# Patient Record
Sex: FEMALE | Race: WHITE | Employment: UNEMPLOYED | URBAN - METROPOLITAN AREA
[De-identification: names, ages, dates, MRNs, and addresses within clinical notes are randomized per-mention and may not be internally consistent; named-entity substitution may affect disease eponyms.]

---

## 2018-02-08 ENCOUNTER — ANESTHESIA EVENT (OUTPATIENT)
Dept: OPERATING ROOM | Age: 83
DRG: 470 | End: 2018-02-08
Payer: MEDICARE

## 2018-02-08 ENCOUNTER — ANESTHESIA (OUTPATIENT)
Dept: OPERATING ROOM | Age: 83
DRG: 470 | End: 2018-02-08
Payer: MEDICARE

## 2018-02-08 ENCOUNTER — APPOINTMENT (OUTPATIENT)
Dept: GENERAL RADIOLOGY | Age: 83
DRG: 470 | End: 2018-02-08
Payer: MEDICARE

## 2018-02-08 ENCOUNTER — HOSPITAL ENCOUNTER (INPATIENT)
Age: 83
LOS: 2 days | Discharge: NURSING, REHAB OR LONG-TERM CARE | DRG: 470 | End: 2018-02-10
Attending: STUDENT IN AN ORGANIZED HEALTH CARE EDUCATION/TRAINING PROGRAM | Admitting: ORTHOPAEDIC SURGERY
Payer: MEDICARE

## 2018-02-08 VITALS — SYSTOLIC BLOOD PRESSURE: 91 MMHG | OXYGEN SATURATION: 95 % | TEMPERATURE: 97.7 F | DIASTOLIC BLOOD PRESSURE: 50 MMHG

## 2018-02-08 DIAGNOSIS — S72.002A CLOSED LEFT HIP FRACTURE, INITIAL ENCOUNTER (HCC): Primary | ICD-10-CM

## 2018-02-08 DIAGNOSIS — S80.02XA CONTUSION OF LEFT KNEE, INITIAL ENCOUNTER: ICD-10-CM

## 2018-02-08 LAB
ABO/RH: NORMAL
ALBUMIN SERPL-MCNC: 3.7 G/DL (ref 3.9–4.9)
ALP BLD-CCNC: 65 U/L (ref 40–130)
ALT SERPL-CCNC: 11 U/L (ref 0–33)
ANION GAP SERPL CALCULATED.3IONS-SCNC: 11 MEQ/L (ref 7–13)
ANTIBODY SCREEN: NORMAL
APTT: 26.8 SEC (ref 21.6–35.4)
AST SERPL-CCNC: 16 U/L (ref 0–35)
BACTERIA: NORMAL /HPF
BASOPHILS ABSOLUTE: 0.1 K/UL (ref 0–0.2)
BASOPHILS RELATIVE PERCENT: 0.9 %
BILIRUB SERPL-MCNC: 0.6 MG/DL (ref 0–1.2)
BILIRUBIN URINE: NEGATIVE
BLOOD, URINE: NEGATIVE
BUN BLDV-MCNC: 26 MG/DL (ref 8–23)
CALCIUM SERPL-MCNC: 8.8 MG/DL (ref 8.6–10.2)
CASTS: NORMAL /LPF
CHLORIDE BLD-SCNC: 107 MEQ/L (ref 98–107)
CLARITY: CLEAR
CO2: 26 MEQ/L (ref 22–29)
COLOR: YELLOW
CREAT SERPL-MCNC: 1.08 MG/DL (ref 0.5–0.9)
EKG ATRIAL RATE: 61 BPM
EKG P AXIS: 74 DEGREES
EKG P-R INTERVAL: 174 MS
EKG Q-T INTERVAL: 440 MS
EKG QRS DURATION: 124 MS
EKG QTC CALCULATION (BAZETT): 442 MS
EKG R AXIS: -41 DEGREES
EKG T AXIS: 3 DEGREES
EKG VENTRICULAR RATE: 61 BPM
EOSINOPHILS ABSOLUTE: 0.2 K/UL (ref 0–0.7)
EOSINOPHILS RELATIVE PERCENT: 1.9 %
EPITHELIAL CELLS, UA: NORMAL /HPF
GFR AFRICAN AMERICAN: 58.7
GFR NON-AFRICAN AMERICAN: 48.5
GLOBULIN: 2.2 G/DL (ref 2.3–3.5)
GLUCOSE BLD-MCNC: 89 MG/DL (ref 74–109)
GLUCOSE URINE: NEGATIVE MG/DL
HCT VFR BLD CALC: 36.4 % (ref 37–47)
HEMOGLOBIN: 11.9 G/DL (ref 12–16)
INR BLD: 1
KETONES, URINE: NEGATIVE MG/DL
LEUKOCYTE ESTERASE, URINE: ABNORMAL
LYMPHOCYTES ABSOLUTE: 0.7 K/UL (ref 1–4.8)
LYMPHOCYTES RELATIVE PERCENT: 7.6 %
MCH RBC QN AUTO: 27.8 PG (ref 27–31.3)
MCHC RBC AUTO-ENTMCNC: 32.6 % (ref 33–37)
MCV RBC AUTO: 85.4 FL (ref 82–100)
MONOCYTES ABSOLUTE: 1 K/UL (ref 0.2–0.8)
MONOCYTES RELATIVE PERCENT: 10.8 %
NEUTROPHILS ABSOLUTE: 7.5 K/UL (ref 1.4–6.5)
NEUTROPHILS RELATIVE PERCENT: 78.8 %
NITRITE, URINE: NEGATIVE
PDW BLD-RTO: 15.1 % (ref 11.5–14.5)
PH UA: 7.5 (ref 5–9)
PLATELET # BLD: 209 K/UL (ref 130–400)
POTASSIUM SERPL-SCNC: 4.7 MEQ/L (ref 3.5–5.1)
PROTEIN UA: NEGATIVE MG/DL
PROTHROMBIN TIME: 10.8 SEC (ref 8.1–13.7)
RBC # BLD: 4.26 M/UL (ref 4.2–5.4)
RBC UA: NORMAL /HPF (ref 0–2)
SODIUM BLD-SCNC: 144 MEQ/L (ref 132–144)
SPECIFIC GRAVITY UA: 1.01 (ref 1–1.03)
TOTAL PROTEIN: 5.9 G/DL (ref 6.4–8.1)
URINE REFLEX TO CULTURE: YES
UROBILINOGEN, URINE: 0.2 E.U./DL
WBC # BLD: 9.6 K/UL (ref 4.8–10.8)
WBC UA: NORMAL /HPF (ref 0–5)

## 2018-02-08 PROCEDURE — 6360000002 HC RX W HCPCS: Performed by: ORTHOPAEDIC SURGERY

## 2018-02-08 PROCEDURE — C1776 JOINT DEVICE (IMPLANTABLE): HCPCS | Performed by: ORTHOPAEDIC SURGERY

## 2018-02-08 PROCEDURE — 2500000003 HC RX 250 WO HCPCS: Performed by: ORTHOPAEDIC SURGERY

## 2018-02-08 PROCEDURE — 6370000000 HC RX 637 (ALT 250 FOR IP): Performed by: ORTHOPAEDIC SURGERY

## 2018-02-08 PROCEDURE — 3600000014 HC SURGERY LEVEL 4 ADDTL 15MIN: Performed by: ORTHOPAEDIC SURGERY

## 2018-02-08 PROCEDURE — 80053 COMPREHEN METABOLIC PANEL: CPT

## 2018-02-08 PROCEDURE — 7100000000 HC PACU RECOVERY - FIRST 15 MIN: Performed by: ORTHOPAEDIC SURGERY

## 2018-02-08 PROCEDURE — 73562 X-RAY EXAM OF KNEE 3: CPT

## 2018-02-08 PROCEDURE — 85730 THROMBOPLASTIN TIME PARTIAL: CPT

## 2018-02-08 PROCEDURE — 86901 BLOOD TYPING SEROLOGIC RH(D): CPT

## 2018-02-08 PROCEDURE — 3600000004 HC SURGERY LEVEL 4 BASE: Performed by: ORTHOPAEDIC SURGERY

## 2018-02-08 PROCEDURE — 81001 URINALYSIS AUTO W/SCOPE: CPT

## 2018-02-08 PROCEDURE — 2580000003 HC RX 258: Performed by: PHYSICIAN ASSISTANT

## 2018-02-08 PROCEDURE — 6370000000 HC RX 637 (ALT 250 FOR IP): Performed by: INTERNAL MEDICINE

## 2018-02-08 PROCEDURE — 2580000003 HC RX 258: Performed by: ORTHOPAEDIC SURGERY

## 2018-02-08 PROCEDURE — 85025 COMPLETE CBC W/AUTO DIFF WBC: CPT

## 2018-02-08 PROCEDURE — 36415 COLL VENOUS BLD VENIPUNCTURE: CPT

## 2018-02-08 PROCEDURE — 0SRS0J9 REPLACEMENT OF LEFT HIP JOINT, FEMORAL SURFACE WITH SYNTHETIC SUBSTITUTE, CEMENTED, OPEN APPROACH: ICD-10-PCS | Performed by: ORTHOPAEDIC SURGERY

## 2018-02-08 PROCEDURE — 87086 URINE CULTURE/COLONY COUNT: CPT

## 2018-02-08 PROCEDURE — 1210000000 HC MED SURG R&B

## 2018-02-08 PROCEDURE — 73502 X-RAY EXAM HIP UNI 2-3 VIEWS: CPT

## 2018-02-08 PROCEDURE — 2580000003 HC RX 258: Performed by: NURSE ANESTHETIST, CERTIFIED REGISTERED

## 2018-02-08 PROCEDURE — 99285 EMERGENCY DEPT VISIT HI MDM: CPT

## 2018-02-08 PROCEDURE — 86900 BLOOD TYPING SEROLOGIC ABO: CPT

## 2018-02-08 PROCEDURE — 3700000001 HC ADD 15 MINUTES (ANESTHESIA): Performed by: ORTHOPAEDIC SURGERY

## 2018-02-08 PROCEDURE — 6360000002 HC RX W HCPCS: Performed by: NURSE ANESTHETIST, CERTIFIED REGISTERED

## 2018-02-08 PROCEDURE — 2500000003 HC RX 250 WO HCPCS: Performed by: NURSE ANESTHETIST, CERTIFIED REGISTERED

## 2018-02-08 PROCEDURE — C1773 RET DEV, INSERTABLE: HCPCS | Performed by: ORTHOPAEDIC SURGERY

## 2018-02-08 PROCEDURE — 3700000000 HC ANESTHESIA ATTENDED CARE: Performed by: ORTHOPAEDIC SURGERY

## 2018-02-08 PROCEDURE — 73501 X-RAY EXAM HIP UNI 1 VIEW: CPT

## 2018-02-08 PROCEDURE — 7100000001 HC PACU RECOVERY - ADDTL 15 MIN: Performed by: ORTHOPAEDIC SURGERY

## 2018-02-08 PROCEDURE — C1713 ANCHOR/SCREW BN/BN,TIS/BN: HCPCS | Performed by: ORTHOPAEDIC SURGERY

## 2018-02-08 PROCEDURE — 6360000002 HC RX W HCPCS: Performed by: ANESTHESIOLOGY

## 2018-02-08 PROCEDURE — 71045 X-RAY EXAM CHEST 1 VIEW: CPT

## 2018-02-08 PROCEDURE — 73552 X-RAY EXAM OF FEMUR 2/>: CPT

## 2018-02-08 PROCEDURE — 85610 PROTHROMBIN TIME: CPT

## 2018-02-08 PROCEDURE — 2720000010 HC SURG SUPPLY STERILE: Performed by: ORTHOPAEDIC SURGERY

## 2018-02-08 PROCEDURE — A6251 ABSORPT DRG <=16 SQ IN W/O B: HCPCS | Performed by: ORTHOPAEDIC SURGERY

## 2018-02-08 PROCEDURE — 6360000002 HC RX W HCPCS: Performed by: PHYSICIAN ASSISTANT

## 2018-02-08 PROCEDURE — 93005 ELECTROCARDIOGRAM TRACING: CPT

## 2018-02-08 PROCEDURE — 86850 RBC ANTIBODY SCREEN: CPT

## 2018-02-08 DEVICE — COMPONENT TOT HIP CAPPED STD FRAC: Type: IMPLANTABLE DEVICE | Site: HIP | Status: FUNCTIONAL

## 2018-02-08 DEVICE — STEM FEM SZ 5 L145MM NK L37MM 44MM OFFSET 132DEG PROX DST: Type: IMPLANTABLE DEVICE | Site: HIP | Status: FUNCTIONAL

## 2018-02-08 DEVICE — SLEEVE FEM 0MM HIP VIT UPLR NK ADJ TAPR UNITRAX: Type: IMPLANTABLE DEVICE | Site: HIP | Status: FUNCTIONAL

## 2018-02-08 DEVICE — IMPLANTABLE DEVICE: Type: IMPLANTABLE DEVICE | Site: HIP | Status: FUNCTIONAL

## 2018-02-08 DEVICE — CEMENT BNE RADIOPAQUE FAST SET ACRYL RESIN HI VISC SIMPLEXHV: Type: IMPLANTABLE DEVICE | Site: HIP | Status: FUNCTIONAL

## 2018-02-08 RX ORDER — ONDANSETRON 2 MG/ML
4 INJECTION INTRAMUSCULAR; INTRAVENOUS EVERY 6 HOURS PRN
Status: DISCONTINUED | OUTPATIENT
Start: 2018-02-08 | End: 2018-02-10 | Stop reason: HOSPADM

## 2018-02-08 RX ORDER — SODIUM CHLORIDE 0.9 % (FLUSH) 0.9 %
10 SYRINGE (ML) INJECTION PRN
Status: DISCONTINUED | OUTPATIENT
Start: 2018-02-08 | End: 2018-02-08 | Stop reason: HOSPADM

## 2018-02-08 RX ORDER — SODIUM CHLORIDE 0.9 % (FLUSH) 0.9 %
10 SYRINGE (ML) INJECTION EVERY 12 HOURS SCHEDULED
Status: DISCONTINUED | OUTPATIENT
Start: 2018-02-08 | End: 2018-02-08 | Stop reason: HOSPADM

## 2018-02-08 RX ORDER — MORPHINE SULFATE 4 MG/ML
4 INJECTION, SOLUTION INTRAMUSCULAR; INTRAVENOUS
Status: DISCONTINUED | OUTPATIENT
Start: 2018-02-08 | End: 2018-02-10 | Stop reason: HOSPADM

## 2018-02-08 RX ORDER — ONDANSETRON 2 MG/ML
4 INJECTION INTRAMUSCULAR; INTRAVENOUS EVERY 6 HOURS PRN
Status: DISCONTINUED | OUTPATIENT
Start: 2018-02-08 | End: 2018-02-08

## 2018-02-08 RX ORDER — MORPHINE SULFATE 4 MG/ML
4 INJECTION, SOLUTION INTRAMUSCULAR; INTRAVENOUS
Status: DISCONTINUED | OUTPATIENT
Start: 2018-02-08 | End: 2018-02-08

## 2018-02-08 RX ORDER — METOPROLOL TARTRATE 50 MG/1
50 TABLET, FILM COATED ORAL 2 TIMES DAILY
Status: DISCONTINUED | OUTPATIENT
Start: 2018-02-08 | End: 2018-02-09

## 2018-02-08 RX ORDER — OXYCODONE HYDROCHLORIDE AND ACETAMINOPHEN 5; 325 MG/1; MG/1
1 TABLET ORAL EVERY 4 HOURS PRN
Status: DISCONTINUED | OUTPATIENT
Start: 2018-02-08 | End: 2018-02-10 | Stop reason: HOSPADM

## 2018-02-08 RX ORDER — AMLODIPINE BESYLATE 5 MG/1
5 TABLET ORAL DAILY
Status: DISCONTINUED | OUTPATIENT
Start: 2018-02-09 | End: 2018-02-10 | Stop reason: HOSPADM

## 2018-02-08 RX ORDER — OXYCODONE HYDROCHLORIDE AND ACETAMINOPHEN 5; 325 MG/1; MG/1
2 TABLET ORAL EVERY 4 HOURS PRN
Status: DISCONTINUED | OUTPATIENT
Start: 2018-02-08 | End: 2018-02-08

## 2018-02-08 RX ORDER — ROCURONIUM BROMIDE 10 MG/ML
INJECTION, SOLUTION INTRAVENOUS PRN
Status: DISCONTINUED | OUTPATIENT
Start: 2018-02-08 | End: 2018-02-08 | Stop reason: SDUPTHER

## 2018-02-08 RX ORDER — SODIUM CHLORIDE 0.9 % (FLUSH) 0.9 %
10 SYRINGE (ML) INJECTION EVERY 12 HOURS SCHEDULED
Status: DISCONTINUED | OUTPATIENT
Start: 2018-02-08 | End: 2018-02-10 | Stop reason: HOSPADM

## 2018-02-08 RX ORDER — ONDANSETRON 2 MG/ML
4 INJECTION INTRAMUSCULAR; INTRAVENOUS
Status: DISCONTINUED | OUTPATIENT
Start: 2018-02-08 | End: 2018-02-08 | Stop reason: HOSPADM

## 2018-02-08 RX ORDER — SODIUM CHLORIDE 9 MG/ML
INJECTION, SOLUTION INTRAVENOUS CONTINUOUS
Status: DISCONTINUED | OUTPATIENT
Start: 2018-02-08 | End: 2018-02-08

## 2018-02-08 RX ORDER — DOCUSATE SODIUM 100 MG/1
100 CAPSULE, LIQUID FILLED ORAL 2 TIMES DAILY
Status: DISCONTINUED | OUTPATIENT
Start: 2018-02-08 | End: 2018-02-10 | Stop reason: HOSPADM

## 2018-02-08 RX ORDER — HYDROCODONE BITARTRATE AND ACETAMINOPHEN 5; 325 MG/1; MG/1
1 TABLET ORAL PRN
Status: DISCONTINUED | OUTPATIENT
Start: 2018-02-08 | End: 2018-02-08 | Stop reason: HOSPADM

## 2018-02-08 RX ORDER — ONDANSETRON 2 MG/ML
INJECTION INTRAMUSCULAR; INTRAVENOUS PRN
Status: DISCONTINUED | OUTPATIENT
Start: 2018-02-08 | End: 2018-02-08 | Stop reason: SDUPTHER

## 2018-02-08 RX ORDER — PROPOFOL 10 MG/ML
INJECTION, EMULSION INTRAVENOUS PRN
Status: DISCONTINUED | OUTPATIENT
Start: 2018-02-08 | End: 2018-02-08 | Stop reason: SDUPTHER

## 2018-02-08 RX ORDER — MAGNESIUM HYDROXIDE 1200 MG/15ML
LIQUID ORAL CONTINUOUS PRN
Status: DISCONTINUED | OUTPATIENT
Start: 2018-02-08 | End: 2018-02-08 | Stop reason: HOSPADM

## 2018-02-08 RX ORDER — ASPIRIN 81 MG/1
81 TABLET ORAL DAILY
Status: DISCONTINUED | OUTPATIENT
Start: 2018-02-09 | End: 2018-02-10 | Stop reason: HOSPADM

## 2018-02-08 RX ORDER — MORPHINE SULFATE 2 MG/ML
2 INJECTION, SOLUTION INTRAMUSCULAR; INTRAVENOUS
Status: DISCONTINUED | OUTPATIENT
Start: 2018-02-08 | End: 2018-02-08

## 2018-02-08 RX ORDER — SENNA AND DOCUSATE SODIUM 50; 8.6 MG/1; MG/1
1 TABLET, FILM COATED ORAL DAILY
Status: DISCONTINUED | OUTPATIENT
Start: 2018-02-08 | End: 2018-02-10 | Stop reason: HOSPADM

## 2018-02-08 RX ORDER — OXYCODONE HYDROCHLORIDE AND ACETAMINOPHEN 5; 325 MG/1; MG/1
2 TABLET ORAL EVERY 4 HOURS PRN
Status: DISCONTINUED | OUTPATIENT
Start: 2018-02-08 | End: 2018-02-10 | Stop reason: HOSPADM

## 2018-02-08 RX ORDER — FENTANYL CITRATE 50 UG/ML
INJECTION, SOLUTION INTRAMUSCULAR; INTRAVENOUS PRN
Status: DISCONTINUED | OUTPATIENT
Start: 2018-02-08 | End: 2018-02-08 | Stop reason: SDUPTHER

## 2018-02-08 RX ORDER — KETOROLAC TROMETHAMINE 15 MG/ML
15 INJECTION, SOLUTION INTRAMUSCULAR; INTRAVENOUS EVERY 6 HOURS
Status: COMPLETED | OUTPATIENT
Start: 2018-02-08 | End: 2018-02-09

## 2018-02-08 RX ORDER — FENTANYL CITRATE 50 UG/ML
50 INJECTION, SOLUTION INTRAMUSCULAR; INTRAVENOUS EVERY 10 MIN PRN
Status: DISCONTINUED | OUTPATIENT
Start: 2018-02-08 | End: 2018-02-08 | Stop reason: HOSPADM

## 2018-02-08 RX ORDER — OXYCODONE HYDROCHLORIDE AND ACETAMINOPHEN 5; 325 MG/1; MG/1
1 TABLET ORAL EVERY 4 HOURS PRN
Status: DISCONTINUED | OUTPATIENT
Start: 2018-02-08 | End: 2018-02-08

## 2018-02-08 RX ORDER — LOSARTAN POTASSIUM 50 MG/1
50 TABLET ORAL 2 TIMES DAILY
Status: DISCONTINUED | OUTPATIENT
Start: 2018-02-09 | End: 2018-02-10

## 2018-02-08 RX ORDER — DIPHENHYDRAMINE HYDROCHLORIDE 50 MG/ML
12.5 INJECTION INTRAMUSCULAR; INTRAVENOUS
Status: DISCONTINUED | OUTPATIENT
Start: 2018-02-08 | End: 2018-02-08 | Stop reason: HOSPADM

## 2018-02-08 RX ORDER — MEPERIDINE HYDROCHLORIDE 25 MG/ML
12.5 INJECTION INTRAMUSCULAR; INTRAVENOUS; SUBCUTANEOUS EVERY 5 MIN PRN
Status: DISCONTINUED | OUTPATIENT
Start: 2018-02-08 | End: 2018-02-08 | Stop reason: HOSPADM

## 2018-02-08 RX ORDER — POTASSIUM CHLORIDE 750 MG/1
10 CAPSULE, EXTENDED RELEASE ORAL
COMMUNITY
End: 2021-04-27

## 2018-02-08 RX ORDER — SODIUM CHLORIDE, SODIUM LACTATE, POTASSIUM CHLORIDE, CALCIUM CHLORIDE 600; 310; 30; 20 MG/100ML; MG/100ML; MG/100ML; MG/100ML
INJECTION, SOLUTION INTRAVENOUS CONTINUOUS PRN
Status: DISCONTINUED | OUTPATIENT
Start: 2018-02-08 | End: 2018-02-08 | Stop reason: SDUPTHER

## 2018-02-08 RX ORDER — HYDROCODONE BITARTRATE AND ACETAMINOPHEN 5; 325 MG/1; MG/1
2 TABLET ORAL PRN
Status: DISCONTINUED | OUTPATIENT
Start: 2018-02-08 | End: 2018-02-08 | Stop reason: HOSPADM

## 2018-02-08 RX ORDER — METOPROLOL TARTRATE 50 MG/1
50 TABLET, FILM COATED ORAL 2 TIMES DAILY
Status: ON HOLD | COMMUNITY
End: 2018-02-22 | Stop reason: HOSPADM

## 2018-02-08 RX ORDER — LIDOCAINE HYDROCHLORIDE 20 MG/ML
INJECTION, SOLUTION INFILTRATION; PERINEURAL PRN
Status: DISCONTINUED | OUTPATIENT
Start: 2018-02-08 | End: 2018-02-08 | Stop reason: SDUPTHER

## 2018-02-08 RX ORDER — LOSARTAN POTASSIUM 50 MG/1
50 TABLET ORAL 2 TIMES DAILY
COMMUNITY

## 2018-02-08 RX ORDER — SODIUM CHLORIDE 0.9 % (FLUSH) 0.9 %
10 SYRINGE (ML) INJECTION PRN
Status: DISCONTINUED | OUTPATIENT
Start: 2018-02-08 | End: 2018-02-10 | Stop reason: HOSPADM

## 2018-02-08 RX ORDER — MORPHINE SULFATE 2 MG/ML
2 INJECTION, SOLUTION INTRAMUSCULAR; INTRAVENOUS
Status: DISCONTINUED | OUTPATIENT
Start: 2018-02-08 | End: 2018-02-10 | Stop reason: HOSPADM

## 2018-02-08 RX ORDER — SODIUM CHLORIDE 9 MG/ML
INJECTION, SOLUTION INTRAVENOUS CONTINUOUS
Status: DISCONTINUED | OUTPATIENT
Start: 2018-02-08 | End: 2018-02-10 | Stop reason: HOSPADM

## 2018-02-08 RX ORDER — ACETAMINOPHEN 325 MG/1
650 TABLET ORAL EVERY 4 HOURS PRN
Status: DISCONTINUED | OUTPATIENT
Start: 2018-02-08 | End: 2018-02-10 | Stop reason: HOSPADM

## 2018-02-08 RX ORDER — ONDANSETRON 2 MG/ML
4 INJECTION INTRAMUSCULAR; INTRAVENOUS ONCE
Status: COMPLETED | OUTPATIENT
Start: 2018-02-08 | End: 2018-02-08

## 2018-02-08 RX ORDER — MORPHINE SULFATE 4 MG/ML
4 INJECTION, SOLUTION INTRAMUSCULAR; INTRAVENOUS ONCE
Status: COMPLETED | OUTPATIENT
Start: 2018-02-08 | End: 2018-02-08

## 2018-02-08 RX ORDER — METOCLOPRAMIDE HYDROCHLORIDE 5 MG/ML
10 INJECTION INTRAMUSCULAR; INTRAVENOUS
Status: DISCONTINUED | OUTPATIENT
Start: 2018-02-08 | End: 2018-02-08 | Stop reason: HOSPADM

## 2018-02-08 RX ADMIN — SODIUM CHLORIDE: 9 INJECTION, SOLUTION INTRAVENOUS at 11:26

## 2018-02-08 RX ADMIN — KETOROLAC TROMETHAMINE 15 MG: 15 INJECTION, SOLUTION INTRAMUSCULAR; INTRAVENOUS at 18:31

## 2018-02-08 RX ADMIN — CEFAZOLIN SODIUM 2 G: 2 SOLUTION INTRAVENOUS at 22:16

## 2018-02-08 RX ADMIN — FENTANYL CITRATE 50 MCG: 50 INJECTION, SOLUTION INTRAMUSCULAR; INTRAVENOUS at 14:25

## 2018-02-08 RX ADMIN — SUGAMMADEX 75 MG: 100 INJECTION, SOLUTION INTRAVENOUS at 14:55

## 2018-02-08 RX ADMIN — ONDANSETRON 4 MG: 2 INJECTION INTRAMUSCULAR; INTRAVENOUS at 14:35

## 2018-02-08 RX ADMIN — OXYCODONE HYDROCHLORIDE AND ACETAMINOPHEN 1 TABLET: 5; 325 TABLET ORAL at 18:32

## 2018-02-08 RX ADMIN — CEFAZOLIN SODIUM 2 G: 2 SOLUTION INTRAVENOUS at 14:00

## 2018-02-08 RX ADMIN — SENNOSIDES AND DOCUSATE SODIUM 1 TABLET: 8.6; 5 TABLET ORAL at 22:16

## 2018-02-08 RX ADMIN — ONDANSETRON 4 MG: 2 INJECTION INTRAMUSCULAR; INTRAVENOUS at 10:52

## 2018-02-08 RX ADMIN — DOCUSATE SODIUM 100 MG: 100 CAPSULE, LIQUID FILLED ORAL at 22:17

## 2018-02-08 RX ADMIN — ROCURONIUM BROMIDE 25 MG: 10 INJECTION INTRAVENOUS at 14:06

## 2018-02-08 RX ADMIN — FENTANYL CITRATE 50 MCG: 50 INJECTION, SOLUTION INTRAMUSCULAR; INTRAVENOUS at 14:05

## 2018-02-08 RX ADMIN — FENTANYL CITRATE 50 MCG: 50 INJECTION, SOLUTION INTRAMUSCULAR; INTRAVENOUS at 15:56

## 2018-02-08 RX ADMIN — ROCURONIUM BROMIDE 15 MG: 10 INJECTION INTRAVENOUS at 14:20

## 2018-02-08 RX ADMIN — SODIUM CHLORIDE, POTASSIUM CHLORIDE, SODIUM LACTATE AND CALCIUM CHLORIDE: 600; 310; 30; 20 INJECTION, SOLUTION INTRAVENOUS at 14:00

## 2018-02-08 RX ADMIN — SODIUM CHLORIDE: 9 INJECTION, SOLUTION INTRAVENOUS at 18:32

## 2018-02-08 RX ADMIN — Medication 4 MG: at 11:00

## 2018-02-08 RX ADMIN — OXYCODONE HYDROCHLORIDE AND ACETAMINOPHEN 1 TABLET: 5; 325 TABLET ORAL at 22:18

## 2018-02-08 RX ADMIN — FENTANYL CITRATE 50 MCG: 50 INJECTION, SOLUTION INTRAMUSCULAR; INTRAVENOUS at 15:42

## 2018-02-08 RX ADMIN — METOPROLOL TARTRATE 50 MG: 50 TABLET, FILM COATED ORAL at 22:17

## 2018-02-08 RX ADMIN — PROPOFOL 140 MG: 10 INJECTION, EMULSION INTRAVENOUS at 14:06

## 2018-02-08 RX ADMIN — LIDOCAINE HYDROCHLORIDE 50 MG: 20 INJECTION, SOLUTION INFILTRATION; PERINEURAL at 14:05

## 2018-02-08 ASSESSMENT — PULMONARY FUNCTION TESTS
PIF_VALUE: 1
PIF_VALUE: 18
PIF_VALUE: 2
PIF_VALUE: 16
PIF_VALUE: 10
PIF_VALUE: 27
PIF_VALUE: 16
PIF_VALUE: 10
PIF_VALUE: 1
PIF_VALUE: 10
PIF_VALUE: 17
PIF_VALUE: 17
PIF_VALUE: 27
PIF_VALUE: 16
PIF_VALUE: 1
PIF_VALUE: 16
PIF_VALUE: 1
PIF_VALUE: 2
PIF_VALUE: 17
PIF_VALUE: 16
PIF_VALUE: 16
PIF_VALUE: 2
PIF_VALUE: 16
PIF_VALUE: 17
PIF_VALUE: 10
PIF_VALUE: 16
PIF_VALUE: 17
PIF_VALUE: 25
PIF_VALUE: 10
PIF_VALUE: 17
PIF_VALUE: 16
PIF_VALUE: 16
PIF_VALUE: 10
PIF_VALUE: 16
PIF_VALUE: 10
PIF_VALUE: 16
PIF_VALUE: 1
PIF_VALUE: 16
PIF_VALUE: 22
PIF_VALUE: 9
PIF_VALUE: 16
PIF_VALUE: 12
PIF_VALUE: 17
PIF_VALUE: 1
PIF_VALUE: 7
PIF_VALUE: 16
PIF_VALUE: 10
PIF_VALUE: 11
PIF_VALUE: 3
PIF_VALUE: 1
PIF_VALUE: 10
PIF_VALUE: 2
PIF_VALUE: 1
PIF_VALUE: 16
PIF_VALUE: 16
PIF_VALUE: 17
PIF_VALUE: 20
PIF_VALUE: 12
PIF_VALUE: 0
PIF_VALUE: 16
PIF_VALUE: 2
PIF_VALUE: 15
PIF_VALUE: 16

## 2018-02-08 ASSESSMENT — ENCOUNTER SYMPTOMS
EYE DISCHARGE: 0
SHORTNESS OF BREATH: 0
ABDOMINAL DISTENTION: 0
ABDOMINAL PAIN: 0
RHINORRHEA: 0
COLOR CHANGE: 0
BACK PAIN: 0
SORE THROAT: 0
CONSTIPATION: 0

## 2018-02-08 ASSESSMENT — PAIN DESCRIPTION - DESCRIPTORS: DESCRIPTORS: ACHING;SHARP

## 2018-02-08 ASSESSMENT — PAIN SCALES - GENERAL
PAINLEVEL_OUTOF10: 8
PAINLEVEL_OUTOF10: 10
PAINLEVEL_OUTOF10: 0
PAINLEVEL_OUTOF10: 5
PAINLEVEL_OUTOF10: 8
PAINLEVEL_OUTOF10: 10
PAINLEVEL_OUTOF10: 5

## 2018-02-08 ASSESSMENT — PAIN - FUNCTIONAL ASSESSMENT: PAIN_FUNCTIONAL_ASSESSMENT: 0-10

## 2018-02-08 ASSESSMENT — PAIN DESCRIPTION - ORIENTATION: ORIENTATION: LEFT

## 2018-02-08 ASSESSMENT — PAIN DESCRIPTION - LOCATION: LOCATION: HIP;KNEE

## 2018-02-08 ASSESSMENT — PAIN DESCRIPTION - PAIN TYPE: TYPE: ACUTE PAIN

## 2018-02-08 NOTE — ANESTHESIA PRE PROCEDURE
Department of Anesthesiology  Preprocedure Note       Name:  Johnson Valdes   Age:  80 y.o.  :  1935                                          MRN:  [id number]         Date:  2018      Surgeon: Mook Baptiste): Ruba Dawson MD    Procedure: Procedure(s):  HIP HEMIARTHROPLASTY-    Medications prior to admission:   Prior to Admission medications    Medication Sig Start Date End Date Taking? Authorizing Provider   losartan (COZAAR) 50 MG tablet Take 50 mg by mouth 2 times daily   Yes Historical Provider, MD   metoprolol tartrate (LOPRESSOR) 50 MG tablet Take 50 mg by mouth 2 times daily   Yes Historical Provider, MD   potassium chloride (MICRO-K) 10 MEQ extended release capsule Take 10 mEq by mouth   Yes Historical Provider, MD   amLODIPine (NORVASC) 5 MG tablet 5 mg daily  12/2/15  Yes Historical Provider, MD   aspirin 81 MG EC tablet Take 81 mg by mouth daily. Yes Historical Provider, MD   Naproxen Sodium (ALEVE PO) Take by mouth    Historical Provider, MD   ROSE MARY ASPIRIN PO Take by mouth    Historical Provider, MD   hydrochlorothiazide (HYDRODIURIL) 25 MG tablet  16   Historical Provider, MD   magnesium oxide (MAG-OX) 400 MG tablet Take 400 mg by mouth daily    Historical Provider, MD   nitroGLYCERIN (NITROLINGUAL) 0.4 MG/SPRAY spray Place 1 spray onto the tongue every 5 minutes as needed for Chest pain    Historical Provider, MD   Cholecalciferol (VITAMIN D3) 1000 UNITS TABS Take by mouth    Historical Provider, MD   calcium carbonate (OSCAL) 500 MG TABS tablet Take 500 mg by mouth daily. Historical Provider, MD   acetaminophen (TYLENOL) 500 MG tablet Take 500 mg by mouth every 6 hours as needed.       Historical Provider, MD       Current medications:    Current Facility-Administered Medications   Medication Dose Route Frequency Provider Last Rate Last Dose    sodium chloride flush 0.9 % injection 10 mL  10 mL Intravenous 2 times per day Kaleigh Lopes CNP        sodium chloride

## 2018-02-08 NOTE — ED TRIAGE NOTES
Pt states at [facility] yesterday, slipped on ice and fell, landed on left leg and buttock, states hit head, no loc, didn't get checked out yesterday. States having a lot of pain to left hip, groin and left knee.

## 2018-02-08 NOTE — ED PROVIDER NOTES
History reviewed. No pertinent family history. SOCIAL HISTORY       Social History     Social History    Marital status:      Spouse name: N/A    Number of children: N/A    Years of education: N/A     Social History Main Topics    Smoking status: Former Smoker    Smokeless tobacco: None    Alcohol use None    Drug use: Unknown    Sexual activity: Not Asked     Other Topics Concern    None     Social History Narrative    None       SCREENINGS             PHYSICAL EXAM    (up to 7 for level 4, 8 or more for level 5)     ED Triage Vitals [02/08/18 0856]   BP Temp Temp Source Pulse Resp SpO2 Height Weight   (!) 145/56 98 °F (36.7 °C) Oral 60 16 95 % 5' 3\" (1.6 m) 200 lb (90.7 kg)       Physical Exam   Constitutional: She is oriented to person, place, and time. She appears well-developed and well-nourished. HENT:   Head: Normocephalic. Eyes: Pupils are equal, round, and reactive to light. Neck: Neck supple. No JVD present. No tracheal deviation present. Cardiovascular: Normal rate. Pulmonary/Chest: Effort normal. No respiratory distress. She has no wheezes. She has no rales. She exhibits no tenderness. Abdominal: Soft. Bowel sounds are normal. She exhibits no distension and no mass. There is no tenderness. There is no guarding. Musculoskeletal: Normal range of motion. She exhibits no edema or deformity. Legs:  Patient has pain on palpation to left posterior hip and left groin no deformity or crepitus no shortening or rotation, neurovascularly intact left lower extremity. She also complains of pain to the left knee there is no bruising. No abrasionsdeformity crepitus   Neurological: She is alert and oriented to person, place, and time. Coordination normal.   Skin: Skin is warm.        DIAGNOSTIC RESULTS     EKG: All EKG's are interpreted by the Emergency Department Physician who either signs or Co-signs this chart in the absence of a cardiologist.  EKG shows sinus rhythm with consults [facility] hospitalist as well as [facility] cardiology for clearance for surgery. CRITICAL CARE TIME   Total Critical Care time was 0 minutes, excluding separately reportable procedures. There was a high probability of clinically significant/life threatening deterioration in the patient's condition which required my urgent intervention. CONSULTS:  IP CONSULT TO ORTHOPEDIC SURGERY  IP CONSULT TO CARDIOLOGY  IP CONSULT TO HOSPITALIST  IP CONSULT TO CARDIOLOGY  IP CONSULT TO HOSPITALIST  IP CONSULT TO CASE MANAGEMENT  IP CONSULT TO SOCIAL WORK    PROCEDURES:  Unless otherwise noted below, none     Procedures    FINAL IMPRESSION      1. Closed left hip fracture, initial encounter ([facility] [address]    2.  Contusion of left knee, initial encounter          DISPOSITION/PLAN   DISPOSITION Admitted 02/08/2018 10:24:45 AM      PATIENT REFERRED TO:  Jazzmine Diaz MD  [place] [phone]            DISCHARGE MEDICATIONS:  New Prescriptions    No medications on file          (Please note that portions of this note were completed with a voice recognition program.  Efforts were made to edit the dictations but occasionally words are mis-transcribed.)    Ashok Downey PA-C (electronically signed)  Attending Emergency Physician         Ashok Downey PA-C  02/08/18 8097

## 2018-02-08 NOTE — CONSULTS
150/75, pulse rate of  61, respiratory rate of 16, temperature of 98.1. CVS:  2+.  RS:  Generally _____. ABDOMEN:  Soft. CENTRAL NERVOUS SYSTEM:  Shows no obvious focal deficits. EXTREMITIES:  Left hip decreased range of motion and diffuse tenderness  noted. Labs so far, sodium 145, potassium 4.7, BUN 26, creatinine 1.08. LFTs were  within normal limits. White cell count 9.6, hemoglobin 11.9, platelets  001. Urinalysis showed small amount of leukocyte esterases. EKG did not show any evidence for acute ischemia. IMPRESSION:  1. Left hip fracture. 2.  History of hypertension. 3.  History of hyperlipidemia. 4.  History of coronary artery disease. The patient is stable at this time. No active cardiac symptoms. No  evidence to suggest any stroke. We will clear this patient for surgery and  we will follow up as needed.       Najma Sexton MD    D: 02/08/2018 11:49:06       T: 02/08/2018 13:42:46     [email or web address]  Job#: [id number]     Doc#: [id number]    CC:

## 2018-02-08 NOTE — BRIEF OP NOTE
Brief Postoperative Note  ______________________________________________________________    Patient: Janell Mcneill  YOB: 1935  MRN: [id number]  Date of Procedure: 2/8/2018    Pre-Op Diagnosis: hip fracture    Post-Op Diagnosis: Same       Procedure(s):  HIP HEMIARTHROPLASTY-    Anesthesia: General    Surgeon(s): Awilda Lujan MD    Staff:  Surgical Assistant: Nam Stele Person First: Franceen Goodell Ardiri  Physician Assistant: CHIARA Landaverde     Estimated Blood Loss: * No values recorded between 2/8/2018  2:00 PM and 2/8/2018  2:70 PM * mL    Complications: None    Specimens:   * No specimens in log *    Implants:    Implant Name Type Inv.  Item Serial No.  Lot No. LRB No. Used   IMPL CEMENT SIMPLEX HV Shoulder/Arm/Wrist/Hand IMPL CEMENT SIMPLEX HV  [employer]: [employer] [id number] Left 2   IMPL HIP FEM STEM [id number] SZ 5 Hip IMPL HIP FEM STEM [id number] SZ 5  [employer]: [employer] [id number] Left 1   IMPL HIP FEM SPACR DISTL CENTRALZR Hip IMPL HIP FEM SPACR DISTL CENTRALZR  [employer]: ORTH[employer] [id number] Left 1   IMPL HIP SLV ADJ STD NECK UNITRAX 0MM Hip IMPL HIP SLV ADJ STD NECK UNITRAX 0MM  [employer]: ORTH[employer] [id number] Left 1   IMPL HIP FEM HEAD MOD UNIPOLAR 46MM Hip IMPL HIP FEM HEAD MOD UNIPOLAR 46MM   [employer]TH[employer] [id number] Left 1         Drains:      Findings: fx    Awilda Lujan MD  Date: 2/8/2018  Time: 2:53 PM

## 2018-02-08 NOTE — CONSULTS
patient is status post coronary artery bypass grafting and has  hypertension, dyslipidemia. Most recent cardiac catheterization was in  2017, at which time, left ventricular ejection fraction was 60%, normal  left main, 70% mid LAD, 80% distal LAD, 50% obtuse marginal branch, 95% mid  circumflex, 75% lateral circumflex, RCA 80%, mid 80% distal.  Right  internal carotid artery 30% stenosis, left 50% stenosis. Renal arteries  normal.  Her GRF then was 70.4. Perfusion imaging study in 11/2016 was reported to be normal.  Left  ventricular ejection fraction was reported to be 82%. Her coronary artery bypass grafting consisted of LIMA to the LAD, SVT to  the first obtuse marginal branch, second obtuse marginal branch, the first  diagonal, and the right coronary artery. The patient is status post  appendectomy and hysterectomy. She also has peripheral vascular disease  and is status post left iliac angioplasty. She is status post appendectomy  and hysterectomy. She is status post exploratory laparotomy for ruptured  diverticulum, she is status post cholecystectomy. PHYSICAL EXAMINATION:  GENERAL:  She is lying flat in bed, in no obvious distress. NECK:  Did not appreciate any jugular venous distention or carotid bruit. LUNGS:  Clear anteriorly and laterally. CARDIOVASCULAR:  Heart sounds are regular without murmur, rub or gallop. ABDOMEN:  Soft and nontender. EXTREMITIES:  Showed no edema. The left lower extremity is laterally  rotated. VITAL SIGNS:  Blood pressure 150/75 and oxygen saturation is 94% on room  air. She is afebrile. Heart rate is in the 60s. NEUROLOGIC:  She is alert and oriented x3. Following simple commands. Detailed neurologic examination is not performed. SKIN:  Shows no petechiae or rash. LABORATORY DATA:  Sodium 144, potassium 4.7, GFR 48.5. Hemoglobin 11.9,  hematocrit 36.4, MCV 85.4, platelets 452. INR 1.   Chest x-ray shows  cardiomegaly without infiltrates or

## 2018-02-09 PROBLEM — M47.817 LUMBOSACRAL SPONDYLOSIS WITHOUT MYELOPATHY: Status: ACTIVE | Noted: 2017-11-20

## 2018-02-09 PROBLEM — M47.816 LUMBAR FACET ARTHROPATHY: Status: ACTIVE | Noted: 2017-11-20

## 2018-02-09 PROBLEM — I10 ESSENTIAL HYPERTENSION: Status: ACTIVE | Noted: 2017-11-20

## 2018-02-09 PROBLEM — M43.17 ACQUIRED SPONDYLOLISTHESIS OF LUMBOSACRAL REGION: Status: RESOLVED | Noted: 2017-12-12 | Resolved: 2018-02-09

## 2018-02-09 PROBLEM — M51.369 DDD (DEGENERATIVE DISC DISEASE), LUMBAR: Status: ACTIVE | Noted: 2017-11-20

## 2018-02-09 PROBLEM — M54.17 RIGHT LUMBOSACRAL RADICULOPATHY: Status: ACTIVE | Noted: 2017-11-20

## 2018-02-09 PROBLEM — M43.17 ACQUIRED SPONDYLOLISTHESIS OF LUMBOSACRAL REGION: Status: ACTIVE | Noted: 2017-12-12

## 2018-02-09 PROBLEM — M17.0 PRIMARY OSTEOARTHRITIS OF BOTH KNEES: Status: ACTIVE | Noted: 2017-11-20

## 2018-02-09 PROBLEM — M51.36 DDD (DEGENERATIVE DISC DISEASE), LUMBAR: Status: ACTIVE | Noted: 2017-11-20

## 2018-02-09 LAB
ANION GAP SERPL CALCULATED.3IONS-SCNC: 13 MEQ/L (ref 7–13)
BUN BLDV-MCNC: 25 MG/DL (ref 8–23)
CALCIUM SERPL-MCNC: 7.8 MG/DL (ref 8.6–10.2)
CHLORIDE BLD-SCNC: 109 MEQ/L (ref 98–107)
CO2: 24 MEQ/L (ref 22–29)
CREAT SERPL-MCNC: 1.39 MG/DL (ref 0.5–0.9)
GFR AFRICAN AMERICAN: 43.8
GFR NON-AFRICAN AMERICAN: 36.2
GLUCOSE BLD-MCNC: 101 MG/DL (ref 74–109)
HCT VFR BLD CALC: 34.3 % (ref 37–47)
HEMOGLOBIN: 11 G/DL (ref 12–16)
MCH RBC QN AUTO: 28.2 PG (ref 27–31.3)
MCHC RBC AUTO-ENTMCNC: 32.2 % (ref 33–37)
MCV RBC AUTO: 87.7 FL (ref 82–100)
PDW BLD-RTO: 15.3 % (ref 11.5–14.5)
PLATELET # BLD: 129 K/UL (ref 130–400)
POTASSIUM REFLEX MAGNESIUM: 4.8 MEQ/L (ref 3.5–5.1)
RBC # BLD: 3.92 M/UL (ref 4.2–5.4)
SODIUM BLD-SCNC: 146 MEQ/L (ref 132–144)
WBC # BLD: 10.9 K/UL (ref 4.8–10.8)

## 2018-02-09 PROCEDURE — 6370000000 HC RX 637 (ALT 250 FOR IP): Performed by: INTERNAL MEDICINE

## 2018-02-09 PROCEDURE — 97535 SELF CARE MNGMENT TRAINING: CPT

## 2018-02-09 PROCEDURE — 80048 BASIC METABOLIC PNL TOTAL CA: CPT

## 2018-02-09 PROCEDURE — 6370000000 HC RX 637 (ALT 250 FOR IP): Performed by: ORTHOPAEDIC SURGERY

## 2018-02-09 PROCEDURE — G8978 MOBILITY CURRENT STATUS: HCPCS

## 2018-02-09 PROCEDURE — 97167 OT EVAL HIGH COMPLEX 60 MIN: CPT

## 2018-02-09 PROCEDURE — 93010 ELECTROCARDIOGRAM REPORT: CPT | Performed by: INTERNAL MEDICINE

## 2018-02-09 PROCEDURE — 85027 COMPLETE CBC AUTOMATED: CPT

## 2018-02-09 PROCEDURE — G8988 SELF CARE GOAL STATUS: HCPCS

## 2018-02-09 PROCEDURE — 6360000002 HC RX W HCPCS: Performed by: ORTHOPAEDIC SURGERY

## 2018-02-09 PROCEDURE — 1210000000 HC MED SURG R&B

## 2018-02-09 PROCEDURE — 97116 GAIT TRAINING THERAPY: CPT

## 2018-02-09 PROCEDURE — G8979 MOBILITY GOAL STATUS: HCPCS

## 2018-02-09 PROCEDURE — 97162 PT EVAL MOD COMPLEX 30 MIN: CPT

## 2018-02-09 PROCEDURE — 36415 COLL VENOUS BLD VENIPUNCTURE: CPT

## 2018-02-09 PROCEDURE — 2580000003 HC RX 258: Performed by: ORTHOPAEDIC SURGERY

## 2018-02-09 PROCEDURE — G8987 SELF CARE CURRENT STATUS: HCPCS

## 2018-02-09 PROCEDURE — 99221 1ST HOSP IP/OBS SF/LOW 40: CPT | Performed by: PHYSICAL MEDICINE & REHABILITATION

## 2018-02-09 PROCEDURE — 2700000000 HC OXYGEN THERAPY PER DAY

## 2018-02-09 RX ORDER — METOPROLOL TARTRATE 50 MG/1
50 TABLET, FILM COATED ORAL DAILY
Status: DISCONTINUED | OUTPATIENT
Start: 2018-02-10 | End: 2018-02-10 | Stop reason: HOSPADM

## 2018-02-09 RX ORDER — METOPROLOL TARTRATE 50 MG/1
50 TABLET, FILM COATED ORAL DAILY
Status: CANCELLED | OUTPATIENT
Start: 2018-02-10

## 2018-02-09 RX ORDER — ONDANSETRON 2 MG/ML
4 INJECTION INTRAMUSCULAR; INTRAVENOUS EVERY 6 HOURS PRN
Status: CANCELLED | OUTPATIENT
Start: 2018-02-09

## 2018-02-09 RX ORDER — LOSARTAN POTASSIUM 50 MG/1
50 TABLET ORAL 2 TIMES DAILY
Status: CANCELLED | OUTPATIENT
Start: 2018-02-09

## 2018-02-09 RX ORDER — AMLODIPINE BESYLATE 5 MG/1
5 TABLET ORAL DAILY
Status: CANCELLED | OUTPATIENT
Start: 2018-02-10

## 2018-02-09 RX ORDER — SENNA AND DOCUSATE SODIUM 50; 8.6 MG/1; MG/1
1 TABLET, FILM COATED ORAL DAILY
Status: CANCELLED | OUTPATIENT
Start: 2018-02-10

## 2018-02-09 RX ORDER — DOCUSATE SODIUM 100 MG/1
100 CAPSULE, LIQUID FILLED ORAL 2 TIMES DAILY
Status: CANCELLED | OUTPATIENT
Start: 2018-02-09

## 2018-02-09 RX ORDER — OXYCODONE HYDROCHLORIDE AND ACETAMINOPHEN 5; 325 MG/1; MG/1
1 TABLET ORAL EVERY 4 HOURS PRN
Status: CANCELLED | OUTPATIENT
Start: 2018-02-09

## 2018-02-09 RX ORDER — ASPIRIN 81 MG/1
81 TABLET ORAL DAILY
Status: CANCELLED | OUTPATIENT
Start: 2018-02-10

## 2018-02-09 RX ORDER — ACETAMINOPHEN 325 MG/1
650 TABLET ORAL EVERY 4 HOURS PRN
Status: CANCELLED | OUTPATIENT
Start: 2018-02-09

## 2018-02-09 RX ORDER — OXYCODONE HYDROCHLORIDE AND ACETAMINOPHEN 5; 325 MG/1; MG/1
2 TABLET ORAL EVERY 4 HOURS PRN
Status: CANCELLED | OUTPATIENT
Start: 2018-02-09

## 2018-02-09 RX ADMIN — VITAMIN D, TAB 1000IU (100/BT) 1000 UNITS: 25 TAB at 10:15

## 2018-02-09 RX ADMIN — SODIUM CHLORIDE: 9 INJECTION, SOLUTION INTRAVENOUS at 21:28

## 2018-02-09 RX ADMIN — OXYCODONE HYDROCHLORIDE AND ACETAMINOPHEN 1 TABLET: 5; 325 TABLET ORAL at 16:31

## 2018-02-09 RX ADMIN — KETOROLAC TROMETHAMINE 15 MG: 15 INJECTION, SOLUTION INTRAMUSCULAR; INTRAVENOUS at 00:31

## 2018-02-09 RX ADMIN — OXYCODONE HYDROCHLORIDE AND ACETAMINOPHEN 1 TABLET: 5; 325 TABLET ORAL at 21:32

## 2018-02-09 RX ADMIN — METOPROLOL TARTRATE 50 MG: 50 TABLET, FILM COATED ORAL at 10:15

## 2018-02-09 RX ADMIN — OXYCODONE HYDROCHLORIDE AND ACETAMINOPHEN 1 TABLET: 5; 325 TABLET ORAL at 05:18

## 2018-02-09 RX ADMIN — SODIUM CHLORIDE: 9 INJECTION, SOLUTION INTRAVENOUS at 00:31

## 2018-02-09 RX ADMIN — METOPROLOL TARTRATE 25 MG: 25 TABLET ORAL at 21:22

## 2018-02-09 RX ADMIN — ASPIRIN 81 MG: 81 TABLET, COATED ORAL at 10:15

## 2018-02-09 RX ADMIN — LOSARTAN POTASSIUM 50 MG: 50 TABLET ORAL at 10:15

## 2018-02-09 RX ADMIN — ENOXAPARIN SODIUM 40 MG: 40 INJECTION SUBCUTANEOUS at 10:16

## 2018-02-09 RX ADMIN — SENNOSIDES AND DOCUSATE SODIUM 1 TABLET: 8.6; 5 TABLET ORAL at 10:15

## 2018-02-09 RX ADMIN — DOCUSATE SODIUM 100 MG: 100 CAPSULE, LIQUID FILLED ORAL at 21:22

## 2018-02-09 RX ADMIN — CEFAZOLIN SODIUM 2 G: 2 SOLUTION INTRAVENOUS at 05:18

## 2018-02-09 RX ADMIN — DOCUSATE SODIUM 100 MG: 100 CAPSULE, LIQUID FILLED ORAL at 10:15

## 2018-02-09 ASSESSMENT — PAIN SCALES - GENERAL
PAINLEVEL_OUTOF10: 0
PAINLEVEL_OUTOF10: 5
PAINLEVEL_OUTOF10: 3
PAINLEVEL_OUTOF10: 0
PAINLEVEL_OUTOF10: 5
PAINLEVEL_OUTOF10: 0

## 2018-02-09 ASSESSMENT — ENCOUNTER SYMPTOMS
ABDOMINAL PAIN: 0
PHOTOPHOBIA: 0
EYE PAIN: 0
CHEST TIGHTNESS: 0
BACK PAIN: 1
FACIAL SWELLING: 0
CHOKING: 0
SHORTNESS OF BREATH: 0
COLOR CHANGE: 0
WHEEZING: 0
BLOOD IN STOOL: 0
ANAL BLEEDING: 0
NAUSEA: 0
ABDOMINAL DISTENTION: 0
VOMITING: 0
TROUBLE SWALLOWING: 0
EYE REDNESS: 0
CONSTIPATION: 1
COUGH: 0

## 2018-02-09 ASSESSMENT — PAIN DESCRIPTION - PAIN TYPE
TYPE: SURGICAL PAIN
TYPE: SURGICAL PAIN

## 2018-02-09 ASSESSMENT — PAIN DESCRIPTION - ORIENTATION
ORIENTATION: LEFT
ORIENTATION: LEFT

## 2018-02-09 ASSESSMENT — PAIN DESCRIPTION - LOCATION
LOCATION: HIP
LOCATION: HIP

## 2018-02-09 ASSESSMENT — PAIN DESCRIPTION - FREQUENCY: FREQUENCY: INTERMITTENT

## 2018-02-09 NOTE — PROGRESS NOTES
Physical Therapy  Facility/Department: [facility] N229/N229-01  Daily Progress Note    NAME: Bonnie Dover    : 1935 (80 y.o.)  MRN: [id number]    Account: [de-identified]  Gender: female    Date of Service: 18    Patient Diagnosis(es):   Patient Active Problem List    Diagnosis Date Noted    Closed left hip fracture, initial encounter ([facility] 2018    DDD (degenerative disc disease), lumbar 2017    Essential hypertension 2017    Lumbar facet arthropathy 2017    Lumbosacral spondylosis without myelopathy 2017    Primary osteoarthritis of both knees 2017    Right lumbosacral radiculopathy 2017    Lower abdominal pain 2016    Constipation 2016    Foot pain, left 2015    Nevus 2012    Low back pain radiating to left leg 2011    Spondylolisthesis of lumbar region 2011    Cervical spondylosis without myelopathy 2005    Headache 2005    Other specified disorders of rotator cuff syndrome of shoulder and allied disorders 2005       Precautions/Weight Bearing Restrictions: Weight bearing as tolerated, POD #1 L Alessandro  Restrictions/Precautions: Weight Bearing, Fall Risk, Surgical Protocols  Falls Safety Armband Present:  [x] Yes  [] No    SUBJECTIVE: Oh Boy, I was just trying to take a nap.    Pain:   Initial Pain level: none     Reassessment of Pain: 3 /10   Location: L Hip   Description: squeezing, shooting  Action:  []  Pt declined intervention  [] Nursing notified     [x] Pain acceptable level for treatment  [] Other:    OBJECTIVE:     Bed Mobility:   Rolling: - Minimal Assist  Supine to Sit: -  Moderate Assist - Trunk lifting   Sit to Supine: -  Minimal Assist - L leg into bed    vc's for technique   Difficulty rolling d/t hip prec and pain   Increased time and effort to complete    Transfers:   Sit to Stand: - Moderate Assist   Stand to Sit: - Minimal Assist  BSC: - Moderate Assist   vc's for technique and safety   Increased difficulty getting up from lower surfaces without hand rails     Gait/Ambulation:   Assistive Device: Rolling Walker  Assist Level: Minimal Assist  Distance: 4' fwd/bwd  Surface: linoleum/hardwood  Gait Deviations: Step to antalgic gait, downward gaze; constant vc's for bracing and increasing foot clearance    Neuromuscular Re-Education/Balance:  Static standing with weight shifting for improved weight acceptance on L    Plan   Plan  Times per week: 5-7 QB  Current Treatment Recommendations: Strengthening, Functional Mobility Training, Neuromuscular Re-education, Home Exercise Program, Equipment Evaluation, Education, & procurement, Transfer Training, Gait Training, Balance Training, Endurance Training, Stair training, Patient/Caregiver Education & Training, Safety Education & Training      Goals  Short term goals  Short term goal 1: SBA for bed mobility   Short term goal 2: SBA for functional transfers  Short term goal 3: amb >50ft with 2ww and SBA  Short term goal 4: indep with HEP  Short term goal 5: standing balance >Fair+  Long term goals  Long term goal 1: 5 steps with 2 hr to enter home with SBA  Long term goal 2: 8 steps with 1 handrail to 2nd fl with SBA     Comments: Good participation, pain limiting. Pt BTB After Tx, Call button within reach and bed alarm activated. 1/3 Hip prec recalled. Reviewed Hip Prec.       Therapy Time   Individual   Time In 1405   Time Out 1428   Minutes 23     Gait - 10 mins  Trsf - 13 mins    Electronically signed by Rinku Reynolds PTA on 2/9/2018 at 2:29 PM

## 2018-02-09 NOTE — PROGRESS NOTES
Recent Labs      02/08/18   1015  02/09/18   0518   WBC  9.6  10.9*   HGB  11.9*  11.0*   HCT  36.4*  34.3*   MCV  85.4  87.7   PLT  209  129*     Recent Labs      02/08/18   1015  02/09/18   0518   NA  144  146*   K  4.7  4.8   CL  107  109*   CO2  26  24   BUN  26*  25*   CREATININE  1.08*  1.39*   GLUCOSE  89  101   CALCIUM  8.8  7.8*   PROT  5.9*   --    LABALBU  3.7*   --    BILITOT  0.6   --    ALKPHOS  65   --    AST  16   --    ALT  11   --    LABGLOM  48.5*  36.2*   GFRAA  58.7*  43.8*   GLOB  2.2*   --          No results for input(s): PHART, QHC7FNX, PO2ART, XBC3VPY, BEART, I6DXHZLU in the last 72 hours. O2 Device: Nasal cannula  O2 Flow Rate (L/min): 2 L/min  No results found for: LACTA     MEDICATIONS during current hospitalization:    Continuous Infusions:   sodium chloride 50 mL/hr at 02/09/18 0031       Scheduled Meds:   sodium chloride flush  10 mL Intravenous 2 times per day    docusate sodium  100 mg Oral BID    sennosides-docusate sodium  1 tablet Oral Daily    enoxaparin  40 mg Subcutaneous Daily    metoprolol tartrate  50 mg Oral BID    amLODIPine  5 mg Oral Daily    losartan  50 mg Oral BID    vitamin D  1,000 Units Oral Daily    aspirin  81 mg Oral Daily       PRN Meds:sodium chloride flush, acetaminophen, oxyCODONE-acetaminophen **OR** oxyCODONE-acetaminophen, morphine **OR** morphine, magnesium hydroxide, ondansetron    Radiology  Xr Hip Left (1 View)    Result Date: 2/8/2018  EXAMINATION: XR HIP LEFT (1 VIEW) DATE AND TIME:2/8/2018 3:00 PM CLINICAL HISTORY: Left hip pain    po  COMPARISON: None available. INDICATION: Findings: Single view of the left hip demonstrate total hip arthroplasty with postsurgical changes. The stem component is in normal position with respect to the femoral shaft. The acetabular component is unremarkable. No complication is identified by radiograph. Impression: Normal postop left hip.      Xr Femur Left (min 2 Views)    Result Date: 2/8/2018  XR

## 2018-02-09 NOTE — PROGRESS NOTES
1  Surface: level tile  Device: Rolling Walker  Assistance: Minimal assistance  Quality of Gait: flexed posture, decreased gait speed, antaligic, step to, increased double limb support time, decreased step clearance on L  Distance: 3ft forward, turning, backward steps  Comments: 2 person for safety     Activity Tolerance  Activity Tolerance: Patient Tolerated treatment well    Exercises  Comments: instructed in anti embolics     ASSESSMENT:   Body structures, Functions, Activity limitations: Decreased functional mobility ; Decreased balance;Decreased endurance;Decreased ROM; Decreased strength  Decision Making: Medium Complexity    Prognosis: Good  Patient Education: Pt educated in role of acute care PT, PT POC, benefits of mobility while in house, safety techniques, use of call light for assistance, d/c planning, d/c recommendation. , WBAT, post hip precautions, anti emobolics  Barriers to Learning: none    DISCHARGE RECOMMENDATIONS:  Discharge Recommendations: Continue to assess pending progress, Patient would benefit from continued therapy after discharge    Assessment: Pt demonstrates the above deficits s/p fall with surgical repair. Pt would also benefit from skilled physical therapy upon d/c to improve safety & independence with all functional mobility, improve LE and core strength, improve hip AROM, improve standing balance, improve endurance to allow for decrease risk for falls, return to previous level of function, and safe return to home with decreased level of assistance, and improve QOL.     REQUIRES PT FOLLOW UP: Yes      PLAN OF CARE:  Plan  Times per week: 5-7 QB  Current Treatment Recommendations: Strengthening, Functional Mobility Training, Neuromuscular Re-education, Home Exercise Program, Equipment Evaluation, Education, & procurement, Transfer Training, Gait Training, Balance Training, Endurance Training, Stair training, Patient/Caregiver Education & Training, Safety Education & Training  Safety

## 2018-02-09 NOTE — CONSULTS
02/09/2018     02/09/2018    CO2 24 02/09/2018    BUN 25 02/09/2018    CREATININE 1.39 02/09/2018    CALCIUM 7.8 02/09/2018    LABALBU 3.7 02/08/2018    LABALBU 3.7 03/24/2012    BILITOT 0.6 02/08/2018    ALKPHOS 65 02/08/2018    AST 16 02/08/2018    ALT 11 02/08/2018     Lab Results   Component Value Date    WBC 10.9 02/09/2018    RBC 3.92 02/09/2018    RBC 4.05 03/24/2012    HGB 11.0 02/09/2018    HCT 34.3 02/09/2018    MCV 87.7 02/09/2018    MCH 28.2 02/09/2018    MCHC 32.2 02/09/2018    RDW 15.3 02/09/2018     02/09/2018    MPV 10.3 07/28/2015     No results found for: VITD25  Lab Results   Component Value Date    APPEARANCE CLOUDY 03/24/2012    COLORU Yellow 02/08/2018    NITRU Negative 02/08/2018    GLUCOSEU Negative 02/08/2018    GLUCOSEU NEG 03/24/2012    KETUA Negative 02/08/2018    UROBILINOGEN 0.2 02/08/2018    BILIRUBINUR Negative 02/08/2018    BILIRUBINUR NEG 03/24/2012     Lab Results   Component Value Date    PROTIME 10.8 02/08/2018     Lab Results   Component Value Date    INR 1.0 02/08/2018         I discussed results with patient. Current Rehabilitation Assessments:    Rehabilitation:  Physical therapy: FIMS:  Bed Mobility:      Transfers: Sit to Stand: Moderate Assistance  Stand to sit: Moderate Assistance, Ambulation 1  Surface: level tile  Device: Rolling Walker  Assistance: Minimal assistance  Quality of Gait: flexed posture, decreased gait speed, antaligic, step to, increased double limb support time, decreased step clearance on L  Distance: 3ft forward, turning, backward steps  Comments: 2 person for safety,      FIMS:  ,  ,        Occupational therapy: FIMS:   ,  ,       Functional Mobility:   Toilet Transfers:  Min A anticipated by functional mobility with [employer]  Bed Transfer: Mod A supine to sit and to EOB  Sit to stand:  Max A   Bed to Chair:  MIn A with [employer]     Speech therapy: FIMS:           Past Medical History:          Diagnosis Date    CAD (coronary artery disease)     decreased range of motion. Tenderness found. Medial joint line tenderness noted. Left knee: She exhibits decreased range of motion. Tenderness found. Medial joint line tenderness noted. Right ankle: Normal. Achilles tendon normal.        Left ankle: She exhibits swelling. Achilles tendon normal.        Cervical back: She exhibits decreased range of motion, tenderness and bony tenderness. Thoracic back: She exhibits decreased range of motion, tenderness and bony tenderness. Lumbar back: She exhibits decreased range of motion, tenderness, bony tenderness and pain. She exhibits no swelling, no edema, no deformity, no laceration and normal pulse. Back:         Right upper arm: Normal.        Left upper arm: Normal.        Right forearm: Normal.        Left forearm: Normal.        Right hand: Normal.        Left hand: Normal.        Right upper leg: Normal.        Left upper leg: She exhibits swelling. Right lower leg: Normal.        Left lower leg: She exhibits swelling. Right foot: Normal.        Left foot: There is swelling. Tender areas are indicated by numbered spot         Lymphadenopathy:     She has no cervical adenopathy. She has no axillary adenopathy. Right: No inguinal adenopathy present. Left: No inguinal adenopathy present. Neurological: She is alert and oriented to person, place, and time. She displays abnormal reflex. She displays no atrophy and no tremor. A sensory deficit is present. No cranial nerve deficit. She exhibits normal muscle tone. Gait abnormal. Coordination normal. She displays no Babinski's sign on the right side. She displays no Babinski's sign on the left side. Skin: Skin is warm, dry and intact. No abrasion, no bruising, no ecchymosis, no laceration, no petechiae and no rash noted. Rash is not macular, not pustular and not urticarial. She is not diaphoretic. No cyanosis or erythema. No pallor.  Nails show no clubbing. aquacell dressing   Psychiatric: She has a normal mood and affect. Her behavior is normal. Judgment and thought content normal. Her mood appears not anxious. Her affect is not angry, not blunt, not labile and not inappropriate. Her speech is not rapid and/or pressured, not delayed, not tangential and not slurred. She is not agitated, not aggressive, not hyperactive, not slowed, not withdrawn, not actively hallucinating and not combative. Thought content is not paranoid and not delusional. Cognition and memory are normal. Cognition and memory are not impaired. She does not express impulsivity or inappropriate judgment. She does not exhibit a depressed mood. She expresses no homicidal and no suicidal ideation. She expresses no suicidal plans and no homicidal plans. She is communicative. She exhibits normal recent memory and normal remote memory. She is attentive. Vitals reviewed. Ortho Exam  Neurologic Exam     Mental Status   Oriented to person, place, and time. Speech: not slurred     Cranial Nerves     CN III, IV, VI   Pupils are equal, round, and reactive to light.   Extraocular motions are normal.     Motor Exam   Muscle bulk: normal  Overall muscle tone: normal    Sensory Exam   Right leg light touch: decreased from toes  Left leg light touch: decreased from toes            Diagnostics:    Recent Results (from the past 24 hour(s))   Basic Metabolic Panel w/ Reflex to MG    Collection Time: 02/09/18  5:18 AM   Result Value Ref Range    Sodium 146 (H) 132 - 144 mEq/L    Potassium reflex Magnesium 4.8 3.5 - 5.1 mEq/L    Chloride 109 (H) 98 - 107 mEq/L    CO2 24 22 - 29 mEq/L    Anion Gap 13 7 - 13 mEq/L    Glucose 101 74 - 109 mg/dL    BUN 25 (H) 8 - 23 mg/dL    CREATININE 1.39 (H) 0.50 - 0.90 mg/dL    GFR Non-African American 36.2 (L) >60    GFR  43.8 (L) >60    Calcium 7.8 (L) 8.6 - 10.2 mg/dL   CBC    Collection Time: 02/09/18  5:18 AM   Result Value Ref Range    WBC 10.9 (H) 4.8 - 10.8 K/uL    RBC 3.92 (L) 4.20 - 5.40 M/uL    Hemoglobin 11.0 (L) 12.0 - 16.0 g/dL    Hematocrit 34.3 (L) 37.0 - 47.0 %    MCV 87.7 82.0 - 100.0 fL    MCH 28.2 27.0 - 31.3 pg    MCHC 32.2 (L) 33.0 - 37.0 %    RDW 15.3 (H) 11.5 - 14.5 %    Platelets 403 (L) 976 - 400 K/uL              Impression:    1. Impaired mobility and ADLs due to Left femoral neck fracture- Status post ORIF left hip 2/8/18-Dr. Dori Lorenzana  2. Fatigue and Malaise  3. Severe postop pain left hip-with good control current medications  4. Hypernatremia  5. Baseline of severe Harman arthritis especially neck low back and left shoulder      Complex Active General Medical Issues that complicate care Assess & Plan:     1. Active Problems:    Closed left hip fracture, initial encounter ([facility] [address]    Cervical spondylosis without myelopathy    Essential hypertension    Lumbosacral spondylosis without myelopathy    Primary osteoarthritis of both knees    Right lumbosacral radiculopathy    Spondylolisthesis of lumbar region  Resolved Problems:    * No resolved hospital problems. *            Recommendations:    1. Considering all of the factors above including the patient's current medical status, social status/home envirnment, their functional needs and their ability to participate in a therapy program, I feel that they would best be served at a  Acute   Rehabilitation level of care. I reviewed the varous local options re these levels of care with the patient and family. 2. Vitamin B12 shot times one  3. Improve Hydration and Nutrition by adding Proteinex and push PO fluids  4. Improved pain control CONSIDER long-acting low-dose oxycodone AKA OxyContin        It was my pleasure to evaluate Janell Givens today. Please call [phone] with questions.     Bibi Ogden, DO

## 2018-02-09 NOTE — H&P
Recommended that she undergo surgical stabilization with  hemiarthroplasty. The procedure, risks, and benefits were discussed with  her. She is going to get appropriate medical and cardiac clearance. The  plan is for surgery today ASAP. Nicol Purcell MD    D: 02/09/2018 7:07:52       T: 02/09/2018 11:53:18     [email or web address]  Job#: [id number]     Doc#: [id number]    CC:         History and Physical      Addendum    Past Medical History:        Diagnosis Date    CAD (coronary artery disease)     Colitis     Hyperlipidemia      Past Surgical History:    Past Surgical History:   Procedure Laterality Date    APPENDECTOMY      COLECTOMY  1/3/2011    sigmoid colectomy with colostomy    COLECTOMY  7/26/2011    partial colectomy with colostomy closure    CORONARY ARTERY BYPASS GRAFT      HYSTERECTOMY      NC PARTIAL HIP REPLACEMENT Left 2/8/2018    HIP HEMIARTHROPLASTY- performed by Nicolas Leal MD at [facility]           Medications Prior to Admission:    No current facility-administered medications for this encounter.       Current Outpatient Prescriptions   Medication Sig Dispense Refill    vitamin D (CHOLECALCIFEROL) 1000 UNIT TABS tablet Take 1 tablet by mouth 2 times daily (with meals) 60 tablet 1     Facility-Administered Medications Ordered in Other Encounters   Medication Dose Route Frequency Provider Last Rate Last Dose    calcium carbonate (TUMS) chewable tablet 500 mg  500 mg Oral BID Deb Scullin, DO   500 mg at 02/22/18 0749    chlorhexidine (PERIDEX) 0.12 % solution 15 mL  15 mL Mouth/Throat PRN Deb Scullin, DO        oxymetazoline (AFRIN) 0.05 % nasal spray 2 spray  2 spray Each Nare BID Deb Scullin, DO   2 spray at 02/21/18 0856    furosemide (LASIX) tablet 20 mg  20 mg Oral Daily Brooklynn Sood MD   20 mg at 02/22/18 0750    potassium chloride (KLOR-CON) extended release tablet 10 mEq  10 mEq Oral Daily Kathryn Durant Roseann Hernandez MD   10 mEq at 02/22/18 0750    hydrALAZINE (APRESOLINE) tablet 25 mg  25 mg Oral 4x Daily PRN Rachael Ramos MD   25 mg at 02/19/18 0557    sennosides-docusate sodium (SENOKOT-S) 8.6-50 MG tablet 1 tablet  1 tablet Oral Daily PRN Abhishek Sears, DO   1 tablet at 02/16/18 8878    cinnamon oil liquid 1 drop  1 drop Oral 4x Daily Deb Scullin, DO   1 drop at 02/17/18 2829    analgesic ointment ointment   Topical PRN Deb Scullin, DO        vitamin D (CHOLECALCIFEROL) tablet 1,000 Units  1,000 Units Oral BID WC Deb Scullin, DO   1,000 Units at 02/22/18 0749    cyanocobalamin injection 1,000 mcg  1,000 mcg Intramuscular Weekly Deb Scullin, DO   1,000 mcg at 02/18/18 0949    lidocaine (LIDODERM) 5 % 3 patch  3 patch Transdermal Daily Deb Scullin, DO   3 patch at 02/21/18 1036    acetaminophen (TYLENOL) tablet 650 mg  650 mg Oral Q4H PRN Deb Scullin, DO   650 mg at 02/22/18 0750    nystatin (MYCOSTATIN) 298608 UNIT/ML suspension 500,000 Units  5 mL Oral 4x Daily Kathleen Pedersen MD   500,000 Units at 02/22/18 0750    diclofenac sodium 1 % gel 4 g  4 g Topical 4x Daily PRN Deb Scullin, DO        acetaminophen (TYLENOL) tablet 650 mg  650 mg Oral Q4H PRN Munira Lucas MD   650 mg at 02/20/18 1620    docusate sodium (COLACE) capsule 100 mg  100 mg Oral BID Munira Lucas MD   100 mg at 02/22/18 0750    enoxaparin (LOVENOX) injection 40 mg  40 mg Subcutaneous Daily Munira Lucas MD   40 mg at 02/22/18 0751    magnesium hydroxide (MILK OF MAGNESIA) 400 MG/5ML suspension 30 mL  30 mL Oral Daily PRN Munira Lucas MD        ondansetron [facility]) injection 4 mg  4 mg Intravenous Q6H PRN Munira Lucas MD        oxyCODONE-acetaminophen (PERCOCET) 5-325 MG per tablet 1 tablet  1 tablet Oral Q4H PRN Munira Lucas MD   1 tablet at 02/19/18 1950    Or    oxyCODONE-acetaminophen (PERCOCET) 5-325 MG per tablet 2 tablet  2 tablet Oral Q4H PRN Solis Magaña MD        amLODIPine (NORVASC) tablet 5 mg  5 mg Oral Daily Solis Im, MD   5 mg at 02/22/18 0750    aspirin EC tablet 81 mg  81 mg Oral Daily Solis Magaña MD   81 mg at 02/22/18 0750    metoprolol tartrate (LOPRESSOR) tablet 50 mg  50 mg Oral Daily Solis Im, MD   50 mg at 02/22/18 0750    metoprolol tartrate (LOPRESSOR) tablet 25 mg  25 mg Oral Nightly Solis Magaña MD   25 mg at 02/21/18 0343       Allergies:  Lisinopril; Statins; and Sulfa antibiotics    Social History:   Social History     Social History    Marital status:      Spouse name: N/A    Number of children: N/A    Years of education: N/A     Occupational History    Not on file. Social History Main Topics    Smoking status: Former Smoker    Smokeless tobacco: Not on file    Alcohol use Not on file    Drug use: Unknown    Sexual activity: Not on file     Other Topics Concern    Not on file     Social History Narrative         Lives With: Spouse    Type of Home: House--Two level (split flight of stairs to 2nd fl full bath; has first fl bed and bedside commode)    Home Access: Stairs to enter with rail- Number of Steps: 5    Entrance Stairs - Rails: Both    Bathroom Shower/Tub: Tub/Shower unit    Bathroom Equipment: Commode, Shower chair (commode is old and unsafe)    Home Equipment: Rolling walker         Homemaking Responsibilities: No       Family History:   History reviewed. No pertinent family history.     ROS    Vitals:  BP (!) 143/48   Pulse 70   Temp 98.2 °F (36.8 °C) (Oral)   Resp 18   Ht 5' 3\" (1.6 m)   Wt 200 lb (90.7 kg)   SpO2 95%   BMI 35.43 kg/m²     Physical Exam    Assessment:  Patient Active Problem List   Diagnosis    Constipation    Closed left hip fracture, initial encounter ([facility]    Cervical spondylosis without myelopathy    DDD (degenerative disc disease), lumbar    Essential hypertension    Foot pain, left    Headache    Low back pain

## 2018-02-10 ENCOUNTER — HOSPITAL ENCOUNTER (INPATIENT)
Age: 83
LOS: 13 days | Discharge: HOME HEALTH | DRG: 560 | End: 2018-02-23
Attending: PHYSICAL MEDICINE & REHABILITATION | Admitting: PHYSICAL MEDICINE & REHABILITATION
Payer: MEDICARE

## 2018-02-10 VITALS
DIASTOLIC BLOOD PRESSURE: 48 MMHG | HEART RATE: 70 BPM | SYSTOLIC BLOOD PRESSURE: 143 MMHG | WEIGHT: 200 LBS | HEIGHT: 63 IN | BODY MASS INDEX: 35.44 KG/M2 | RESPIRATION RATE: 18 BRPM | TEMPERATURE: 98.2 F | OXYGEN SATURATION: 95 %

## 2018-02-10 DIAGNOSIS — M47.812 CERVICAL SPONDYLOSIS WITHOUT MYELOPATHY: ICD-10-CM

## 2018-02-10 DIAGNOSIS — S72.002A CLOSED LEFT HIP FRACTURE, INITIAL ENCOUNTER (HCC): Primary | ICD-10-CM

## 2018-02-10 LAB
BACTERIA: NORMAL /HPF
BILIRUBIN URINE: NEGATIVE
BLOOD, URINE: NEGATIVE
CASTS: NORMAL /LPF
CLARITY: CLEAR
COLOR: ABNORMAL
EPITHELIAL CELLS, UA: NORMAL /HPF
GLUCOSE URINE: NEGATIVE MG/DL
HCT VFR BLD CALC: 31.2 % (ref 37–47)
HEMOGLOBIN: 9.9 G/DL (ref 12–16)
KETONES, URINE: NEGATIVE MG/DL
LEUKOCYTE ESTERASE, URINE: NEGATIVE
MCH RBC QN AUTO: 27.7 PG (ref 27–31.3)
MCHC RBC AUTO-ENTMCNC: 31.6 % (ref 33–37)
MCV RBC AUTO: 87.4 FL (ref 82–100)
MUCUS: PRESENT
NITRITE, URINE: NEGATIVE
PDW BLD-RTO: 15 % (ref 11.5–14.5)
PH UA: 5.5 (ref 5–9)
PLATELET # BLD: 107 K/UL (ref 130–400)
PROTEIN UA: 30 MG/DL
RBC # BLD: 3.57 M/UL (ref 4.2–5.4)
RBC UA: NORMAL /HPF (ref 0–2)
SPECIFIC GRAVITY UA: 1.02 (ref 1–1.03)
URINE CULTURE, ROUTINE: NORMAL
UROBILINOGEN, URINE: 0.2 E.U./DL
WBC # BLD: 12.1 K/UL (ref 4.8–10.8)
WBC UA: NORMAL /HPF (ref 0–5)

## 2018-02-10 PROCEDURE — 87086 URINE CULTURE/COLONY COUNT: CPT

## 2018-02-10 PROCEDURE — 6370000000 HC RX 637 (ALT 250 FOR IP): Performed by: INTERNAL MEDICINE

## 2018-02-10 PROCEDURE — 85027 COMPLETE CBC AUTOMATED: CPT

## 2018-02-10 PROCEDURE — 97116 GAIT TRAINING THERAPY: CPT

## 2018-02-10 PROCEDURE — 81001 URINALYSIS AUTO W/SCOPE: CPT

## 2018-02-10 PROCEDURE — 6370000000 HC RX 637 (ALT 250 FOR IP): Performed by: ORTHOPAEDIC SURGERY

## 2018-02-10 PROCEDURE — 99232 SBSQ HOSP IP/OBS MODERATE 35: CPT | Performed by: PHYSICAL MEDICINE & REHABILITATION

## 2018-02-10 PROCEDURE — 1180000000 HC REHAB R&B

## 2018-02-10 PROCEDURE — 6360000002 HC RX W HCPCS: Performed by: ORTHOPAEDIC SURGERY

## 2018-02-10 PROCEDURE — 36415 COLL VENOUS BLD VENIPUNCTURE: CPT

## 2018-02-10 PROCEDURE — 2700000000 HC OXYGEN THERAPY PER DAY

## 2018-02-10 PROCEDURE — 97535 SELF CARE MNGMENT TRAINING: CPT

## 2018-02-10 PROCEDURE — 97110 THERAPEUTIC EXERCISES: CPT

## 2018-02-10 RX ORDER — SENNA AND DOCUSATE SODIUM 50; 8.6 MG/1; MG/1
1 TABLET, FILM COATED ORAL DAILY
Status: DISCONTINUED | OUTPATIENT
Start: 2018-02-11 | End: 2018-02-15

## 2018-02-10 RX ORDER — ACETAMINOPHEN 325 MG/1
650 TABLET ORAL EVERY 4 HOURS PRN
Status: DISCONTINUED | OUTPATIENT
Start: 2018-02-10 | End: 2018-02-23 | Stop reason: HOSPADM

## 2018-02-10 RX ORDER — METOPROLOL TARTRATE 50 MG/1
50 TABLET, FILM COATED ORAL DAILY
Status: DISCONTINUED | OUTPATIENT
Start: 2018-02-11 | End: 2018-02-22

## 2018-02-10 RX ORDER — CYANOCOBALAMIN 1000 UG/ML
1000 INJECTION INTRAMUSCULAR; SUBCUTANEOUS ONCE
Status: DISCONTINUED | OUTPATIENT
Start: 2018-02-10 | End: 2018-02-10 | Stop reason: HOSPADM

## 2018-02-10 RX ORDER — ASPIRIN 81 MG/1
81 TABLET ORAL DAILY
Status: DISCONTINUED | OUTPATIENT
Start: 2018-02-11 | End: 2018-02-23 | Stop reason: HOSPADM

## 2018-02-10 RX ORDER — CINNAMON
1 OIL (ML) MISCELLANEOUS 4 TIMES DAILY
Status: DISCONTINUED | OUTPATIENT
Start: 2018-02-10 | End: 2018-02-10 | Stop reason: HOSPADM

## 2018-02-10 RX ORDER — ERGOCALCIFEROL 1.25 MG/1
50000 CAPSULE ORAL DAILY
Status: DISCONTINUED | OUTPATIENT
Start: 2018-02-10 | End: 2018-02-10 | Stop reason: HOSPADM

## 2018-02-10 RX ORDER — AMLODIPINE BESYLATE 5 MG/1
5 TABLET ORAL DAILY
Status: DISCONTINUED | OUTPATIENT
Start: 2018-02-11 | End: 2018-02-22

## 2018-02-10 RX ORDER — DOCUSATE SODIUM 100 MG/1
100 CAPSULE, LIQUID FILLED ORAL 2 TIMES DAILY
Status: DISCONTINUED | OUTPATIENT
Start: 2018-02-10 | End: 2018-02-23 | Stop reason: HOSPADM

## 2018-02-10 RX ORDER — SALIVA STIMULANT COMB. NO.7
GEL (GRAM) MUCOUS MEMBRANE 3 TIMES DAILY
Status: DISCONTINUED | OUTPATIENT
Start: 2018-02-10 | End: 2018-02-10 | Stop reason: HOSPADM

## 2018-02-10 RX ORDER — SODIUM CHLORIDE 9 MG/ML
INJECTION, SOLUTION INTRAVENOUS
Status: DISCONTINUED
Start: 2018-02-10 | End: 2018-02-10 | Stop reason: WASHOUT

## 2018-02-10 RX ORDER — ONDANSETRON 2 MG/ML
4 INJECTION INTRAMUSCULAR; INTRAVENOUS EVERY 6 HOURS PRN
Status: DISCONTINUED | OUTPATIENT
Start: 2018-02-10 | End: 2018-02-23 | Stop reason: HOSPADM

## 2018-02-10 RX ORDER — OXYCODONE HYDROCHLORIDE AND ACETAMINOPHEN 5; 325 MG/1; MG/1
2 TABLET ORAL EVERY 4 HOURS PRN
Status: DISCONTINUED | OUTPATIENT
Start: 2018-02-10 | End: 2018-02-23 | Stop reason: HOSPADM

## 2018-02-10 RX ORDER — OXYCODONE HYDROCHLORIDE AND ACETAMINOPHEN 5; 325 MG/1; MG/1
1 TABLET ORAL EVERY 4 HOURS PRN
Status: DISCONTINUED | OUTPATIENT
Start: 2018-02-10 | End: 2018-02-23 | Stop reason: HOSPADM

## 2018-02-10 RX ORDER — LOSARTAN POTASSIUM 50 MG/1
50 TABLET ORAL DAILY
Status: DISCONTINUED | OUTPATIENT
Start: 2018-02-11 | End: 2018-02-10 | Stop reason: HOSPADM

## 2018-02-10 RX ADMIN — VITAMIN D, TAB 1000IU (100/BT) 1000 UNITS: 25 TAB at 09:58

## 2018-02-10 RX ADMIN — SENNOSIDES AND DOCUSATE SODIUM 1 TABLET: 8.6; 5 TABLET ORAL at 09:58

## 2018-02-10 RX ADMIN — OXYCODONE HYDROCHLORIDE AND ACETAMINOPHEN 1 TABLET: 5; 325 TABLET ORAL at 18:43

## 2018-02-10 RX ADMIN — DOCUSATE SODIUM 100 MG: 100 CAPSULE, LIQUID FILLED ORAL at 09:59

## 2018-02-10 RX ADMIN — METOPROLOL TARTRATE 25 MG: 25 TABLET, FILM COATED ORAL at 21:49

## 2018-02-10 RX ADMIN — LOSARTAN POTASSIUM 50 MG: 50 TABLET ORAL at 09:59

## 2018-02-10 RX ADMIN — METOPROLOL TARTRATE 50 MG: 50 TABLET, FILM COATED ORAL at 09:59

## 2018-02-10 RX ADMIN — ASPIRIN 81 MG: 81 TABLET, COATED ORAL at 09:58

## 2018-02-10 RX ADMIN — ENOXAPARIN SODIUM 40 MG: 40 INJECTION SUBCUTANEOUS at 09:59

## 2018-02-10 RX ADMIN — DOCUSATE SODIUM 100 MG: 100 CAPSULE, LIQUID FILLED ORAL at 21:48

## 2018-02-10 RX ADMIN — OXYCODONE HYDROCHLORIDE AND ACETAMINOPHEN 1 TABLET: 5; 325 TABLET ORAL at 14:22

## 2018-02-10 RX ADMIN — OXYCODONE HYDROCHLORIDE AND ACETAMINOPHEN 1 TABLET: 5; 325 TABLET ORAL at 09:59

## 2018-02-10 ASSESSMENT — PAIN SCALES - GENERAL
PAINLEVEL_OUTOF10: 0
PAINLEVEL_OUTOF10: 5
PAINLEVEL_OUTOF10: 1
PAINLEVEL_OUTOF10: 1
PAINLEVEL_OUTOF10: 3

## 2018-02-10 NOTE — PROGRESS NOTES
Hip surgery    Progress Note    Subjective:     Post-Operative Day: 2 Status Post left Hip Arthroplasty  Systemic or Specific Complaints:Pain Control    Objective:     CURRENT VITALS:  BP (!) 108/43   Pulse 63   Temp 98.6 °F (37 °C) (Oral)   Resp 18   Ht 5' 3\" (1.6 m)   Wt 200 lb (90.7 kg)   SpO2 95%   BMI 35.43 kg/m²     General: alert, appears stated age and cooperative   Wound: Wound clean and dry no evidence of infection. Motion: Painful range of Motion   DVT Exam: No evidence of DVT seen on physical exam.       NVI in lower extremity. Thigh swollen but soft. Moving foot and ankle. Data Review  Recent Labs      02/08/18   1015  02/09/18   0518  02/10/18   0533   WBC  9.6  10.9*  12.1*   RBC  4.26  3.92*  3.57*   HGB  11.9*  11.0*  9.9*   HCT  36.4*  34.3*  31.2*   MCV  85.4  87.7  87.4   MCH  27.8  28.2  27.7   MCHC  32.6*  32.2*  31.6*   RDW  15.1*  15.3*  15.0*   PLT  209  129*  107*       Assessment:     Status Post left Hip Arthroplasty. Doing well postoperatively.  [facility] for rehab later today    Plan:      1: Continues current post-op course :d/c to acute rehab  2:  Continue Deep venous thrombosis prophylaxis  3:  Continue physical therapy  4:  Continue Pain Control

## 2018-02-10 NOTE — PROGRESS NOTES
Bear Purcell MD 50 mL/hr at 02/09/18 2128      sodium chloride flush 0.9 % injection 10 mL  10 mL Intravenous 2 times per day Bear Purcell MD        sodium chloride flush 0.9 % injection 10 mL  10 mL Intravenous PRN Bear Purcell MD        acetaminophen (TYLENOL) tablet 650 mg  650 mg Oral Q4H PRN Bear Purcell MD        oxyCODONE-acetaminophen (PERCOCET) 5-325 MG per tablet 1 tablet  1 tablet Oral Q4H PRN Bear Purcell MD   1 tablet at 02/10/18 9179    Or    oxyCODONE-acetaminophen (PERCOCET) 5-325 MG per tablet 2 tablet  2 tablet Oral Q4H PRN Bear Purcell MD        morphine injection 2 mg  2 mg Intravenous Q2H PRN Bear Purcell MD        Or    morphine injection 4 mg  4 mg Intravenous Q2H PRN Bear Purcell MD        docusate sodium (COLACE) capsule 100 mg  100 mg Oral BID Bear Purcell MD   100 mg at 02/10/18 0959    magnesium hydroxide (MILK OF MAGNESIA) 400 MG/5ML suspension 30 mL  30 mL Oral Daily PRN Bear Purcell MD        sennosides-docusate sodium (SENOKOT-S) 8.6-50 MG tablet 1 tablet  1 tablet Oral Daily Bear Purcell MD   1 tablet at 02/10/18 0958    ondansetron (ZOFRAN) injection 4 mg  4 mg Intravenous Q6H PRN Bear Purcell MD        enoxaparin (LOVENOX) injection 40 mg  40 mg Subcutaneous Daily Bear Purcell MD   40 mg at 02/10/18 0959    amLODIPine (NORVASC) tablet 5 mg  5 mg Oral Daily Re Chavis MD   Stopped at 02/10/18 0949    losartan (COZAAR) tablet 50 mg  50 mg Oral BID Re Chavis MD   50 mg at 02/10/18 2571    vitamin D (CHOLECALCIFEROL) tablet 1,000 Units  1,000 Units Oral Daily Re Chavis MD   1,000 Units at 02/10/18 0958    aspirin EC tablet 81 mg  81 mg Oral Daily Re Chavis MD   81 mg at 02/10/18 0958       OBJECTIVE  PHYSICAL EXAM:   BP (!) 108/43   Pulse 63   Temp 98.6 °F (37 °C) (Oral)   Resp 18   Ht 5' 3\" (1.6 m)   Wt 200 lb (90.7 kg)

## 2018-02-10 NOTE — PROGRESS NOTES
Physical Therapy  Facility/Department: [facility] N229/N229-01  Daily Progress Note    NAME: Pratima Monte    : 1935 (80 y.o.)  MRN: [id number]    Account: [de-identified]  Gender: female    Date of Service: 02/10/18    Patient Diagnosis(es):   Patient Active Problem List    Diagnosis Date Noted    [facility]    DDD (degenerative disc disease), lumbar 2017    Essential hypertension 2017    Lumbar facet arthropathy 2017    Lumbosacral spondylosis without myelopathy 2017    Primary osteoarthritis of both knees 2017    Right lumbosacral radiculopathy 2017    Lower abdominal pain 2016    Constipation 2016    Foot pain, left 2015    Nevus 2012    Low back pain radiating to left leg 2011    Spondylolisthesis of lumbar region 2011    Cervical spondylosis without myelopathy 2005    Headache 2005    Other specified disorders of rotator cuff syndrome of shoulder and allied disorders 2005       Precautions/Weight Bearing Restrictions: Weight bearing as tolerated,   Restrictions/Precautions: Weight Bearing, Fall Risk, Surgical Protocols  Falls Safety Armband Present:  [x] Yes  [] No    SUBJECTIVE: \"My groin is bothering me. \"  Pain:   Initial Pain level: 5 /10   Location: L groin   Description: aching  Action:  [x]  Pt declined intervention  [] Nursing notified     [x] Pain acceptable level for treatment  [] Other:      Reassessment of Pain: 10   Location: L groin   Description: aching  Action:  [x]  Pt declined intervention  [] Nursing notified     [x] Pain acceptable level for treatment  [] Other:    OBJECTIVE:     Bed Mobility:   Rolling: - Not Tested  Supine to Sit: -  Stand by Assist - VC's to follow surgical prec (pt attempting to cross legs to assist LLE to EOB)  Sit to Supine: -  Minimal Assist - assist needed only to lift LLE     Transfers: Sit to Stand: - Contact Guard Assist   Stand to Sit: - Contact Guard Assist   Good carryover of cues from AM session     Gait/Ambulation:   Assistive Device: Rolling Walker  Assist Level: Contact Guard Assist  Distance: 20 feet x 2  Surface: linoleum/hardwood  Gait Deviations: encouragement to inc distance, VC's for sequencing w/ good follow through & pt often stating sequence out loud, 0 LOB, step to pattern initially, progressing to small reciprocal steps, pt still dragging LLE however improved from AM session    Exercise:  Supine:  AP x20  QS x20  GS x20 (pt also doing w/ TA iso as she stated she did these at home)  Hip abd x10 LLE  Heelslides x10 LLE    Plan   Plan  Times per week: 5-7 QB  Current Treatment Recommendations: Strengthening, Functional Mobility Training, Neuromuscular Re-education, Home Exercise Program, Equipment Evaluation, Education, & procurement, Transfer Training, Gait Training, Balance Training, Endurance Training, Stair training, Patient/Caregiver Education & Training, Safety Education & Training  Safety Devices  Type of devices: All fall risk precautions in place    Goals  Short term goals  Short term goal 1: SBA for bed mobility   Short term goal 2: SBA for functional transfers  Short term goal 3: amb >50ft with 2ww and SBA  Short term goal 4: indep with HEP  Short term goal 5: standing balance >Fair+  Long term goals  Long term goal 1: 5 steps with 2 hr to enter home with SBA  Long term goal 2: 8 steps with 1 handrail to 2nd fl with SBA     Comments: Pt returned BTB post tx session per request w/ alarm activated and call light in reach.     Therapy Time   Individual   Time In 1340   Time Out 1403   Minutes 23     Gait: 10  Transfers: 5  Therex: 8    Electronically signed by My Vanegas PTA on 2/10/2018 at 2:02 PM

## 2018-02-10 NOTE — PLAN OF CARE
Problem: Infection:  Goal: Will remain free from infection  Will remain free from infection   Outcome: Ongoing      Problem: Safety:  Goal: Free from accidental physical injury  Free from accidental physical injury   Outcome: Ongoing    Goal: Free from intentional harm  Free from intentional harm   Outcome: Ongoing

## 2018-02-11 PROBLEM — R26.9 ABNORMALITY OF GAIT AND MOBILITY: Status: ACTIVE | Noted: 2018-02-11

## 2018-02-11 PROBLEM — N30.00 ACUTE CYSTITIS WITHOUT HEMATURIA: Status: ACTIVE | Noted: 2018-02-11

## 2018-02-11 PROBLEM — R26.9 GAIT ABNORMALITY: Status: ACTIVE | Noted: 2018-02-11

## 2018-02-11 LAB
GLUCOSE BLD-MCNC: 109 MG/DL (ref 60–115)
GLUCOSE BLD-MCNC: 159 MG/DL (ref 60–115)
PERFORMED ON: ABNORMAL
PERFORMED ON: NORMAL

## 2018-02-11 PROCEDURE — 6370000000 HC RX 637 (ALT 250 FOR IP): Performed by: INTERNAL MEDICINE

## 2018-02-11 PROCEDURE — 97166 OT EVAL MOD COMPLEX 45 MIN: CPT

## 2018-02-11 PROCEDURE — 97162 PT EVAL MOD COMPLEX 30 MIN: CPT

## 2018-02-11 PROCEDURE — 6360000002 HC RX W HCPCS: Performed by: PHYSICAL MEDICINE & REHABILITATION

## 2018-02-11 PROCEDURE — 6370000000 HC RX 637 (ALT 250 FOR IP): Performed by: PHYSICAL MEDICINE & REHABILITATION

## 2018-02-11 PROCEDURE — 99223 1ST HOSP IP/OBS HIGH 75: CPT | Performed by: PHYSICAL MEDICINE & REHABILITATION

## 2018-02-11 PROCEDURE — 6360000002 HC RX W HCPCS: Performed by: INTERNAL MEDICINE

## 2018-02-11 PROCEDURE — 2700000000 HC OXYGEN THERAPY PER DAY

## 2018-02-11 PROCEDURE — 1180000000 HC REHAB R&B

## 2018-02-11 RX ORDER — CYANOCOBALAMIN 1000 UG/ML
1000 INJECTION INTRAMUSCULAR; SUBCUTANEOUS WEEKLY
Status: DISCONTINUED | OUTPATIENT
Start: 2018-02-11 | End: 2018-02-23 | Stop reason: HOSPADM

## 2018-02-11 RX ORDER — NITROFURANTOIN 25; 75 MG/1; MG/1
100 CAPSULE ORAL EVERY 12 HOURS SCHEDULED
Status: DISCONTINUED | OUTPATIENT
Start: 2018-02-11 | End: 2018-02-20

## 2018-02-11 RX ORDER — ACETAMINOPHEN 325 MG/1
650 TABLET ORAL EVERY 4 HOURS PRN
Status: DISCONTINUED | OUTPATIENT
Start: 2018-02-11 | End: 2018-02-23 | Stop reason: HOSPADM

## 2018-02-11 RX ORDER — OXYCODONE HCL 20 MG/1
20 TABLET, FILM COATED, EXTENDED RELEASE ORAL EVERY 12 HOURS SCHEDULED
Status: DISCONTINUED | OUTPATIENT
Start: 2018-02-11 | End: 2018-02-16

## 2018-02-11 RX ORDER — LIDOCAINE 50 MG/G
3 PATCH TOPICAL DAILY
Status: DISCONTINUED | OUTPATIENT
Start: 2018-02-11 | End: 2018-02-23 | Stop reason: HOSPADM

## 2018-02-11 RX ORDER — BISACODYL 10 MG
10 SUPPOSITORY, RECTAL RECTAL DAILY PRN
Status: DISCONTINUED | OUTPATIENT
Start: 2018-02-11 | End: 2018-02-15

## 2018-02-11 RX ORDER — SODIUM PHOSPHATE, DIBASIC AND SODIUM PHOSPHATE, MONOBASIC 7; 19 G/133ML; G/133ML
1 ENEMA RECTAL
Status: DISPENSED | OUTPATIENT
Start: 2018-02-11 | End: 2018-02-11

## 2018-02-11 RX ADMIN — SENNOSIDES AND DOCUSATE SODIUM 1 TABLET: 8.6; 5 TABLET ORAL at 09:30

## 2018-02-11 RX ADMIN — OXYCODONE HYDROCHLORIDE AND ACETAMINOPHEN 1 TABLET: 5; 325 TABLET ORAL at 09:29

## 2018-02-11 RX ADMIN — VITAMIN D, TAB 1000IU (100/BT) 1000 UNITS: 25 TAB at 09:30

## 2018-02-11 RX ADMIN — NITROFURANTOIN MONOHYDRATE/MACROCRYSTALLINE 100 MG: 25; 75 CAPSULE ORAL at 19:43

## 2018-02-11 RX ADMIN — DOCUSATE SODIUM 100 MG: 100 CAPSULE, LIQUID FILLED ORAL at 19:43

## 2018-02-11 RX ADMIN — METOPROLOL TARTRATE 50 MG: 50 TABLET, FILM COATED ORAL at 09:29

## 2018-02-11 RX ADMIN — ENOXAPARIN SODIUM 40 MG: 40 INJECTION SUBCUTANEOUS at 09:30

## 2018-02-11 RX ADMIN — CHOLECALCIFEROL TAB 25 MCG (1000 UNIT) 1000 UNITS: 25 TAB at 16:39

## 2018-02-11 RX ADMIN — NYSTATIN 500000 UNITS: 100000 SUSPENSION ORAL at 12:18

## 2018-02-11 RX ADMIN — AMLODIPINE BESYLATE 5 MG: 5 TABLET ORAL at 09:29

## 2018-02-11 RX ADMIN — NITROFURANTOIN MONOHYDRATE/MACROCRYSTALLINE 100 MG: 25; 75 CAPSULE ORAL at 12:17

## 2018-02-11 RX ADMIN — NYSTATIN 500000 UNITS: 100000 SUSPENSION ORAL at 19:43

## 2018-02-11 RX ADMIN — CYANOCOBALAMIN 1000 MCG: 1000 INJECTION, SOLUTION INTRAMUSCULAR at 12:18

## 2018-02-11 RX ADMIN — ASPIRIN 81 MG: 81 TABLET, COATED ORAL at 09:30

## 2018-02-11 RX ADMIN — DOCUSATE SODIUM 100 MG: 100 CAPSULE, LIQUID FILLED ORAL at 09:29

## 2018-02-11 RX ADMIN — NYSTATIN 500000 UNITS: 100000 SUSPENSION ORAL at 16:39

## 2018-02-11 RX ADMIN — OXYCODONE HYDROCHLORIDE 20 MG: 20 TABLET, FILM COATED, EXTENDED RELEASE ORAL at 19:43

## 2018-02-11 RX ADMIN — OXYCODONE HYDROCHLORIDE AND ACETAMINOPHEN 1 TABLET: 5; 325 TABLET ORAL at 15:02

## 2018-02-11 RX ADMIN — METOPROLOL TARTRATE 25 MG: 25 TABLET, FILM COATED ORAL at 19:43

## 2018-02-11 ASSESSMENT — ENCOUNTER SYMPTOMS
TROUBLE SWALLOWING: 0
SHORTNESS OF BREATH: 0
CONSTIPATION: 1
VOMITING: 0
EYE REDNESS: 0
NAUSEA: 0
WHEEZING: 0
CHOKING: 0
COLOR CHANGE: 0
CHEST TIGHTNESS: 0
ABDOMINAL DISTENTION: 0
EYE PAIN: 0
ANAL BLEEDING: 0
COUGH: 0
FACIAL SWELLING: 0
BOWEL INCONTINENCE: 0
ABDOMINAL PAIN: 0
PHOTOPHOBIA: 0
BACK PAIN: 1
BLOOD IN STOOL: 0

## 2018-02-11 ASSESSMENT — PAIN DESCRIPTION - ORIENTATION
ORIENTATION: LEFT
ORIENTATION: LEFT;LOWER
ORIENTATION: LEFT

## 2018-02-11 ASSESSMENT — PAIN SCALES - GENERAL
PAINLEVEL_OUTOF10: 8
PAINLEVEL_OUTOF10: 4
PAINLEVEL_OUTOF10: 3
PAINLEVEL_OUTOF10: 4
PAINLEVEL_OUTOF10: 2
PAINLEVEL_OUTOF10: 4

## 2018-02-11 ASSESSMENT — PAIN DESCRIPTION - PAIN TYPE
TYPE: SURGICAL PAIN
TYPE: SURGICAL PAIN

## 2018-02-11 ASSESSMENT — PAIN DESCRIPTION - DESCRIPTORS
DESCRIPTORS: ACHING;SHARP
DESCRIPTORS: ACHING

## 2018-02-11 ASSESSMENT — PAIN DESCRIPTION - LOCATION
LOCATION: HIP
LOCATION: HIP;BACK
LOCATION: HIP

## 2018-02-11 ASSESSMENT — PAIN DESCRIPTION - FREQUENCY: FREQUENCY: INTERMITTENT

## 2018-02-11 NOTE — PROGRESS NOTES
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  Leisure & Hobbies: Bowling, golfing, knit, tanvi, paint, has a summer home in [place] out on the [address], has 9 grandchildren     Objective   Vision: Impaired  Vision Exceptions: Wears glasses at all times  Hearing: Within functional limits    Orientation  Overall Orientation Status: Within Normal Limits  Observation/Palpation  Posture: Good     Balance  Sitting Balance: Modified independent   Standing Balance: Stand by assistance  Standing Balance  Sit to stand: Contact guard assistance  Stand to sit: Contact guard assistance  Toilet Transfers  Toilet - Technique: Stand step  Equipment Used: Grab bars  Toilet Transfer: Contact guard assistance  Toilet Transfers Comments: CGA from w/c  Tub Transfers  Tub Transfers Comments: Pt. plans to use shower stall on d/c home  Shower Transfers  Shower - Transfer From: Wheelchair  Shower - Transfer Type: To and From  Shower - Transfer To: Shower seat with back  Shower - Technique: Stand pivot  Shower Transfers: Contact Guard  Shower Transfers Comments: Pt. plans to use shower stall on d/c home     ADL  Feeding: Independent  Grooming: Setup  UE Bathing: Setup  LE Bathing: Moderate assistance  UE Dressing: Setup  LE Dressing: Dependent/Total  Toileting: Contact guard assistance  Tone RUE  RUE Tone: Normotonic  Tone LUE  LUE Tone: Normotonic  Coordination  Movements Are Fluid And Coordinated: Yes     Transfers  Sit to stand: Contact guard assistance  Stand to sit: Contact guard assistance  Vision - Basic Assessment  Prior Vision: Wears glasses all the time  Visual History: No significant visual history  Patient Visual Report: No visual complaint reported. Cognition  Overall Cognitive Status: [facility]  Cognition Comment: Comprehension: Mod I, Expression:  Mod I, Social Interaction: Independent, Problem Solving: Mod I, Memory: Mod I  Perception  Overall Perceptual Status: WFL     Sensation  Overall Sensation Status: WFL      LUE AROM (degrees)  LUE AROM : WFL  Left Hand AROM (degrees)  Left Hand AROM: WFL  RUE AROM (degrees)  RUE AROM : WFL  Right Hand AROM (degrees)  Right Hand AROM: WFL     LUE Strength  Gross LUE Strength: Exceptions to [facility]  L Shoulder Flex: 3+/5  L Shoulder Ext: 3+/5  L Shoulder ABduction: 3+/5  L Shoulder ADduction: 3+/5  L Shoulder Horiz ABduction: 3+/5  L Shoulder Horiz ADduction: 3+/5  L Elbow Flex: 3+/5  L Elbow Ext: 3+/5  L Wrist Flex: 3+/5  L Wrist Ext: 3+/5  L Hand Grasp: 3+/5  L Hand Release: 3+/5  RUE Strength  Gross RUE Strength: Exceptions to [facility]  R Shoulder Flex: 3+/5  R Shoulder Ext: 3+/5  R Shoulder ABduction: 3+/5  R Shoulder ADduction: 3+/5  R Shoulder Horiz ABduction: 3+/5  R Shoulder Horiz ADduction: 3+/5  R Elbow Flex: 3+/5  R Elbow Ext: 3+/5  R Wrist Flex: 3+/5  R Wrist Ext: 3+/5  R Hand Grasp: 3+/5  R Hand Release: 3+/5     Hand Dominance  Hand Dominance: Right     Assessment   Performance deficits / Impairments: Decreased functional mobility ; Decreased ADL status; Decreased endurance;Decreased high-level IADLs;Decreased safe awareness;Decreased balance;Decreased strength  Assessment: Pt. demonstrates decreased balance, strength and endurance which impacts her ability to safely perform ADL tasks. Pt. would benefit from skilled OT to address these deficits.    Treatment Diagnosis: L Subcapital femoral neck fx s/p fall, left hemiarthroplasty with impaired mobility  Prognosis: Good  Decision Making: Medium Complexity  History: multi comorb  Exam: 7 deficits  Assistance / Modification: Mod-Max A  Patient Education: Pt. educated on benefits of OT  Barriers to Learning:  None  Discharge Recommendations: Continue to assess pending progress  REQUIRES OT FOLLOW UP: Yes  Activity Tolerance  Activity Tolerance: Patient Tolerated treatment well  Activity Tolerance: Per RN Joanne, pt. does not wear O2 at home. On 2L O2 on OT arrival; pt. cleared to attempt shower without O2 per RN. Pt. maintained O2 sats 95% on RA; RN Notified and pt. left without O2 to attempt weaning, Per RN. Safety Devices  Safety Devices in place: Yes  Type of devices: All fall risk precautions in place        Discharge Recommendations:  Continue to assess pending progress     Plan   Plan  Times per week: 5-7x/week, 15-60 minutes/day  Times per day: Daily  Plan weeks: 1 - 1 1/2  Current Treatment Recommendations: Strengthening, Balance Training, Functional Mobility Training, Endurance Training, Safety Education & Training, Self-Care / ADL, Patient/Caregiver Education & Training, Equipment Evaluation, Education, & procurement, Home Management Training    G-Code  OutComes Score  AM-PAC Score  Goals  Patient Goals   Patient goals : Get back to doing what I was     [x]  Patient will complete self care as followed using the recommended adaptive equipment and/or adaptive techniques as instructed:  Feeding: Independent  Grooming: Independent  Bathing: Mod I  UE Dressing: Independent  LE Dressing: Mod I  Toileting: Independent  Toilet Transfers: Mod I  Tub Transfers: Mod I   [x]  Patient will sequence self-care routine with no verbal/tactile cues. [x]  Patient will improve static and dynamic standing balance to complete pants management at Mod I level     [x]  Patient will improve functional endurance to tolerate/complete 60 minutes of ADLs. []  Patient will improve B UE strength and endurance to 4/5 in order to participate in self-care activities as projected. [x]  Patient will perform kitchen mobility at device level without episodes of LOB and good safety awareness      [x]  Patient will perform basic room mobility at device level. [x]  Patient will access appropriate D/C site with as few architectural barriers as possible.      [x]  Patient and/or caregiver will

## 2018-02-11 NOTE — CONSULTS
History reviewed. No pertinent family history. SOCIAL HISTORY:    Social History     Social History    Marital status:      Spouse name: N/A    Number of children: N/A    Years of education: N/A     Occupational History    Not on file. Social History Main Topics    Smoking status: Former Smoker    Smokeless tobacco: Not on file    Alcohol use Not on file    Drug use: Unknown    Sexual activity: Not on file     Other Topics Concern    Not on file     Social History Narrative         Lives With: Spouse    Type of Home: House--Two level (split flight of stairs to 2nd fl full bath; has first fl bed and bedside commode)    Home Access: Stairs to enter with rail- Number of Steps: 5    Entrance Stairs - Rails: Both    Bathroom Shower/Tub: Tub/Shower unit    Bathroom Equipment: Commode, Shower chair (commode is old and unsafe)    Home Equipment: Rolling walker         Homemaking Responsibilities: No     MEDICATIONS:  Prescriptions Prior to Admission: losartan (COZAAR) 50 MG tablet, Take 50 mg by mouth 2 times daily  metoprolol tartrate (LOPRESSOR) 50 MG tablet, Take 50 mg by mouth 2 times daily  potassium chloride (MICRO-K) 10 MEQ extended release capsule, Take 10 mEq by mouth  Naproxen Sodium (ALEVE PO), Take by mouth  ROSE MARY ASPIRIN PO, Take by mouth  amLODIPine (NORVASC) 5 MG tablet, 5 mg daily   hydrochlorothiazide (HYDRODIURIL) 25 MG tablet,   magnesium oxide (MAG-OX) 400 MG tablet, Take 400 mg by mouth daily  nitroGLYCERIN (NITROLINGUAL) 0.4 MG/SPRAY spray, Place 1 spray onto the tongue every 5 minutes as needed for Chest pain  Cholecalciferol (VITAMIN D3) 1000 UNITS TABS, Take by mouth  calcium carbonate (OSCAL) 500 MG TABS tablet, Take 500 mg by mouth daily. aspirin 81 MG EC tablet, Take 81 mg by mouth daily. acetaminophen (TYLENOL) 500 MG tablet, Take 500 mg by mouth every 6 hours as needed.     [unfilled]    ALLERGIES:  Lisinopril; Statins; and Sulfa antibiotics    COMPLETE REVIEW OF

## 2018-02-11 NOTE — H&P
hemiarthroplasty. The symptoms are aggravated by palpation, movement and weight bearing. She has tried rest, ice and acetaminophen (Percocet) for the symptoms. The treatment provided mild relief. Back Pain   This is a chronic problem. The current episode started more than 1 year ago. The problem occurs constantly. The pain is present in the lumbar spine, sacro-iliac and thoracic spine. The quality of the pain is described as aching. The pain is at a severity of 8/10. The pain is severe. The symptoms are aggravated by lying down. Associated symptoms include leg pain and weakness. Pertinent negatives include no abdominal pain, bladder incontinence, bowel incontinence, chest pain, dysuria, fever, headaches, numbness or tingling. Risk factors include lack of exercise, menopause, obesity and sedentary lifestyle. She has tried NSAIDs, heat, home exercises and analgesics for the symptoms. The treatment provided moderate relief. Constipation   This is a recurrent problem. The current episode started in the past 7 days. The problem is unchanged. The stool is described as firm and formed. The patient is not on a high fiber diet. She does not exercise regularly. There has not been adequate water intake. Associated symptoms include back pain. Pertinent negatives include no abdominal pain, difficulty urinating, fever, nausea or vomiting. The patient has stabilized medically and is able to participate at acute level rehab but is too medically complex for SNF due to numerous highly complex medical issues as well as her need for 2/3 therapy PT and OT as well as need to titrate high-risk high dose narcotic pain medication and elderly patient. Imaging:    Imaging and other studies reviewed and discussed with patient and staff    Xr Hip Left (1 View)   Result Date: 2/8/2018  EXAMINATION: XR HIP LEFT (1 VIEW) DATE AND TIME:2/8/2018 3:00 PM CLINICAL HISTORY: Left hip pain    po  COMPARISON: None available. tenderness. Lumbar back: She exhibits decreased range of motion, tenderness, bony tenderness and pain. She exhibits no swelling, no edema, no deformity, no laceration and normal pulse. Right upper arm: Normal.        Left upper arm: Normal.        Right forearm: Normal.        Left forearm: Normal.        Right hand: Normal.        Left hand: Normal.        Right upper leg: Normal.        Left upper leg: She exhibits swelling. Right lower leg: Normal.        Left lower leg: She exhibits swelling. Right foot: Normal.        Left foot: Normal.   Tender areas are indicated by numbered spot         Lymphadenopathy:     She has no cervical adenopathy. She has no axillary adenopathy. Right: No inguinal adenopathy present. Left: No inguinal adenopathy present. Neurological: She is alert and oriented to person, place, and time. She has normal strength. She displays abnormal reflex. She displays no atrophy and no tremor. A sensory deficit is present. No cranial nerve deficit. She exhibits normal muscle tone. Gait abnormal. Coordination normal. She displays no Babinski's sign on the right side. She displays no Babinski's sign on the left side. Skin: Skin is warm, dry and intact. No abrasion, no bruising, no ecchymosis, no laceration, no petechiae and no rash noted. Rash is not macular, not pustular and not urticarial. She is not diaphoretic. No cyanosis or erythema. No pallor. Nails show no clubbing. Psychiatric: She has a normal mood and affect. Her behavior is normal. Judgment and thought content normal. Her mood appears not anxious. Her affect is not angry, not blunt, not labile and not inappropriate. Her speech is not rapid and/or pressured, not delayed, not tangential and not slurred. She is not agitated, not aggressive, not hyperactive, not slowed, not withdrawn, not actively hallucinating and not combative.  Thought content is not paranoid and not delusional. Cognition extensive review of the records and above physical exam, I have formulated the following diagnoses and plan:      DIAGNOSES:      1. The patient was admitted to the acute rehabilitation unit with the primary rehab diagnoses being severe abnormality of gait and mobility and impaired self care and ADL's due to acute left hip fracture of the left sub-capital area with left  hip hemiarthroplasty. Compared to Pre-Admission Assessment, patients medical and functional status remain challengingly complex and patient continues to require intensive therapeutic intervention from multiple therapies, therefore, initiate acute intensive comprehensive inpatient rehabilitation program including PT/OT to improve balance, ambulation, ADLs, and to improve the P/AROM. Functional and medical status reassessed regarding patients ability to participate in therapies and patient found to be able to participate in acute intensive comprehensive inpatient rehabilitation program.  Therapeutic modifications regarding activities in therapies, place, amount of time per day and intensity of therapy made daily. Enroll in acute course of therapy program to include 2 hours per day of PT 5 to 7 days per week and 1 hours per day of OT 5 to 7 days per week, and   recreational therapy 1/2 hour per day 3-5 days per week . The patient is stable medically and physically on previous exam.       This patient present with significant new onset decreased mobility and inability to perform activities of daily living skills independently and is at significant risk for prolonged disability  For this reason they have been admitted to [facility]. The patient's current functional and medical status are highly complex but the patient is able to participate in intensive rehabilitation. A comprehensive inpatient rehabilitation program is appropriate.   The patient will undergo initial evaluation by the has been ordered  Lovenox . The patient is high risk for orthostasis and a hydration program and orthostatic blood pressure screening have been ordered. I will attempt to get old records from the patient's previous hospital stay. Care everywhere on [facility] was utilized. 3.  Current and previous medications were reviewed and summarized and compared to old medication lists from home and from the acute floor. 4.  Complex labs and x-rays were reviewed. I will review patient's old EKG and place it on the chart. 5.  Will provide emotional support for this patient regarding adjustment to their disability. Cognition and mood will be screened daily and addressed by rehabilitation psychology and/or speech therapy as appropriate. I have encouraged the patient to attend the Rehab Adjustment to Disability Support Group and recreational therapy. 6.  Estimated length of stay is 2 weeks. Discharge to home with help from family and home health   PT, OT, RN, aide and . Patient should be independent at discharge. 7.  The patient's medical and rehab prognosis are good. [address] regarding the patient's back up to general medical needs. A welcome letter was presented with an explanation of my services, my specialty and what to expect during the rehabilitation process. As well as introducing myself, I also wrote my name on their bedside marker board with their name as well as the names of the other physicians with an explanation of our individual roles in their care, as well as the rehab process.             Juanjose Coulter D.O., F.A.A.P.M.&LAURA

## 2018-02-11 NOTE — PROGRESS NOTES
Facility/Department: [facility] Initial Assessment: Physical Therapy  Room: R260/R260-01    NAME: Lala Carrero  : 1935  MRN: [id number]    Date of Service: 2018    Rehab Diagnosis(es):Left subcapital femoral neck Fx s/p fall with L hemiarthroplasty with impaired mobility    Patient Active Problem List    Diagnosis Date Noted     Gait and ADL abnormality dt left subtrochanteric femoral neck fracture and left hemiarthroplasty-[facility] rehab admission February 10, 2018 2018    Abnormality of gait and mobility 2018    Acute cystitis without hematuria 2018    Hip fx, left, closed, initial encounter ([address] 02/10/2018    Closed left hip fracture, initial encounter ([facility] 2018    DDD (degenerative disc disease), lumbar 2017    Essential hypertension 2017    Lumbar facet arthropathy 2017    Lumbosacral spondylosis without myelopathy 2017    Primary osteoarthritis of both knees 2017    Right lumbosacral radiculopathy 2017    Constipation 2016    Foot pain, left 2015    Low back pain radiating to left leg 2011    Spondylolisthesis of lumbar region 2011    Cervical spondylosis without myelopathy 2005    Headache 2005       Past Medical History:   Diagnosis Date    CAD (coronary artery disease)     Colitis     Hyperlipidemia      Past Surgical History:   Procedure Laterality Date    APPENDECTOMY      COLECTOMY  1/3/2011    sigmoid colectomy with colostomy    COLECTOMY  2011    partial colectomy with colostomy closure    CORONARY ARTERY BYPASS GRAFT      HYSTERECTOMY      SIGMOIDOSCOPY      VARICOSE VEIN SURGERY         Chart Reviewed: Yes  Patient assessed for rehabilitation services?: Yes  Family / Caregiver Present: No    Restrictions:  Restrictions/Precautions: Weight Bearing, Fall Risk, Surgical Protocols  Lower Extremity Weight Bearing Restrictions  Left Lower Extremity Weight Bearing: Weight Bearing As Tolerated  Position Activity Restriction  Hip Precautions: Posterior hip precautions       SUBJECTIVE: Subjective: Pt reports pain is 5/10    Post Treatment Pain Screening:  Pain Assessment  Pain Assessment: 0-10  Pain Level: 3  Pain Type: Surgical pain  Pain Location: Hip  Pain Orientation: Left    Prior Level of Function:  Social/Functional History  Lives With: Spouse  Type of Home: House  Home Layout: Two level, Laundry in basement  Home Access: Stairs to enter with rails  Entrance Stairs - Number of Steps: 5  Entrance Stairs - Rails: Both  Home Equipment: Rolling walker  Ambulation Assistance: Independent  Transfer Assistance: Independent  Active : Yes  Additional Comments: PTA pt was independent with no device    OBJECTIVE:   Vision/Hearing:  Vision: Impaired  Vision Exceptions: Wears glasses at all times  Hearing: Within functional limits    Cognition:  Overall Orientation Status: Within Normal Limits  Follows Commands: Within Functional Limits  Observation/Palpation  Posture: Good  Observation: pt verbalizes gait sequencing to self    ROM:  RLE AROM: WFL  LLE AROM : WFL    Strength:  Strength RLE  Strength RLE: WFL  Strength LLE  Comment: functionally able to WB.  Unable to lift the LLE into a SLR without assist. Gross strength at hip as observed 3- to 3/5    Neuro:  Sensation  Overall Sensation Status: WFL     Balance  Posture: Fair  Sitting - Static: Good  Sitting - Dynamic: Good;-  Standing - Static: Fair  Standing - Dynamic: Fair  Tone RLE  RLE Tone: Normotonic  Tone LLE  LLE Tone: Normotonic  Motor Control  Gross Motor?: WFL    Bed mobility  Rolling to Left: Contact guard assistance  Supine to Sit: Stand by assistance  Sit to Supine: Stand by assistance (assist with the LLE)  Scooting: Stand by assistance    Transfers  Sit to Stand: Minimal Assistance  Stand to sit: Minimal Assistance  Bed to Chair: Minimal assistance  Stand Pivot Transfers: Minimal

## 2018-02-11 NOTE — PLAN OF CARE
Problem: Falls - Risk of  Goal: Absence of falls  Outcome: Ongoing      Problem: Infection:  Goal: Will remain free from infection  Will remain free from infection   Outcome: Ongoing      Problem: Safety:  Goal: Free from accidental physical injury  Free from accidental physical injury   Outcome: Ongoing    Goal: Free from intentional harm  Free from intentional harm   Outcome: Ongoing      Problem: Daily Care:  Goal: Daily care needs are met  Daily care needs are met   Outcome: Ongoing      Problem: Pain:  Goal: Patient's pain/discomfort is manageable  Patient's pain/discomfort is manageable   Outcome: Ongoing      Problem: Skin Integrity:  Goal: Skin integrity will stabilize  Skin integrity will stabilize   Outcome: Ongoing      Problem: Discharge Planning:  Goal: Patients continuum of care needs are met  Patients continuum of care needs are met   Outcome: Ongoing

## 2018-02-12 LAB
ALBUMIN SERPL-MCNC: 2.7 G/DL (ref 3.9–4.9)
ALP BLD-CCNC: 55 U/L (ref 40–130)
ALT SERPL-CCNC: 9 U/L (ref 0–33)
ANION GAP SERPL CALCULATED.3IONS-SCNC: 10 MEQ/L (ref 7–13)
AST SERPL-CCNC: 20 U/L (ref 0–35)
BILIRUB SERPL-MCNC: 0.4 MG/DL (ref 0–1.2)
BUN BLDV-MCNC: 26 MG/DL (ref 8–23)
CALCIUM SERPL-MCNC: 7.9 MG/DL (ref 8.6–10.2)
CHLORIDE BLD-SCNC: 110 MEQ/L (ref 98–107)
CO2: 24 MEQ/L (ref 22–29)
CREAT SERPL-MCNC: 1.11 MG/DL (ref 0.5–0.9)
GFR AFRICAN AMERICAN: 56.8
GFR NON-AFRICAN AMERICAN: 47
GLOBULIN: 2 G/DL (ref 2.3–3.5)
GLUCOSE BLD-MCNC: 110 MG/DL (ref 60–115)
GLUCOSE BLD-MCNC: 110 MG/DL (ref 60–115)
GLUCOSE BLD-MCNC: 111 MG/DL (ref 60–115)
GLUCOSE BLD-MCNC: 89 MG/DL (ref 60–115)
GLUCOSE BLD-MCNC: 89 MG/DL (ref 74–109)
HCT VFR BLD CALC: 28.4 % (ref 37–47)
HEMOGLOBIN: 9.2 G/DL (ref 12–16)
MCH RBC QN AUTO: 28 PG (ref 27–31.3)
MCHC RBC AUTO-ENTMCNC: 32.3 % (ref 33–37)
MCV RBC AUTO: 86.8 FL (ref 82–100)
PDW BLD-RTO: 14.9 % (ref 11.5–14.5)
PERFORMED ON: NORMAL
PLATELET # BLD: 148 K/UL (ref 130–400)
POTASSIUM SERPL-SCNC: 4.5 MEQ/L (ref 3.5–5.1)
RBC # BLD: 3.27 M/UL (ref 4.2–5.4)
SODIUM BLD-SCNC: 144 MEQ/L (ref 132–144)
TOTAL PROTEIN: 4.7 G/DL (ref 6.4–8.1)
URINE CULTURE, ROUTINE: NORMAL
WBC # BLD: 7.8 K/UL (ref 4.8–10.8)

## 2018-02-12 PROCEDURE — 97112 NEUROMUSCULAR REEDUCATION: CPT | Performed by: INTERNAL MEDICINE

## 2018-02-12 PROCEDURE — 97535 SELF CARE MNGMENT TRAINING: CPT

## 2018-02-12 PROCEDURE — 6370000000 HC RX 637 (ALT 250 FOR IP): Performed by: INTERNAL MEDICINE

## 2018-02-12 PROCEDURE — 80053 COMPREHEN METABOLIC PANEL: CPT

## 2018-02-12 PROCEDURE — 6370000000 HC RX 637 (ALT 250 FOR IP): Performed by: PHYSICAL MEDICINE & REHABILITATION

## 2018-02-12 PROCEDURE — 97116 GAIT TRAINING THERAPY: CPT | Performed by: INTERNAL MEDICINE

## 2018-02-12 PROCEDURE — 97110 THERAPEUTIC EXERCISES: CPT | Performed by: INTERNAL MEDICINE

## 2018-02-12 PROCEDURE — 1180000000 HC REHAB R&B

## 2018-02-12 PROCEDURE — 85027 COMPLETE CBC AUTOMATED: CPT

## 2018-02-12 PROCEDURE — 97110 THERAPEUTIC EXERCISES: CPT

## 2018-02-12 PROCEDURE — 97535 SELF CARE MNGMENT TRAINING: CPT | Performed by: INTERNAL MEDICINE

## 2018-02-12 PROCEDURE — 97116 GAIT TRAINING THERAPY: CPT

## 2018-02-12 PROCEDURE — 36415 COLL VENOUS BLD VENIPUNCTURE: CPT

## 2018-02-12 PROCEDURE — 6360000002 HC RX W HCPCS: Performed by: INTERNAL MEDICINE

## 2018-02-12 PROCEDURE — 99233 SBSQ HOSP IP/OBS HIGH 50: CPT | Performed by: PHYSICAL MEDICINE & REHABILITATION

## 2018-02-12 RX ORDER — ANALGESIC BALM 1.74; 4.06 G/29G; G/29G
OINTMENT TOPICAL PRN
Status: DISCONTINUED | OUTPATIENT
Start: 2018-02-12 | End: 2018-02-23 | Stop reason: HOSPADM

## 2018-02-12 RX ADMIN — CHOLECALCIFEROL TAB 25 MCG (1000 UNIT) 1000 UNITS: 25 TAB at 16:46

## 2018-02-12 RX ADMIN — CHOLECALCIFEROL TAB 25 MCG (1000 UNIT) 1000 UNITS: 25 TAB at 09:46

## 2018-02-12 RX ADMIN — OXYCODONE HYDROCHLORIDE 20 MG: 20 TABLET, FILM COATED, EXTENDED RELEASE ORAL at 09:46

## 2018-02-12 RX ADMIN — METOPROLOL TARTRATE 25 MG: 25 TABLET, FILM COATED ORAL at 20:29

## 2018-02-12 RX ADMIN — NYSTATIN 500000 UNITS: 100000 SUSPENSION ORAL at 13:48

## 2018-02-12 RX ADMIN — AMLODIPINE BESYLATE 5 MG: 5 TABLET ORAL at 09:47

## 2018-02-12 RX ADMIN — NYSTATIN 500000 UNITS: 100000 SUSPENSION ORAL at 20:30

## 2018-02-12 RX ADMIN — DOCUSATE SODIUM 100 MG: 100 CAPSULE, LIQUID FILLED ORAL at 20:29

## 2018-02-12 RX ADMIN — NITROFURANTOIN MONOHYDRATE/MACROCRYSTALLINE 100 MG: 25; 75 CAPSULE ORAL at 09:46

## 2018-02-12 RX ADMIN — OXYCODONE HYDROCHLORIDE 20 MG: 20 TABLET, FILM COATED, EXTENDED RELEASE ORAL at 20:30

## 2018-02-12 RX ADMIN — NYSTATIN 500000 UNITS: 100000 SUSPENSION ORAL at 16:46

## 2018-02-12 RX ADMIN — NYSTATIN 500000 UNITS: 100000 SUSPENSION ORAL at 09:46

## 2018-02-12 RX ADMIN — DOCUSATE SODIUM 100 MG: 100 CAPSULE, LIQUID FILLED ORAL at 09:46

## 2018-02-12 RX ADMIN — ASPIRIN 81 MG: 81 TABLET, COATED ORAL at 09:47

## 2018-02-12 RX ADMIN — ENOXAPARIN SODIUM 40 MG: 40 INJECTION SUBCUTANEOUS at 09:45

## 2018-02-12 RX ADMIN — NITROFURANTOIN MONOHYDRATE/MACROCRYSTALLINE 100 MG: 25; 75 CAPSULE ORAL at 20:29

## 2018-02-12 RX ADMIN — SENNOSIDES AND DOCUSATE SODIUM 1 TABLET: 8.6; 5 TABLET ORAL at 09:47

## 2018-02-12 RX ADMIN — METOPROLOL TARTRATE 50 MG: 50 TABLET, FILM COATED ORAL at 09:46

## 2018-02-12 ASSESSMENT — PAIN DESCRIPTION - ONSET: ONSET: ON-GOING

## 2018-02-12 ASSESSMENT — PAIN DESCRIPTION - PAIN TYPE
TYPE: SURGICAL PAIN

## 2018-02-12 ASSESSMENT — PAIN DESCRIPTION - PROGRESSION: CLINICAL_PROGRESSION: NOT CHANGED

## 2018-02-12 ASSESSMENT — PAIN SCALES - GENERAL
PAINLEVEL_OUTOF10: 0
PAINLEVEL_OUTOF10: 4

## 2018-02-12 ASSESSMENT — PAIN DESCRIPTION - FREQUENCY
FREQUENCY: INTERMITTENT

## 2018-02-12 ASSESSMENT — PAIN DESCRIPTION - ORIENTATION
ORIENTATION: LEFT

## 2018-02-12 ASSESSMENT — PAIN DESCRIPTION - LOCATION
LOCATION: HIP
LOCATION: HIP
LOCATION: HIP;GROIN

## 2018-02-12 ASSESSMENT — PAIN DESCRIPTION - DESCRIPTORS
DESCRIPTORS: ACHING

## 2018-02-12 NOTE — PROGRESS NOTES
Offered patient bedside tablet. she accepted. consent signed. explained available apps and password to patient. 2 informational sheets given.

## 2018-02-12 NOTE — PROGRESS NOTES
HOME ENJOYS SOCIALIZING HAVING DRINKS AND PLAYING CARD GAMES, WATCHING TV/NEWS, HAS A TABLET, PLAYS CARD GAMES ON A COMPUTER, WORD SEARCHES, LIKES [place] [employer] ROLL MUSIC    PER PATIENT: PAST LEISURE INTERESTS ARE BOWLING, GOLF, KNITTING, PAINTING, LEADED GLASS, HAS ACCESS TO A STATIONARY BIKE AND TREADMILL, HAD PETS IN THE PAST    Observed/noted emotions:    [] Homesick  [] Anger   [] Depressed   [] Anxious    [x] Cooperative   [] Isolated   [x] Pleasant       [] Agitated       Patient provided with the following accessible and independently used recreation/leisure resources when in hospital room:   [x] 5 days week large print orientation/puzzle activity handout  [x] Word searches, crossword puzzles  [] Creative art independent use kit  [] Journaling kit  [] Creative writing kit  [] Deck of Cards  []     Recommendations:   Recreation Therapy services offered to all [facility] inpatients:  [x]Recommended  []Not recommended secondary to:     Comments:  PRESENT.      Electronically signed by: Azeem Lloyd  Date: 2/12/2018   Electronically signed by Azeem Lloyd on 2/12/2018 at 4:51 PM

## 2018-02-12 NOTE — PROGRESS NOTES
INTERNAL MEDICINE CONSULT PROGRESS NOTE    SERVICE DATE:  2/12/2018   SERVICE TIME:  8:52 am    CONSULTING SERVICE:  Internal Medicine    ASSESSMENT/PLAN:   Evelyn Crespo is an 80-year-old female status post left hip fracture status post surgery. Patient is now in rehab due to gait abnormality. We are following patient for medical management. Gait and ADL abnormalities due to left hip fracture: status post surgery  - Continues with PT and OT at this time    Coronary artery disease  Hypertension  Lumbar spondylosis    Dispo: Will continue to follow in rehab with you. SUBJECTIVE  INTERVAL HISTORY OF PRESENT ILLNESS: Patient is stable at this time, still feels weak, no shortness of breath.      Current Facility-Administered Medications: vitamin D (CHOLECALCIFEROL) tablet 1,000 Units, 1,000 Units, Oral, BID WC  cyanocobalamin injection 1,000 mcg, 1,000 mcg, Intramuscular, Weekly  lidocaine (LIDODERM) 5 % 3 patch, 3 patch, Transdermal, Daily  acetaminophen (TYLENOL) tablet 650 mg, 650 mg, Oral, Q4H PRN  bisacodyl (DULCOLAX) suppository 10 mg, 10 mg, Rectal, Daily PRN  nitrofurantoin (macrocrystal-monohydrate) (MACROBID) capsule 100 mg, 100 mg, Oral, 2 times per day  nystatin (MYCOSTATIN) 340026 UNIT/ML suspension 500,000 Units, 5 mL, Oral, 4x Daily  oxyCODONE (OXYCONTIN) extended release tablet 20 mg, 20 mg, Oral, 2 times per day  diclofenac sodium 1 % gel 4 g, 4 g, Topical, 4x Daily PRN  acetaminophen (TYLENOL) tablet 650 mg, 650 mg, Oral, Q4H PRN  docusate sodium (COLACE) capsule 100 mg, 100 mg, Oral, BID  enoxaparin (LOVENOX) injection 40 mg, 40 mg, Subcutaneous, Daily  magnesium hydroxide (MILK OF MAGNESIA) 400 MG/5ML suspension 30 mL, 30 mL, Oral, Daily PRN  ondansetron (ZOFRAN) injection 4 mg, 4 mg, Intravenous, Q6H PRN  oxyCODONE-acetaminophen (PERCOCET) 5-325 MG per tablet 1 tablet, 1 tablet, Oral, Q4H PRN **OR** oxyCODONE-acetaminophen (PERCOCET) 5-325 MG per tablet 2 tablet, 2 tablet, Oral, Q4H PRN  sennosides-docusate sodium (SENOKOT-S) 8.6-50 MG tablet 1 tablet, 1 tablet, Oral, Daily  amLODIPine (NORVASC) tablet 5 mg, 5 mg, Oral, Daily  aspirin EC tablet 81 mg, 81 mg, Oral, Daily  metoprolol tartrate (LOPRESSOR) tablet 50 mg, 50 mg, Oral, Daily  metoprolol tartrate (LOPRESSOR) tablet 25 mg, 25 mg, Oral, Nightly    OBJECTIVE  PHYSICAL EXAM:    BP (!) 133/58   Pulse 68   Temp 98 °F (36.7 °C) (Oral)   Resp 19   Ht 5' 3\" (1.6 m)   Wt 200 lb (90.7 kg)   SpO2 93%   BMI 35.43 kg/m²   Body mass index is 35.43 kg/m². CONSTITUTIONAL:  awake, alert, cooperative, no apparent distress, and appears stated age  EYES:  Lids and lashes normal, pupils equal, round and reactive to light, extra ocular muscles intact, sclera clear, conjunctiva normal  LUNGS:  No increased work of breathing, good air exchange, clear to auscultation bilaterally, no crackles or wheezing  CARDIOVASCULAR:  Normal apical impulse, regular rate and rhythm, normal S1 and S2, no S3 or S4, and no murmur noted  ABDOMEN:  No scars, normal bowel sounds, soft, non-distended, non-tender, no masses palpated, no hepatosplenomegally  EXTREMITIES: Trace edema left, no cyanosis, no clubbing    DATA:   Diagnostic tests reviewed for today's visit:    All labs and imaging results reviewed. SIGNATURE: Radha Duong PA-C PATIENT NAME: Judd Reeves   DATE: February 12, 2018 MRN: [id number]   TIME: 8:47 AM PAGER/CONTACT #: [phone]     I have independently seen and examined this patient. I have reviewed and agree with the above documentation, assessment and plan.      Jasper Garrido MD  1:10 PM  February 12, 2018

## 2018-02-12 NOTE — PROGRESS NOTES
Subjective: The patient complains of severe  acute  left hip and left anterior flank pain partially relieved by  Percocet, OxyContin, Tylenol and exacerbated by  constipation palpation and range of motion. I am concerned about patients  tenderness under her breast but no rash. She complains of flank pain, consider BenGay. She also complains of constipation. ROS x10: The patient also complains of severely impaired mobility and activities of daily living. Otherwise no new problems with vision, hearing, nose, mouth, throat, dermal, cardiovascular, GI, , pulmonary, musculoskeletal, psychiatric or neurological. See Rehab H&P on Rehab chart dated 02/11/18. Vital signs:  BP (!) 133/58   Pulse 68   Temp 98 °F (36.7 °C) (Oral)   Resp 19   Ht 5' 3\" (1.6 m)   Wt 200 lb (90.7 kg)   SpO2 93%   BMI 35.43 kg/m²   I/O:   PO/Intake:  fair PO intake, no problems observed or reported. Bowel/Bladder:  continent, constipation. General:  Patient is well developed, adequately nourished, non-obese    and well kempt. HEENT:    PERRLA, hearing intact to loud voice, external inspection of    ear and nose benign. Inspection of lips, tongue and gums    benign  Musculoskeletal: No significant change in strength or tone. All joints stable. Inspection and palpation of digits and nails show no     clubbing, cyanosis or inflammatory conditions. Neuro/Psychiatric: Affect: flat but pleasant. Alert and oriented to person, place    and situation. No significant change in deep tendon    reflexes or sensation  Lungs:  Diminished CTA-B. Respiration effort is normal at rest.     Heart:   S1 = S2, RRR. No loud murmurs. Abdomen:  Soft, non-tender, no enlargement of liver or spleen. Extremities:  No significant lower extremity edema or tenderness. Skin:   Intact to general survey, no visualized or palpated problems.     Rehabilitation:  Physical therapy: FIMS:  Bed Mobility: Scooting: Stand by assistance    Transfers: Sit to Stand: Minimal Assistance  Stand to sit: Minimal Assistance  Bed to Chair: Minimal assistance  Stand Pivot Transfers: Minimal Assistance, Ambulation 1  Surface: level tile  Device: Rolling Walker  Assistance: Minimal assistance  Quality of Gait: verbal cues to stand upright, antalgic gait, decreased stance on the LLE  Distance: 20 feet,      FIMS: Bed, Chair, Wheel Chair: 1 - Requires >75% assitance to transfer,  , Assessment: Pt referred for L subcapital femoral neck fx s/p fall. L hemiarthroplasty with impaired mobility. Pt currently min assist with RW, progress as tolerated. Occupational therapy: FIMS:  Eatin - Patient feeds self  Groomin - Requires setup/cues to do all tasks  Bathing: 3 - Able to bathe 5-7 areas  Dressing-Upper: 5 - Requires setup/supervision/cues and/or requires assist with presthesis/brace only  Dressing-Lower: 1 - Total assist with lower body dressing  Toiletin - Requires steadying assistance only  Toilet Transfer: 4 - Requires steadying assistance only < 25% assist  Tub Transfer: 1 - Total assistance, pt. expends less than 25% effort  Shower Transfer: 4 - Minimal contact assistance, pt. expends 75% or more effort,  , Assessment: Pt. demonstrates decreased balance, strength and endurance which impacts her ability to safely perform ADL tasks. Pt. would benefit from skilled OT to address these deficits.      Speech therapy: FIMS: Comprehension: 5 - Patient understands basic needs (hungry/hot/pain)  Expression: 5 - Expresses basic ideas/needs only (hungry/hot/pain)  Social Interaction: 5 - Patient is appropriate with supervision/cues  Problem Solvin - Independent with device (e.g. notes, schedules)  Memory: 6 - Patient requires device to recall (e.g. memory book)      Lab/X-ray studies reviewed, analyzed and discussed with patient and staff:   Recent Results (from the past 24 hour(s))   POCT Glucose    Collection Time: 18 11:55 AM   Result Value Ref Range    POC Glucose 159 (H) 60 - 115 mg/dl    Performed on ACCU-CHEK    CBC    Collection Time: 02/12/18  5:41 AM   Result Value Ref Range    WBC 7.8 4.8 - 10.8 K/uL    RBC 3.27 (L) 4.20 - 5.40 M/uL    Hemoglobin 9.2 (L) 12.0 - 16.0 g/dL    Hematocrit 28.4 (L) 37.0 - 47.0 %    MCV 86.8 82.0 - 100.0 fL    MCH 28.0 27.0 - 31.3 pg    MCHC 32.3 (L) 33.0 - 37.0 %    RDW 14.9 (H) 11.5 - 14.5 %    Platelets 331 468 - 438 K/uL   Comprehensive Metabolic Panel    Collection Time: 02/12/18  5:41 AM   Result Value Ref Range    Sodium 144 132 - 144 mEq/L    Potassium 4.5 3.5 - 5.1 mEq/L    Chloride 110 (H) 98 - 107 mEq/L    CO2 24 22 - 29 mEq/L    Anion Gap 10 7 - 13 mEq/L    Glucose 89 74 - 109 mg/dL    BUN 26 (H) 8 - 23 mg/dL    CREATININE 1.11 (H) 0.50 - 0.90 mg/dL    GFR Non- 47.0 (L) >60    GFR  56.8 (L) >60    Calcium 7.9 (L) 8.6 - 10.2 mg/dL    Total Protein 4.7 (L) 6.4 - 8.1 g/dL    Alb 2.7 (L) 3.9 - 4.9 g/dL    Total Bilirubin 0.4 0.0 - 1.2 mg/dL    Alkaline Phosphatase 55 40 - 130 U/L    ALT 9 0 - 33 U/L    AST 20 0 - 35 U/L    Globulin 2.0 (L) 2.3 - 3.5 g/dL   POCT Glucose    Collection Time: 02/12/18  6:58 AM   Result Value Ref Range    POC Glucose 89 60 - 115 mg/dl    Performed on ACCU-CHEK        Xr Hip Left (1 View)  Result Date: 2/8/2018  Impression: Normal postop left hip. Xr Femur Left (min 2 Views)  Result Date: 2/8/2018  MINIMALLY DISPLACED SUBCAPITAL LEFT FEMORAL NECK FRACTURE    Xr Knee Left (3 Views)  Result Date: 2/8/2018  NO ACUTE FRACTURES    Xr Chest Portable  Result Date: 2/8/2018  NO ACUTE ACTIVE CARDIOPULMONARY PROCESS    Xr Hip 2-3 Vw W Pelvis Left  Result Date: 2/8/2018  ACUTE SUBCAPITAL LEFT FEMORAL NECK FRACTURE       Previous extensive, complex labs, notes and diagnostics reviewed and analyzed.      ALLERGIES:    Allergies as of 02/10/2018 - Review Complete 02/10/2018   Allergen Reaction Noted    Lisinopril Anaphylaxis 01/29/2016    Statins Other

## 2018-02-12 NOTE — FLOWSHEET NOTE
Shift assessment complete. Pt is sleeping in room. I woke her up and she is startled easily. Pt is alert and orientated x 4. Pink, warm and dry. Pt states that she is in no pain. No sob noted. Resting in bed. Bedside table and call light is within reach. Will continue to monitor along with hourly rounding.  Electronically signed by Samuel Schmitt RN on 2/11/2018 at 9:30 PM

## 2018-02-12 NOTE — PROGRESS NOTES
Physical Therapy Rehab Treatment Note  Facility/Department: [facility]  [facility]       NAME: Angie Lopez  : 1935 (80 y.o.)  MRN: [id number]  CODE STATUS: Full Code    Date of Service: 2018  Chart Reviewed: Yes  Family / Caregiver Present: No  General Comment  Comments: reports not having much of an appetite    Restrictions:  Restrictions/Precautions: Weight Bearing, Fall Risk, Surgical Protocols  Lower Extremity Weight Bearing Restrictions  Left Lower Extremity Weight Bearing: Weight Bearing As Tolerated  Position Activity Restriction  Hip Precautions: Posterior hip precautions   SUBJECTIVE: Subjective: i will do whatever i can  Pain Screening  Patient Currently in Pain: Yes  Pre Treatment Pain Screening  Pain at present: 4  Scale Used: Numeric Score  Intervention List: Patient able to continue with treatment  Post Treatment Pain Screenin  Pain Assessment  Pain Assessment: 0-10  Pain Level: 4  Pain Type: Surgical pain  Pain Location: Hip;Groin  Pain Orientation: Left  Pain Descriptors: Aching  Pain Frequency: Intermittent  Pain Intervention(s): Repositioned;RN notified  Nurse arrived with pain meds at end of tx  OBJECTIVE:   Overall Orientation Status: Within Normal Limits  Neuromuscular Education  NDT Treatment: Standing  Neuromuscular Comments: static standing at ww bedside. Bed mobility  Supine to Sit: Stand by assistance  Sit to Supine: Minimal assistance  Comment: will try to use cane to lift left leg onto bed next tx. Transfers  Sit to Stand: Contact guard assistance  Stand to sit: Contact guard assistance  Comment: pt often pushes from ww to stand  Ambulation  Ambulation?: Yes  Ambulation 1  Surface: level tile  Device: Rolling Walker  Other Apparatus:  (wrapped left knee for support with ace wrap)  Assistance: Minimal assistance  Quality of Gait: antalgic gt left, wbos, mild trunk flexion with increased wb through ww, vc to not pivot on left leg.    Distance: 22 feet x

## 2018-02-13 LAB
GLUCOSE BLD-MCNC: 124 MG/DL (ref 60–115)
GLUCOSE BLD-MCNC: 137 MG/DL (ref 60–115)
GLUCOSE BLD-MCNC: 96 MG/DL (ref 60–115)
PERFORMED ON: ABNORMAL
PERFORMED ON: ABNORMAL
PERFORMED ON: NORMAL

## 2018-02-13 PROCEDURE — 6370000000 HC RX 637 (ALT 250 FOR IP): Performed by: INTERNAL MEDICINE

## 2018-02-13 PROCEDURE — 6370000000 HC RX 637 (ALT 250 FOR IP): Performed by: PHYSICAL MEDICINE & REHABILITATION

## 2018-02-13 PROCEDURE — 99232 SBSQ HOSP IP/OBS MODERATE 35: CPT | Performed by: PHYSICAL MEDICINE & REHABILITATION

## 2018-02-13 PROCEDURE — 97112 NEUROMUSCULAR REEDUCATION: CPT | Performed by: INTERNAL MEDICINE

## 2018-02-13 PROCEDURE — 97110 THERAPEUTIC EXERCISES: CPT

## 2018-02-13 PROCEDURE — 6360000002 HC RX W HCPCS: Performed by: INTERNAL MEDICINE

## 2018-02-13 PROCEDURE — 97116 GAIT TRAINING THERAPY: CPT | Performed by: INTERNAL MEDICINE

## 2018-02-13 PROCEDURE — 97530 THERAPEUTIC ACTIVITIES: CPT

## 2018-02-13 PROCEDURE — 97535 SELF CARE MNGMENT TRAINING: CPT | Performed by: INTERNAL MEDICINE

## 2018-02-13 PROCEDURE — 1180000000 HC REHAB R&B

## 2018-02-13 RX ORDER — CINNAMON
1 OIL (ML) MISCELLANEOUS 4 TIMES DAILY
Status: DISCONTINUED | OUTPATIENT
Start: 2018-02-13 | End: 2018-02-23 | Stop reason: HOSPADM

## 2018-02-13 RX ADMIN — AMLODIPINE BESYLATE 5 MG: 5 TABLET ORAL at 08:54

## 2018-02-13 RX ADMIN — NYSTATIN 500000 UNITS: 100000 SUSPENSION ORAL at 17:41

## 2018-02-13 RX ADMIN — NYSTATIN 500000 UNITS: 100000 SUSPENSION ORAL at 08:54

## 2018-02-13 RX ADMIN — METOPROLOL TARTRATE 25 MG: 25 TABLET, FILM COATED ORAL at 21:02

## 2018-02-13 RX ADMIN — NYSTATIN 500000 UNITS: 100000 SUSPENSION ORAL at 21:00

## 2018-02-13 RX ADMIN — DOCUSATE SODIUM 100 MG: 100 CAPSULE, LIQUID FILLED ORAL at 08:54

## 2018-02-13 RX ADMIN — OXYCODONE HYDROCHLORIDE 20 MG: 20 TABLET, FILM COATED, EXTENDED RELEASE ORAL at 08:54

## 2018-02-13 RX ADMIN — ASPIRIN 81 MG: 81 TABLET, COATED ORAL at 08:54

## 2018-02-13 RX ADMIN — NYSTATIN 500000 UNITS: 100000 SUSPENSION ORAL at 13:01

## 2018-02-13 RX ADMIN — DOCUSATE SODIUM 100 MG: 100 CAPSULE, LIQUID FILLED ORAL at 21:00

## 2018-02-13 RX ADMIN — METOPROLOL TARTRATE 50 MG: 50 TABLET, FILM COATED ORAL at 08:53

## 2018-02-13 RX ADMIN — OXYCODONE HYDROCHLORIDE 20 MG: 20 TABLET, FILM COATED, EXTENDED RELEASE ORAL at 21:00

## 2018-02-13 RX ADMIN — NITROFURANTOIN MONOHYDRATE/MACROCRYSTALLINE 100 MG: 25; 75 CAPSULE ORAL at 21:00

## 2018-02-13 RX ADMIN — CHOLECALCIFEROL TAB 25 MCG (1000 UNIT) 1000 UNITS: 25 TAB at 08:54

## 2018-02-13 RX ADMIN — NITROFURANTOIN MONOHYDRATE/MACROCRYSTALLINE 100 MG: 25; 75 CAPSULE ORAL at 08:52

## 2018-02-13 RX ADMIN — ENOXAPARIN SODIUM 40 MG: 40 INJECTION SUBCUTANEOUS at 08:52

## 2018-02-13 RX ADMIN — SENNOSIDES AND DOCUSATE SODIUM 1 TABLET: 8.6; 5 TABLET ORAL at 08:52

## 2018-02-13 RX ADMIN — CHOLECALCIFEROL TAB 25 MCG (1000 UNIT) 1000 UNITS: 25 TAB at 17:41

## 2018-02-13 ASSESSMENT — PAIN DESCRIPTION - DESCRIPTORS
DESCRIPTORS: ACHING

## 2018-02-13 ASSESSMENT — PAIN DESCRIPTION - ORIENTATION
ORIENTATION: LEFT

## 2018-02-13 ASSESSMENT — PAIN DESCRIPTION - FREQUENCY
FREQUENCY: INTERMITTENT

## 2018-02-13 ASSESSMENT — PAIN DESCRIPTION - PAIN TYPE
TYPE: SURGICAL PAIN

## 2018-02-13 ASSESSMENT — PAIN SCALES - GENERAL
PAINLEVEL_OUTOF10: 8
PAINLEVEL_OUTOF10: 3
PAINLEVEL_OUTOF10: 0
PAINLEVEL_OUTOF10: 5
PAINLEVEL_OUTOF10: 3
PAINLEVEL_OUTOF10: 4

## 2018-02-13 ASSESSMENT — PAIN DESCRIPTION - PROGRESSION
CLINICAL_PROGRESSION: NOT CHANGED
CLINICAL_PROGRESSION: NOT CHANGED

## 2018-02-13 ASSESSMENT — PAIN DESCRIPTION - LOCATION
LOCATION: HIP

## 2018-02-13 ASSESSMENT — PAIN DESCRIPTION - ONSET
ONSET: ON-GOING
ONSET: ON-GOING

## 2018-02-13 NOTE — PROGRESS NOTES
difficulty with R UE due to decreased fine motor and being fatigued. Pt. removed marbles from golf tees alternating B UE and had mod difficulty with B UE. Pt. removed golf tees from board alternating B UE and had min difficulty due to fatigue. Pt.  Pt. engaged in strengthening and endurance task with 1 lb. wrist weights on B UE. Pt. placed resistive clothespins on poles using B UE and then removed them having no difficulty with either UE. Pt. completed 2 sets x 5 reps with B UE using a green hand gripper. Pt. had no problem with R UE. Pt. was unable to fully squeeze with L UE. Pt. required frequent v.cs during the entire session to stay awake and attend to task. Pt. required max v.c.s to follow directions. Assessment      Activity Tolerance  Activity Tolerance: Patient limited by fatigue  Safety Devices  Safety Devices in place: Yes  Type of devices:  All fall risk precautions in place          Plan   Plan  Times per week: 5-7x/week, 15-60 minutes/day  Times per day: Daily  Plan weeks: 1 - 1 1/2  Current Treatment Recommendations: Strengthening, Balance Training, Functional Mobility Training, Endurance Training, Safety Education & Training, Self-Care / ADL, Patient/Caregiver Education & Training, Equipment Evaluation, Education, & procurement, Home Management Training    Goals  Patient Goals   Patient goals : Get back to doing what I was       Therapy Time   Individual Concurrent Group Co-treatment   Time In 1100         Time Out 1200         Minutes Abner ROWLAND This therapy session was supervised by DASHAWN Concepcion/GOLDIE

## 2018-02-13 NOTE — PROGRESS NOTES
therapy: FIMS:  Bed Mobility: Scooting: Stand by assistance    Transfers: Sit to Stand: Contact guard assistance  Stand to sit: Stand by assistance  Bed to Chair: Contact guard assistance  Stand Pivot Transfers: Contact guard assistance, Ambulation 1  Surface: carpet  Device: Rolling Walker  Other Apparatus:  (wrapped left knee for support with ace wrap)  Assistance: Contact guard assistance  Quality of Gait: Antalgic, recpirocal, decreased pace, SL & HS (LLE), increased BUE WB, Flexed trunk. Distance: 30 ft, 70 ft  Comments: improved tolerance , Stairs  # Steps : 4  Stairs Height: 6\"  Assistance: Contact guard assistance    FIMS: Bed, Chair, Wheel Chair: 4 - Requires steadying assistance only <25% assist  and/or requires assist with one leg only  Walk: 1 - Total Assistance Walks/operates wheelchair < 50 feet OR requires two or more people OR patient performs < 25% of locomotion effort  Distance Walked: 25 feet  Stairs: 0 - Activity Does not Occur ( 0 only for the admission assessment),  , Assessment: Improved gait tolerance in PM. Pt states that her L knee is \"bad\"; during gait she verbalizes sequencing during stance \"lock the knee, shift. .. \". No change in bed mobility status as pt required LLE lift/lower after SC attempt. Spouse present in pt room upon return.       Occupational therapy: FIMS:  Eatin - Feeds self with setup/supervision/cues and/or requires only setup/supervision/cues to perform tube feedings  Groomin - Did not occur  Bathin - Did not occur  Dressing-Upper: 0 - Did not occur  Dressing-Lower: 0 - Did not occur  Toiletin - Requires steadying assistance only  Toilet Transfer: 4 - Requires steadying assistance only < 25% assist  Tub Transfer: 1 - Total assistance, pt. expends less than 25% effort  Shower Transfer: 4 - Minimal contact assistance, pt. expends 75% or more effort,  , Assessment: Pt. demonstrates decreased balance, strength and endurance which impacts her ability to safely perform ADL tasks. Pt. would benefit from skilled OT to address these deficits. Speech therapy: FIMS: Comprehension: 6 - Complex ideas 90% or device (hearing aid/glasses)  Expression: 6 - Device used to express complex ideas/needs  Social Interaction: 6 - Patient requires medication for mood and/or effect  Problem Solvin - Independent with device (e.g. notes, schedules)  Memory: 6 - Patient requires device to recall (e.g. memory book)      Lab/X-ray studies reviewed, analyzed and discussed with patient and staff:   Recent Results (from the past 24 hour(s))   POCT Glucose    Collection Time: 18 11:54 AM   Result Value Ref Range    POC Glucose 110 60 - 115 mg/dl    Performed on ACCU-CHEK    POCT Glucose    Collection Time: 18  4:10 PM   Result Value Ref Range    POC Glucose 111 60 - 115 mg/dl    Performed on ACCU-CHEK    POCT Glucose    Collection Time: 18  8:12 PM   Result Value Ref Range    POC Glucose 110 60 - 115 mg/dl    Performed on ACCU-CHEK    POCT Glucose    Collection Time: 18  6:08 AM   Result Value Ref Range    POC Glucose 96 60 - 115 mg/dl    Performed on ACCU-CHEK        Xr Hip Left (1 View)  Result Date: 2018  Impression: Normal postop left hip. Xr Femur Left (min 2 Views)  Result Date: 2018  MINIMALLY DISPLACED SUBCAPITAL LEFT FEMORAL NECK FRACTURE    Xr Knee Left (3 Views)  Result Date: 2018  NO ACUTE FRACTURES    Xr Chest Portable  Result Date: 2018  NO ACUTE ACTIVE CARDIOPULMONARY PROCESS          Previous extensive, complex labs, notes and diagnostics reviewed and analyzed. ALLERGIES:    Allergies as of 02/10/2018 - Review Complete 02/10/2018   Allergen Reaction Noted    Lisinopril Anaphylaxis 2016    Statins Other (See Comments) 2016    Sulfa antibiotics  2012      (please also verify by checking MAR)    Yesterday I evaluated this patient for periodic reassessment of medical and functional status.   The patient was hip and anterior left rib cage generalized OA pain, flank pain:  Consider BenGay, reassess pain every shift and prior to and after each therapy session, give scheduled OxyContin 20 mg and prn Tylenol and Percocet, modalities prn in therapy, Lidoderm, K-pad prn.   4. Skin healing and breakdown risk:  continue pressure relief program.  Daily skin exams and reports from nursing. 5. Fatigue due to nutritional and hydration deficiency:  continue to monitor I&Os, calorie counts prn, dietary consult prn.  6. Acute episodic insomnia with situational adjustment disorder:  prn Ambien, monitor for day time sedation. 7. Falls risk elevated:  patient to use call light to get nursing assistance to get up, bed and chair alarm. 8. Elevated DVT risk: progressive activities in PT, continue prophylaxis EMILY hose, elevation and  Lovenox . 9. Complex discharge planning:  Discharge date is set for 02/23/18 to home with her  with help from her son and daughter-in-law and home health PT, OT, RN, aide and  as well as equipment including a wheeled walker and shower chair and a bedside commode. Weekly team meeting every Monday to assess progress towards goals, discuss and address social, psychological and medical comorbidities and to address difficulties they may be having progressing in therapy. Patient and family education is in progress. The patient is to follow-up with their family physician after discharge. Complex Active General Medical Issues that complicate care Assess & Plan:    1. History of colitis with Severe active on chronic Constipation exacerbated by anesthesia recent fall and mobility and narcotics-add rectal suppositories, milk of magnesia, Senokot, Colace monitor stools for blood  2. Closed left hip fracture with recent left hip hemiarthroplasty  - weightbearing as tolerated and posterior hip precautions  3.    Cervical spondylosis without myelopathy, DDD (degenerative disc disease),

## 2018-02-14 LAB
GLUCOSE BLD-MCNC: 109 MG/DL (ref 60–115)
GLUCOSE BLD-MCNC: 94 MG/DL (ref 60–115)
PERFORMED ON: NORMAL
PERFORMED ON: NORMAL

## 2018-02-14 PROCEDURE — 6370000000 HC RX 637 (ALT 250 FOR IP): Performed by: INTERNAL MEDICINE

## 2018-02-14 PROCEDURE — 97110 THERAPEUTIC EXERCISES: CPT | Performed by: INTERNAL MEDICINE

## 2018-02-14 PROCEDURE — 97535 SELF CARE MNGMENT TRAINING: CPT | Performed by: INTERNAL MEDICINE

## 2018-02-14 PROCEDURE — 97112 NEUROMUSCULAR REEDUCATION: CPT | Performed by: INTERNAL MEDICINE

## 2018-02-14 PROCEDURE — 99232 SBSQ HOSP IP/OBS MODERATE 35: CPT | Performed by: PHYSICAL MEDICINE & REHABILITATION

## 2018-02-14 PROCEDURE — 97116 GAIT TRAINING THERAPY: CPT | Performed by: INTERNAL MEDICINE

## 2018-02-14 PROCEDURE — 6360000002 HC RX W HCPCS: Performed by: INTERNAL MEDICINE

## 2018-02-14 PROCEDURE — 1180000000 HC REHAB R&B

## 2018-02-14 PROCEDURE — 97535 SELF CARE MNGMENT TRAINING: CPT

## 2018-02-14 PROCEDURE — 6370000000 HC RX 637 (ALT 250 FOR IP): Performed by: PHYSICAL MEDICINE & REHABILITATION

## 2018-02-14 RX ADMIN — NYSTATIN 500000 UNITS: 100000 SUSPENSION ORAL at 20:35

## 2018-02-14 RX ADMIN — ENOXAPARIN SODIUM 40 MG: 40 INJECTION SUBCUTANEOUS at 08:24

## 2018-02-14 RX ADMIN — ASPIRIN 81 MG: 81 TABLET, COATED ORAL at 08:26

## 2018-02-14 RX ADMIN — NYSTATIN 500000 UNITS: 100000 SUSPENSION ORAL at 08:24

## 2018-02-14 RX ADMIN — NYSTATIN 500000 UNITS: 100000 SUSPENSION ORAL at 13:07

## 2018-02-14 RX ADMIN — Medication 1 DROP: at 20:39

## 2018-02-14 RX ADMIN — NITROFURANTOIN MONOHYDRATE/MACROCRYSTALLINE 100 MG: 25; 75 CAPSULE ORAL at 20:34

## 2018-02-14 RX ADMIN — METOPROLOL TARTRATE 50 MG: 50 TABLET, FILM COATED ORAL at 08:27

## 2018-02-14 RX ADMIN — CHOLECALCIFEROL TAB 25 MCG (1000 UNIT) 1000 UNITS: 25 TAB at 08:24

## 2018-02-14 RX ADMIN — DOCUSATE SODIUM 100 MG: 100 CAPSULE, LIQUID FILLED ORAL at 20:35

## 2018-02-14 RX ADMIN — OXYCODONE HYDROCHLORIDE 20 MG: 20 TABLET, FILM COATED, EXTENDED RELEASE ORAL at 20:35

## 2018-02-14 RX ADMIN — NYSTATIN 500000 UNITS: 100000 SUSPENSION ORAL at 16:19

## 2018-02-14 RX ADMIN — SENNOSIDES AND DOCUSATE SODIUM 1 TABLET: 8.6; 5 TABLET ORAL at 08:26

## 2018-02-14 RX ADMIN — DOCUSATE SODIUM 100 MG: 100 CAPSULE, LIQUID FILLED ORAL at 08:24

## 2018-02-14 RX ADMIN — METOPROLOL TARTRATE 25 MG: 25 TABLET, FILM COATED ORAL at 20:35

## 2018-02-14 RX ADMIN — OXYCODONE HYDROCHLORIDE 20 MG: 20 TABLET, FILM COATED, EXTENDED RELEASE ORAL at 08:25

## 2018-02-14 RX ADMIN — NITROFURANTOIN MONOHYDRATE/MACROCRYSTALLINE 100 MG: 25; 75 CAPSULE ORAL at 08:24

## 2018-02-14 RX ADMIN — CHOLECALCIFEROL TAB 25 MCG (1000 UNIT) 1000 UNITS: 25 TAB at 16:19

## 2018-02-14 ASSESSMENT — PAIN SCALES - GENERAL
PAINLEVEL_OUTOF10: 5
PAINLEVEL_OUTOF10: 0

## 2018-02-14 ASSESSMENT — PAIN DESCRIPTION - ORIENTATION: ORIENTATION: LEFT

## 2018-02-14 ASSESSMENT — PAIN DESCRIPTION - PAIN TYPE: TYPE: ACUTE PAIN

## 2018-02-14 ASSESSMENT — PAIN DESCRIPTION - DESCRIPTORS: DESCRIPTORS: SHARP

## 2018-02-14 ASSESSMENT — PAIN DESCRIPTION - LOCATION: LOCATION: ELBOW

## 2018-02-14 NOTE — PROGRESS NOTES
5-325 MG per tablet 1 tablet, 1 tablet, Oral, Q4H PRN **OR** oxyCODONE-acetaminophen (PERCOCET) 5-325 MG per tablet 2 tablet, 2 tablet, Oral, Q4H PRN  sennosides-docusate sodium (SENOKOT-S) 8.6-50 MG tablet 1 tablet, 1 tablet, Oral, Daily  amLODIPine (NORVASC) tablet 5 mg, 5 mg, Oral, Daily  aspirin EC tablet 81 mg, 81 mg, Oral, Daily  metoprolol tartrate (LOPRESSOR) tablet 50 mg, 50 mg, Oral, Daily  metoprolol tartrate (LOPRESSOR) tablet 25 mg, 25 mg, Oral, Nightly    OBJECTIVE  PHYSICAL EXAM:    BP (!) 110/55   Pulse 79   Temp 98 °F (36.7 °C) (Oral)   Resp 16   Ht 5' 3\" (1.6 m)   Wt 200 lb (90.7 kg)   SpO2 95%   BMI 35.43 kg/m²   Body mass index is 35.43 kg/m². CONSTITUTIONAL:  awake, alert, cooperative, no apparent distress, and appears stated age  EYES:  Lids and lashes normal, pupils equal, round and reactive to light, extra ocular muscles intact, sclera clear, conjunctiva normal  LUNGS:  No increased work of breathing, good air exchange, clear to auscultation bilaterally, no crackles or wheezing  CARDIOVASCULAR:  Normal apical impulse, regular rate and rhythm, normal S1 and S2, no S3 or S4, and no murmur noted  ABDOMEN:  No scars, normal bowel sounds, soft, non-distended, non-tender, no masses palpated, no hepatosplenomegally  EXTREMITIES: Trace edema left, no cyanosis, no clubbing    DATA:   Diagnostic tests reviewed for today's visit:    All labs and imaging results reviewed. SIGNATURE: Luís Oliva PA-C PATIENT NAME: Chikis Davalos   DATE: February 14, 2018 MRN: [id number]   TIME: 10:33 AM PAGER/CONTACT #: [phone]     I have independently seen and examined this patient. I have reviewed and agree with the above documentation, assessment and plan.      Alaiyah Prescott MD  11:34 AM  February 14, 2018

## 2018-02-14 NOTE — PROGRESS NOTES
spleen. Extremities:  No significant lower extremity edema or tenderness. Skin:   Intact to general survey, no visualized or palpated problems. Rehabilitation:  Physical therapy: FIMS:  Bed Mobility: Scooting: Stand by assistance    Transfers: Sit to Stand: Contact guard assistance  Stand to sit: Contact guard assistance  Bed to Chair: Contact guard assistance  Stand Pivot Transfers: Contact guard assistance, Ambulation 1  Surface: carpet  Device: Rolling Walker  Other Apparatus:  (wrapped left knee for support with ace wrap)  Assistance: Contact guard assistance  Quality of Gait: Antalgic, recpirocal, decreased pace, SL & HS (LLE), increased BUE WB, Flexed trunk. Distance: 30 ft x2  Comments: decreased pace today, Stairs  # Steps : 4  Stairs Height: 6\"  Assistance: Contact guard assistance    FIMS: Bed, Chair, Wheel Chair: 4 - Requires steadying assistance only <25% assist  and/or requires assist with one leg only  Walk: 1 - Total Assistance Walks/operates wheelchair < 50 feet OR requires two or more people OR patient performs < 25% of locomotion effort  Distance Walked: 25 feet  Stairs: 0 - Activity Does not Occur ( 0 only for the admission assessment),  , Assessment: Pt still reporting nausea and hasnt eaten yet today. Limited by pain, fatigue, and decreased endurance.  Slightly improved tolerance in PM.     Occupational therapy: FIMS:  Eatin - Feeds self with setup/supervision/cues and/or requires only setup/supervision/cues to perform tube feedings  Groomin - Did not occur  Bathin - Did not occur  Dressing-Upper: 0 - Did not occur  Dressing-Lower: 0 - Did not occur  Toiletin - Requires setup/supervision/cues  Toilet Transfer: 4 - Requires steadying assistance only < 25% assist  Tub Transfer: 1 - Total assistance, pt. expends less than 25% effort  Shower Transfer: 4 - Minimal contact assistance, pt. expends 75% or more effort,  , Assessment: Pt. demonstrates decreased balance, strength and

## 2018-02-14 NOTE — PROGRESS NOTES
10 reps                    Assessment      Discharge Recommendations: Continue to assess pending progress  Activity Tolerance  Activity Tolerance: Patient Tolerated treatment well  Safety Devices  Safety Devices in place: Yes  Type of devices:  All fall risk precautions in place       Discharge Recommendations:  Continue to assess pending progress     Plan   Plan  Times per week: 5-7x/week, 15-60 minutes/day  Times per day: Daily  Plan weeks: 1 - 1 1/2  Current Treatment Recommendations: Strengthening, Balance Training, Functional Mobility Training, Endurance Training, Safety Education & Training, Self-Care / ADL, Patient/Caregiver Education & Training, Equipment Evaluation, Education, & procurement, Home Management Training  Plan Comment: Contine POC  G-Code     OutComes Score                                        Goals  Patient Goals   Patient goals : Get back to doing what I was       Therapy Time   Individual Concurrent Group Co-treatment   Time In 6978         Time Out 1050         Minutes 5                 DANIEL Blum/L    Electronically signed by MATIAS Blum on 2/14/2018 at 10:51 AM

## 2018-02-14 NOTE — PROGRESS NOTES
Physical Therapy  Physical Therapy Rehab Treatment Note  Facility/Department: [facility] [place] [facility]  [facility]       NAME: Rahul Chahal  : 1935 (80 y.o.)  MRN: [id number]  CODE STATUS: Full Code    Date of Service: 2018  Chart Reviewed: Yes  Family / Caregiver Present: No    Restrictions:  Restrictions/Precautions: Weight Bearing, Fall Risk, Surgical Protocols  Lower Extremity Weight Bearing Restrictions  Left Lower Extremity Weight Bearing: Weight Bearing As Tolerated  Position Activity Restriction  Hip Precautions: Posterior hip precautions       SUBJECTIVE: Subjective: \"The only pain I get is when I first try to stand but it usually works itself out\". Response To Previous Treatment: Patient with no complaints from previous session. Pain Screening  Patient Currently in Pain: Denies  Pre Treatment Pain Screening  Pain at present: 0  Scale Used: Numeric Score  Comments / Details: denies pain at rest    Post Treatment Pain Screening:   Pain Assessment  Pain Assessment: 0-10  Pain Level: 0    OBJECTIVE:   Follows Commands: Within Functional Limits       Bed mobility  Rolling to Left: Stand by assistance  Supine to Sit: Stand by assistance  Sit to Supine: Stand by assistance  Scooting: Stand by assistance  Comment: Improved today; increased time & effort to complete. Pain with roll to L    Transfers  Sit to Stand: Stand by assistance  Stand to sit: Stand by assistance  Bed to Chair: Contact guard assistance  Stand Pivot Transfers: Contact guard assistance    Ambulation  Ambulation?: Yes  Ambulation 1  Surface: carpet  Device: Rolling Walker  Assistance: Contact guard assistance;Stand by assistance  Quality of Gait: Antalgic, recpirocal, decreased pace, SL & HS (LLE), increased BUE WB, Flexed trunk. Distance: 35 x2 ft  Comments: very slow steady pace; completed 28' in 1min 35sec  Stairs/Curb  Stairs?: No        Activity Tolerance  Activity Tolerance: Patient limited by pain; Patient limited by

## 2018-02-15 LAB
GLUCOSE BLD-MCNC: 86 MG/DL (ref 60–115)
PERFORMED ON: NORMAL

## 2018-02-15 PROCEDURE — 6370000000 HC RX 637 (ALT 250 FOR IP): Performed by: PHYSICAL MEDICINE & REHABILITATION

## 2018-02-15 PROCEDURE — 1180000000 HC REHAB R&B

## 2018-02-15 PROCEDURE — 6370000000 HC RX 637 (ALT 250 FOR IP): Performed by: INTERNAL MEDICINE

## 2018-02-15 PROCEDURE — 6360000002 HC RX W HCPCS: Performed by: INTERNAL MEDICINE

## 2018-02-15 PROCEDURE — 99232 SBSQ HOSP IP/OBS MODERATE 35: CPT | Performed by: PHYSICAL MEDICINE & REHABILITATION

## 2018-02-15 PROCEDURE — 97535 SELF CARE MNGMENT TRAINING: CPT

## 2018-02-15 PROCEDURE — 97530 THERAPEUTIC ACTIVITIES: CPT

## 2018-02-15 PROCEDURE — 97110 THERAPEUTIC EXERCISES: CPT

## 2018-02-15 RX ORDER — FUROSEMIDE 20 MG/1
20 TABLET ORAL ONCE
Status: COMPLETED | OUTPATIENT
Start: 2018-02-15 | End: 2018-02-15

## 2018-02-15 RX ORDER — SENNA AND DOCUSATE SODIUM 50; 8.6 MG/1; MG/1
1 TABLET, FILM COATED ORAL DAILY PRN
Status: DISCONTINUED | OUTPATIENT
Start: 2018-02-15 | End: 2018-02-23 | Stop reason: HOSPADM

## 2018-02-15 RX ADMIN — Medication 1 DROP: at 16:26

## 2018-02-15 RX ADMIN — FUROSEMIDE 20 MG: 20 TABLET ORAL at 16:28

## 2018-02-15 RX ADMIN — NITROFURANTOIN MONOHYDRATE/MACROCRYSTALLINE 100 MG: 25; 75 CAPSULE ORAL at 20:45

## 2018-02-15 RX ADMIN — NYSTATIN 500000 UNITS: 100000 SUSPENSION ORAL at 16:29

## 2018-02-15 RX ADMIN — DOCUSATE SODIUM 100 MG: 100 CAPSULE, LIQUID FILLED ORAL at 20:44

## 2018-02-15 RX ADMIN — AMLODIPINE BESYLATE 5 MG: 5 TABLET ORAL at 08:47

## 2018-02-15 RX ADMIN — OXYCODONE HYDROCHLORIDE 20 MG: 20 TABLET, FILM COATED, EXTENDED RELEASE ORAL at 20:45

## 2018-02-15 RX ADMIN — NYSTATIN 500000 UNITS: 100000 SUSPENSION ORAL at 08:46

## 2018-02-15 RX ADMIN — NYSTATIN 500000 UNITS: 100000 SUSPENSION ORAL at 20:45

## 2018-02-15 RX ADMIN — Medication 1 DROP: at 08:46

## 2018-02-15 RX ADMIN — OXYCODONE HYDROCHLORIDE AND ACETAMINOPHEN 1 TABLET: 5; 325 TABLET ORAL at 18:08

## 2018-02-15 RX ADMIN — METOPROLOL TARTRATE 50 MG: 50 TABLET, FILM COATED ORAL at 08:47

## 2018-02-15 RX ADMIN — CHOLECALCIFEROL TAB 25 MCG (1000 UNIT) 1000 UNITS: 25 TAB at 08:47

## 2018-02-15 RX ADMIN — ASPIRIN 81 MG: 81 TABLET, COATED ORAL at 08:47

## 2018-02-15 RX ADMIN — NITROFURANTOIN MONOHYDRATE/MACROCRYSTALLINE 100 MG: 25; 75 CAPSULE ORAL at 08:46

## 2018-02-15 RX ADMIN — METOPROLOL TARTRATE 25 MG: 25 TABLET, FILM COATED ORAL at 20:44

## 2018-02-15 RX ADMIN — ENOXAPARIN SODIUM 40 MG: 40 INJECTION SUBCUTANEOUS at 08:49

## 2018-02-15 RX ADMIN — OXYCODONE HYDROCHLORIDE 20 MG: 20 TABLET, FILM COATED, EXTENDED RELEASE ORAL at 08:47

## 2018-02-15 RX ADMIN — SENNOSIDES AND DOCUSATE SODIUM 1 TABLET: 8.6; 5 TABLET ORAL at 08:47

## 2018-02-15 RX ADMIN — Medication 1 DROP: at 20:40

## 2018-02-15 RX ADMIN — CHOLECALCIFEROL TAB 25 MCG (1000 UNIT) 1000 UNITS: 25 TAB at 16:29

## 2018-02-15 RX ADMIN — DOCUSATE SODIUM 100 MG: 100 CAPSULE, LIQUID FILLED ORAL at 08:47

## 2018-02-15 ASSESSMENT — PAIN SCALES - GENERAL
PAINLEVEL_OUTOF10: 10
PAINLEVEL_OUTOF10: 4
PAINLEVEL_OUTOF10: 5
PAINLEVEL_OUTOF10: 4

## 2018-02-15 ASSESSMENT — PAIN DESCRIPTION - FREQUENCY
FREQUENCY: INTERMITTENT

## 2018-02-15 ASSESSMENT — PAIN DESCRIPTION - LOCATION
LOCATION: HIP
LOCATION: HIP;ELBOW
LOCATION: HIP

## 2018-02-15 ASSESSMENT — PAIN DESCRIPTION - DESCRIPTORS
DESCRIPTORS: ACHING
DESCRIPTORS: ACHING
DESCRIPTORS: ACHING;SHARP

## 2018-02-15 ASSESSMENT — PAIN DESCRIPTION - PAIN TYPE
TYPE: SURGICAL PAIN
TYPE: SURGICAL PAIN

## 2018-02-15 ASSESSMENT — PAIN DESCRIPTION - ORIENTATION
ORIENTATION: LEFT

## 2018-02-15 ASSESSMENT — PAIN DESCRIPTION - ONSET: ONSET: ON-GOING

## 2018-02-15 ASSESSMENT — PAIN DESCRIPTION - PROGRESSION: CLINICAL_PROGRESSION: NOT CHANGED

## 2018-02-15 NOTE — PROGRESS NOTES
liver or spleen. Extremities:  No significant lower extremity edema or tenderness. Skin:   Intact to general survey, no visualized or palpated problems. Rehabilitation:  Physical therapy: FIMS:  Bed Mobility: Scooting: Stand by assistance    Transfers: Sit to Stand: Stand by assistance  Stand to sit: Stand by assistance  Bed to Chair: Contact guard assistance  Stand Pivot Transfers: Contact guard assistance, Ambulation 1  Surface: carpet  Device: Rolling Walker  Other Apparatus:  (wrapped left knee for support with ace wrap)  Assistance: Contact guard assistance, Stand by assistance  Quality of Gait: Antalgic, recpirocal, decreased pace, SL & HS (LLE), increased BUE WB, Flexed trunk. Distance: 75 ft, 30 ft  Comments: very slow steady pace. 30' gait limited by fatigue. , Stairs  # Steps : 4  Stairs Height: 6\"  Assistance: Contact guard assistance    FIMS: Bed, Chair, Wheel Chair: 4 - Requires steadying assistance only <25% assist  and/or requires assist with one leg only  Walk: 1 - Total Assistance Walks/operates wheelchair < 50 feet OR requires two or more people OR patient performs < 25% of locomotion effort  Distance Walked: 25 feet  Stairs: 0 - Activity Does not Occur ( 0 only for the admission assessment),  , Assessment: Decreased time & effort to complete transfers and gait. L knee unstable during prolonged standing/gait. Pt reports chronic issue and may likely require surgery but will talk to her doctor about a brace.      Occupational therapy: FIMS:  Eatin - Patient feeds self  Groomin - Did not occur  Bathin - Did not occur  Dressing-Upper: 0 - Did not occur  Dressing-Lower: 0 - Did not occur  Toiletin - Requires setup/supervision/cues  Toilet Transfer: 4 - Requires steadying assistance only < 25% assist  Tub Transfer: 1 - Total assistance, pt. expends less than 25% effort  Shower Transfer: 5 - Supervision, set-up, cues,  , Assessment: Pt. demonstrates decreased balance, strength and endurance which impacts her ability to safely perform ADL tasks. Pt. would benefit from skilled OT to address these deficits. Speech therapy: FIMS: Comprehension: 6 - Complex ideas 90% or device (hearing aid/glasses)  Expression: 7 - Patient expresses complex ideas/needs  Social Interaction: 6 - Patient requires medication for mood and/or effect  Problem Solvin - Patient able to solve simple/routine tasks  Memory: 6 - Patient requires device to recall (e.g. memory book)      Lab/X-ray studies reviewed, analyzed and discussed with patient and staff:   Recent Results (from the past 24 hour(s))   POCT Glucose    Collection Time: 18 12:01 PM   Result Value Ref Range    POC Glucose 109 60 - 115 mg/dl    Performed on ACCU-CHEK        Xr Hip Left (1 View)  Result Date: 2018  Impression: Normal postop left hip. Xr Femur Left (min 2 Views)  Result Date: 2018  MINIMALLY DISPLACED SUBCAPITAL LEFT FEMORAL NECK FRACTURE       Previous extensive, complex labs, notes and diagnostics reviewed and analyzed. ALLERGIES:    Allergies as of 02/10/2018 - Review Complete 02/10/2018   Allergen Reaction Noted    Lisinopril Anaphylaxis 2016    Statins Other (See Comments) 2016    Sulfa antibiotics  2012      (please also verify by checking MAR)    Y      Complex Physical Medicine & Rehab Issues Assess & Plan:   1. Severe abnormality of gait and mobility and impaired self-care and ADL's secondary to acute subtrochanteric femoral neck fracture status post left hemiarthroplasty. Functional and medical status reassessed regarding patients ability to participate in therapies and patient found to be able to participate in acute intensive comprehensive inpatient rehabilitation program including PT/OT to improve balance, ambulation, ADLs, and to improve the P/AROM. Therapeutic modifications regarding activities in therapies, place, amount of time per day and intensity of therapy made daily.   In bed therapies or bedside therapies prn.   2. Bowel severe constipation and Bladder dysfunction:  frequent toileting, ambulate to bathroom with assistance, check post void residuals. Check for C.difficile x1 if >2 loose stools in 24 hours, continue bowel & bladder program.  Monitor bowel and bladder function. Lactinex 2 PO every AC. MOM prn, I prescribed Brown Bomb prn, Glycerin suppository prn, enema prn.  3. Severe postfracture pain left hip and anterior left rib cage generalized OA pain, flank pain:  Consider BenGay, reassess pain every shift and prior to and after each therapy session, give scheduled OxyContin 20 mg and prn Tylenol and Percocet, modalities prn in therapy, Lidoderm, K-pad prn. I reviewed OARRS report, no medications, no concerns. 4. Skin healing and breakdown risk:  continue pressure relief program.  Daily skin exams and reports from nursing. 5. Fatigue due to nutritional and hydration deficiency:  continue to monitor I&Os, calorie counts prn, dietary consult prn.  6. Acute episodic insomnia with situational adjustment disorder:  prn Ambien, monitor for day time sedation. 7. Falls risk elevated:  patient to use call light to get nursing assistance to get up, bed and chair alarm. 8. Elevated DVT risk: progressive activities in PT, continue prophylaxis EMILY hose, elevation and  Lovenox . 9. Complex discharge planning:  Discharge date is set for 02/23/18 to home with her  with help from her son and daughter-in-law and home health PT, OT, RN, aide and  as well as equipment including a wheeled walker and shower chair and a bedside commode. Weekly team meeting Monday to assess progress towards goals, discuss and address social, psychological and medical comorbidities and to address difficulties they may be having progressing in therapy. Patient and family education is in progress. The patient is to follow-up with their family physician after discharge.         Complex Active General Medical Issues that complicate care Assess & Plan:    1. History of colitis with Severe active on chronic Constipation exacerbated by anesthesia recent fall and mobility and narcotics-add rectal suppositories, milk of magnesia, Senokot, Colace monitor stools for blood  2. Closed left hip fracture with recent left hip hemiarthroplasty  - weightbearing as tolerated and posterior hip precautions  3. Cervical spondylosis without myelopathy, DDD (degenerative disc disease), lumbar,  Low back pain radiating to left leg,Lumbar facet arthropathy, Lumbosacral spondylosis without myelopathy, Primary osteoarthritis of both knees-add Lidoderm, titrate oxycodone dosing to more consistent low-dose OxyContin and K-pad  4. Essential hypertension, coronary artery disease and history of coronary artery bypass graft-consult hospitalist check vital signs every shift titrate dishes of Norvasc and Lopressor  5. Foot pain, left-add Voltaren gel and proper footwear  6. Active chronic Headache-when necessary Tylenol quiet room improve the lighting in the room. 7. Rule out diabetes mellitus /elevated blood sugar s- Continue blood sugar checks BID, diet, adjust/add medications prn. Recent Labs      02/13/18   0608  02/13/18   1210  02/13/18   1637  02/14/18   0652  02/14/18   1201   POCGLU  96  137*  124*  94  109   8. Acute UTI-start Macrobid check urine CNS check post void residual  9. Postop anemia and trend toward thrombocytopenia-recheck CBC, dose vitamin B12 shots and monitor platelets 1 Lovenox. 10. High-risk high dose narcotic medication and elderly patient-check prescription monitoring system titrate to lowest possible dose monitor for any signs or symptoms of drug euphoria-so far she is denying any side effects or drug intolerances. 11. Bilateral lower extremity edema - I prescribed Lasix 20mg x1.  I will monitor for improvement in her edema add EMILY hose elevate the legs and consider increasing daily

## 2018-02-15 NOTE — PROGRESS NOTES
Occupational Therapy  Facility[facility]Department[facility]  Daily Treatment Note  NAME: Issac Stovall  : 1935  MRN: [id number]    Date of Service: 2/15/2018    Patient Diagnosis(es): There were no encounter diagnoses. has a past medical history of CAD (coronary artery disease); Colitis; and Hyperlipidemia. has a past surgical history that includes Coronary artery bypass graft; Appendectomy; Hysterectomy; Varicose vein surgery; sigmoidoscopy; colectomy (1/3/2011); colectomy (2011); and partial hip replacement (Left, 2018). Restrictions  Restrictions/Precautions  Restrictions/Precautions: Weight Bearing, Fall Risk, Surgical Protocols  Required Braces or Orthoses?: No  Lower Extremity Weight Bearing Restrictions  Left Lower Extremity Weight Bearing: Weight Bearing As Tolerated  Position Activity Restriction  Hip Precautions: Posterior hip precautions  Subjective   General  Patient assessed for rehabilitation services?: Yes  Referring Practitioner: Dr. Elton Mccallum  Diagnosis: L Subcapital femoral neck fx s/p fall, L hemiarthroplasty with impaired mobility. Pre Treatment Pain Screening  Pain at present: 4  Scale Used: Numeric Score  Intervention List: Patient able to continue with treatment  Pain Assessment  Patient Currently in Pain: Yes  Pain Assessment: 0-10  Pain Level: 4  Pain Type: Surgical pain  Pain Location: Hip  Pain Orientation: Left  Pain Descriptors: Aching  Pain Frequency: Intermittent  Vital Signs  Patient Currently in Pain: Yes   Orientation     Objective      Pt. seen for makeup time and full OT tx session. Pt. performed repetitive reaching tasks with B 1lb wrist weights. Pt. tolerated well, placing/removing 50 golf tees and marbles in board with alternating hands. Pt. had min difficulty manipulating marbles but was able to pick marbles up when dropped. Pt. also manipulated wooden puzzle pieces over vertical carie with B UE and placed \"Tetris Link\" game pieces in board.  Pt's UE endurance G- for all reaching tasks. Cold pack applied to pt's hip x 10 minutes with pain improvement reported. Assessment      Discharge Recommendations: Continue to assess pending progress  Activity Tolerance  Activity Tolerance: Patient Tolerated treatment well  Safety Devices  Safety Devices in place: Yes  Type of devices:  All fall risk precautions in place       Discharge Recommendations:  Continue to assess pending progress     Plan   Plan  Times per week: 5-7x/week, 15-60 minutes/day  Times per day: Daily  Plan weeks: 1 - 1 1/2  Current Treatment Recommendations: Strengthening, Balance Training, Functional Mobility Training, Endurance Training, Safety Education & Training, Self-Care / ADL, Patient/Caregiver Education & Training, Equipment Evaluation, Education, & procurement, Home Management Training  Plan Comment: Continue POC  G-Code  OutComes Score  AM-PAC Score  Goals  Patient Goals   Patient goals : Get back to doing what I was       Therapy Time   Individual Concurrent Group Co-treatment   Time In 1405         Time Out 1450         Minutes 33 57 [facility], OTR/L Electronically signed by Kaleb Neri OTR/L on 2/15/2018 at 2:59 PM

## 2018-02-15 NOTE — PROGRESS NOTES
Physical Therapy    Facility/Department: [facility]  Rehabilitation Daily Treatment Note    NAME: Bonnie Dover    : 1935 (80 y.o.)  MRN: [id number]    Account: [de-identified]  Gender: female    Date of Service: 02/15/18      SUBJECTIVE:     Start and end of tx pain 5/10  Location: L knee  Description: hurts  Response: no intervention required      Falls Safety Armband Present: [x] Yes  [] No    The below exercises were completed to facilitate strength, motor control   and muscular endurance. Seated:   AP: x20   LAQ: x20   Abduction: x20   Adduction: x20    The below exercises were completed to facilitate strength, motor control   and muscular endurance. Supine:  SAQ: x20  Abduction: x20  Heel slides: x20    Safety/Judgment:     Safety Devices  Type of devices: Chair alarm in place;Gait belt    Minutes:      Transfer/Bed mobility training:      Gait training:      Neuro re education:      Therapeutic ex: 23, late due to patient having to use restroom.       Therapy Time:    Individual   Time In 1307   Time Out 1330   Minutes 23

## 2018-02-16 ENCOUNTER — PROCEDURE VISIT (OUTPATIENT)
Dept: PHYSICAL MEDICINE AND REHAB | Age: 83
End: 2018-02-16
Payer: MEDICARE

## 2018-02-16 DIAGNOSIS — G89.29 CHRONIC PAIN OF LEFT KNEE: Primary | ICD-10-CM

## 2018-02-16 DIAGNOSIS — M25.562 CHRONIC PAIN OF LEFT KNEE: Primary | ICD-10-CM

## 2018-02-16 PROBLEM — D64.9 ANEMIA: Status: ACTIVE | Noted: 2018-02-16

## 2018-02-16 PROCEDURE — 97535 SELF CARE MNGMENT TRAINING: CPT

## 2018-02-16 PROCEDURE — 6370000000 HC RX 637 (ALT 250 FOR IP): Performed by: PHYSICAL MEDICINE & REHABILITATION

## 2018-02-16 PROCEDURE — 97112 NEUROMUSCULAR REEDUCATION: CPT | Performed by: INTERNAL MEDICINE

## 2018-02-16 PROCEDURE — 97535 SELF CARE MNGMENT TRAINING: CPT | Performed by: INTERNAL MEDICINE

## 2018-02-16 PROCEDURE — 97110 THERAPEUTIC EXERCISES: CPT | Performed by: INTERNAL MEDICINE

## 2018-02-16 PROCEDURE — 97116 GAIT TRAINING THERAPY: CPT | Performed by: INTERNAL MEDICINE

## 2018-02-16 PROCEDURE — 97530 THERAPEUTIC ACTIVITIES: CPT

## 2018-02-16 PROCEDURE — 6370000000 HC RX 637 (ALT 250 FOR IP): Performed by: INTERNAL MEDICINE

## 2018-02-16 PROCEDURE — 20610 DRAIN/INJ JOINT/BURSA W/O US: CPT | Performed by: PHYSICAL MEDICINE & REHABILITATION

## 2018-02-16 PROCEDURE — 1180000000 HC REHAB R&B

## 2018-02-16 PROCEDURE — 6360000002 HC RX W HCPCS: Performed by: INTERNAL MEDICINE

## 2018-02-16 PROCEDURE — 97116 GAIT TRAINING THERAPY: CPT

## 2018-02-16 PROCEDURE — 99232 SBSQ HOSP IP/OBS MODERATE 35: CPT | Performed by: PHYSICAL MEDICINE & REHABILITATION

## 2018-02-16 RX ADMIN — ASPIRIN 81 MG: 81 TABLET, COATED ORAL at 09:24

## 2018-02-16 RX ADMIN — AMLODIPINE BESYLATE 5 MG: 5 TABLET ORAL at 09:22

## 2018-02-16 RX ADMIN — NITROFURANTOIN MONOHYDRATE/MACROCRYSTALLINE 100 MG: 25; 75 CAPSULE ORAL at 09:24

## 2018-02-16 RX ADMIN — METOPROLOL TARTRATE 25 MG: 25 TABLET, FILM COATED ORAL at 19:55

## 2018-02-16 RX ADMIN — NITROFURANTOIN MONOHYDRATE/MACROCRYSTALLINE 100 MG: 25; 75 CAPSULE ORAL at 19:55

## 2018-02-16 RX ADMIN — ACETAMINOPHEN 650 MG: 325 TABLET ORAL at 05:14

## 2018-02-16 RX ADMIN — NYSTATIN 500000 UNITS: 100000 SUSPENSION ORAL at 09:22

## 2018-02-16 RX ADMIN — OXYCODONE HYDROCHLORIDE AND ACETAMINOPHEN 1 TABLET: 5; 325 TABLET ORAL at 19:55

## 2018-02-16 RX ADMIN — OXYCODONE HYDROCHLORIDE 20 MG: 20 TABLET, FILM COATED, EXTENDED RELEASE ORAL at 09:24

## 2018-02-16 RX ADMIN — SENNOSIDES AND DOCUSATE SODIUM 1 TABLET: 8.6; 5 TABLET ORAL at 09:24

## 2018-02-16 RX ADMIN — ENOXAPARIN SODIUM 40 MG: 40 INJECTION SUBCUTANEOUS at 09:22

## 2018-02-16 RX ADMIN — CHOLECALCIFEROL TAB 25 MCG (1000 UNIT) 1000 UNITS: 25 TAB at 16:28

## 2018-02-16 RX ADMIN — CHOLECALCIFEROL TAB 25 MCG (1000 UNIT) 1000 UNITS: 25 TAB at 09:23

## 2018-02-16 RX ADMIN — NYSTATIN 500000 UNITS: 100000 SUSPENSION ORAL at 19:55

## 2018-02-16 RX ADMIN — NYSTATIN 500000 UNITS: 100000 SUSPENSION ORAL at 13:00

## 2018-02-16 RX ADMIN — METOPROLOL TARTRATE 50 MG: 50 TABLET, FILM COATED ORAL at 09:24

## 2018-02-16 RX ADMIN — DOCUSATE SODIUM 100 MG: 100 CAPSULE, LIQUID FILLED ORAL at 09:22

## 2018-02-16 RX ADMIN — DOCUSATE SODIUM 100 MG: 100 CAPSULE, LIQUID FILLED ORAL at 19:55

## 2018-02-16 RX ADMIN — NYSTATIN 500000 UNITS: 100000 SUSPENSION ORAL at 16:27

## 2018-02-16 ASSESSMENT — PAIN DESCRIPTION - ORIENTATION
ORIENTATION: LEFT
ORIENTATION: LEFT

## 2018-02-16 ASSESSMENT — PAIN DESCRIPTION - PROGRESSION: CLINICAL_PROGRESSION: NOT CHANGED

## 2018-02-16 ASSESSMENT — PAIN SCALES - GENERAL
PAINLEVEL_OUTOF10: 0
PAINLEVEL_OUTOF10: 2
PAINLEVEL_OUTOF10: 0
PAINLEVEL_OUTOF10: 3
PAINLEVEL_OUTOF10: 2

## 2018-02-16 ASSESSMENT — PAIN DESCRIPTION - DESCRIPTORS
DESCRIPTORS: ACHING;SORE
DESCRIPTORS: ACHING

## 2018-02-16 ASSESSMENT — PAIN DESCRIPTION - LOCATION
LOCATION: HIP;KNEE
LOCATION: HIP

## 2018-02-16 ASSESSMENT — PAIN DESCRIPTION - PAIN TYPE: TYPE: SURGICAL PAIN

## 2018-02-16 ASSESSMENT — PAIN DESCRIPTION - FREQUENCY: FREQUENCY: INTERMITTENT

## 2018-02-16 ASSESSMENT — ENCOUNTER SYMPTOMS
SHORTNESS OF BREATH: 0
NAUSEA: 0
VOMITING: 0

## 2018-02-16 ASSESSMENT — PAIN DESCRIPTION - ONSET: ONSET: ON-GOING

## 2018-02-16 NOTE — PROGRESS NOTES
Markie Woodard is a 80 y.o. female patient. Admitted to rehab for gait and ADL abnormalities secondary to left hip fracture status post surgery. We've been consulted to address her medical issues.     Current Facility-Administered Medications   Medication Dose Route Frequency Provider Last Rate Last Dose    sennosides-docusate sodium (SENOKOT-S) 8.6-50 MG tablet 1 tablet  1 tablet Oral Daily PRN Deb Scullin, DO        cinnamon oil liquid 1 drop  1 drop Oral 4x Daily Deb Scullin, DO   1 drop at 02/15/18 2040    analgesic ointment ointment   Topical PRN Deb Scullin, DO        vitamin D (CHOLECALCIFEROL) tablet 1,000 Units  1,000 Units Oral BID WC Deb Scullin, DO   1,000 Units at 02/15/18 1629    cyanocobalamin injection 1,000 mcg  1,000 mcg Intramuscular Weekly Deb Scullin, DO   1,000 mcg at 02/11/18 1218    lidocaine (LIDODERM) 5 % 3 patch  3 patch Transdermal Daily Deb Scullin, DO   3 patch at 02/12/18 0947    acetaminophen (TYLENOL) tablet 650 mg  650 mg Oral Q4H PRN Deb Scullin, DO   650 mg at 02/16/18 0514    nitrofurantoin (macrocrystal-monohydrate) (MACROBID) capsule 100 mg  100 mg Oral 2 times per day Deb Scullin, DO   100 mg at 02/15/18 2045    nystatin (MYCOSTATIN) 750701 UNIT/ML suspension 500,000 Units  5 mL Oral 4x Daily Alondra Brumfield MD   500,000 Units at 02/15/18 2045    oxyCODONE (OXYCONTIN) extended release tablet 20 mg  20 mg Oral 2 times per day Deb Scullin, DO   20 mg at 02/15/18 2045    diclofenac sodium 1 % gel 4 g  4 g Topical 4x Daily PRN Deb Scullin, DO        acetaminophen (TYLENOL) tablet 650 mg  650 mg Oral Q4H PRN Crystal Beckwith MD        docusate sodium (COLACE) capsule 100 mg  100 mg Oral BID Crystal Beckwith MD   100 mg at 02/15/18 2044    enoxaparin (LOVENOX) injection 40 mg  40 mg Subcutaneous Daily Crystal Beckwith MD   40 mg at 02/15/18 0849    magnesium hydroxide (MILK OF MAGNESIA) 400 MG/5ML suspension 30 mL

## 2018-02-16 NOTE — PROGRESS NOTES
Occupational Therapy  Facility/Department: [facility]  Daily Treatment Note  NAME: Van Valdez  : 1935  MRN: [id number]    Date of Service: 2018    Patient Diagnosis(es): There were no encounter diagnoses. has a past medical history of CAD (coronary artery disease); Colitis; and Hyperlipidemia. has a past surgical history that includes Coronary artery bypass graft; Appendectomy; Hysterectomy; Varicose vein surgery; sigmoidoscopy; colectomy (1/3/2011); colectomy (2011); and partial hip replacement (Left, 2018). Restrictions  Restrictions/Precautions  Restrictions/Precautions: Weight Bearing, Fall Risk, Surgical Protocols  Left Lower Extremity Weight Bearing: Weight Bearing As Tolerated  Hip Precautions: Posterior hip precautions  Subjective   General  Referring Practitioner: Dr. Daisy Seo  Diagnosis: L Subcapital femoral neck fx s/p fall, L hemiarthroplasty with impaired mobility. Pre Treatment Pain Screening  Pain at present: 0  Pain Assessment  Patient Currently in Pain: Denies    Objective     Pt. seen in room for 1# UE ADL strengthening /AROM. Most exercises completed w/o weight in L/UE d/t arthritic conditions and poor tolerance to weight. Pt's  and son present. Assessment      Activity Tolerance  Activity Tolerance: Patient Tolerated treatment well  Safety Devices  Safety Devices in place: Yes  Type of devices:  All fall risk precautions in place       Discharge Recommendations:  Continue to assess pending progress     Plan   Plan  Times per week: 5-7x/week, 15-60 minutes/day  Times per day: Daily  Plan weeks: 1 -   Current Treatment Recommendations: Strengthening, Balance Training, Functional Mobility Training, Endurance Training, Safety Education & Training, Self-Care / ADL, Patient/Caregiver Education & Training, Equipment Evaluation, Education, & procurement, Home Management Training  Plan Comment: Continue POC    Goals  Patient Goals   Patient goals :

## 2018-02-16 NOTE — FLOWSHEET NOTE
Aquacil dressing removed as ordered. Incision line clean and dry. No redness noted. Dry sterile dressing applied.

## 2018-02-16 NOTE — PROGRESS NOTES
Occupational Therapy  Facility/Department: [facility]  Daily Treatment Note  NAME: Janie Smith  : 1935  MRN: [id number]    Date of Service: 2018    Patient Diagnosis(es): There were no encounter diagnoses. has a past medical history of CAD (coronary artery disease); Colitis; and Hyperlipidemia. has a past surgical history that includes Coronary artery bypass graft; Appendectomy; Hysterectomy; Varicose vein surgery; sigmoidoscopy; colectomy (1/3/2011); colectomy (2011); and partial hip replacement (Left, 2018). Restrictions  Restrictions/Precautions  Restrictions/Precautions: Weight Bearing, Fall Risk, Surgical Protocols  Required Braces or Orthoses?: No  Lower Extremity Weight Bearing Restrictions  Left Lower Extremity Weight Bearing: Weight Bearing As Tolerated  Position Activity Restriction  Hip Precautions: Posterior hip precautions     Subjective   General  Patient assessed for rehabilitation services?: Yes  Referring Practitioner: Dr. Emely Brooks  Diagnosis: L Subcapital femoral neck fx s/p fall, L hemiarthroplasty with impaired mobility. Pain  Pre Treatment Pain Screening  Pain at present: 0  Scale Used: Numeric Score  Intervention List: Patient able to continue with treatment  Pain Assessment  Patient Currently in Pain: No  Pain Assessment: 0-10  Pain Level: 0  Vital Signs  Patient Currently in Pain: No     Objective    Upper Extremity Function  UE Strengthing: Patient engaged in multiple therapeutic activities to increase bilateral UE strength. Patient engages in dowel tower, filling and removing 3 sides of tower with large and small dowels, along with corresponding pegs while donning 1/2# bilateral wrist weights. Patient also engages in hand strengthening task while wearing 1/2# wrist weights to place and remove clothespins on vertical surface with alternating hands.         Assessment      Activity Tolerance  Activity Tolerance: Patient Tolerated treatment well  Safety

## 2018-02-16 NOTE — PROGRESS NOTES
Subjective: The patient complains of severe acute  left hip and left anterior flank pain partially relieved by  Percocet, OxyContin, Tylenol and exacerbated by  constipation palpation and range of motion. She requests Lasix for bilateral lower extremity edema, she is status post extra dose of Lasix, await diuresis. She is complaining of some lightheadedness and dizziness with the OxyContin and I have discontinued it. She complains of increased left knee pain, she is pending left knee injection today. I'm hoping that her family will bring in a left knee brace. ROS x10: The patient also complains of severely impaired mobility and activities of daily living. Otherwise no new problems with vision, hearing, nose, mouth, throat, dermal, cardiovascular, GI, , pulmonary, musculoskeletal, psychiatric or neurological. See Rehab H&P on Rehab chart dated 02/11/18. Vital signs:  BP (!) 132/57   Pulse 58   Temp 97 °F (36.1 °C) (Oral)   Resp 16   Ht 5' 3\" (1.6 m)   Wt 200 lb (90.7 kg)   SpO2 97%   BMI 35.43 kg/m²   I/O:   PO/Intake:  fair PO intake,     Bowel/Bladder:  continent, constipation. General:  Patient is well developed, adequately nourished, non-obese    and well kempt. HEENT:    PERRLA, hearing intact to loud voice, external inspection of    ear and nose benign. Inspection of lips, tongue and gums    benign  Musculoskeletal: No significant change in strength or tone. All joints stable. Inspection and palpation of digits and nails show no     clubbing, cyanosis or inflammatory conditions. Neuro/Psychiatric: Affect: flat but pleasant. Alert and oriented to person, place    and situation. No significant change in deep tendon    reflexes or sensation  Lungs:  Diminished CTA-B. Respiration effort is normal at rest.     Heart:   S1 = S2, RRR. No loud murmurs. Abdomen:  Soft, non-tender, no enlargement of liver or spleen.   Extremities:  No significant lower extremity edema or tenderness. Skin:   Intact to general survey, no visualized or palpated problems. Rehabilitation:  Physical therapy: FIMS:  Bed Mobility: Scooting: Modified independent    Transfers: Sit to Stand: Stand by assistance  Stand to sit: Stand by assistance  Bed to Chair: Contact guard assistance  Stand Pivot Transfers: Contact guard assistance, Ambulation 1  Surface: carpet, level tile  Device: Rolling Walker  Other Apparatus: Knee Immobilizer (ace wrap to L knee)  Assistance: Contact guard assistance  Quality of Gait: Antalgic, recpirocal, decreased pace, SL & HS (LLE), increased BUE WB, Flexed trunk. Distance: 80 ft x2  Comments: very slow steady pace, gait limited by fatigue. , Stairs  # Steps : 4  Stairs Height: 6\"  Assistance: Contact guard assistance    FIMS: Bed, Chair, Wheel Chair: 4 - Requires steadying assistance only <25% assist  and/or requires assist with one leg only  Walk: 1 - Total Assistance Walks/operates wheelchair < 50 feet OR requires two or more people OR patient performs < 25% of locomotion effort  Distance Walked: 25 feet  Stairs: 0 - Activity Does not Occur ( 0 only for the admission assessment),  , Assessment: Pt presents supine on rehab mat. Pt requested ace wrap to L knee for gait; reported improved stability and decreased anxiety.      Occupational therapy: FIMS:  Eatin - Patient feeds self  Groomin - Able to perform 3-4 tasks with touching help  Bathin - Able to bathe 8-9 areas  Dressing-Upper: 4 - Requires assist with buttons/zippers only and/or requires assist with one arm only  Dressing-Lower: 3 - Requires assist with 2-3 parts of dressing  Toiletin - Requires setup/supervision/cues  Toilet Transfer: 4 - Requires steadying assistance only < 25% assist  Tub Transfer: 1 - Total assistance, pt. expends less than 25% effort  Shower Transfer: 5 - Supervision, set-up, cues,  , Assessment: Pt. demonstrates decreased balance, strength and endurance which impacts her ability to safely perform ADL tasks. Pt. would benefit from skilled OT to address these deficits. Speech therapy: FIMS: Comprehension: 6 - Complex ideas 90% or device (hearing aid/glasses)  Expression: 7 - Patient expresses complex ideas/needs  Social Interaction: 6 - Patient requires medication for mood and/or effect  Problem Solvin - Patient able to solve simple/routine tasks  Memory: 6 - Patient requires device to recall (e.g. memory book)      Lab/X-ray studies reviewed, analyzed and discussed with patient and staff:   Recent Results (from the past 24 hour(s))   POCT Glucose    Collection Time: 02/15/18 10:31 PM   Result Value Ref Range    POC Glucose 86 60 - 115 mg/dl    Performed on ACCU-CHEK        Xr Hip Left (1 View)  Result Date: 2018  Impression: Normal postop left hip. Xr Femur Left (min 2 Views)  Result Date: 2018  MINIMALLY DISPLACED SUBCAPITAL LEFT FEMORAL NECK FRACTURE       Previous extensive, complex labs, notes and diagnostics reviewed and analyzed. ALLERGIES:    Allergies as of 02/10/2018 - Review Complete 02/10/2018   Allergen Reaction Noted    Lisinopril Anaphylaxis 2016    Statins Other (See Comments) 2016    Sulfa antibiotics  2012      (please also verify by checking [facility])       Complex Physical Medicine & Rehab Issues Assess & Plan:   1. Severe abnormality of gait and mobility and impaired self-care and ADL's secondary to acute subtrochanteric femoral neck fracture status post left hemiarthroplasty. Functional and medical status reassessed regarding patients ability to participate in therapies and patient found to be able to participate in acute intensive comprehensive inpatient rehabilitation program including PT/OT to improve balance, ambulation, ADLs, and to improve the P/AROM. Therapeutic modifications regarding activities in therapies, place, amount of time per day and intensity of therapy made daily.   In bed therapies or bedside therapies prn.   2. Bowel severe constipation and Bladder dysfunction:  frequent toileting, ambulate to bathroom with assistance, check post void residuals. Check for C.difficile x1 if >2 loose stools in 24 hours, continue bowel & bladder program.  Monitor bowel and bladder function. Lactinex 2 PO every AC. MOM prn, I prescribed Brown Bomb prn, Glycerin suppository prn, enema prn.  3. Severe postfracture pain left hip and anterior left rib cage generalized OA pain, flank pain, increased lfet knee pain:   Guillermo, reassess pain every shift and prior to and after each therapy session,   discontinue OxyContin 20 mg and prn Tylenol and Percocet, modalities prn in therapy, Lidoderm, K-pad prn. I reviewed OARRS report, no medications, no concerns. Pending left knee injection today. Consider left knee brace. 4. Skin breakdown risk:  continue pressure relief program.  Daily skin exams and reports from nursing. 5. Fatigue due to nutritional and hydration deficiency:  continue to monitor I&Os, calorie counts prn, dietary consult prn.  6. Acute episodic insomnia with situational adjustment disorder:  prn Ambien, monitor for day time sedation. 7. Falls risk elevated:  patient to use call light to get nursing assistance to get up, bed and chair alarm. 8. Elevated DVT risk: progressive activities in PT, continue prophylaxis EMILY hose, elevation and  Lovenox . 9. Complex discharge planning:  Discharge date is set for 02/23/18 to home with her  with help from her son and daughter-in-law and home health PT, OT, RN, aide and  as well as equipment including a wheeled walker and shower chair and a bedside commode. Weekly team meeting Monday to assess progress towards goals, discuss and address social, psychological and medical comorbidities and to address difficulties they may be having progressing in therapy. Patient and family education is in progress.   The patient is to follow-up with their family diuresis. I will monitor for improvement in her edema add EMILY hose elevate the legs and consider increasing daily Lasix dose           This note transcribed by Dennys Noriega on 02/16/18 at 10:18 a.m.         Edwin Correa D.O., PM&R     Attending    [facility] Pisano

## 2018-02-17 PROCEDURE — 6370000000 HC RX 637 (ALT 250 FOR IP): Performed by: INTERNAL MEDICINE

## 2018-02-17 PROCEDURE — 97110 THERAPEUTIC EXERCISES: CPT

## 2018-02-17 PROCEDURE — 6370000000 HC RX 637 (ALT 250 FOR IP): Performed by: PHYSICAL MEDICINE & REHABILITATION

## 2018-02-17 PROCEDURE — 1180000000 HC REHAB R&B

## 2018-02-17 PROCEDURE — 6360000002 HC RX W HCPCS: Performed by: INTERNAL MEDICINE

## 2018-02-17 PROCEDURE — 97116 GAIT TRAINING THERAPY: CPT

## 2018-02-17 RX ORDER — FUROSEMIDE 20 MG/1
20 TABLET ORAL ONCE
Status: COMPLETED | OUTPATIENT
Start: 2018-02-17 | End: 2018-02-17

## 2018-02-17 RX ADMIN — ASPIRIN 81 MG: 81 TABLET, COATED ORAL at 09:20

## 2018-02-17 RX ADMIN — ACETAMINOPHEN 650 MG: 325 TABLET ORAL at 12:47

## 2018-02-17 RX ADMIN — NYSTATIN 500000 UNITS: 100000 SUSPENSION ORAL at 09:20

## 2018-02-17 RX ADMIN — AMLODIPINE BESYLATE 5 MG: 5 TABLET ORAL at 09:21

## 2018-02-17 RX ADMIN — ENOXAPARIN SODIUM 40 MG: 40 INJECTION SUBCUTANEOUS at 09:21

## 2018-02-17 RX ADMIN — CHOLECALCIFEROL TAB 25 MCG (1000 UNIT) 1000 UNITS: 25 TAB at 16:42

## 2018-02-17 RX ADMIN — ACETAMINOPHEN 650 MG: 325 TABLET ORAL at 08:58

## 2018-02-17 RX ADMIN — Medication 1 DROP: at 09:21

## 2018-02-17 RX ADMIN — FUROSEMIDE 20 MG: 20 TABLET ORAL at 12:49

## 2018-02-17 RX ADMIN — NITROFURANTOIN MONOHYDRATE/MACROCRYSTALLINE 100 MG: 25; 75 CAPSULE ORAL at 20:10

## 2018-02-17 RX ADMIN — NITROFURANTOIN MONOHYDRATE/MACROCRYSTALLINE 100 MG: 25; 75 CAPSULE ORAL at 09:20

## 2018-02-17 RX ADMIN — ACETAMINOPHEN 650 MG: 325 TABLET ORAL at 21:23

## 2018-02-17 RX ADMIN — DOCUSATE SODIUM 100 MG: 100 CAPSULE, LIQUID FILLED ORAL at 20:10

## 2018-02-17 RX ADMIN — DOCUSATE SODIUM 100 MG: 100 CAPSULE, LIQUID FILLED ORAL at 09:21

## 2018-02-17 RX ADMIN — ACETAMINOPHEN 650 MG: 325 TABLET ORAL at 16:51

## 2018-02-17 RX ADMIN — METOPROLOL TARTRATE 50 MG: 50 TABLET, FILM COATED ORAL at 09:26

## 2018-02-17 RX ADMIN — NYSTATIN 500000 UNITS: 100000 SUSPENSION ORAL at 20:10

## 2018-02-17 RX ADMIN — CHOLECALCIFEROL TAB 25 MCG (1000 UNIT) 1000 UNITS: 25 TAB at 09:21

## 2018-02-17 RX ADMIN — METOPROLOL TARTRATE 25 MG: 25 TABLET, FILM COATED ORAL at 20:11

## 2018-02-17 RX ADMIN — NYSTATIN 500000 UNITS: 100000 SUSPENSION ORAL at 16:42

## 2018-02-17 ASSESSMENT — PAIN DESCRIPTION - LOCATION
LOCATION: HIP
LOCATION: HIP

## 2018-02-17 ASSESSMENT — PAIN SCALES - GENERAL
PAINLEVEL_OUTOF10: 4
PAINLEVEL_OUTOF10: 4
PAINLEVEL_OUTOF10: 3
PAINLEVEL_OUTOF10: 3
PAINLEVEL_OUTOF10: 0
PAINLEVEL_OUTOF10: 5
PAINLEVEL_OUTOF10: 3
PAINLEVEL_OUTOF10: 3

## 2018-02-17 ASSESSMENT — PAIN DESCRIPTION - DESCRIPTORS
DESCRIPTORS: SORE
DESCRIPTORS: SORE;ACHING;TIGHTNESS

## 2018-02-17 ASSESSMENT — PAIN DESCRIPTION - PAIN TYPE
TYPE: SURGICAL PAIN
TYPE: SURGICAL PAIN

## 2018-02-17 ASSESSMENT — PAIN DESCRIPTION - ORIENTATION
ORIENTATION: LEFT
ORIENTATION: LEFT

## 2018-02-17 ASSESSMENT — PAIN DESCRIPTION - FREQUENCY
FREQUENCY: INTERMITTENT
FREQUENCY: INTERMITTENT

## 2018-02-17 NOTE — PROGRESS NOTES
Subjective: The patient complains of severe acute  left hip and left anterior flank pain partially relieved by  Percocet, OxyContin, Tylenol and exacerbated by  constipation palpation and range of motion. She requests Lasix for bilateral lower extremity edema, she is status post extra dose of Lasix, await diuresis. She is complaining of some lightheadedness and dizziness with the OxyContin and I have discontinued it. She complains of increased left knee pain, she is pending left knee injection today. I'm hoping that her family will bring in a left knee brace. ROS x10: The patient also complains of severely impaired mobility and activities of daily living. Otherwise no new problems with vision, hearing, nose, mouth, throat, dermal, cardiovascular, GI, , pulmonary, musculoskeletal, psychiatric or neurological. See Rehab H&P on Rehab chart dated 02/11/18. Vital signs:  BP (!) 167/64   Pulse 54   Temp 97 °F (36.1 °C) (Oral)   Resp 17   Ht 5' 3\" (1.6 m)   Wt 200 lb (90.7 kg)   SpO2 95%   BMI 35.43 kg/m²   I/O:   PO/Intake:  fair PO intake,     Bowel/Bladder:  continent, constipation. General:  Patient is well developed, adequately nourished, non-obese    and well kempt. HEENT:    PERRLA, hearing intact to loud voice, external inspection of    ear and nose benign. Inspection of lips, tongue and gums    benign  Musculoskeletal: No significant change in strength or tone. All joints stable. Inspection and palpation of digits and nails show no     clubbing, cyanosis or inflammatory conditions. Neuro/Psychiatric: Affect: flat but pleasant. Alert and oriented to person, place    and situation. No significant change in deep tendon    reflexes or sensation  Lungs:  Diminished CTA-B. Respiration effort is normal at rest.     Heart:   S1 = S2, RRR. No loud murmurs. Abdomen:  Soft, non-tender, no enlargement of liver or spleen.   Extremities:  No significant lower extremity continue bowel & bladder program.  Monitor bowel and bladder function. Lactinex 2 PO every AC. MOM prn, I prescribed Brown Bomb prn, Glycerin suppository prn, enema prn.  3. Severe postfracture pain left hip and anterior left rib cage generalized OA pain, flank pain, increased lfet knee pain:   Guillermo, reassess pain every shift and prior to and after each therapy session,   discontinue OxyContin 20 mg and prn Tylenol and Percocet, modalities prn in therapy, Lidoderm, K-pad prn. I reviewed OARRS report, no medications, no concerns. Pending left knee injection today. Consider left knee brace. 4. Skin breakdown risk:  continue pressure relief program.  Daily skin exams and reports from nursing. 5. Fatigue due to nutritional and hydration deficiency:  continue to monitor I&Os, calorie counts prn, dietary consult prn.  6. Acute episodic insomnia with situational adjustment disorder:  prn Ambien, monitor for day time sedation. 7. Falls risk elevated:  patient to use call light to get nursing assistance to get up, bed and chair alarm. 8. Elevated DVT risk: progressive activities in PT, continue prophylaxis EMILY hose, elevation and  Lovenox . 9. Complex discharge planning:  Discharge date is set for 02/23/18 to home with her  with help from her son and daughter-in-law and home health PT, OT, RN, aide and  as well as equipment including a wheeled walker and shower chair and a bedside commode. Weekly team meeting Monday to assess progress towards goals, discuss and address social, psychological and medical comorbidities and to address difficulties they may be having progressing in therapy. Patient and family education is in progress. The patient is to follow-up with their family physician after discharge. Complex Active General Medical Issues that complicate care Assess & Plan:    1.   History of colitis with Severe active on chronic Constipation exacerbated by anesthesia recent fall

## 2018-02-17 NOTE — PROGRESS NOTES
Patient is resting comfortably in bed. Bed alarm is on, call light is within reach. Patient denies pain at this time. Will continue to monitor.

## 2018-02-17 NOTE — PROGRESS NOTES
mobility trainin       Gait trainin      Neuro re education:     Therapeutic ex:    Gini Ram, BRAN, 18 at 1:01 PM

## 2018-02-18 ENCOUNTER — APPOINTMENT (OUTPATIENT)
Dept: ULTRASOUND IMAGING | Age: 83
DRG: 560 | End: 2018-02-18
Attending: PHYSICAL MEDICINE & REHABILITATION
Payer: MEDICARE

## 2018-02-18 LAB
ANION GAP SERPL CALCULATED.3IONS-SCNC: 11 MEQ/L (ref 7–13)
BUN BLDV-MCNC: 22 MG/DL (ref 8–23)
CALCIUM SERPL-MCNC: 8.3 MG/DL (ref 8.6–10.2)
CHLORIDE BLD-SCNC: 105 MEQ/L (ref 98–107)
CO2: 27 MEQ/L (ref 22–29)
CREAT SERPL-MCNC: 1.07 MG/DL (ref 0.5–0.9)
GFR AFRICAN AMERICAN: 59.3
GFR NON-AFRICAN AMERICAN: 49
GLUCOSE BLD-MCNC: 91 MG/DL (ref 74–109)
POTASSIUM SERPL-SCNC: 4.4 MEQ/L (ref 3.5–5.1)
SODIUM BLD-SCNC: 143 MEQ/L (ref 132–144)

## 2018-02-18 PROCEDURE — 6370000000 HC RX 637 (ALT 250 FOR IP): Performed by: INTERNAL MEDICINE

## 2018-02-18 PROCEDURE — 1180000000 HC REHAB R&B

## 2018-02-18 PROCEDURE — 93005 ELECTROCARDIOGRAM TRACING: CPT

## 2018-02-18 PROCEDURE — 36415 COLL VENOUS BLD VENIPUNCTURE: CPT

## 2018-02-18 PROCEDURE — 6360000002 HC RX W HCPCS: Performed by: PHYSICAL MEDICINE & REHABILITATION

## 2018-02-18 PROCEDURE — 0HBRXZZ EXCISION OF TOE NAIL, EXTERNAL APPROACH: ICD-10-PCS | Performed by: PODIATRIST

## 2018-02-18 PROCEDURE — 80048 BASIC METABOLIC PNL TOTAL CA: CPT

## 2018-02-18 PROCEDURE — 97110 THERAPEUTIC EXERCISES: CPT

## 2018-02-18 PROCEDURE — 97116 GAIT TRAINING THERAPY: CPT

## 2018-02-18 PROCEDURE — 6370000000 HC RX 637 (ALT 250 FOR IP): Performed by: PHYSICAL MEDICINE & REHABILITATION

## 2018-02-18 PROCEDURE — 3E0U33Z INTRODUCTION OF ANTI-INFLAMMATORY INTO JOINTS, PERCUTANEOUS APPROACH: ICD-10-PCS | Performed by: PHYSICAL MEDICINE & REHABILITATION

## 2018-02-18 PROCEDURE — 6360000002 HC RX W HCPCS: Performed by: INTERNAL MEDICINE

## 2018-02-18 PROCEDURE — 93970 EXTREMITY STUDY: CPT

## 2018-02-18 RX ORDER — POTASSIUM CHLORIDE 750 MG/1
10 TABLET, FILM COATED, EXTENDED RELEASE ORAL DAILY
Status: DISCONTINUED | OUTPATIENT
Start: 2018-02-18 | End: 2018-02-23 | Stop reason: HOSPADM

## 2018-02-18 RX ORDER — HYDRALAZINE HYDROCHLORIDE 25 MG/1
25 TABLET, FILM COATED ORAL 4 TIMES DAILY PRN
Status: DISCONTINUED | OUTPATIENT
Start: 2018-02-18 | End: 2018-02-23 | Stop reason: HOSPADM

## 2018-02-18 RX ORDER — FUROSEMIDE 20 MG/1
20 TABLET ORAL DAILY
Status: DISCONTINUED | OUTPATIENT
Start: 2018-02-18 | End: 2018-02-23 | Stop reason: HOSPADM

## 2018-02-18 RX ADMIN — ACETAMINOPHEN 650 MG: 325 TABLET ORAL at 09:48

## 2018-02-18 RX ADMIN — NYSTATIN 500000 UNITS: 100000 SUSPENSION ORAL at 13:33

## 2018-02-18 RX ADMIN — CHOLECALCIFEROL TAB 25 MCG (1000 UNIT) 1000 UNITS: 25 TAB at 09:49

## 2018-02-18 RX ADMIN — FUROSEMIDE 20 MG: 20 TABLET ORAL at 13:31

## 2018-02-18 RX ADMIN — NITROFURANTOIN MONOHYDRATE/MACROCRYSTALLINE 100 MG: 25; 75 CAPSULE ORAL at 22:18

## 2018-02-18 RX ADMIN — AMLODIPINE BESYLATE 5 MG: 5 TABLET ORAL at 09:48

## 2018-02-18 RX ADMIN — NYSTATIN 500000 UNITS: 100000 SUSPENSION ORAL at 16:25

## 2018-02-18 RX ADMIN — NYSTATIN 500000 UNITS: 100000 SUSPENSION ORAL at 09:48

## 2018-02-18 RX ADMIN — NITROFURANTOIN MONOHYDRATE/MACROCRYSTALLINE 100 MG: 25; 75 CAPSULE ORAL at 09:48

## 2018-02-18 RX ADMIN — ENOXAPARIN SODIUM 40 MG: 40 INJECTION SUBCUTANEOUS at 09:49

## 2018-02-18 RX ADMIN — ACETAMINOPHEN 650 MG: 325 TABLET ORAL at 22:31

## 2018-02-18 RX ADMIN — CYANOCOBALAMIN 1000 MCG: 1000 INJECTION, SOLUTION INTRAMUSCULAR at 09:49

## 2018-02-18 RX ADMIN — CHOLECALCIFEROL TAB 25 MCG (1000 UNIT) 1000 UNITS: 25 TAB at 16:25

## 2018-02-18 RX ADMIN — ACETAMINOPHEN 650 MG: 325 TABLET ORAL at 16:25

## 2018-02-18 RX ADMIN — METOPROLOL TARTRATE 50 MG: 50 TABLET, FILM COATED ORAL at 09:48

## 2018-02-18 RX ADMIN — DOCUSATE SODIUM 100 MG: 100 CAPSULE, LIQUID FILLED ORAL at 09:49

## 2018-02-18 RX ADMIN — NYSTATIN 500000 UNITS: 100000 SUSPENSION ORAL at 22:17

## 2018-02-18 RX ADMIN — DOCUSATE SODIUM 100 MG: 100 CAPSULE, LIQUID FILLED ORAL at 22:19

## 2018-02-18 RX ADMIN — ASPIRIN 81 MG: 81 TABLET, COATED ORAL at 09:48

## 2018-02-18 RX ADMIN — METOPROLOL TARTRATE 25 MG: 25 TABLET, FILM COATED ORAL at 22:18

## 2018-02-18 RX ADMIN — POTASSIUM CHLORIDE 10 MEQ: 750 TABLET, FILM COATED, EXTENDED RELEASE ORAL at 13:33

## 2018-02-18 ASSESSMENT — PAIN DESCRIPTION - PAIN TYPE: TYPE: SURGICAL PAIN

## 2018-02-18 ASSESSMENT — PAIN DESCRIPTION - ORIENTATION: ORIENTATION: LEFT

## 2018-02-18 ASSESSMENT — PAIN SCALES - GENERAL
PAINLEVEL_OUTOF10: 5
PAINLEVEL_OUTOF10: 4
PAINLEVEL_OUTOF10: 4

## 2018-02-18 ASSESSMENT — PAIN DESCRIPTION - LOCATION: LOCATION: HIP

## 2018-02-18 NOTE — PROGRESS NOTES
Subjective: The patient complains of severe acute  left hip and left anterior flank pain partially relieved by  Percocet, OxyContin, Tylenol and exacerbated by  constipation palpation and range of motion. She requests Lasix for bilateral lower extremity edema, she is status post extra dose of Lasix, await diuresis. She is complaining of some lightheadedness and dizziness with the OxyContin and I have discontinued it. She complains of increased left knee pain, she is pending left knee injection today. I'm hoping that her family will bring in a left knee brace. ROS x10: The patient also complains of severely impaired mobility and activities of daily living. Otherwise no new problems with vision, hearing, nose, mouth, throat, dermal, cardiovascular, GI, , pulmonary, musculoskeletal, psychiatric or neurological. See Rehab H&P on Rehab chart dated 02/11/18. Vital signs:  BP (!) 185/87   Pulse 75   Temp 98 °F (36.7 °C) (Oral)   Resp 16   Ht 5' 3\" (1.6 m)   Wt 200 lb (90.7 kg)   SpO2 94%   BMI 35.43 kg/m²   I/O:   PO/Intake:  fair PO intake,     Bowel/Bladder:  continent, constipation. General:  Patient is well developed, adequately nourished, non-obese    and well kempt. HEENT:    PERRLA, hearing intact to loud voice, external inspection of    ear and nose benign. Inspection of lips, tongue and gums    benign  Musculoskeletal: No significant change in strength or tone. All joints stable. Inspection and palpation of digits and nails show no     clubbing, cyanosis or inflammatory conditions. Neuro/Psychiatric: Affect: flat but pleasant. Alert and oriented to person, place    and situation. No significant change in deep tendon    reflexes or sensation  Lungs:  Diminished CTA-B. Respiration effort is normal at rest.     Heart:   S1 = S2, RRR. No loud murmurs. Abdomen:  Soft, non-tender, no enlargement of liver or spleen.   Extremities:  No significant lower extremity edema or tenderness. Skin:   Intact to general survey, no visualized or palpated problems. Rehabilitation:  Physical therapy: FIMS:  Bed Mobility: Scooting: Modified independent    Transfers: Sit to Stand: Stand by assistance  Stand to sit: Stand by assistance  Bed to Chair: Stand by assistance  Stand Pivot Transfers: Stand by assistance, Ambulation 1  Surface: carpet  Device: Rolling Walker  Other Apparatus: Knee Immobilizer  Assistance: Stand by assistance  Quality of Gait: Limited by pain in PM.   Distance: 80 ft   Comments: Pt w/o increaseing pain in Lt hip during amb to 4-5/10 level. , Stairs  # Steps : 4  Stairs Height: 6\"  Rails: Bilateral  Assistance: Stand by assistance  Comment: Non-reciprocal.    FIMS: Bed, Chair, Wheel Chair: 4 - Requires steadying assistance only <25% assist  and/or requires assist with one leg only  Walk: 1 - Total Assistance Walks/operates wheelchair < 50 feet OR requires two or more people OR patient performs < 25% of locomotion effort  Distance Walked: 8  Wheel Chair: 0 - Activity Not Assessed/Does Not Occur  Distance Traveled in Wheel Chair: 0  Stairs: 0 - Activity Does not Occur ( 0 only for the admission assessment),  , Assessment: Walking distance limited D/T increased pain in PM vs AM this date. Good adryan to seated exs. Occupational therapy: FIMS:  Eatin - Patient feeds self  Groomin - Did not occur  Bathin - Did not occur  Dressing-Upper: 0 - Did not occur  Dressing-Lower: 0 - Did not occur  Toiletin - Requires setup/supervision/cues  Toilet Transfer: 4 - Requires steadying assistance only < 25% assist  Tub Transfer: 0 - Activity does not occur  Shower Transfer: 0 - Activity does not occur,  , Assessment: Pt. demonstrates decreased balance, strength and endurance which impacts her ability to safely perform ADL tasks. Pt. would benefit from skilled OT to address these deficits.      Speech therapy: FIMS: Comprehension: 6 - Complex ideas 90% or device (hearing aid/glasses)  Expression: 7 - Patient expresses complex ideas/needs  Social Interaction: 6 - Patient requires medication for mood and/or effect  Problem Solvin - Patient able to solve simple/routine tasks  Memory: 6 - Patient requires device to recall (e.g. memory book)      Lab/X-ray studies reviewed, analyzed and discussed with patient and staff:   Recent Results (from the past 24 hour(s))   Basic Metabolic Panel    Collection Time: 18  5:47 AM   Result Value Ref Range    Sodium 143 132 - 144 mEq/L    Potassium 4.4 3.5 - 5.1 mEq/L    Chloride 105 98 - 107 mEq/L    CO2 27 22 - 29 mEq/L    Anion Gap 11 7 - 13 mEq/L    Glucose 91 74 - 109 mg/dL    BUN 22 8 - 23 mg/dL    CREATININE 1.07 (H) 0.50 - 0.90 mg/dL    GFR Non-African American 49.0 (L) >60    GFR  59.3 (L) >60    Calcium 8.3 (L) 8.6 - 10.2 mg/dL       Xr Hip Left (1 View)  Result Date: 2018  Impression: Normal postop left hip. Xr Femur Left (min 2 Views)  Result Date: 2018  MINIMALLY DISPLACED SUBCAPITAL LEFT FEMORAL NECK FRACTURE       Previous extensive, complex labs, notes and diagnostics reviewed and analyzed. ALLERGIES:    Allergies as of 02/10/2018 - Review Complete 02/10/2018   Allergen Reaction Noted    Lisinopril Anaphylaxis 2016    Statins Other (See Comments) 2016    Sulfa antibiotics  2012      (please also verify by checking [facility])       Complex Physical Medicine & Rehab Issues Assess & Plan:   1. Severe abnormality of gait and mobility and impaired self-care and ADL's secondary to acute subtrochanteric femoral neck fracture status post left hemiarthroplasty. Functional and medical status reassessed regarding patients ability to participate in therapies and patient found to be able to participate in acute intensive comprehensive inpatient rehabilitation program including PT/OT to improve balance, ambulation, ADLs, and to improve the P/AROM.   Therapeutic modifications may be having progressing in therapy. Patient and family education is in progress. The patient is to follow-up with their family physician after discharge. Complex Active General Medical Issues that complicate care Assess & Plan:    1. History of colitis with Severe active on chronic Constipation exacerbated by anesthesia recent fall and mobility and narcotics-add rectal suppositories, milk of magnesia, Senokot, Colace monitor stools for blood  2. Closed left hip fracture with recent left hip hemiarthroplasty  - weightbearing as tolerated and posterior hip precautions  3. Cervical spondylosis without myelopathy, DDD (degenerative disc disease), lumbar,  Low back pain radiating to left leg,Lumbar facet arthropathy, Lumbosacral spondylosis without myelopathy, Primary osteoarthritis of both knees-add Lidoderm, titrate oxycodone dosing to more consistent low-dose OxyContin and K-pad  4. Essential hypertension, coronary artery disease and history of coronary artery bypass graft-consult hospitalist check vital signs every shift titrate dishes of Norvasc and Lopressor  5. Foot pain, left-add Voltaren gel and proper footwear  6. Active chronic Headache-when necessary Tylenol quiet room improve the lighting in the room. 7. Rule out diabetes mellitus /elevated blood sugar s- Continue blood sugar checks BID, diet, adjust/add medications prn. Recent Labs      02/15/18   2231   POCGLU  86   8. Acute UTI-start Macrobid check urine CNS check post void residual  9. Postop anemia and trend toward thrombocytopenia-recheck CBC, dose vitamin B12 shots and monitor platelets 1 Lovenox. 10. High-risk high dose narcotic medication and elderly patient-check prescription monitoring system titrate to lowest possible dose monitor for any signs or symptoms of drug euphoria-so far she is denying any side effects or drug intolerances. Discontinue OxyContin because of side effects  11.  Bilateral lower extremity edema -

## 2018-02-18 NOTE — CONSULTS
file.     Social History Main Topics    Smoking status: Former Smoker    Smokeless tobacco: Not on file    Alcohol use Not on file    Drug use: Unknown    Sexual activity: Not on file     Other Topics Concern    Not on file     Social History Narrative         Lives With: Spouse    Type of Home: House--Two level (split flight of stairs to 2nd fl full bath; has first fl bed and bedside commode)    Home Access: Stairs to enter with rail- Number of Steps: 5    Entrance Stairs - Rails: Both    Bathroom Shower/Tub: Tub/Shower unit    Bathroom Equipment: Commode, Shower chair (commode is old and unsafe)    Home Equipment: Rolling walker         Homemaking Responsibilities: No         REVIEW OF SYSTEMS:  CONSTITUTIONAL:  No fevers, chills, nightsweats, unintended weight loss  HEENT:  Denies frequent or severe headaches, nasal congestion/sinus symptoms, problematic allergy problems. EYES:  No diplopia or blurry vision. CARDIOVASCULAR:  No chest pain, dyspnea, palpitations, orthopnea  PULM:  No dyspnea, unexplained cough. GI:  No dysphagia/odynophagia, problematic reflux, constipation, diarrhea, changes in stool habits, hematochezia, melena. :  No new urinary complaints, including dysuria, gross hematuria or pyuria. NEURO:  No new balance problems, peripheral weakness/paresthesias or numbness of concern. MUSC-SKEL:  No new joint pain, swelling, or erythema. PSY:  No concerns regarding depression, anxiety or panic. INTEGUMENTARY:  No new skin changes (rash, new or changing mole, new growth)      OBJECTIVE:  BP (!) 176/55   Pulse 87   Temp 97 °F (36.1 °C) (Oral)   Resp 16   Ht 5' 3\" (1.6 m)   Wt 200 lb (90.7 kg)   SpO2 95%   BMI 35.43 kg/m²   Patient is alert and oriented x 3 in NAD.      Vascular:   palpable Dorsalis Pedis and Posterior Tibial Pulses B/L  Capillary Fill time < 3 seconds to B/L digits  Skin temperature warm to warm tibial tuberosity to the digits B/L  Hair growth absent to digits  No

## 2018-02-18 NOTE — PROGRESS NOTES
to complete standing activities with good tolerance. Initiated weightshifting to improve WBing and stability through L LE. Fair + balance with reaching activities in unsupport stand, no LOB observed.    Treatment Diagnosis: difficulty walking  Prognosis: Good  REQUIRES PT FOLLOW UP: Yes    PLAN OF CARE/Safety:    Good safety      Therapy Time:   Individual   Time In 1405   Time Out 1435   Minutes 30     Minutes:30      Transfer/Bed mobility trainin      Gait training: 10      Neuro re education:5     Therapeutic ex:10      Susan Jefferson PTA, 18 at 2:33 PM     Electronically signed by Susan Jefferson PTA on 2018 at 2:34 PM

## 2018-02-19 LAB
ANION GAP SERPL CALCULATED.3IONS-SCNC: 10 MEQ/L (ref 7–13)
BUN BLDV-MCNC: 19 MG/DL (ref 8–23)
CALCIUM SERPL-MCNC: 8.1 MG/DL (ref 8.6–10.2)
CHLORIDE BLD-SCNC: 106 MEQ/L (ref 98–107)
CO2: 28 MEQ/L (ref 22–29)
CREAT SERPL-MCNC: 1.03 MG/DL (ref 0.5–0.9)
GFR AFRICAN AMERICAN: >60
GFR NON-AFRICAN AMERICAN: 51.2
GLUCOSE BLD-MCNC: 105 MG/DL (ref 74–109)
MAGNESIUM: 2.1 MG/DL (ref 1.7–2.3)
POTASSIUM SERPL-SCNC: 4.4 MEQ/L (ref 3.5–5.1)
SODIUM BLD-SCNC: 144 MEQ/L (ref 132–144)

## 2018-02-19 PROCEDURE — 93005 ELECTROCARDIOGRAM TRACING: CPT

## 2018-02-19 PROCEDURE — 97535 SELF CARE MNGMENT TRAINING: CPT

## 2018-02-19 PROCEDURE — 1180000000 HC REHAB R&B

## 2018-02-19 PROCEDURE — 6370000000 HC RX 637 (ALT 250 FOR IP): Performed by: INTERNAL MEDICINE

## 2018-02-19 PROCEDURE — 97530 THERAPEUTIC ACTIVITIES: CPT

## 2018-02-19 PROCEDURE — 6360000002 HC RX W HCPCS: Performed by: INTERNAL MEDICINE

## 2018-02-19 PROCEDURE — 97110 THERAPEUTIC EXERCISES: CPT | Performed by: INTERNAL MEDICINE

## 2018-02-19 PROCEDURE — 80048 BASIC METABOLIC PNL TOTAL CA: CPT

## 2018-02-19 PROCEDURE — 83735 ASSAY OF MAGNESIUM: CPT

## 2018-02-19 PROCEDURE — 36415 COLL VENOUS BLD VENIPUNCTURE: CPT

## 2018-02-19 PROCEDURE — 97535 SELF CARE MNGMENT TRAINING: CPT | Performed by: INTERNAL MEDICINE

## 2018-02-19 PROCEDURE — 97116 GAIT TRAINING THERAPY: CPT

## 2018-02-19 PROCEDURE — 97116 GAIT TRAINING THERAPY: CPT | Performed by: INTERNAL MEDICINE

## 2018-02-19 PROCEDURE — 6370000000 HC RX 637 (ALT 250 FOR IP): Performed by: PHYSICAL MEDICINE & REHABILITATION

## 2018-02-19 PROCEDURE — 99233 SBSQ HOSP IP/OBS HIGH 50: CPT | Performed by: PHYSICAL MEDICINE & REHABILITATION

## 2018-02-19 PROCEDURE — 97112 NEUROMUSCULAR REEDUCATION: CPT | Performed by: INTERNAL MEDICINE

## 2018-02-19 RX ORDER — CHLORHEXIDINE GLUCONATE 0.12 MG/ML
15 RINSE ORAL PRN
Status: DISCONTINUED | OUTPATIENT
Start: 2018-02-19 | End: 2018-02-23 | Stop reason: HOSPADM

## 2018-02-19 RX ORDER — CALCIUM CARBONATE 200(500)MG
500 TABLET,CHEWABLE ORAL 2 TIMES DAILY
Status: DISCONTINUED | OUTPATIENT
Start: 2018-02-19 | End: 2018-02-23 | Stop reason: HOSPADM

## 2018-02-19 RX ORDER — OXYMETAZOLINE HYDROCHLORIDE 0.05 G/100ML
2 SPRAY NASAL 2 TIMES DAILY
Status: DISCONTINUED | OUTPATIENT
Start: 2018-02-19 | End: 2018-02-23 | Stop reason: HOSPADM

## 2018-02-19 RX ADMIN — NYSTATIN 500000 UNITS: 100000 SUSPENSION ORAL at 19:50

## 2018-02-19 RX ADMIN — ASPIRIN 81 MG: 81 TABLET, COATED ORAL at 09:59

## 2018-02-19 RX ADMIN — NITROFURANTOIN MONOHYDRATE/MACROCRYSTALLINE 100 MG: 25; 75 CAPSULE ORAL at 19:50

## 2018-02-19 RX ADMIN — CHOLECALCIFEROL TAB 25 MCG (1000 UNIT) 1000 UNITS: 25 TAB at 09:58

## 2018-02-19 RX ADMIN — ANTACID TABLETS 500 MG: 500 TABLET, CHEWABLE ORAL at 16:11

## 2018-02-19 RX ADMIN — NYSTATIN 500000 UNITS: 100000 SUSPENSION ORAL at 16:11

## 2018-02-19 RX ADMIN — POTASSIUM CHLORIDE 10 MEQ: 750 TABLET, FILM COATED, EXTENDED RELEASE ORAL at 09:59

## 2018-02-19 RX ADMIN — METOPROLOL TARTRATE 25 MG: 25 TABLET, FILM COATED ORAL at 19:48

## 2018-02-19 RX ADMIN — HYDRALAZINE HYDROCHLORIDE 25 MG: 25 TABLET, FILM COATED ORAL at 05:57

## 2018-02-19 RX ADMIN — DOCUSATE SODIUM 100 MG: 100 CAPSULE, LIQUID FILLED ORAL at 19:49

## 2018-02-19 RX ADMIN — METOPROLOL TARTRATE 50 MG: 50 TABLET, FILM COATED ORAL at 09:59

## 2018-02-19 RX ADMIN — AMLODIPINE BESYLATE 5 MG: 5 TABLET ORAL at 09:59

## 2018-02-19 RX ADMIN — ENOXAPARIN SODIUM 40 MG: 40 INJECTION SUBCUTANEOUS at 09:58

## 2018-02-19 RX ADMIN — OXYMETAZOLINE HYDROCHLORIDE 2 SPRAY: 5 SPRAY NASAL at 19:50

## 2018-02-19 RX ADMIN — NYSTATIN 500000 UNITS: 100000 SUSPENSION ORAL at 10:06

## 2018-02-19 RX ADMIN — ACETAMINOPHEN 650 MG: 325 TABLET ORAL at 09:03

## 2018-02-19 RX ADMIN — NITROFURANTOIN MONOHYDRATE/MACROCRYSTALLINE 100 MG: 25; 75 CAPSULE ORAL at 09:59

## 2018-02-19 RX ADMIN — NYSTATIN 500000 UNITS: 100000 SUSPENSION ORAL at 12:53

## 2018-02-19 RX ADMIN — ACETAMINOPHEN 650 MG: 325 TABLET, FILM COATED ORAL at 17:05

## 2018-02-19 RX ADMIN — CHOLECALCIFEROL TAB 25 MCG (1000 UNIT) 1000 UNITS: 25 TAB at 16:11

## 2018-02-19 RX ADMIN — OXYCODONE HYDROCHLORIDE AND ACETAMINOPHEN 1 TABLET: 5; 325 TABLET ORAL at 19:50

## 2018-02-19 RX ADMIN — DOCUSATE SODIUM 100 MG: 100 CAPSULE, LIQUID FILLED ORAL at 10:00

## 2018-02-19 RX ADMIN — FUROSEMIDE 20 MG: 20 TABLET ORAL at 09:59

## 2018-02-19 ASSESSMENT — PAIN SCALES - GENERAL
PAINLEVEL_OUTOF10: 4
PAINLEVEL_OUTOF10: 2
PAINLEVEL_OUTOF10: 4
PAINLEVEL_OUTOF10: 6
PAINLEVEL_OUTOF10: 4
PAINLEVEL_OUTOF10: 5

## 2018-02-19 ASSESSMENT — PAIN DESCRIPTION - FREQUENCY
FREQUENCY: CONTINUOUS

## 2018-02-19 ASSESSMENT — PAIN DESCRIPTION - LOCATION
LOCATION: KNEE
LOCATION: KNEE
LOCATION: KNEE;HIP
LOCATION: HIP
LOCATION: HIP;KNEE

## 2018-02-19 ASSESSMENT — PAIN DESCRIPTION - ORIENTATION
ORIENTATION: LEFT

## 2018-02-19 ASSESSMENT — PAIN DESCRIPTION - PAIN TYPE
TYPE: ACUTE PAIN
TYPE: ACUTE PAIN;CHRONIC PAIN
TYPE: CHRONIC PAIN
TYPE: ACUTE PAIN

## 2018-02-19 ASSESSMENT — PAIN DESCRIPTION - PROGRESSION
CLINICAL_PROGRESSION: GRADUALLY IMPROVING
CLINICAL_PROGRESSION: GRADUALLY IMPROVING
CLINICAL_PROGRESSION: NOT CHANGED

## 2018-02-19 ASSESSMENT — PAIN DESCRIPTION - ONSET
ONSET: ON-GOING
ONSET: ON-GOING

## 2018-02-19 ASSESSMENT — PAIN DESCRIPTION - DESCRIPTORS
DESCRIPTORS: ACHING
DESCRIPTORS: ACHING;SORE
DESCRIPTORS: ACHING
DESCRIPTORS: ACHING

## 2018-02-19 ASSESSMENT — ENCOUNTER SYMPTOMS
VOMITING: 0
SHORTNESS OF BREATH: 0
NAUSEA: 0

## 2018-02-19 NOTE — PROGRESS NOTES
Physical Therapy  Physical Therapy Rehab Treatment Note  [facility]  [facility]       NAME: Adriana Coley  : 1935 (80 y.o.)  MRN: [id number]  CODE STATUS: Full Code    Date of Service: 2018  Chart Reviewed: Yes  Patient assessed for rehabilitation services?: Yes  Family / Caregiver Present: No  General Comment  Comments: c/o left knee pain this am.     Restrictions:  Restrictions/Precautions: Weight Bearing, Fall Risk, Surgical Protocols  Lower Extremity Weight Bearing Restrictions  Left Lower Extremity Weight Bearing: Weight Bearing As Tolerated  Position Activity Restriction  Hip Precautions: Posterior hip precautions       SUBJECTIVE: Subjective: \"Leonila got a knee pain this AM\". Pre Treatment Pain Screening  Pain at present: 5  Scale Used: Numeric Score  Intervention List: Patient able to continue with treatment    Post Treatment Pain Screening:   Pain Assessment  Pain Location: Knee  Pain Orientation: Left  Pain Descriptors: Aching; Sore  Pain Frequency: Continuous  Pain Onset: On-going  Clinical Progression: Not changed  Patient's Stated Pain Goal: No pain  Pain Intervention(s): Medication (see eMar)    OBJECTIVE:   Overall Orientation Status: Within Normal Limits       Neuromuscular Education  PNF: RLE stepping with LLE in stance: emphasis on HS posture and decreased BUE WB. Statnding Lat WS in BUE support      Bed mobility  Rolling to Left: Stand by assistance  Rolling to Right: Stand by assistance  Supine to Sit: Modified independent  Sit to Supine: Modified independent  Scooting: Modified independent  Comment: Increased time and effort    Transfers  Sit to Stand: Supervision  Stand to sit: Supervision  Bed to Chair: Stand by assistance  Stand Pivot Transfers: Stand by assistance  Comment: good management of hip precautions.           Activity Tolerance  Activity Tolerance: Patient Tolerated treatment well    Other exercises  Other exercises 1: standing TKE R

## 2018-02-19 NOTE — PROGRESS NOTES
Physical Therapy  Physical Therapy Rehab Treatment Note  [facility]  [facility]       NAME: Beck Albreto  : 1935 (80 y.o.)  MRN: [id number]  CODE STATUS: Full Code    Date of Service: 2018  Chart Reviewed: Yes  Patient assessed for rehabilitation services?: Yes  Family / Caregiver Present: No  General Comment  Comments: c/o left knee pain this am.     Restrictions:  Restrictions/Precautions: Weight Bearing, Fall Risk, Surgical Protocols  Lower Extremity Weight Bearing Restrictions  Left Lower Extremity Weight Bearing: Weight Bearing As Tolerated  Position Activity Restriction  Hip Precautions: Posterior hip precautions       SUBJECTIVE: Subjective: Pain in R knee. Pt reports increased confidence in ability to return to home (18 DC date) with spouse. Response To Previous Treatment: Patient with no complaints from previous session. Pain Screening  Patient Currently in Pain: Yes  Pre Treatment Pain Screening  Pain at present: 4  Scale Used: Numeric Score  Intervention List: Patient able to continue with treatment  Comments / Details: pt reports that she had meds for pain but does not rotate CP while resting as much as she could be    Post Treatment Pain Screening:   Pain Assessment  Pain Assessment: 0-10  Pain Type: Acute pain  Pain Location: Hip;Knee  Pain Orientation: Left  Pain Descriptors: Aching  Pain Frequency: Continuous  Pain Onset: On-going  Clinical Progression: Gradually improving  Patient's Stated Pain Goal: No pain  Pain Intervention(s): Medication (see eMar)    OBJECTIVE:   Overall Orientation Status: Within Normal Limits       Neuromuscular Education  PNF: step ups onto 4\" #box: 2mins 30sec (emphasis on sequencing, and endurance), Static stand: 2mins 30sec unsupported, Side stepping with [facility] on tile to L/R,     Transfers  Sit to Stand: Modified independent  Stand to sit: Modified independent  Comment: good management of hip precautions. Ambulation  Ambulation?: Yes  Ambulation 1  Surface: carpet;level tile  Device: Rolling Walker  Quality of Gait: Antalgic, recpirocal, decreased pace, SL & HS (LLE), increased BUE WB, Flexed trunk. Distance: 180 ft, 150 ft  Comments: slightly improved pace, VCs for posture and <BUE WB  Stairs/Curb  Stairs?: No        Activity Tolerance  Activity Tolerance: Patient Tolerated treatment well    Other exercises  Other exercises 1: standing TKE R knee: x20, x15  RTB    ASSESSMENT/COMMENTS:  Assessment: Improved endurance and L knee stability today. Pt concerned re: need for pain meds other than Tylenol but reported decreased pain in PM at end of Tx; \"I dont want to get hooked on that Oxy\". Left vmail for  re: pt request for bedside sommode    PLAN OF CARE/Safety:   Safety Devices  Type of devices:  All fall risk precautions in place      Therapy Time:   Individual   Time In 1400   Time Out 1500   Minutes 60     Minutes:       Transfer/Bed mobility trainin      Gait trainin      Neuro re education:  20     Therapeutic ex: 5      TWILA WISDOM PTA, 18 at 5:35 PM

## 2018-02-19 NOTE — PROGRESS NOTES
Occupational Therapy  [facility]  [facility]  NAME: Ricki Pacheco  : 1935  MRN: [id number]    Date of Service: 2018    Patient Diagnosis(es): There were no encounter diagnoses. has a past medical history of CAD (coronary artery disease); Colitis; and Hyperlipidemia. has a past surgical history that includes Coronary artery bypass graft; Appendectomy; Hysterectomy; Varicose vein surgery; sigmoidoscopy; colectomy (1/3/2011); colectomy (2011); and partial hip replacement (Left, 2018). Restrictions  Restrictions/Precautions  Restrictions/Precautions: Weight Bearing, Fall Risk, Surgical Protocols  Required Braces or Orthoses?: No  Lower Extremity Weight Bearing Restrictions  Left Lower Extremity Weight Bearing: Weight Bearing As Tolerated  Position Activity Restriction  Hip Precautions: Posterior hip precautions  Subjective   General  Chart Reviewed: Yes  Patient assessed for rehabilitation services?: Yes  Referring Practitioner: Dr. Joy Goodwin  Diagnosis: L Subcapital femoral neck fx s/p fall, L hemiarthroplasty with impaired mobility. Pre Treatment Pain Screening  Pain at present: 5  Scale Used: Numeric Score  Intervention List: Patient able to continue with treatment  Pain Assessment  Patient Currently in Pain: Yes  Pain Assessment: 0-10  Pain Level: 4  Pain Type: Acute pain  Pain Location: Hip  Pain Orientation: Left  Pain Descriptors: Aching  Pain Frequency: Continuous  Pain Intervention(s): Cold applied  Vital Signs  Patient Currently in Pain: Yes   Orientation     Objective    Pt. was able to site hip precautions independently. Pt. had 1lb wrist weight on R UE the entire session. Pt had no wrist weight on L UE due to arthritis pain. Pt. engaged in strengthening and endurance task by hanging eggs by their strings on horizontal poles at different heights using L UE and removing them with R UE. Pt. had no difficulty with task.  Pt. placed clothespins on ropes at various heights using B UE. Pt. had no difficulty with either UE. Pt. engaged in strengthening and fine motor task by inserting small pegs in peg board. Pt. had occasional difficulty due to decreased fine motor. Pt. tolerated wrist weight the entire session. Assessment   Assessment: Pt demonstrates decreased activity tolerance which limits her safety and independence with ADLs and IADLs. Pt would benefit from continued OT services to address her aforementioned deficits. Activity Tolerance  Activity Tolerance: Patient Tolerated treatment well  Safety Devices  Safety Devices in place: Yes  Type of devices:  All fall risk precautions in place          Plan   Plan  Times per week: 5-7x/week, 15-60 minutes/day  Times per day: Daily  Plan weeks: 1 - 1 1/2  Current Treatment Recommendations: Strengthening, Balance Training, Functional Mobility Training, Endurance Training, Safety Education & Training, Self-Care / ADL, Patient/Caregiver Education & Training, Equipment Evaluation, Education, & procurement, Home Management Training  Plan Comment: Continue POC    Goals  Patient Goals   Patient goals : Get back to doing what I was       Therapy Time   Individual Concurrent Group Co-treatment   Time In 1130         Time Out 1200         Minutes [address] S/SALLY This therapy session was supervised by DASHAWN Heaton/GOLDIE

## 2018-02-19 NOTE — PROGRESS NOTES
Occupational Therapy  Facility/Department: [facility]  Daily Treatment Note  NAME: Abbie Ganser  : 1935  MRN: [id number]    Date of Service: 2018    Patient Diagnosis(es): There were no encounter diagnoses. has a past medical history of CAD (coronary artery disease); Colitis; and Hyperlipidemia. has a past surgical history that includes Coronary artery bypass graft; Appendectomy; Hysterectomy; Varicose vein surgery; sigmoidoscopy; colectomy (1/3/2011); colectomy (2011); and partial hip replacement (Left, 2018). Restrictions  Restrictions/Precautions  Restrictions/Precautions: Weight Bearing, Fall Risk, Surgical Protocols  Required Braces or Orthoses?: No  Lower Extremity Weight Bearing Restrictions  Left Lower Extremity Weight Bearing: Weight Bearing As Tolerated  Position Activity Restriction  Hip Precautions: Posterior hip precautions  Subjective Pt stated, \"I'm feeling more confident each day. \"   General  Chart Reviewed: Yes  Patient assessed for rehabilitation services?: Yes  Referring Practitioner: Dr. Cecily Russell  Diagnosis: L Subcapital femoral neck fx s/p fall, L hemiarthroplasty with impaired mobility. Pre Treatment Pain Screening  Pain at present: 5  Scale Used: Numeric Score  Intervention List: Patient able to continue with treatment  Pain Assessment  Patient Currently in Pain: Yes  Pain Assessment: 0-10  Pain Level: 5  Pain Type: Acute pain;Chronic pain  Pain Location: Knee;Hip  Pain Orientation: Left  Pain Descriptors: Aching  Pain Frequency: Continuous  Clinical Progression: Gradually improving  Patient's Stated Pain Goal: No pain  Pain Intervention(s): Repositioned;Distraction;RN notified  Vital Signs  Patient Currently in Pain: Yes   Orientation     Objective    ADL  LE Dressing:  (Re-educated pt on use of reacher and sock aid to doff and don bilateral socks.  After verbal education, pt independently donned and doffed bilateral socks with equipment.)  Instrumental ADL's  Instrumental ADLs: Yes (Pt independently retrieved towels from ground level with use of reacher and then folded towels with BUEs at table x 10. Pt maintained hip precautions independently throughout activity.)     Additional Activities Comment  Additional Activities: Pt tolerated standing for increments of 7-8 minutes x 2 to engage in Hi-Q fine motor and visual perceptual task. Pt demo'd good unsuupported standing balance with intermittent unilateral UE support on table. Pt required repetition of directions to recall rules of game. Pt required a seated rest break d/t fatigue between standing trials; no increase in pain noted with standing. Assessment   Assessment: Pt demonstrates decreased activity tolerance which limits her safety and independence with ADLs and IADLs. Pt would benefit from continued OT services to address her aforementioned deficits. Activity Tolerance  Activity Tolerance: Patient Tolerated treatment well  Safety Devices  Safety Devices in place: Yes  Type of devices:  All fall risk precautions in place       Discharge Recommendations:  Continue to assess pending progress     Plan   Plan  Times per week: 5-7x/week, 15-60 minutes/day  Times per day: Daily  Plan weeks: 1 - 1 1/2  Current Treatment Recommendations: Strengthening, Balance Training, Functional Mobility Training, Endurance Training, Safety Education & Training, Self-Care / ADL, Patient/Caregiver Education & Training, Equipment Evaluation, Education, & procurement, Home Management Training  Plan Comment: Continue POC    Goals  Patient Goals   Patient goals : Get back to doing what I was       Therapy Time   Individual Concurrent Group Co-treatment   Time In  1100         Time Out 1130         Minutes  2[address] Mack Bain OT Electronically signed by Adalgisa Montiel OT on 2/19/2018 at 12:24 PM

## 2018-02-19 NOTE — PLAN OF CARE
Problem: Falls - Risk of  Intervention: Fall risk assessment  Interventions for fall prevention.    Intervention: Fall precautions  Continue with POC  Intervention: Toileting assistance  One person assist

## 2018-02-19 NOTE — PROGRESS NOTES
times per day    nystatin  5 mL Oral 4x Daily    docusate sodium  100 mg Oral BID    enoxaparin  40 mg Subcutaneous Daily    amLODIPine  5 mg Oral Daily    aspirin  81 mg Oral Daily    metoprolol tartrate  50 mg Oral Daily    metoprolol tartrate  25 mg Oral Nightly     Continuous Infusions:   PRN Meds:chlorhexidine, hydrALAZINE, sennosides-docusate sodium, analgesic ointment, acetaminophen, diclofenac sodium, acetaminophen, magnesium hydroxide, ondansetron, oxyCODONE-acetaminophen **OR** oxyCODONE-acetaminophen    PHYSICAL EXAM:    CURRENT VITALS: /76   Pulse 81   Temp 98 °F (36.7 °C) (Oral)   Resp 16   Ht 5' 3\" (1.6 m)   Wt 200 lb (90.7 kg)   SpO2 95%   BMI 35.43 kg/m²     CONSTITUTIONAL:  awake, alert, cooperative, no apparent distress,   ENT:  Normocephalic, without obvious abnormality, atraumatic, sinuses nontender on palpation, external ears without lesions,  NECK:  Supple, symmetrical, trachea midline, no adenopathy, thyroid symmetric, not enlarged and no tenderness, skin normal  LUNGS:  No increased work of breathing, good air exchange, clear to auscultation bilaterally, no crackles or wheezing  CARDIOVASCULAR:  Normal apical impulse, regular rate and rhythm, normal S1 and S2,  and 2/6 murmur noted, Mid Incisional Scars noted. ABDOMEN:  Obese,Normal bowel sounds, soft, non-distended, non-tender, no masses palpated, no hepatosplenomegally  EXTREMITIES:  No edema, Pulses strong throughout. Post Operative Changes. NEUROLOGIC:  Awake, alert, oriented to name, place and time.  Following all commands and moving all extremties  SKIN:  no bruising or bleeding, normal skin color, texture, turgor and no rashes     Data:        LABS:      Recent Results (from the past 24 hour(s))   EKG 12 Lead    Collection Time: 02/19/18  4:48 AM   Result Value Ref Range    Ventricular Rate 67 BPM    Atrial Rate 67 BPM    P-R Interval 144 ms    QRS Duration 122 ms    Q-T Interval 440 ms    QTc Calculation (Bazett)

## 2018-02-19 NOTE — PROGRESS NOTES
Subjective: The patient complains of severe acute  left hip and left anterior flank pain partially relieved by  Percocet, OxyContin, Tylenol and exacerbated by  constipation palpation and range of motion. Her left knee pain feels better status post left knee injection. She complains of left elbow pain, I rpescribed left elbow counter force brace. She complains of dry mouth and tongue at night, I prescribed Peridex rinse at bedside, I also prescribed Afrin BID. I am concerned about her elevated BP, I prescribed PO Apresoline. I am also concerned about her low calcium at 8.1, I prescribed TUMS BID. ROS x10: The patient also complains of severely impaired mobility and activities of daily living. Otherwise no new problems with vision, hearing, nose, mouth, throat, dermal, cardiovascular, GI, , pulmonary, musculoskeletal, psychiatric or neurological. See Rehab H&P on Rehab chart dated 02/11/18. Vital signs:  /68   Pulse 70   Temp 97 °F (36.1 °C) (Oral)   Resp 16   Ht 5' 3\" (1.6 m)   Wt 200 lb (90.7 kg)   SpO2 94%   BMI 35.43 kg/m²   I/O:   PO/Intake:  fair PO intake,     Bowel/Bladder:  continent, constipation. General:  Patient is well developed, adequately nourished, non-obese    and well kempt. HEENT:    PERRLA, hearing intact to loud voice, external inspection of    ear and nose benign. Inspection of lips, tongue and gums benign. Dry mouth, tongue. Musculoskeletal: No significant change in strength or tone. All joints stable. Inspection and palpation of digits and nails show no     clubbing, cyanosis or inflammatory conditions. Neuro/Psychiatric: Affect: flat but pleasant. Alert and oriented to person, place    and situation. No significant change in deep tendon    reflexes or sensation  Lungs:  Diminished CTA-B. Respiration effort is normal at rest.     Heart:   S1 = S2, RRR. No loud murmurs.     Abdomen:  Soft, non-tender, no enlargement of liver or hemiarthroplasty  - weightbearing as tolerated and posterior hip precautions  3. Cervical spondylosis without myelopathy, DDD (degenerative disc disease), lumbar,  Low back pain radiating to left leg,Lumbar facet arthropathy, Lumbosacral spondylosis without myelopathy, Primary osteoarthritis of both knees-add Lidoderm, titrate oxycodone dosing to more consistent low-dose OxyContin and K-pad  4. Essential hypertension, coronary artery disease and history of coronary artery bypass graft-consult hospitalist check vital signs every shift titrate dishes of Norvasc and Lopressor. I prescribed Apresoline PO.  5.   Foot pain, left-add Voltaren gel and proper footwear  6. Active chronic Headache-when necessary Tylenol quiet room improve the lighting in the room. 7. Rule out diabetes mellitus /elevated blood sugar s- Continue blood sugar checks BID, diet, adjust/add medications prn.    8. No results for input(s): POCGLU in the last 72 hours. Acute UTI-start Macrobid check urine CNS check post void residual  9. Postop anemia and trend toward thrombocytopenia-recheck CBC, dose vitamin B12 shots and monitor platelets 1 Lovenox. 10. High-risk high dose narcotic medication and elderly patient-check prescription monitoring system titrate to lowest possible dose monitor for any signs or symptoms of drug euphoria-so far she is denying any side effects or drug intolerances. Discontinue OxyContin because of side effects  11. Bilateral lower extremity edema - status post extra dose Lasix 20mg x1, await diuresis. I will monitor for improvement in her edema add EMILY hose elevate the legs and consider increasing daily Lasix dose. 12. Dry mouth, tongue - I prescribed Peridex rinse at bedside and Afrin BID. 13. Low Calcium at 8.1 on 02/19/18 - I prescribed TUMS BID. This note transcribed by Linda Horne on 02/19/18 at 10:47 a.m.         Grace Valerio D.O., PM&R     Attending    [address] [place] [address]

## 2018-02-20 PROCEDURE — 97110 THERAPEUTIC EXERCISES: CPT

## 2018-02-20 PROCEDURE — 6370000000 HC RX 637 (ALT 250 FOR IP): Performed by: PHYSICAL MEDICINE & REHABILITATION

## 2018-02-20 PROCEDURE — 97112 NEUROMUSCULAR REEDUCATION: CPT | Performed by: INTERNAL MEDICINE

## 2018-02-20 PROCEDURE — 6360000002 HC RX W HCPCS: Performed by: INTERNAL MEDICINE

## 2018-02-20 PROCEDURE — 6370000000 HC RX 637 (ALT 250 FOR IP): Performed by: INTERNAL MEDICINE

## 2018-02-20 PROCEDURE — 1180000000 HC REHAB R&B

## 2018-02-20 PROCEDURE — 99232 SBSQ HOSP IP/OBS MODERATE 35: CPT | Performed by: PHYSICAL MEDICINE & REHABILITATION

## 2018-02-20 PROCEDURE — 97530 THERAPEUTIC ACTIVITIES: CPT

## 2018-02-20 PROCEDURE — 97116 GAIT TRAINING THERAPY: CPT | Performed by: INTERNAL MEDICINE

## 2018-02-20 RX ADMIN — ASPIRIN 81 MG: 81 TABLET, COATED ORAL at 08:55

## 2018-02-20 RX ADMIN — NYSTATIN 500000 UNITS: 100000 SUSPENSION ORAL at 16:19

## 2018-02-20 RX ADMIN — ANTACID TABLETS 500 MG: 500 TABLET, CHEWABLE ORAL at 08:54

## 2018-02-20 RX ADMIN — METOPROLOL TARTRATE 50 MG: 50 TABLET, FILM COATED ORAL at 08:55

## 2018-02-20 RX ADMIN — ACETAMINOPHEN 650 MG: 325 TABLET, FILM COATED ORAL at 16:20

## 2018-02-20 RX ADMIN — CHOLECALCIFEROL TAB 25 MCG (1000 UNIT) 1000 UNITS: 25 TAB at 08:54

## 2018-02-20 RX ADMIN — ACETAMINOPHEN 650 MG: 325 TABLET ORAL at 23:48

## 2018-02-20 RX ADMIN — ENOXAPARIN SODIUM 40 MG: 40 INJECTION SUBCUTANEOUS at 08:53

## 2018-02-20 RX ADMIN — DOCUSATE SODIUM 100 MG: 100 CAPSULE, LIQUID FILLED ORAL at 08:55

## 2018-02-20 RX ADMIN — FUROSEMIDE 20 MG: 20 TABLET ORAL at 08:54

## 2018-02-20 RX ADMIN — CHOLECALCIFEROL TAB 25 MCG (1000 UNIT) 1000 UNITS: 25 TAB at 16:22

## 2018-02-20 RX ADMIN — NYSTATIN 500000 UNITS: 100000 SUSPENSION ORAL at 08:54

## 2018-02-20 RX ADMIN — NYSTATIN 500000 UNITS: 100000 SUSPENSION ORAL at 20:08

## 2018-02-20 RX ADMIN — OXYMETAZOLINE HYDROCHLORIDE 2 SPRAY: 5 SPRAY NASAL at 08:53

## 2018-02-20 RX ADMIN — ANTACID TABLETS 500 MG: 500 TABLET, CHEWABLE ORAL at 16:20

## 2018-02-20 RX ADMIN — DOCUSATE SODIUM 100 MG: 100 CAPSULE, LIQUID FILLED ORAL at 20:07

## 2018-02-20 RX ADMIN — AMLODIPINE BESYLATE 5 MG: 5 TABLET ORAL at 08:55

## 2018-02-20 RX ADMIN — METOPROLOL TARTRATE 25 MG: 25 TABLET, FILM COATED ORAL at 20:07

## 2018-02-20 RX ADMIN — POTASSIUM CHLORIDE 10 MEQ: 750 TABLET, FILM COATED, EXTENDED RELEASE ORAL at 08:54

## 2018-02-20 RX ADMIN — NYSTATIN 500000 UNITS: 100000 SUSPENSION ORAL at 12:37

## 2018-02-20 ASSESSMENT — PAIN DESCRIPTION - DESCRIPTORS
DESCRIPTORS: ACHING

## 2018-02-20 ASSESSMENT — PAIN DESCRIPTION - LOCATION
LOCATION: HIP;KNEE
LOCATION: KNEE
LOCATION: HIP;ELBOW

## 2018-02-20 ASSESSMENT — PAIN DESCRIPTION - ORIENTATION
ORIENTATION: LEFT

## 2018-02-20 ASSESSMENT — PAIN SCALES - GENERAL
PAINLEVEL_OUTOF10: 1
PAINLEVEL_OUTOF10: 3
PAINLEVEL_OUTOF10: 4
PAINLEVEL_OUTOF10: 0
PAINLEVEL_OUTOF10: 0

## 2018-02-20 ASSESSMENT — PAIN DESCRIPTION - PROGRESSION: CLINICAL_PROGRESSION: GRADUALLY IMPROVING

## 2018-02-20 ASSESSMENT — PAIN DESCRIPTION - PAIN TYPE
TYPE: ACUTE PAIN

## 2018-02-20 ASSESSMENT — PAIN DESCRIPTION - FREQUENCY
FREQUENCY: INTERMITTENT

## 2018-02-20 ASSESSMENT — PAIN DESCRIPTION - ONSET: ONSET: ON-GOING

## 2018-02-20 NOTE — PROGRESS NOTES
Physical Therapy    Facility/Department: [facility] Daily Treatment Note    NAME: Fredy Alexis    : 1935 (80 y.o.)  MRN: [id number]    Account: [de-identified]  Gender: female    Date of Service: 18      SUBJECTIVE:     Start and end of tx pain 2/10  Location: L shin area  Description: ache  Response: no intervention required      Falls Safety Armband Present: [x] Yes  [] No    The below exercises were completed to facilitate strength, motor control   and muscular endurance. Seated:   AP: x20   LAQ: x20   Hip Flex: x20 R   Abduction: x20   Adduction: x20   HS Curls: x20    The below exercises were completed to facilitate strength, motor control   and muscular endurance.     Supine:   AP: x20   QS: x20   SAQ: x20   Glut set: x20   Abduction: x20   Heel slides: x20        Safety/Judgment:     Safety Devices  Type of devices: Chair alarm in place;Gait belt    Minutes:      Transfer/Bed mobility training:      Gait training:      Neuro re education:      Therapeutic ex: 30      Therapy Time:    Individual   Time In 1400   Time Out 1430   Minutes 30

## 2018-02-20 NOTE — PROGRESS NOTES
1 set x 10 reps  Shoulder ADduction: 1 set x 10 reps  Elbow Flexion: 1 set x 10 reps  Wrist Flexion: 1 set x 10 reps  Wrist Extension: 1 set x 10 reps    Pt. engaged in fine motor tasks with B UE by threading beads on a string and had occasional difficulty due to decreased fine motor. Pt. removed beads from green theraputty and had no difficulty with task. Assessment      Activity Tolerance  Activity Tolerance: Patient Tolerated treatment well  Safety Devices  Safety Devices in place: Yes  Type of devices:  All fall risk precautions in place          Plan   Plan  Times per week: 5-7x/week, 15-60 minutes/day  Times per day: Daily  Plan weeks: 1 - 1 1/2  Current Treatment Recommendations: Strengthening, Balance Training, Functional Mobility Training, Endurance Training, Safety Education & Training, Self-Care / ADL, Patient/Caregiver Education & Training, Equipment Evaluation, Education, & procurement, Home Management Training  Plan Comment: Continue POC    Goals  Patient Goals   Patient goals : Get back to doing what I was       Therapy Time   Individual Concurrent Group Co-treatment   Time In 0900         Time Out 0930         Minutes [address] S/SALLY This therapy session was supervised by DASHAWN Moran/GOLDIE

## 2018-02-20 NOTE — PROGRESS NOTES
CARDIOLOGY [facility] PROGRESS NOTE         2/20/2018      Imelda Ann    [id number]  1935    Rounding MD: Alta Solomon MD ,[facility]    Primary Cardiologist:  Gary Hall MD        ASSESSMENT:        1. Mechanical Fall  2. Left Hip Fracture post Repair 2/8/18  3. Coronary artery disease, status post coronary artery bypass graft surgery, September 2007; left internal mammary artery to the left anterior descending  coronary artery, saphenous vein graft to the first obtuse marginal and second obtuse marginal, diagonal and right coronary artery, left atrial appendage ligation, asymptomatic. 5. Negative Lexiscan myocardial perfusion stress test, Novemeber 2016. 6. Normal left ventricular systolic function. 7. Hypertension. 8. Familial hyperlipidemia (intolerant to statins and Juxtapid). 9. Peripheral vascular disease with stable 2.6 cm abdominal aortic ectasia; status post left iliac balloon angioplasty. 10. 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 11. History of appendectomy. 12. History of total abdominal hysterectomy. 13. Obesity. 14. Melanotic stool. 15. Negative CT of the chest for pulmonary embolism or masses, 2012. 16. Negative mammogram 2012. 17. PObstructive sleep apnea. PLAN:    Continue Rehab. Supportive Cardiac Care  OK to DC once OK with others. See Orders    SUBJECTIVE:     Patient has No Cardiac Complaints. Denies CP, SOB, Palpitations or CHF. Cardiac and general ROS otherwise negative and unchanged.     OBJECTIVE:     MEDICATIONS:     Scheduled Meds:   calcium carbonate  500 mg Oral BID    oxymetazoline  2 spray Each Nare BID    furosemide  20 mg Oral Daily    potassium chloride  10 mEq Oral Daily    cinnamon oil  1 drop Oral 4x Daily    vitamin D  1,000 Units Oral BID WC    cyanocobalamin  1,000 mcg Intramuscular Weekly    lidocaine  3 patch Transdermal Daily    nystatin  5 mL Oral 4x Daily    docusate sodium  100 mg Oral BID    enoxaparin  40 mg Subcutaneous Daily    amLODIPine  5 mg Oral Daily    aspirin  81 mg Oral Daily    metoprolol tartrate  50 mg Oral Daily    metoprolol tartrate  25 mg Oral Nightly     Continuous Infusions:   PRN Meds:chlorhexidine, hydrALAZINE, sennosides-docusate sodium, analgesic ointment, acetaminophen, diclofenac sodium, acetaminophen, magnesium hydroxide, ondansetron, oxyCODONE-acetaminophen **OR** oxyCODONE-acetaminophen    PHYSICAL EXAM:    CURRENT VITALS: /66   Pulse 64   Temp 98 °F (36.7 °C) (Oral)   Resp 16   Ht 5' 3\" (1.6 m)   Wt 200 lb (90.7 kg)   SpO2 95%   BMI 35.43 kg/m²     CONSTITUTIONAL:  awake, alert, cooperative, no apparent distress,   ENT:  Normocephalic, without obvious abnormality, atraumatic, sinuses nontender on palpation, external ears without lesions,  NECK:  Supple, symmetrical, trachea midline, no adenopathy, thyroid symmetric, not enlarged and no tenderness, skin normal  LUNGS:  No increased work of breathing, good air exchange, clear to auscultation bilaterally, no crackles or wheezing  CARDIOVASCULAR:  Normal apical impulse, regular rate and rhythm, normal S1 and S2,  and 2/6 murmur noted, Mid Incisional Scars noted. ABDOMEN:  Obese,Normal bowel sounds, soft, non-distended, non-tender, no masses palpated, no hepatosplenomegally  EXTREMITIES:  No edema, Pulses strong throughout. Post Operative Changes. NEUROLOGIC:  Awake, alert, oriented to name, place and time. Following all commands and moving all extremties  SKIN:  no bruising or bleeding, normal skin color, texture, turgor and no rashes     Data:        LABS:      No results found for this or any previous visit (from the past 24 hour(s)).       EKG:   Sinus rhythm with premature atrial complexes  Right bundle branch block  Left anterior fascicular block        Melvi Fuentes MD ,[address] [place] [address] Cardiology

## 2018-02-20 NOTE — PROGRESS NOTES
K-pad  4. Essential hypertension, coronary artery disease and history of coronary artery bypass graft-consult hospitalist check vital signs every shift titrate dishes of Norvasc and Lopressor. I prescribed Apresoline PO.  5.   Foot pain, left-add Voltaren gel and proper footwear  6. Active chronic Headache-when necessary Tylenol quiet room improve the lighting in the room. 7. Rule out diabetes mellitus /elevated blood sugar s- Continue blood sugar checks BID, diet, adjust/add medications prn. No results for input(s): POCGLU in the last 72 hours. 8. Status post Acute UTI - discontinue Macrobid, recheck UA, monitor urine. Check post void residual  9. Postop anemia and trend toward thrombocytopenia-recheck CBC, dose vitamin B12 shots and monitor platelets 1 Lovenox. 10. High-risk high dose narcotic medication and elderly patient-check prescription monitoring system titrate to lowest possible dose monitor for any signs or symptoms of drug euphoria-so far she is denying any side effects or drug intolerances. Discontinue OxyContin because of side effects  11. Bilateral lower extremity edema - status post extra dose Lasix 20mg x1, await diuresis. I will monitor for improvement in her edema add EMILY hose elevate the legs and consider increasing daily Lasix dose. 12. Dry mouth, tongue - I prescribed Peridex rinse at bedside and Afrin BID. 13. Low Calcium at 8.1 on 02/19/18 - I prescribed TUMS BID. 14. Lateral epicondylitis, status post elbow pain - counter force brace in place. I instructed her and staff on its placement. This note transcribed by Andreas Friend on 02/20/18 at 11:22 a.m.         Damián Cali D.O., PM&R     Attending    [facility] Mariana Pisano

## 2018-02-20 NOTE — PROGRESS NOTES
Occupational Therapy  Facility/Department: [facility]  Daily Treatment Note  NAME: Juan Bingham  : 1935  MRN: [id number]    Date of Service: 2018    Patient Diagnosis(es): There were no encounter diagnoses. has a past medical history of CAD (coronary artery disease); Colitis; and Hyperlipidemia. has a past surgical history that includes Coronary artery bypass graft; Appendectomy; Hysterectomy; Varicose vein surgery; sigmoidoscopy; colectomy (1/3/2011); colectomy (2011); and partial hip replacement (Left, 2018). Restrictions  Restrictions/Precautions  Restrictions/Precautions: Weight Bearing, Fall Risk, Surgical Protocols  Required Braces or Orthoses?: No  Lower Extremity Weight Bearing Restrictions  Left Lower Extremity Weight Bearing: Weight Bearing As Tolerated  Position Activity Restriction  Hip Precautions: Posterior hip precautions  Subjective   General  Chart Reviewed: Yes  Patient assessed for rehabilitation services?: Yes  Referring Practitioner: Dr. Dusty Neely  Diagnosis: L Subcapital femoral neck fx s/p fall, L hemiarthroplasty with impaired mobility. Pre Treatment Pain Screening  Pain at present: 3  Scale Used: Numeric Score  Intervention List: Patient able to continue with treatment  Pain Assessment  Patient Currently in Pain: Yes  Pain Assessment: 0-10  Pain Level: 3  Pain Type: Acute pain  Pain Location: Hip;Elbow  Pain Orientation: Left  Pain Descriptors: Aching  Pain Frequency: Intermittent  Vital Signs  Patient Currently in Pain: Yes   Orientation     Objective    Pt. had cold pack on L hip when arriving to session. Pt. engaged in strengthening and endurance task with 1 lb. wrist weights on R UE. Pt. requested no weight to be put on L UE due to soreness. Pt. placed blocks on dowels at different heights and then took them off. Pt. had no difficulty with R UE during task and min difficulty with L UE due to elbow soreness.  Pt. required no breaks while putting blocks

## 2018-02-21 PROCEDURE — 6370000000 HC RX 637 (ALT 250 FOR IP): Performed by: INTERNAL MEDICINE

## 2018-02-21 PROCEDURE — 97535 SELF CARE MNGMENT TRAINING: CPT

## 2018-02-21 PROCEDURE — 6370000000 HC RX 637 (ALT 250 FOR IP): Performed by: PHYSICAL MEDICINE & REHABILITATION

## 2018-02-21 PROCEDURE — 1180000000 HC REHAB R&B

## 2018-02-21 PROCEDURE — 97535 SELF CARE MNGMENT TRAINING: CPT | Performed by: INTERNAL MEDICINE

## 2018-02-21 PROCEDURE — 99231 SBSQ HOSP IP/OBS SF/LOW 25: CPT | Performed by: PHYSICAL MEDICINE & REHABILITATION

## 2018-02-21 PROCEDURE — 93010 ELECTROCARDIOGRAM REPORT: CPT | Performed by: INTERNAL MEDICINE

## 2018-02-21 PROCEDURE — 97112 NEUROMUSCULAR REEDUCATION: CPT | Performed by: INTERNAL MEDICINE

## 2018-02-21 PROCEDURE — 97530 THERAPEUTIC ACTIVITIES: CPT

## 2018-02-21 PROCEDURE — 97110 THERAPEUTIC EXERCISES: CPT | Performed by: INTERNAL MEDICINE

## 2018-02-21 PROCEDURE — 97116 GAIT TRAINING THERAPY: CPT | Performed by: INTERNAL MEDICINE

## 2018-02-21 PROCEDURE — 6360000002 HC RX W HCPCS: Performed by: INTERNAL MEDICINE

## 2018-02-21 RX ADMIN — ENOXAPARIN SODIUM 40 MG: 40 INJECTION SUBCUTANEOUS at 08:56

## 2018-02-21 RX ADMIN — ANTACID TABLETS 500 MG: 500 TABLET, CHEWABLE ORAL at 08:56

## 2018-02-21 RX ADMIN — NYSTATIN 500000 UNITS: 100000 SUSPENSION ORAL at 12:39

## 2018-02-21 RX ADMIN — POTASSIUM CHLORIDE 10 MEQ: 750 TABLET, FILM COATED, EXTENDED RELEASE ORAL at 08:59

## 2018-02-21 RX ADMIN — CHOLECALCIFEROL TAB 25 MCG (1000 UNIT) 1000 UNITS: 25 TAB at 17:10

## 2018-02-21 RX ADMIN — ASPIRIN 81 MG: 81 TABLET, COATED ORAL at 08:56

## 2018-02-21 RX ADMIN — METOPROLOL TARTRATE 25 MG: 25 TABLET, FILM COATED ORAL at 21:13

## 2018-02-21 RX ADMIN — CHOLECALCIFEROL TAB 25 MCG (1000 UNIT) 1000 UNITS: 25 TAB at 08:59

## 2018-02-21 RX ADMIN — NYSTATIN 500000 UNITS: 100000 SUSPENSION ORAL at 08:56

## 2018-02-21 RX ADMIN — DOCUSATE SODIUM 100 MG: 100 CAPSULE, LIQUID FILLED ORAL at 08:59

## 2018-02-21 RX ADMIN — FUROSEMIDE 20 MG: 20 TABLET ORAL at 08:59

## 2018-02-21 RX ADMIN — OXYMETAZOLINE HYDROCHLORIDE 2 SPRAY: 5 SPRAY NASAL at 08:56

## 2018-02-21 RX ADMIN — ACETAMINOPHEN 650 MG: 325 TABLET ORAL at 18:25

## 2018-02-21 RX ADMIN — AMLODIPINE BESYLATE 5 MG: 5 TABLET ORAL at 08:59

## 2018-02-21 RX ADMIN — NYSTATIN 500000 UNITS: 100000 SUSPENSION ORAL at 17:11

## 2018-02-21 RX ADMIN — ACETAMINOPHEN 650 MG: 325 TABLET ORAL at 09:03

## 2018-02-21 RX ADMIN — ANTACID TABLETS 500 MG: 500 TABLET, CHEWABLE ORAL at 17:11

## 2018-02-21 RX ADMIN — NYSTATIN 500000 UNITS: 100000 SUSPENSION ORAL at 21:13

## 2018-02-21 RX ADMIN — DOCUSATE SODIUM 100 MG: 100 CAPSULE, LIQUID FILLED ORAL at 21:13

## 2018-02-21 ASSESSMENT — PAIN DESCRIPTION - PROGRESSION: CLINICAL_PROGRESSION: GRADUALLY IMPROVING

## 2018-02-21 ASSESSMENT — PAIN DESCRIPTION - FREQUENCY
FREQUENCY: INTERMITTENT
FREQUENCY: INTERMITTENT

## 2018-02-21 ASSESSMENT — PAIN SCALES - GENERAL
PAINLEVEL_OUTOF10: 0
PAINLEVEL_OUTOF10: 2
PAINLEVEL_OUTOF10: 3
PAINLEVEL_OUTOF10: 3

## 2018-02-21 ASSESSMENT — PAIN DESCRIPTION - LOCATION: LOCATION: HEAD

## 2018-02-21 ASSESSMENT — PAIN DESCRIPTION - ONSET: ONSET: ON-GOING

## 2018-02-21 ASSESSMENT — PAIN DESCRIPTION - DESCRIPTORS
DESCRIPTORS: ACHING
DESCRIPTORS: ACHING

## 2018-02-21 ASSESSMENT — PAIN DESCRIPTION - PAIN TYPE: TYPE: ACUTE PAIN

## 2018-02-21 ASSESSMENT — PAIN DESCRIPTION - ORIENTATION
ORIENTATION: LEFT
ORIENTATION: ANTERIOR

## 2018-02-21 NOTE — PROGRESS NOTES
INTERNAL MEDICINE CONSULT PROGRESS NOTE    SERVICE DATE:  2/21/2018   SERVICE TIME:  11:02 am    CONSULTING SERVICE:  Internal Medicine    ASSESSMENT/PLAN:   Evelyn Crespo is an 78-year-old female status post left hip fracture status post surgery. Patient is now in rehab due to gait abnormality. We are following patient for medical management. Gait and ADL abnormalities due to left hip fracture: status post surgery  - Continues with PT and OT at this time    Constipation  - Colace and Senokot added to regimen    Coronary artery disease  Hypertension  Lumbar spondylosis    Dispo: Will continue to follow in rehab with you. SUBJECTIVE  INTERVAL HISTORY OF PRESENT ILLNESS:  Says she feels well today. No complaints.      Current Facility-Administered Medications: calcium carbonate (TUMS) chewable tablet 500 mg, 500 mg, Oral, BID  chlorhexidine (PERIDEX) 0.12 % solution 15 mL, 15 mL, Mouth/Throat, PRN  oxymetazoline (AFRIN) 0.05 % nasal spray 2 spray, 2 spray, Each Nare, BID  furosemide (LASIX) tablet 20 mg, 20 mg, Oral, Daily  potassium chloride (KLOR-CON) extended release tablet 10 mEq, 10 mEq, Oral, Daily  hydrALAZINE (APRESOLINE) tablet 25 mg, 25 mg, Oral, 4x Daily PRN  sennosides-docusate sodium (SENOKOT-S) 8.6-50 MG tablet 1 tablet, 1 tablet, Oral, Daily PRN  cinnamon oil liquid 1 drop, 1 drop, Oral, 4x Daily  analgesic ointment ointment, , Topical, PRN  vitamin D (CHOLECALCIFEROL) tablet 1,000 Units, 1,000 Units, Oral, BID WC  cyanocobalamin injection 1,000 mcg, 1,000 mcg, Intramuscular, Weekly  lidocaine (LIDODERM) 5 % 3 patch, 3 patch, Transdermal, Daily  acetaminophen (TYLENOL) tablet 650 mg, 650 mg, Oral, Q4H PRN  nystatin (MYCOSTATIN) 955600 UNIT/ML suspension 500,000 Units, 5 mL, Oral, 4x Daily  diclofenac sodium 1 % gel 4 g, 4 g, Topical, 4x Daily PRN  acetaminophen (TYLENOL) tablet 650 mg, 650 mg, Oral, Q4H PRN  docusate sodium (COLACE) capsule 100 mg, 100 mg, Oral, BID  enoxaparin (LOVENOX) injection 40 mg, 40 mg, Subcutaneous, Daily  magnesium hydroxide (MILK OF MAGNESIA) 400 MG/5ML suspension 30 mL, 30 mL, Oral, Daily PRN  ondansetron (ZOFRAN) injection 4 mg, 4 mg, Intravenous, Q6H PRN  oxyCODONE-acetaminophen (PERCOCET) 5-325 MG per tablet 1 tablet, 1 tablet, Oral, Q4H PRN **OR** oxyCODONE-acetaminophen (PERCOCET) 5-325 MG per tablet 2 tablet, 2 tablet, Oral, Q4H PRN  amLODIPine (NORVASC) tablet 5 mg, 5 mg, Oral, Daily  aspirin EC tablet 81 mg, 81 mg, Oral, Daily  metoprolol tartrate (LOPRESSOR) tablet 50 mg, 50 mg, Oral, Daily  metoprolol tartrate (LOPRESSOR) tablet 25 mg, 25 mg, Oral, Nightly    OBJECTIVE  PHYSICAL EXAM:    BP (!) 151/64   Pulse 58   Temp 98.2 °F (36.8 °C) (Oral)   Resp 19   Ht 5' 3\" (1.6 m)   Wt 200 lb (90.7 kg)   SpO2 95%   BMI 35.43 kg/m²   Body mass index is 35.43 kg/m². CONSTITUTIONAL:  awake, alert, cooperative, no apparent distress, and appears stated age  EYES:  Lids and lashes normal, pupils equal, round and reactive to light, extra ocular muscles intact, sclera clear, conjunctiva normal  LUNGS:  No increased work of breathing, good air exchange, clear to auscultation bilaterally, no crackles or wheezing  CARDIOVASCULAR:  Normal apical impulse, regular rate and rhythm, normal S1 and S2, no S3 or S4, and no murmur noted  ABDOMEN:  No scars, normal bowel sounds, soft, non-distended, non-tender, no masses palpated, no hepatosplenomegally  EXTREMITIES: Trace edema left, no cyanosis, no clubbing    DATA:   Diagnostic tests reviewed for today's visit:    All labs and imaging results reviewed.      SIGNATURE: Milagros Saint, MD PATIENT NAME: Flip Rodriguez   DATE: February 21, 2018 MRN: [id number]   TIME: 11:02 AM PAGER/CONTACT #: [phone]

## 2018-02-21 NOTE — HOME CARE
Transfer To Home Care    R250/R250-01  Patient Name: Laci Young         Address: [facility] [address]       . MRN: [id number]                          : 1935  (80 y.o.)       Phone:     [phone] (home)   Gender: female   Demographics Verified:  [x]Yes   []No       PAYOR NAME:  Payor: MEDICARE / Plan: MEDICARE PART A AND B / Product Type: *No Product type* /      HOME ENVIRONMENT/DME/NEEDS:     Patient lives with spouse 2 level + basement. She has grab bars and wheeled walker  Patient needs education for the following medications: ASA, LOVENOX, PERCOCET     Full Weight Bearing as Tolerated  EMILY hose on 6 am off at 6 pm                                                1) Check operative dressing with vital signs.  If aquacell in place then leave for 7 days unless heavily saturated.   If heavily saturated then remove aquacell and start daily dressing changes   2) Clean wound with normal saline and apply 4x4 gauze with light tape    Physician to Follow Referral: Demario Montenegro MD    I certify that I, or a nurse practioner or physician assistant working with me, had an in-person encounter with Laci Young on 2018 and the reason for the home care services is documented in the clinical note for that day.      Laci Young was assessed on the above date and was found to have medical conditions requiring Home Care that included RN, PT, OT, AND AN AIDE.     Homebound Status: further, I certify that my clinical findings support that Laci Young does meet the Medicare definition of \"Confined to the Home\" because of   Medically restricted to home because of  Gait and ADL abnormality dt left subtrochanteric femoral neck fracture and left hemiarthroplasty     Certification and medical necessity: I certify that, based on my findings, the following services are medically necessary home health services for Laci Young for the following reasons:  - Vital signs monitoring: Nurse to perform BP, HR, RR, Temp, and Weight with each visit and teach patient and caregivers on taking daily.  Nurse to call physician if pulse less than 50 or greater than 120, respiratory rate less than 12 or greater than 25, oral temperature greater than or equal to 655 oF, systolic BP less than 90 or greater than 970, diastolic BP less than 50 or greater than 100.  - Consult Physical Therapy for  Evaluate and Treat, strength training, gait training, balance training, safety training and functional mobility  - Consult Occupational Therapy for  range of motion, strengthening training, Activities of Daily Living retraining and Independent Activities of Daily Living retraining  - [address] Aid for [address] [place] [address] with Activities of Daily Living  Diagnosis:    Gait and ADL abnormality dt left subtrochanteric femoral neck fracture and left hemiarthroplasty-[facility] rehab admission February 8 218  80year-old female presented to the emergency room with left hip pain one day after falling at home. X-rays revealed a minimally displaced subcapital fracture of the left hip.   Patient was taken for left hemiarthroplasty by Dr. Juliette Moseley after she was cleared medically by cardiology and respiratory  HX: cervical spondylosis, right lumbosacral radiculopathy, spondylolisthesis of lumbar region, DDD     Code Status: Resuscitation/Code Status Note on Issac Stovall  The code status was made Full Code     High Risk For Readmission:  [x]Yes   []No             [x]  F2F Completed   Ordering Physician: (Dr. Karol Jefferson)  PCP: Alyssia Morales MD   DATE OF LAST PCP VISIT: 11/20/17    Anticipated Discharge Date: (2/23/18)    Tracee Perry 20 Coordinatior     []Yes   [x]No  Signed up for Palliative Care Services   []Yes   [x]No  (Ask for Palliative Care)                             IV ACCESS - NONE    []  Medication     []  Last Dose               Next Dose   Supplier/Contact #: ()     Line Type

## 2018-02-21 NOTE — PROGRESS NOTES
Occupational Therapy  Facility/Department: [facility]  Daily Treatment Note  NAME: Clark Amaya  : 1935  MRN: [id number]    Date of Service: 2018    Patient Diagnosis(es): There were no encounter diagnoses. has a past medical history of CAD (coronary artery disease); Colitis; and Hyperlipidemia. has a past surgical history that includes Coronary artery bypass graft; Appendectomy; Hysterectomy; Varicose vein surgery; sigmoidoscopy; colectomy (1/3/2011); colectomy (2011); and partial hip replacement (Left, 2018). Restrictions  Restrictions/Precautions  Restrictions/Precautions: Weight Bearing, Fall Risk, Surgical Protocols  Required Braces or Orthoses?: No  Lower Extremity Weight Bearing Restrictions  Left Lower Extremity Weight Bearing: Weight Bearing As Tolerated  Position Activity Restriction  Hip Precautions: Posterior hip precautions  Subjective   General  Chart Reviewed: Yes  Patient assessed for rehabilitation services?: Yes  Referring Practitioner: Dr. Ariza Blood  Diagnosis: L Subcapital femoral neck fx s/p fall, L hemiarthroplasty with impaired mobility. Pre Treatment Pain Screening  Pain at present: 3  Scale Used: Numeric Score  Intervention List: Patient able to continue with treatment;Nurse called to administer meds  Pain Assessment  Patient Currently in Pain: Yes  Pain Assessment: 0-10  Pain Level: 3  Pain Type: Acute pain  Pain Location: Head  Pain Orientation: Anterior  Pain Descriptors: Aching  Pain Frequency: Intermittent  Vital Signs  Patient Currently in Pain: Yes   Orientation     Objective    Pt. was able to state all hip precautions. Pt. completed shower ADL at below status. ADL  Equipment Provided: Sock aid;Long-handled sponge;Dressing stick; Reacher  Feeding: Independent  Grooming: Modified independent   UE Bathing: Modified independent   LE Bathing: Modified independent   UE Dressing: Modified independent   LE Dressing: Supervision  Toileting: Modified This therapy session was supervised by DASHAWN Disla/GOLDIE

## 2018-02-21 NOTE — HOME CARE
Spoke to patient regarding discharge home with home health care. Discussed home health care process. Patient asked questions and verbalized understanding. Patient stated that she had [facility] in the past and would like to use them again. Using [place] [facility] Choice, the Patient will be discharged home with [facility]: SN, PT, OT, AIDE, SW. Provided hard copy information about [facility].  Electronically signed by Sheba Lehman RN on 2/21/18 at 1:36 PM

## 2018-02-21 NOTE — PROGRESS NOTES
problems. Rehabilitation:  Physical therapy: FIMS:  Bed Mobility: Scooting: Modified independent    Transfers: Sit to Stand: Modified independent  Stand to sit: Modified independent  Bed to Chair: Stand by assistance  Stand Pivot Transfers: Stand by assistance, Ambulation 1  Surface: carpet, level tile  Device: Rolling Walker  Other Apparatus:  (left knee ace wrap for comfort)  Assistance: Supervision  Quality of Gait: recpirocal, SL & HS (LLE), increased BUE WB, Flexed trunk  Distance: 150 ft  Comments: antalgic; improved posture -occasional cues only. , Stairs  # Steps : 12  Stairs Height: 6\"  Rails: Bilateral  Assistance: Supervision  Comment: good sequencing    FIMS: Bed, Chair, Wheel Chair: 6 - Requires assistive device (slide rail)  Walk: 1 - Total Assistance Walks/operates wheelchair < 50 feet OR requires two or more people OR patient performs < 25% of locomotion effort  Distance Walked: 8  Wheel Chair: 0 - Activity Not Assessed/Does Not Occur  Distance Traveled in Wheel Chair: 0  Stairs: 0 - Activity Does not Occur ( 0 only for the admission assessment),  , Assessment: Pt progressing well towards goals. Continue to focus on increased gait tolerance and LLE WB tolerance. L knee strengthening for imporved stability. No wrap of L knee today. Pt completed 12 steps today (first time) no increase in assist    Occupational therapy: FIMS:  Eatin - Patient feeds self  Groomin - Did not occur  Bathin - Did not occur  Dressing-Upper: 0 - Did not occur  Dressing-Lower: 0 - Did not occur  Toiletin - Requires device (grab bar/walker/etc.)  Toilet Transfer: 6 - Independent with device (grab bar/walker/slide bar)  Tub Transfer: 0 - Activity does not occur  Shower Transfer: 0 - Activity does not occur,  , Assessment: Pt demonstrates decreased activity tolerance which limits her safety and independence with ADLs and IADLs. Pt would benefit from continued OT services to address her aforementioned deficits. Speech therapy: FIMS: Comprehension: 6 - Complex ideas 90% or device (hearing aid/glasses)  Expression: 6 - Device used to express complex ideas/needs  Social Interaction: 6 - Patient requires medication for mood and/or effect  Problem Solvin - Independent with device (e.g. notes, schedules)  Memory: 6 - Patient requires device to recall (e.g. memory book)      Lab/X-ray studies reviewed, analyzed and discussed with patient and staff:   No results found for this or any previous visit (from the past 24 hour(s)). Xr Hip Left (1 View)  Result Date: 2018  Impression: Normal postop left hip. Xr Femur Left (min 2 Views)  Result Date: 2018  MINIMALLY DISPLACED SUBCAPITAL LEFT FEMORAL NECK FRACTURE       Previous extensive, complex labs, notes and diagnostics reviewed and analyzed. ALLERGIES:    Allergies as of 02/10/2018 - Review Complete 02/10/2018   Allergen Reaction Noted    Lisinopril Anaphylaxis 2016    Statins Other (See Comments) 2016    Sulfa antibiotics  2012      (please also verify by checking [facility])     Complex Physical Medicine & Rehab Issues Assess & Plan:   1. Severe abnormality of gait and mobility and impaired self-care and ADL's secondary to acute subtrochanteric femoral neck fracture status post left hemiarthroplasty. Functional and medical status reassessed regarding patients ability to participate in therapies and patient found to be able to participate in acute intensive comprehensive inpatient rehabilitation program including PT/OT to improve balance, ambulation, ADLs, and to improve the P/AROM. Therapeutic modifications regarding activities in therapies, place, amount of time per day and intensity of therapy made daily. In bed therapies or bedside therapies prn.   2. Bowel severe constipation and Bladder dysfunction:  frequent toileting, ambulate to bathroom with assistance, check post void residuals.   Check for C.difficile x1 if >2 loose stools in transcribed by Payam De Guzman on 02/21/18 at 10:29 a.m.         Rafa Us D.O., PM&R     Attending    [address]

## 2018-02-22 ENCOUNTER — APPOINTMENT (OUTPATIENT)
Dept: GENERAL RADIOLOGY | Age: 83
DRG: 560 | End: 2018-02-22
Attending: PHYSICAL MEDICINE & REHABILITATION
Payer: MEDICARE

## 2018-02-22 LAB
EKG ATRIAL RATE: 64 BPM
EKG ATRIAL RATE: 67 BPM
EKG P AXIS: 82 DEGREES
EKG P AXIS: 85 DEGREES
EKG P-R INTERVAL: 144 MS
EKG P-R INTERVAL: 154 MS
EKG Q-T INTERVAL: 440 MS
EKG Q-T INTERVAL: 450 MS
EKG QRS DURATION: 122 MS
EKG QRS DURATION: 122 MS
EKG QTC CALCULATION (BAZETT): 464 MS
EKG QTC CALCULATION (BAZETT): 464 MS
EKG R AXIS: -35 DEGREES
EKG R AXIS: -46 DEGREES
EKG T AXIS: 5 DEGREES
EKG T AXIS: 9 DEGREES
EKG VENTRICULAR RATE: 64 BPM
EKG VENTRICULAR RATE: 67 BPM

## 2018-02-22 PROCEDURE — 73501 X-RAY EXAM HIP UNI 1 VIEW: CPT

## 2018-02-22 PROCEDURE — 97535 SELF CARE MNGMENT TRAINING: CPT | Performed by: INTERNAL MEDICINE

## 2018-02-22 PROCEDURE — 97110 THERAPEUTIC EXERCISES: CPT | Performed by: INTERNAL MEDICINE

## 2018-02-22 PROCEDURE — 97530 THERAPEUTIC ACTIVITIES: CPT

## 2018-02-22 PROCEDURE — 6370000000 HC RX 637 (ALT 250 FOR IP): Performed by: PHYSICAL MEDICINE & REHABILITATION

## 2018-02-22 PROCEDURE — 93005 ELECTROCARDIOGRAM TRACING: CPT

## 2018-02-22 PROCEDURE — 1180000000 HC REHAB R&B

## 2018-02-22 PROCEDURE — 97110 THERAPEUTIC EXERCISES: CPT

## 2018-02-22 PROCEDURE — 6360000002 HC RX W HCPCS: Performed by: INTERNAL MEDICINE

## 2018-02-22 PROCEDURE — 6370000000 HC RX 637 (ALT 250 FOR IP): Performed by: INTERNAL MEDICINE

## 2018-02-22 PROCEDURE — 97116 GAIT TRAINING THERAPY: CPT | Performed by: INTERNAL MEDICINE

## 2018-02-22 PROCEDURE — 97535 SELF CARE MNGMENT TRAINING: CPT

## 2018-02-22 RX ORDER — METOPROLOL TARTRATE 50 MG/1
50 TABLET, FILM COATED ORAL 2 TIMES DAILY
Status: DISCONTINUED | OUTPATIENT
Start: 2018-02-22 | End: 2018-02-23 | Stop reason: HOSPADM

## 2018-02-22 RX ORDER — METOPROLOL TARTRATE 50 MG/1
50 TABLET, FILM COATED ORAL 2 TIMES DAILY
Qty: 60 TABLET | Refills: 3 | Status: SHIPPED | OUTPATIENT
Start: 2018-02-22

## 2018-02-22 RX ORDER — MAGNESIUM OXIDE 400 MG/1
400 TABLET ORAL DAILY
Status: DISCONTINUED | OUTPATIENT
Start: 2018-02-22 | End: 2018-02-22 | Stop reason: CLARIF

## 2018-02-22 RX ORDER — AMLODIPINE BESYLATE 5 MG/1
5 TABLET ORAL DAILY
Status: DISCONTINUED | OUTPATIENT
Start: 2018-02-22 | End: 2018-02-23 | Stop reason: HOSPADM

## 2018-02-22 RX ORDER — LOSARTAN POTASSIUM 50 MG/1
50 TABLET ORAL 2 TIMES DAILY
Status: DISCONTINUED | OUTPATIENT
Start: 2018-02-22 | End: 2018-02-23 | Stop reason: HOSPADM

## 2018-02-22 RX ORDER — NITROGLYCERIN 400 UG/1
1 SPRAY ORAL EVERY 5 MIN PRN
Status: DISCONTINUED | OUTPATIENT
Start: 2018-02-22 | End: 2018-02-23 | Stop reason: HOSPADM

## 2018-02-22 RX ORDER — FUROSEMIDE 20 MG/1
20 TABLET ORAL DAILY
Qty: 60 TABLET | Refills: 3 | Status: SHIPPED | OUTPATIENT
Start: 2018-02-23 | End: 2021-04-27

## 2018-02-22 RX ADMIN — Medication 1 DROP: at 22:20

## 2018-02-22 RX ADMIN — NYSTATIN 500000 UNITS: 100000 SUSPENSION ORAL at 12:02

## 2018-02-22 RX ADMIN — OXYCODONE HYDROCHLORIDE AND ACETAMINOPHEN 1 TABLET: 5; 325 TABLET ORAL at 14:54

## 2018-02-22 RX ADMIN — NYSTATIN 500000 UNITS: 100000 SUSPENSION ORAL at 07:50

## 2018-02-22 RX ADMIN — CHOLECALCIFEROL TAB 25 MCG (1000 UNIT) 1000 UNITS: 25 TAB at 16:57

## 2018-02-22 RX ADMIN — METOPROLOL TARTRATE 50 MG: 50 TABLET, FILM COATED ORAL at 07:50

## 2018-02-22 RX ADMIN — LOSARTAN POTASSIUM 50 MG: 50 TABLET ORAL at 12:08

## 2018-02-22 RX ADMIN — METOPROLOL TARTRATE 50 MG: 50 TABLET ORAL at 22:18

## 2018-02-22 RX ADMIN — POTASSIUM CHLORIDE 10 MEQ: 750 TABLET, FILM COATED, EXTENDED RELEASE ORAL at 07:50

## 2018-02-22 RX ADMIN — DOCUSATE SODIUM 100 MG: 100 CAPSULE, LIQUID FILLED ORAL at 22:19

## 2018-02-22 RX ADMIN — FUROSEMIDE 20 MG: 20 TABLET ORAL at 07:50

## 2018-02-22 RX ADMIN — CHOLECALCIFEROL TAB 25 MCG (1000 UNIT) 1000 UNITS: 25 TAB at 07:49

## 2018-02-22 RX ADMIN — ENOXAPARIN SODIUM 40 MG: 40 INJECTION SUBCUTANEOUS at 07:51

## 2018-02-22 RX ADMIN — OXYMETAZOLINE HYDROCHLORIDE 2 SPRAY: 5 SPRAY NASAL at 22:20

## 2018-02-22 RX ADMIN — DOCUSATE SODIUM 100 MG: 100 CAPSULE, LIQUID FILLED ORAL at 07:50

## 2018-02-22 RX ADMIN — AMLODIPINE BESYLATE 5 MG: 5 TABLET ORAL at 12:08

## 2018-02-22 RX ADMIN — Medication 400 MG: at 12:08

## 2018-02-22 RX ADMIN — NYSTATIN 500000 UNITS: 100000 SUSPENSION ORAL at 22:18

## 2018-02-22 RX ADMIN — NYSTATIN 500000 UNITS: 100000 SUSPENSION ORAL at 16:57

## 2018-02-22 RX ADMIN — ANTACID TABLETS 500 MG: 500 TABLET, CHEWABLE ORAL at 16:57

## 2018-02-22 RX ADMIN — ACETAMINOPHEN 650 MG: 325 TABLET ORAL at 07:50

## 2018-02-22 RX ADMIN — LOSARTAN POTASSIUM 50 MG: 50 TABLET ORAL at 22:19

## 2018-02-22 RX ADMIN — AMLODIPINE BESYLATE 5 MG: 5 TABLET ORAL at 07:50

## 2018-02-22 RX ADMIN — ASPIRIN 81 MG: 81 TABLET, COATED ORAL at 07:50

## 2018-02-22 RX ADMIN — ANTACID TABLETS 500 MG: 500 TABLET, CHEWABLE ORAL at 07:49

## 2018-02-22 ASSESSMENT — PAIN DESCRIPTION - ORIENTATION
ORIENTATION: LEFT

## 2018-02-22 ASSESSMENT — PAIN DESCRIPTION - FREQUENCY
FREQUENCY: INTERMITTENT

## 2018-02-22 ASSESSMENT — PAIN DESCRIPTION - DESCRIPTORS
DESCRIPTORS: ACHING
DESCRIPTORS: ACHING;THROBBING
DESCRIPTORS: ACHING

## 2018-02-22 ASSESSMENT — PAIN SCALES - GENERAL
PAINLEVEL_OUTOF10: 2
PAINLEVEL_OUTOF10: 0
PAINLEVEL_OUTOF10: 3
PAINLEVEL_OUTOF10: 0
PAINLEVEL_OUTOF10: 4
PAINLEVEL_OUTOF10: 5
PAINLEVEL_OUTOF10: 2
PAINLEVEL_OUTOF10: 4

## 2018-02-22 ASSESSMENT — PAIN DESCRIPTION - PROGRESSION
CLINICAL_PROGRESSION: GRADUALLY IMPROVING
CLINICAL_PROGRESSION: NOT CHANGED

## 2018-02-22 ASSESSMENT — PAIN DESCRIPTION - PAIN TYPE
TYPE: ACUTE PAIN
TYPE: ACUTE PAIN;SURGICAL PAIN
TYPE: ACUTE PAIN
TYPE: ACUTE PAIN

## 2018-02-22 ASSESSMENT — PAIN DESCRIPTION - LOCATION
LOCATION: HIP;KNEE
LOCATION: HIP
LOCATION: HIP;KNEE
LOCATION: HIP

## 2018-02-22 ASSESSMENT — PAIN DESCRIPTION - ONSET
ONSET: ON-GOING
ONSET: ON-GOING

## 2018-02-22 NOTE — PROGRESS NOTES
Occupational Therapy  Facility[facility]Department: [facility]  Daily Treatment Note  NAME: Evelyn Crespo  : 1935  MRN: [id number]    Date of Service: 2018    Patient Diagnosis(es): There were no encounter diagnoses. has a past medical history of CAD (coronary artery disease); Colitis; and Hyperlipidemia. has a past surgical history that includes Coronary artery bypass graft; Appendectomy; Hysterectomy; Varicose vein surgery; sigmoidoscopy; colectomy (1/3/2011); colectomy (2011); and partial hip replacement (Left, 2018). Restrictions  Restrictions/Precautions  Restrictions/Precautions: Weight Bearing, Fall Risk, Surgical Protocols  Required Braces or Orthoses?: No  Lower Extremity Weight Bearing Restrictions  Left Lower Extremity Weight Bearing: Weight Bearing As Tolerated  Position Activity Restriction  Hip Precautions: Posterior hip precautions  Subjective   General  Chart Reviewed: Yes  Patient assessed for rehabilitation services?: Yes  Referring Practitioner: Dr. Piter Dalal  Diagnosis: L Subcapital femoral neck fx s/p fall, L hemiarthroplasty with impaired mobility. Pre Treatment Pain Screening  Pain at present: 2  Scale Used: Numeric Score  Intervention List: Patient able to continue with treatment  Pain Assessment  Patient Currently in Pain: No  Pain Assessment: 0-10  Pain Level: 0  Pain Type: Acute pain  Pain Location: Hip  Pain Orientation: Left  Pain Descriptors: Aching  Pain Frequency: Intermittent  Vital Signs  Patient Currently in Pain: No   Orientation  Orientation  Overall Orientation Status: Within Normal Limits  Objective    Pt. was able to state all 3 hip precautions. Pt. performed shower ADL at below status.     ADL  Equipment Provided: Reacher;Sock aid;Long-handled sponge;Dressing stick  Feeding: Independent  Grooming: Modified independent   UE Bathing: Modified independent   LE Bathing: Modified independent   UE Dressing: Contact guard assistance (loss of balance

## 2018-02-22 NOTE — PROGRESS NOTES
Occupational Therapy    Occupational Therapy Discharge Report  Date: 2018    Patient Name: Janell Mcneill  MRN: [id number]     : 1935     Restrictions       Subjective    · Referring practitioner: Dr. Christoph Morales          Objective    ADL  Equipment Provided: Reacher;Sock aid;Long-handled sponge;Dressing stick  Feeding: Independent  Grooming: Modified independent   UE Bathing: Modified independent   LE Bathing: Modified independent   UE Dressing: Contact guard assistance (loss of balance posterior while retrieving clothes)  LE Dressing: Contact guard assistance (loss of balance posterior while retrieving clothing)  Toileting: Modified independent     Overall Orientation Status: Within Normal Limits         Transfers  Sit to stand: Modified independent  Stand to sit: Modified independent      Toilet Transfers  Toilet - Technique: Ambulating  Equipment Used: Grab bars  Toilet Transfer: Modified independent  Shower Transfers  Shower - Transfer From: Walker  Shower - Transfer Type: To and From  Shower - Transfer To: Shower seat with back  Shower - Technique: Ambulating  Shower Transfers: Modified independence    Vision  Vision: Impaired  Vision Exceptions: Wears glasses at all times  Hearing  Hearing: Within functional limits         Perception  Overall Perceptual Status: WFL  Sensation  Overall Sensation Status: WFL           LUE PROM (degrees)  LUE PROM: WFL  LUE AROM (degrees)  LUE AROM : WFL  RUE PROM (degrees)  RUE PROM: WFL  RUE AROM (degrees)  RUE AROM : WFL    LUE Strength  Gross LUE Strength: WFL  RUE Strength  Gross RUE Strength: WFL      Assessment   OT D/C RECOMMENDATIONS  REQUIRES OT FOLLOW UP: Yes  Type: Home OT; Home Health Care    OT D/C Equipment  Equipment Needed: Yes  Equipment Recommended: 3-in-1 commode  Social/Functional History  Lives With: Spouse  Type of Home: House  Home Layout: Two level; Laundry in basement  Home Access: Stairs to enter with rails  Entrance Stairs - Rails:

## 2018-02-22 NOTE — FLOWSHEET NOTE
Patient c/o increased pain in her left leg . Dr. Renuka Post said no xray, he will see patient in the am. It almost sounds like sciatica pain.

## 2018-02-23 ENCOUNTER — PROCEDURE VISIT (OUTPATIENT)
Dept: PHYSICAL MEDICINE AND REHAB | Age: 83
End: 2018-02-23
Payer: MEDICARE

## 2018-02-23 VITALS
OXYGEN SATURATION: 95 % | SYSTOLIC BLOOD PRESSURE: 133 MMHG | TEMPERATURE: 97 F | HEIGHT: 63 IN | RESPIRATION RATE: 20 BRPM | WEIGHT: 200 LBS | BODY MASS INDEX: 35.44 KG/M2 | HEART RATE: 61 BPM | DIASTOLIC BLOOD PRESSURE: 71 MMHG

## 2018-02-23 DIAGNOSIS — M54.32 SCIATICA OF LEFT SIDE: Primary | ICD-10-CM

## 2018-02-23 PROCEDURE — 6370000000 HC RX 637 (ALT 250 FOR IP): Performed by: INTERNAL MEDICINE

## 2018-02-23 PROCEDURE — 6370000000 HC RX 637 (ALT 250 FOR IP): Performed by: PHYSICAL MEDICINE & REHABILITATION

## 2018-02-23 PROCEDURE — 3E0T3BZ INTRODUCTION OF ANESTHETIC AGENT INTO PERIPHERAL NERVES AND PLEXI, PERCUTANEOUS APPROACH: ICD-10-PCS | Performed by: PHYSICAL MEDICINE & REHABILITATION

## 2018-02-23 PROCEDURE — 93010 ELECTROCARDIOGRAM REPORT: CPT | Performed by: INTERNAL MEDICINE

## 2018-02-23 PROCEDURE — 6360000002 HC RX W HCPCS: Performed by: INTERNAL MEDICINE

## 2018-02-23 PROCEDURE — 64445 NJX AA&/STRD SCIATIC NRV IMG: CPT | Performed by: PHYSICAL MEDICINE & REHABILITATION

## 2018-02-23 PROCEDURE — 99239 HOSP IP/OBS DSCHRG MGMT >30: CPT | Performed by: PHYSICAL MEDICINE & REHABILITATION

## 2018-02-23 RX ORDER — OXYCODONE HYDROCHLORIDE AND ACETAMINOPHEN 5; 325 MG/1; MG/1
1 TABLET ORAL EVERY 4 HOURS PRN
Qty: 30 TABLET | Refills: 0 | Status: SHIPPED | OUTPATIENT
Start: 2018-02-23 | End: 2018-03-02

## 2018-02-23 RX ADMIN — AMLODIPINE BESYLATE 5 MG: 5 TABLET ORAL at 08:53

## 2018-02-23 RX ADMIN — ANTACID TABLETS 500 MG: 500 TABLET, CHEWABLE ORAL at 08:49

## 2018-02-23 RX ADMIN — ASPIRIN 81 MG: 81 TABLET, COATED ORAL at 08:49

## 2018-02-23 RX ADMIN — METOPROLOL TARTRATE 50 MG: 50 TABLET ORAL at 08:50

## 2018-02-23 RX ADMIN — FUROSEMIDE 20 MG: 20 TABLET ORAL at 08:49

## 2018-02-23 RX ADMIN — POTASSIUM CHLORIDE 10 MEQ: 750 TABLET, FILM COATED, EXTENDED RELEASE ORAL at 08:49

## 2018-02-23 RX ADMIN — ACETAMINOPHEN 650 MG: 325 TABLET, FILM COATED ORAL at 08:49

## 2018-02-23 RX ADMIN — LOSARTAN POTASSIUM 50 MG: 50 TABLET ORAL at 12:12

## 2018-02-23 RX ADMIN — DOCUSATE SODIUM 100 MG: 100 CAPSULE, LIQUID FILLED ORAL at 08:49

## 2018-02-23 RX ADMIN — ENOXAPARIN SODIUM 40 MG: 40 INJECTION SUBCUTANEOUS at 08:50

## 2018-02-23 RX ADMIN — CHOLECALCIFEROL TAB 25 MCG (1000 UNIT) 1000 UNITS: 25 TAB at 08:50

## 2018-02-23 RX ADMIN — Medication 400 MG: at 08:49

## 2018-02-23 RX ADMIN — NYSTATIN 500000 UNITS: 100000 SUSPENSION ORAL at 08:49

## 2018-02-23 RX ADMIN — OXYMETAZOLINE HYDROCHLORIDE 2 SPRAY: 5 SPRAY NASAL at 08:49

## 2018-02-23 ASSESSMENT — PAIN DESCRIPTION - FREQUENCY: FREQUENCY: INTERMITTENT

## 2018-02-23 ASSESSMENT — PAIN DESCRIPTION - ONSET: ONSET: ON-GOING

## 2018-02-23 ASSESSMENT — PAIN DESCRIPTION - LOCATION: LOCATION: KNEE

## 2018-02-23 ASSESSMENT — PAIN DESCRIPTION - ORIENTATION: ORIENTATION: LEFT

## 2018-02-23 ASSESSMENT — PAIN DESCRIPTION - DESCRIPTORS: DESCRIPTORS: ACHING

## 2018-02-23 ASSESSMENT — PAIN DESCRIPTION - PAIN TYPE: TYPE: ACUTE PAIN;SURGICAL PAIN

## 2018-02-23 ASSESSMENT — PAIN SCALES - GENERAL
PAINLEVEL_OUTOF10: 3
PAINLEVEL_OUTOF10: 4

## 2018-02-23 NOTE — DISCHARGE SUMMARY
complex labs, notes and diagnostics reviewed and analyzed. ALLERGIES:    Allergies as of 02/10/2018 - Review Complete 02/10/2018   Allergen Reaction Noted    Lisinopril Anaphylaxis 01/29/2016    Statins Other (See Comments) 01/29/2016    Sulfa antibiotics  03/27/2012      (please also verify by checking [facility])     Complex Physical Medicine & Rehab Issues Assess & Plan:   1. Severe abnormality of gait and mobility and impaired self-care and ADL's secondary to acute subtrochanteric femoral neck fracture status post left hemiarthroplasty. The patient has completed the acute rehabilitation program and is ready for discharge today to home with her  with home health PT, OT, RN and aide. As well as equipment including a wheeled walker and shower chair and a bedside commode. Patient and family education is complete. The patient is to follow-up with their family physician after discharge . Complex Active General Medical Issues that complicated care to be followed by family physician:     1. History of colitis with Severe active on chronic Constipation exacerbated by anesthesia recent fall and mobility and narcotics - s/p rectal suppositories, milk of magnesia, Senokot, Colace. 2.   Closed left hip fracture with recent left hip hemiarthroplasty  - weightbearing as tolerated and posterior hip precautions. Needs cues for hip precautions. Status post Sciatic block. 3.   Cervical spondylosis without myelopathy, DDD (degenerative disc disease), lumbar,  Low back pain radiating to left leg,Lumbar facet arthropathy, Lumbosacral spondylosis without myelopathy, Primary osteoarthritis of both knees -  Lidoderm, titrate oxycodone dosing to more consistent low-dose OxyContin and K-pad  4. Essential hypertension, coronary artery disease and history of coronary artery bypass graft-consulted hospitalist, Norvasc and Lopressor.   I prescribed Apresoline PO.  5.   Foot pain, left - Voltaren gel and proper

## 2018-02-23 NOTE — PROGRESS NOTES
Transfer: 6 - Independent with device (grab bar/walker/slide bar)  Tub Transfer: 0 - Activity does not occur  Shower Transfer: 6 - Modified independence,  , Assessment: Pt demonstrates decreased activity tolerance which limits her safety and independence with ADLs and IADLs. Pt would benefit from continued OT services to address her aforementioned deficits. Speech therapy: FIMS: Comprehension: 6 - Complex ideas 90% or device (hearing aid/glasses)  Expression: 7 - Patient expresses complex ideas/needs  Social Interaction: 7 - Patient has appropriate behavior/relations 100% of the time  Problem Solvin - Independent with device (e.g. notes, schedules)  Memory: 6 - Patient requires device to recall (e.g. memory book)      Lab/X-ray studies reviewed, analyzed and discussed with patient and staff:   Recent Results (from the past 24 hour(s))   EKG 12 Lead    Collection Time: 18 10:17 AM   Result Value Ref Range    Ventricular Rate 64 BPM    Atrial Rate 64 BPM    P-R Interval 154 ms    QRS Duration 122 ms    Q-T Interval 450 ms    QTc Calculation (Bazett) 464 ms    P Axis 85 degrees    R Axis -35 degrees    T Axis 5 degrees       Xr Hip Left (1 View)  Result Date: 2018  Impression: Normal postop left hip. Xr Femur Left (min 2 Views)  Result Date: 2018  MINIMALLY DISPLACED SUBCAPITAL LEFT FEMORAL NECK FRACTURE       Previous extensive, complex labs, notes and diagnostics reviewed and analyzed. ALLERGIES:    Allergies as of 02/10/2018 - Review Complete 02/10/2018   Allergen Reaction Noted    Lisinopril Anaphylaxis 2016    Statins Other (See Comments) 2016    Sulfa antibiotics  2012      (please also verify by checking [facility])     Complex Physical Medicine & Rehab Issues Assess & Plan:   1. Severe abnormality of gait and mobility and impaired self-care and ADL's secondary to acute subtrochanteric femoral neck fracture status post left hemiarthroplasty.   Functional and medical aide.   As well as equipment including a wheeled walker and shower chair and a bedside commode. Patient and family education is in progress. The patient is to follow-up with their family physician after discharge. Complex Active General Medical Issues that complicate care Assess & Plan:    1. History of colitis with Severe active on chronic Constipation exacerbated by anesthesia recent fall and mobility and narcotics-add rectal suppositories, milk of magnesia, Senokot, Colace monitor stools for blood  2. Closed left hip fracture with recent left hip hemiarthroplasty  - weightbearing as tolerated and posterior hip precautions. Needs cues for hip precautions. 3.   Cervical spondylosis without myelopathy, DDD (degenerative disc disease), lumbar,  Low back pain radiating to left leg,Lumbar facet arthropathy, Lumbosacral spondylosis without myelopathy, Primary osteoarthritis of both knees-add Lidoderm, titrate oxycodone dosing to more consistent low-dose OxyContin and K-pad  4. Essential hypertension, coronary artery disease and history of coronary artery bypass graft-consult hospitalist check vital signs every shift titrate dishes of Norvasc and Lopressor. I prescribed Apresoline PO.  5.   Foot pain, left-add Voltaren gel and proper footwear  6. Active chronic Headache-when necessary Tylenol quiet room improve the lighting in the room. 7. Rule out diabetes mellitus /elevated blood sugar s- Continue blood sugar checks BID, diet, adjust/add medications prn. No results for input(s): POCGLU in the last 72 hours. 8. Status post Acute UTI - discontinue Macrobid, recheck UA, monitor urine. Check post void residual  9. Postop anemia and trend toward thrombocytopenia-recheck CBC, dose vitamin B12 shots and monitor platelets 1 Lovenox.   10. High-risk high dose narcotic medication and elderly patient-check prescription monitoring system titrate to lowest possible dose monitor for any signs or symptoms

## 2018-12-31 NOTE — PROGRESS NOTES
Isotretinoin Pregnancy And Lactation Text: This medication is Pregnancy Category X and is considered extremely dangerous during pregnancy. It is unknown if it is excreted in breast milk. Pt up to chair eating breakfast. Was up to BR. Respirations even and nonlabored. No acute distress observed. Benzoyl Peroxide Pregnancy And Lactation Text: This medication is Pregnancy Category C. It is unknown if benzoyl peroxide is excreted in breast milk. Birth Control Pills Counseling: Birth Control Pill Counseling: I discussed with the patient the potential side effects of OCPs including but not limited to increased risk of stroke, heart attack, thrombophlebitis, deep venous thrombosis, hepatic adenomas, breast changes, GI upset, headaches, and depression.  The patient verbalized understanding of the proper use and possible adverse effects of OCPs. All of the patient's questions and concerns were addressed. Spironolactone Counseling: Patient advised regarding risks of diarrhea, abdominal pain, hyperkalemia, birth defects (for female patients), liver toxicity and renal toxicity. The patient may need blood work to monitor liver and kidney function and potassium levels while on therapy. The patient verbalized understanding of the proper use and possible adverse effects of spironolactone.  All of the patient's questions and concerns were addressed. Include Pregnancy/Lactation Warning?: No Topical Clindamycin Counseling: Patient counseled that this medication may cause skin irritation or allergic reactions.  In the event of skin irritation, the patient was advised to reduce the amount of the drug applied or use it less frequently.   The patient verbalized understanding of the proper use and possible adverse effects of clindamycin.  All of the patient's questions and concerns were addressed. Azithromycin Counseling:  I discussed with the patient the risks of azithromycin including but not limited to GI upset, allergic reaction, drug rash, diarrhea, and yeast infections. High Dose Vitamin A Counseling: Side effects reviewed, pt to contact office should one occur. Doxycycline Pregnancy And Lactation Text: This medication is Pregnancy Category D and not consider safe during pregnancy. It is also excreted in breast milk but is considered safe for shorter treatment courses. Topical Retinoid counseling:  Patient advised to apply a pea-sized amount only at bedtime and wait 30 minutes after washing their face before applying.  If too drying, patient may add a non-comedogenic moisturizer. The patient verbalized understanding of the proper use and possible adverse effects of retinoids.  All of the patient's questions and concerns were addressed. Birth Control Pills Pregnancy And Lactation Text: This medication should be avoided if pregnant and for the first 30 days post-partum. Spironolactone Pregnancy And Lactation Text: This medication can cause feminization of the male fetus and should be avoided during pregnancy. The active metabolite is also found in breast milk. Topical Clindamycin Pregnancy And Lactation Text: This medication is Pregnancy Category B and is considered safe during pregnancy. It is unknown if it is excreted in breast milk. Erythromycin Counseling:  I discussed with the patient the risks of erythromycin including but not limited to GI upset, allergic reaction, drug rash, diarrhea, increase in liver enzymes, and yeast infections. Tetracycline Counseling: Patient counseled regarding possible photosensitivity and increased risk for sunburn.  Patient instructed to avoid sunlight, if possible.  When exposed to sunlight, patients should wear protective clothing, sunglasses, and sunscreen.  The patient was instructed to call the office immediately if the following severe adverse effects occur:  hearing changes, easy bruising/bleeding, severe headache, or vision changes.  The patient verbalized understanding of the proper use and possible adverse effects of tetracycline.  All of the patient's questions and concerns were addressed. Patient understands to avoid pregnancy while on therapy due to potential birth defects. Dapsone Counseling: I discussed with the patient the risks of dapsone including but not limited to hemolytic anemia, agranulocytosis, rashes, methemoglobinemia, kidney failure, peripheral neuropathy, headaches, GI upset, and liver toxicity.  Patients who start dapsone require monitoring including baseline LFTs and weekly CBCs for the first month, then every month thereafter.  The patient verbalized understanding of the proper use and possible adverse effects of dapsone.  All of the patient's questions and concerns were addressed. Azithromycin Pregnancy And Lactation Text: This medication is considered safe during pregnancy and is also secreted in breast milk. High Dose Vitamin A Pregnancy And Lactation Text: High dose vitamin A therapy is contraindicated during pregnancy and breast feeding. Detail Level: Zone Topical Retinoid Pregnancy And Lactation Text: This medication is Pregnancy Category C. It is unknown if this medication is excreted in breast milk. Topical Sulfur Applications Counseling: Topical Sulfur Counseling: Patient counseled that this medication may cause skin irritation or allergic reactions.  In the event of skin irritation, the patient was advised to reduce the amount of the drug applied or use it less frequently.   The patient verbalized understanding of the proper use and possible adverse effects of topical sulfur application.  All of the patient's questions and concerns were addressed. Erythromycin Pregnancy And Lactation Text: This medication is Pregnancy Category B and is considered safe during pregnancy. It is also excreted in breast milk. Tetracycline Pregnancy And Lactation Text: This medication is Pregnancy Category D and not consider safe during pregnancy. It is also excreted in breast milk. Minocycline Counseling: Patient advised regarding possible photosensitivity and discoloration of the teeth, skin, lips, tongue and gums.  Patient instructed to avoid sunlight, if possible.  When exposed to sunlight, patients should wear protective clothing, sunglasses, and sunscreen.  The patient was instructed to call the office immediately if the following severe adverse effects occur:  hearing changes, easy bruising/bleeding, severe headache, or vision changes.  The patient verbalized understanding of the proper use and possible adverse effects of minocycline.  All of the patient's questions and concerns were addressed. Bactrim Counseling:  I discussed with the patient the risks of sulfa antibiotics including but not limited to GI upset, allergic reaction, drug rash, diarrhea, dizziness, photosensitivity, and yeast infections.  Rarely, more serious reactions can occur including but not limited to aplastic anemia, agranulocytosis, methemoglobinemia, blood dyscrasias, liver or kidney failure, lung infiltrates or desquamative/blistering drug rashes. Tazorac Counseling:  Patient advised that medication is irritating and drying.  Patient may need to apply sparingly and wash off after an hour before eventually leaving it on overnight.  The patient verbalized understanding of the proper use and possible adverse effects of tazorac.  All of the patient's questions and concerns were addressed. Dapsone Pregnancy And Lactation Text: This medication is Pregnancy Category C and is not considered safe during pregnancy or breast feeding. Topical Sulfur Applications Pregnancy And Lactation Text: This medication is Pregnancy Category C and has an unknown safety profile during pregnancy. It is unknown if this topical medication is excreted in breast milk. Bactrim Pregnancy And Lactation Text: This medication is Pregnancy Category D and is known to cause fetal risk.  It is also excreted in breast milk. Isotretinoin Counseling: Patient should get monthly blood tests, not donate blood, not drive at night if vision affected, not share medication, and not undergo elective surgery for 6 months after tx completed. Side effects reviewed, pt to contact office should one occur. Tazorac Pregnancy And Lactation Text: This medication is not safe during pregnancy. It is unknown if this medication is excreted in breast milk. Doxycycline Counseling:  Patient counseled regarding possible photosensitivity and increased risk for sunburn.  Patient instructed to avoid sunlight, if possible.  When exposed to sunlight, patients should wear protective clothing, sunglasses, and sunscreen.  The patient was instructed to call the office immediately if the following severe adverse effects occur:  hearing changes, easy bruising/bleeding, severe headache, or vision changes.  The patient verbalized understanding of the proper use and possible adverse effects of doxycycline.  All of the patient's questions and concerns were addressed. Benzoyl Peroxide Counseling: Patient counseled that medicine may cause skin irritation and bleach clothing.  In the event of skin irritation, the patient was advised to reduce the amount of the drug applied or use it less frequently.   The patient verbalized understanding of the proper use and possible adverse effects of benzoyl peroxide.  All of the patient's questions and concerns were addressed. Detail Level: Detailed

## 2019-02-26 ENCOUNTER — HOSPITAL ENCOUNTER (EMERGENCY)
Age: 84
Discharge: HOME | End: 2019-02-26
Payer: MEDICARE

## 2019-02-26 ENCOUNTER — APPOINTMENT (OUTPATIENT)
Dept: GENERAL RADIOLOGY | Age: 84
End: 2019-02-26
Payer: MEDICARE

## 2019-02-26 VITALS
BODY MASS INDEX: 34.15 KG/M2 | RESPIRATION RATE: 20 BRPM | OXYGEN SATURATION: 100 % | SYSTOLIC BLOOD PRESSURE: 156 MMHG | DIASTOLIC BLOOD PRESSURE: 79 MMHG | HEART RATE: 60 BPM | WEIGHT: 200 LBS | TEMPERATURE: 97.6 F | HEIGHT: 64 IN

## 2019-02-26 DIAGNOSIS — N39.0 ACUTE UTI: ICD-10-CM

## 2019-02-26 DIAGNOSIS — M54.50 ACUTE RIGHT-SIDED LOW BACK PAIN WITHOUT SCIATICA: Primary | ICD-10-CM

## 2019-02-26 LAB
BACTERIA: NEGATIVE /HPF
BILIRUBIN URINE: NEGATIVE
BLOOD, URINE: NEGATIVE
CLARITY: CLEAR
COLOR: YELLOW
EPITHELIAL CELLS, UA: ABNORMAL /HPF (ref 0–5)
GLUCOSE URINE: NEGATIVE MG/DL
HYALINE CASTS: ABNORMAL /HPF (ref 0–5)
KETONES, URINE: ABNORMAL MG/DL
LEUKOCYTE ESTERASE, URINE: ABNORMAL
NITRITE, URINE: NEGATIVE
PH UA: 5 (ref 5–9)
PROTEIN UA: NEGATIVE MG/DL
RBC UA: ABNORMAL /HPF (ref 0–5)
SPECIFIC GRAVITY UA: 1.02 (ref 1–1.03)
URINE REFLEX TO CULTURE: YES
UROBILINOGEN, URINE: 0.2 E.U./DL
WBC UA: ABNORMAL /HPF (ref 0–5)

## 2019-02-26 PROCEDURE — 99283 EMERGENCY DEPT VISIT LOW MDM: CPT

## 2019-02-26 PROCEDURE — 87086 URINE CULTURE/COLONY COUNT: CPT

## 2019-02-26 PROCEDURE — 6370000000 HC RX 637 (ALT 250 FOR IP): Performed by: NURSE PRACTITIONER

## 2019-02-26 PROCEDURE — 72110 X-RAY EXAM L-2 SPINE 4/>VWS: CPT

## 2019-02-26 PROCEDURE — 81001 URINALYSIS AUTO W/SCOPE: CPT

## 2019-02-26 PROCEDURE — 71101 X-RAY EXAM UNILAT RIBS/CHEST: CPT

## 2019-02-26 RX ORDER — NITROFURANTOIN 25; 75 MG/1; MG/1
100 CAPSULE ORAL 2 TIMES DAILY
Qty: 20 CAPSULE | Refills: 0 | Status: SHIPPED | OUTPATIENT
Start: 2019-02-26 | End: 2019-03-08

## 2019-02-26 RX ORDER — HYDROCODONE BITARTRATE AND ACETAMINOPHEN 5; 325 MG/1; MG/1
1 TABLET ORAL EVERY 6 HOURS PRN
Qty: 10 TABLET | Refills: 0 | Status: SHIPPED | OUTPATIENT
Start: 2019-02-26 | End: 2019-03-01

## 2019-02-26 RX ORDER — HYDROCODONE BITARTRATE AND ACETAMINOPHEN 5; 325 MG/1; MG/1
1 TABLET ORAL ONCE
Status: COMPLETED | OUTPATIENT
Start: 2019-02-26 | End: 2019-02-26

## 2019-02-26 RX ADMIN — HYDROCODONE BITARTRATE AND ACETAMINOPHEN 1 TABLET: 5; 325 TABLET ORAL at 10:07

## 2019-02-26 ASSESSMENT — ENCOUNTER SYMPTOMS
DIARRHEA: 0
NAUSEA: 0
SHORTNESS OF BREATH: 0
COUGH: 0
BACK PAIN: 1
ABDOMINAL PAIN: 0
SORE THROAT: 0
VOMITING: 0

## 2019-02-26 ASSESSMENT — PAIN DESCRIPTION - FREQUENCY
FREQUENCY: INTERMITTENT
FREQUENCY: INTERMITTENT

## 2019-02-26 ASSESSMENT — PAIN DESCRIPTION - PROGRESSION: CLINICAL_PROGRESSION: GRADUALLY WORSENING

## 2019-02-26 ASSESSMENT — PAIN DESCRIPTION - DESCRIPTORS
DESCRIPTORS: ACHING;SHARP
DESCRIPTORS: ACHING

## 2019-02-26 ASSESSMENT — PAIN DESCRIPTION - ORIENTATION: ORIENTATION: RIGHT

## 2019-02-26 ASSESSMENT — PAIN DESCRIPTION - PAIN TYPE
TYPE: ACUTE PAIN
TYPE: ACUTE PAIN;CHRONIC PAIN

## 2019-02-26 ASSESSMENT — PAIN SCALES - GENERAL
PAINLEVEL_OUTOF10: 10
PAINLEVEL_OUTOF10: 10
PAINLEVEL_OUTOF10: 7

## 2019-02-26 ASSESSMENT — PAIN DESCRIPTION - LOCATION
LOCATION: RIB CAGE
LOCATION: BACK;RIB CAGE

## 2019-02-26 ASSESSMENT — PAIN DESCRIPTION - ONSET: ONSET: ON-GOING

## 2019-02-28 LAB — URINE CULTURE, ROUTINE: NORMAL

## 2019-06-05 ENCOUNTER — HOSPITAL ENCOUNTER (OUTPATIENT)
Dept: PHYSICAL THERAPY | Age: 84
Setting detail: THERAPIES SERIES
Discharge: HOME | End: 2019-06-05
Payer: MEDICARE

## 2019-06-05 PROCEDURE — 97110 THERAPEUTIC EXERCISES: CPT

## 2019-06-05 PROCEDURE — 97162 PT EVAL MOD COMPLEX 30 MIN: CPT

## 2019-06-05 ASSESSMENT — PAIN DESCRIPTION - DESCRIPTORS: DESCRIPTORS: TIGHTNESS

## 2019-06-05 ASSESSMENT — PAIN DESCRIPTION - ORIENTATION: ORIENTATION: LEFT

## 2019-06-05 ASSESSMENT — PAIN DESCRIPTION - PAIN TYPE: TYPE: CHRONIC PAIN

## 2019-06-05 ASSESSMENT — PAIN DESCRIPTION - LOCATION: LOCATION: KNEE

## 2019-06-05 ASSESSMENT — PAIN SCALES - GENERAL: PAINLEVEL_OUTOF10: 5

## 2019-06-05 NOTE — PROGRESS NOTES
mobility , Decreased strength, Decreased balance, Decreased high-level IADLs, Increased Pain  Assessment: Pt presents w/ > 2 yr h/o L knee pain, s/p CABG w/ revascularization of L saphenous artery  s/p L hip hemiarthroplasty and c/o LE weakness and L knee and shin pain;  Pt demo's significant weakness in B LEs, and  dec core strength, and impaired gait; Pt demo's good L knee ROM; Dec Function w/ gait, transfers, stairs. Prognosis: Good      New Education Provided: see HEP          PLAN: [x] Evaluate and Treat  Frequency/Duration:  Plan  Times per week: 2-3  Plan weeks: 4-6  Current Treatment Recommendations: Strengthening, Balance Training, Gait Training, Stair training, Neuromuscular Re-education, Manual Therapy - Soft Tissue Mobilization, Manual Therapy - Joint Manipulation, Pain Management, Home Exercise Program, Modalities, Aquatics                  ? Patient Status:[x] Continue/ Initiate plan of Care    [] Discharge PT. Recommend pt continue with HEP. [] Additional visits requested, Please re-certify for additional visits:          Signature: Electronically signed by Rakel Hylton PT on 6/5/19 at 6:53 PM      If you have any questions or concerns, please don't hesitate to call. Thank you for your referral.    I have reviewed this plan of care and certify a need for medically necessary rehabilitation services.     Physician Signature:__________________________________________________________  Date:  Please sign and return

## 2019-06-05 NOTE — PROGRESS NOTES
[address]  PHYSICAL THERAPY EVALUATION    Date: 2019  Patient Name: Jay Hayes       MRN: [id number]   Account: [de-identified]   : 1935  (80 y.o.)   Gender: female   Referring Practitioner: Rob Uribe                 Diagnosis: LLE Weakness  Treatment Diagnosis: Impaired strength B LEs; Impaired Gait; L LE Pain  Additional Pertinent Hx: L hip hemiarthroplasty 18;  CAD;  CABG             Past Medical History:  has a past medical history of CAD (coronary artery disease), Colitis, and Hyperlipidemia. Past Surgical History:   has a past surgical history that includes Coronary artery bypass graft; Appendectomy; Hysterectomy; Varicose vein surgery; sigmoidoscopy; colectomy (1/3/2011); colectomy (2011); and pr partial hip replacement (Left, 2018). Vital Signs  Patient Currently in Pain: Yes   Pain Screening  Patient Currently in Pain: Yes  Pain Assessment  Pain Assessment: 0-10  Pain Level: 5(Range : 2/10 to 10/10 L knee)  Pain Type: Chronic pain  Pain Location: Knee  Pain Orientation: Left  Pain Descriptors: Tightness                Lives With: Spouse  Type of Home: House  Home Layout: Two level; Laundry in basement;Able to Live on Main level with bedroom/bathroom(does have smaller laundry on first floor)  Home Access: Stairs to enter with rails  Entrance Stairs - Number of Steps: 2 outside w/out rail and then 4 steps once in w/ B rails  Bathroom Shower/Tub: Walk-in shower  Home Equipment: Cane;4 wheeled walker(uses rollator in home to use seat to trasnport things)  ADL Assistance: [address]: Independent  Transfer Assistance: Independent  Occupation: Retired  IADL Comments: Dec Function w/ : transfers; inc use of hand rail for reciprocal stairs; children hae been caring for yard. Additional Comments:  had a stroke in March , pt performing all household duties. Subjective:  Subjective: Pt w/ h/o of L knee pain that existed for tita 1 yr prior to L partial hip replacement; Pt c/o sifnificant L knee pain and shin and weakness of L leg especially w/ walking and has inc LBP when tries to walk w/out cane d/t inc wobble. Objective:                  Ambulation 1  Surface: carpet  Device: Single point cane  Assistance: Independent  Quality of Gait: dec L SLS time vs R, dec rate, dec step length, bilat, inc lat sway bilat; St c held in R UE w/ poor and changing timing of placement of st c decreasing support for L SLS   Distance: >/20 ft into clinic             Strength RLE  Comment: Quad 5/5;  HS 4/5;  DF 5/5; iliopsoas 4+/5;  abd 2+/5;  ext 3-/5;  IR 3+/5;  ER 4-/5  Strength LLE  Comment: Quad 4+/5;  HS 4/5;  DF 5/5;  Iliopsoas 4/5;  Abd 2/5; Ext 3-/5;  IR 3-/5;  ER 3-/5        Strength Other  Other: dec core noted w/ table mobs and LE MMT             AROM LLE (degrees)  LLE General AROM: Knee: 0-132                     Observation/Palpation  Palpation: TTP medial L LE along saphenous vasculatrue; TTP L med jt line and shin             Additional Measures  Special Tests: (-) Pako L          Exercises:   Exercises  Exercise 2: TA isos w/ breath  5s-6s  x 10 - w/ edu on breath and TA   Exercise 3: TA marhes x 5  Exercise 4:  TA alt ER x 5  Exercise 5:  TA bridges 5s*  Exercise 6: TA B SLR *  Exercise 7: B clams/ REv clams*  Exercise 8:  H/l Abd TB 5s *  Exercise 9:  h/l Add ball 5s *  Exercise 20: ta isos, marches, alt ER   Modalities:     Manual:     *Indicates exercise,modality, or manual techniques to be initiated when appropriate  Assessment:   Body structures, Functions, Activity limitations: Decreased functional mobility , Decreased strength, Decreased balance, Decreased high-level IADLs, Increased Pain  Assessment: Pt presents w/ > 2 yr h/o L knee pain, s/p CABG w/ revascularization of L saphenous artery  s/p L hip hemiarthroplasty and c/o LE weakness and L knee and shin pain;  Pt demo's significant weakness in B LEs, and  dec core strength, and impaired gait; Pt demo's good L knee ROM; Dec Function w/ gait, transfers, stairs. Prognosis: Good        Decision Making: Medium Complexity  History: S/p L hip sx, CABG, age  Exam: strength, ROM, Pako; LEFS   Clinical Presentation: evolving        Plan  Frequency/Duration:  Plan  Times per week: 2-3  Plan weeks: 4-6  Current Treatment Recommendations: Strengthening, Balance Training, Gait Training, Stair training, Neuromuscular Re-education, Manual Therapy - Soft Tissue Mobilization, Manual Therapy - Joint Manipulation, Pain Management, Home Exercise Program, Modalities, Aquatics         Patient Education  New Education Provided: see HEP     POST-PAIN     Pain Rating (0-10 pain scale): 0  /10  Location and pain description same as pre-treatment unless indicated. Action: [] NA  [] Call Physician  [] Perform HEP  [] Meds as prescribed    Evaluation and patient rights have been reviewed and patient agrees with plan of care. Yes  [x]  No  []   Explain:       Vasquez Fall Risk Assessment  Risk Factor Scale  Score   History of Falls [] Yes  [x] No 25  0 0   Secondary Diagnosis [] Yes  [x] No 15  0 0   Ambulatory Aid [] Furniture  [x] Crutches/cane/walker  [] None/bedrest/wheelchair/nurse 30  15  0 15   IV/Heparin Lock [] Yes  [x] No 20  0 0   Gait/Transferring [] Impaired  [] Weak  [x] Normal/bedrest/immobile 20  10  0 0   Mental Status [] Forgets limitations  [x] Oriented to own ability 15  0 0      Total:15     Based on the Assessment score: check the appropriate box.   [x]  No intervention needed   Low =   Score of 0-24  []  Use standard prevention interventions Moderate =  Score of 24-44   [] Discuss fall prevention strategies   [] Indicate moderate falls risk on eval  []  Use high risk prevention interventions High = Score of 45 and higher   [] Discuss fall prevention strategies   [] Provide supervision during treatment time    Goals  Short term goals  Time Frame for Short term goals: 8 -10 visits  Short term goal 1: dec l knee pain to </=5/10 at worse  Short term goal 2: Inc strength B hip abd, ext  to >/= 3+/5 and B hip IR and ER to >/= 4-/5 to improve transfers and stairs  Short term goal 3: Pt to demo improved timing of st cane for maximum support in L SLS   Short term goal 4:  Inc LEFS to >/= 47/80  Long term goals  Long term goal 1: TBD         PT Individual Minutes  Time In: 0881  Time Out: 5599  Minutes: 55  Timed Code Treatment Minutes: 14 Minutes  Procedure Minutes: 41  Electronically signed by Hugo Melendez PT on 6/5/19 at 6:49 PM

## 2019-06-10 ENCOUNTER — HOSPITAL ENCOUNTER (OUTPATIENT)
Dept: PHYSICAL THERAPY | Age: 84
Setting detail: THERAPIES SERIES
Discharge: HOME | End: 2019-06-10
Payer: MEDICARE

## 2019-06-10 PROCEDURE — 97110 THERAPEUTIC EXERCISES: CPT

## 2019-06-10 ASSESSMENT — PAIN SCALES - GENERAL: PAINLEVEL_OUTOF10: 4

## 2019-06-10 ASSESSMENT — PAIN DESCRIPTION - ORIENTATION: ORIENTATION: RIGHT

## 2019-06-10 ASSESSMENT — PAIN DESCRIPTION - LOCATION: LOCATION: HIP

## 2019-06-10 NOTE — PROGRESS NOTES
[address] [address]  Outpatient Physical Therapy    Treatment Note        Date: 6/10/2019  Patient: Mark Ortega  : 1935  ACCT #: [de-identified]  Referring Practitioner: Candis Zayas  Diagnosis: LLE Weakness    Visit Information:  PT Visit Information  Onset Date: 19  PT Insurance Information: Medicare ;  Lucio Huff secondary   Total # of Visits to Date: 2  Plan of Care/Certification Expiration Date: 19  No Show: 0  Canceled Appointment: 0  Progress Note Counter: 2/8-10 PN 19    Subjective: The hip she had surgery on is ok but it is the other hip that is bothering her (/10) and L knee is 3/10; Pt states since the sx she walks w/ L knee slightly bent and when she tries to keep it straight, she has pain form the ankle all the way up to the post hip      HEP Compliance:  [x] Good [] Fair [] Poor [] Reports not doing due to:    Vital Signs  Patient Currently in Pain: Yes   Pain Screening  Patient Currently in Pain: Yes  Pain Assessment  Pain Assessment: 0-10  Pain Level: 4  Pain Location: Hip  Pain Orientation: Right    OBJECTIVE:   Exercises  Exercise 1: NS Lev 1  x 6 mins w/ YTB abd band to assist in cuing L hip abds to fire and dec genu valgum during ext w/ some improvement  Exercise 2: TA isos w/ breath  5s-6s  x 10 - w/ edu on breath and TA   Exercise 3: TA marches x 10  Exercise 4:  TA alt ER x 10  Exercise 5:  TA bridges 5s x 10  Exercise 6: TA B SLR  2 x 5 on L w/ limited range of lift; x 10 on R   Exercise 7: B clams w/ pillow b/w  2 x 10 ( consider [place] E-stim for L clams NV)  Exercise 8:  H/l Abd TB 5s  x 15   Exercise 9:  h/l Add ball 5s x 15  Exercise 10: seated hip IR isos w/ strap 5sx 10  Exercise 19: HEP: bridges, SLR, H/L abd and add,                            Modalities:  Modalities  E-stim (parameters): [place] E-stim w/ clams *   Declined CP    *Indicates exercise, modality, or manual techniques to be initiated when appropriate    Assessment:         Body structures, Functions, Activity limitations: Decreased functional mobility , Decreased strength, Decreased balance, Decreased high-level IADLs, Increased Pain  Assessment: Progressed core and initiated hip and LE strengthening w/ good adryan w/ modification needed for some exs. Pt unable to lift L knee of R w/ L clams however ERor mm activity was palpated. Treatment Diagnosis: Impaired strength B LEs; Impaired Gait; L LE Pain  Prognosis: Good       Goals:  Short term goals  Time Frame for Short term goals: 8 -10 visits  Short term goal 1: dec l knee pain to </=5/10 at worse  Short term goal 2: Inc strength B hip abd, ext  to >/= 3+/5 and B hip IR and ER to >/= 4-/5 to improve transfers and stairs  Short term goal 3: Pt to demo improved timing of st cane for maximum support in L SLS   Short term goal 4: Inc LEFS to >/= 47/80    Long term goals  Long term goal 1: TBD  Progress toward goals:    POST-PAIN       Pain Rating (0-10 pain scale):  4 /10   Location and pain description same as pre-treatment unless indicated. Action: [] NA   [x] Perform HEP  [] Meds as prescribed  [] Modalities as prescribed   [] Call Physician     Frequency/Duration:  Plan  Times per week: 2-3  Plan weeks: 4-6  Specific instructions for Next Treatment: consider [place] E-stim w/ L clams  Current Treatment Recommendations: Strengthening, Balance Training, Gait Training, Stair training, Neuromuscular Re-education, Manual Therapy - Soft Tissue Mobilization, Manual Therapy - Joint Manipulation, Pain Management, Home Exercise Program, Modalities, Aquatics     Pt to continue current HEP. See objective section for any therapeutic exercise changes, additions or modifications this date.          PT Individual Minutes  Time In: 8906  Time Out: 3334  Minutes: 42  Timed Code Treatment Minutes: 42 Minutes  Procedure Minutes: 0    Signature:  Electronically signed by Luis F Kat PT on 6/10/19 at 11:02 AM

## 2019-06-14 ENCOUNTER — HOSPITAL ENCOUNTER (OUTPATIENT)
Dept: PHYSICAL THERAPY | Age: 84
Setting detail: THERAPIES SERIES
Discharge: HOME | End: 2019-06-14
Payer: MEDICARE

## 2019-06-14 PROCEDURE — 97110 THERAPEUTIC EXERCISES: CPT

## 2019-06-14 ASSESSMENT — PAIN DESCRIPTION - PROGRESSION: CLINICAL_PROGRESSION: GRADUALLY WORSENING

## 2019-06-14 ASSESSMENT — PAIN DESCRIPTION - DESCRIPTORS: DESCRIPTORS: ACHING

## 2019-06-14 ASSESSMENT — PAIN DESCRIPTION - LOCATION: LOCATION: BACK

## 2019-06-14 ASSESSMENT — PAIN DESCRIPTION - PAIN TYPE: TYPE: CHRONIC PAIN

## 2019-06-14 ASSESSMENT — PAIN DESCRIPTION - ORIENTATION: ORIENTATION: RIGHT

## 2019-06-14 ASSESSMENT — PAIN DESCRIPTION - FREQUENCY: FREQUENCY: INTERMITTENT

## 2019-06-14 ASSESSMENT — PAIN SCALES - GENERAL: PAINLEVEL_OUTOF10: 3

## 2019-06-14 ASSESSMENT — PAIN DESCRIPTION - ONSET: ONSET: ON-GOING

## 2019-06-14 NOTE — PROGRESS NOTES
[address] [address]  Outpatient Physical Therapy    Treatment Note        Date: 2019  Patient: Kodi Ernst  : 1935  ACCT #: [de-identified]  Referring Practitioner: Aminata Gamboa  Diagnosis: LLE Weakness    Visit Information:  PT Visit Information  Onset Date: 19  PT Insurance Information: Medicare ;  [facility]   Total # of Visits to Date: 3  Plan of Care/Certification Expiration Date: 19  No Show: 0  Canceled Appointment: 0  Progress Note Counter: 3/8-10 PN 19    Subjective: Pt reports R sided LBP rated as 3/10. HEP Compliance:  [] Good [] Fair [] Poor [] Reports not doing due to:    Vital Signs  Patient Currently in Pain: Yes   Pain Screening  Patient Currently in Pain: Yes  Pain Assessment  Pain Assessment: 0-10  Pain Level: 3  Pain Type: Chronic pain  Pain Location: Back  Pain Orientation: Right  Pain Descriptors: Aching  Pain Frequency: Intermittent  Pain Onset: On-going  Clinical Progression: Gradually worsening    OBJECTIVE:   Exercises  Exercise 1: NS Lev 1  x 6 mins w/ YTB abd band to assist in cuing L hip abds to fire and dec genu valgum during ext w/ some improvement  Exercise 2: TA isos w/ breath  5s-6s  x 10 - w/ edu on breath and TA   Exercise 3: TA marches x 15  Exercise 4:  TA alt ER x 15  Exercise 5:  TA bridges 5s x 15  Exercise 6: TA B SLR  2 x 7 on L w/ limited range of lift; x 10 on R   Exercise 7: B clams w/ pillow b/w  2 x 10 ( consider [place] E-stim for L clams NV)  Exercise 8:  H/l Abd YTB 5s  x 15   Exercise 9:  h/l Add ball 5s x 15  Exercise 10: seated hip IR isos w/ strap 5sx 10  Exercise 11: modified nba stretch 30\"x3   Exercise 19: HEP: modified nba       Modalities:        *Indicates exercise, modality, or manual techniques to be initiated when appropriate    Assessment:         Body structures, Functions, Activity limitations: Decreased functional mobility , Decreased strength, Decreased balance, Decreased high-level IADLs, Increased Pain  Assessment: Pt tolerated increased reps and added LE stretches to decrease mm tightness. Pt demonstrated good return demo. Treatment Diagnosis: Impaired strength B LEs; Impaired Gait; L LE Pain  Prognosis: Good       Goals:  Short term goals  Time Frame for Short term goals: 8 -10 visits  Short term goal 1: dec l knee pain to </=5/10 at worse  Short term goal 2: Inc strength B hip abd, ext  to >/= 3+/5 and B hip IR and ER to >/= 4-/5 to improve transfers and stairs  Short term goal 3: Pt to demo improved timing of st cane for maximum support in L SLS   Short term goal 4: Inc LEFS to >/= 47/80    Long term goals  Long term goal 1: TBD  Progress toward goals:    POST-PAIN       Pain Rating (0-10 pain scale):  0 /10   Location and pain description same as pre-treatment unless indicated. Action: [] NA   [x] Perform HEP  [] Meds as prescribed  [] Modalities as prescribed   [] Call Physician     Frequency/Duration:  Plan  Times per week: 2-3  Plan weeks: 4-6  Specific instructions for Next Treatment: consider [place] E-stim w/ L kristine  Current Treatment Recommendations: Strengthening, Balance Training, Gait Training, Stair training, Neuromuscular Re-education, Manual Therapy - Soft Tissue Mobilization, Manual Therapy - Joint Manipulation, Pain Management, Home Exercise Program, Modalities, Aquatics     Pt to continue current HEP. See objective section for any therapeutic exercise changes, additions or modifications this date.          PT Individual Minutes  Time In: 1300  Time Out: 7648  Minutes: 40  Timed Code Treatment Minutes: 40 Minutes  Procedure Minutes:    Signature:  Electronically signed by Melvena Paget, PT on 6/14/19 at 12:58 PM

## 2019-06-17 ENCOUNTER — HOSPITAL ENCOUNTER (OUTPATIENT)
Dept: PHYSICAL THERAPY | Age: 84
Setting detail: THERAPIES SERIES
Discharge: HOME | End: 2019-06-17
Payer: MEDICARE

## 2019-06-17 PROCEDURE — 97110 THERAPEUTIC EXERCISES: CPT

## 2019-06-17 ASSESSMENT — PAIN SCALES - GENERAL: PAINLEVEL_OUTOF10: 3

## 2019-06-17 ASSESSMENT — PAIN DESCRIPTION - DESCRIPTORS: DESCRIPTORS: ACHING

## 2019-06-17 ASSESSMENT — PAIN DESCRIPTION - LOCATION: LOCATION: BACK

## 2019-06-17 ASSESSMENT — PAIN DESCRIPTION - PAIN TYPE: TYPE: CHRONIC PAIN

## 2019-06-17 NOTE — PROGRESS NOTES
Decreased balance, Decreased high-level IADLs, Increased Pain  Assessment: Patient demonstrates compensation with clamshells needing cues for improved technique. Good isolation of hip rotators with isometrics. Educated patient on importance of strengthening to improve gait quality. Treatment Diagnosis: Impaired strength B LEs; Impaired Gait; L LE Pain        Goals:  Short term goals  Time Frame for Short term goals: 8 -10 visits  Short term goal 1: dec l knee pain to </=5/10 at worse  Short term goal 2: Inc strength B hip abd, ext  to >/= 3+/5 and B hip IR and ER to >/= 4-/5 to improve transfers and stairs  Short term goal 3: Pt to demo improved timing of st cane for maximum support in L SLS   Short term goal 4: Inc LEFS to >/= 47/80    Long term goals  Long term goal 1: TBD  Progress toward goals: strength, improve functional ability    POST-PAIN       Pain Rating (0-10 pain scale):   3/10   Location and pain description same as pre-treatment unless indicated. Action: [] NA   [x] Perform HEP  [x] Meds as prescribed  [] Modalities as prescribed   [] Call Physician     Frequency/Duration:  Plan  Times per week: 2-3  Plan weeks: 4-6  Specific instructions for Next Treatment: consider [place] E-stim w/ L clams  Current Treatment Recommendations: Strengthening, Balance Training, Gait Training, Stair training, Neuromuscular Re-education, Manual Therapy - Soft Tissue Mobilization, Manual Therapy - Joint Manipulation, Pain Management, Home Exercise Program, Modalities, Aquatics     Pt to continue current HEP. See objective section for any therapeutic exercise changes, additions or modifications this date.          PT Individual Minutes  Time In: 2529  Time Out: 6865  Minutes: 46  Timed Code Treatment Minutes: 46 Minutes  Procedure Minutes: 0 minutes    Signature:  Electronically signed by Dominique Frank PT on 6/17/19 at 1:11 PM

## 2019-06-21 ENCOUNTER — HOSPITAL ENCOUNTER (OUTPATIENT)
Dept: PHYSICAL THERAPY | Age: 84
Setting detail: THERAPIES SERIES
Discharge: HOME | End: 2019-06-21
Payer: MEDICARE

## 2019-06-21 PROCEDURE — 97110 THERAPEUTIC EXERCISES: CPT

## 2019-06-21 NOTE — PROGRESS NOTES
[facility]  Outpatient Physical Therapy    Treatment Note        Date: 2019  Patient: Cristine Ahmadi  : 1935  ACCT #: [de-identified]  Referring Practitioner: Louis Cortes  Diagnosis: LLE Weakness    Visit Information:  PT Visit Information  Onset Date: 19  PT Insurance Information: Medicare ;  Joaquina Perdomo secondary   Total # of Visits to Date: 5  Plan of Care/Certification Expiration Date: 19  No Show: 0  Canceled Appointment: 0  Progress Note Counter: 5/8-10 PN 19    Subjective: Pt states \"I had a pain in my groin the last time I was here but I seems to be gone now. \" Pt noted to be mildly unsteady initially upon standing up from chair and walking into clinic req SBA. HEP Compliance:  [x] Good [] Fair [] Poor [] Reports not doing due to:    Vital Signs  Patient Currently in Pain: Denies   Pain Screening  Patient Currently in Pain: Denies    OBJECTIVE:   Exercises  Exercise 1: NS Lev 2  x 5 minutes (patient demonstrates improved hip alignment during reciprocal motion)  Exercise 2: TA isos w/ breath  5s-6s  x 12 - w/ edu on breath and TA   Exercise 3: TA marches x15; TA alt UEs x 15, TA using 2lb weight over headhead x 10  Exercise 5:  TA bridges 5s x 15  Exercise 6: TA SLR x 10 B   Exercise 7: B clams w/ pillow b/w  2 x 12; reverse clams 2 x 12 ( consider [place] E-stim for L clams)  Exercise 8:  H/l Abd GTB 5s  x 20  Exercise 9:  h/l Add ball 5s x 20  Exercise 11: modified nba stretch 20\"x3   Exercise 12: manual resistance provided for bilateral hip internal/external isometrics 5s x 10    Strength: [x] NT  [] MMT completed:     ROM: [x] NT  [] ROM measurements:    Assessment:     Body structures, Functions, Activity limitations: Decreased functional mobility , Decreased strength, Decreased balance, Decreased high-level IADLs, Increased Pain  Assessment: Pt able to maintain good form/tech w/ clams after initial cuing this date though dec ROM achieved on Lt vs Rt

## 2019-06-24 ENCOUNTER — HOSPITAL ENCOUNTER (OUTPATIENT)
Dept: PHYSICAL THERAPY | Age: 84
Setting detail: THERAPIES SERIES
Discharge: HOME | End: 2019-06-24
Payer: MEDICARE

## 2019-06-24 PROCEDURE — 97110 THERAPEUTIC EXERCISES: CPT

## 2019-06-24 ASSESSMENT — PAIN DESCRIPTION - PAIN TYPE: TYPE: CHRONIC PAIN

## 2019-06-24 ASSESSMENT — PAIN DESCRIPTION - ORIENTATION: ORIENTATION: LEFT

## 2019-06-24 ASSESSMENT — PAIN SCALES - GENERAL: PAINLEVEL_OUTOF10: 3

## 2019-06-24 ASSESSMENT — PAIN DESCRIPTION - LOCATION: LOCATION: KNEE

## 2019-06-24 NOTE — PROGRESS NOTES
[facility]  Outpatient Physical Therapy    Treatment Note        Date: 2019  Patient: Cherelle Shepherd  : 1935  ACCT #: [de-identified]  Referring Practitioner: Pete Dhaliwal  Diagnosis: LLE Weakness    Visit Information:  PT Visit Information  Onset Date: 19  PT Insurance Information: Medicare ;  [employer]   Total # of Visits to Date: 6  Plan of Care/Certification Expiration Date: 19  No Show: 0  Progress Note Due Date: 19  Canceled Appointment: 0  Progress Note Counter: 6/8-10 PN 19    Subjective: Pt states she only has L knee pain this am.  States she is just slow moving. Completing HEP. Comments: Reports L knee pain at the worst is 4/10 - STG #1  HEP Compliance:  [x] Good [] Fair [] Poor [] Reports not doing due to:    Vital Signs  Patient Currently in Pain: Yes   Pain Screening  Patient Currently in Pain: Yes  Pain Assessment  Pain Assessment: 0-10  Pain Level: 3  Pain Type: Chronic pain  Pain Location: Knee  Pain Orientation: Left    OBJECTIVE:   Exercises  Exercise 1: NS Lev 2  x 5 minutes (patient demonstrates improved hip alignment during reciprocal motion)  Exercise 2: TA isos w/ breath  5s-6s  x 12 - w/ edu on breath and TA   Exercise 3: TA marches x15; TA alt UEs x 15, TA using 2lb weight over headhead x 10  Exercise 5:  TA bridges 5s x 15  Exercise 6: TA SLR x 15B   Exercise 7: B clams w/ pillow b/w  2 x 12; reverse clams 2 x 12 ( consider [place] E-stim for L clams)  Exercise 8:  H/l Abd GTB 5s  x 25  Exercise 9:  h/l Add ball 5s x 25  Exercise 11: modified nba stretch 20\"x3   Exercise 12: manual resistance provided for bilateral hip internal/external isometrics 5s x 10  Exercise 13: Seated quad set (push feet into floor) 10 X 5\" hold     Strength: [x] NT  [] MMT completed:     ROM: [x] NT  [] ROM measurements:     Assessment:       Body structures, Functions, Activity limitations: Decreased functional mobility , Decreased strength,

## 2019-06-28 ENCOUNTER — HOSPITAL ENCOUNTER (OUTPATIENT)
Dept: PHYSICAL THERAPY | Age: 84
Setting detail: THERAPIES SERIES
Discharge: HOME | End: 2019-06-28
Payer: MEDICARE

## 2019-06-28 PROCEDURE — 97110 THERAPEUTIC EXERCISES: CPT

## 2019-06-28 ASSESSMENT — PAIN SCALES - GENERAL: PAINLEVEL_OUTOF10: 2

## 2019-06-28 ASSESSMENT — PAIN DESCRIPTION - DESCRIPTORS: DESCRIPTORS: ACHING

## 2019-06-28 ASSESSMENT — PAIN DESCRIPTION - PAIN TYPE: TYPE: CHRONIC PAIN

## 2019-06-28 ASSESSMENT — PAIN DESCRIPTION - LOCATION: LOCATION: KNEE

## 2019-06-28 NOTE — PROGRESS NOTES
[address]       Physical Therapy  Cancellation/No-show Note  Patient Name:  Julissa Murphy  :  1935   Date:  2019  Referring Practitioner: Andrew Lee  Diagnosis: LLE Weakness    Visit Information:  PT Visit Information  Onset Date: 19  PT Insurance Information: Medicare ;  Alan Larios secondary   Total # of Visits to Date: 7  Plan of Care/Certification Expiration Date: 19  No Show: 0  Canceled Appointment: 1  Progress Note Counter: 7/8-10 PN 19 ( CX 19- going out of town)     For today's appointment patient:  [x]  Cancelled  []  Rescheduled appointment  []  No-show   []  Called pt to remind of next appointment     Reason given by patient:  []  Patient ill  []  Conflicting appointment  []  No transportation    []  Conflict with work  []  No reason given  [x]  Other:       Comments:    Going out of town     Signature: Electronically signed by Mendez Chance PTA on 19 at 11:49 AM

## 2019-07-01 ENCOUNTER — HOSPITAL ENCOUNTER (OUTPATIENT)
Dept: PHYSICAL THERAPY | Age: 84
Setting detail: THERAPIES SERIES
Discharge: HOME | End: 2019-07-01
Payer: MEDICARE

## 2019-07-01 PROCEDURE — 97110 THERAPEUTIC EXERCISES: CPT

## 2019-07-01 ASSESSMENT — PAIN SCALES - GENERAL: PAINLEVEL_OUTOF10: 2

## 2019-07-01 ASSESSMENT — PAIN DESCRIPTION - PAIN TYPE: TYPE: CHRONIC PAIN

## 2019-07-01 ASSESSMENT — PAIN DESCRIPTION - ORIENTATION: ORIENTATION: LOWER

## 2019-07-01 ASSESSMENT — PAIN DESCRIPTION - LOCATION: LOCATION: BACK

## 2019-07-01 NOTE — PROGRESS NOTES
remaining and improving use of AD. Pt will benefit from cont w/ POC to cont to progress strength and gait . Specific instructions for Next Treatment: consider [place] E-stim w/ L clams  Prognosis: Good  Discharge Recommendations: Continue to assess pending progress        PLAN: [x] Evaluate and Treat  Frequency/Duration:  Plan  Times per week: 2-3  Plan weeks: 4-6  Specific instructions for Next Treatment: consider [place] E-stim w/ L clams  Current Treatment Recommendations: Strengthening, Balance Training, Gait Training, Stair training, Neuromuscular Re-education, Manual Therapy - Soft Tissue Mobilization, Manual Therapy - Joint Manipulation, Pain Management, Home Exercise Program, Modalities, Aquatics                  ? Patient Status:[x] Continue/ Initiate plan of Care    [] Discharge PT. Recommend pt continue with HEP. [] Additional visits requested, Please re-certify for additional visits:          Signature: objective information Electronically signed by Johan Quiroz PTA on 7/1/19 at 12:54 PM  Electronically signed by Jayme Thompson PT on 7/2/2019 at 12:31 PM        If you have any questions or concerns, please don't hesitate to call. Thank you for your referral.    I have reviewed this plan of care and certify a need for medically necessary rehabilitation services.     Physician Signature:__________________________________________________________  Date:  Please sign and return

## 2019-07-05 ENCOUNTER — APPOINTMENT (OUTPATIENT)
Dept: PHYSICAL THERAPY | Age: 84
End: 2019-07-05
Payer: MEDICARE

## 2019-07-08 ENCOUNTER — HOSPITAL ENCOUNTER (OUTPATIENT)
Dept: PHYSICAL THERAPY | Age: 84
Setting detail: THERAPIES SERIES
Discharge: HOME | End: 2019-07-08
Payer: MEDICARE

## 2019-07-08 PROCEDURE — 97110 THERAPEUTIC EXERCISES: CPT

## 2019-07-08 ASSESSMENT — PAIN SCALES - GENERAL: PAINLEVEL_OUTOF10: 2

## 2019-07-08 ASSESSMENT — PAIN DESCRIPTION - FREQUENCY: FREQUENCY: INTERMITTENT

## 2019-07-08 ASSESSMENT — PAIN DESCRIPTION - DESCRIPTORS: DESCRIPTORS: ACHING

## 2019-07-08 ASSESSMENT — PAIN DESCRIPTION - PAIN TYPE: TYPE: CHRONIC PAIN

## 2019-07-08 ASSESSMENT — PAIN DESCRIPTION - LOCATION: LOCATION: KNEE

## 2019-07-08 ASSESSMENT — PAIN DESCRIPTION - ORIENTATION: ORIENTATION: LEFT

## 2019-07-12 ENCOUNTER — HOSPITAL ENCOUNTER (OUTPATIENT)
Dept: PHYSICAL THERAPY | Age: 84
Setting detail: THERAPIES SERIES
Discharge: HOME | End: 2019-07-12
Payer: MEDICARE

## 2019-07-12 PROCEDURE — 97110 THERAPEUTIC EXERCISES: CPT

## 2019-07-12 ASSESSMENT — PAIN SCALES - GENERAL: PAINLEVEL_OUTOF10: 3

## 2019-07-12 ASSESSMENT — PAIN DESCRIPTION - LOCATION: LOCATION: KNEE

## 2019-07-12 ASSESSMENT — PAIN DESCRIPTION - ORIENTATION: ORIENTATION: LEFT

## 2019-07-12 ASSESSMENT — PAIN DESCRIPTION - FREQUENCY: FREQUENCY: INTERMITTENT

## 2019-07-12 ASSESSMENT — PAIN DESCRIPTION - PAIN TYPE: TYPE: CHRONIC PAIN

## 2019-07-12 ASSESSMENT — PAIN DESCRIPTION - DESCRIPTORS: DESCRIPTORS: ACHING

## 2019-07-15 ENCOUNTER — HOSPITAL ENCOUNTER (OUTPATIENT)
Dept: PHYSICAL THERAPY | Age: 84
Setting detail: THERAPIES SERIES
Discharge: HOME | End: 2019-07-15
Payer: MEDICARE

## 2019-07-15 PROCEDURE — 97110 THERAPEUTIC EXERCISES: CPT

## 2019-07-15 ASSESSMENT — PAIN SCALES - GENERAL: PAINLEVEL_OUTOF10: 2

## 2019-07-15 ASSESSMENT — PAIN DESCRIPTION - DESCRIPTORS: DESCRIPTORS: ACHING

## 2019-07-15 ASSESSMENT — PAIN DESCRIPTION - ORIENTATION: ORIENTATION: LEFT

## 2019-07-15 ASSESSMENT — PAIN DESCRIPTION - LOCATION: LOCATION: KNEE

## 2019-07-15 ASSESSMENT — PAIN DESCRIPTION - PAIN TYPE: TYPE: CHRONIC PAIN

## 2019-07-15 NOTE — PROGRESS NOTES
high-level IADLs, Increased Pain  Assessment: Pt demo's < 3/5 strength L hip abd this date and initiated [place] E-stim to facilitate glute med w/ good adryan; Cont's to demo significant deficits in L hip ER requriing cuing for clams to dec compensations. Cont w/ exs for core and hip strengthening. Treatment Diagnosis: Impaired strength B LEs; Impaired Gait; L LE Pain  Prognosis: Good       Goals:  Short term goals  Time Frame for Short term goals: 8 -10 visits  Short term goal 1: dec l knee pain to </=5/10 at worse  Short term goal 2: Inc strength B hip abd, ext  to >/= 3+/5 and B hip IR and ER to >/= 4-/5 to improve transfers and stairs  Short term goal 3: Pt to demo improved timing of st cane for maximum support in L SLS   Short term goal 4: Inc LEFS to >/= 47/80    Long term goals  Long term goal 1: TBD  Progress toward goals: strength    POST-PAIN       Pain Rating (0-10 pain scale):  0 /10   Location and pain description same as pre-treatment unless indicated. Action: [] NA   [] Perform HEP  [] Meds as prescribed  [] Modalities as prescribed   [] Call Physician     Frequency/Duration:  Plan  Times per week: 2-3  Plan weeks: 4-6  Specific instructions for Next Treatment: consider [place] E-stim w/ L clams   Current Treatment Recommendations: Strengthening, Balance Training, Gait Training, Stair training, Neuromuscular Re-education, Manual Therapy - Soft Tissue Mobilization, Manual Therapy - Joint Manipulation, Pain Management, Home Exercise Program, Modalities, Aquatics     Pt to continue current HEP. See objective section for any therapeutic exercise changes, additions or modifications this date.          PT Individual Minutes  Time In: 8563  Time Out: 9762  Minutes: 44  Timed Code Treatment Minutes: 44 Minutes  Procedure Minutes:0    Signature:  Electronically signed by Kiersten Alegre PT on 7/15/19 at 10:44 AM

## 2019-07-19 ENCOUNTER — HOSPITAL ENCOUNTER (OUTPATIENT)
Dept: PHYSICAL THERAPY | Age: 84
Setting detail: THERAPIES SERIES
Discharge: HOME | End: 2019-07-19
Payer: MEDICARE

## 2019-07-19 PROCEDURE — 97110 THERAPEUTIC EXERCISES: CPT

## 2019-07-26 ENCOUNTER — HOSPITAL ENCOUNTER (OUTPATIENT)
Dept: PHYSICAL THERAPY | Age: 84
Setting detail: THERAPIES SERIES
Discharge: HOME | End: 2019-07-26
Payer: MEDICARE

## 2019-07-26 PROCEDURE — 97110 THERAPEUTIC EXERCISES: CPT

## 2019-07-26 ASSESSMENT — PAIN DESCRIPTION - FREQUENCY: FREQUENCY: INTERMITTENT

## 2019-07-26 ASSESSMENT — PAIN SCALES - GENERAL: PAINLEVEL_OUTOF10: 4

## 2019-07-26 ASSESSMENT — PAIN DESCRIPTION - PAIN TYPE: TYPE: CHRONIC PAIN

## 2019-07-26 ASSESSMENT — PAIN DESCRIPTION - ORIENTATION: ORIENTATION: LEFT

## 2019-07-26 ASSESSMENT — PAIN DESCRIPTION - DESCRIPTORS: DESCRIPTORS: SORE;SHARP

## 2019-07-26 ASSESSMENT — PAIN DESCRIPTION - LOCATION: LOCATION: BUTTOCKS

## 2019-07-26 NOTE — PROGRESS NOTES
[address] [address]  Outpatient Physical Therapy    Treatment Note        Date: 2019  Patient: Qing Trujillo  : 1935  ACCT #: [de-identified]  Referring Practitioner: Reynaldo Bobo  Diagnosis: LLE Weakness    Visit Information:  PT Visit Information  Onset Date: 19  PT Insurance Information: Medicare ;  Kaleigh Ana Maria secondary   Total # of Visits to Date: 15  Plan of Care/Certification Expiration Date: 19  No Show: 1  Canceled Appointment: 1  Progress Note Counter: 5/8-10     Subjective: pain today is 4/10  Comments: D/C on NV per request  HEP Compliance:  [x] Good [] Fair [] Poor [] Reports not doing due to:    Vital Signs  Patient Currently in Pain: Yes   Pain Screening  Patient Currently in Pain: Yes  Pain Assessment  Pain Level: 4  Pain Type: Chronic pain  Pain Location: Buttocks  Pain Orientation: Left  Pain Descriptors: Sore;Sharp  Pain Frequency: Intermittent    OBJECTIVE:   Exercises  Exercise 1: Sci-Fit, L-3.0, 5 min  Exercise 2: piriformis stretch, fig-4, 20 sec x 3, and left knee to opp shoulder 10 sec x 5, seated  Exercise 3: SLR, 2 x 5, b/l  Exercise 5:  TA bridges  x 15  Exercise 7: clams x 10, b/l  Exercise 13: SAQ's 10 sec x 10, b/l  Exercise 15: Sink exs: x10 ea, with circles    Strength: [x] NT  [] MMT completed:   ROM: [x] NT  [] ROM measurements:   *Indicates exercise, modality, or manual techniques to be initiated when appropriate    Assessment:     Body structures, Functions, Activity limitations: Decreased functional mobility , Decreased strength, Decreased balance, Decreased high-level IADLs, Increased Pain  Assessment: much UE support used with sink ex, fatigue with SLR, rest break req, attempted S/L abd, unable to perform, fair adryan to session  Treatment Diagnosis: Impaired strength B LEs; Impaired Gait; L LE Pain  Prognosis: Good       Goals:  Short term goals  Time Frame for Short term goals: 8 -10 visits  Short term goal 1: dec l knee pain to </=5/10 at worse  Short term goal 2: Inc strength B hip abd, ext  to >/= 3+/5 and B hip IR and ER to >/= 4-/5 to improve transfers and stairs  Short term goal 3: Pt to demo improved timing of st cane for maximum support in L SLS   Short term goal 4: Inc LEFS to >/= 47/80    Long term goals  Long term goal 1: TBD  Progress toward goals:ongoing    POST-PAIN       Pain Rating (0-10 pain scale):   0/10   Location and pain description same as pre-treatment unless indicated. Action: [] NA   [] Perform HEP  [] Meds as prescribed  [] Modalities as prescribed   [] Call Physician     Frequency/Duration:  Plan  Times per week: 2-3  Plan weeks: 4-6  Specific instructions for Next Treatment: D/C per request  Current Treatment Recommendations: Strengthening, Balance Training, Gait Training, Stair training, Neuromuscular Re-education, Manual Therapy - Soft Tissue Mobilization, Manual Therapy - Joint Manipulation, Pain Management, Home Exercise Program, Modalities, Aquatics     Pt to continue current HEP. See objective section for any therapeutic exercise changes, additions or modifications this date.      PT Individual Minutes  Time In: 1120  Time Out: 4027  Minutes: 38  Timed Code Treatment Minutes: 38 Minutes  Procedure Minutes:0    Signature:  Electronically signed by Leatha Arechiga PTA on 7/26/19 at 11:59 AM

## 2019-07-29 ENCOUNTER — HOSPITAL ENCOUNTER (OUTPATIENT)
Dept: PHYSICAL THERAPY | Age: 84
Setting detail: THERAPIES SERIES
Discharge: HOME | End: 2019-07-29
Payer: MEDICARE

## 2019-07-29 PROCEDURE — 97110 THERAPEUTIC EXERCISES: CPT

## 2019-07-29 ASSESSMENT — PAIN DESCRIPTION - ORIENTATION: ORIENTATION: LEFT

## 2019-07-29 ASSESSMENT — PAIN DESCRIPTION - DESCRIPTORS: DESCRIPTORS: ACHING

## 2019-07-29 ASSESSMENT — PAIN SCALES - GENERAL: PAINLEVEL_OUTOF10: 4

## 2019-07-29 ASSESSMENT — PAIN DESCRIPTION - LOCATION: LOCATION: BUTTOCKS;KNEE

## 2019-07-29 ASSESSMENT — PAIN DESCRIPTION - PAIN TYPE: TYPE: CHRONIC PAIN

## 2019-07-29 NOTE — PROGRESS NOTES
[address] [address]  Outpatient Physical Therapy    Treatment Note        Date: 2019  Patient: Gregg Alfaro  : 1935  ACCT #: [de-identified]  Referring Practitioner: Cindy Simmons  Diagnosis: LLE Weakness    Visit Information:  PT Visit Information  Onset Date: 19  PT Insurance Information: Medicare ;  Peri Smith secondary   Total # of Visits to Date: [address] [facility] [place]  Plan of Care/Certification Expiration Date: 19  No Show: 1  Canceled Appointment: 1  Progress Note Counter: 6/8-10     Subjective: pain today is 4/10, left buttocks, 2/10, left knee     HEP Compliance:  [x] Good [] Fair [] Poor [] Reports not doing due to:    Vital Signs  Patient Currently in Pain: Yes   Pain Screening  Patient Currently in Pain: Yes  Pain Assessment  Pain Level: 4  Pain Type: Chronic pain  Pain Location: Buttocks;Knee  Pain Orientation: Left  Pain Descriptors: Aching    OBJECTIVE:   Exercises  Exercise 1: Sci-Fit, L-3.0, 5 min  Exercise 2: piriformis stretch, fig-4, 20 sec x 3, and left knee to opp shoulder 20 sec x 3, seated  Exercise 3: SLR x 10, b/l  Exercise 5:  TA bridges  3 sec x 15  Exercise 6: hip abd x 20, supine, using slide bd  Exercise 7: clams x 15, b/l  Exercise 13: SAQ's 10 sec x 10, b/l  Exercise 15: Sink exs: x10   Exercise 17: HS curls x 10, YTB, b/l       Strength: [] NT  [x] MMT completed:  Strength RLE  Comment: Quad 5/5;  HS 5/5;  DF 5/5; iliopsoas 5/5;  abd 3+/5;  ext 3-/5;  IR 4-/5;  ER 4/5  Strength LLE  Comment: Quad 5/5;  HS 4+/5;  DF 5/5;  Iliopsoas 5/5;  Abd 2/5; Ext 3-/5;  IR 3-/5;  ER 3-/5      ROM: [x] NT  [] ROM measurements:   *Indicates exercise, modality, or manual techniques to be initiated when appropriate    Assessment:         Body structures, Functions, Activity limitations: Decreased functional mobility , Decreased strength, Decreased balance, Decreased high-level IADLs, Increased Pain  Assessment: difficulty with abd of right LE during sink ex, able to perform SLR without a rest break, limited ROM with clams, LLE>RLE, LLE exhibits weakness with most exercises  Treatment Diagnosis: Impaired strength B LEs; Impaired Gait; L LE Pain  Prognosis: Good       Goals:  Short term goals  Time Frame for Short term goals: 8 -10 visits  Short term goal 1: dec l knee pain to </=5/10 at worse  Short term goal 2: Inc strength B hip abd, ext  to >/= 3+/5 and B hip IR and ER to >/= 4-/5 to improve transfers and stairs  Short term goal 3: Pt to demo improved timing of st cane for maximum support in L SLS   Short term goal 4: Inc LEFS to >/= 47/80    Long term goals  Long term goal 1: TBD  Progress toward goals:see D/C summary    POST-PAIN       Pain Rating (0-10 pain scale):  4 /10   Location and pain description same as pre-treatment unless indicated. Action: [] NA   [x] Perform HEP  [] Meds as prescribed  [] Modalities as prescribed   [] Call Physician     Frequency/Duration:  Plan  Current Treatment Recommendations: Strengthening, Balance Training, Gait Training, Stair training, Neuromuscular Re-education, Manual Therapy - Soft Tissue Mobilization, Manual Therapy - Joint Manipulation, Pain Management, Home Exercise Program, Modalities, Aquatics  Plan Comment: D/C     Pt to continue current HEP. See objective section for any therapeutic exercise changes, additions or modifications this date.      PT Individual Minutes  Time In: 4697  Time Out: 1150  Minutes: 38  Timed Code Treatment Minutes: 38 Minutes  Procedure Minutes:0    Signature:  Electronically signed by Rochelle Castro PTA on 7/29/19 at 11:48 AM

## 2019-11-11 LAB
ALBUMIN: 3.7 G/DL (ref 3.4–5)
ALP BLD-CCNC: 48 U/L (ref 33–136)
ALT SERPL-CCNC: 9 U/L (ref 7–45)
ANION GAP SERPL CALCULATED.3IONS-SCNC: 9 MMOL/L (ref 10–20)
AST SERPL-CCNC: 16 U/L (ref 9–39)
BICARBONATE: 29 MMOL/L (ref 21–32)
BILIRUB SERPL-MCNC: 0.7 MG/DL (ref 0–1.2)
BILIRUBIN DIRECT: 0.1 MG/DL (ref 0–0.3)
CALCIUM SERPL-MCNC: 9 MG/DL (ref 8.6–10.3)
CHLORIDE BLD-SCNC: 107 MMOL/L (ref 98–107)
CHOLESTEROL/HDL RATIO: 5.5
CHOLESTEROL: 205 MG/DL (ref 0–199)
CREAT SERPL-MCNC: 1.19 MG/DL (ref 0.5–1)
ERYTHROCYTE [DISTWIDTH] IN BLOOD BY AUTOMATED COUNT: 14.5 % (ref 11.5–14)
GFR AFRICAN AMERICAN: 52 ML/MIN/1.73M2
GFR NON-AFRICAN AMERICAN: 43 ML/MIN/1.73M2
GLUCOSE: 89 MG/DL (ref 74–99)
HCT VFR BLD CALC: 40.2 % (ref 36–46)
HDLC SERPL-MCNC: 37 MG/DL
HEMOGLOBIN: 12.4 G/DL (ref 12–16)
LDL CHOLESTEROL: 149 MG/DL (ref 0–99)
MCHC RBC AUTO-ENTMCNC: 30.8 G/DL (ref 32–36)
MCV RBC AUTO: 91 FL (ref 80–100)
PLATELET # BLD: 201 X10E9/L (ref 150–450)
POTASSIUM SERPL-SCNC: 4.2 MMOL/L (ref 3.5–5.3)
RBC # BLD: 4.43 X10E12/L (ref 4–5.2)
SODIUM BLD-SCNC: 141 MMOL/L (ref 136–145)
TOTAL PROTEIN: 6.1 G/DL (ref 6.4–8.2)
TRIGL SERPL-MCNC: 96 MG/DL (ref 0–149)
UREA NITROGEN: 20 MG/DL (ref 6–23)
VLDLC SERPL CALC-MCNC: 19 MG/DL (ref 0–40)
WBC: 7.2 X10E9/L (ref 4.4–11.3)

## 2021-04-27 ENCOUNTER — OFFICE VISIT (OUTPATIENT)
Dept: OBGYN CLINIC | Age: 86
End: 2021-04-27
Payer: MEDICARE

## 2021-04-27 VITALS
WEIGHT: 183 LBS | BODY MASS INDEX: 32.43 KG/M2 | DIASTOLIC BLOOD PRESSURE: 74 MMHG | SYSTOLIC BLOOD PRESSURE: 122 MMHG | HEIGHT: 63 IN

## 2021-04-27 DIAGNOSIS — B36.9 FUNGAL DERMATITIS: ICD-10-CM

## 2021-04-27 DIAGNOSIS — B37.9 YEAST INFECTION: ICD-10-CM

## 2021-04-27 DIAGNOSIS — L29.2 VULVAR ITCHING: Primary | ICD-10-CM

## 2021-04-27 PROCEDURE — G8417 CALC BMI ABV UP PARAM F/U: HCPCS | Performed by: OBSTETRICS & GYNECOLOGY

## 2021-04-27 PROCEDURE — G8427 DOCREV CUR MEDS BY ELIG CLIN: HCPCS | Performed by: OBSTETRICS & GYNECOLOGY

## 2021-04-27 PROCEDURE — 1036F TOBACCO NON-USER: CPT | Performed by: OBSTETRICS & GYNECOLOGY

## 2021-04-27 PROCEDURE — 1090F PRES/ABSN URINE INCON ASSESS: CPT | Performed by: OBSTETRICS & GYNECOLOGY

## 2021-04-27 PROCEDURE — 99202 OFFICE O/P NEW SF 15 MIN: CPT | Performed by: OBSTETRICS & GYNECOLOGY

## 2021-04-27 PROCEDURE — 1123F ACP DISCUSS/DSCN MKR DOCD: CPT | Performed by: OBSTETRICS & GYNECOLOGY

## 2021-04-27 PROCEDURE — 4040F PNEUMOC VAC/ADMIN/RCVD: CPT | Performed by: OBSTETRICS & GYNECOLOGY

## 2021-04-27 RX ORDER — APIXABAN 2.5 MG/1
TABLET, FILM COATED ORAL
COMMUNITY
Start: 2021-04-13

## 2021-04-27 RX ORDER — PANTOPRAZOLE SODIUM 40 MG/1
TABLET, DELAYED RELEASE ORAL
COMMUNITY
Start: 2021-04-23

## 2021-04-27 RX ORDER — FLUCONAZOLE 150 MG/1
150 TABLET ORAL DAILY
Qty: 3 TABLET | Refills: 0 | Status: SHIPPED | OUTPATIENT
Start: 2021-04-27 | End: 2021-04-30

## 2021-04-27 SDOH — HEALTH STABILITY: MENTAL HEALTH: HOW OFTEN DO YOU HAVE A DRINK CONTAINING ALCOHOL?: NOT ASKED

## 2021-04-27 SDOH — HEALTH STABILITY: MENTAL HEALTH: HOW MANY STANDARD DRINKS CONTAINING ALCOHOL DO YOU HAVE ON A TYPICAL DAY?: NOT ASKED

## 2021-04-27 ASSESSMENT — ENCOUNTER SYMPTOMS
NAUSEA: 0
EYES NEGATIVE: 1
DIARRHEA: 0
ABDOMINAL PAIN: 0
VOMITING: 0
CONSTIPATION: 0
BLOOD IN STOOL: 0
RESPIRATORY NEGATIVE: 1
ANAL BLEEDING: 0
ALLERGIC/IMMUNOLOGIC NEGATIVE: 1
ABDOMINAL DISTENTION: 0
RECTAL PAIN: 0

## 2021-04-27 NOTE — PROGRESS NOTES
The patient was asked if she would like a chaperone present for her intimate exam. She  Declined the chaperone.  Jason Bernardo

## 2021-04-27 NOTE — PROGRESS NOTES
Patient here c/o vulvar itching. New patient; reviewed medical, surgical, social and family history. Also reviewed current medications and allergies. States treated for a UTI with abx 2 weeks ago an sx started immediately after abx. States intense itching of vulva. Exam with typical atrophic changes and some erythema with areas excoriated from scratching.  + white discharge c/w yeast as well. Discussed and will give Diflucan 1 po qd x 3 days and Mycolog Cream as directed. All questions answered. F/U prn. Vitals:  /74   Ht 5' 3\" (1.6 m)   Wt 183 lb (83 kg)   BMI 32.42 kg/m²   Past Medical History:   Diagnosis Date    Bleeding ulcer     CAD (coronary artery disease)     Colitis     Hyperlipidemia     Hypertension      Past Surgical History:   Procedure Laterality Date    APPENDECTOMY      COLECTOMY  1/3/2011    sigmoid colectomy with colostomy    COLECTOMY  2011    partial colectomy with colostomy closure    CORONARY ARTERY BYPASS GRAFT      HYSTERECTOMY      HYSTERECTOMY, TOTAL ABDOMINAL      CT PARTIAL HIP REPLACEMENT Left 2018    HIP HEMIARTHROPLASTY- performed by Amalia Chaney MD at [address] [place] [address]       Allergies:  Lisinopril, Statins, and Sulfa antibiotics  Current Outpatient Medications   Medication Sig Dispense Refill    pantoprazole (PROTONIX) 40 MG tablet       ELIQUIS 2.5 MG TABS tablet take 1 tablet by mouth twice a day      fluconazole (DIFLUCAN) 150 MG tablet Take 1 tablet by mouth daily for 3 doses 3 tablet 0    nystatin-triamcinolone (MYCOLOG II) 617841-3.1 UNIT/GM-% cream Apply topically 2 times daily.  30 g 0    magnesium oxide (MAG-OX) 400 (241.3 Mg) MG TABS tablet Take 1 tablet by mouth daily 30 tablet 0    metoprolol tartrate (LOPRESSOR) 50 MG tablet Take 1 tablet by mouth 2 times daily 60 tablet 3    losartan (COZAAR) 50 MG tablet Take 50 mg by mouth 2 times daily      amLODIPine (NORVASC) 5 MG tablet 5 mg daily   0    nitroGLYCERIN (NITROLINGUAL) 0.4 MG/SPRAY spray Place 1 spray onto the tongue every 5 minutes as needed for Chest pain      calcium carbonate (OSCAL) 500 MG TABS tablet Take 500 mg by mouth daily.  aspirin 81 MG EC tablet Take 81 mg by mouth daily.  acetaminophen (TYLENOL) 500 MG tablet Take 500 mg by mouth every 6 hours as needed. No current facility-administered medications for this visit.       Social History     Socioeconomic History    Marital status:      Spouse name: Not on file    Number of children: Not on file    Years of education: Not on file    Highest education level: Not on file   Occupational History    Not on file   Social Needs    Financial resource strain: Not on file    Food insecurity     Worry: Not on file     Inability: Not on file    Transportation needs     Medical: Not on file     Non-medical: Not on file   Tobacco Use    Smoking status: Former Smoker    Smokeless tobacco: Never Used   Substance and Sexual Activity    Alcohol use: Not Currently    Drug use: Not Currently    Sexual activity: Not on file   Lifestyle    Physical activity     Days per week: Not on file     Minutes per session: Not on file    Stress: Not on file   Relationships    Social connections     Talks on phone: Not on file     Gets together: Not on file     Attends Jehovah's witness service: Not on file     Active member of club or organization: Not on file     Attends meetings of clubs or organizations: Not on file     Relationship status: Not on file    Intimate partner violence     Fear of current or ex partner: Not on file     Emotionally abused: Not on file     Physically abused: Not on file     Forced sexual activity: Not on file   Other Topics Concern    Not on file   Social History Narrative         Lives With: Spouse    Type of Home: House--Two level (split flight of stairs to 2nd fl full bath; has first fl bed and bedside commode)    Home Access: Stairs to enter with rail- Number of Steps: 5    Entrance Stairs - Rails: Both    Bathroom Shower/Tub: Tub/Shower unit    Bathroom Equipment: Commode, Shower chair (commode is old and unsafe)    Home Equipment: Rolling walker         Homemaking Responsibilities: No      History reviewed. No pertinent family history. Review of Systems   Constitutional: Negative. Negative for activity change, appetite change, chills, diaphoresis, fatigue, fever and unexpected weight change. HENT: Negative. Eyes: Negative. Respiratory: Negative. Cardiovascular: Negative. Gastrointestinal: Negative for abdominal distention, abdominal pain, anal bleeding, blood in stool, constipation, diarrhea, nausea, rectal pain and vomiting. Endocrine: Negative. Genitourinary: Negative for decreased urine volume, difficulty urinating, dyspareunia, dysuria, enuresis, flank pain, frequency, genital sores, hematuria, menstrual problem, pelvic pain, urgency, vaginal bleeding, vaginal discharge and vaginal pain. Musculoskeletal: Negative. Skin: Negative. Allergic/Immunologic: Negative. Neurological: Negative. Hematological: Negative. Psychiatric/Behavioral: Negative.    + vulvar itching. Objective:     Physical Exam  Constitutional:       General: She is not in acute distress. Appearance: She is well-developed. She is not diaphoretic. HENT:      Head: Normocephalic and atraumatic. Eyes:      Conjunctiva/sclera: Conjunctivae normal.   Neck:      Musculoskeletal: Normal range of motion and neck supple. Cardiovascular:      Rate and Rhythm: Normal rate and regular rhythm. Pulmonary:      Effort: Pulmonary effort is normal. No respiratory distress. Genitourinary:     Labia:         Right: Rash present. No tenderness, lesion or injury. Left: Rash present. No tenderness, lesion or injury. Vagina: Normal. No signs of injury and foreign body.  No vaginal discharge, erythema, tenderness or bleeding. Comments: White discharge. External genitalia with fungal rash and small excoriations from scratching. Sx c/w yeast.   Musculoskeletal: Normal range of motion. General: No tenderness or deformity. Skin:     General: Skin is warm and dry. Coloration: Skin is not pale. Neurological:      Mental Status: She is alert and oriented to person, place, and time. Motor: No abnormal muscle tone. Coordination: Coordination normal.   Psychiatric:         Behavior: Behavior normal.         Thought Content: Thought content normal.         Judgment: Judgment normal.         Assessment:          Diagnosis Orders   1. Vulvar itching     2. Fungal dermatitis     3. Yeast infection          Plan:      Medications placedthis encounter:  Orders Placed This Encounter   Medications    fluconazole (DIFLUCAN) 150 MG tablet     Sig: Take 1 tablet by mouth daily for 3 doses     Dispense:  3 tablet     Refill:  0    nystatin-triamcinolone (MYCOLOG II) 191244-3.7 UNIT/GM-% cream     Sig: Apply topically 2 times daily. Dispense:  30 g     Refill:  0         Orders placedthis encounter:  No orders of the defined types were placed in this encounter. Follow up:  Return for PRN.

## 2021-08-02 ENCOUNTER — APPOINTMENT (OUTPATIENT)
Dept: GENERAL RADIOLOGY | Age: 86
End: 2021-08-02
Payer: MEDICARE

## 2021-08-02 ENCOUNTER — HOSPITAL ENCOUNTER (EMERGENCY)
Age: 86
Discharge: HOME | End: 2021-08-02
Attending: EMERGENCY MEDICINE
Payer: MEDICARE

## 2021-08-02 VITALS
WEIGHT: 180 LBS | TEMPERATURE: 98 F | HEIGHT: 63 IN | RESPIRATION RATE: 18 BRPM | HEART RATE: 50 BPM | DIASTOLIC BLOOD PRESSURE: 60 MMHG | OXYGEN SATURATION: 98 % | SYSTOLIC BLOOD PRESSURE: 148 MMHG | BODY MASS INDEX: 31.89 KG/M2

## 2021-08-02 DIAGNOSIS — M25.552 LEFT HIP PAIN: Primary | ICD-10-CM

## 2021-08-02 PROCEDURE — 96375 TX/PRO/DX INJ NEW DRUG ADDON: CPT

## 2021-08-02 PROCEDURE — 6360000002 HC RX W HCPCS: Performed by: EMERGENCY MEDICINE

## 2021-08-02 PROCEDURE — 96374 THER/PROPH/DIAG INJ IV PUSH: CPT

## 2021-08-02 PROCEDURE — 73502 X-RAY EXAM HIP UNI 2-3 VIEWS: CPT

## 2021-08-02 PROCEDURE — 2580000003 HC RX 258: Performed by: EMERGENCY MEDICINE

## 2021-08-02 PROCEDURE — 99284 EMERGENCY DEPT VISIT MOD MDM: CPT

## 2021-08-02 RX ORDER — 0.9 % SODIUM CHLORIDE 0.9 %
500 INTRAVENOUS SOLUTION INTRAVENOUS ONCE
Status: COMPLETED | OUTPATIENT
Start: 2021-08-02 | End: 2021-08-02

## 2021-08-02 RX ORDER — OXYCODONE HYDROCHLORIDE AND ACETAMINOPHEN 5; 325 MG/1; MG/1
1 TABLET ORAL EVERY 6 HOURS PRN
Qty: 12 TABLET | Refills: 0 | Status: SHIPPED | OUTPATIENT
Start: 2021-08-02 | End: 2021-08-05

## 2021-08-02 RX ORDER — CYCLOBENZAPRINE HCL 10 MG
10 TABLET ORAL NIGHTLY PRN
Qty: 10 TABLET | Refills: 0 | Status: SHIPPED | OUTPATIENT
Start: 2021-08-02 | End: 2021-08-12

## 2021-08-02 RX ORDER — ONDANSETRON 2 MG/ML
4 INJECTION INTRAMUSCULAR; INTRAVENOUS ONCE
Status: COMPLETED | OUTPATIENT
Start: 2021-08-02 | End: 2021-08-02

## 2021-08-02 RX ADMIN — HYDROMORPHONE HYDROCHLORIDE 1 MG: 1 INJECTION, SOLUTION INTRAMUSCULAR; INTRAVENOUS; SUBCUTANEOUS at 08:36

## 2021-08-02 RX ADMIN — SODIUM CHLORIDE 500 ML: 9 INJECTION, SOLUTION INTRAVENOUS at 08:36

## 2021-08-02 RX ADMIN — ONDANSETRON 4 MG: 2 INJECTION INTRAMUSCULAR; INTRAVENOUS at 08:36

## 2021-08-02 ASSESSMENT — PAIN DESCRIPTION - LOCATION
LOCATION: HIP
LOCATION: HIP

## 2021-08-02 ASSESSMENT — ENCOUNTER SYMPTOMS
SHORTNESS OF BREATH: 0
VOMITING: 0
ABDOMINAL PAIN: 0
BACK PAIN: 0
DIARRHEA: 0
SORE THROAT: 0
COUGH: 0
NAUSEA: 0

## 2021-08-02 ASSESSMENT — PAIN DESCRIPTION - ORIENTATION
ORIENTATION: LEFT
ORIENTATION: LEFT

## 2021-08-02 ASSESSMENT — PAIN SCALES - GENERAL
PAINLEVEL_OUTOF10: 6
PAINLEVEL_OUTOF10: 10

## 2021-08-02 ASSESSMENT — PAIN DESCRIPTION - DESCRIPTORS
DESCRIPTORS: SHARP
DESCRIPTORS: ACHING

## 2021-08-02 ASSESSMENT — PAIN DESCRIPTION - FREQUENCY
FREQUENCY: CONTINUOUS
FREQUENCY: CONTINUOUS

## 2021-08-02 ASSESSMENT — PAIN DESCRIPTION - PAIN TYPE
TYPE: ACUTE PAIN
TYPE: ACUTE PAIN;CHRONIC PAIN

## 2021-08-02 NOTE — ED PROVIDER NOTES
[facility] ED  eMERGENCYdEPARTMENT eNCOUnter      Pt Name: Emily Little  MRN: [id number]  Albinogfjag 1935  Date of evaluation: 8/2/2021  Babak Carson MD    CHIEF COMPLAINT           HPI  Emily Little is a 80 y.o. female per chart review has a h/o L hip fx s/p replacement, HTN, hpl, CAD presents to the ED with L hip pain. Pt notes gradual onset, severe, constant, aching, L hip pain since last night. Pt denies fall, fever, n/v, cp, sob, ab pain, dysuria, diarrhea. ROS  Review of Systems   Constitutional: Negative for activity change, chills and fever. HENT: Negative for ear pain and sore throat. Eyes: Negative for visual disturbance. Respiratory: Negative for cough and shortness of breath. Cardiovascular: Negative for chest pain, palpitations and leg swelling. Gastrointestinal: Negative for abdominal pain, diarrhea, nausea and vomiting. Genitourinary: Negative for dysuria. Musculoskeletal: Negative for back pain. L hip pain   Skin: Negative for rash. Neurological: Negative for dizziness and weakness. Except as noted above the remainder of the review of systems was reviewed and negative.        PAST MEDICAL HISTORY     Past Medical History:   Diagnosis Date    Bleeding ulcer     CAD (coronary artery disease)     Colitis     Hyperlipidemia     Hypertension          SURGICAL HISTORY       Past Surgical History:   Procedure Laterality Date    APPENDECTOMY      COLECTOMY  1/3/2011    sigmoid colectomy with colostomy    COLECTOMY  7/26/2011    partial colectomy with colostomy closure    CORONARY ARTERY BYPASS GRAFT      HYSTERECTOMY      HYSTERECTOMY, TOTAL ABDOMINAL      SD PARTIAL HIP REPLACEMENT Left 2/8/2018    HIP HEMIARTHROPLASTY- performed by Dyan Meadows MD at [facility]       Previous Medications    ACETAMINOPHEN (TYLENOL) 500 MG TABLET    Take 500 mg by mouth every 6 hours as needed. AMLODIPINE (NORVASC) 5 MG TABLET    5 mg daily     ASPIRIN 81 MG EC TABLET    Take 81 mg by mouth daily. CALCIUM CARBONATE (OSCAL) 500 MG TABS TABLET    Take 500 mg by mouth daily. ELIQUIS 2.5 MG TABS TABLET    take 1 tablet by mouth twice a day    LOSARTAN (COZAAR) 50 MG TABLET    Take 50 mg by mouth 2 times daily    MAGNESIUM OXIDE (MAG-OX) 400 (241.3 MG) MG TABS TABLET    Take 1 tablet by mouth daily    METOPROLOL TARTRATE (LOPRESSOR) 50 MG TABLET    Take 1 tablet by mouth 2 times daily    NITROGLYCERIN (NITROLINGUAL) 0.4 MG/SPRAY SPRAY    Place 1 spray onto the tongue every 5 minutes as needed for Chest pain    NYSTATIN-TRIAMCINOLONE (MYCOLOG II) 634569-5.1 UNIT/GM-% CREAM    Apply topically 2 times daily. PANTOPRAZOLE (PROTONIX) 40 MG TABLET           ALLERGIES     Lisinopril, Statins, and Sulfa antibiotics    FAMILY HISTORY     No family history on file.        SOCIAL HISTORY       Social History     Socioeconomic History    Marital status:      Spouse name: Not on file    Number of children: Not on file    Years of education: Not on file    Highest education level: Not on file   Occupational History    Not on file   Tobacco Use    Smoking status: Former Smoker    Smokeless tobacco: Never Used   Vaping Use    Vaping Use: Never used   Substance and Sexual Activity    Alcohol use: Not Currently    Drug use: Not Currently    Sexual activity: Not on file   Other Topics Concern    Not on file   Social History Narrative         Lives With: Spouse    Type of Home: House--Two level (split flight of stairs to 2nd fl full bath; has first fl bed and bedside commode)    Home Access: Stairs to enter with rail- Number of Steps: 5    Entrance Stairs - Rails: Both    Bathroom Shower/Tub: Tub/Shower unit    Bathroom Equipment: Commode, Shower chair (commode is old and unsafe)    Home Equipment: Rolling walker         Homemaking Responsibilities: No Social Determinants of Health     Financial Resource Strain:     Difficulty of Paying Living Expenses:    Food Insecurity:     Worried About Running Out of Food in the Last Year:     [address] in the Last Year:    Transportation Needs:     Lack of Transportation (Medical):  Lack of Transportation (Non-Medical):    Physical Activity:     Days of Exercise per Week:     Minutes of Exercise per Session:    Stress:     Feeling of Stress :    Social Connections:     Frequency of Communication with Friends and Family:     Frequency of Social Gatherings with Friends and Family:     Attends [employer] Services:     Active Member of Clubs or Organizations:     Attends Club or Organization Meetings:     Marital Status:    Intimate Partner Violence:     Fear of Current or Ex-Partner:     Emotionally Abused:     Physically Abused:     Sexually Abused:          PHYSICAL EXAM       ED Triage Vitals   BP Temp Temp src Pulse Resp SpO2 Height Weight   -- -- -- -- -- -- -- --       Physical Exam  Vitals and nursing note reviewed. Constitutional:       Appearance: She is well-developed. HENT:      Head: Normocephalic. Right Ear: External ear normal.      Left Ear: External ear normal.   Eyes:      Conjunctiva/sclera: Conjunctivae normal.      Pupils: Pupils are equal, round, and reactive to light. Cardiovascular:      Rate and Rhythm: Normal rate and regular rhythm. Heart sounds: Normal heart sounds. Pulmonary:      Effort: Pulmonary effort is normal.      Breath sounds: Normal breath sounds. Abdominal:      General: Bowel sounds are normal. There is no distension. Palpations: Abdomen is soft. Tenderness: There is no abdominal tenderness. Musculoskeletal:         General: Normal range of motion. Cervical back: Normal range of motion and neck supple. Comments: +L hip pain. 2+ L DP pulse. Skin:     General: Skin is warm and dry.    Neurological:      Mental Status: She is alert and oriented to person, place, and time. Psychiatric:         Mood and Affect: Mood normal.           MDM  81 yo female presents to the ED with L hip pain. Pt is afebrile, hemodynamically stable. Pt given 500 ccs NS, IV dilaudid, IV zofran in the ED with moderate relief. XR of hip shows intact hardware. Pt educated about L hip pain. Pt with hip replacement years ago with Dr. Lamine Fuchs of [facility]. Pt given prescription for percocet, flexeril. Pt will f/u with ortho. Pt understands plan. FINAL IMPRESSION      1.  Left hip pain          DISPOSITION/PLAN   DISPOSITION Decision To Discharge 08/02/2021 09:14:43 AM        DISCHARGE MEDICATIONS:  [unfilled]         Burt Rutledge MD(electronically signed)  Attending Emergency Physician            Burt Rutledge MD  08/02/21 0333

## 2021-08-02 NOTE — ED NOTES
Bed: 07  Expected date: 8/2/21  Expected time: 8:10 AM  Means of arrival: [facility]  Comments:  86F hip pain, hx of replacement.   120/74, RR 16,64 Hr, 99% RA

## 2022-10-08 ENCOUNTER — APPOINTMENT (OUTPATIENT)
Dept: CT IMAGING | Age: 87
End: 2022-10-08
Payer: MEDICARE

## 2022-10-08 ENCOUNTER — HOSPITAL ENCOUNTER (EMERGENCY)
Age: 87
Discharge: HOME | End: 2022-10-08
Attending: FAMILY MEDICINE
Payer: MEDICARE

## 2022-10-08 VITALS
DIASTOLIC BLOOD PRESSURE: 90 MMHG | TEMPERATURE: 97.9 F | OXYGEN SATURATION: 98 % | HEART RATE: 60 BPM | SYSTOLIC BLOOD PRESSURE: 219 MMHG | HEIGHT: 63 IN | BODY MASS INDEX: 32.96 KG/M2 | WEIGHT: 186 LBS | RESPIRATION RATE: 18 BRPM

## 2022-10-08 DIAGNOSIS — N28.1 BILATERAL RENAL CYSTS: ICD-10-CM

## 2022-10-08 DIAGNOSIS — N30.01 ACUTE HEMORRHAGIC CYSTITIS: Primary | ICD-10-CM

## 2022-10-08 LAB
ALBUMIN SERPL-MCNC: 3.3 G/DL (ref 3.5–4.6)
ALP BLD-CCNC: 45 U/L (ref 40–130)
ALT SERPL-CCNC: <5 U/L (ref 0–33)
ANION GAP SERPL CALCULATED.3IONS-SCNC: 10 MEQ/L (ref 9–15)
AST SERPL-CCNC: 10 U/L (ref 0–35)
BACTERIA: NEGATIVE /HPF
BASOPHILS ABSOLUTE: 0.1 K/UL (ref 0–0.2)
BASOPHILS RELATIVE PERCENT: 0.6 %
BILIRUB SERPL-MCNC: 0.4 MG/DL (ref 0.2–0.7)
BILIRUBIN URINE: ABNORMAL
BLOOD, URINE: ABNORMAL
BUN BLDV-MCNC: 30 MG/DL (ref 8–23)
CALCIUM SERPL-MCNC: 9.3 MG/DL (ref 8.5–9.9)
CHLORIDE BLD-SCNC: 108 MEQ/L (ref 95–107)
CLARITY: ABNORMAL
CO2: 23 MEQ/L (ref 20–31)
COLOR: ABNORMAL
CREAT SERPL-MCNC: 1.38 MG/DL (ref 0.5–0.9)
EOSINOPHILS ABSOLUTE: 0.1 K/UL (ref 0–0.7)
EOSINOPHILS RELATIVE PERCENT: 0.7 %
EPITHELIAL CELLS, UA: ABNORMAL /HPF (ref 0–5)
GFR AFRICAN AMERICAN: 43.7
GFR NON-AFRICAN AMERICAN: 36.1
GLOBULIN: 2.3 G/DL (ref 2.3–3.5)
GLUCOSE BLD-MCNC: 109 MG/DL (ref 70–99)
GLUCOSE URINE: NEGATIVE MG/DL
HCT VFR BLD CALC: 33.7 % (ref 37–47)
HEMOGLOBIN: 10.9 G/DL (ref 12–16)
KETONES, URINE: NEGATIVE MG/DL
LEUKOCYTE ESTERASE, URINE: ABNORMAL
LYMPHOCYTES ABSOLUTE: 0.6 K/UL (ref 1–4.8)
LYMPHOCYTES RELATIVE PERCENT: 6.4 %
MCH RBC QN AUTO: 28.4 PG (ref 27–31.3)
MCHC RBC AUTO-ENTMCNC: 32.4 % (ref 33–37)
MCV RBC AUTO: 87.5 FL (ref 82–100)
MONOCYTES ABSOLUTE: 0.9 K/UL (ref 0.2–0.8)
MONOCYTES RELATIVE PERCENT: 10.2 %
NEUTROPHILS ABSOLUTE: 7.4 K/UL (ref 1.4–6.5)
NEUTROPHILS RELATIVE PERCENT: 82.1 %
NITRITE, URINE: POSITIVE
PDW BLD-RTO: 15.4 % (ref 11.5–14.5)
PH UA: 5 (ref 5–9)
PLATELET # BLD: 157 K/UL (ref 130–400)
POTASSIUM SERPL-SCNC: 4.1 MEQ/L (ref 3.4–4.9)
PROTEIN UA: 100 MG/DL
RBC # BLD: 3.85 M/UL (ref 4.2–5.4)
RBC UA: >100 /HPF (ref 0–2)
SODIUM BLD-SCNC: 141 MEQ/L (ref 135–144)
SPECIFIC GRAVITY UA: 1.01 (ref 1–1.03)
TOTAL PROTEIN: 5.6 G/DL (ref 6.3–8)
URINE REFLEX TO CULTURE: YES
UROBILINOGEN, URINE: 1 E.U./DL
WBC # BLD: 9 K/UL (ref 4.8–10.8)
WBC UA: >100 /HPF (ref 0–5)

## 2022-10-08 PROCEDURE — 87086 URINE CULTURE/COLONY COUNT: CPT

## 2022-10-08 PROCEDURE — 87186 SC STD MICRODIL/AGAR DIL: CPT

## 2022-10-08 PROCEDURE — 2580000003 HC RX 258: Performed by: FAMILY MEDICINE

## 2022-10-08 PROCEDURE — 74176 CT ABD & PELVIS W/O CONTRAST: CPT

## 2022-10-08 PROCEDURE — 36415 COLL VENOUS BLD VENIPUNCTURE: CPT

## 2022-10-08 PROCEDURE — 85025 COMPLETE CBC W/AUTO DIFF WBC: CPT

## 2022-10-08 PROCEDURE — 96365 THER/PROPH/DIAG IV INF INIT: CPT

## 2022-10-08 PROCEDURE — 6360000002 HC RX W HCPCS: Performed by: FAMILY MEDICINE

## 2022-10-08 PROCEDURE — 87088 URINE BACTERIA CULTURE: CPT

## 2022-10-08 PROCEDURE — 6370000000 HC RX 637 (ALT 250 FOR IP): Performed by: FAMILY MEDICINE

## 2022-10-08 PROCEDURE — 99284 EMERGENCY DEPT VISIT MOD MDM: CPT

## 2022-10-08 PROCEDURE — 80053 COMPREHEN METABOLIC PANEL: CPT

## 2022-10-08 PROCEDURE — 81001 URINALYSIS AUTO W/SCOPE: CPT

## 2022-10-08 RX ORDER — CIPROFLOXACIN 500 MG/1
500 TABLET, FILM COATED ORAL ONCE
Status: COMPLETED | OUTPATIENT
Start: 2022-10-08 | End: 2022-10-08

## 2022-10-08 RX ORDER — CIPROFLOXACIN 500 MG/1
500 TABLET, FILM COATED ORAL 2 TIMES DAILY
Qty: 14 TABLET | Refills: 0 | Status: SHIPPED | OUTPATIENT
Start: 2022-10-08 | End: 2022-10-15

## 2022-10-08 RX ORDER — AMLODIPINE BESYLATE 5 MG/1
5 TABLET ORAL ONCE
Status: COMPLETED | OUTPATIENT
Start: 2022-10-08 | End: 2022-10-08

## 2022-10-08 RX ORDER — 0.9 % SODIUM CHLORIDE 0.9 %
1000 INTRAVENOUS SOLUTION INTRAVENOUS ONCE
Status: COMPLETED | OUTPATIENT
Start: 2022-10-08 | End: 2022-10-08

## 2022-10-08 RX ADMIN — CIPROFLOXACIN 500 MG: 500 TABLET, FILM COATED ORAL at 14:22

## 2022-10-08 RX ADMIN — SODIUM CHLORIDE 1000 ML: 9 INJECTION, SOLUTION INTRAVENOUS at 11:24

## 2022-10-08 RX ADMIN — CEFTRIAXONE SODIUM 1000 MG: 1 INJECTION, POWDER, FOR SOLUTION INTRAMUSCULAR; INTRAVENOUS at 11:26

## 2022-10-08 RX ADMIN — AMLODIPINE BESYLATE 5 MG: 5 TABLET ORAL at 14:33

## 2022-10-08 NOTE — ED NOTES
Pt presents to ER with her son for blood in her urine.    Pt states she saw a urologist last week because she has had issues with her kidney, he did a urine culture then   Pt states she did have some sort of infection and the next time she went they did a \"pelvic exam\"   Patient was started on a new medication and then shortly after started experiencing blood in her urine      Angelina Adam, RN  10/08/22 [id number]

## 2022-10-08 NOTE — ED TRIAGE NOTES
Pt presents to ER for blood in her urine and pain with urination x several days after seeing a urologist 1 week ago. Pt started on trospium by urologist and thought that this is what was causing it so she stopped taking it.

## 2022-10-08 NOTE — ED PROVIDER NOTES
[address] [facility] ED  eMERGENCY dEPARTMENT eNCOUnter      Pt Name: Leila Nieves  MRN: [id number]  [place] 1935  Date of evaluation: 10/8/2022  Provider: Lenny Gerard MD    CHIEF COMPLAINT       Chief Complaint   Patient presents with    Hematuria         HISTORY OF PRESENT ILLNESS   (Location/Symptom, Timing/Onset,Context/Setting, Quality, Duration, Modifying Factors, Severity)  Note limiting factors. Leila Nieves is a 80 y.o. female who presents to the emergency department hematuria and dysuria    Patient with PHX of constipation degenerative disc disease hypertension and urinary incontinence . To the ED today with urinary urgency frequency and dysuria started yesterday gross hematuria have got worse today. Was seen by a urologist urologist 3 weeks ago week ago. Pt started on trospium by urologist and thought that this is what was causing it so she stopped taking it. Background : patient had MRI of the kidney at the [facility] clinic last year that was significant for multiple benign neural renal cyst              The history is provided by the patient. NursingNotes were reviewed. REVIEW OF SYSTEMS    (2-9 systems for level 4, 10 or more for level 5)     Review of Systems   Constitutional: Negative. HENT: Negative. Respiratory: Negative. Cardiovascular: Negative. Genitourinary:  Positive for dysuria, frequency and hematuria. Skin: Negative. Psychiatric/Behavioral: Negative. Except as noted above the remainder of the review of systems was reviewed and negative.        PAST MEDICAL HISTORY     Past Medical History:   Diagnosis Date    Bleeding ulcer     CAD (coronary artery disease)     Colitis     Hyperlipidemia     Hypertension          SURGICALHISTORY       Past Surgical History:   Procedure Laterality Date    APPENDECTOMY      COLECTOMY  1/3/2011    sigmoid colectomy with colostomy    COLECTOMY  7/26/2011    partial colectomy with colostomy closure CORONARY ARTERY BYPASS GRAFT      HYSTERECTOMY      HYSTERECTOMY, TOTAL ABDOMINAL      MO PARTIAL HIP REPLACEMENT Left 2/8/2018    HIP HEMIARTHROPLASTY- performed by Nickolas Blackwood MD at [facility]       Discharge Medication List as of 10/8/2022  2:26 PM        CONTINUE these medications which have NOT CHANGED    Details   pantoprazole (PROTONIX) 40 MG tablet Historical Med      ELIQUIS 2.5 MG TABS tablet take 1 tablet by mouth twice a day, DAWHistorical Med      nystatin-triamcinolone (MYCOLOG II) 454823-6.1 UNIT/GM-% cream Apply topically 2 times daily. , Disp-30 g, R-0, Normal      magnesium oxide (MAG-OX) 400 (241.3 Mg) MG TABS tablet Take 1 tablet by mouth daily, Disp-30 tablet, R-0Normal      metoprolol tartrate (LOPRESSOR) 50 MG tablet Take 1 tablet by mouth 2 times daily, Disp-60 tablet, R-3Normal      losartan (COZAAR) 50 MG tablet Take 50 mg by mouth 2 times dailyHistorical Med      amLODIPine (NORVASC) 5 MG tablet 5 mg daily , R-0Historical Med      nitroGLYCERIN (NITROLINGUAL) 0.4 MG/SPRAY spray Place 1 spray onto the tongue every 5 minutes as needed for Chest pain      calcium carbonate (OSCAL) 500 MG TABS tablet Take 500 mg by mouth daily. aspirin 81 MG EC tablet Take 81 mg by mouth daily. acetaminophen (TYLENOL) 500 MG tablet Take 500 mg by mouth every 6 hours as needed. ALLERGIES     Lisinopril, Statins, and Sulfa antibiotics    FAMILY HISTORY     No family history on file.        SOCIAL HISTORY       Social History     Socioeconomic History    Marital status:    Tobacco Use    Smoking status: Former    Smokeless tobacco: Never   Vaping Use    Vaping Use: Never used   Substance and Sexual Activity    Alcohol use: Not Currently    Drug use: Not Currently   Social History Narrative         Lives With: Spouse    Type of Home: House--Two level (split flight of stairs to 2nd fl full bath; has first fl bed and bedside commode)    Home Access: Stairs to enter with rail- Number of Steps: 5    Entrance Stairs - Rails: Both    Bathroom Shower/Tub: Tub/Shower unit    Bathroom Equipment: Commode, Shower chair (commode is old and unsafe)    Home Equipment: Rolling walker         Homemaking Responsibilities: No       SCREENINGS    [place] Coma Scale  Eye Opening: Spontaneous  Best Verbal Response: Oriented  Best Motor Response: Obeys commands  [place] Coma Scale Score: 15 @FLOW([id number])@      PHYSICAL EXAM    (up to 7 for level 4, 8 or more for level 5)     ED Triage Vitals   BP Temp Temp Source Heart Rate Resp SpO2 Height Weight   10/08/22 1028 10/08/22 1031 10/08/22 1031 10/08/22 1028 10/08/22 1031 10/08/22 1028 10/08/22 1028 10/08/22 1028   (!) 151/69 97.9 °F (36.6 °C) Oral 60 18 97 % 5' 3\" (1.6 m) 186 lb (84.4 kg)       Physical Exam  Vitals and nursing note reviewed. Constitutional:       Appearance: Normal appearance. She is well-developed. HENT:      Head: Normocephalic and atraumatic. Right Ear: External ear normal.      Left Ear: External ear normal.      Nose: Nose normal.      Mouth/Throat:      Mouth: Mucous membranes are moist.      Pharynx: Oropharynx is clear. Eyes:      Pupils: Pupils are equal, round, and reactive to light. Cardiovascular:      Rate and Rhythm: Normal rate and regular rhythm. Heart sounds: Normal heart sounds. Pulmonary:      Effort: Pulmonary effort is normal. No respiratory distress. Breath sounds: Normal breath sounds. No wheezing or rales. Chest:      Chest wall: No tenderness. Abdominal:      General: Bowel sounds are normal.      Palpations: Abdomen is soft. Musculoskeletal:         General: Normal range of motion. Cervical back: Normal range of motion and neck supple. Skin:     General: Skin is warm and dry. Neurological:      Mental Status: She is alert and oriented to person, place, and time.       Cranial Nerves: No cranial nerve deficit. Sensory: No sensory deficit. Motor: No abnormal muscle tone. Coordination: Coordination normal.      Deep Tendon Reflexes: Reflexes normal.   Psychiatric:         Behavior: Behavior normal.         Thought Content: Thought content normal.         Judgment: Judgment normal.       DIAGNOSTIC RESULTS     EKG: All EKG's are interpreted by the Emergency Department Physician who either signs or Co-signsthis chart in the absence of a cardiologist.        RADIOLOGY:   Lucrecia Shouts such as CT, Ultrasound and MRI are read by the radiologist. Plain radiographic images are visualized and preliminarily interpreted by the emergency physician with the below findings:        Interpretation per the Radiologist below, if available at the time ofthis note:    CT ABDOMEN PELVIS WO CONTRAST Additional Contrast? None   Final Result   Kidneys demonstrate bilateral low-attenuation areas with intermixed densities   including left partially exophytic 2.6 cm renal lesion of likely scattered   renal cortical cyst somewhat indeterminate on lack of intravenous contrast   imaging with extrarenal pelves right greater than left. No calculi evident. In the setting of hematuria on dedicated CT renal protocol and/or urography   considered for further evaluation. Small hiatal hernia      Bilateral adrenal nodules concerning for lipid rich adenomas.                ED BEDSIDE ULTRASOUND:   Performed by ED Physician - none    LABS:  Labs Reviewed   CULTURE, URINE - Abnormal; Notable for the following components:       Result Value    Urine Culture, Routine   (*)     Value: Performed at [facility] [address]  [phone]      Organism Escherichia coli (*)     All other components within normal limits    Narrative:     ORDER#: [id number]                          ORDERED BY: Rona Horne  SOURCE: Urine Clean Catch                  COLLECTED:  10/08/22 11:54  ANTIBIOTICS AT KATHRYN.: RECEIVED :  10/08/22 11:54   URINALYSIS WITH REFLEX TO CULTURE - Abnormal; Notable for the following components:    Color, UA RED (*)     Clarity, UA TURBID (*)     Bilirubin Urine MODERATE (*)     Blood, Urine MODERATE (*)     Protein,  (*)     Nitrite, Urine POSITIVE (*)     Leukocyte Esterase, Urine LARGE (*)     All other components within normal limits   COMPREHENSIVE METABOLIC PANEL - Abnormal; Notable for the following components:    Chloride 108 (*)     Glucose 109 (*)     BUN 30 (*)     Creatinine 1.38 (*)     GFR Non- 36.1 (*)     GFR  43.7 (*)     Total Protein 5.6 (*)     Albumin 3.3 (*)     All other components within normal limits   CBC WITH AUTO DIFFERENTIAL - Abnormal; Notable for the following components:    RBC 3.85 (*)     Hemoglobin 10.9 (*)     Hematocrit 33.7 (*)     MCHC 32.4 (*)     RDW 15.4 (*)     Neutrophils Absolute 7.4 (*)     Lymphocytes Absolute 0.6 (*)     Monocytes Absolute 0.9 (*)     All other components within normal limits   MICROSCOPIC URINALYSIS - Abnormal; Notable for the following components:    RBC, UA >100 (*)     WBC, UA >100 (*)     All other components within normal limits       All other labs were within normal range or not returned as of this dictation.     EMERGENCY DEPARTMENT COURSE and DIFFERENTIAL DIAGNOSIS/MDM:   Vitals:    Vitals:    10/08/22 1031 10/08/22 1420 10/08/22 1425 10/08/22 1429   BP:  (!) 201/91 (!) 208/84 (!) 219/90   Pulse:       Resp: 18      Temp: 97.9 °F (36.6 °C)      TempSrc: Oral      SpO2:   100% 98%   Weight:       Height:                    MDM  Number of Diagnoses or Management Options  Acute hemorrhagic cystitis  Bilateral renal cysts  Diagnosis management comments: Resolved known history of hypertension multiple renal cyst urinary incontinence already have a urologist presented with acute onset dysuria hematuria started last night urine confirmed likely secondary to acute hemorrhagic cystitis CT of the abdomen pelvis confirmed the presence of multiple renal cysts which patient was already aware of this patient was started empirically antibiotic and advised to have a close follow-up with her urologist this week. Patient and son verbalized understanding and agreed with       Amount and/or Complexity of Data Reviewed  Clinical lab tests: ordered and reviewed  Tests in the radiology section of CPT®: ordered and reviewed        CONSULTS:  None    PROCEDURES:  Unless otherwise noted below, none     Procedures    FINAL IMPRESSION      1. Acute hemorrhagic cystitis    2.  Bilateral renal cysts          DISPOSITION/PLAN   DISPOSITION Decision To Discharge 10/08/2022 02:17:58 PM      PATIENT REFERRED TO:  Marlee Correa MD  [facility]  [address] [facility] [phone]    In 3 days      Blayne Chau MD  [facility] [address] [facility], [address] [address] [phone]    In 3 days      DISCHARGE MEDICATIONS:  Discharge Medication List as of 10/8/2022  2:26 PM        START taking these medications    Details   ciprofloxacin (CIPRO) 500 MG tablet Take 1 tablet by mouth 2 times daily for 7 days, Disp-14 tablet, R-0Normal                (Please note thatportions of this note were completed with a voice recognition program.  Efforts were made to edit the dictations but occasionally words are mis-transcribed.)    Jomar Jefferson MD (electronically signed)  Attending Emergency Physician          Cassy Liz MD  10/09/22 Eileen House

## 2022-10-08 NOTE — ED NOTES
Per Dr. Jameson Andersen it is okay to send pt home without waiting for her BP to lower as she has known HTN      Greg Perry RN  10/08/22 7816

## 2022-10-08 NOTE — ED NOTES
Dr. Goddard Lab at bedside for discharge, explaining UTI.  At this time patients BP repeatedly cycles over 144 systolic     Angelina Adam RN  10/08/22 1431

## 2022-10-08 NOTE — ED NOTES
Dr. Elliott Abts notified of patients blood pressure systolic >249      Connie Xie RN  10/08/22 2012

## 2022-10-09 ASSESSMENT — ENCOUNTER SYMPTOMS: RESPIRATORY NEGATIVE: 1

## 2022-10-10 LAB
ORGANISM: ABNORMAL
URINE CULTURE, ROUTINE: ABNORMAL
URINE CULTURE, ROUTINE: ABNORMAL

## 2023-01-04 LAB
ALBUMIN: 3.8 G/DL (ref 3.4–5)
ALP BLD-CCNC: 36 U/L (ref 33–136)
ALT SERPL-CCNC: 9 U/L (ref 7–45)
ANION GAP SERPL CALCULATED.3IONS-SCNC: 10 MMOL/L (ref 10–20)
AST SERPL-CCNC: 13 U/L (ref 9–39)
BICARBONATE: 28 MMOL/L (ref 21–32)
BILIRUB SERPL-MCNC: 0.5 MG/DL (ref 0–1.2)
BILIRUBIN DIRECT: 0.1 MG/DL (ref 0–0.3)
CALCIUM SERPL-MCNC: 9 MG/DL (ref 8.6–10.3)
CHLORIDE BLD-SCNC: 107 MMOL/L (ref 98–107)
CHOLESTEROL/HDL RATIO: 4.4
CHOLESTEROL: 195 MG/DL (ref 0–199)
CREAT SERPL-MCNC: 1.42 MG/DL (ref 0.5–1)
EGFR FEMALE: 36 ML/MIN/1.73M2
ERYTHROCYTE [DISTWIDTH] IN BLOOD BY AUTOMATED COUNT: 14.8 % (ref 11.5–14)
GLUCOSE: 90 MG/DL (ref 74–99)
HCT VFR BLD CALC: 36.4 % (ref 36–46)
HDLC SERPL-MCNC: 44.3 MG/DL
HEMOGLOBIN: 11.3 G/DL (ref 12–16)
LDL CHOLESTEROL: 132 MG/DL (ref 0–99)
MAGNESIUM: 2 MG/DL (ref 1.6–2.4)
MCHC RBC AUTO-ENTMCNC: 31 G/DL (ref 32–36)
MCV RBC AUTO: 91 FL (ref 80–100)
PLATELET # BLD: 229 X10E9/L (ref 150–450)
POTASSIUM SERPL-SCNC: 4.1 MMOL/L (ref 3.5–5.3)
RBC # BLD: 4.02 X10E12/L (ref 4–5.2)
SODIUM BLD-SCNC: 141 MMOL/L (ref 136–145)
TOTAL PROTEIN: 6.3 G/DL (ref 6.4–8.2)
TRIGL SERPL-MCNC: 96 MG/DL (ref 0–149)
UREA NITROGEN: 26 MG/DL (ref 6–23)
VLDLC SERPL CALC-MCNC: 19 MG/DL (ref 0–40)
WBC: 7.7 X10E9/L (ref 4.4–11.3)

## 2023-03-07 ENCOUNTER — APPOINTMENT (OUTPATIENT)
Dept: GENERAL RADIOLOGY | Age: 88
End: 2023-03-07
Payer: COMMERCIAL

## 2023-03-07 ENCOUNTER — HOSPITAL ENCOUNTER (EMERGENCY)
Age: 88
Discharge: HOME | End: 2023-03-07
Attending: STUDENT IN AN ORGANIZED HEALTH CARE EDUCATION/TRAINING PROGRAM
Payer: COMMERCIAL

## 2023-03-07 VITALS
OXYGEN SATURATION: 98 % | DIASTOLIC BLOOD PRESSURE: 60 MMHG | BODY MASS INDEX: 32.96 KG/M2 | RESPIRATION RATE: 18 BRPM | TEMPERATURE: 98 F | WEIGHT: 186 LBS | SYSTOLIC BLOOD PRESSURE: 147 MMHG | HEART RATE: 57 BPM | HEIGHT: 63 IN

## 2023-03-07 DIAGNOSIS — T82.9XXA DISORDER OF CARDIAC PACEMAKER SYSTEM, INITIAL ENCOUNTER: Primary | ICD-10-CM

## 2023-03-07 LAB
ALBUMIN SERPL-MCNC: 3.8 G/DL (ref 3.5–4.6)
ALP BLD-CCNC: 51 U/L (ref 40–130)
ALT SERPL-CCNC: 8 U/L (ref 0–33)
ANION GAP SERPL CALCULATED.3IONS-SCNC: 10 MEQ/L (ref 9–15)
AST SERPL-CCNC: 14 U/L (ref 0–35)
BASOPHILS ABSOLUTE: 0.1 K/UL (ref 0–0.2)
BASOPHILS RELATIVE PERCENT: 1.1 %
BILIRUB SERPL-MCNC: <0.2 MG/DL (ref 0.2–0.7)
BILIRUBIN URINE: NEGATIVE
BLOOD, URINE: NEGATIVE
BUN BLDV-MCNC: 37 MG/DL (ref 8–23)
CALCIUM SERPL-MCNC: 9.1 MG/DL (ref 8.5–9.9)
CHLORIDE BLD-SCNC: 105 MEQ/L (ref 95–107)
CLARITY: CLEAR
CO2: 27 MEQ/L (ref 20–31)
COLOR: YELLOW
CREAT SERPL-MCNC: 1.96 MG/DL (ref 0.5–0.9)
EOSINOPHILS ABSOLUTE: 0.2 K/UL (ref 0–0.7)
EOSINOPHILS RELATIVE PERCENT: 2.1 %
GFR SERPL CREATININE-BSD FRML MDRD: 24.2 ML/MIN/{1.73_M2}
GLOBULIN: 2.7 G/DL (ref 2.3–3.5)
GLUCOSE BLD-MCNC: 103 MG/DL (ref 70–99)
GLUCOSE URINE: NEGATIVE MG/DL
HCT VFR BLD CALC: 36.5 % (ref 37–47)
HEMOGLOBIN: 11.8 G/DL (ref 12–16)
KETONES, URINE: NEGATIVE MG/DL
LEUKOCYTE ESTERASE, URINE: NEGATIVE
LIPASE: 32 U/L (ref 12–95)
LYMPHOCYTES ABSOLUTE: 1.4 K/UL (ref 1–4.8)
LYMPHOCYTES RELATIVE PERCENT: 14.4 %
MAGNESIUM: 2.1 MG/DL (ref 1.7–2.4)
MCH RBC QN AUTO: 28.2 PG (ref 27–31.3)
MCHC RBC AUTO-ENTMCNC: 32.4 % (ref 33–37)
MCV RBC AUTO: 86.9 FL (ref 79.4–94.8)
MONOCYTES ABSOLUTE: 0.9 K/UL (ref 0.2–0.8)
MONOCYTES RELATIVE PERCENT: 9.2 %
NEUTROPHILS ABSOLUTE: 7.3 K/UL (ref 1.4–6.5)
NEUTROPHILS RELATIVE PERCENT: 73.2 %
NITRITE, URINE: NEGATIVE
PDW BLD-RTO: 15.1 % (ref 11.5–14.5)
PH UA: 7 (ref 5–9)
PLATELET # BLD: 198 K/UL (ref 130–400)
POTASSIUM SERPL-SCNC: 5 MEQ/L (ref 3.4–4.9)
PRO-BNP: 4213 PG/ML
PROTEIN UA: NEGATIVE MG/DL
RBC # BLD: 4.2 M/UL (ref 4.2–5.4)
SODIUM BLD-SCNC: 142 MEQ/L (ref 135–144)
SPECIFIC GRAVITY UA: 1.01 (ref 1–1.03)
TOTAL PROTEIN: 6.5 G/DL (ref 6.3–8)
TROPONIN: <0.01 NG/ML (ref 0–0.01)
TSH SERPL DL<=0.05 MIU/L-ACNC: 0.74 UIU/ML (ref 0.44–3.86)
URINE REFLEX TO CULTURE: NORMAL
UROBILINOGEN, URINE: 0.2 E.U./DL
WBC # BLD: 9.9 K/UL (ref 4.8–10.8)

## 2023-03-07 PROCEDURE — 99285 EMERGENCY DEPT VISIT HI MDM: CPT

## 2023-03-07 PROCEDURE — 36415 COLL VENOUS BLD VENIPUNCTURE: CPT

## 2023-03-07 PROCEDURE — 96375 TX/PRO/DX INJ NEW DRUG ADDON: CPT

## 2023-03-07 PROCEDURE — 83735 ASSAY OF MAGNESIUM: CPT

## 2023-03-07 PROCEDURE — 71045 X-RAY EXAM CHEST 1 VIEW: CPT

## 2023-03-07 PROCEDURE — 84443 ASSAY THYROID STIM HORMONE: CPT

## 2023-03-07 PROCEDURE — 81003 URINALYSIS AUTO W/O SCOPE: CPT

## 2023-03-07 PROCEDURE — 85025 COMPLETE CBC W/AUTO DIFF WBC: CPT

## 2023-03-07 PROCEDURE — 83880 ASSAY OF NATRIURETIC PEPTIDE: CPT

## 2023-03-07 PROCEDURE — 84484 ASSAY OF TROPONIN QUANT: CPT

## 2023-03-07 PROCEDURE — 2500000003 HC RX 250 WO HCPCS: Performed by: PHYSICIAN ASSISTANT

## 2023-03-07 PROCEDURE — 6360000002 HC RX W HCPCS: Performed by: PHYSICIAN ASSISTANT

## 2023-03-07 PROCEDURE — 93005 ELECTROCARDIOGRAM TRACING: CPT | Performed by: PHYSICIAN ASSISTANT

## 2023-03-07 PROCEDURE — 83690 ASSAY OF LIPASE: CPT

## 2023-03-07 PROCEDURE — 96374 THER/PROPH/DIAG INJ IV PUSH: CPT

## 2023-03-07 PROCEDURE — 80053 COMPREHEN METABOLIC PANEL: CPT

## 2023-03-07 RX ORDER — HYDRALAZINE HYDROCHLORIDE 20 MG/ML
10 INJECTION INTRAMUSCULAR; INTRAVENOUS ONCE
Status: COMPLETED | OUTPATIENT
Start: 2023-03-07 | End: 2023-03-07

## 2023-03-07 RX ORDER — LABETALOL HYDROCHLORIDE 5 MG/ML
10 INJECTION, SOLUTION INTRAVENOUS ONCE
Status: COMPLETED | OUTPATIENT
Start: 2023-03-07 | End: 2023-03-07

## 2023-03-07 RX ADMIN — HYDRALAZINE HYDROCHLORIDE 10 MG: 20 INJECTION INTRAMUSCULAR; INTRAVENOUS at 21:10

## 2023-03-07 RX ADMIN — LABETALOL HYDROCHLORIDE 10 MG: 5 INJECTION, SOLUTION INTRAVENOUS at 21:41

## 2023-03-07 NOTE — ED PROVIDER NOTES
[address] [facility] ED  eMERGENCY dEPARTMENT eNCOUnter      Pt Name: Milena Adler  MRN: [id number]  [place] 1935  Date of evaluation: 3/7/2023  Provider: Nicole Poon PA-C    CHIEF COMPLAINT     No chief complaint on file. HISTORY OF PRESENT ILLNESS   (Location/Symptom, Timing/Onset,Context/Setting, Quality, Duration, Modifying Factors, Severity)  Note limiting factors. Milena Adler is a 80 y.o. female who presents to the emergency department patient says her pacemaker \"keeps zapping her. She notes she had episode this morning but then had 9 separate episodes since 2:00 in the afternoon she did contact her cardiologist who directed her to emergency room she states that the initial EKG by EMS was okay she notes that she has neck and arm pain left greater than right she states her right side chest hurts has leg swelling bilaterally she denies any falls    HPI    NursingNotes were reviewed. REVIEW OF SYSTEMS    (2-9 systems for level 4, 10 or more for level 5)     Review of Systems    Except as noted above the remainder of the review of systems was reviewed and negative.        PAST MEDICAL HISTORY     Past Medical History:   Diagnosis Date    Bleeding ulcer     CAD (coronary artery disease)     Colitis     Hyperlipidemia     Hypertension          SURGICALHISTORY       Past Surgical History:   Procedure Laterality Date    APPENDECTOMY      COLECTOMY  1/3/2011    sigmoid colectomy with colostomy    COLECTOMY  7/26/2011    partial colectomy with colostomy closure    CORONARY ARTERY BYPASS GRAFT      HYSTERECTOMY (CERVIX STATUS UNKNOWN)      HYSTERECTOMY, TOTAL ABDOMINAL (CERVIX REMOVED)      IL HEMIARTHROPLASTY HIP PARTIAL Left 2/8/2018    HIP HEMIARTHROPLASTY- performed by Giselle Oleary MD at [address] [place] [address]       Previous Medications    ACETAMINOPHEN (TYLENOL) 500 MG TABLET    Take 500 mg by mouth every 6 hours as needed. AMLODIPINE (NORVASC) 5 MG TABLET    5 mg daily     ASPIRIN 81 MG EC TABLET    Take 81 mg by mouth daily. CALCIUM CARBONATE (OSCAL) 500 MG TABS TABLET    Take 500 mg by mouth daily. ELIQUIS 2.5 MG TABS TABLET    take 1 tablet by mouth twice a day    LOSARTAN (COZAAR) 50 MG TABLET    Take 50 mg by mouth 2 times daily    MAGNESIUM OXIDE (MAG-OX) 400 (241.3 MG) MG TABS TABLET    Take 1 tablet by mouth daily    METOPROLOL TARTRATE (LOPRESSOR) 50 MG TABLET    Take 1 tablet by mouth 2 times daily    NITROGLYCERIN (NITROLINGUAL) 0.4 MG/SPRAY SPRAY    Place 1 spray onto the tongue every 5 minutes as needed for Chest pain    NYSTATIN-TRIAMCINOLONE (MYCOLOG II) 274412-4.1 UNIT/GM-% CREAM    Apply topically 2 times daily. PANTOPRAZOLE (PROTONIX) 40 MG TABLET                Lisinopril, Statins, and Sulfa antibiotics    FAMILY HISTORY     History reviewed. No pertinent family history.        SOCIAL HISTORY       Social History     Socioeconomic History    Marital status:      Spouse name: None    Number of children: None    Years of education: None    Highest education level: None   Tobacco Use    Smoking status: Former    Smokeless tobacco: Never   Vaping Use    Vaping Use: Never used   Substance and Sexual Activity    Alcohol use: Not Currently    Drug use: Not Currently   Social History Narrative         Lives With: Spouse    Type of Home: House--Two level (split flight of stairs to 2nd fl full bath; has first fl bed and bedside commode)    Home Access: Stairs to enter with rail- Number of Steps: 5    Entrance Stairs - Rails: Both    Bathroom Shower/Tub: Tub/Shower unit    Bathroom Equipment: Commode, Shower chair (commode is old and unsafe)    Home Equipment: Rolling walker         Homemaking Responsibilities: No       SCREENINGS   [place] Coma Scale  Eye Opening: Spontaneous  Best Verbal Response: Oriented  Best Motor Response: Obeys commands  [place] Coma Scale Score: 15  Ebola Virus Disease (EVD) Screening   Temp: 98 °F (36.7 °C)  CIWA Assessment  BP: (!) 147/60  Heart Rate: 57    PHYSICAL EXAM    (up to 7 for level 4, 8 or more for level 5)     ED Triage Vitals [03/07/23 1726]   BP Temp Temp Source Heart Rate Resp SpO2 Height Weight   (!) 164/79 98 °F (36.7 °C) Oral 91 18 97 % 5' 3\" (1.6 m) 186 lb (84.4 kg)       Physical Exam    RESULTS     EKG: All EKG's are interpreted by the Emergency Department Physician who either signs or Co-signsthis chart in the absence of a cardiologist.         RADIOLOGY:   Non-plain filmimages such as CT, Ultrasound and MRI are read by the radiologist. Plain radiographic images are visualized and preliminarily interpreted by the emergency physician with the below findings:            Interpretation per the Radiologist below, if available at the time ofthis note:    XR CHEST PORTABLE   Final Result   No acute process. Borderline cardiomegaly. ED BEDSIDE ULTRASOUND:   Performed by ED Physician - none    LABS:  Labs Reviewed   CBC WITH AUTO DIFFERENTIAL - Abnormal; Notable for the following components:       Result Value    Hemoglobin 11.8 (*)     Hematocrit 36.5 (*)     MCHC 32.4 (*)     RDW 15.1 (*)     Neutrophils Absolute 7.3 (*)     Monocytes Absolute 0.9 (*)     All other components within normal limits   COMPREHENSIVE METABOLIC PANEL - Abnormal; Notable for the following components:    Potassium 5.0 (*)     Glucose 103 (*)     BUN 37 (*)     Creatinine 1.96 (*)     Est, Glom Filt Rate 24.2 (*)     All other components within normal limits   MAGNESIUM   LIPASE   TROPONIN   TSH   BRAIN NATRIURETIC PEPTIDE   URINALYSIS WITH REFLEX TO CULTURE       All other labs were within normal range or not returned as of this dictation.     EMERGENCY DEPARTMENT COURSE and DIFFERENTIAL DIAGNOSIS/MDM:   Vitals:    Vitals:    03/07/23 1900 03/07/23 1915 03/07/23 2036 03/07/23 2145   BP:   (!) 219/83 (!) 147/60   Pulse: 61 61 56 57   Resp:       Temp: TempSrc:       SpO2: 97% 100% 98% 98%   Weight:       Height:                MDM  Number of Diagnoses or Management Options  Disorder of cardiac pacemaker system, initial encounter  Diagnosis management comments: Patient presents with \"pacemaker keeps happening\" she describes right-sided muscle tremor she has left-sided neck and arm pain which is greater than right-sided. States this started this morning she has a new pacemaker from 2/23/2023. Patient remains ambulatory. Denies chest pain Dr. Jon Garner evaluated patient in emergency room differential diagnosis includes pacer firing arrhythmia    Patient has reproducible rhythmic muscle tremor noted right chest wall and intermittent points during her ER visit also noted by nursing we discussed with cardiology and [facility] pacemaker personnel [employer] personnel will evaluate patient in emergency room Dr. Abram Oates cardiology states if they are able to improve patient's symptoms then patient can discharge to home [employer] rep talk to Dr. Luba Guevara directly Dr. Luba Guevara and Dr. Jon Garner discussed patient at length patient observation 1.5 hours if stable disposition home follow-up with Dr. Luba Guevara tomorrow at 10 AM.  Patient wants discharged to home. Patient blood pressure improved patient has no symptoms at this yelena no more muscle contractions and wants discharged home. She is to call cardiology to ascertain exact time and location for Dr. Luba Guevara appointment       Amount and/or Complexity of Data Reviewed  Clinical lab tests: reviewed and ordered  Tests in the radiology section of CPT®: reviewed and ordered  Discuss the patient with other providers: yes  Independent visualization of images, tracings, or specimens: yes        CRITICAL CARE TIME       CONSULTS:  None    PROCEDURES:  Unless otherwise noted below, none     Procedures    FINAL IMPRESSION      1.  Disorder of cardiac pacemaker system, initial encounter          DISPOSITION/PLAN   DISPOSITION Ed Observation 03/07/2023 08:15:43 PM      PATIENT REFERRED TO:  Cheryle Bradford, MD  [address] [facility] [phone]  [phone]    Call in 1 day  for 10 am appt    [facility]) ED  [address] [address]  [phone]  Go to   If symptoms worsen    DISCHARGE MEDICATIONS:  New Prescriptions    No medications on file          (Please note that portions of this note were completed with a voice recognition program.  Efforts were made to edit the dictations but occasionally words are mis-transcribed.)    Arabella Farrell PA-C (electronically signed)  Attending Emergency Physician        Arabella Farrell PA-C  03/07/23 3976

## 2023-03-07 NOTE — ED NOTES
Patient changed into gown, placed on continuous pulse ox and cardiac monitoring.  Astrid Smith at bedside for patient exam.     Car Linares RN  03/07/23 6728

## 2023-03-07 NOTE — ED TRIAGE NOTES
History of CAD and Afib presents with complaint of feeling like she is being hit in the chest. Denies complaints at presents by states she has experienced multiple episodes over the last 24 hours. Patient called squad this morning who did evaluation, including EKG and all was normal.    Vitals are stable in triage and patient denies complaints other than chronic pains. [employer] pacemaker was implanted on 2/24/23. Takes Eloquis.

## 2023-03-07 NOTE — ED NOTES
Pt pacer interrogated at this time   Pt has pacer card at this time and is a [employer]  [place] PA updated that interrogation is completed at this time  Pt remains alert and oriented times 4  Pt family at bedside     Abel Bentley, [facility]  03/07/23 6630

## 2023-03-08 LAB
ALANINE AMINOTRANSFERASE (SGPT) (U/L) IN SER/PLAS: 9 U/L (ref 7–45)
ALBUMIN (G/DL) IN SER/PLAS: 3.8 G/DL (ref 3.4–5)
ALKALINE PHOSPHATASE (U/L) IN SER/PLAS: 44 U/L (ref 33–136)
ANION GAP IN SER/PLAS: 13 MMOL/L (ref 10–20)
ASPARTATE AMINOTRANSFERASE (SGOT) (U/L) IN SER/PLAS: 15 U/L (ref 9–39)
BILIRUBIN TOTAL (MG/DL) IN SER/PLAS: 0.5 MG/DL (ref 0–1.2)
CALCIUM (MG/DL) IN SER/PLAS: 9.4 MG/DL (ref 8.6–10.3)
CARBON DIOXIDE, TOTAL (MMOL/L) IN SER/PLAS: 26 MMOL/L (ref 21–32)
CHLORIDE (MMOL/L) IN SER/PLAS: 106 MMOL/L (ref 98–107)
CREATININE (MG/DL) IN SER/PLAS: 1.83 MG/DL (ref 0.5–1.05)
ERYTHROCYTE DISTRIBUTION WIDTH (RATIO) BY AUTOMATED COUNT: 14.5 % (ref 11.5–14.5)
ERYTHROCYTE MEAN CORPUSCULAR HEMOGLOBIN CONCENTRATION (G/DL) BY AUTOMATED: 30.3 G/DL (ref 32–36)
ERYTHROCYTE MEAN CORPUSCULAR VOLUME (FL) BY AUTOMATED COUNT: 91 FL (ref 80–100)
ERYTHROCYTES (10*6/UL) IN BLOOD BY AUTOMATED COUNT: 4.08 X10E12/L (ref 4–5.2)
GFR FEMALE: 26 ML/MIN/1.73M2
GLUCOSE (MG/DL) IN SER/PLAS: 93 MG/DL (ref 74–99)
HEMATOCRIT (%) IN BLOOD BY AUTOMATED COUNT: 37 % (ref 36–46)
HEMOGLOBIN (G/DL) IN BLOOD: 11.2 G/DL (ref 12–16)
INR IN PPP BY COAGULATION ASSAY: 1.2 (ref 0.9–1.1)
LEUKOCYTES (10*3/UL) IN BLOOD BY AUTOMATED COUNT: 10.7 X10E9/L (ref 4.4–11.3)
PLATELETS (10*3/UL) IN BLOOD AUTOMATED COUNT: 232 X10E9/L (ref 150–450)
POTASSIUM (MMOL/L) IN SER/PLAS: 4.4 MMOL/L (ref 3.5–5.3)
PROTEIN TOTAL: 6.6 G/DL (ref 6.4–8.2)
PROTHROMBIN TIME (PT) IN PPP BY COAGULATION ASSAY: 14.3 SEC (ref 9.8–13.4)
SODIUM (MMOL/L) IN SER/PLAS: 141 MMOL/L (ref 136–145)
UREA NITROGEN (MG/DL) IN SER/PLAS: 35 MG/DL (ref 6–23)

## 2023-03-08 NOTE — DISCHARGE INSTRUCTIONS
Follow-up with Dr. Kimberli Sullivan in the morning at his office call to confirm location and time. Return to if any symptoms worsen or new symptoms develop.

## 2023-03-10 LAB
EKG ATRIAL RATE: 61 BPM
EKG P AXIS: -77 DEGREES
EKG P-R INTERVAL: 174 MS
EKG Q-T INTERVAL: 482 MS
EKG QRS DURATION: 156 MS
EKG QTC CALCULATION (BAZETT): 489 MS
EKG R AXIS: -84 DEGREES
EKG T AXIS: 88 DEGREES
EKG VENTRICULAR RATE: 62 BPM

## 2023-03-10 PROCEDURE — 93010 ELECTROCARDIOGRAM REPORT: CPT | Performed by: INTERNAL MEDICINE

## 2023-03-27 LAB
ANION GAP IN SER/PLAS: 12 MMOL/L (ref 10–20)
ANION GAP SERPL CALCULATED.3IONS-SCNC: 12 MMOL/L (ref 10–20)
BICARBONATE: 25 MMOL/L (ref 21–32)
CALCIUM (MG/DL) IN SER/PLAS: 9 MG/DL (ref 8.6–10.3)
CALCIUM SERPL-MCNC: 9 MG/DL (ref 8.6–10.3)
CARBON DIOXIDE, TOTAL (MMOL/L) IN SER/PLAS: 25 MMOL/L (ref 21–32)
CHLORIDE (MMOL/L) IN SER/PLAS: 107 MMOL/L (ref 98–107)
CHLORIDE BLD-SCNC: 107 MMOL/L (ref 98–107)
CREAT SERPL-MCNC: 1.55 MG/DL (ref 0.5–1)
CREATININE (MG/DL) IN SER/PLAS: 1.55 MG/DL (ref 0.5–1.05)
EGFR FEMALE: 32 ML/MIN/1.73M2
ERYTHROCYTE DISTRIBUTION WIDTH (RATIO) BY AUTOMATED COUNT: 14.8 % (ref 11.5–14.5)
ERYTHROCYTE MEAN CORPUSCULAR HEMOGLOBIN CONCENTRATION (G/DL) BY AUTOMATED: 31.8 G/DL (ref 32–36)
ERYTHROCYTE MEAN CORPUSCULAR VOLUME (FL) BY AUTOMATED COUNT: 89 FL (ref 80–100)
ERYTHROCYTE [DISTWIDTH] IN BLOOD BY AUTOMATED COUNT: 14.8 % (ref 11.5–14)
ERYTHROCYTES (10*6/UL) IN BLOOD BY AUTOMATED COUNT: 3.99 X10E12/L (ref 4–5.2)
GFR FEMALE: 32 ML/MIN/1.73M2
GLUCOSE (MG/DL) IN SER/PLAS: 91 MG/DL (ref 74–99)
GLUCOSE: 91 MG/DL (ref 74–99)
HCT VFR BLD CALC: 35.5 % (ref 36–46)
HEMATOCRIT (%) IN BLOOD BY AUTOMATED COUNT: 35.5 % (ref 36–46)
HEMOGLOBIN (G/DL) IN BLOOD: 11.3 G/DL (ref 12–16)
HEMOGLOBIN: 11.3 G/DL (ref 12–16)
LEUKOCYTES (10*3/UL) IN BLOOD BY AUTOMATED COUNT: 7.7 X10E9/L (ref 4.4–11.3)
MCHC RBC AUTO-ENTMCNC: 31.8 G/DL (ref 32–36)
MCV RBC AUTO: 89 FL (ref 80–100)
PLATELET # BLD: 214 X10E9/L (ref 150–450)
PLATELETS (10*3/UL) IN BLOOD AUTOMATED COUNT: 214 X10E9/L (ref 150–450)
POTASSIUM (MMOL/L) IN SER/PLAS: 4.4 MMOL/L (ref 3.5–5.3)
POTASSIUM SERPL-SCNC: 4.4 MMOL/L (ref 3.5–5.3)
RBC # BLD: 3.99 X10E12/L (ref 4–5.2)
SODIUM (MMOL/L) IN SER/PLAS: 140 MMOL/L (ref 136–145)
SODIUM BLD-SCNC: 140 MMOL/L (ref 136–145)
UREA NITROGEN (MG/DL) IN SER/PLAS: 25 MG/DL (ref 6–23)
UREA NITROGEN: 25 MG/DL (ref 6–23)
WBC: 7.7 X10E9/L (ref 4.4–11.3)

## 2023-04-25 ENCOUNTER — APPOINTMENT (OUTPATIENT)
Dept: CT IMAGING | Age: 88
End: 2023-04-25
Payer: MEDICARE

## 2023-04-25 ENCOUNTER — APPOINTMENT (OUTPATIENT)
Dept: GENERAL RADIOLOGY | Age: 88
End: 2023-04-25
Payer: MEDICARE

## 2023-04-25 ENCOUNTER — HOSPITAL ENCOUNTER (EMERGENCY)
Age: 88
Discharge: HOME | End: 2023-04-25
Attending: STUDENT IN AN ORGANIZED HEALTH CARE EDUCATION/TRAINING PROGRAM
Payer: MEDICARE

## 2023-04-25 VITALS
RESPIRATION RATE: 19 BRPM | HEART RATE: 60 BPM | SYSTOLIC BLOOD PRESSURE: 172 MMHG | BODY MASS INDEX: 33.13 KG/M2 | OXYGEN SATURATION: 97 % | TEMPERATURE: 98.2 F | DIASTOLIC BLOOD PRESSURE: 69 MMHG | WEIGHT: 187 LBS | HEIGHT: 63 IN

## 2023-04-25 DIAGNOSIS — S20.219A CONTUSION OF CHEST WALL, UNSPECIFIED LATERALITY, INITIAL ENCOUNTER: Primary | ICD-10-CM

## 2023-04-25 DIAGNOSIS — I10 HYPERTENSION, UNSPECIFIED TYPE: ICD-10-CM

## 2023-04-25 LAB
ALBUMIN SERPL-MCNC: 3.8 G/DL (ref 3.5–4.6)
ALP SERPL-CCNC: 54 U/L (ref 40–130)
ALT SERPL-CCNC: 7 U/L (ref 0–33)
ANION GAP SERPL CALCULATED.3IONS-SCNC: 10 MEQ/L (ref 9–15)
APTT PPP: 36.1 SEC (ref 24.4–36.8)
AST SERPL-CCNC: 13 U/L (ref 0–35)
BASOPHILS # BLD: 0.1 K/UL (ref 0–0.2)
BASOPHILS NFR BLD: 0.7 %
BILIRUB SERPL-MCNC: <0.2 MG/DL (ref 0.2–0.7)
BUN SERPL-MCNC: 23 MG/DL (ref 8–23)
CALCIUM SERPL-MCNC: 8.8 MG/DL (ref 8.5–9.9)
CHLORIDE SERPL-SCNC: 106 MEQ/L (ref 95–107)
CO2 SERPL-SCNC: 25 MEQ/L (ref 20–31)
CREAT SERPL-MCNC: 1.32 MG/DL (ref 0.5–0.9)
EOSINOPHIL # BLD: 0.1 K/UL (ref 0–0.7)
EOSINOPHIL NFR BLD: 1.4 %
ERYTHROCYTE [DISTWIDTH] IN BLOOD BY AUTOMATED COUNT: 16.7 % (ref 11.5–14.5)
ETHANOL PERCENT: NORMAL G/DL
ETHANOLAMINE SERPL-MCNC: <10 MG/DL (ref 0–0.08)
GLOBULIN SER CALC-MCNC: 2.4 G/DL (ref 2.3–3.5)
GLUCOSE SERPL-MCNC: 130 MG/DL (ref 70–99)
HCT VFR BLD AUTO: 32.8 % (ref 37–47)
HGB BLD-MCNC: 10.7 G/DL (ref 12–16)
INR PPP: 1.1
LYMPHOCYTES # BLD: 1 K/UL (ref 1–4.8)
LYMPHOCYTES NFR BLD: 12.9 %
MCH RBC QN AUTO: 28.9 PG (ref 27–31.3)
MCHC RBC AUTO-ENTMCNC: 32.7 % (ref 33–37)
MCV RBC AUTO: 88.3 FL (ref 79.4–94.8)
MONOCYTES # BLD: 0.8 K/UL (ref 0.2–0.8)
MONOCYTES NFR BLD: 10.3 %
NEUTROPHILS # BLD: 5.8 K/UL (ref 1.4–6.5)
NEUTS SEG NFR BLD: 74.7 %
PLATELET # BLD AUTO: 217 K/UL (ref 130–400)
POC CREATININE WHOLE BLOOD: 1.5
POTASSIUM SERPL-SCNC: 4.2 MEQ/L (ref 3.4–4.9)
PROT SERPL-MCNC: 6.2 G/DL (ref 6.3–8)
PROTHROMBIN TIME: 13.9 SEC (ref 12.3–14.9)
RBC # BLD AUTO: 3.71 M/UL (ref 4.2–5.4)
SODIUM SERPL-SCNC: 141 MEQ/L (ref 135–144)
TROPONIN T SERPL-MCNC: <0.01 NG/ML (ref 0–0.01)
WBC # BLD AUTO: 7.8 K/UL (ref 4.8–10.8)

## 2023-04-25 PROCEDURE — 6370000000 HC RX 637 (ALT 250 FOR IP): Performed by: EMERGENCY MEDICINE

## 2023-04-25 PROCEDURE — 99285 EMERGENCY DEPT VISIT HI MDM: CPT

## 2023-04-25 PROCEDURE — 96375 TX/PRO/DX INJ NEW DRUG ADDON: CPT

## 2023-04-25 PROCEDURE — 36415 COLL VENOUS BLD VENIPUNCTURE: CPT

## 2023-04-25 PROCEDURE — 73070 X-RAY EXAM OF ELBOW: CPT

## 2023-04-25 PROCEDURE — 82077 ASSAY SPEC XCP UR&BREATH IA: CPT

## 2023-04-25 PROCEDURE — 85610 PROTHROMBIN TIME: CPT

## 2023-04-25 PROCEDURE — 84484 ASSAY OF TROPONIN QUANT: CPT

## 2023-04-25 PROCEDURE — 80053 COMPREHEN METABOLIC PANEL: CPT

## 2023-04-25 PROCEDURE — 85730 THROMBOPLASTIN TIME PARTIAL: CPT

## 2023-04-25 PROCEDURE — 96374 THER/PROPH/DIAG INJ IV PUSH: CPT

## 2023-04-25 PROCEDURE — 6360000004 HC RX CONTRAST MEDICATION: Performed by: EMERGENCY MEDICINE

## 2023-04-25 PROCEDURE — 6360000002 HC RX W HCPCS: Performed by: EMERGENCY MEDICINE

## 2023-04-25 PROCEDURE — 93005 ELECTROCARDIOGRAM TRACING: CPT | Performed by: EMERGENCY MEDICINE

## 2023-04-25 PROCEDURE — 73030 X-RAY EXAM OF SHOULDER: CPT

## 2023-04-25 PROCEDURE — 85025 COMPLETE CBC W/AUTO DIFF WBC: CPT

## 2023-04-25 PROCEDURE — 70450 CT HEAD/BRAIN W/O DYE: CPT

## 2023-04-25 PROCEDURE — 71260 CT THORAX DX C+: CPT

## 2023-04-25 PROCEDURE — 2580000003 HC RX 258: Performed by: EMERGENCY MEDICINE

## 2023-04-25 RX ORDER — MORPHINE SULFATE 2 MG/ML
2 INJECTION, SOLUTION INTRAMUSCULAR; INTRAVENOUS ONCE
Status: COMPLETED | OUTPATIENT
Start: 2023-04-25 | End: 2023-04-25

## 2023-04-25 RX ORDER — LIDOCAINE 50 MG/G
1 PATCH TOPICAL DAILY
Qty: 30 PATCH | Refills: 0 | Status: SHIPPED | OUTPATIENT
Start: 2023-04-25

## 2023-04-25 RX ORDER — ACETAMINOPHEN 325 MG/1
650 TABLET ORAL ONCE
Status: COMPLETED | OUTPATIENT
Start: 2023-04-25 | End: 2023-04-25

## 2023-04-25 RX ORDER — 0.9 % SODIUM CHLORIDE 0.9 %
500 INTRAVENOUS SOLUTION INTRAVENOUS ONCE
Status: COMPLETED | OUTPATIENT
Start: 2023-04-25 | End: 2023-04-25

## 2023-04-25 RX ORDER — HYDRALAZINE HYDROCHLORIDE 20 MG/ML
5 INJECTION INTRAMUSCULAR; INTRAVENOUS ONCE
Status: DISCONTINUED | OUTPATIENT
Start: 2023-04-25 | End: 2023-04-26 | Stop reason: HOSPADM

## 2023-04-25 RX ORDER — HYDRALAZINE HYDROCHLORIDE 20 MG/ML
5 INJECTION INTRAMUSCULAR; INTRAVENOUS ONCE
Status: COMPLETED | OUTPATIENT
Start: 2023-04-25 | End: 2023-04-25

## 2023-04-25 RX ADMIN — MORPHINE SULFATE 2 MG: 2 INJECTION, SOLUTION INTRAMUSCULAR; INTRAVENOUS at 20:23

## 2023-04-25 RX ADMIN — IOPAMIDOL 50 ML: 612 INJECTION, SOLUTION INTRAVENOUS at 22:56

## 2023-04-25 RX ADMIN — ACETAMINOPHEN 650 MG: 325 TABLET ORAL at 23:37

## 2023-04-25 RX ADMIN — SODIUM CHLORIDE 500 ML: 9 INJECTION, SOLUTION INTRAVENOUS at 21:11

## 2023-04-25 RX ADMIN — HYDRALAZINE HYDROCHLORIDE 5 MG: 20 INJECTION INTRAMUSCULAR; INTRAVENOUS at 23:36

## 2023-04-25 RX ADMIN — MORPHINE SULFATE 2 MG: 2 INJECTION, SOLUTION INTRAMUSCULAR; INTRAVENOUS at 23:34

## 2023-04-25 ASSESSMENT — PAIN SCALES - GENERAL
PAINLEVEL_OUTOF10: 10

## 2023-04-25 ASSESSMENT — PAIN DESCRIPTION - LOCATION
LOCATION: CHEST
LOCATION: CHEST

## 2023-04-25 ASSESSMENT — LIFESTYLE VARIABLES
HOW MANY STANDARD DRINKS CONTAINING ALCOHOL DO YOU HAVE ON A TYPICAL DAY: PATIENT DOES NOT DRINK
HOW OFTEN DO YOU HAVE A DRINK CONTAINING ALCOHOL: NEVER

## 2023-04-25 ASSESSMENT — PAIN - FUNCTIONAL ASSESSMENT
PAIN_FUNCTIONAL_ASSESSMENT: NONE - DENIES PAIN
PAIN_FUNCTIONAL_ASSESSMENT: 0-10

## 2023-04-25 ASSESSMENT — PAIN DESCRIPTION - DESCRIPTORS: DESCRIPTORS: SHARP

## 2023-04-25 ASSESSMENT — PAIN DESCRIPTION - PAIN TYPE: TYPE: ACUTE PAIN

## 2023-04-25 NOTE — ED PROVIDER NOTES
[address] [facility] ED  EMERGENCY DEPARTMENT ENCOUNTER      Pt Name: Harpreet Ortega  MRN: [id number]  [place] 1935  Date of evaluation: 4/25/2023  Provider: Shannon Doty, [address] [place] [address]       Chief Complaint   Patient presents with    Chest Injury     Mayelin Schmidt about 1 week ago, still having sharp pain in the left side of the chest. Pt didn't go to the doctor or ED when she initially fell. HISTORY OF PRESENT ILLNESS   (Location/Symptom, Timing/Onset, Context/Setting, Quality, Duration, Modifying Factors, Severity)  Note limiting factors. Harpreet Ortega is a 80 y.o. female who presents to the emergency department for evaluation via EMS. Chart shows history of hypertension, arthritis, abnormal gait/ability, hyperlipidemia, CAD on ASA, sp pacer. Patient does take Eliquis. She says that normally she ambulates with a cane and has difficulty ambulating, she was walking to the car and tripped and fell onto her left chest.  She had no antecedent symptoms including numbness or weakness, chest pain or shortness of breath. She denied hitting her head, LOC or amnesia. Shortly after she noticed that she had sharp pain in her left chest with palpation, twisting/movement. Took tylenol without significant relief. No vision change, neck or jaw pain, shortness of breath, vomiting or diaphoresis, Gary pain, back pain, numbness or weakness. HPI    Nursing Notes were reviewed. REVIEW OF SYSTEMS    (2-9 systems for level 4, 10 or more for level 5)     Review of Systems   Constitutional:         Fall, left chest wall pain, L shoulder/elbow pain   Musculoskeletal:  Negative for neck pain. Neurological:  Negative for weakness, numbness and headaches. All other systems reviewed and are negative. Except as noted above the remainder of the review of systems was reviewed and negative.        PAST MEDICAL HISTORY     Past Medical History:   Diagnosis Date    Bleeding ulcer     CAD

## 2023-04-25 NOTE — ED TRIAGE NOTES
Pt. Brought by Kaden Galvin c/o pain in the left chest and arm following a fall 1 week ago. VSS  Pt.  Is afebrile  NSR on the monitor  #20 L AC from [place] is patent

## 2023-04-26 LAB
EKG ATRIAL RATE: 60 BPM
EKG P AXIS: 61 DEGREES
EKG P-R INTERVAL: 240 MS
EKG Q-T INTERVAL: 472 MS
EKG QRS DURATION: 134 MS
EKG QTC CALCULATION (BAZETT): 472 MS
EKG R AXIS: -56 DEGREES
EKG T AXIS: 8 DEGREES
EKG VENTRICULAR RATE: 60 BPM
PERFORMED ON: ABNORMAL
POC CREATININE: 1.5 MG/DL (ref 0.6–1.2)
POC SAMPLE TYPE: ABNORMAL

## 2023-04-26 PROCEDURE — 93010 ELECTROCARDIOGRAM REPORT: CPT | Performed by: INTERNAL MEDICINE

## 2023-04-26 NOTE — ED NOTES
Report received from [facility]. Assumed care of patient.       Michelle Roblero RN  04/25/23 3205

## 2023-05-25 ENCOUNTER — HOSPITAL ENCOUNTER (OUTPATIENT)
Dept: CARDIOLOGY | Age: 88
Discharge: HOME | End: 2023-05-25
Payer: MEDICARE

## 2023-05-25 ENCOUNTER — TRANSCRIBE ORDERS (OUTPATIENT)
Dept: GENERAL RADIOLOGY | Age: 88
End: 2023-05-25

## 2023-05-25 ENCOUNTER — HOSPITAL ENCOUNTER (OUTPATIENT)
Dept: GENERAL RADIOLOGY | Age: 88
Discharge: HOME | End: 2023-05-27
Attending: INTERNAL MEDICINE
Payer: MEDICARE

## 2023-05-25 DIAGNOSIS — I49.5 CORONARY SINUS RHYTHM DISORDER (HCC): Primary | ICD-10-CM

## 2023-05-25 DIAGNOSIS — I49.5 CORONARY SINUS RHYTHM DISORDER (HCC): ICD-10-CM

## 2023-05-25 PROCEDURE — 71046 X-RAY EXAM CHEST 2 VIEWS: CPT

## 2023-05-25 PROCEDURE — 93280 PM DEVICE PROGR EVAL DUAL: CPT

## 2023-08-24 ENCOUNTER — HOSPITAL ENCOUNTER (OUTPATIENT)
Dept: CARDIOLOGY | Age: 88
Discharge: HOME | End: 2023-08-24
Payer: MEDICARE

## 2023-08-24 PROCEDURE — 93296 REM INTERROG EVL PM/IDS: CPT

## 2023-11-05 ENCOUNTER — HOSPITAL ENCOUNTER (EMERGENCY)
Age: 88
Discharge: HOME | End: 2023-11-05
Payer: MEDICARE

## 2023-11-05 ENCOUNTER — APPOINTMENT (OUTPATIENT)
Dept: CT IMAGING | Age: 88
End: 2023-11-05
Payer: MEDICARE

## 2023-11-05 VITALS
HEIGHT: 65 IN | RESPIRATION RATE: 18 BRPM | WEIGHT: 185 LBS | BODY MASS INDEX: 30.82 KG/M2 | HEART RATE: 68 BPM | OXYGEN SATURATION: 96 % | DIASTOLIC BLOOD PRESSURE: 63 MMHG | SYSTOLIC BLOOD PRESSURE: 139 MMHG

## 2023-11-05 DIAGNOSIS — I10 PRIMARY HYPERTENSION: Primary | ICD-10-CM

## 2023-11-05 DIAGNOSIS — G44.209 ACUTE NON INTRACTABLE TENSION-TYPE HEADACHE: ICD-10-CM

## 2023-11-05 DIAGNOSIS — M54.2 NECK PAIN: ICD-10-CM

## 2023-11-05 LAB
ALBUMIN SERPL-MCNC: 3.9 G/DL (ref 3.5–4.6)
ALP SERPL-CCNC: 47 U/L (ref 40–130)
ALT SERPL-CCNC: 8 U/L (ref 0–33)
ANION GAP SERPL CALCULATED.3IONS-SCNC: 14 MEQ/L (ref 9–15)
AST SERPL-CCNC: 20 U/L (ref 0–35)
BASOPHILS # BLD: 0 K/UL (ref 0–0.2)
BASOPHILS NFR BLD: 0.3 %
BILIRUB SERPL-MCNC: <0.2 MG/DL (ref 0.2–0.7)
BUN SERPL-MCNC: 38 MG/DL (ref 8–23)
CALCIUM SERPL-MCNC: 8.9 MG/DL (ref 8.5–9.9)
CHLORIDE SERPL-SCNC: 106 MEQ/L (ref 95–107)
CHP ED QC CHECK: NORMAL
CO2 SERPL-SCNC: 23 MEQ/L (ref 20–31)
CREAT SERPL-MCNC: 1.65 MG/DL (ref 0.5–0.9)
EOSINOPHIL # BLD: 0.1 K/UL (ref 0–0.7)
EOSINOPHIL NFR BLD: 0.9 %
ERYTHROCYTE [DISTWIDTH] IN BLOOD BY AUTOMATED COUNT: 15.6 % (ref 11.5–14.5)
GLOBULIN SER CALC-MCNC: 2.9 G/DL (ref 2.3–3.5)
GLUCOSE BLD-MCNC: 89 MG/DL
GLUCOSE BLD-MCNC: 89 MG/DL (ref 70–99)
GLUCOSE SERPL-MCNC: 88 MG/DL (ref 70–99)
HCT VFR BLD AUTO: 38.3 % (ref 37–47)
HGB BLD-MCNC: 11.7 G/DL (ref 12–16)
INR PPP: 1.2
LYMPHOCYTES # BLD: 1.7 K/UL (ref 1–4.8)
LYMPHOCYTES NFR BLD: 18.5 %
MCH RBC QN AUTO: 27.5 PG (ref 27–31.3)
MCHC RBC AUTO-ENTMCNC: 30.5 % (ref 33–37)
MCV RBC AUTO: 89.9 FL (ref 79.4–94.8)
MONOCYTES # BLD: 0.8 K/UL (ref 0.2–0.8)
MONOCYTES NFR BLD: 8.8 %
NEUTROPHILS # BLD: 6.6 K/UL (ref 1.4–6.5)
NEUTS SEG NFR BLD: 71.1 %
PERFORMED ON: NORMAL
PLATELET # BLD AUTO: 247 K/UL (ref 130–400)
POTASSIUM SERPL-SCNC: 5 MEQ/L (ref 3.4–4.9)
PROT SERPL-MCNC: 6.8 G/DL (ref 6.3–8)
PROTHROMBIN TIME: 14.8 SEC (ref 12.3–14.9)
RBC # BLD AUTO: 4.26 M/UL (ref 4.2–5.4)
SODIUM SERPL-SCNC: 143 MEQ/L (ref 135–144)
TROPONIN, HIGH SENSITIVITY: 18 NG/L (ref 0–19)
WBC # BLD AUTO: 9.3 K/UL (ref 4.8–10.8)

## 2023-11-05 PROCEDURE — 84484 ASSAY OF TROPONIN QUANT: CPT

## 2023-11-05 PROCEDURE — 85610 PROTHROMBIN TIME: CPT

## 2023-11-05 PROCEDURE — 96374 THER/PROPH/DIAG INJ IV PUSH: CPT

## 2023-11-05 PROCEDURE — 85025 COMPLETE CBC W/AUTO DIFF WBC: CPT

## 2023-11-05 PROCEDURE — 80053 COMPREHEN METABOLIC PANEL: CPT

## 2023-11-05 PROCEDURE — 6370000000 HC RX 637 (ALT 250 FOR IP): Performed by: NURSE PRACTITIONER

## 2023-11-05 PROCEDURE — 99284 EMERGENCY DEPT VISIT MOD MDM: CPT

## 2023-11-05 PROCEDURE — 72125 CT NECK SPINE W/O DYE: CPT

## 2023-11-05 PROCEDURE — 93005 ELECTROCARDIOGRAM TRACING: CPT | Performed by: NURSE PRACTITIONER

## 2023-11-05 PROCEDURE — 70450 CT HEAD/BRAIN W/O DYE: CPT

## 2023-11-05 PROCEDURE — 6360000002 HC RX W HCPCS: Performed by: NURSE PRACTITIONER

## 2023-11-05 PROCEDURE — 36415 COLL VENOUS BLD VENIPUNCTURE: CPT

## 2023-11-05 RX ORDER — TRAMADOL HYDROCHLORIDE 50 MG/1
50 TABLET ORAL ONCE
Status: COMPLETED | OUTPATIENT
Start: 2023-11-05 | End: 2023-11-05

## 2023-11-05 RX ORDER — ACETAMINOPHEN 500 MG
1000 TABLET ORAL ONCE
Status: COMPLETED | OUTPATIENT
Start: 2023-11-05 | End: 2023-11-05

## 2023-11-05 RX ORDER — TRAMADOL HYDROCHLORIDE 50 MG/1
50 TABLET ORAL EVERY 8 HOURS PRN
Qty: 9 TABLET | Refills: 0 | Status: SHIPPED | OUTPATIENT
Start: 2023-11-05 | End: 2023-11-08

## 2023-11-05 RX ORDER — HYDRALAZINE HYDROCHLORIDE 20 MG/ML
10 INJECTION INTRAMUSCULAR; INTRAVENOUS ONCE
Status: COMPLETED | OUTPATIENT
Start: 2023-11-05 | End: 2023-11-05

## 2023-11-05 RX ADMIN — HYDRALAZINE HYDROCHLORIDE 10 MG: 20 INJECTION, SOLUTION INTRAMUSCULAR; INTRAVENOUS at 19:50

## 2023-11-05 RX ADMIN — TRAMADOL HYDROCHLORIDE 50 MG: 50 TABLET ORAL at 20:46

## 2023-11-05 RX ADMIN — ACETAMINOPHEN 1000 MG: 500 TABLET ORAL at 19:42

## 2023-11-05 ASSESSMENT — PAIN - FUNCTIONAL ASSESSMENT
PAIN_FUNCTIONAL_ASSESSMENT: ACTIVITIES ARE NOT PREVENTED
PAIN_FUNCTIONAL_ASSESSMENT: 0-10

## 2023-11-05 ASSESSMENT — ENCOUNTER SYMPTOMS
DIARRHEA: 0
NAUSEA: 0
COUGH: 0
ABDOMINAL PAIN: 0
SORE THROAT: 0
COLOR CHANGE: 0
BACK PAIN: 0
TROUBLE SWALLOWING: 0
VOICE CHANGE: 0
CONSTIPATION: 0
VOMITING: 0
SHORTNESS OF BREATH: 0

## 2023-11-05 ASSESSMENT — PAIN DESCRIPTION - PAIN TYPE: TYPE: ACUTE PAIN

## 2023-11-05 ASSESSMENT — PAIN SCALES - GENERAL
PAINLEVEL_OUTOF10: 10
PAINLEVEL_OUTOF10: 10

## 2023-11-05 ASSESSMENT — PAIN DESCRIPTION - ONSET: ONSET: SUDDEN

## 2023-11-05 ASSESSMENT — PAIN DESCRIPTION - LOCATION
LOCATION: NECK

## 2023-11-05 ASSESSMENT — PAIN DESCRIPTION - DESCRIPTORS
DESCRIPTORS: ACHING

## 2023-11-05 ASSESSMENT — PAIN DESCRIPTION - FREQUENCY: FREQUENCY: CONTINUOUS

## 2023-11-05 NOTE — ED TRIAGE NOTES
Pt arrived via EMS from home with c/o HTN, EMS checked the pt's systolic BP at 127. Pt is c/o neck pain 10 out of 10, she is A&O x4. Pt states she has taken all of her BP medication.

## 2023-11-05 NOTE — ED PROVIDER NOTES
and are negative. Except as noted above the remainder of the review of systems was reviewed and negative. PAST MEDICAL HISTORY     Past Medical History:   Diagnosis Date    Bleeding ulcer     CAD (coronary artery disease)     Colitis     Hyperlipidemia     Hypertension          SURGICAL HISTORY       Past Surgical History:   Procedure Laterality Date    APPENDECTOMY      COLECTOMY  1/3/2011    sigmoid colectomy with colostomy    COLECTOMY  7/26/2011    partial colectomy with colostomy closure    CORONARY ARTERY BYPASS GRAFT      HYSTERECTOMY (CERVIX STATUS UNKNOWN)      HYSTERECTOMY, TOTAL ABDOMINAL (CERVIX REMOVED)      ID HEMIARTHROPLASTY HIP PARTIAL Left 2/8/2018    HIP HEMIARTHROPLASTY- performed by Lavern Servin MD at [facility]       Discharge Medication List as of 11/5/2023  9:21 PM        CONTINUE these medications which have NOT CHANGED    Details   lidocaine (LIDODERM) 5 % Place 1 patch onto the skin daily 12 hours on, 12 hours off., Disp-30 patch, R-0Normal      pantoprazole (PROTONIX) 40 MG tablet Historical Med      ELIQUIS 2.5 MG TABS tablet take 1 tablet by mouth twice a day, DAWHistorical Med      nystatin-triamcinolone (MYCOLOG II) 666840-2.1 UNIT/GM-% cream Apply topically 2 times daily. , Disp-30 g, R-0, Normal      magnesium oxide (MAG-OX) 400 (241.3 Mg) MG TABS tablet Take 1 tablet by mouth daily, Disp-30 tablet, R-0Normal      metoprolol tartrate (LOPRESSOR) 50 MG tablet Take 1 tablet by mouth 2 times daily, Disp-60 tablet, R-3Normal      losartan (COZAAR) 50 MG tablet Take 50 mg by mouth 2 times dailyHistorical Med      amLODIPine (NORVASC) 5 MG tablet 5 mg daily , R-0Historical Med      nitroGLYCERIN (NITROLINGUAL) 0.4 MG/SPRAY spray Place 1 spray onto the tongue every 5 minutes as needed for Chest pain      calcium carbonate (OSCAL) 500 MG TABS tablet Take 500 mg by mouth daily.         aspirin 81 MG EC

## 2023-11-06 LAB
EKG ATRIAL RATE: 65 BPM
EKG P AXIS: 84 DEGREES
EKG P-R INTERVAL: 224 MS
EKG Q-T INTERVAL: 440 MS
EKG QRS DURATION: 134 MS
EKG QTC CALCULATION (BAZETT): 457 MS
EKG R AXIS: -51 DEGREES
EKG T AXIS: 4 DEGREES
EKG VENTRICULAR RATE: 65 BPM

## 2023-11-06 PROCEDURE — 93010 ELECTROCARDIOGRAM REPORT: CPT | Performed by: INTERNAL MEDICINE

## 2023-11-06 NOTE — ED NOTES
Discharge instructions discussed at length including pain medication, ice/heat management, the importance of taking medication properly and follow up with PCP     Breanna Nava RN  11/05/23 2628

## 2023-11-06 NOTE — ED NOTES
Pt states her neck pain has not improved. Pt walked to the restroom with my assistance.      Lashaun Fink RN  11/05/23 2021

## 2023-11-27 ENCOUNTER — HOSPITAL ENCOUNTER (OUTPATIENT)
Dept: CARDIOLOGY | Age: 88
Discharge: HOME | End: 2023-11-27
Payer: MEDICARE

## 2023-11-27 PROCEDURE — 93294 REM INTERROG EVL PM/LDLS PM: CPT | Performed by: INTERNAL MEDICINE

## 2023-11-27 PROCEDURE — 93296 REM INTERROG EVL PM/IDS: CPT

## 2023-11-30 PROBLEM — K21.9 GERD (GASTROESOPHAGEAL REFLUX DISEASE): Status: ACTIVE | Noted: 2023-11-30

## 2023-11-30 PROBLEM — K92.2 GI BLEED: Status: ACTIVE | Noted: 2023-11-30

## 2023-11-30 PROBLEM — I10 HYPERTENSION: Status: ACTIVE | Noted: 2023-11-30

## 2023-11-30 PROBLEM — M70.62 TROCHANTERIC BURSITIS OF LEFT HIP: Status: ACTIVE | Noted: 2023-11-30

## 2023-11-30 PROBLEM — R42 VERTIGO: Status: ACTIVE | Noted: 2023-11-30

## 2023-11-30 PROBLEM — I73.9 PERIPHERAL VASCULAR DISEASE (CMS-HCC): Status: ACTIVE | Noted: 2023-11-30

## 2023-11-30 PROBLEM — R94.31 ABNORMAL ELECTROCARDIOGRAM: Status: ACTIVE | Noted: 2023-11-30

## 2023-11-30 PROBLEM — I25.10 ASHD (ARTERIOSCLEROTIC HEART DISEASE): Status: ACTIVE | Noted: 2023-11-30

## 2023-11-30 PROBLEM — R60.9 EDEMA: Status: ACTIVE | Noted: 2023-11-30

## 2023-11-30 PROBLEM — Z95.1 S/P CABG (CORONARY ARTERY BYPASS GRAFT): Status: ACTIVE | Noted: 2023-11-30

## 2023-11-30 PROBLEM — I45.10 RIGHT BUNDLE BRANCH BLOCK (RBBB): Status: ACTIVE | Noted: 2023-11-30

## 2023-11-30 PROBLEM — I71.40 ANEURYSM OF ABDOMINAL AORTA (CMS-HCC): Status: ACTIVE | Noted: 2023-11-30

## 2023-11-30 PROBLEM — I48.91 ATRIAL FIBRILLATION (MULTI): Status: ACTIVE | Noted: 2023-11-30

## 2023-11-30 PROBLEM — E78.5 HYPERLIPIDEMIA: Status: ACTIVE | Noted: 2023-11-30

## 2023-11-30 PROBLEM — M25.552 PAIN OF LEFT HIP JOINT: Status: ACTIVE | Noted: 2023-11-30

## 2023-11-30 RX ORDER — SPIRONOLACTONE 25 MG/1
12.5 TABLET ORAL DAILY
COMMUNITY
End: 2024-03-08 | Stop reason: WASHOUT

## 2023-11-30 RX ORDER — METOPROLOL TARTRATE 25 MG/1
1 TABLET, FILM COATED ORAL 2 TIMES DAILY
COMMUNITY
End: 2024-03-08 | Stop reason: SDUPTHER

## 2023-11-30 RX ORDER — ASPIRIN 81 MG/1
1 TABLET ORAL DAILY
COMMUNITY

## 2023-11-30 RX ORDER — NITROGLYCERIN 400 UG/1
SPRAY ORAL
COMMUNITY

## 2023-11-30 RX ORDER — AMIODARONE HYDROCHLORIDE 200 MG/1
0.5 TABLET ORAL EVERY OTHER DAY
COMMUNITY
Start: 2022-04-27 | End: 2023-12-15 | Stop reason: SDUPTHER

## 2023-11-30 RX ORDER — LOSARTAN POTASSIUM 50 MG/1
1 TABLET ORAL 2 TIMES DAILY
COMMUNITY
Start: 2022-08-05 | End: 2024-03-08 | Stop reason: WASHOUT

## 2023-11-30 RX ORDER — ACETAMINOPHEN 500 MG/1
1000 CAPSULE, LIQUID FILLED ORAL EVERY 6 HOURS PRN
COMMUNITY

## 2023-11-30 RX ORDER — APIXABAN 2.5 MG/1
1 TABLET, FILM COATED ORAL 2 TIMES DAILY
COMMUNITY
Start: 2022-08-23 | End: 2023-12-15 | Stop reason: SDUPTHER

## 2023-12-05 ENCOUNTER — LAB (OUTPATIENT)
Dept: LAB | Facility: LAB | Age: 88
End: 2023-12-05
Payer: MEDICARE

## 2023-12-05 DIAGNOSIS — E78.5 HYPERLIPIDEMIA, UNSPECIFIED: ICD-10-CM

## 2023-12-05 DIAGNOSIS — I25.10 ATHEROSCLEROTIC HEART DISEASE OF NATIVE CORONARY ARTERY WITHOUT ANGINA PECTORIS: ICD-10-CM

## 2023-12-05 DIAGNOSIS — Z79.899 OTHER LONG TERM (CURRENT) DRUG THERAPY: ICD-10-CM

## 2023-12-05 DIAGNOSIS — I48.91 UNSPECIFIED ATRIAL FIBRILLATION (MULTI): ICD-10-CM

## 2023-12-05 DIAGNOSIS — I10 ESSENTIAL (PRIMARY) HYPERTENSION: Primary | ICD-10-CM

## 2023-12-05 DIAGNOSIS — Z95.0 PRESENCE OF CARDIAC PACEMAKER: ICD-10-CM

## 2023-12-05 LAB
ALBUMIN SERPL BCP-MCNC: 3.6 G/DL (ref 3.4–5)
ALP SERPL-CCNC: 39 U/L (ref 33–136)
ALT SERPL W P-5'-P-CCNC: 6 U/L (ref 7–45)
ANION GAP SERPL CALC-SCNC: 13 MMOL/L (ref 10–20)
AST SERPL W P-5'-P-CCNC: 13 U/L (ref 9–39)
BILIRUB DIRECT SERPL-MCNC: 0.1 MG/DL (ref 0–0.3)
BILIRUB SERPL-MCNC: 0.6 MG/DL (ref 0–1.2)
BUN SERPL-MCNC: 37 MG/DL (ref 6–23)
CALCIUM SERPL-MCNC: 8.3 MG/DL (ref 8.6–10.3)
CHLORIDE SERPL-SCNC: 109 MMOL/L (ref 98–107)
CHOLEST SERPL-MCNC: 216 MG/DL (ref 0–199)
CHOLESTEROL/HDL RATIO: 5.1
CO2 SERPL-SCNC: 27 MMOL/L (ref 21–32)
CREAT SERPL-MCNC: 1.79 MG/DL (ref 0.5–1.05)
ERYTHROCYTE [DISTWIDTH] IN BLOOD BY AUTOMATED COUNT: 14.9 % (ref 11.5–14.5)
GFR SERPL CREATININE-BSD FRML MDRD: 27 ML/MIN/1.73M*2
GLUCOSE SERPL-MCNC: 81 MG/DL (ref 74–99)
HCT VFR BLD AUTO: 37.7 % (ref 36–46)
HDLC SERPL-MCNC: 42.6 MG/DL
HGB BLD-MCNC: 11.8 G/DL (ref 12–16)
LDLC SERPL CALC-MCNC: 155 MG/DL
MAGNESIUM SERPL-MCNC: 2.03 MG/DL (ref 1.6–2.4)
MCH RBC QN AUTO: 28 PG (ref 26–34)
MCHC RBC AUTO-ENTMCNC: 31.3 G/DL (ref 32–36)
MCV RBC AUTO: 89 FL (ref 80–100)
NON HDL CHOLESTEROL: 173 MG/DL (ref 0–149)
NRBC BLD-RTO: 0 /100 WBCS (ref 0–0)
PLATELET # BLD AUTO: 212 X10*3/UL (ref 150–450)
POTASSIUM SERPL-SCNC: 4.5 MMOL/L (ref 3.5–5.3)
PROT SERPL-MCNC: 5.8 G/DL (ref 6.4–8.2)
RBC # BLD AUTO: 4.22 X10*6/UL (ref 4–5.2)
SODIUM SERPL-SCNC: 144 MMOL/L (ref 136–145)
TRIGL SERPL-MCNC: 92 MG/DL (ref 0–149)
TSH SERPL-ACNC: 0.97 MIU/L (ref 0.44–3.98)
VLDL: 18 MG/DL (ref 0–40)
WBC # BLD AUTO: 6.2 X10*3/UL (ref 4.4–11.3)

## 2023-12-05 PROCEDURE — 36415 COLL VENOUS BLD VENIPUNCTURE: CPT

## 2023-12-05 PROCEDURE — 80061 LIPID PANEL: CPT

## 2023-12-05 PROCEDURE — 82248 BILIRUBIN DIRECT: CPT

## 2023-12-05 PROCEDURE — 83735 ASSAY OF MAGNESIUM: CPT

## 2023-12-05 PROCEDURE — 80053 COMPREHEN METABOLIC PANEL: CPT

## 2023-12-05 PROCEDURE — 84443 ASSAY THYROID STIM HORMONE: CPT

## 2023-12-05 PROCEDURE — 85027 COMPLETE CBC AUTOMATED: CPT

## 2023-12-11 ENCOUNTER — APPOINTMENT (OUTPATIENT)
Dept: CARDIOLOGY | Facility: CLINIC | Age: 88
End: 2023-12-11
Payer: MEDICARE

## 2023-12-15 ENCOUNTER — TELEPHONE (OUTPATIENT)
Dept: CARDIOLOGY | Facility: CLINIC | Age: 88
End: 2023-12-15

## 2023-12-15 ENCOUNTER — OFFICE VISIT (OUTPATIENT)
Dept: CARDIOLOGY | Facility: CLINIC | Age: 88
End: 2023-12-15
Payer: MEDICARE

## 2023-12-15 VITALS
DIASTOLIC BLOOD PRESSURE: 93 MMHG | BODY MASS INDEX: 32.78 KG/M2 | HEIGHT: 63 IN | SYSTOLIC BLOOD PRESSURE: 156 MMHG | WEIGHT: 185 LBS | HEART RATE: 72 BPM

## 2023-12-15 DIAGNOSIS — I49.5 SINUS NODE DYSFUNCTION (MULTI): ICD-10-CM

## 2023-12-15 DIAGNOSIS — Z87.891 FORMER SMOKER: ICD-10-CM

## 2023-12-15 DIAGNOSIS — I48.91 ATRIAL FIBRILLATION (MULTI): Primary | ICD-10-CM

## 2023-12-15 DIAGNOSIS — Z95.0 PACEMAKER: ICD-10-CM

## 2023-12-15 DIAGNOSIS — Z79.899 ON AMIODARONE THERAPY: ICD-10-CM

## 2023-12-15 DIAGNOSIS — R94.31 ABNORMAL ELECTROCARDIOGRAM: ICD-10-CM

## 2023-12-15 DIAGNOSIS — Z79.899 HIGH RISK MEDICATION USE: ICD-10-CM

## 2023-12-15 DIAGNOSIS — Z95.1 S/P CABG (CORONARY ARTERY BYPASS GRAFT): ICD-10-CM

## 2023-12-15 PROCEDURE — 99214 OFFICE O/P EST MOD 30 MIN: CPT | Performed by: INTERNAL MEDICINE

## 2023-12-15 PROCEDURE — 1159F MED LIST DOCD IN RCRD: CPT | Performed by: INTERNAL MEDICINE

## 2023-12-15 PROCEDURE — 3077F SYST BP >= 140 MM HG: CPT | Performed by: INTERNAL MEDICINE

## 2023-12-15 PROCEDURE — 3080F DIAST BP >= 90 MM HG: CPT | Performed by: INTERNAL MEDICINE

## 2023-12-15 PROCEDURE — 1036F TOBACCO NON-USER: CPT | Performed by: INTERNAL MEDICINE

## 2023-12-15 RX ORDER — TRAMADOL HYDROCHLORIDE 50 MG/1
50 TABLET ORAL EVERY 8 HOURS PRN
COMMUNITY
Start: 2023-11-06 | End: 2024-03-08 | Stop reason: ALTCHOICE

## 2023-12-15 RX ORDER — FERROUS GLUCONATE 324(38)MG
1 TABLET ORAL DAILY
COMMUNITY
Start: 2023-01-18

## 2023-12-15 RX ORDER — POLYETHYLENE GLYCOL 3350 17 G/17G
17 POWDER, FOR SOLUTION ORAL
COMMUNITY
Start: 2021-01-02

## 2023-12-15 RX ORDER — AMIODARONE HYDROCHLORIDE 200 MG/1
100 TABLET ORAL EVERY OTHER DAY
Qty: 5 TABLET | Refills: 2 | Status: SHIPPED | OUTPATIENT
Start: 2023-12-15 | End: 2024-03-08 | Stop reason: SDUPTHER

## 2023-12-15 RX ORDER — APIXABAN 2.5 MG/1
2.5 TABLET, FILM COATED ORAL 2 TIMES DAILY
Qty: 180 TABLET | Refills: 2 | Status: SHIPPED | OUTPATIENT
Start: 2023-12-15 | End: 2024-04-08 | Stop reason: SDUPTHER

## 2023-12-15 ASSESSMENT — ENCOUNTER SYMPTOMS
DEPRESSION: 0
OCCASIONAL FEELINGS OF UNSTEADINESS: 0
LOSS OF SENSATION IN FEET: 0

## 2023-12-15 NOTE — PROGRESS NOTES
CARDIOLOGY OFFICE VISIT      CHIEF COMPLAINT  No chief complaint on file.      HISTORY OF PRESENT ILLNESS  HPI    88-year-old female with a past medical history of coronary artery disease status post coronary bypass graft surgery September 2007, left internal mammary artery to the left anterior descending artery, saphenous vein graft to the first obtuse marginal and second obtuse minor, diagonal and right coronary artery with left atrial appendage ligation. No evidence of ischemia per stress test in 2020 with a left ventricular a ejection fraction 75%. Normal left ventricular function with recent echocardiogram value of 70%. Hypertension. Hyperlipidemia. Peripheral vascular disease.    Looking at her records, she was diagnosed of atrial fibrillation back in December 2020 during office visit. She was asymptomatic. She is being complaining of dizziness and lightheadedness and also shortness of breath for almost 3 years. She was placed on amiodarone therapy. She did not require cardioversion. Subsequent office visit she was back in sinus rhythm and sinus bradycardia. Holter monitor ordered in 2020 shows significant sinus node dysfunction with average heart rate of 40 bpm with a maximal heart rate of 76 bpm. Since then they have no recurrence of atrial fibrillation. Patient has been maintained on amiodarone therapy with LFTs and TSH lately between normal values. No recent PFTs.    During the last visit, she was offered her a dual-chamber pacemaker for symptomatic bradycardia and also better management for atrial fibrillation. She underwent dual-chamber pacemaker implantation February 24, 2023 with no complications.    Patient underwent repositioning of the right atrial lead in March and 2023 with no complications.    Since the last office visit, she has been doing well except for occasionally episodes of hypertension.    Few weeks ago she was evaluated emergency department for headaches.  She was found to be  hypertensive.    Laboratory data in December 2023 shows TSH 0.97.  LFTs okay.    Patient had a device interrogation in December 2023 at the device clinic at [facility] and it shows battery longevity 12.6 years.  Adequate sensing, capture and impedances of all leads.  1 brief episode of nonsustained ventricular tachycardia lasting 1 second duration.        Past Medical History  No past medical history on file.    Social History  Social History     Tobacco Use    Smoking status: Former     Types: Cigarettes    Smokeless tobacco: Never   Substance Use Topics    Alcohol use: Not on file    Drug use: Not on file       Family History   No family history on file.     Allergies:  Allergies   Allergen Reactions    Lisinopril Unknown    Statins-Hmg-Coa Reductase Inhibitors Unknown    Sulfa (Sulfonamide Antibiotics) Unknown        Outpatient Medications:  Current Outpatient Medications   Medication Instructions    acetaminophen (TYLENOL) 1,000 mg, oral, Every 6 hours PRN    amiodarone (Pacerone) 200 mg tablet 0.5 tablets, oral, Every other day    aspirin 81 mg EC tablet 1 tablet, oral, Daily    Eliquis 2.5 mg tablet 1 tablet, oral, 2 times daily    ferrous gluconate 324 (38 Fe) MG tablet 1 tablet, oral, Daily    losartan (Cozaar) 50 mg tablet 1 tablet, oral, 2 times daily    metoprolol tartrate (Lopressor) 25 mg tablet 1 tablet, oral, 2 times daily    nitroglycerin (NitrolinguaL) 400 mcg/spray spray USE 1 SPRAY UNDER THE TONGUE AS NEEDED FOR CHEST PAIN.    polyethylene glycol (GLYCOLAX, MIRALAX) 17 g, oral, Daily RT    spironolactone (ALDACTONE) 12.5 mg, oral, Daily    traMADol (ULTRAM) 50 mg, oral, Every 8 hours PRN          REVIEW OF SYSTEMS  Review of Systems   All other systems reviewed and are negative.        VITALS  Vitals:    12/15/23 1222   BP: (!) 156/93   Pulse: 72       PHYSICAL EXAM  Constitutional:       Appearance: Healthy appearance. Not in distress.   Neck:      Vascular: No JVR. JVD normal.    Pulmonary:      Effort: Pulmonary effort is normal.      Breath sounds: Normal breath sounds. No wheezing. No rhonchi. No rales.   Chest:      Chest wall: Not tender to palpatation.   Cardiovascular:      PMI at left midclavicular line. Normal rate. Regular rhythm. Normal S1. Normal S2.       Murmurs: There is no murmur.      No gallop.  No click. No rub.   Pulses:     Intact distal pulses.   Edema:     Peripheral edema absent.   Abdominal:      General: Bowel sounds are normal.      Palpations: Abdomen is soft.      Tenderness: There is no abdominal tenderness.   Musculoskeletal: Normal range of motion.         General: No tenderness. Skin:     General: Skin is warm and dry.   Neurological:      General: No focal deficit present.      Mental Status: Alert and oriented to person, place and time.           ASSESSMENT AND PLAN    Clinical impression    1. Tiredness/fatigue  2. Symptomatic bradycardia  3. Sinus node dysfunction  4. Normal left ventricular function. Stress test and echocardiogram as described above  5. History of coronary artery disease with coronary artery bypass graft surgery  5. Hypertension  6. Hyperlipidemia  7. Atrial fibrillation, on high risk medication (amiodarone)  8. Status post Medtronic pacemaker implanted in February 2023 with no complications. Now with right atrial lead dislodged. Plan discussed with patient during this office visit. Status post repositioning of the right atrial lead in March 2023 with no complications      Plan-recommendations    Patient was instructed to check blood pressure twice a day and bring the blood pressure readings to the next appointment.  She will be seen in our office in the next 3 to 4 weeks for adjustment medical therapy if needed for hypertension.    From the electrophysiology standpoint, she will be seen every 6 months.    Will continue watching the burden of atrial and ventricular arrhythmias by device interrogation.    Continue with amiodarone  therapy at 100 mg daily.    Get CMP and TSH and complete PFTs for high risk medication (amiodarone).    Follow-up device clinic as scheduled.    Risk factor modification and lifestyle modification discussed with patient. Diet , exercise and hydration discussed with patient.    I have personally review with patient during this office visit, laboratory data, echocardiogram results, stress test results, Holter-event monitor results prior and after the last electrophysiology visit. All questions has been answered.    Please excuse any errors in grammar or translation related to this dictation.  Voice recognition software was utilized to prepare this document.      Scribe Attestation  By signing my name below, I, Tierney Jiménez LPN , Scribe   attest that this documentation has been prepared under the direction and in the presence of Eric Clay MD.

## 2023-12-15 NOTE — TELEPHONE ENCOUNTER
SCHEDULED PFT AT [facility] ON 12/22/23  AT 1:45PM ARRIVAL TIME, FAXED ORDER, I SPOKE WITH PT AND GAVE HER APPT INFO.

## 2024-01-02 ENCOUNTER — TRANSCRIBE ORDERS (OUTPATIENT)
Dept: CARDIOLOGY | Facility: CLINIC | Age: 89
End: 2024-01-02
Payer: MEDICARE

## 2024-01-02 DIAGNOSIS — I48.91 ATRIAL FIBRILLATION, UNSPECIFIED TYPE (MULTI): ICD-10-CM

## 2024-01-02 DIAGNOSIS — I24.9 ACUTE ISCHEMIC HEART DISEASE, UNSPECIFIED (MULTI): ICD-10-CM

## 2024-01-02 DIAGNOSIS — I25.10 ASHD (ARTERIOSCLEROTIC HEART DISEASE): ICD-10-CM

## 2024-01-02 DIAGNOSIS — R94.31 ABNORMAL ELECTROCARDIOGRAM: ICD-10-CM

## 2024-01-08 ENCOUNTER — DOCUMENTATION (OUTPATIENT)
Dept: CARDIOLOGY | Facility: CLINIC | Age: 89
End: 2024-01-08
Payer: MEDICARE

## 2024-01-08 ENCOUNTER — TELEPHONE (OUTPATIENT)
Dept: CARDIOLOGY | Facility: CLINIC | Age: 89
End: 2024-01-08
Payer: MEDICARE

## 2024-01-09 ENCOUNTER — APPOINTMENT (OUTPATIENT)
Dept: RADIOLOGY | Facility: CLINIC | Age: 89
End: 2024-01-09

## 2024-01-09 ENCOUNTER — APPOINTMENT (OUTPATIENT)
Dept: CARDIOLOGY | Facility: CLINIC | Age: 89
End: 2024-01-09

## 2024-01-09 ENCOUNTER — APPOINTMENT (OUTPATIENT)
Dept: RADIOLOGY | Facility: CLINIC | Age: 89
End: 2024-01-09
Payer: MEDICARE

## 2024-01-09 ENCOUNTER — APPOINTMENT (OUTPATIENT)
Dept: CARDIOLOGY | Facility: CLINIC | Age: 89
End: 2024-01-09
Payer: MEDICARE

## 2024-01-16 ENCOUNTER — APPOINTMENT (OUTPATIENT)
Dept: CARDIOLOGY | Facility: CLINIC | Age: 89
End: 2024-01-16
Payer: MEDICARE

## 2024-02-27 ENCOUNTER — HOSPITAL ENCOUNTER (OUTPATIENT)
Dept: RADIOLOGY | Facility: CLINIC | Age: 89
Discharge: HOME | End: 2024-02-27
Payer: MEDICARE

## 2024-02-27 ENCOUNTER — ANCILLARY PROCEDURE (OUTPATIENT)
Dept: CARDIOLOGY | Facility: CLINIC | Age: 89
End: 2024-02-27
Payer: MEDICARE

## 2024-02-27 ENCOUNTER — CLINICAL SUPPORT (OUTPATIENT)
Dept: CARDIOLOGY | Facility: CLINIC | Age: 89
End: 2024-02-27
Payer: MEDICARE

## 2024-02-27 DIAGNOSIS — I48.91 ATRIAL FIBRILLATION, UNSPECIFIED TYPE (MULTI): ICD-10-CM

## 2024-02-27 DIAGNOSIS — R94.31 ABNORMAL ELECTROCARDIOGRAM: ICD-10-CM

## 2024-02-27 DIAGNOSIS — I48.0 PAROXYSMAL ATRIAL FIBRILLATION (MULTI): ICD-10-CM

## 2024-02-27 DIAGNOSIS — I25.10 ASHD (ARTERIOSCLEROTIC HEART DISEASE): ICD-10-CM

## 2024-02-27 DIAGNOSIS — I24.9 ACUTE ISCHEMIC HEART DISEASE, UNSPECIFIED (MULTI): ICD-10-CM

## 2024-02-27 LAB
AORTIC VALVE MEAN GRADIENT: 8 MMHG
AORTIC VALVE PEAK VELOCITY: 1.91 M/S
AV PEAK GRADIENT: 14.6 MMHG
AVA (PEAK VEL): 1.81 CM2
AVA (VTI): 1.94 CM2
EJECTION FRACTION APICAL 4 CHAMBER: 64.6
EJECTION FRACTION: 65 %
LEFT VENTRICLE INTERNAL DIMENSION DIASTOLE: 4.5 CM (ref 3.5–6)
LEFT VENTRICULAR OUTFLOW TRACT DIAMETER: 2.1 CM
MITRAL VALVE E/A RATIO: 1.11
MITRAL VALVE E/E' RATIO: 16.07
RIGHT VENTRICLE PEAK SYSTOLIC PRESSURE: 23.1 MMHG

## 2024-02-27 PROCEDURE — 2500000004 HC RX 250 GENERAL PHARMACY W/ HCPCS (ALT 636 FOR OP/ED): Performed by: INTERNAL MEDICINE

## 2024-02-27 PROCEDURE — 93306 TTE W/DOPPLER COMPLETE: CPT

## 2024-02-27 PROCEDURE — 3430000001 HC RX 343 DIAGNOSTIC RADIOPHARMACEUTICALS: Performed by: INTERNAL MEDICINE

## 2024-02-27 PROCEDURE — 93306 TTE W/DOPPLER COMPLETE: CPT | Performed by: INTERNAL MEDICINE

## 2024-02-27 PROCEDURE — 93017 CV STRESS TEST TRACING ONLY: CPT

## 2024-02-27 PROCEDURE — A9502 TC99M TETROFOSMIN: HCPCS | Performed by: INTERNAL MEDICINE

## 2024-02-27 PROCEDURE — 78452 HT MUSCLE IMAGE SPECT MULT: CPT

## 2024-02-27 RX ORDER — REGADENOSON 0.08 MG/ML
0.4 INJECTION, SOLUTION INTRAVENOUS ONCE
Status: COMPLETED | OUTPATIENT
Start: 2024-02-27 | End: 2024-02-27

## 2024-02-27 RX ADMIN — REGADENOSON 0.4 MG: 0.08 INJECTION, SOLUTION INTRAVENOUS at 12:06

## 2024-02-27 RX ADMIN — TETROFOSMIN 12 MILLICURIE: 0.23 INJECTION, POWDER, LYOPHILIZED, FOR SOLUTION INTRAVENOUS at 11:00

## 2024-02-27 RX ADMIN — TETROFOSMIN 31.3 MILLICURIE: 0.23 INJECTION, POWDER, LYOPHILIZED, FOR SOLUTION INTRAVENOUS at 12:02

## 2024-02-28 DIAGNOSIS — I10 HYPERTENSION, UNSPECIFIED TYPE: ICD-10-CM

## 2024-02-28 RX ORDER — AMLODIPINE BESYLATE 5 MG/1
5 TABLET ORAL DAILY
Qty: 90 TABLET | Refills: 3 | Status: SHIPPED | OUTPATIENT
Start: 2024-02-28 | End: 2025-02-27

## 2024-02-28 NOTE — TELEPHONE ENCOUNTER
----- Message from Adrienne Nance sent at 2/28/2024  8:02 AM EST -----  02/28/24 Please see orders from Dr. Clay.  ----- Message -----  From: Eric Clay MD  Sent: 2/27/2024   3:21 PM EST  To: Adrienne Nance    Please add Norvasc 5 mg 1 tablet daily.  Blood pressure checks in the next 2 weeks  ----- Message -----  From: Adrienne Nance  Sent: 2/27/2024   1:58 PM EST  To: Eric Clay MD    02/27/24 Patient states in her last visit with Dr. Clay she was to keep a log of her blood pressures and then bring in for Dr. Clay to review. Here are a few of her blood pressure reading.

## 2024-03-08 ENCOUNTER — OFFICE VISIT (OUTPATIENT)
Dept: CARDIOLOGY | Facility: CLINIC | Age: 89
End: 2024-03-08
Payer: MEDICARE

## 2024-03-08 VITALS
WEIGHT: 191.2 LBS | DIASTOLIC BLOOD PRESSURE: 62 MMHG | HEART RATE: 65 BPM | BODY MASS INDEX: 33.87 KG/M2 | SYSTOLIC BLOOD PRESSURE: 118 MMHG

## 2024-03-08 DIAGNOSIS — I45.10 RIGHT BUNDLE BRANCH BLOCK (RBBB): ICD-10-CM

## 2024-03-08 DIAGNOSIS — K21.9 GASTROESOPHAGEAL REFLUX DISEASE WITHOUT ESOPHAGITIS: ICD-10-CM

## 2024-03-08 DIAGNOSIS — I10 PRIMARY HYPERTENSION: ICD-10-CM

## 2024-03-08 DIAGNOSIS — K92.2 GASTROINTESTINAL HEMORRHAGE, UNSPECIFIED GASTROINTESTINAL HEMORRHAGE TYPE: ICD-10-CM

## 2024-03-08 DIAGNOSIS — I73.9 PERIPHERAL VASCULAR DISEASE (CMS-HCC): ICD-10-CM

## 2024-03-08 DIAGNOSIS — E78.2 MIXED HYPERLIPIDEMIA: ICD-10-CM

## 2024-03-08 DIAGNOSIS — Z95.1 S/P CABG (CORONARY ARTERY BYPASS GRAFT): Primary | ICD-10-CM

## 2024-03-08 DIAGNOSIS — R42 VERTIGO: ICD-10-CM

## 2024-03-08 DIAGNOSIS — I48.0 PAROXYSMAL ATRIAL FIBRILLATION (MULTI): ICD-10-CM

## 2024-03-08 DIAGNOSIS — I25.10 ASHD (ARTERIOSCLEROTIC HEART DISEASE): ICD-10-CM

## 2024-03-08 DIAGNOSIS — I48.91 ATRIAL FIBRILLATION (MULTI): ICD-10-CM

## 2024-03-08 DIAGNOSIS — R60.9 EDEMA, UNSPECIFIED TYPE: ICD-10-CM

## 2024-03-08 DIAGNOSIS — R94.31 ABNORMAL ELECTROCARDIOGRAM: ICD-10-CM

## 2024-03-08 DIAGNOSIS — I71.40 ABDOMINAL AORTIC ANEURYSM (AAA) WITHOUT RUPTURE, UNSPECIFIED PART (CMS-HCC): ICD-10-CM

## 2024-03-08 PROCEDURE — 3074F SYST BP LT 130 MM HG: CPT | Performed by: INTERNAL MEDICINE

## 2024-03-08 PROCEDURE — 1036F TOBACCO NON-USER: CPT | Performed by: INTERNAL MEDICINE

## 2024-03-08 PROCEDURE — 99214 OFFICE O/P EST MOD 30 MIN: CPT | Performed by: INTERNAL MEDICINE

## 2024-03-08 PROCEDURE — 3078F DIAST BP <80 MM HG: CPT | Performed by: INTERNAL MEDICINE

## 2024-03-08 PROCEDURE — 1159F MED LIST DOCD IN RCRD: CPT | Performed by: INTERNAL MEDICINE

## 2024-03-08 PROCEDURE — 93000 ELECTROCARDIOGRAM COMPLETE: CPT | Performed by: INTERNAL MEDICINE

## 2024-03-08 RX ORDER — METOPROLOL TARTRATE 25 MG/1
25 TABLET, FILM COATED ORAL 2 TIMES DAILY
Qty: 180 TABLET | Refills: 1 | Status: SHIPPED | OUTPATIENT
Start: 2024-03-08

## 2024-03-08 RX ORDER — AMIODARONE HYDROCHLORIDE 200 MG/1
100 TABLET ORAL DAILY
Qty: 5 TABLET | Refills: 2 | Status: SHIPPED | OUTPATIENT
Start: 2024-03-08

## 2024-03-08 RX ORDER — CYCLOBENZAPRINE HCL 10 MG
10 TABLET ORAL NIGHTLY
COMMUNITY
Start: 2024-01-23

## 2024-03-08 NOTE — PROGRESS NOTES
CARDIOLOGY OFFICE NOTE     Date:   3/8/2024    Patient:    Lou Oliva    YOB: 1935    Primary Physician: Alexa Chatman MD       Reason for Visit: Follow-up for testing.    HPI:     Lou Oliva was seen in cardiac evaluation at the  Cardiology office March 8, 2024.      The patients problems are listed as in the impression below.    Electronic medical records reviewed.    The patient returns.  She feels well now.  She apparently was at [facility].  She was thought to have some dehydration.  Her medications were adjusted.  She had her losartan and spironolactone discontinued and her amiodarone decreased.  Records are not available for review.    She states that she is feeling well overall.  She has no cardiovascular complaints.    Patient denies Chest Pain, SOB, Lightheadedness, Dizziness, TIA or CVA symptoms.  No CHF or Edema.  No Palpitations.  No GI,  or Bleeding Issues. No Recent Fever or Chills.     Cardiovascular and general review of systems is otherwise negative.    A 14-system review is otherwise negative, other than noted.     PHYSICAL EXAMINATION:      Vitals:    03/08/24 1048   BP: 118/62   Pulse: 65     No acute distress.  Alert and oriented.   Head and neck examination: No jugular venous distention, no carotid bruits, no mass. Carotid upstrokes preserved. Oral mucosa moist. No xanthelasma. Head and neck examination otherwise unremarkable.   Lungs: Clear to auscultation and percussion. No wheezes, no rales, and no rhonchi. Chest excursion appeared to be normal. No chest wall tenderness on palpation. Well-healed mid incisional scar.   Heart: Normal S1 and S2. No S3. No S4. No rub. No murmur. Point of maximal impulse was within normal limits. Pacemaker left chest. Abdomen was soft. Nontender. No organomegaly. No bruits. No masses.   Extremities: 1+ bipedal edema. No clubbing. No cyanosis. Pulses are strong throughout. No bruits.    Musculoskeletal exam: No ulcers, otherwise unremarkable.   Neurologically appeared grossly intact.   .  IMPRESSION:      1. Edema  2. Symptomatic bradycardia, post permanent pacemaker currently 2023, Medtronic sensor XT DR LOVELL.  3. Coronary artery disease, status post coronary artery bypass graft surgery  4. September 2007; left internal mammary artery to the left anterior descending  coronary artery, saphenous vein graft to the first obtuse marginal and second  obtuse marginal, diagonal and right coronary artery, left atrial appendage  ligation, asymptomatic.   5. Negative Lexiscan myocardial perfusion stress test, ejection fraction 65%, 2012 , 2020 and June 2024.  6. Normal left ventricular systolic function. LVEF 65-70%.,  Echocardiogram 2/2024.  7. Hypertension.  8. Familial hyperlipidemia (intolerant to statins and Juxtapid).  9. Peripheral vascular disease with stable 2.6 cm abdominal aortic ectasia;  status post left iliac balloon angioplasty.   10. 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   11. History of appendectomy.  12. History of total abdominal hysterectomy.  13. Obesity.  14. Melanotic stool.  15. Negative CT of the chest for pulmonary embolism or masses, 2012.  16. Negative mammogram 2012.   17. 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   17. Possible obstructive sleep apnea.  18. Statin intolerance due to myalgias  19. GERD / PUD  20. Anemia  Otherwise as per assessment below.    RECOMMENDATIONS:      Overall she is doing well.  Would suggest discontinuing altogether losartan and spironolactone given her renal insufficiency and control blood pressure.  Her amiodarone will be decreased to 200 mg 1/2 tablet daily.  She will continue her other medications.  Refills were provided.    We encouraged her to drink least 48 ounces of fluid per day.    Exercise dietary walking program was encouraged.    She will follow-up with electrophysiology Dr. Clay as  scheduled.    Pacemaker interrogations as scheduled.    [employer] portal use was encouraged.    We will plan to see back in 4 months with Laboratory Studies and ECG as ordered.     Patient will follow up with their primary physician for general care.    The patient knows to contact medical care earlier if need be.      ALLERGIES:     Allergies   Allergen Reactions    Lisinopril Unknown    Statins-Hmg-Coa Reductase Inhibitors Unknown    Sulfa (Sulfonamide Antibiotics) Unknown        MEDICATIONS:     Current Outpatient Medications   Medication Instructions    acetaminophen (TYLENOL) 1,000 mg, oral, Every 6 hours PRN    amiodarone (PACERONE) 100 mg, oral, Every other day    amLODIPine (NORVASC) 5 mg, oral, Daily    aspirin 81 mg EC tablet 1 tablet, oral, Daily    cyclobenzaprine (FLEXERIL) 10 mg, oral, Nightly    Eliquis 2.5 mg, oral, 2 times daily    ferrous gluconate 324 (38 Fe) MG tablet 1 tablet, oral, Daily    losartan (Cozaar) 50 mg tablet 1 tablet, oral, 2 times daily    metoprolol tartrate (Lopressor) 25 mg tablet 1 tablet, oral, 2 times daily    nitroglycerin (NitrolinguaL) 400 mcg/spray spray USE 1 SPRAY UNDER THE TONGUE AS NEEDED FOR CHEST PAIN.    polyethylene glycol (GLYCOLAX, MIRALAX) 17 g, oral, Daily RT    spironolactone (ALDACTONE) 12.5 mg, oral, Daily       ELECTROCARDIOGRAM:      Atrial paced rhythm.  Right bundle branch block.  Secondary ST-T wave changes.  Rate 62.    CARDIAC TESTING:      Lexiscan Myoview perfusion stress test: Normal.  No ischemia or MI.  LVEF 65%.    Echocardiogram: Normal LV systolic function.  LVEF 65 to 70%.  LV diastolic dysfunction.  Aortic valve sclerosis mild stenosis V-max 1.9 meters per second.    LABORATORY DATA:      CBC:   Lab Results   Component Value Date    WBC 6.2 12/05/2023    RBC 4.22 12/05/2023    HGB 11.8 (L) 12/05/2023    HCT 37.7 12/05/2023     12/05/2023        CMP:    Lab Results   Component Value Date     12/05/2023    K 4.5 12/05/2023    CL  109 (H) 12/05/2023    CO2 27 12/05/2023    BUN 37 (H) 12/05/2023    CREATININE 1.79 (H) 12/05/2023    GLUCOSE 81 12/05/2023    CALCIUM 8.3 (L) 12/05/2023       Magnesium:    Lab Results   Component Value Date    MG 2.03 12/05/2023       Lipid Profile:    Lab Results   Component Value Date    CHOL 216 (H) 12/05/2023    TRIG 92 12/05/2023    HDL 42.6 12/05/2023    LDLF 132 (H) 01/04/2023       Hepatic Function Panel:    Lab Results   Component Value Date    ALKPHOS 39 12/05/2023    ALT 6 (L) 12/05/2023    AST 13 12/05/2023    PROT 5.8 (L) 12/05/2023    BILITOT 0.6 12/05/2023    BILIDIR 0.1 12/05/2023       TSH:    Lab Results   Component Value Date    TSH 0.97 12/05/2023     PT/INR:    Lab Results   Component Value Date    PROTIME 14.3 (H) 03/08/2023    INR 1.2 (H) 03/08/2023                   PROBLEM LIST:     Patient Active Problem List   Diagnosis    Abnormal electrocardiogram    Aneurysm of abdominal aorta (CMS/HCC)    ASHD (arteriosclerotic heart disease)    Atrial fibrillation (CMS/HCC)    GERD (gastroesophageal reflux disease)    GI bleed    Edema    Hyperlipidemia    Pain of left hip joint    Hypertension    Peripheral vascular disease (CMS/HCC)    Right bundle branch block (RBBB)    S/P CABG (coronary artery bypass graft)    Trochanteric bursitis of left hip    Vertigo             Delroy Avila MD, [facility] /  Cardiology      Of Note:  [employer] voice recognition dictation software was utilized partially in the preparation of this note, therefore, inaccuracies in spelling, word choice and punctuation may have occurred which were not recognized the time of signing.    Patient was seen and examined with total time of visit including chart preparation, rooming, and chart completion exceeding 40 minutes.      ----

## 2024-03-08 NOTE — PATIENT INSTRUCTIONS
INCREASE WATER INTAKE. 4 GLASSES DAILY     DISCONTINUE TAKING LOSARTAN   DISCONTINUE TAKING SPIRONOLACTONE     TAKE AMIODARONE 200 MG- TAKE 1/2 TABLET DAILY     HAVE BLOOD WORK DONE ONE WEEK PRIOR TO NEXT OFFICE VISIT.

## 2024-03-28 NOTE — PROGRESS NOTES
[id number] [address]  Outpatient Physical Therapy    Treatment Note        Date: 2019  Patient: Cristine Ahmadi  : 1935  ACCT #: [de-identified]  Referring Practitioner: Kalina Law  Diagnosis: LLE Weakness    Visit Information:  PT Visit Information  Onset Date: 19  PT Insurance Information: Medicare ;  Joaquina Perdomo secondary   Total # of Visits to Date: 7  Plan of Care/Certification Expiration Date: 19  No Show: 0  Canceled Appointment: 0  Progress Note Counter: 7/8-10 PN 19    Subjective: Pt states \"I feel good. I''m still always sore and stiff in the mornings. \"      HEP Compliance:  [x] Good [] Fair [] Poor [] Reports not doing due to:    Vital Signs  Patient Currently in Pain: Yes   Pain Screening  Patient Currently in Pain: Yes  Pain Assessment  Pain Assessment: 0-10  Pain Level: 2  Pain Type: Chronic pain  Pain Location: Knee  Pain Descriptors: Aching    OBJECTIVE:   Exercises  Exercise 1: NS Lev 2  x 5 minutes (patient demonstrates improved hip alignment during reciprocal motion)  Exercise 2: TA isos w/ breath  5s-6s  x 12 - w/ edu on breath and TA   Exercise 3: TA marches x15; TA alt UEs x 15, TA using 2lb weight over headhead x 10  Exercise 4:  TA alt ER x 15  Exercise 5:  TA bridges 5s x 15  Exercise 6: TA SLR x 15B   Exercise 7: B clams w/ pillow b/w  2 x 12; reverse clams 2 x 12 ( consider [employer] E-stim for L clams)  Exercise 8:  H/l Abd GTB 5s  x 25  Exercise 9:  h/l Add ball 5s x 25  Exercise 11: modified nba stretch 20\"x3   Exercise 13: Seated quad set (push feet into floor) 10 X 5\" hold  Exercise 14: 4\" step ups F/L x10 ea B      Strength: [x] NT  [] MMT completed:     ROM: [x] NT  [] ROM measurements:     Assessment:     Body structures, Functions, Activity limitations: Decreased functional mobility , Decreased strength, Decreased balance, Decreased high-level IADLs, Increased Pain  Assessment: Good adryan to added step ups to further progress WBing exs and LE strength. Pt cont's to demo inc trendelenburg and Lt LE weakness which is more apparent during step ups. Slight fatigue reported. Treatment Diagnosis: Impaired strength B LEs; Impaired Gait; L LE Pain  Prognosis: Good     Goals:  Short term goals  Time Frame for Short term goals: 8 -10 visits  Short term goal 1: dec l knee pain to </=5/10 at worse  Short term goal 2: Inc strength B hip abd, ext  to >/= 3+/5 and B hip IR and ER to >/= 4-/5 to improve transfers and stairs  Short term goal 3: Pt to demo improved timing of st cane for maximum support in L SLS   Short term goal 4: Inc LEFS to >/= 47/80  Long term goals  Long term goal 1: TBD  Progress toward goals: B hip strength    POST-PAIN       Pain Rating (0-10 pain scale):   0/10   Location and pain description same as pre-treatment unless indicated. Action: [] NA   [] Perform HEP  [] Meds as prescribed  [] Modalities as prescribed   [] Call Physician     Frequency/Duration:  Plan  Times per week: 2-3  Plan weeks: 4-6  Specific instructions for Next Treatment: consider [place] E-stim w/ L clams; PN due NV*   Current Treatment Recommendations: Strengthening, Balance Training, Gait Training, Stair training, Neuromuscular Re-education, Manual Therapy - Soft Tissue Mobilization, Manual Therapy - Joint Manipulation, Pain Management, Home Exercise Program, Modalities, Aquatics     Pt to continue current HEP. See objective section for any therapeutic exercise changes, additions or modifications this date.      PT Individual Minutes  Time In: 2430  Time Out: 0238  Minutes: 38  Timed Code Treatment Minutes: 38 Minutes  Procedure Minutes: N/A     Signature:  Electronically signed by Nakita Jean-Baptiste PTA on 6/28/19 at 10:56 AM no

## 2024-04-01 ENCOUNTER — HOSPITAL ENCOUNTER (OUTPATIENT)
Dept: CARDIOLOGY | Age: 89
Discharge: HOME | End: 2024-04-01
Payer: MEDICARE

## 2024-04-01 PROCEDURE — 93280 PM DEVICE PROGR EVAL DUAL: CPT

## 2024-04-01 PROCEDURE — 93280 PM DEVICE PROGR EVAL DUAL: CPT | Performed by: INTERNAL MEDICINE

## 2024-04-02 DIAGNOSIS — Z95.0 PACEMAKER: ICD-10-CM

## 2024-04-02 NOTE — PROGRESS NOTES
Rec'd fax from [facility] device clinic requesting  order for services rendered.  Order entered for date of service rendered and x next year for routine device checks in-clinic and remotely or as directed by physician.

## 2024-04-08 DIAGNOSIS — I48.91 ATRIAL FIBRILLATION (MULTI): ICD-10-CM

## 2024-04-08 RX ORDER — APIXABAN 2.5 MG/1
2.5 TABLET, FILM COATED ORAL 2 TIMES DAILY
Qty: 90 TABLET | Refills: 0 | Status: SHIPPED | OUTPATIENT
Start: 2024-04-08 | End: 2024-05-23

## 2024-05-14 ENCOUNTER — LAB (OUTPATIENT)
Dept: LAB | Facility: LAB | Age: 89
End: 2024-05-14
Payer: MEDICARE

## 2024-05-14 DIAGNOSIS — I48.91 ATRIAL FIBRILLATION (MULTI): ICD-10-CM

## 2024-05-14 DIAGNOSIS — R94.31 ABNORMAL ELECTROCARDIOGRAM: ICD-10-CM

## 2024-05-14 DIAGNOSIS — I49.5 SINUS NODE DYSFUNCTION (MULTI): ICD-10-CM

## 2024-05-14 DIAGNOSIS — Z95.0 PACEMAKER: ICD-10-CM

## 2024-05-14 DIAGNOSIS — Z87.891 FORMER SMOKER: ICD-10-CM

## 2024-05-14 DIAGNOSIS — Z79.899 HIGH RISK MEDICATION USE: ICD-10-CM

## 2024-05-14 DIAGNOSIS — Z95.1 S/P CABG (CORONARY ARTERY BYPASS GRAFT): ICD-10-CM

## 2024-05-14 LAB
ALBUMIN SERPL BCP-MCNC: 3.9 G/DL (ref 3.4–5)
ALP SERPL-CCNC: 51 U/L (ref 33–136)
ALT SERPL W P-5'-P-CCNC: 8 U/L (ref 7–45)
ANION GAP SERPL CALC-SCNC: 11 MMOL/L (ref 10–20)
AST SERPL W P-5'-P-CCNC: 14 U/L (ref 9–39)
BILIRUB SERPL-MCNC: 0.5 MG/DL (ref 0–1.2)
BUN SERPL-MCNC: 37 MG/DL (ref 6–23)
CALCIUM SERPL-MCNC: 9.2 MG/DL (ref 8.6–10.3)
CHLORIDE SERPL-SCNC: 108 MMOL/L (ref 98–107)
CO2 SERPL-SCNC: 29 MMOL/L (ref 21–32)
CREAT SERPL-MCNC: 1.68 MG/DL (ref 0.5–1.05)
EGFRCR SERPLBLD CKD-EPI 2021: 29 ML/MIN/1.73M*2
GLUCOSE SERPL-MCNC: 92 MG/DL (ref 74–99)
POTASSIUM SERPL-SCNC: 4.9 MMOL/L (ref 3.5–5.3)
PROT SERPL-MCNC: 6.4 G/DL (ref 6.4–8.2)
SODIUM SERPL-SCNC: 143 MMOL/L (ref 136–145)
TSH SERPL-ACNC: 0.51 MIU/L (ref 0.44–3.98)

## 2024-05-14 PROCEDURE — 80053 COMPREHEN METABOLIC PANEL: CPT

## 2024-05-14 PROCEDURE — 84443 ASSAY THYROID STIM HORMONE: CPT

## 2024-05-14 PROCEDURE — 36415 COLL VENOUS BLD VENIPUNCTURE: CPT

## 2024-05-15 ENCOUNTER — TELEPHONE (OUTPATIENT)
Dept: CARDIOLOGY | Facility: CLINIC | Age: 89
End: 2024-05-15
Payer: MEDICARE

## 2024-05-15 DIAGNOSIS — I48.0 PAROXYSMAL ATRIAL FIBRILLATION (MULTI): ICD-10-CM

## 2024-05-15 DIAGNOSIS — I10 PRIMARY HYPERTENSION: ICD-10-CM

## 2024-05-15 DIAGNOSIS — E78.2 MIXED HYPERLIPIDEMIA: ICD-10-CM

## 2024-05-15 DIAGNOSIS — I25.10 ASHD (ARTERIOSCLEROTIC HEART DISEASE): ICD-10-CM

## 2024-05-15 NOTE — TELEPHONE ENCOUNTER
----- Message from Delroy Avila MD sent at 5/14/2024  3:38 PM EDT -----  Yes ok with [facility]    ----- Message -----  From: Tierney Jiménez LPN  Sent: 5/14/2024   3:08 PM EDT  To: MD Dr. Austin Mills,    Is it ok to change BMP, CBC, TSH,Mag, lipid and liver to today's date?   Pt has pending appt 7/8/24  ----- Message -----  From: Adrienne Nance  Sent: 5/14/2024  11:46 AM EDT  To: Do [id number] Clinical Support Staff    05/14/24 Patient was at the lab today drawing labs for another physician and they sonya extra vials because she is due to have labs done for Dr. Avila in July but the lab sent the patient over to tell us to change the lab order so that it shows for today's date. Needs to be done ASAP per lab.

## 2024-05-22 DIAGNOSIS — I48.91 ATRIAL FIBRILLATION (MULTI): ICD-10-CM

## 2024-05-23 RX ORDER — APIXABAN 2.5 MG/1
TABLET, FILM COATED ORAL
Qty: 60 TABLET | Refills: 0 | Status: SHIPPED | OUTPATIENT
Start: 2024-05-23

## 2024-05-28 ENCOUNTER — HOSPITAL ENCOUNTER (OUTPATIENT)
Dept: CARDIOLOGY | Age: 89
Discharge: HOME | End: 2024-05-28
Payer: MEDICARE

## 2024-05-28 PROCEDURE — 93280 PM DEVICE PROGR EVAL DUAL: CPT

## 2024-05-28 PROCEDURE — 93280 PM DEVICE PROGR EVAL DUAL: CPT | Performed by: INTERNAL MEDICINE

## 2024-06-17 ENCOUNTER — APPOINTMENT (OUTPATIENT)
Dept: CARDIOLOGY | Facility: CLINIC | Age: 89
End: 2024-06-17
Payer: MEDICARE

## 2024-06-17 VITALS
HEART RATE: 66 BPM | DIASTOLIC BLOOD PRESSURE: 80 MMHG | BODY MASS INDEX: 34.16 KG/M2 | SYSTOLIC BLOOD PRESSURE: 164 MMHG | WEIGHT: 192.8 LBS | HEIGHT: 63 IN

## 2024-06-17 DIAGNOSIS — I48.91 ATRIAL FIBRILLATION (MULTI): ICD-10-CM

## 2024-06-17 DIAGNOSIS — Z95.0 PACEMAKER: ICD-10-CM

## 2024-06-17 DIAGNOSIS — Z95.1 S/P CABG (CORONARY ARTERY BYPASS GRAFT): ICD-10-CM

## 2024-06-17 DIAGNOSIS — Z79.899 HIGH RISK MEDICATION USE: ICD-10-CM

## 2024-06-17 DIAGNOSIS — I49.5 SINUS NODE DYSFUNCTION (MULTI): ICD-10-CM

## 2024-06-17 DIAGNOSIS — Z87.891 FORMER SMOKER: ICD-10-CM

## 2024-06-17 DIAGNOSIS — I10 HYPERTENSION, UNSPECIFIED TYPE: ICD-10-CM

## 2024-06-17 PROCEDURE — 3079F DIAST BP 80-89 MM HG: CPT | Performed by: NURSE PRACTITIONER

## 2024-06-17 PROCEDURE — 1036F TOBACCO NON-USER: CPT | Performed by: NURSE PRACTITIONER

## 2024-06-17 PROCEDURE — 3077F SYST BP >= 140 MM HG: CPT | Performed by: NURSE PRACTITIONER

## 2024-06-17 PROCEDURE — 99214 OFFICE O/P EST MOD 30 MIN: CPT | Performed by: NURSE PRACTITIONER

## 2024-06-17 PROCEDURE — 1159F MED LIST DOCD IN RCRD: CPT | Performed by: NURSE PRACTITIONER

## 2024-06-17 RX ORDER — BETAMETHASONE DIPROPIONATE 0.5 MG/G
LOTION TOPICAL 2 TIMES DAILY
COMMUNITY

## 2024-06-17 RX ORDER — AMLODIPINE BESYLATE 5 MG/1
5 TABLET ORAL DAILY
Qty: 90 TABLET | Refills: 1 | Status: SHIPPED | OUTPATIENT
Start: 2024-06-17 | End: 2025-06-17

## 2024-06-17 RX ORDER — APIXABAN 2.5 MG/1
TABLET, FILM COATED ORAL
Qty: 60 TABLET | Refills: 6 | Status: SHIPPED | OUTPATIENT
Start: 2024-06-17

## 2024-06-17 NOTE — PATIENT INSTRUCTIONS
Your Amiodarone 200 mg 1/2 should be taken daily not every other day    Resume Amlodipine 5 mg 1 tablet daily for  your blood pressure.     Monitor your blood pressure on a daily basis, keep track of your readings and bring them with you to your next office visit.

## 2024-06-17 NOTE — PROGRESS NOTES
Lou Oliva is a 89 y.o. female that presents to the office today with her son Vinny for cardiac follow up.  She follows with her  primary cardiologist Dr. Avila and Dr. Clay  and was added to my schedule today as a Dr. Clay 6 month follow up.      Per Dr. Clay office notes, she has a PMH of coronary artery disease status post coronary bypass graft surgery September 2007, left internal mammary artery to the left anterior descending artery, saphenous vein graft to the first obtuse marginal and second obtuse minor, diagonal and right coronary artery with left atrial appendage ligation. No evidence of ischemia per stress test in 2020 with a left ventricular a ejection fraction 75%. Normal left ventricular function with recent echocardiogram value of 70%. Hypertension. Hyperlipidemia. Peripheral vascular disease.     Looking at her records, she was diagnosed of atrial fibrillation back in December 2020 during office visit. She was asymptomatic. She is being complaining of dizziness and lightheadedness and also shortness of breath for almost 3 years. She was placed on amiodarone therapy. She did not require cardioversion. Subsequent office visit she was back in sinus rhythm and sinus bradycardia. Holter monitor ordered in 2020 shows significant sinus node dysfunction with average heart rate of 40 bpm with a maximal heart rate of 76 bpm. Since then they have no recurrence of atrial fibrillation. Patient has been maintained on amiodarone therapy with LFTs and TSH lately between normal values. No recent PFTs.     During the last visit, she was offered her a dual-chamber pacemaker for symptomatic bradycardia and also better management for atrial fibrillation. She underwent dual-chamber pacemaker implantation February 24, 2023 with no complications.     Patient underwent repositioning of the right atrial lead in March and 2023 with no complications.    She states that she has been doing well, she denies chest  pain, chest pressure, chest tightness, shortness of breath, palpitations. She does admit to occasional lightheadedness.     Testing Reviewed  Device check 2024 showed normal functioning device no alerts, battery life 12 years.    2023 PFT's:     PFT shows spirometry is normal range.  There is no response to   bronchodilator.  Static lung volume within normal range.    Diffusion capacity is mildly impaired, when corrected for   alveolar volume diffusion capacity is within normal range.    Airway resistance is normal.     Assessment/Plan  Pacemaker:  Interrogation as noted above.  Atrial fibrillation:  No reoccurrence.  Continue amiodarone.  Anticoagulation:  Eliquis.  Denies bleeding.  Continue  Hypertension:  Sub optimal control.. Resume amlodipine 5 mg daily  Continue follow up  with device clinic as scheduled  PFT's prior to next office visit with Dr. Clay  CMP, TSH and T4 before next office visit with Dr. Clay   Keep pending apt with Dr. Avila as scheduled  Follow with Dr. Clay as scheduled or sooner if needed.       Patient Active Problem List   Diagnosis    Abnormal electrocardiogram    Aneurysm of abdominal aorta (CMS-[facility])    ASHD (arteriosclerotic heart disease)    Atrial fibrillation (Multi)    GERD (gastroesophageal reflux disease)    GI bleed    Edema    Hyperlipidemia    Pain of left hip joint    Hypertension    Peripheral vascular disease (CMS-[facility])    Right bundle branch block (RBBB)    S/P CABG (coronary artery bypass graft)    Trochanteric bursitis of left hip    Vertigo    Pacemaker    Sinus node dysfunction (Multi)    High risk medication use    BMI 32.0-32.9,adult    Former smoker       Social History     Tobacco Use    Smoking status: Former     Current packs/day: 0.00     Types: Cigarettes     Quit date:      Years since quittin.4    Smokeless tobacco: Never   Substance Use Topics    Alcohol use: Never    Drug use: Never       History reviewed. No pertinent past medical  history.      Current Outpatient Medications:     acetaminophen (Tylenol) 500 mg capsule, Take 2 capsules (1,000 mg) by mouth every 6 hours if needed., Disp: , Rfl:     amiodarone (Pacerone) 200 mg tablet, Take 0.5 tablets (100 mg) by mouth once daily., Disp: 5 tablet, Rfl: 2    aspirin 81 mg EC tablet, Take 1 tablet (81 mg) by mouth once daily., Disp: , Rfl:     betamethasone dipropionate (Diprosone) 0.05 % lotion, Apply topically 2 times a day., Disp: , Rfl:     ferrous gluconate 324 (38 Fe) MG tablet, Take 1 tablet (324 mg) by mouth once daily., Disp: , Rfl:     metoprolol tartrate (Lopressor) 25 mg tablet, Take 1 tablet (25 mg) by mouth 2 times a day., Disp: 180 tablet, Rfl: 1    nitroglycerin (NitrolinguaL) 400 mcg/spray spray, USE 1 SPRAY UNDER THE TONGUE AS NEEDED FOR CHEST PAIN., Disp: , Rfl:     polyethylene glycol (Glycolax, Miralax) 17 gram/dose powder, Take 17 g by mouth once daily., Disp: , Rfl:     amLODIPine (Norvasc) 5 mg tablet, Take 1 tablet (5 mg) by mouth once daily., Disp: 90 tablet, Rfl: 1    Eliquis 2.5 mg tablet, TAKE 1 TABLET(2.5 MG) BY MOUTH TWICE DAILY, Disp: 60 tablet, Rfl: 6    Lisinopril, Statins-hmg-coa reductase inhibitors, and Sulfa (sulfonamide antibiotics)    No family history on file.    Past Surgical History:   Procedure Laterality Date    OTHER SURGICAL HISTORY  01/05/2022    Arterial ligation    OTHER SURGICAL HISTORY  01/05/2022    Hip replacement    OTHER SURGICAL HISTORY  01/25/2022    Complete colonoscopy          Review of systems  Constitutional: No weight loss, fever, chills, weakness or fatigue  HEENT: No visual loss, blurred vision, double vision or yellow sclerae  Skin: No rash or itching  Cardiovascular: No chest pain, pressure or discomfort, No palpitations or edema.  Respiratory: No shortness of breath, cough or sputum  Gastrointestinal: No nausea, vomiting or diarrhea. No bloody or dark tarry stools.  Neurological: No headache,  syncope.   Musculoskeletal: No  "muscle, back pain, joint pain or stiffness.  Hematologic: No anemia, bleeding or bruising.    /80 (BP Location: Left arm, Patient Position: Sitting)   Pulse 66   Ht 1.6 m (5' 3\")   Wt 87.5 kg (192 lb 12.8 oz)   BMI 34.15 kg/m²     Patient Active Problem List   Diagnosis    Abnormal electrocardiogram    Aneurysm of abdominal aorta (CMS-[facility])    ASHD (arteriosclerotic heart disease)    Atrial fibrillation (Multi)    GERD (gastroesophageal reflux disease)    GI bleed    Edema    Hyperlipidemia    Pain of left hip joint    Hypertension    Peripheral vascular disease (CMS-[facility])    Right bundle branch block (RBBB)    S/P CABG (coronary artery bypass graft)    Trochanteric bursitis of left hip    Vertigo    Pacemaker    Sinus node dysfunction (Multi)    High risk medication use    BMI 32.0-32.9,adult    Former smoker         Physical Exam  Constitutional: Well developed, awake/alert x 3, no distress.  Head/Neck: No JVD, No bruits  Respiratory/Thorax: patent airways, CTAB, normal breath sounds with good expansion.  Cardiovascular: Regular rate and rhythm, no murmurs, normal S1 and S2,   Gastrointestinal: Non distended, soft, non-tender, no rebound tenderness or guarding.  Extremities: No cyanosis, edema.   Neurological: Alert and oriented x 3. Moves extremities spontaneous with purpose.  Psychological: Appropriate mood and behavior  Skin: Warm and Dry.          Please excuse any errors in grammar or translation related to dictation, voice recognition software was used to prepare this document.      "

## 2024-06-19 RX ORDER — AMIODARONE HYDROCHLORIDE 200 MG/1
TABLET ORAL
Qty: 60 TABLET | Refills: 1 | Status: SHIPPED | OUTPATIENT
Start: 2024-06-19

## 2024-06-20 ENCOUNTER — TELEPHONE (OUTPATIENT)
Dept: CARDIOLOGY | Facility: CLINIC | Age: 89
End: 2024-06-20
Payer: MEDICARE

## 2024-06-20 NOTE — TELEPHONE ENCOUNTER
Scheduled patients PFT at [facility] on 11/20/24 arriving at noon for a 12:15 appointment. Provided patient with prep.

## 2024-06-20 NOTE — TELEPHONE ENCOUNTER
Called patient in response to her request for Amiodarone renewal.  Patient was notified rx was sent 6/19/24.  Patient then questioned why she was only given 5 tablets at a time.  Called [facility] pharmacist confirms rx from 6/19/24 received.  She could not give a reason for the qty of 5 tablets per fill on prior rx only stating that is what insurance covered.  Pharmacist stated patient will receive qty of 45 for current rx.  Patient notified of same by phone and she verbalized understanding.  Kim Gomez, CMA

## 2024-07-08 ENCOUNTER — APPOINTMENT (OUTPATIENT)
Dept: CARDIOLOGY | Facility: CLINIC | Age: 89
End: 2024-07-08
Payer: MEDICARE

## 2024-07-08 VITALS
WEIGHT: 192 LBS | HEART RATE: 65 BPM | BODY MASS INDEX: 35.33 KG/M2 | SYSTOLIC BLOOD PRESSURE: 128 MMHG | DIASTOLIC BLOOD PRESSURE: 82 MMHG | HEIGHT: 62 IN

## 2024-07-08 DIAGNOSIS — K92.2 GASTROINTESTINAL HEMORRHAGE, UNSPECIFIED GASTROINTESTINAL HEMORRHAGE TYPE: ICD-10-CM

## 2024-07-08 DIAGNOSIS — R60.9 EDEMA, UNSPECIFIED TYPE: ICD-10-CM

## 2024-07-08 DIAGNOSIS — E78.2 MIXED HYPERLIPIDEMIA: ICD-10-CM

## 2024-07-08 DIAGNOSIS — I73.9 PERIPHERAL VASCULAR DISEASE (CMS-HCC): ICD-10-CM

## 2024-07-08 DIAGNOSIS — Z95.1 S/P CABG (CORONARY ARTERY BYPASS GRAFT): ICD-10-CM

## 2024-07-08 DIAGNOSIS — R42 VERTIGO: ICD-10-CM

## 2024-07-08 DIAGNOSIS — I45.10 RIGHT BUNDLE BRANCH BLOCK (RBBB): ICD-10-CM

## 2024-07-08 DIAGNOSIS — K21.9 GASTROESOPHAGEAL REFLUX DISEASE WITHOUT ESOPHAGITIS: ICD-10-CM

## 2024-07-08 DIAGNOSIS — R94.31 ABNORMAL ELECTROCARDIOGRAM: ICD-10-CM

## 2024-07-08 DIAGNOSIS — I48.0 PAROXYSMAL ATRIAL FIBRILLATION (MULTI): ICD-10-CM

## 2024-07-08 DIAGNOSIS — I71.40 ABDOMINAL AORTIC ANEURYSM (AAA) WITHOUT RUPTURE, UNSPECIFIED PART (CMS-HCC): ICD-10-CM

## 2024-07-08 DIAGNOSIS — I25.10 ASHD (ARTERIOSCLEROTIC HEART DISEASE): ICD-10-CM

## 2024-07-08 DIAGNOSIS — I10 PRIMARY HYPERTENSION: ICD-10-CM

## 2024-07-08 DIAGNOSIS — I10 HYPERTENSION, UNSPECIFIED TYPE: ICD-10-CM

## 2024-07-08 PROCEDURE — 3074F SYST BP LT 130 MM HG: CPT | Performed by: INTERNAL MEDICINE

## 2024-07-08 PROCEDURE — 3079F DIAST BP 80-89 MM HG: CPT | Performed by: INTERNAL MEDICINE

## 2024-07-08 PROCEDURE — 99214 OFFICE O/P EST MOD 30 MIN: CPT | Performed by: INTERNAL MEDICINE

## 2024-07-08 PROCEDURE — 1036F TOBACCO NON-USER: CPT | Performed by: INTERNAL MEDICINE

## 2024-07-08 RX ORDER — DICLOFENAC SODIUM 10 MG/G
4 GEL TOPICAL 4 TIMES DAILY PRN
COMMUNITY

## 2024-07-08 RX ORDER — LOSARTAN POTASSIUM 25 MG/1
25 TABLET ORAL DAILY
Qty: 90 TABLET | Refills: 1 | Status: SHIPPED | OUTPATIENT
Start: 2024-07-08 | End: 2025-01-04

## 2024-07-08 RX ORDER — RANOLAZINE 500 MG/1
500 TABLET, EXTENDED RELEASE ORAL 2 TIMES DAILY
Qty: 180 TABLET | Refills: 3 | Status: SHIPPED | OUTPATIENT
Start: 2024-07-08 | End: 2025-07-08

## 2024-07-08 RX ORDER — AMLODIPINE BESYLATE 2.5 MG/1
2.5 TABLET ORAL DAILY
Qty: 90 TABLET | Refills: 1 | Status: SHIPPED | OUTPATIENT
Start: 2024-07-08 | End: 2025-01-04

## 2024-07-08 NOTE — PROGRESS NOTES
CARDIOLOGY OFFICE NOTE     Date:   7/8/2024    Patient:    Lou Oliva    YOB: 1935    Primary Physician: Alexa Chatman MD       Reason for Visit: 4-month follow-up.    HPI:     Lou Oliva was seen in cardiac evaluation at the  Cardiology office July 8, 2024.      The patients problems are listed as in the impression below.    Electronic medical records reviewed.    Patient appears to be doing well overall.  She does have dizziness and edema.  She is tired.  She has had some discomfort in her chest mostly at night.  She has no other significant cardiovascular complaints.    Patient denies Chest Pain, SOB, Lightheadedness, Dizziness, TIA or CVA symptoms.  No CHF or Edema.  No Palpitations.  No GI,  or Bleeding Issues. No Recent Fever or Chills.     Cardiovascular and general review of systems is otherwise negative.    A 14-system review is otherwise negative, other than noted.     PHYSICAL EXAMINATION:      Vitals:    07/08/24 1411   BP: 128/82   Pulse: 65     No acute distress.  Alert and oriented.   Head and neck examination: No jugular venous distention, no carotid bruits, no mass. Carotid upstrokes preserved. Oral mucosa moist. No xanthelasma. Head and neck examination otherwise unremarkable.   Lungs: Clear to auscultation and percussion. No wheezes, no rales, and no rhonchi. Chest excursion appeared to be normal. No chest wall tenderness on palpation. Well-healed mid incisional scar.   Heart: Normal S1 and S2. No S3. No S4. No rub. No murmur. Point of maximal impulse was within normal limits. Pacemaker left chest. Abdomen was soft. Nontender. No organomegaly. No bruits. No masses.   Extremities: 1+ bipedal edema. No clubbing. No cyanosis. Pulses are strong throughout. No bruits.   Musculoskeletal exam: No ulcers, otherwise unremarkable.   Neurologically appeared grossly intact.   .  IMPRESSION:       Edema  Chest discomfort  symptoms  Lightheadedness  Fatigue  Symptomatic bradycardia, post permanent pacemaker currently 2023, [employer] sensor XT DR LOVELL.  Coronary artery disease, status post coronary artery bypass graft surgery, September 2007; left internal mammary artery to the left anterior descending coronary artery, saphenous vein graft to the first obtuse marginal and second obtuse marginal, diagonal and right coronary artery, left atrial appendage ligation  Negative Lexiscan myocardial perfusion stress test, ejection fraction 65%, 2012 , 2020 and June 2024.  Normal left ventricular systolic function. LVEF 65-70%.,  Echocardiogram 2/2024.  Hypertension.  Familial hyperlipidemia (intolerant to statins and Juxtapid).  Peripheral vascular disease with stable 2.6 cm abdominal aortic ectasia; status post left iliac balloon angioplasty.   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   History of appendectomy.  History of total abdominal hysterectomy.  Obesity.  Negative CT of the chest for pulmonary embolism or masses, 2012.  Negative mammogram 2012.   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   Possible obstructive sleep apnea.  Statin intolerance due to myalgias  GERD / PUD  Anemia      Otherwise as per assessment below.    RECOMMENDATIONS:      Patient overall has residual symptoms.  I have encouraged her to increase her activity as tolerated.    Hydration was encouraged.    Would suggest adjusting her medications as follows: And Ranexa 500 mg twice daily, decrease amlodipine to 2.5 mg daily, resume losartan 25 mg daily at this time.  She will continue her other medications.  Refills were provided.    [employer] portal use was encouraged.    We will plan to see back in 4 months with Laboratory Studies and ECG as ordered.     Patient will follow up with their primary physician for general care.    The patient knows to contact medical care earlier if need be.      ALLERGIES:     Allergies    Allergen Reactions    Lisinopril Unknown    Statins-Hmg-Coa Reductase Inhibitors Unknown    Sulfa (Sulfonamide Antibiotics) Unknown        MEDICATIONS:     Current Outpatient Medications   Medication Instructions    acetaminophen (TYLENOL) 1,000 mg, oral, Every 6 hours PRN    amiodarone (Pacerone) 200 mg tablet TAKE 1/2 TABLET(100 MG) BY MOUTH DAILY    amLODIPine (NORVASC) 5 mg, oral, Daily    aspirin 81 mg EC tablet 1 tablet, oral, Daily    betamethasone dipropionate (Diprosone) 0.05 % lotion Topical, 2 times daily    diclofenac sodium (VOLTAREN ARTHRITIS PAIN) 4 g, Topical, 4 times daily PRN    Eliquis 2.5 mg tablet TAKE 1 TABLET(2.5 MG) BY MOUTH TWICE DAILY    ferrous gluconate 324 (38 Fe) MG tablet 1 tablet, oral, Daily    metoprolol tartrate (LOPRESSOR) 25 mg, oral, 2 times daily    nitroglycerin (NitrolinguaL) 400 mcg/spray spray USE 1 SPRAY UNDER THE TONGUE AS NEEDED FOR CHEST PAIN.    polyethylene glycol (GLYCOLAX, MIRALAX) 17 g, oral, Daily RT       ELECTROCARDIOGRAM:      None this visit    CARDIAC TESTING:      None this visit    LABORATORY DATA:        CMP:    Lab Results   Component Value Date     05/14/2024    K 4.9 05/14/2024     (H) 05/14/2024    CO2 29 05/14/2024    BUN 37 (H) 05/14/2024    CREATININE 1.68 (H) 05/14/2024    GLUCOSE 92 05/14/2024    CALCIUM 9.2 05/14/2024     Hepatic Function Panel:    Lab Results   Component Value Date    ALKPHOS 51 05/14/2024    ALT 8 05/14/2024    AST 14 05/14/2024    PROT 6.4 05/14/2024    BILITOT 0.5 05/14/2024    BILIDIR 0.1 12/05/2023       TSH:    Lab Results   Component Value Date    TSH 0.51 05/14/2024                   PROBLEM LIST:     Patient Active Problem List   Diagnosis    Abnormal electrocardiogram    Aneurysm of abdominal aorta (CMS-HCC)    ASHD (arteriosclerotic heart disease)    Atrial fibrillation (Multi)    GERD (gastroesophageal reflux disease)    GI bleed    Edema    Hyperlipidemia    Pain of left hip joint    Hypertension     Peripheral vascular disease (CMS-HCC)    Right bundle branch block (RBBB)    S/P CABG (coronary artery bypass graft)    Trochanteric bursitis of left hip    Vertigo    Pacemaker    Sinus node dysfunction (Multi)    High risk medication use    BMI 32.0-32.9,adult    Former smoker             Delroy Avila MD, [facility] /  Cardiology      Of Note:  [employer] voice recognition dictation software was utilized partially in the preparation of this note, therefore, inaccuracies in spelling, word choice and punctuation may have occurred which were not recognized the time of signing.    Patient was seen and examined with total time of visit including chart preparation, rooming, and chart completion exceeding 40 minutes.      ----

## 2024-07-08 NOTE — PATIENT INSTRUCTIONS
Decrease Losartan 25mg take one tab daily.   Decrease Amlodipine 2.5mg take one tab daily.  Start Ranexa 500mg take one tab twice daily    Please bring any lab results from other providers / physicians to your next appointment.     Please bring all medicines, vitamins, and herbal supplements with you when you come to the office.     Prescriptions will not be filled unless you are compliant with your follow up appointments or have a follow up appointment scheduled as per instruction of your physician. Refills should be requested at the time of your visit.      Scribe Attestation  By signing my name below, INadeem Scribe   attest that this documentation has been prepared under the direction and in the presence of Delroy Avila MD.

## 2024-07-30 ENCOUNTER — TELEPHONE (OUTPATIENT)
Dept: CARDIOLOGY | Facility: CLINIC | Age: 89
End: 2024-07-30
Payer: MEDICARE

## 2024-07-30 NOTE — TELEPHONE ENCOUNTER
Pt presented to office with a list of blood pressure readings.  BP's reviewed by Dr. Clay and per written orders Dr. Clay pt needs an appt with Judith for blood pressure adjustments.    Routed to [facility] for appt.  BP readings scanned to chart.

## 2024-08-13 ENCOUNTER — TELEPHONE (OUTPATIENT)
Dept: CARDIOLOGY | Facility: CLINIC | Age: 89
End: 2024-08-13

## 2024-08-13 ENCOUNTER — APPOINTMENT (OUTPATIENT)
Dept: CARDIOLOGY | Facility: CLINIC | Age: 89
End: 2024-08-13
Payer: MEDICARE

## 2024-08-13 VITALS
HEART RATE: 62 BPM | WEIGHT: 190.4 LBS | HEIGHT: 62 IN | SYSTOLIC BLOOD PRESSURE: 140 MMHG | DIASTOLIC BLOOD PRESSURE: 70 MMHG | BODY MASS INDEX: 35.04 KG/M2

## 2024-08-13 DIAGNOSIS — I10 HYPERTENSION, UNSPECIFIED TYPE: ICD-10-CM

## 2024-08-13 DIAGNOSIS — Z95.1 S/P CABG (CORONARY ARTERY BYPASS GRAFT): ICD-10-CM

## 2024-08-13 DIAGNOSIS — I48.0 PAROXYSMAL ATRIAL FIBRILLATION (MULTI): ICD-10-CM

## 2024-08-13 DIAGNOSIS — E78.2 MIXED HYPERLIPIDEMIA: ICD-10-CM

## 2024-08-13 DIAGNOSIS — I73.9 PERIPHERAL VASCULAR DISEASE (CMS-HCC): ICD-10-CM

## 2024-08-13 DIAGNOSIS — R60.9 EDEMA, UNSPECIFIED TYPE: ICD-10-CM

## 2024-08-13 DIAGNOSIS — I25.10 ASHD (ARTERIOSCLEROTIC HEART DISEASE): ICD-10-CM

## 2024-08-13 DIAGNOSIS — I10 PRIMARY HYPERTENSION: ICD-10-CM

## 2024-08-13 DIAGNOSIS — I10 PRIMARY HYPERTENSION: Primary | ICD-10-CM

## 2024-08-13 DIAGNOSIS — R94.31 ABNORMAL ELECTROCARDIOGRAM: ICD-10-CM

## 2024-08-13 DIAGNOSIS — I71.40 ABDOMINAL AORTIC ANEURYSM (AAA) WITHOUT RUPTURE, UNSPECIFIED PART (CMS-HCC): ICD-10-CM

## 2024-08-13 DIAGNOSIS — K21.9 GASTROESOPHAGEAL REFLUX DISEASE WITHOUT ESOPHAGITIS: ICD-10-CM

## 2024-08-13 DIAGNOSIS — K92.2 GASTROINTESTINAL HEMORRHAGE, UNSPECIFIED GASTROINTESTINAL HEMORRHAGE TYPE: ICD-10-CM

## 2024-08-13 DIAGNOSIS — R42 VERTIGO: ICD-10-CM

## 2024-08-13 DIAGNOSIS — I45.10 RIGHT BUNDLE BRANCH BLOCK (RBBB): ICD-10-CM

## 2024-08-13 PROCEDURE — 99213 OFFICE O/P EST LOW 20 MIN: CPT | Performed by: NURSE PRACTITIONER

## 2024-08-13 PROCEDURE — 3078F DIAST BP <80 MM HG: CPT | Performed by: NURSE PRACTITIONER

## 2024-08-13 PROCEDURE — 1160F RVW MEDS BY RX/DR IN RCRD: CPT | Performed by: NURSE PRACTITIONER

## 2024-08-13 PROCEDURE — 1159F MED LIST DOCD IN RCRD: CPT | Performed by: NURSE PRACTITIONER

## 2024-08-13 PROCEDURE — 3077F SYST BP >= 140 MM HG: CPT | Performed by: NURSE PRACTITIONER

## 2024-08-13 PROCEDURE — 1036F TOBACCO NON-USER: CPT | Performed by: NURSE PRACTITIONER

## 2024-08-13 RX ORDER — COLLAGENASE SANTYL 250 [ARB'U]/G
OINTMENT TOPICAL
COMMUNITY
Start: 2024-07-08 | End: 2024-10-06

## 2024-08-13 RX ORDER — LOSARTAN POTASSIUM 25 MG/1
25 TABLET ORAL 2 TIMES DAILY
Qty: 90 TABLET | Refills: 1 | Status: SHIPPED | OUTPATIENT
Start: 2024-08-13 | End: 2025-02-09

## 2024-08-13 RX ORDER — FERROUS SULFATE TAB 325 MG (65 MG ELEMENTAL FE) 325 (65 FE) MG
1 TAB ORAL
COMMUNITY
Start: 2024-07-19

## 2024-08-13 NOTE — PROGRESS NOTES
Lou Oliva is a 89 y.o. female that presents to the office today with her son for elevated blood pressure.  She follows with her  primary cardiologist Dr. Mak  and was added to my schedule today.  PMH as noted below.     She states that her blood pressures have been running high at home and is experiencing lightheadedness with her elevated pressures.  She has provided a list of home blood pressure reading taken at different times of day ranging from 140's -170's systolic.  Her medications were recently adjusted at her last office visit with Dr. Mak but she is not sure exactly what she is taking.  She denies chest pain, shortness of breath, palpitations.     PMH   Edema  Chest discomfort symptoms  Lightheadedness  Fatigue  Symptomatic bradycardia, post permanent pacemaker currently 2023, [employer] sensor XT DR LOVELL.  Coronary artery disease, status post coronary artery bypass graft surgery, September 2007; left internal mammary artery to the left anterior descending coronary artery, saphenous vein graft to the first obtuse marginal and second obtuse marginal, diagonal and right coronary artery, left atrial appendage ligation  Negative Lexiscan myocardial perfusion stress test, ejection fraction 65%, 2012 , 2020 and June 2024.  Normal left ventricular systolic function. LVEF 65-70%.,  Echocardiogram 2/2024.  Hypertension.  Familial hyperlipidemia (intolerant to statins and Juxtapid).  Peripheral vascular disease with stable 2.6 cm abdominal aortic ectasia; status post left iliac balloon angioplasty.   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   History of appendectomy.  History of total abdominal hysterectomy.  Obesity.  Negative CT of the chest for pulmonary embolism or masses, 2012.  Negative mammogram 2012.   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   Possible obstructive sleep apnea.  Statin intolerance due to myalgias  GERD /  PUD  Anemia    Assessment/Plan  Hypertension:  Sub optimal control.   She is not sure exactly what she is taking for medications.  She has been advised to call our office once she is home to go over her current medications and dosages.   Lightheadedness:  Suspect due to uncontrolled HTN.  High sodium diet:  She admits to eating out on a daily basis.  Further recommendations pending review of current medications.        Patient Active Problem List   Diagnosis    Abnormal electrocardiogram    Aneurysm of abdominal aorta (CMS-[facility])    ASHD (arteriosclerotic heart disease)    Atrial fibrillation (Multi)    GERD (gastroesophageal reflux disease)    GI bleed    Edema    Hyperlipidemia    Pain of left hip joint    Hypertension    Peripheral vascular disease (CMS-[facility])    Right bundle branch block (RBBB)    S/P CABG (coronary artery bypass graft)    Trochanteric bursitis of left hip    Vertigo    Pacemaker    Sinus node dysfunction (Multi)    High risk medication use    BMI 32.0-32.9,adult    Former smoker       Social History     Tobacco Use    Smoking status: Former     Current packs/day: 0.00     Types: Cigarettes     Quit date:      Years since quittin.6    Smokeless tobacco: Never   Substance Use Topics    Alcohol use: Never    Drug use: Never       No past medical history on file.      Current Outpatient Medications:     acetaminophen (Tylenol) 500 mg capsule, Take 2 capsules (1,000 mg) by mouth every 6 hours if needed., Disp: , Rfl:     amiodarone (Pacerone) 200 mg tablet, TAKE 1/2 TABLET(100 MG) BY MOUTH DAILY (Patient taking differently: Take 0.5 tablets (100 mg) by mouth once daily.), Disp: 60 tablet, Rfl: 1    amLODIPine (Norvasc) 2.5 mg tablet, Take 1 tablet (2.5 mg) by mouth once daily., Disp: 90 tablet, Rfl: 1    aspirin 81 mg EC tablet, Take 1 tablet (81 mg) by mouth once daily., Disp: , Rfl:     collagenase (SantyL) 250 unit/gram ointment, Apply topically once daily., Disp: , Rfl:     diclofenac  sodium (Voltaren Arthritis Pain) 1 % gel, Apply 4.5 inches (4 g) topically 4 times a day as needed., Disp: , Rfl:     Eliquis 2.5 mg tablet, TAKE 1 TABLET(2.5 MG) BY MOUTH TWICE DAILY (Patient taking differently: Take 1 tablet (2.5 mg) by mouth 2 times a day. TAKE 1 TABLET(2.5 MG) BY MOUTH TWICE DAILY), Disp: 60 tablet, Rfl: 6    FeroSuL tablet, Take 1 tablet by mouth once daily with breakfast., Disp: , Rfl:     ferrous gluconate 324 (38 Fe) MG tablet, Take 1 tablet (324 mg) by mouth once daily., Disp: , Rfl:     losartan (Cozaar) 25 mg tablet, Take 1 tablet (25 mg) by mouth once daily., Disp: 90 tablet, Rfl: 1    metoprolol tartrate (Lopressor) 25 mg tablet, Take 1 tablet (25 mg) by mouth 2 times a day., Disp: 180 tablet, Rfl: 1    nitroglycerin (NitrolinguaL) 400 mcg/spray spray, Place 1 spray under the tongue every 5 minutes if needed for chest pain., Disp: , Rfl:     polyethylene glycol (Glycolax, Miralax) 17 gram/dose powder, Take 17 g by mouth once daily., Disp: , Rfl:     ranolazine (Ranexa) 500 mg 12 hr tablet, Take 1 tablet (500 mg) by mouth 2 times a day., Disp: 180 tablet, Rfl: 3    betamethasone dipropionate (Diprosone) 0.05 % lotion, Apply topically 2 times a day., Disp: , Rfl:     Lisinopril, Statins-hmg-coa reductase inhibitors, and Sulfa (sulfonamide antibiotics)    No family history on file.    Past Surgical History:   Procedure Laterality Date    OTHER SURGICAL HISTORY  01/05/2022    Arterial ligation    OTHER SURGICAL HISTORY  01/05/2022    Hip replacement    OTHER SURGICAL HISTORY  01/25/2022    Complete colonoscopy          Review of systems  Constitutional: No weight loss, fever, chills, weakness or fatigue  HEENT: No visual loss, blurred vision, double vision or yellow sclerae  Skin: No rash or itching  Cardiovascular: No chest pain, pressure or discomfort, No palpitations or edema.  Respiratory: No shortness of breath, cough or sputum  Gastrointestinal: No nausea, vomiting or diarrhea. No  "bloody or dark tarry stools.  Neurological: Positive for lightheadedness. No headache,  syncope. No numbness or tingling in the extremities. No change in mood, affect, memory, metation.   Musculoskeletal: No muscle, back pain, joint pain or stiffness.  Hematologic: No anemia, bleeding or bruising.    /70 (BP Location: Right arm, Patient Position: Sitting)   Pulse 62   Ht 1.575 m (5' 2\")   Wt 86.4 kg (190 lb 6.4 oz)   BMI 34.82 kg/m²     Patient Active Problem List   Diagnosis    Abnormal electrocardiogram    Aneurysm of abdominal aorta (CMS-HCC)    ASHD (arteriosclerotic heart disease)    Atrial fibrillation (Multi)    GERD (gastroesophageal reflux disease)    GI bleed    Edema    Hyperlipidemia    Pain of left hip joint    Hypertension    Peripheral vascular disease (CMS-HCC)    Right bundle branch block (RBBB)    S/P CABG (coronary artery bypass graft)    Trochanteric bursitis of left hip    Vertigo    Pacemaker    Sinus node dysfunction (Multi)    High risk medication use    BMI 32.0-32.9,adult    Former smoker         Physical Exam  Constitutional: Well developed, awake/alert x 3, no distress.  Head/Neck: No JVD, No bruits  Respiratory/Thorax: patent airways, CTAB, normal breath sounds with good expansion.  Cardiovascular: Regular rate and rhythm, no murmurs, normal S1 and S2,   Gastrointestinal: Non distended, soft, non-tender, no rebound tenderness or guarding.  Extremities: No cyanosis, edema.   Neurological: Alert and oriented x 3. Moves extremities spontaneous with purpose.  Psychological: Appropriate mood and behavior  Skin: Warm and Dry. Bilateral lower extremity dressing D&I        Please excuse any errors in grammar or translation related to dictation, voice recognition software was used to prepare this document.      "

## 2024-08-13 NOTE — TELEPHONE ENCOUNTER
Patient called office as instructed to update the medications she is taking based on her bottles at home.  Medication list updated.  Patient states she has been taking Metoprolol 25 mg only 1/2 tablet twice a day.  She is not taking:  collagenase Clostridium, betamethasone dipropionate, or ferrous sulfate and all will need removed.  She is taking all other medications as listed in her chart.  Note to JASMIN Garcia, CNP for review.  Kim Gomez, CMA

## 2024-08-13 NOTE — PATIENT INSTRUCTIONS
TAKE YOUR BLOOD PRESSURE DAILY, TAKE AT THE SAME TIME EVERY DAY APPROXIMATELY 1-2 HOURS AFTER YOU TAKE YOUR MEDICATIONS.    PLEASE CALL  OUR NURSING STAFF [phone] AND ASK FOR NURSING (OPTION # 4) AND LET THEM KNOW EXACTLY WHAT YOU ARE TAKING FOR MEDICATIONS.    Please bring all current medication bottles with you to all office appointment.

## 2024-09-18 DIAGNOSIS — I73.9 PERIPHERAL VASCULAR DISEASE (CMS-HCC): ICD-10-CM

## 2024-09-18 DIAGNOSIS — I25.10 ASHD (ARTERIOSCLEROTIC HEART DISEASE): ICD-10-CM

## 2024-09-18 DIAGNOSIS — I48.0 PAROXYSMAL ATRIAL FIBRILLATION (MULTI): ICD-10-CM

## 2024-09-18 DIAGNOSIS — I10 PRIMARY HYPERTENSION: ICD-10-CM

## 2024-09-18 DIAGNOSIS — E78.2 MIXED HYPERLIPIDEMIA: ICD-10-CM

## 2024-09-18 DIAGNOSIS — K92.2 GASTROINTESTINAL HEMORRHAGE, UNSPECIFIED GASTROINTESTINAL HEMORRHAGE TYPE: ICD-10-CM

## 2024-09-18 DIAGNOSIS — I45.10 RIGHT BUNDLE BRANCH BLOCK (RBBB): ICD-10-CM

## 2024-09-18 DIAGNOSIS — R94.31 ABNORMAL ELECTROCARDIOGRAM: ICD-10-CM

## 2024-09-18 DIAGNOSIS — Z95.1 S/P CABG (CORONARY ARTERY BYPASS GRAFT): ICD-10-CM

## 2024-09-18 DIAGNOSIS — K21.9 GASTROESOPHAGEAL REFLUX DISEASE WITHOUT ESOPHAGITIS: ICD-10-CM

## 2024-09-18 DIAGNOSIS — R60.9 EDEMA, UNSPECIFIED TYPE: ICD-10-CM

## 2024-09-18 DIAGNOSIS — R42 VERTIGO: ICD-10-CM

## 2024-09-18 DIAGNOSIS — I10 HYPERTENSION, UNSPECIFIED TYPE: ICD-10-CM

## 2024-09-18 DIAGNOSIS — I71.40 ABDOMINAL AORTIC ANEURYSM (AAA) WITHOUT RUPTURE, UNSPECIFIED PART (CMS-HCC): ICD-10-CM

## 2024-09-18 RX ORDER — LOSARTAN POTASSIUM 25 MG/1
25 TABLET ORAL 2 TIMES DAILY
Qty: 180 TABLET | Refills: 0 | Status: SHIPPED | OUTPATIENT
Start: 2024-09-18 | End: 2025-03-17

## 2024-09-18 NOTE — TELEPHONE ENCOUNTER
Patient called office stating she has been trying to get her Losartan 25 mg twice a day filled but [facility] never received rx.  Hx notes rx sent to [facility].  Patient states she does not use a mail order service and instructs rx be sent to [facility].  Order placed per patient's request and routed to Dr. Delroy Avila, JESS Gomez, [facility]

## 2024-10-08 ENCOUNTER — HOSPITAL ENCOUNTER (OUTPATIENT)
Dept: CARDIOLOGY | Age: 89
Discharge: HOME | End: 2024-10-08
Payer: MEDICARE

## 2024-10-08 PROCEDURE — 93294 REM INTERROG EVL PM/LDLS PM: CPT | Performed by: INTERNAL MEDICINE

## 2024-10-08 PROCEDURE — 93296 REM INTERROG EVL PM/IDS: CPT

## 2024-10-17 ENCOUNTER — APPOINTMENT (OUTPATIENT)
Dept: GENERAL RADIOLOGY | Age: 89
End: 2024-10-17
Payer: MEDICARE

## 2024-10-17 ENCOUNTER — HOSPITAL ENCOUNTER (EMERGENCY)
Age: 89
Discharge: HOME | End: 2024-10-17
Attending: EMERGENCY MEDICINE
Payer: MEDICARE

## 2024-10-17 VITALS
SYSTOLIC BLOOD PRESSURE: 178 MMHG | HEART RATE: 62 BPM | HEIGHT: 65 IN | BODY MASS INDEX: 30.82 KG/M2 | RESPIRATION RATE: 16 BRPM | TEMPERATURE: 98 F | WEIGHT: 185 LBS | DIASTOLIC BLOOD PRESSURE: 71 MMHG | OXYGEN SATURATION: 96 %

## 2024-10-17 DIAGNOSIS — M25.551 BILATERAL HIP PAIN: ICD-10-CM
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
## 2024-10-17 DIAGNOSIS — M25.552 BILATERAL HIP PAIN: ICD-10-CM

## 2024-10-17 PROCEDURE — 73502 X-RAY EXAM HIP UNI 2-3 VIEWS: CPT

## 2024-10-17 PROCEDURE — 72100 X-RAY EXAM L-S SPINE 2/3 VWS: CPT

## 2024-10-17 PROCEDURE — 99283 EMERGENCY DEPT VISIT LOW MDM: CPT

## 2024-10-17 PROCEDURE — 6370000000 HC RX 637 (ALT 250 FOR IP): Performed by: EMERGENCY MEDICINE

## 2024-10-17 RX ORDER — HYDROCODONE BITARTRATE AND ACETAMINOPHEN 5; 325 MG/1; MG/1
1 TABLET ORAL ONCE
Status: COMPLETED | OUTPATIENT
Start: 2024-10-17 | End: 2024-10-17

## 2024-10-17 RX ORDER — HYDROCODONE BITARTRATE AND ACETAMINOPHEN 5; 325 MG/1; MG/1
1 TABLET ORAL EVERY 6 HOURS PRN
Qty: 12 TABLET | Refills: 0 | Status: SHIPPED | OUTPATIENT
Start: 2024-10-17 | End: 2024-10-20

## 2024-10-17 RX ADMIN — HYDROCODONE BITARTRATE AND ACETAMINOPHEN 1 TABLET: 5; 325 TABLET ORAL at 13:14

## 2024-10-17 ASSESSMENT — PAIN SCALES - GENERAL
PAINLEVEL_OUTOF10: 6
PAINLEVEL_OUTOF10: 5

## 2024-10-17 ASSESSMENT — PAIN - FUNCTIONAL ASSESSMENT: PAIN_FUNCTIONAL_ASSESSMENT: 0-10

## 2024-10-17 ASSESSMENT — PAIN DESCRIPTION - LOCATION
LOCATION: LEG
LOCATION: HIP;LEG

## 2024-10-17 ASSESSMENT — PAIN DESCRIPTION - ORIENTATION
ORIENTATION: LEFT
ORIENTATION: LEFT

## 2024-10-17 NOTE — ED PROVIDER NOTES
3:05 PM EDT  [facility] ED  EMERGENCY DEPARTMENT ENCOUNTER      Pt Name: Loren Noyola  MRN: [id number]  Birthdate 1935  Date of evaluation: 10/17/2024  Provider: Dougie Richey MD    CHIEF COMPLAINT       Chief Complaint   Patient presents with    Fall     Patient called EMS from home due to worsening hip pain from a fall she experienced on Saturday. Patient fell on left side. Patient denies LOC, patient states she is on blood thinners. Patient alert and oriented.          HISTORY OF PRESENT ILLNESS   (Location/Symptom, Timing/Onset, Context/Setting, Quality, Duration, Modifying Factors, Severity)  Note limiting factors.   89-year-old female presenting after a mechanical fall.  Patient fell on Saturday and has had increasing pain since that time.  Taking ibuprofen at home.  Notes pain to both hips.  She is still able to walk.  Did not hit head or lose consciousness.        Nursing Notes were reviewed.    REVIEW OF SYSTEMS    (2-9 systems for level 4, 10 or more for level 5)     Review of Systems   All other systems reviewed and are negative.      Except as noted above the remainder of the review of systems was reviewed and negative.       PAST MEDICAL HISTORY     Past Medical History:   Diagnosis Date    Bleeding ulcer     CAD (coronary artery disease)     Colitis     Hyperlipidemia     Hypertension          SURGICAL HISTORY       Past Surgical History:   Procedure Laterality Date    APPENDECTOMY      COLECTOMY  1/3/2011    sigmoid colectomy with colostomy    COLECTOMY  7/26/2011    partial colectomy with colostomy closure    CORONARY ARTERY BYPASS GRAFT      HYSTERECTOMY (CERVIX STATUS UNKNOWN)      HYSTERECTOMY, TOTAL ABDOMINAL (CERVIX REMOVED)      MD HEMIARTHROPLASTY HIP PARTIAL Left 2/8/2018    HIP HEMIARTHROPLASTY- performed by Bryan Malcolm MD at [facility] OR    SIGMOIDOSCOPY      VARICOSE VEIN SURGERY           CURRENT MEDICATIONS       Previous Medications    ACETAMINOPHEN (TYLENOL)

## 2024-10-17 NOTE — ED NOTES
Patient called EMS from home due to worsening hip pain from a fall she experienced on Saturday, pain has worsened. Patient denies LOC, patient states she is on blood thinners. Patient alert and oriented.

## 2024-10-21 ENCOUNTER — APPOINTMENT (OUTPATIENT)
Dept: GENERAL RADIOLOGY | Age: 89
DRG: 552 | End: 2024-10-21
Payer: COMMERCIAL

## 2024-10-21 ENCOUNTER — APPOINTMENT (OUTPATIENT)
Dept: CT IMAGING | Age: 89
DRG: 552 | End: 2024-10-21
Payer: COMMERCIAL

## 2024-10-21 ENCOUNTER — HOSPITAL ENCOUNTER (OUTPATIENT)
Age: 89
Setting detail: OBSERVATION
Discharge: NURSING, REHAB OR LONG-TERM CARE | DRG: 552 | End: 2024-10-23
Attending: INTERNAL MEDICINE | Admitting: INTERNAL MEDICINE
Payer: COMMERCIAL

## 2024-10-21 DIAGNOSIS — R26.2 INABILITY TO WALK: Primary | ICD-10-CM

## 2024-10-21 DIAGNOSIS — N28.89 LEFT KIDNEY MASS: ICD-10-CM

## 2024-10-21 DIAGNOSIS — S22.080A COMPRESSION FRACTURE OF T12 VERTEBRA, INITIAL ENCOUNTER (HCC): ICD-10-CM

## 2024-10-21 PROBLEM — R26.81 GAIT INSTABILITY: Status: ACTIVE | Noted: 2024-10-21

## 2024-10-21 LAB
ALBUMIN SERPL-MCNC: 3.2 G/DL (ref 3.5–4.6)
ALP SERPL-CCNC: 56 U/L (ref 40–130)
ALT SERPL-CCNC: 6 U/L (ref 0–33)
ANION GAP SERPL CALCULATED.3IONS-SCNC: 11 MEQ/L (ref 9–15)
APTT PPP: 38.9 SEC (ref 24.4–36.8)
AST SERPL-CCNC: 15 U/L (ref 0–35)
BASOPHILS # BLD: 0 K/UL (ref 0–0.2)
BASOPHILS NFR BLD: 0.1 %
BILIRUB SERPL-MCNC: 0.4 MG/DL (ref 0.2–0.7)
BUN SERPL-MCNC: 37 MG/DL (ref 8–23)
CALCIUM SERPL-MCNC: 8.6 MG/DL (ref 8.5–9.9)
CHLORIDE SERPL-SCNC: 107 MEQ/L (ref 95–107)
CO2 SERPL-SCNC: 21 MEQ/L (ref 20–31)
CREAT SERPL-MCNC: 1.69 MG/DL (ref 0.5–0.9)
EOSINOPHIL # BLD: 0.1 K/UL (ref 0–0.7)
EOSINOPHIL NFR BLD: 0.9 %
ERYTHROCYTE [DISTWIDTH] IN BLOOD BY AUTOMATED COUNT: 14.8 % (ref 11.5–14.5)
GLOBULIN SER CALC-MCNC: 2.6 G/DL (ref 2.3–3.5)
GLUCOSE SERPL-MCNC: 108 MG/DL (ref 70–99)
HCT VFR BLD AUTO: 31.6 % (ref 37–47)
HGB BLD-MCNC: 10.2 G/DL (ref 12–16)
INR PPP: 1.5
LYMPHOCYTES # BLD: 0.5 K/UL (ref 1–4.8)
LYMPHOCYTES NFR BLD: 6.7 %
MCH RBC QN AUTO: 29.5 PG (ref 27–31.3)
MCHC RBC AUTO-ENTMCNC: 32.3 % (ref 33–37)
MCV RBC AUTO: 91.3 FL (ref 79.4–94.8)
MONOCYTES # BLD: 1 K/UL (ref 0.2–0.8)
MONOCYTES NFR BLD: 13.5 %
NEUTROPHILS # BLD: 5.8 K/UL (ref 1.4–6.5)
NEUTS SEG NFR BLD: 77.9 %
PLATELET # BLD AUTO: 195 K/UL (ref 130–400)
PLATELET BLD QL SMEAR: ADEQUATE
POTASSIUM SERPL-SCNC: 4.6 MEQ/L (ref 3.4–4.9)
PROT SERPL-MCNC: 5.8 G/DL (ref 6.3–8)
PROTHROMBIN TIME: 17.8 SEC (ref 12.3–14.9)
RBC # BLD AUTO: 3.46 M/UL (ref 4.2–5.4)
SLIDE REVIEW: ABNORMAL
SODIUM SERPL-SCNC: 139 MEQ/L (ref 135–144)
WBC # BLD AUTO: 7.5 K/UL (ref 4.8–10.8)

## 2024-10-21 PROCEDURE — 96374 THER/PROPH/DIAG INJ IV PUSH: CPT

## 2024-10-21 PROCEDURE — 72125 CT NECK SPINE W/O DYE: CPT

## 2024-10-21 PROCEDURE — G0378 HOSPITAL OBSERVATION PER HR: HCPCS

## 2024-10-21 PROCEDURE — 99285 EMERGENCY DEPT VISIT HI MDM: CPT

## 2024-10-21 PROCEDURE — 73552 X-RAY EXAM OF FEMUR 2/>: CPT

## 2024-10-21 PROCEDURE — 85730 THROMBOPLASTIN TIME PARTIAL: CPT

## 2024-10-21 PROCEDURE — 74176 CT ABD & PELVIS W/O CONTRAST: CPT

## 2024-10-21 PROCEDURE — 80053 COMPREHEN METABOLIC PANEL: CPT

## 2024-10-21 PROCEDURE — 71250 CT THORAX DX C-: CPT

## 2024-10-21 PROCEDURE — 70450 CT HEAD/BRAIN W/O DYE: CPT

## 2024-10-21 PROCEDURE — 6360000002 HC RX W HCPCS: Performed by: PHYSICIAN ASSISTANT

## 2024-10-21 PROCEDURE — 85610 PROTHROMBIN TIME: CPT

## 2024-10-21 PROCEDURE — 96375 TX/PRO/DX INJ NEW DRUG ADDON: CPT

## 2024-10-21 PROCEDURE — 6370000000 HC RX 637 (ALT 250 FOR IP)

## 2024-10-21 PROCEDURE — 2580000003 HC RX 258: Performed by: INTERNAL MEDICINE

## 2024-10-21 PROCEDURE — 85025 COMPLETE CBC W/AUTO DIFF WBC: CPT

## 2024-10-21 RX ORDER — MORPHINE SULFATE 4 MG/ML
4 INJECTION, SOLUTION INTRAMUSCULAR; INTRAVENOUS ONCE
Status: COMPLETED | OUTPATIENT
Start: 2024-10-21 | End: 2024-10-21

## 2024-10-21 RX ORDER — HYDRALAZINE HYDROCHLORIDE 20 MG/ML
10 INJECTION INTRAMUSCULAR; INTRAVENOUS EVERY 4 HOURS PRN
Status: DISCONTINUED | OUTPATIENT
Start: 2024-10-21 | End: 2024-10-23 | Stop reason: HOSPADM

## 2024-10-21 RX ORDER — ENOXAPARIN SODIUM 100 MG/ML
30 INJECTION SUBCUTANEOUS DAILY
Status: DISCONTINUED | OUTPATIENT
Start: 2024-10-21 | End: 2024-10-22

## 2024-10-21 RX ORDER — ACETAMINOPHEN 325 MG/1
650 TABLET ORAL EVERY 6 HOURS PRN
Status: DISCONTINUED | OUTPATIENT
Start: 2024-10-21 | End: 2024-10-23 | Stop reason: HOSPADM

## 2024-10-21 RX ORDER — ONDANSETRON 4 MG/1
4 TABLET, ORALLY DISINTEGRATING ORAL EVERY 8 HOURS PRN
Status: DISCONTINUED | OUTPATIENT
Start: 2024-10-21 | End: 2024-10-23 | Stop reason: HOSPADM

## 2024-10-21 RX ORDER — HYDROCODONE BITARTRATE AND ACETAMINOPHEN 5; 325 MG/1; MG/1
1 TABLET ORAL ONCE
Status: COMPLETED | OUTPATIENT
Start: 2024-10-21 | End: 2024-10-21

## 2024-10-21 RX ORDER — ONDANSETRON 2 MG/ML
4 INJECTION INTRAMUSCULAR; INTRAVENOUS EVERY 6 HOURS PRN
Status: DISCONTINUED | OUTPATIENT
Start: 2024-10-21 | End: 2024-10-23 | Stop reason: HOSPADM

## 2024-10-21 RX ORDER — SODIUM CHLORIDE 0.9 % (FLUSH) 0.9 %
5-40 SYRINGE (ML) INJECTION EVERY 12 HOURS SCHEDULED
Status: DISCONTINUED | OUTPATIENT
Start: 2024-10-21 | End: 2024-10-23 | Stop reason: HOSPADM

## 2024-10-21 RX ORDER — POLYETHYLENE GLYCOL 3350 17 G/17G
17 POWDER, FOR SOLUTION ORAL DAILY PRN
Status: DISCONTINUED | OUTPATIENT
Start: 2024-10-21 | End: 2024-10-23 | Stop reason: HOSPADM

## 2024-10-21 RX ORDER — RANOLAZINE 500 MG/1
500 TABLET, EXTENDED RELEASE ORAL 2 TIMES DAILY
Status: ON HOLD | COMMUNITY
Start: 2024-07-08 | End: 2025-07-08

## 2024-10-21 RX ORDER — ONDANSETRON 2 MG/ML
4 INJECTION INTRAMUSCULAR; INTRAVENOUS ONCE
Status: COMPLETED | OUTPATIENT
Start: 2024-10-21 | End: 2024-10-21

## 2024-10-21 RX ORDER — ACETAMINOPHEN 650 MG/1
650 SUPPOSITORY RECTAL EVERY 6 HOURS PRN
Status: DISCONTINUED | OUTPATIENT
Start: 2024-10-21 | End: 2024-10-23 | Stop reason: HOSPADM

## 2024-10-21 RX ORDER — SODIUM CHLORIDE 0.9 % (FLUSH) 0.9 %
5-40 SYRINGE (ML) INJECTION PRN
Status: DISCONTINUED | OUTPATIENT
Start: 2024-10-21 | End: 2024-10-23 | Stop reason: HOSPADM

## 2024-10-21 RX ORDER — SODIUM CHLORIDE 9 MG/ML
INJECTION, SOLUTION INTRAVENOUS PRN
Status: DISCONTINUED | OUTPATIENT
Start: 2024-10-21 | End: 2024-10-23 | Stop reason: HOSPADM

## 2024-10-21 RX ADMIN — MORPHINE SULFATE 4 MG: 4 INJECTION, SOLUTION INTRAMUSCULAR; INTRAVENOUS at 14:39

## 2024-10-21 RX ADMIN — SODIUM CHLORIDE, PRESERVATIVE FREE 10 ML: 5 INJECTION INTRAVENOUS at 22:22

## 2024-10-21 RX ADMIN — ONDANSETRON 4 MG: 2 INJECTION, SOLUTION INTRAMUSCULAR; INTRAVENOUS at 14:39

## 2024-10-21 RX ADMIN — HYDROCODONE BITARTRATE AND ACETAMINOPHEN 1 TABLET: 5; 325 TABLET ORAL at 22:21

## 2024-10-21 ASSESSMENT — PAIN DESCRIPTION - LOCATION
LOCATION: HIP
LOCATION: HIP

## 2024-10-21 ASSESSMENT — ENCOUNTER SYMPTOMS
SORE THROAT: 0
CONSTIPATION: 0
COLOR CHANGE: 0
SHORTNESS OF BREATH: 0
ABDOMINAL PAIN: 0
RHINORRHEA: 0
ABDOMINAL DISTENTION: 0
EYE DISCHARGE: 0

## 2024-10-21 ASSESSMENT — PAIN DESCRIPTION - FREQUENCY: FREQUENCY: CONTINUOUS

## 2024-10-21 ASSESSMENT — PAIN - FUNCTIONAL ASSESSMENT
PAIN_FUNCTIONAL_ASSESSMENT: PREVENTS OR INTERFERES WITH MANY ACTIVE NOT PASSIVE ACTIVITIES
PAIN_FUNCTIONAL_ASSESSMENT: PREVENTS OR INTERFERES WITH MANY ACTIVE NOT PASSIVE ACTIVITIES

## 2024-10-21 ASSESSMENT — PAIN DESCRIPTION - ORIENTATION
ORIENTATION: RIGHT
ORIENTATION: LEFT

## 2024-10-21 ASSESSMENT — LIFESTYLE VARIABLES
HOW OFTEN DO YOU HAVE A DRINK CONTAINING ALCOHOL: NEVER
HOW MANY STANDARD DRINKS CONTAINING ALCOHOL DO YOU HAVE ON A TYPICAL DAY: PATIENT DOES NOT DRINK

## 2024-10-21 ASSESSMENT — PAIN SCALES - WONG BAKER: WONGBAKER_NUMERICALRESPONSE: NO HURT

## 2024-10-21 ASSESSMENT — PAIN SCALES - GENERAL
PAINLEVEL_OUTOF10: 6
PAINLEVEL_OUTOF10: 8

## 2024-10-21 ASSESSMENT — PAIN DESCRIPTION - DESCRIPTORS: DESCRIPTORS: ACHING

## 2024-10-21 ASSESSMENT — PAIN DESCRIPTION - PAIN TYPE: TYPE: CHRONIC PAIN;ACUTE PAIN

## 2024-10-21 NOTE — ED PROVIDER NOTES
[facility] ED  EMERGENCY DEPARTMENT ENCOUNTER      Pt Name: Loren Noyola  MRN: [id number]  Birthdate 1935  Date of evaluation: 10/21/2024  Provider: Yobani Moreno PA-C  2:01 PM EDT    CHIEF COMPLAINT     No chief complaint on file.        HISTORY OF PRESENT ILLNESS   (Location/Symptom, Timing/Onset, Context/Setting, Quality, Duration, Modifying Factors, Severity)  Note limiting factors.   Loren Noyola is a 89 y.o. female who presents to the emergency department with complaint of head pain, neck pain, right hip pain, right lateral chest wall pain, abdominal pain, secondary to fall which patient states occurred on 10/13/2024 .  Patient states she was not seen after the initial fall, she states she was able to stand and ambulate.  She states pain became worse, therefore she came into the ED on 10/17/2024, with a complaint of left hip pain, at that time x-rays were obtained which showed no acute fracture or subluxation, she was discharged home as a bilateral hip pain, with a prescription for Norco, she states she has been using Norco, but has progressively been having more pain with ambulation, she denies any dizziness, no nausea vomiting, no paresthesias, no bowel or bladder dysfunction.  She is rating her current pain at this time is an 8 out of 10, she states she did take a Norco at noon today prior to calling EMS.  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   is currently being watched by her children while she was in the hospital today.    HPI    Nursing Notes were reviewed.    REVIEW OF SYSTEMS    (2-9 systems for level 4, 10 or more for level 5)     Review of Systems   Constitutional:  Negative for activity change and appetite change.   HENT:  Negative for congestion, ear discharge, ear pain, nosebleeds, rhinorrhea and sore throat.         Occipital head  hysterectomy.      7.  Bilateral adrenal nodules measuring 2.6 cm on the left and 1.9 cm on the   right and unchanged with density measurements consistent with benign adrenal   adenomas.  These require no additional imaging follow-up.  NOTE: Imaging   studies do not address the functional status of an adrenal mass.  If there   are clinical signs or symptoms of adrenal hyperfunction, biochemical testing   may be required to determine if the lesion is secreting access hormone.      8.  Otherwise unchanged.               ED BEDSIDE ULTRASOUND:   Performed by ED Physician - none    LABS:  Labs Reviewed   CBC WITH AUTO DIFFERENTIAL - Abnormal; Notable for the following components:       Result Value    RBC 3.46 (*)     Hemoglobin 10.2 (*)     Hematocrit 31.6 (*)     MCHC 32.3 (*)     RDW 14.8 (*)     Lymphocytes Absolute 0.5 (*)     Monocytes Absolute 1.0 (*)     All other components within normal limits   COMPREHENSIVE METABOLIC PANEL - Abnormal; Notable for the following components:    Glucose 108 (*)     BUN 37 (*)     Creatinine 1.69 (*)     Est, Glom Filt Rate 28.6 (*)     Total Protein 5.8 (*)     Albumin 3.2 (*)     All other components within normal limits   PROTIME-INR - Abnormal; Notable for the following components:    Protime 17.8 (*)     All other components within normal limits   APTT - Abnormal; Notable for the following components:    aPTT 38.9 (*)     All other components within normal limits   CBC WITH AUTO DIFFERENTIAL       All other labs were within normal range or not returned as of this dictation.    EMERGENCY DEPARTMENT COURSE and DIFFERENTIAL DIAGNOSIS/MDM:   Vitals:    Vitals:    10/21/24 1530 10/21/24 1653 10/21/24 1700 10/21/24 1730   BP: (!) 153/63 (!) 154/71 (!) 166/71 (!) 156/73   Pulse: 64 63 63 68   Resp: 21 15 17 19   Temp:       TempSrc:       SpO2: 95% 94% 96% 94%           Medical Decision Making  Patient presented to the Ed with complaint of right-sided hip pain, neck pain,

## 2024-10-21 NOTE — PROGRESS NOTES
1840: Report called to ANDREW Allen, on 4 west. Patient and her son aware of admission plan. Telemetry monitor applied, verified placement with monitor room. Patient in no distress on departure from ED. IV saline locked. Patient is on room air. Patient alert and oriented x 4.

## 2024-10-21 NOTE — ED NOTES
Pt educated on pain medication and zofran at this time  Pt is alert and oriented times 4 at this time  Pt sitting in bed at this time

## 2024-10-21 NOTE — ED TRIAGE NOTES
Pt had a fall on 10/20/2024 Pt was seen in ER and discharged home with family   Pt states that she has continued pain to the L hip and has been taking Norco for pain that was prescribed from ER  Pt is alert and oriented times 4   Pt states that she uses a rolator at home for mobility   Pt is able to ambulate short distances with rolator but states that it is painful

## 2024-10-21 NOTE — H&P
HISTORY AND PHYSICAL             Date: 10/21/2024        Patient Name: Loren Noyola     YOB: 1935      Age:  89 y.o.    Chief Complaint   Gait instability      History Obtained From   patient, electronic medical record    History of Present Illness   Patient is a 89-year-old female who comes to ED for complaints of head, neck , back pain, chest wall pain and right hip pain secondary to fall.  Reports striking her head on the cushion of the sofa.  Reports being on Eliquis.  Patient and son at bedside reports patient has fallen multiple times.  Patient was seen in hospital twice prior to today for fall on 10/17 and 10/13; x-ray of left femur, left hip, right hip, and lumbar spine were negative for fractures.  Patient reports that her pain got progressively worse.  Patient has been taking Norco for pain with minimal relief.  CT of the head without contrast today was negative for acute intracranial abnormalities CT of the chest revealed T12 compression fracture; CT of the lumbar spine abdominal pelvis negative for fractures.  See full report.  Patient's creatinine was 1.69 GFR 28.6 BUN 37 hemoglobin 10.2.  Patient was given morphine 4 mg, Zofran 4 mg in ED with some relief.  Patient has a past medical history of hypertension, hyperlipidemia, CAD, pacemaker.    Past Medical History     Past Medical History:   Diagnosis Date    Bleeding ulcer     CAD (coronary artery disease)     Colitis     Hyperlipidemia     Hypertension         Past Surgical History     Past Surgical History:   Procedure Laterality Date    APPENDECTOMY      COLECTOMY  1/3/2011    sigmoid colectomy with colostomy    COLECTOMY  7/26/2011    partial colectomy with colostomy closure    CORONARY ARTERY BYPASS GRAFT      HYSTERECTOMY (CERVIX STATUS UNKNOWN)      HYSTERECTOMY, TOTAL ABDOMINAL (CERVIX REMOVED)      HI HEMIARTHROPLASTY HIP PARTIAL Left 2/8/2018    HIP HEMIARTHROPLASTY- performed by Bryan Malcolm MD at [facility] OR     images of the chest, abdomen and pelvis without intravenous contrast.  This CT exam was performed using one or more of the following dose reduction techniques:  automated exposure control, adjustment of the mA and/or kV according to patient size, and/or use of iterative reconstruction technique. COMPARISON: 04/25/2023 FINDINGS: CHEST: Lungs:  Posterior basilar lung scarring and/or atelectasis but unchanged.  No mass. Pleural space:  No acute findings.  No significant effusion.  No pneumothorax. Heart:  Borderline to mild cardiomegaly, unchanged.  No significant pericardial effusion.  No significant coronary artery calcifications. ABDOMEN: Liver:  No acute findings. Gallbladder and bile ducts:  No acute findings.  No calcified stones.  No ductal dilation. Pancreas:  No acute findings.  No ductal dilation. Spleen:  No acute findings.  No splenomegaly. Adrenals:  Bilateral adrenal nodules measuring 2.6 cm on the left and 1.9 cm on the right and unchanged with density measurements consistent with benign adrenal adenomas.  These require no additional imaging follow-up. Kidneys and ureters:  1.7 cm mildly hyperdense mass involving the lateral left kidney on image 51 of series 2 which is not definitely seen on the prior study and is of indeterminate density at 58 Hounsfield units.  Multiple simple appearing bilateral renal cysts requiring no additional follow-up. Stomach and bowel:  No acute findings.  No obstruction.  No mucosal thickening. PELVIS: Appendix:  No findings to suggest acute appendicitis. Bladder:  No acute findings.  No stones. Reproductive:  5.8 cm ovoid fluid density mass with some rim calcification in the right lateral pelvis which is unchanged from the prior study consistent with a benign etiology, possibly a postoperative seroma as the patient has undergone prior hysterectomy.  Prior hysterectomy. CHEST, ABDOMEN and PELVIS: Intraperitoneal space:  No acute findings.  No significant fluid collection. No

## 2024-10-21 NOTE — ED NOTES
Assisted patient to toilet in room with walker and standby assistance. Patient reporting a lot of pain in right hip/right low back with ambulation. Patient very slow to ambulate. Patient and her son do not feel patient is safe to return home. Patient is primary caregiver for her  and he is not able to be of assistance to her. Assited patient back to bed, repositioned for comfort. CHIARA Ramirez, made aware.

## 2024-10-22 PROBLEM — R42 VERTIGO: Status: ACTIVE | Noted: 2023-11-30

## 2024-10-22 PROBLEM — I73.9 PERIPHERAL VASCULAR DISEASE (HCC): Status: ACTIVE | Noted: 2023-11-30

## 2024-10-22 PROBLEM — Z95.0 PACEMAKER: Status: ACTIVE | Noted: 2024-06-17

## 2024-10-22 PROBLEM — M77.8 RIGHT WRIST TENDONITIS: Status: ACTIVE | Noted: 2023-01-18

## 2024-10-22 PROBLEM — M70.62 TROCHANTERIC BURSITIS OF LEFT HIP: Status: ACTIVE | Noted: 2023-11-30

## 2024-10-22 PROBLEM — M70.60 GREATER TROCHANTERIC BURSITIS: Status: ACTIVE | Noted: 2024-10-22

## 2024-10-22 PROBLEM — M25.559 ARTHRALGIA OF HIP: Status: ACTIVE | Noted: 2024-10-22

## 2024-10-22 PROBLEM — Z95.1 S/P CABG (CORONARY ARTERY BYPASS GRAFT): Status: ACTIVE | Noted: 2023-11-30

## 2024-10-22 PROBLEM — M25.562 LEFT KNEE PAIN: Status: ACTIVE | Noted: 2018-03-16

## 2024-10-22 PROBLEM — I45.10 RIGHT BUNDLE BRANCH BLOCK (RBBB): Status: ACTIVE | Noted: 2023-11-30

## 2024-10-22 LAB
BASOPHILS # BLD: 0 K/UL (ref 0–0.2)
BASOPHILS NFR BLD: 0.1 %
EOSINOPHIL # BLD: 0.2 K/UL (ref 0–0.7)
EOSINOPHIL NFR BLD: 1.9 %
ERYTHROCYTE [DISTWIDTH] IN BLOOD BY AUTOMATED COUNT: 14.9 % (ref 11.5–14.5)
HCT VFR BLD AUTO: 29.5 % (ref 37–47)
HGB BLD-MCNC: 9.6 G/DL (ref 12–16)
LYMPHOCYTES # BLD: 0.8 K/UL (ref 1–4.8)
LYMPHOCYTES NFR BLD: 10.6 %
MCH RBC QN AUTO: 29.2 PG (ref 27–31.3)
MCHC RBC AUTO-ENTMCNC: 32.5 % (ref 33–37)
MCV RBC AUTO: 89.7 FL (ref 79.4–94.8)
MONOCYTES # BLD: 1.1 K/UL (ref 0.2–0.8)
MONOCYTES NFR BLD: 14.3 %
NEUTROPHILS # BLD: 5.7 K/UL (ref 1.4–6.5)
NEUTS SEG NFR BLD: 72 %
PERFORMED ON: ABNORMAL
PLATELET # BLD AUTO: 204 K/UL (ref 130–400)
POC CREATININE: 1.9 MG/DL (ref 0.6–1.2)
POC SAMPLE TYPE: ABNORMAL
RBC # BLD AUTO: 3.29 M/UL (ref 4.2–5.4)
WBC # BLD AUTO: 8 K/UL (ref 4.8–10.8)

## 2024-10-22 PROCEDURE — 97166 OT EVAL MOD COMPLEX 45 MIN: CPT

## 2024-10-22 PROCEDURE — 85025 COMPLETE CBC W/AUTO DIFF WBC: CPT

## 2024-10-22 PROCEDURE — 2580000003 HC RX 258: Performed by: INTERNAL MEDICINE

## 2024-10-22 PROCEDURE — 36415 COLL VENOUS BLD VENIPUNCTURE: CPT

## 2024-10-22 PROCEDURE — G0378 HOSPITAL OBSERVATION PER HR: HCPCS

## 2024-10-22 PROCEDURE — 97162 PT EVAL MOD COMPLEX 30 MIN: CPT

## 2024-10-22 PROCEDURE — 6370000000 HC RX 637 (ALT 250 FOR IP): Performed by: INTERNAL MEDICINE

## 2024-10-22 RX ORDER — PANTOPRAZOLE SODIUM 40 MG/1
40 TABLET, DELAYED RELEASE ORAL
Status: CANCELLED | OUTPATIENT
Start: 2024-10-23

## 2024-10-22 RX ORDER — LANOLIN ALCOHOL/MO/W.PET/CERES
400 CREAM (GRAM) TOPICAL DAILY
Status: DISCONTINUED | OUTPATIENT
Start: 2024-10-22 | End: 2024-10-23 | Stop reason: HOSPADM

## 2024-10-22 RX ORDER — CALCIUM CARBONATE 500(1250)
500 TABLET ORAL DAILY
Status: DISCONTINUED | OUTPATIENT
Start: 2024-10-22 | End: 2024-10-23 | Stop reason: HOSPADM

## 2024-10-22 RX ORDER — RANOLAZINE 500 MG/1
500 TABLET, EXTENDED RELEASE ORAL 2 TIMES DAILY
Status: DISCONTINUED | OUTPATIENT
Start: 2024-10-22 | End: 2024-10-23 | Stop reason: HOSPADM

## 2024-10-22 RX ORDER — TRAMADOL HYDROCHLORIDE 50 MG/1
50 TABLET ORAL ONCE
Status: COMPLETED | OUTPATIENT
Start: 2024-10-22 | End: 2024-10-22

## 2024-10-22 RX ORDER — ACETAMINOPHEN 325 MG/1
650 TABLET ORAL EVERY 6 HOURS PRN
Status: CANCELLED | OUTPATIENT
Start: 2024-10-22

## 2024-10-22 RX ORDER — POLYETHYLENE GLYCOL 3350 17 G/17G
17 POWDER, FOR SOLUTION ORAL DAILY PRN
Status: CANCELLED | OUTPATIENT
Start: 2024-10-22

## 2024-10-22 RX ORDER — CALCIUM CARBONATE 500(1250)
500 TABLET ORAL DAILY
Status: CANCELLED | OUTPATIENT
Start: 2024-10-23

## 2024-10-22 RX ORDER — AMLODIPINE BESYLATE 5 MG/1
5 TABLET ORAL DAILY
Status: CANCELLED | OUTPATIENT
Start: 2024-10-23

## 2024-10-22 RX ORDER — METOPROLOL TARTRATE 50 MG
50 TABLET ORAL 2 TIMES DAILY
Status: DISCONTINUED | OUTPATIENT
Start: 2024-10-22 | End: 2024-10-23 | Stop reason: HOSPADM

## 2024-10-22 RX ORDER — ASPIRIN 81 MG/1
81 TABLET ORAL DAILY
Status: DISCONTINUED | OUTPATIENT
Start: 2024-10-22 | End: 2024-10-23 | Stop reason: HOSPADM

## 2024-10-22 RX ORDER — ACETAMINOPHEN 650 MG/1
650 SUPPOSITORY RECTAL EVERY 6 HOURS PRN
Status: CANCELLED | OUTPATIENT
Start: 2024-10-22

## 2024-10-22 RX ORDER — SODIUM CHLORIDE 9 MG/ML
INJECTION, SOLUTION INTRAVENOUS PRN
Status: CANCELLED | OUTPATIENT
Start: 2024-10-22

## 2024-10-22 RX ORDER — METOPROLOL TARTRATE 50 MG
50 TABLET ORAL 2 TIMES DAILY
Status: CANCELLED | OUTPATIENT
Start: 2024-10-22

## 2024-10-22 RX ORDER — RANOLAZINE 500 MG/1
500 TABLET, EXTENDED RELEASE ORAL 2 TIMES DAILY
Status: CANCELLED | OUTPATIENT
Start: 2024-10-22

## 2024-10-22 RX ORDER — LANOLIN ALCOHOL/MO/W.PET/CERES
400 CREAM (GRAM) TOPICAL DAILY
Status: CANCELLED | OUTPATIENT
Start: 2024-10-23

## 2024-10-22 RX ORDER — TRAMADOL HYDROCHLORIDE 50 MG/1
50 TABLET ORAL EVERY 6 HOURS PRN
Status: CANCELLED | OUTPATIENT
Start: 2024-10-22

## 2024-10-22 RX ORDER — AMLODIPINE BESYLATE 5 MG/1
5 TABLET ORAL DAILY
Status: DISCONTINUED | OUTPATIENT
Start: 2024-10-22 | End: 2024-10-23 | Stop reason: HOSPADM

## 2024-10-22 RX ORDER — LOSARTAN POTASSIUM 50 MG/1
50 TABLET ORAL 2 TIMES DAILY
Status: DISCONTINUED | OUTPATIENT
Start: 2024-10-22 | End: 2024-10-23 | Stop reason: HOSPADM

## 2024-10-22 RX ORDER — TRAMADOL HYDROCHLORIDE 50 MG/1
50 TABLET ORAL EVERY 6 HOURS PRN
Status: DISCONTINUED | OUTPATIENT
Start: 2024-10-22 | End: 2024-10-23 | Stop reason: HOSPADM

## 2024-10-22 RX ORDER — ASPIRIN 81 MG/1
81 TABLET ORAL DAILY
Status: CANCELLED | OUTPATIENT
Start: 2024-10-23

## 2024-10-22 RX ORDER — HYDRALAZINE HYDROCHLORIDE 20 MG/ML
10 INJECTION INTRAMUSCULAR; INTRAVENOUS EVERY 4 HOURS PRN
Status: CANCELLED | OUTPATIENT
Start: 2024-10-22

## 2024-10-22 RX ORDER — LOSARTAN POTASSIUM 50 MG/1
50 TABLET ORAL 2 TIMES DAILY
Status: CANCELLED | OUTPATIENT
Start: 2024-10-22

## 2024-10-22 RX ORDER — PANTOPRAZOLE SODIUM 40 MG/1
40 TABLET, DELAYED RELEASE ORAL
Status: DISCONTINUED | OUTPATIENT
Start: 2024-10-23 | End: 2024-10-23 | Stop reason: HOSPADM

## 2024-10-22 RX ADMIN — SODIUM CHLORIDE, PRESERVATIVE FREE 10 ML: 5 INJECTION INTRAVENOUS at 09:55

## 2024-10-22 RX ADMIN — SODIUM CHLORIDE, PRESERVATIVE FREE 10 ML: 5 INJECTION INTRAVENOUS at 21:03

## 2024-10-22 RX ADMIN — ASPIRIN 81 MG: 81 TABLET, COATED ORAL at 09:53

## 2024-10-22 RX ADMIN — RANOLAZINE 500 MG: 500 TABLET, FILM COATED, EXTENDED RELEASE ORAL at 21:03

## 2024-10-22 RX ADMIN — ACETAMINOPHEN 325MG 650 MG: 325 TABLET ORAL at 09:52

## 2024-10-22 RX ADMIN — RANOLAZINE 500 MG: 500 TABLET, FILM COATED, EXTENDED RELEASE ORAL at 09:53

## 2024-10-22 RX ADMIN — APIXABAN 2.5 MG: 2.5 TABLET, FILM COATED ORAL at 09:53

## 2024-10-22 RX ADMIN — Medication 400 MG: at 09:53

## 2024-10-22 RX ADMIN — APIXABAN 2.5 MG: 2.5 TABLET, FILM COATED ORAL at 21:03

## 2024-10-22 RX ADMIN — AMLODIPINE BESYLATE 5 MG: 5 TABLET ORAL at 09:53

## 2024-10-22 RX ADMIN — METOPROLOL TARTRATE 50 MG: 50 TABLET, FILM COATED ORAL at 21:03

## 2024-10-22 RX ADMIN — TRAMADOL HYDROCHLORIDE 50 MG: 50 TABLET ORAL at 11:57

## 2024-10-22 RX ADMIN — LOSARTAN POTASSIUM 50 MG: 50 TABLET, FILM COATED ORAL at 09:53

## 2024-10-22 RX ADMIN — LOSARTAN POTASSIUM 50 MG: 50 TABLET, FILM COATED ORAL at 21:03

## 2024-10-22 RX ADMIN — METOPROLOL TARTRATE 50 MG: 50 TABLET, FILM COATED ORAL at 09:53

## 2024-10-22 RX ADMIN — CALCIUM 500 MG: 500 TABLET ORAL at 09:59

## 2024-10-22 RX ADMIN — TRAMADOL HYDROCHLORIDE 50 MG: 50 TABLET ORAL at 17:56

## 2024-10-22 ASSESSMENT — PAIN SCALES - GENERAL
PAINLEVEL_OUTOF10: 6
PAINLEVEL_OUTOF10: 4
PAINLEVEL_OUTOF10: 7
PAINLEVEL_OUTOF10: 4

## 2024-10-22 ASSESSMENT — PAIN DESCRIPTION - ORIENTATION
ORIENTATION: MID
ORIENTATION: LOWER
ORIENTATION: MID;LOWER

## 2024-10-22 ASSESSMENT — PAIN DESCRIPTION - LOCATION
LOCATION: BACK

## 2024-10-22 ASSESSMENT — PAIN DESCRIPTION - DESCRIPTORS
DESCRIPTORS: ACHING;SHARP
DESCRIPTORS: ACHING;SORE
DESCRIPTORS: ACHING;SHARP

## 2024-10-22 ASSESSMENT — PAIN - FUNCTIONAL ASSESSMENT: PAIN_FUNCTIONAL_ASSESSMENT: PREVENTS OR INTERFERES SOME ACTIVE ACTIVITIES AND ADLS

## 2024-10-22 NOTE — CARE COORDINATION
[facility] Pre-Admission Screening Document      Patient Name: Loren Noyola       MRN: [id number]    : 1935    Age: 89 y.o.  Gender: female   Payor: Payor: MEDICARE / Plan: MEDICARE PART A AND B / Product Type: *No Product type* /   MSSP: No    Admitted from: [facility] Floor: 4W  Attending Care Provider: Amara Vincent DO  Inpatient Rehab Referring Care Provider: Dr. Vincent  Primary Care Provider: Evette Johnson MD  Inpatient Treatment Team including Consults: Treatment Team:   Amara Vincent DO Hipp, Lisa, Deb Fortune DO Bennett, Kathryn A, RN Schuler, Kayla, RN Brown, Jennifer, W  Marvel Weeks, Luz Marina Linton    Reason for Hospitalization:   1. Inability to walk    2. Compression fracture of T12 vertebra, initial encounter ([facility])    3. Left kidney mass      No chief complaint on file.    Isolation:No active isolations    Hospital Course:  Admit Date: 10/21/2024  1:11 PM  Inpatient Rehab Referral Date: 10/22/2024  Narrative of hospital course/history of present illness: 89 y.o. female patient who presented to ER 10/21 with complaints of pain to head, neck and right hip after experiencing a fall 10/13. Pain worsened so she came to ER 10/17, d/c'd home after it was confirmed no injury. However, 10/21 patient admitting to another fall and worsening pain not relieved by prescribed norco. CT revealing T12 compression fx. Patient admitted under hospitalist services with neurosx consulting. NeuroSx determined fx to be chronic and no surgical needs.    Internal Medicine:  Assessment and Plan:         Active Problems/ diagnosis:      CT of the thoracic spine reveals T12 compression fracture\  -Reviewed CT spine with neurosurgeon, Dr. Murillo-no need for surgical intervention.  T12 compression fracture is chronic.  -Telemetry; oxygen protocol  -PT, OT eval and treat  -Lovenox DVT prophylaxis     Hypertension,  Recommend ultrasound of thyroid non emergently.     CT Head W/O Contrast  Result Date: 10/21/2024  No acute intracranial abnormality.     XR LUMBAR SPINE (2-3 VIEWS)  Result Date: 10/17/2024  1. Moderate degenerative changes in the mid and lower lumbar spine. 2. Grade 1 anterolisthesis of L3 on L4.     XR HIP LEFT (2-3 VIEWS)  Result Date: 10/17/2024  Left hip hemiarthroplasty in anatomic alignment.     XR HIP RIGHT (2-3 VIEWS)  Result Date: 10/17/2024  1. 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 No acute bony abnormalities. 2. Degenerative changes in the visualized lower lumbar spine.         Medications/IV's:  The patient is currently on aspirin and eliquis for DVT prophylaxis.     Scheduled:    amLODIPine, 5 mg, Oral, Daily    apixaban, 2.5 mg, Oral, BID    aspirin, 81 mg, Oral, Daily    calcium elemental, 500 mg, Oral, Daily    losartan, 50 mg, Oral, BID    magnesium oxide, 400 mg, Oral, Daily    metoprolol tartrate, 50 mg, Oral, BID    pantoprazole, 40 mg, Oral, QAM AC    ranolazine, 500 mg, Oral, BID    sodium chloride flush, 5-40 mL, IntraVENous, 2 times per day    PRN:  traMADol, sodium chloride flush, sodium chloride, ondansetron **OR** ondansetron, polyethylene glycol, acetaminophen **OR** acetaminophen, hydrALAZINE    Allergies:  Allergies   Allergen Reactions    Lisinopril Anaphylaxis    Statins Other (See Comments)    Sulfa Antibiotics Itching         Most Recent Vitals, Height and Weight  BP (!) 121/57   Pulse 62   Temp 97.5 °F (36.4 °C) (Oral)   Resp 18   Ht 1.651 m (5' 5\")   Wt 83.9 kg (184 lb 15.5 oz)   SpO2 96%   BMI 30.78 kg/m²     Weight Bearing Restrictions: None       Current Diet Order: ADULT DIET; Regular    Skin: Bruising/ Left great toe wound  Wound Care Documentation:  Incision 02/08/18 Hip Left (Active)   Number of days: 2447          Lungs:   Respiratory  Respiratory Pattern: Regular  Respiratory Depth: Normal  Respiratory Quality/Effort: Unlabored  Chest Assessment: Chest

## 2024-10-22 NOTE — PROGRESS NOTES
Internal Medicine   Hospitalist   Progress Note    10/22/2024   2:36 PM    Name:  Loren Noyola  MRN:    [id number]     IP Day: 0     Admit Date: 10/21/2024  1:11 PM  PCP: Evette Johnson MD    Code Status:  Full Code    Assessment and Plan:        Active Problems/ diagnosis:     CT of the thoracic spine reveals T12 compression fracture\  -Reviewed CT spine with neurosurgeon, Dr. Murillo-no need for surgical intervention.  T12 compression fracture is chronic.  -Telemetry; oxygen protocol  -PT, OT eval and treat  -Lovenox DVT prophylaxis     Hypertension, hyperlipidemia  -Home medication includes metoprolol, losartan, amlodipine      CAD  Status post CABG  Presence of pacemaker  -On aspirin, Eliquis    Hypertension  Hyperlipidemia line  -resume home medication       7 pm- 7 am, please contact on call Hospitalist for any needs     Dc to rehab    Subjective:      no new events.     Physical Examination:      Vitals:  BP (!) 145/65   Pulse 61   Temp 97.9 °F (36.6 °C) (Oral)   Resp 16   Ht 1.65 m (5' 4.96\")   Wt 83.9 kg (184 lb 15.5 oz)   SpO2 96%   BMI 30.82 kg/m²   Temp (24hrs), Av.7 °F (36.5 °C), Min:97.5 °F (36.4 °C), Max:97.9 °F (36.6 °C)      General appearance: alert, cooperative and no distress  Mental Status: oriented to person, place and time and normal affect  Lungs: clear to auscultation bilaterally, normal effort  Heart: regular rate and rhythm, no murmur  Abdomen: soft, nontender, nondistended, bowel sounds present, no masses  Extremities: no edema, redness, tenderness in the calves  Skin: no gross lesions, rashes  Neurologically no new deficit.  Intact throughout    Data:     Labs:  Recent Labs     10/21/24  1441 10/22/24  0626   WBC 7.5 8.0   HGB 10.2* 9.6*    204     Recent Labs     10/21/24  1441 10/21/24  1459     --    K 4.6  --      --    CO2 21  --    BUN 37*  --    CREATININE 1.69* 1.9*   GLUCOSE 108*  --      Recent Labs     10/21/24  1441   AST 15   ALT 6    BILITOT 0.4   ALKPHOS 56       Current Facility-Administered Medications   Medication Dose Route Frequency Provider Last Rate Last Admin    amLODIPine (NORVASC) tablet 5 mg  5 mg Oral Daily Carlton, Yazid R, DO   5 mg at 10/22/24 0953    apixaban (ELIQUIS) tablet 2.5 mg  2.5 mg Oral BID Carlton, Yazid R, DO   2.5 mg at 10/22/24 0953    aspirin EC tablet 81 mg  81 mg Oral Daily Carlton, Yazid R, DO   81 mg at 10/22/24 0953    calcium elemental (OSCAL) tablet 500 mg  500 mg Oral Daily Carlton, Yazid R, DO   500 mg at 10/22/24 0959    losartan (COZAAR) tablet 50 mg  50 mg Oral BID Carlton, Yazid R, DO   50 mg at 10/22/24 0953    magnesium oxide (MAG-OX) tablet 400 mg  400 mg Oral Daily Carlton, Yazid R, DO   400 mg at 10/22/24 0953    metoprolol tartrate (LOPRESSOR) tablet 50 mg  50 mg Oral BID Carlton, Yazid R, DO   50 mg at 10/22/24 0953    [START ON 10/23/2024] pantoprazole (PROTONIX) tablet 40 mg  40 mg Oral QAM AC Carlton, Yazid R, DO        ranolazine (RANEXA) extended release tablet 500 mg  500 mg Oral BID Carlton, Yazid R, DO   500 mg at 10/22/24 0953    traMADol (ULTRAM) tablet 50 mg  50 mg Oral Q6H PRN Carlton, Yazid R, DO        sodium chloride flush 0.9 % injection 5-40 mL  5-40 mL IntraVENous 2 times per day Carlton, Yazid R, DO   10 mL at 10/22/24 0955    sodium chloride flush 0.9 % injection 5-40 mL  5-40 mL IntraVENous PRN Carlton, Yazid R, DO        0.9 % sodium chloride infusion   IntraVENous PRN Carlton, Yazid R, DO        ondansetron (ZOFRAN-ODT) disintegrating tablet 4 mg  4 mg Oral Q8H PRN Carlton, Yazid R, DO        Or    ondansetron (ZOFRAN) injection 4 mg  4 mg IntraVENous Q6H PRN Carlton, Yazid R, DO        polyethylene glycol (GLYCOLAX) packet 17 g  17 g Oral Daily PRN Amara Vincent R,         acetaminophen (TYLENOL) tablet 650 mg  650 mg Oral Q6H PRN Amara Vincent DO   650 mg at 10/22/24 0952    Or    acetaminophen (TYLENOL) suppository 650 mg  650 mg Rectal Q6H PRN Amara Vincent

## 2024-10-22 NOTE — CARE COORDINATION
Inpatient Rehab referral received. Met with patient to discuss therapy needs and [facility] rehab. We discussed that she fell at home, did not seek evaluation and then ended up falling again with increased pain. Patient reports today she is overall doing better and pain has lessened. Son entered room during discussion. He agrees with rehab. Inquired about returning home post rehab and son denied concerns and reports patient just cannot return home at this time d/t needing therapy to improve CLOF. Adult sons local and supportive. I reviewed and explained [facility] Inpatient Rehab program and requirements, including 3 hours of intense therapy daily, anticipated length of stay, weekly team meetings and goal of discharge to home. All questions answered and patient verbalized understanding. Freedom of choice provided and patient requests admit to [facility]. Patient reports she has been on rehab in the past and did well after hip replacement. Patient reports to being independent as baseline, uses rollator and still drives. Son present and did not report any discrepancy with information provided by patient.  Electronically signed by Tamera Rangel on 10/22/2024 at 3:43 PM

## 2024-10-22 NOTE — PROGRESS NOTES
Shift assessment complete. VSS. A&Ox4. Patient is calm and cooperative. Denies having nausea or SOB on exertion. On room air. Lung sounds are clear. No complaints of chest discomfort. Paced on tele. Abdomen is soft and round. Bowel sounds are present. Up to commode x 1 assist: 2 large bowel movements today. Tolerating PO diet. Medications ordered and given per MAR. PRN ultram given for 7/10 back pain. Pain reassessed 4/10. Skin intact. Generalized bruising present. 1+ nonpitting edema BLE. Family at bedside. Call light in reach. Bed in lowest position. No needs at this time. Care ongoing.     Electronically signed by Jourdan Eaton RN on 10/22/2024 at 4:23 PM     1717 - Wound/ostomy eval ordered as patient has wound to left great toe. Currently covered with a mepilex dressing.     Electronically signed by Jourdan Eaton RN on 10/22/2024 at 5:18 PM

## 2024-10-22 NOTE — DISCHARGE SUMMARY
contrast. 6.  5.8 cm ovoid fluid density mass with some rim calcification in the right lateral pelvis which is unchanged from the prior study consistent with a benign etiology, possibly a postoperative seroma as the patient has undergone prior hysterectomy. 7.  Bilateral adrenal nodules measuring 2.6 cm on the left and 1.9 cm on the right and unchanged with density measurements consistent with benign adrenal adenomas.  These require no additional imaging follow-up.  NOTE: Imaging studies do not address the functional status of an adrenal mass.  If there are clinical signs or symptoms of adrenal hyperfunction, biochemical testing may be required to determine if the lesion is secreting access hormone. 8.  Otherwise unchanged.     CT ABDOMEN PELVIS WO CONTRAST Additional Contrast? None    Result Date: 10/21/2024  EXAM: CT Chest, Abdomen and Pelvis Without Intravenous Contrast EXAM DATE/TIME: 10/21/2024 3:45 pm CLINICAL HISTORY: ORDERING SYSTEM PROVIDED HISTORY: fall 10/13/2024  TECHNOLOGIST PROVIDED HISTORY:  Reason for exam:->fall 10/13/2024  Additional Contrast?->None Decision Support Exception - unselect if not a suspected or confirmed emergency medical condition->Emergency Medical Condition (MA)  What reading provider will be dictating this exam?->CRC; ORDERING SYSTEM PROVIDED HISTORY: Right side rib pain secondary to fall 10/13/2024  TECHNOLOGIST PROVIDED HISTORY:  Reason for exam:->Right side rib pain secondary to fall 10/13/2024 Decision Support Exception - unselect if not a suspected or confirmed emergency medical condition->Emergency Medical Condition (MA)  What reading provider will be dictating this exam?->CRC TECHNIQUE: Axial computed tomography images of the chest, abdomen and pelvis without intravenous contrast.  This CT exam was performed using one or more of the following dose reduction techniques:  automated exposure control, adjustment of the mA and/or kV according to patient size, and/or use of  central canal narrowing.  Prior median sternotomy.  Left hip prosthesis with result in artifact obscuring the adjacent structures.  Multilevel degenerative changes of the spine. No definite acute bony abnormality is noted.  Grade 1-2 degenerative anterolisthesis of L4 on L5 is noted and unchanged.  No dislocation. Soft tissues:  No acute findings. 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 Scattered calcified atherosclerotic plaque is noted within the arterial system. Lymph nodes:  No acute findings.  No enlarged lymph nodes. Tubes, lines and devices:  Left-sided implanted cardiac device.     1.  Severe compression fracture deformity of T12 of indeterminate age though has occurred since the study of 04/25/2023.  There is mild retropulsion of the posterosuperior aspect of the T12 vertebral body resulting in a mild degree of central canal narrowing. 2.  1.7 cm mildly hyperdense mass involving the lateral left kidney on image 51 of series 2 which is not definitely seen on the prior study and is of indeterminate density at 58 Hounsfield units.  ACR White Paper guidelines (Herts, et al. JACR 2018; 15(2):264-273) recommend MRI or CT without and with intravenous contrast. 3.  Posterior basilar lung scarring and/or atelectasis but unchanged. 4.  Borderline to mild cardiomegaly, unchanged. 5.  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  ACR White Paper guidelines (Herts, et al. JACR 2018; 15(2):264-273) recommend MRI or CT without and with intravenous contrast. 6.  5.8 cm ovoid fluid density mass with some rim calcification in the right lateral pelvis which is unchanged from the prior study consistent with a benign etiology, possibly a postoperative seroma as the patient has undergone prior hysterectomy. 7.  Bilateral adrenal nodules measuring

## 2024-10-22 NOTE — CARE COORDINATION
This LSW visited patient at bedside this am.  Patient and I discussed discharge plans.  Patient completed PT evaluation today and is requesting to have referral sent to [facility] Acute [facility]hab program.  Patient will not need insurance authorization if accepted to [facility] Program.  I completed MOON with patient, copy provided.  Electronically signed by KIRTI Gilbert on 10/22/24 at 10:58 AM EDT

## 2024-10-22 NOTE — PROGRESS NOTES
[facility] OCCUPATIONAL THERAPY EVALUATION - ACUTE     NAME: Loren Noyola  : 1935 (89 y.o.)  MRN: [id number]  CODE STATUS: Full Code  Room: W469/W469-01    Date of Service: 10/22/2024    Patient Diagnosis(es): Gait instability [R26.81]  Left kidney mass [N28.89]  Inability to walk [R26.2]  Compression fracture of T12 vertebra, initial encounter ([facility]) [S22.080A]   Patient Active Problem List    Diagnosis Date Noted    Gait instability 10/21/2024    Anemia 2018     Gait and ADL abnormality dt left subtrochanteric femoral neck fracture and left hemiarthroplasty-[facility] rehab admission February 10, 2018 2018    Abnormality of gait and mobility 2018    Acute cystitis without hematuria 2018    Hip fx, left, closed, initial encounter ([facility]) 02/10/2018    Closed left hip fracture, initial encounter ([facility]) 2018    DDD (degenerative disc disease), lumbar 2017    Essential hypertension 2017    Lumbar facet arthropathy 2017    Lumbosacral spondylosis without myelopathy 2017    Primary osteoarthritis of both knees 2017    Right lumbosacral radiculopathy 2017    Constipation 2016    Foot pain, left 2015    Low back pain radiating to left leg 2011    Spondylolisthesis of lumbar region 2011    Cervical spondylosis without myelopathy 2005    Headache 2005        Past Medical History:   Diagnosis Date    Bleeding ulcer     CAD (coronary artery disease)     Colitis     Hyperlipidemia     Hypertension      Past Surgical History:   Procedure Laterality Date    APPENDECTOMY      COLECTOMY  1/3/2011    sigmoid colectomy with colostomy    COLECTOMY  2011    partial colectomy with colostomy closure    CORONARY ARTERY BYPASS GRAFT      HYSTERECTOMY (CERVIX STATUS UNKNOWN)      HYSTERECTOMY, TOTAL ABDOMINAL (CERVIX REMOVED)      DE HEMIARTHROPLASTY HIP PARTIAL Left 2018    HIP HEMIARTHROPLASTY- performed by Bryan ROSS

## 2024-10-22 NOTE — DISCHARGE INSTR - COC
Continuity of Care Form    Patient Name: Loren Noyola   :  1935  MRN:  [id number]    Admit date:  10/21/2024  Discharge date:  ***    Code Status Order: Full Code   Advance Directives:   Advance Care Flowsheet Documentation             Admitting Physician:  Amara Vincent DO  PCP: Evette Johnson MD    Discharging Nurse: ***  Discharging Hospital Unit/Room#: W469/W469-01  Discharging Unit Phone Number: ***    Emergency Contact:   Extended Emergency Contact Information  Primary Emergency Contact: Jude Noyola  Home Phone: [phone]  Relation: Child  Secondary Emergency Contact: Rupert Noyola  Home Phone: [phone]  Relation: Child    Past Surgical History:  Past Surgical History:   Procedure Laterality Date    APPENDECTOMY      COLECTOMY  1/3/2011    sigmoid colectomy with colostomy    COLECTOMY  2011    partial colectomy with colostomy closure    CORONARY ARTERY BYPASS GRAFT      HYSTERECTOMY (CERVIX STATUS UNKNOWN)      HYSTERECTOMY, TOTAL ABDOMINAL (CERVIX REMOVED)      CT HEMIARTHROPLASTY HIP PARTIAL Left 2018    HIP HEMIARTHROPLASTY- performed by Bryan Malcolm MD at [facility] OR    SIGMOIDOSCOPY      VARICOSE VEIN SURGERY         Immunization History:   Immunization History   Administered Date(s) Administered    COVID-19, PFIZER Bivalent, DO NOT Dilute, (age 12y+), IM, 30 mcg/0.3 mL 10/23/2022    COVID-19, PFIZER PURPLE top, DILUTE for use, (age 12 y+), 30mcg/0.3mL 2021, 10/07/2021    COVID-19, PFIZER, (age 12y+), IM, 30mcg/0.3mL 2023       Active Problems:  Patient Active Problem List   Diagnosis Code    Constipation K59.00    Closed left hip fracture, initial encounter ([facility]) S72.002A    Cervical spondylosis without myelopathy M47.812    DDD (degenerative disc disease), lumbar M51.369    Essential hypertension I10    Foot pain, left M79.672    Headache R51.9    Low back pain radiating to left leg M54.50, M79.605    Lumbar facet arthropathy M47.816

## 2024-10-22 NOTE — PROGRESS NOTES
Physical Therapy Med Surg Initial Assessment  Facility/Department: [address] MED SURG UNIT  Room: [facility]/[place] [address]       NAME: Loren Noyola  : 1935 (89 y.o.)  MRN: [id number]  CODE STATUS: Full Code    Date of Service: 10/22/2024    Patient Diagnosis(es): Gait instability [R26.81]  Left kidney mass [N28.89]  Inability to walk [R26.2]  Compression fracture of T12 vertebra, initial encounter ([facility]) [S22.866A]   No chief complaint on file.    Patient Active Problem List    Diagnosis Date Noted    Gait instability 10/21/2024    Anemia 2018     Gait and ADL abnormality dt left subtrochanteric femoral neck fracture and left hemiarthroplasty-[facility] rehab admission February 10, 2018 2018    Abnormality of gait and mobility 2018    Acute cystitis without hematuria 2018    Hip fx, left, closed, initial encounter ([facility]) 02/10/2018    Closed left hip fracture, initial encounter ([facility]) 2018    DDD (degenerative disc disease), lumbar 2017    Essential hypertension 2017    Lumbar facet arthropathy 2017    Lumbosacral spondylosis without myelopathy 2017    Primary osteoarthritis of both knees 2017    Right lumbosacral radiculopathy 2017    Constipation 2016    Foot pain, left 2015    Low back pain radiating to left leg 2011    Spondylolisthesis of lumbar region 2011    Cervical spondylosis without myelopathy 2005    Headache 2005        Past Medical History:   Diagnosis Date    Bleeding ulcer     CAD (coronary artery disease)     Colitis     Hyperlipidemia     Hypertension      Past Surgical History:   Procedure Laterality Date    APPENDECTOMY      COLECTOMY  1/3/2011    sigmoid colectomy with colostomy    COLECTOMY  2011    partial colectomy with colostomy closure    CORONARY ARTERY BYPASS GRAFT      HYSTERECTOMY (CERVIX STATUS UNKNOWN)      HYSTERECTOMY, TOTAL ABDOMINAL (CERVIX REMOVED)      [place] HEMIARTHROPLASTY HIP

## 2024-10-22 NOTE — PLAN OF CARE
See OT evaluation for all goals and OT POC. Electronically signed by Evelin Hess OTR/L on 10/22/2024 at 2:14 PM

## 2024-10-23 ENCOUNTER — HOSPITAL ENCOUNTER (INPATIENT)
Age: 89
LOS: 10 days | Discharge: HOME HEALTH | DRG: 552 | End: 2024-11-02
Attending: PHYSICAL MEDICINE & REHABILITATION | Admitting: PHYSICAL MEDICINE & REHABILITATION
Payer: COMMERCIAL

## 2024-10-23 VITALS
RESPIRATION RATE: 18 BRPM | HEIGHT: 65 IN | HEART RATE: 60 BPM | WEIGHT: 184.97 LBS | OXYGEN SATURATION: 95 % | BODY MASS INDEX: 30.82 KG/M2 | TEMPERATURE: 97.7 F | SYSTOLIC BLOOD PRESSURE: 112 MMHG | DIASTOLIC BLOOD PRESSURE: 59 MMHG

## 2024-10-23 DIAGNOSIS — N28.89 LEFT KIDNEY MASS: ICD-10-CM

## 2024-10-23 DIAGNOSIS — M70.62 TROCHANTERIC BURSITIS OF LEFT HIP: ICD-10-CM

## 2024-10-23 DIAGNOSIS — Z74.09 IMPAIRED MOBILITY AND ACTIVITIES OF DAILY LIVING: Primary | ICD-10-CM

## 2024-10-23 DIAGNOSIS — S22.080D COMPRESSION FRACTURE OF T12 VERTEBRA WITH ROUTINE HEALING: ICD-10-CM

## 2024-10-23 DIAGNOSIS — Z78.9 IMPAIRED MOBILITY AND ACTIVITIES OF DAILY LIVING: Primary | ICD-10-CM

## 2024-10-23 PROCEDURE — 97535 SELF CARE MNGMENT TRAINING: CPT

## 2024-10-23 PROCEDURE — 6370000000 HC RX 637 (ALT 250 FOR IP): Performed by: INTERNAL MEDICINE

## 2024-10-23 PROCEDURE — 97110 THERAPEUTIC EXERCISES: CPT

## 2024-10-23 PROCEDURE — G0378 HOSPITAL OBSERVATION PER HR: HCPCS

## 2024-10-23 PROCEDURE — 99221 1ST HOSP IP/OBS SF/LOW 40: CPT | Performed by: PHYSICAL MEDICINE & REHABILITATION

## 2024-10-23 PROCEDURE — 99213 OFFICE O/P EST LOW 20 MIN: CPT

## 2024-10-23 PROCEDURE — 1180000000 HC REHAB R&B

## 2024-10-23 RX ORDER — HYDRALAZINE HYDROCHLORIDE 20 MG/ML
10 INJECTION INTRAMUSCULAR; INTRAVENOUS EVERY 4 HOURS PRN
Status: DISCONTINUED | OUTPATIENT
Start: 2024-10-23 | End: 2024-11-02 | Stop reason: HOSPADM

## 2024-10-23 RX ORDER — TRAMADOL HYDROCHLORIDE 50 MG/1
50 TABLET ORAL EVERY 6 HOURS PRN
Status: DISCONTINUED | OUTPATIENT
Start: 2024-10-23 | End: 2024-10-24

## 2024-10-23 RX ORDER — ACETAMINOPHEN 325 MG/1
650 TABLET ORAL EVERY 6 HOURS PRN
Status: DISCONTINUED | OUTPATIENT
Start: 2024-10-23 | End: 2024-11-02 | Stop reason: HOSPADM

## 2024-10-23 RX ORDER — RANOLAZINE 500 MG/1
500 TABLET, EXTENDED RELEASE ORAL 2 TIMES DAILY
Status: DISCONTINUED | OUTPATIENT
Start: 2024-10-23 | End: 2024-11-02 | Stop reason: HOSPADM

## 2024-10-23 RX ORDER — CALCIUM CARBONATE 500(1250)
500 TABLET ORAL DAILY
Status: DISCONTINUED | OUTPATIENT
Start: 2024-10-23 | End: 2024-11-02 | Stop reason: HOSPADM

## 2024-10-23 RX ORDER — LOSARTAN POTASSIUM 50 MG/1
50 TABLET ORAL 2 TIMES DAILY
Status: DISCONTINUED | OUTPATIENT
Start: 2024-10-23 | End: 2024-11-02 | Stop reason: HOSPADM

## 2024-10-23 RX ORDER — AMLODIPINE BESYLATE 5 MG/1
5 TABLET ORAL DAILY
Status: DISCONTINUED | OUTPATIENT
Start: 2024-10-23 | End: 2024-10-24

## 2024-10-23 RX ORDER — SODIUM CHLORIDE 9 MG/ML
INJECTION, SOLUTION INTRAVENOUS PRN
Status: DISCONTINUED | OUTPATIENT
Start: 2024-10-23 | End: 2024-11-02 | Stop reason: HOSPADM

## 2024-10-23 RX ORDER — PANTOPRAZOLE SODIUM 40 MG/1
40 TABLET, DELAYED RELEASE ORAL
Status: DISCONTINUED | OUTPATIENT
Start: 2024-10-24 | End: 2024-10-24

## 2024-10-23 RX ORDER — LANOLIN ALCOHOL/MO/W.PET/CERES
400 CREAM (GRAM) TOPICAL DAILY
Status: DISCONTINUED | OUTPATIENT
Start: 2024-10-23 | End: 2024-11-02 | Stop reason: HOSPADM

## 2024-10-23 RX ORDER — ACETAMINOPHEN 650 MG/1
650 SUPPOSITORY RECTAL EVERY 6 HOURS PRN
Status: DISCONTINUED | OUTPATIENT
Start: 2024-10-23 | End: 2024-10-24

## 2024-10-23 RX ORDER — ASPIRIN 81 MG/1
81 TABLET ORAL DAILY
Status: DISCONTINUED | OUTPATIENT
Start: 2024-10-23 | End: 2024-11-02 | Stop reason: HOSPADM

## 2024-10-23 RX ORDER — POLYETHYLENE GLYCOL 3350 17 G/17G
17 POWDER, FOR SOLUTION ORAL DAILY PRN
Status: DISCONTINUED | OUTPATIENT
Start: 2024-10-23 | End: 2024-11-02 | Stop reason: HOSPADM

## 2024-10-23 RX ORDER — METOPROLOL TARTRATE 50 MG
50 TABLET ORAL 2 TIMES DAILY
Status: DISCONTINUED | OUTPATIENT
Start: 2024-10-23 | End: 2024-11-02 | Stop reason: HOSPADM

## 2024-10-23 RX ADMIN — ASPIRIN 81 MG: 81 TABLET, COATED ORAL at 08:48

## 2024-10-23 RX ADMIN — AMLODIPINE BESYLATE 5 MG: 5 TABLET ORAL at 08:49

## 2024-10-23 RX ADMIN — APIXABAN 2.5 MG: 2.5 TABLET, FILM COATED ORAL at 08:48

## 2024-10-23 RX ADMIN — METOPROLOL TARTRATE 50 MG: 50 TABLET, FILM COATED ORAL at 21:23

## 2024-10-23 RX ADMIN — LOSARTAN POTASSIUM 50 MG: 50 TABLET, FILM COATED ORAL at 21:23

## 2024-10-23 RX ADMIN — METOPROLOL TARTRATE 50 MG: 50 TABLET, FILM COATED ORAL at 08:50

## 2024-10-23 RX ADMIN — RANOLAZINE 500 MG: 500 TABLET, FILM COATED, EXTENDED RELEASE ORAL at 08:48

## 2024-10-23 RX ADMIN — Medication 400 MG: at 21:22

## 2024-10-23 RX ADMIN — TRAMADOL HYDROCHLORIDE 50 MG: 50 TABLET ORAL at 05:49

## 2024-10-23 RX ADMIN — TRAMADOL HYDROCHLORIDE 50 MG: 50 TABLET ORAL at 00:32

## 2024-10-23 RX ADMIN — ASPIRIN 81 MG: 81 TABLET, COATED ORAL at 21:23

## 2024-10-23 RX ADMIN — LOSARTAN POTASSIUM 50 MG: 50 TABLET, FILM COATED ORAL at 08:49

## 2024-10-23 RX ADMIN — RANOLAZINE 500 MG: 500 TABLET, FILM COATED, EXTENDED RELEASE ORAL at 21:22

## 2024-10-23 RX ADMIN — PANTOPRAZOLE SODIUM 40 MG: 40 TABLET, DELAYED RELEASE ORAL at 05:49

## 2024-10-23 RX ADMIN — TRAMADOL HYDROCHLORIDE 50 MG: 50 TABLET ORAL at 11:56

## 2024-10-23 RX ADMIN — APIXABAN 2.5 MG: 2.5 TABLET, FILM COATED ORAL at 21:22

## 2024-10-23 RX ADMIN — Medication 400 MG: at 08:48

## 2024-10-23 ASSESSMENT — PAIN SCALES - GENERAL
PAINLEVEL_OUTOF10: 4
PAINLEVEL_OUTOF10: 5
PAINLEVEL_OUTOF10: 6
PAINLEVEL_OUTOF10: 3
PAINLEVEL_OUTOF10: 0
PAINLEVEL_OUTOF10: 5
PAINLEVEL_OUTOF10: 0

## 2024-10-23 ASSESSMENT — ENCOUNTER SYMPTOMS
BOWEL INCONTINENCE: 0
BACK PAIN: 1

## 2024-10-23 ASSESSMENT — PAIN DESCRIPTION - ORIENTATION: ORIENTATION: RIGHT

## 2024-10-23 ASSESSMENT — PAIN DESCRIPTION - LOCATION
LOCATION: BACK
LOCATION: HIP

## 2024-10-23 NOTE — CARE COORDINATION
Patient can admit to room 260 pending medical clearance.  Electronically signed by Tamera Rangel on 10/23/2024 at 1:41 PM

## 2024-10-23 NOTE — PROGRESS NOTES
Internal Medicine   Hospitalist   Progress Note    10/23/2024   1:49 PM    Name:  Loren Noyola  MRN:    [id number]     IP Day: 0     Admit Date: 10/21/2024  1:11 PM  PCP: Evette Johnson MD    Code Status:  Full Code    Assessment and Plan:        Active Problems/ diagnosis:     CT of the thoracic spine reveals T12 compression fracture\  -Reviewed CT spine with neurosurgeon, Dr. Murillo-no need for surgical intervention.  T12 compression fracture is chronic.  -Telemetry; oxygen protocol  -PT, OT eval and treat  -Lovenox DVT prophylaxis     Hypertension, hyperlipidemia  -Home medication includes metoprolol, losartan, amlodipine      CAD  Status post CABG  Presence of pacemaker  -On aspirin, Eliquis    Hypertension  Hyperlipidemia line  -resume home medication       7 pm- 7 am, please contact on call Hospitalist for any needs     Dc to rehab    Subjective:      no new events.     Physical Examination:      Vitals:  BP (!) 121/57   Pulse 62   Temp 97.5 °F (36.4 °C) (Oral)   Resp 18   Ht 1.651 m (5' 5\")   Wt 83.9 kg (184 lb 15.5 oz)   SpO2 96%   BMI 30.78 kg/m²   Temp (24hrs), Av.6 °F (36.4 °C), Min:97.5 °F (36.4 °C), Max:97.9 °F (36.6 °C)      General appearance: alert, cooperative and no distress  Mental Status: oriented to person, place and time and normal affect  Lungs: clear to auscultation bilaterally, normal effort  Heart: regular rate and rhythm, no murmur  Abdomen: soft, nontender, nondistended, bowel sounds present, no masses  Extremities: no edema, redness, tenderness in the calves  Skin: no gross lesions, rashes  Neurologically no new deficit.  Intact throughout    Data:     Labs:  Recent Labs     10/21/24  1441 10/22/24  0626   WBC 7.5 8.0   HGB 10.2* 9.6*    204     Recent Labs     10/21/24  1441 10/21/24  1459     --    K 4.6  --      --    CO2 21  --    BUN 37*  --    CREATININE 1.69* 1.9*   GLUCOSE 108*  --      Recent Labs     10/21/24  1441   AST 15   ALT 6

## 2024-10-23 NOTE — PLAN OF CARE
Problem: Discharge Planning  Goal: Discharge to home or other facility with appropriate resources  10/23/2024 1107 by Tiffany Maldonado RN  Outcome: Progressing  10/22/2024 2340 by Shanae Franklin RN  Outcome: Progressing     Problem: Safety - Adult  Goal: Free from fall injury  10/23/2024 1107 by Tiffany Maldonado RN  Outcome: Progressing  10/22/2024 2340 by Shanae Franklin RN  Outcome: Progressing     Problem: ABCDS Injury Assessment  Goal: Absence of physical injury  10/22/2024 2340 by Shanae Franklin RN  Outcome: Progressing     Problem: Skin/Tissue Integrity  Goal: Absence of new skin breakdown  Description: 1.  Monitor for areas of redness and/or skin breakdown  2.  Assess vascular access sites hourly  3.  Every 4-6 hours minimum:  Change oxygen saturation probe site  4.  Every 4-6 hours:  If on nasal continuous positive airway pressure, respiratory therapy assess nares and determine need for appliance change or resting period.  10/22/2024 2340 by Shanae Franklin RN  Outcome: Progressing     Problem: Pain  Goal: Verbalizes/displays adequate comfort level or baseline comfort level  10/22/2024 2340 by Shanae Franklin RN  Outcome: Progressing      Occupational Therapy Progress Note     Patient Name: Iza Garcia  BUBQC'V Date: 11/20/2023  Problem List  Principal Problem:    Acute respiratory failure with hypoxia and hypercapnia (HCC)  Active Problems:    Atrial fibrillation ([address])    Essential hypertension    Acute on chronic diastolic CHF ([address])    Hypothyroidism    Chronic obstructive pulmonary disease (HCC)    BPH (benign prostatic hyperplasia)    Acute encephalopathy    Right elbow pain    Compression fractures of T5 and T9 vertebrae    History of stroke    Other constipation          11/20/23 0940   OT Last Visit   OT Visit Date 11/20/23   Note Type   Note Type Treatment for insurance authorization   Pain Assessment   Pain Assessment Tool 0-10   Pain Score No Pain   Restrictions/Precautions   Weight Bearing Precautions Per Order No   Other Precautions Cognitive; Chair Alarm; Fall Risk;Hard of hearing   Lifestyle   Autonomy Pta pt I with ADL and fxnal mobility with RW, pt reports doing everything I although pt a questionable historian on this date   Reciprocal Relationships supportive dtr   Service to Others retired    [facility] enjoys watching TV   ADL   Where Assessed Chair   Grooming Assistance 5  Supervision/Setup   Grooming Deficit Setup;Supervision/safety;Verbal cueing   Grooming Comments Pt brushes hair and teeth when seated in chair requiring S for safety, benefits from vc at pt is reluctant to initiate grooming on this date   Bed Mobility   Additional Comments Pt OOB in chair throughout session, left in chair with alarm on and all needs within reach   Transfers   Sit to Stand 3  Moderate assistance   Additional items Assist x 1;Assist x 2; Increased time required;Verbal cues   Stand to Sit 3  Moderate assistance   Additional items Assist x 1; Increased time required;Verbal cues; Impulsive   Additional Comments with RW, fluctuates between requiring MOD A x 1-2 with RW   Functional Mobility   Functional Mobility 3 Moderate assistance   Additional Comments Pt requiring MOD A x 2 with RW for short distance mobility with chair follow, requiring one seated rest break   Additional items Rolling walker   Cognition   Overall Cognitive Status Impaired   Arousal/Participation Alert; Cooperative   Attention Attends with cues to redirect   Orientation Level Oriented to person;Disoriented to place; Disoriented to time;Disoriented to situation   Memory Decreased recall of precautions;Decreased recall of recent events;Decreased short term memory   Following Commands Follows one step commands with increased time or repetition   Comments Pt pleasant and cooperative on this date although disoriented to time, place and situation. Pt benefitting from increased time and vc for encouragement. Activity Tolerance   Activity Tolerance Patient tolerated treatment well;Patient limited by fatigue   Medical Staff Made Aware Co-tx with DPT due to high medical complexity, restorative tech Hedy assisted with chair follow, RN cleared for therapy   Assessment   Assessment Pt seen for OT treatment session day 3 on this date focused on ADL retraining, functional transfers and mobility, energy conservation, functional endurance, recall of safety precautions, and patient education. Pt was greeted in chair and was cooperative throughout session. Following session, pt was left in chair with chair alarm on and all needs within reach. Pt continues to be limited by functional status related to ADLs and transfers requiring MOD A x 1-2 for transfers and fxnal mobility, although demonstrates improvements in seated grooming requiring S with vc.  Pt also continues to be limited by cognition. The patient's raw score on the AM-PAC Daily Activity Inpatient Short Form is 15. A raw score of less than 19 suggests the patient may benefit from discharge to post-acute rehabilitation services.  Please refer to the recommendation of the Occupational Therapist for safe discharge planning. Pt would benefit from continued acute OT intervention with plan to continue OT treatment sessions 2-3x per week. Recommend d/c to post acute rehab services. Plan   Treatment Interventions ADL retraining;Functional transfer training; Endurance training;Continued evaluation; Energy conservation; Activityengagement;Cognitive reorientation   Goal Expiration Date 11/27/23   OT Treatment Day 3   OT Frequency 2-3x/wk   Discharge Recommendation   Rehab Resource Intensity Level, OT I (Maximum Resource Intensity)   AM-PAC Daily Activity Inpatient   Lower Body Dressing 2   Bathing 2   Toileting 2   Upper Body Dressing 3   Grooming 3   Eating 3   Daily Activity Raw Score 15   Daily Activity Standardized Score (Calc for Raw Score >=11) 34.69   AM-PAC Applied Cognition Inpatient   Following a Speech/Presentation 3   Understanding Ordinary Conversation 3   Taking Medications 2   Remembering Where Things Are Placed or Put Away 2   Remembering List of 4-5 Errands 2   Taking Care of Complicated Tasks 2   Applied Cognition Raw Score 14   Applied Cognition Standardized Score 32.02   Modified Kenzie Scale   Modified Kenzie Scale 4   End of Consult   Education Provided Yes   Patient Position at End of Consult Bedside chair;Bed/Chair alarm activated; All needs within reach   Nurse Communication Nurse aware of consult       Sherine Alter, OTD, OTR/L

## 2024-10-23 NOTE — PROGRESS NOTES
Pt arrived on the unit at 1825, admitted to room 260. Alert and oriented, pt tearful, wants a room closer to the nurses station so she \"won't feel alone.\" Advised pt we will do our best to accommodate her request but no other rooms are available at this time. Removed tele box #[id number] and returned to monitor room, no tele orders. Son at bedside, call light in reach. Electronically signed by Melinda Proctor RN on 10/23/2024 at 6:58 PM

## 2024-10-23 NOTE — CONSULTS
Physical Medicine & Rehabilitation  Consult Note      Admitting Physician: Amara Vincent DO    Primary Care Provider: Evette Johnson MD     Reason for Consult:  Asses rehab needs, promote physical and mental function, analyze level of care to determine rehab needs, improve ability to actively participate in the rehabilitation process, and decrease likelihood of re-admit to the hospital after discharge.      History of Present Illness:    Loren Noyola is a 89 y.o. female admitted to [facility] on 10/21/2024.     Patient was admitted to [facility] after presenting to the ER with pain in her head neck and back after falling at home striking her head on the cushions of her sofa.  She had several previous falls with ER visits in the week prior.  She was found to have a T12 compression fracture and admitted under the care of the hospitalist.    She was seen by the neurosurgeon PA and discussed with neurosurgery this is likely old and there is now planned for surgical intervention.      Back Pain  This is a recurrent problem. The problem has been waxing and waning since onset. The pain is present in the lumbar spine, gluteal, sacro-iliac and thoracic spine. The quality of the pain is described as aching. The pain does not radiate. The pain is at a severity of 7/10. The pain is severe. The pain is Worse during the day. The symptoms are aggravated by twisting. Stiffness is present In the morning. Associated symptoms include weakness. Pertinent negatives include no bladder incontinence, bowel incontinence, dysuria, fever, headaches, leg pain, paresis, paresthesias, pelvic pain, perianal numbness or tingling. Risk factors include menopause, poor posture and obesity. She has tried analgesics, bed rest, heat, ice, muscle relaxant and walking for the symptoms. The treatment provided mild relief.         I reviewed recent nursing notes discussed care with acute care providers, \" Alert and

## 2024-10-23 NOTE — PROGRESS NOTES
Nutrition Assessment     Type and Reason for Visit: Initial, Positive Nutrition Screen, Wound    Nutrition Recommendations/Plan:   Continue General diet as tolerated  Dietitian Recommendations : consider daily MVI with minerals to aid in meeting micronutrient needs to support wound healing     Malnutrition Assessment:  Malnutrition Status: No malnutrition    Nutrition Assessment:  Nutrition trigger for wound, Nutritional status appears adequate at this time, based on available information. Pt appears well nourished per visible assessment.  Denies any GI &/or nutrition related concerns.  Reports daily intake of high protein food choices at 2 or more meals. Reports stable weight prior to admission. Current diet is appropriate and tolerated,  no discharge needs identified at this time. RDN to follow up in 7-10 days per protocol.          Nutrition Related Findings:   Admitted for frequent falls Wound Type: Pressure Injury, Stage III (R foot)    Current Nutrition Therapies:    ADULT DIET; Regular    Anthropometric Measures:  Height: 165.1 cm (5' 5\")  Current Body Wt: 83.5 kg (184 lb) (* edema)   BMI: 30.6    Nutrition Diagnosis:   Increased nutrient needs related to increase demand for energy/nutrients as evidenced by wounds    Nutrition Interventions:   Food and/or Nutrient Delivery: Continue Current Diet  Nutrition Education/Counseling: Education not indicated          Goals:     Goals: PO intake 75% or greater, other (specify)  Specify Other Goals: improved skin integrity    Nutrition Monitoring and Evaluation:      Food/Nutrient Intake Outcomes: Vitamin/Mineral Intake, Food and Nutrient Intake  Physical Signs/Symptoms Outcomes: Meal Time Behavior, Skin    Discharge Planning:    No discharge needs at this time     MASHA HARTMANN RD, LD

## 2024-10-23 NOTE — PROGRESS NOTES
Physical Therapy Med Surg Daily Treatment Note  Facility/Department: [address] MED SURG UNIT  Room: [facility]/[place] [address]       NAME: Loren Noyola  : 1935 (89 y.o.)  MRN: [id number]  CODE STATUS: Full Code    Date of Service: 10/23/2024    Patient Diagnosis(es): Gait instability [R26.81]  Left kidney mass [N28.89]  Inability to walk [R26.2]  Compression fracture of T12 vertebra, initial encounter ([facility]) [S22.080A]   No chief complaint on file.    Patient Active Problem List    Diagnosis Date Noted    Impaired mobility and activities of daily living 10/23/2024    Compression fracture of T12 vertebra with routine healing 10/23/2024    Arthralgia of hip 10/22/2024    Greater trochanteric bursitis 10/22/2024    Gait instability 10/21/2024    Pacemaker 2024    Peripheral vascular disease (HCC) 2023    Right bundle branch block (RBBB) 2023    S/P CABG (coronary artery bypass graft) 2023    Trochanteric bursitis of left hip 2023    Vertigo 2023    Right wrist tendonitis 2023    Left knee pain 2018    Anemia 2018     Gait and ADL abnormality dt left subtrochanteric femoral neck fracture and left hemiarthroplasty-[facility] rehab admission February 10, 2018 2018    Abnormality of gait and mobility 2018    Acute cystitis without hematuria 2018    Hip fx, left, closed, initial encounter ([facility]) 02/10/2018    Closed left hip fracture, initial encounter ([facility]) 2018    DDD (degenerative disc disease), lumbar 2017    Essential hypertension 2017    Lumbar facet arthropathy 2017    Lumbosacral spondylosis without myelopathy 2017    Primary osteoarthritis of both knees 2017    Right lumbosacral radiculopathy 2017    Constipation 2016    Foot pain, left 2015    Left foot pain 2015    Low back pain radiating to left leg 2011    Spondylolisthesis of lumbar region 2011    Cervical spondylosis without

## 2024-10-23 NOTE — PROGRESS NOTES
Wound Ostomy Continence Nurse  Consult Note       NAME:  Loren Noyola  MEDICAL RECORD NUMBER:  [id number]  AGE: 89 y.o.   GENDER: female  : 1935  TODAY'S DATE:  10/23/2024    Subjective   Reason for [facility] Nurse Evaluation and Assessment: Right foot      Loren Noyola is a 89 y.o. female referred by:   [] Physician  [x] Nursing  [] Other:     Wound Identification:  Wound Type: pressure  Contributing Factors: chronic pressure, shear force, and poor fitting footwear    Wound History: Patient admitted to [facility] on 10/21/2024 with pressure injury to right medial foot. Patient states her shoes rub on the bill area and caused it to \"open up\". States her son recently cut a hole in the side of her shoe so it won't rub anymore.   Current Wound Care Treatment:  Recommending 1) continue pressure injury prevention interventions 2) MWF calcium Alg AG dressings to right foot wound as ordered    Patient Goal of Care:  [x] Wound Healing  [] Odor Control  [] Palliative Care  [] Pain Control   [] Other:         PAST MEDICAL HISTORY        Diagnosis Date    Bleeding ulcer     CAD (coronary artery disease)     Colitis     Hyperlipidemia     Hypertension     Osteoarthritis        PAST SURGICAL HISTORY    Past Surgical History:   Procedure Laterality Date    APPENDECTOMY      COLECTOMY  1/3/2011    sigmoid colectomy with colostomy    COLECTOMY  2011    partial colectomy with colostomy closure    CORONARY ARTERY BYPASS GRAFT      HYSTERECTOMY (CERVIX STATUS UNKNOWN)      HYSTERECTOMY, TOTAL ABDOMINAL (CERVIX REMOVED)      [place] HEMIARTHROPLASTY HIP PARTIAL Left 2018    HIP HEMIARTHROPLASTY- performed by Bryan Malcolm MD at [facility] OR    SIGMOIDOSCOPY      VARICOSE VEIN SURGERY         FAMILY HISTORY    History reviewed. No pertinent family history.    SOCIAL HISTORY    Social History     Tobacco Use    Smoking status: Former     Types: Cigarettes    Smokeless tobacco: Never   Vaping Use     (cm) 0.3 cm 10/23/24 0815   Wound Surface Area (cm^2) 0.72 cm^2 10/23/24 0815   Wound Volume (cm^3) 0.216 cm^3 10/23/24 0815   Wound Assessment Pink/red;Subcutaneous 10/23/24 0815   Drainage Amount Small (< 25%) 10/23/24 0815   Drainage Description Serous 10/23/24 0815   Odor None 10/23/24 0815   Nel-wound Assessment Intact 10/23/24 0815   Margins Defined edges 10/23/24 0815   Wound Thickness Description not for Pressure Injury Full thickness 10/23/24 0815   Number of days: 0     Assessment: Patient in bed, eating breakfast. Agreeable to assessment and photo documentation. Foam removed from bilateral feet. Left foot with no open areas present. Right foot with stage 3 pressure injury to bunion on medial portion of foot. Area cleansed with saline. Wound is pink/red and clean. Small amount of serous drainage noted. Periwound intact. Calcium Alginate Ag placed to wound bed, covered with gauze, secured with roll gauze. Patient also with stable scab to right leg - states she hit it on wheelchair. Ok to leave open to air. Patient should follow up in OP wound clinic at discharge. Wound ostoym to follow while in hosue.     Plan   Plan of Care: Wound 10/23/24 Foot Right-Dressing/Treatment: Alginate with Ag, Gauze dressing/dressing sponge, Roll gauze    Specialty Bed Required : No   [] Low Air Loss   [] Pressure Redistribution  [] Fluid Immersion  [] Bariatric  [] Other:     Current Diet: ADULT DIET; Regular  Dietician consult:  No    Discharge Plan:  Placement for patient upon discharge: home with support    Patient appropriate for Outpatient Wound Care Center: Yes    Referrals:  []   [] Home Health Care  [] Supplies  [] Other    Patient/Caregiver Teaching:  Level of patient/caregiver understanding able to:   [] Indicates understanding       [] Needs reinforcement  [] Unsuccessful      [] Verbal Understanding  [] Demonstrated understanding       [] No evidence of learning  [] Refused teaching         [] N/A

## 2024-10-23 NOTE — CARE COORDINATION
This LSW spoke with Benita, admissions liaison for [facility] program.  Per Benita, patient has been accepted to [facility] program and can transfer today, 10/23/24.  Patient and ANDREW Allen are aware. I also called and left detailed voicemail message for son, Jude.  Electronically signed by KIRTI Gilbert on 10/23/24 at 12:19 PM EDT

## 2024-10-23 NOTE — PROGRESS NOTES
1100: Alert and oriented x4, calm and cooperative. Repositioned. Up to BSC with 1 assist and walker. Currently ordered pain regimen working well per pt at this time. Dressing to lower extremity CDI. Zinc to bottom. Safety maintained. Call light within reach. Bed alarm on.     1230: Spoke with LSW and UAM-- to to go to rehab with bed/room number not yet available-- awaiting. Pt medicated for pain per MAR-- pain well controlled per pt upon charted reassessment.     1400: Patient can admit to room 260.     1600: Call to rehab-- report will be given to Shannan per , Nelly. Name and number given. Awaiting.     1730: Report called to Shannan on rehab. All questions answered. Pt currently eating and requests to finish meal prior to transfer. Gathering belongings at this time.     Electronically signed by Tiffany Maldonado RN on 10/23/2024 at 11:09 AM

## 2024-10-23 NOTE — PLAN OF CARE
Problem: Discharge Planning  Goal: Discharge to home or other facility with appropriate resources  10/22/2024 2340 by Shanae Franklin RN  Outcome: Progressing  10/22/2024 1017 by Jourdan Eaton RN  Outcome: Progressing  Flowsheets (Taken 10/22/2024 1003)  Discharge to home or other facility with appropriate resources: Identify barriers to discharge with patient and caregiver     Problem: Safety - Adult  Goal: Free from fall injury  10/22/2024 2340 by Shanae Franklin RN  Outcome: Progressing  10/22/2024 1017 by Jourdan Eaton RN  Outcome: Progressing     Problem: ABCDS Injury Assessment  Goal: Absence of physical injury  10/22/2024 2340 by Shanae Franklin RN  Outcome: Progressing  10/22/2024 1017 by Jourdan Eaton RN  Outcome: Progressing     Problem: Skin/Tissue Integrity  Goal: Absence of new skin breakdown  Description: 1.  Monitor for areas of redness and/or skin breakdown  2.  Assess vascular access sites hourly  3.  Every 4-6 hours minimum:  Change oxygen saturation probe site  4.  Every 4-6 hours:  If on nasal continuous positive airway pressure, respiratory therapy assess nares and determine need for appliance change or resting period.  10/22/2024 2340 by Shanae Franklin RN  Outcome: Progressing  10/22/2024 1017 by Jourdan Eaton RN  Outcome: Progressing     Problem: Pain  Goal: Verbalizes/displays adequate comfort level or baseline comfort level  10/22/2024 2340 by Shanae Franklin RN  Outcome: Progressing  10/22/2024 1017 by Jourdan Eaton RN  Outcome: Progressing     Problem: Physical Therapy - Adult  Goal: By Discharge: Performs mobility at highest level of function for planned discharge setting.  See evaluation for individualized goals.  10/22/2024 0955 by Benita Whitley, PT  Outcome: Progressing     Problem: Occupational Therapy - Adult  Goal: By Discharge: Performs self-care activities at highest level of function for planned discharge setting.  See evaluation for individualized goals.  10/22/2024

## 2024-10-24 ENCOUNTER — APPOINTMENT (OUTPATIENT)
Dept: GENERAL RADIOLOGY | Age: 89
DRG: 552 | End: 2024-10-24
Attending: PHYSICAL MEDICINE & REHABILITATION
Payer: COMMERCIAL

## 2024-10-24 PROBLEM — S72.002A HIP FX, LEFT, CLOSED, INITIAL ENCOUNTER (HCC): Status: RESOLVED | Noted: 2018-02-10 | Resolved: 2024-10-24

## 2024-10-24 PROBLEM — R32 URINARY INCONTINENCE: Status: ACTIVE | Noted: 2024-10-24

## 2024-10-24 PROBLEM — R26.9 ABNORMALITY OF GAIT AND MOBILITY: Status: RESOLVED | Noted: 2018-02-11 | Resolved: 2024-10-24

## 2024-10-24 LAB
BACTERIA URNS QL MICRO: ABNORMAL /HPF
BILIRUB UR QL STRIP: NEGATIVE
CLARITY UR: ABNORMAL
COLOR UR: YELLOW
EPI CELLS #/AREA URNS AUTO: ABNORMAL /HPF (ref 0–5)
GLUCOSE UR STRIP-MCNC: NEGATIVE MG/DL
HGB UR QL STRIP: ABNORMAL
HYALINE CASTS #/AREA URNS AUTO: ABNORMAL /HPF (ref 0–5)
KETONES UR STRIP-MCNC: NEGATIVE MG/DL
LEUKOCYTE ESTERASE UR QL STRIP: ABNORMAL
NITRITE UR QL STRIP: POSITIVE
PH UR STRIP: 5 [PH] (ref 5–9)
PROT UR STRIP-MCNC: 30 MG/DL
RBC #/AREA URNS AUTO: ABNORMAL /HPF (ref 0–5)
SP GR UR STRIP: 1.02 (ref 1–1.03)
URINE REFLEX TO CULTURE: YES
UROBILINOGEN UR STRIP-ACNC: 0.2 E.U./DL
WBC #/AREA URNS AUTO: >100 /HPF (ref 0–5)

## 2024-10-24 PROCEDURE — 97129 THER IVNTJ 1ST 15 MIN: CPT

## 2024-10-24 PROCEDURE — 99223 1ST HOSP IP/OBS HIGH 75: CPT | Performed by: PHYSICAL MEDICINE & REHABILITATION

## 2024-10-24 PROCEDURE — 87086 URINE CULTURE/COLONY COUNT: CPT

## 2024-10-24 PROCEDURE — 73630 X-RAY EXAM OF FOOT: CPT

## 2024-10-24 PROCEDURE — 97162 PT EVAL MOD COMPLEX 30 MIN: CPT

## 2024-10-24 PROCEDURE — 1180000000 HC REHAB R&B

## 2024-10-24 PROCEDURE — 6370000000 HC RX 637 (ALT 250 FOR IP): Performed by: INTERNAL MEDICINE

## 2024-10-24 PROCEDURE — 97165 OT EVAL LOW COMPLEX 30 MIN: CPT

## 2024-10-24 PROCEDURE — 6360000002 HC RX W HCPCS: Performed by: PHYSICAL MEDICINE & REHABILITATION

## 2024-10-24 PROCEDURE — 97110 THERAPEUTIC EXERCISES: CPT

## 2024-10-24 PROCEDURE — 6370000000 HC RX 637 (ALT 250 FOR IP): Performed by: PHYSICAL MEDICINE & REHABILITATION

## 2024-10-24 PROCEDURE — 87088 URINE BACTERIA CULTURE: CPT

## 2024-10-24 PROCEDURE — 97535 SELF CARE MNGMENT TRAINING: CPT

## 2024-10-24 PROCEDURE — 87186 SC STD MICRODIL/AGAR DIL: CPT

## 2024-10-24 PROCEDURE — 97116 GAIT TRAINING THERAPY: CPT

## 2024-10-24 PROCEDURE — 99221 1ST HOSP IP/OBS SF/LOW 40: CPT | Performed by: PHYSICIAN ASSISTANT

## 2024-10-24 PROCEDURE — 81001 URINALYSIS AUTO W/SCOPE: CPT

## 2024-10-24 RX ORDER — LIDOCAINE 4 G/G
3 PATCH TOPICAL DAILY
Status: DISCONTINUED | OUTPATIENT
Start: 2024-10-24 | End: 2024-11-02 | Stop reason: HOSPADM

## 2024-10-24 RX ORDER — PANTOPRAZOLE SODIUM 20 MG/1
20 TABLET, DELAYED RELEASE ORAL
Status: DISCONTINUED | OUTPATIENT
Start: 2024-10-25 | End: 2024-11-02 | Stop reason: HOSPADM

## 2024-10-24 RX ORDER — CINNAMON
1 OIL (ML) MISCELLANEOUS 4 TIMES DAILY
Status: DISCONTINUED | OUTPATIENT
Start: 2024-10-24 | End: 2024-11-02 | Stop reason: HOSPADM

## 2024-10-24 RX ORDER — VITAMIN B COMPLEX
2000 TABLET ORAL
Status: DISCONTINUED | OUTPATIENT
Start: 2024-10-24 | End: 2024-11-02 | Stop reason: HOSPADM

## 2024-10-24 RX ORDER — NYSTATIN 100000 [USP'U]/ML
5 SUSPENSION ORAL 4 TIMES DAILY
Status: DISCONTINUED | OUTPATIENT
Start: 2024-10-24 | End: 2024-11-02 | Stop reason: HOSPADM

## 2024-10-24 RX ORDER — HYDROCODONE BITARTRATE AND ACETAMINOPHEN 5; 325 MG/1; MG/1
1 TABLET ORAL EVERY 4 HOURS PRN
Status: DISCONTINUED | OUTPATIENT
Start: 2024-10-24 | End: 2024-11-02 | Stop reason: HOSPADM

## 2024-10-24 RX ORDER — UBIDECARENONE 100 MG
100 CAPSULE ORAL DAILY
Status: DISCONTINUED | OUTPATIENT
Start: 2024-10-24 | End: 2024-11-02 | Stop reason: HOSPADM

## 2024-10-24 RX ORDER — CYANOCOBALAMIN 1000 UG/ML
1000 INJECTION, SOLUTION INTRAMUSCULAR; SUBCUTANEOUS WEEKLY
Status: DISCONTINUED | OUTPATIENT
Start: 2024-10-24 | End: 2024-11-02 | Stop reason: HOSPADM

## 2024-10-24 RX ORDER — TRAMADOL HYDROCHLORIDE 50 MG/1
50 TABLET ORAL EVERY 6 HOURS PRN
Status: DISCONTINUED | OUTPATIENT
Start: 2024-10-24 | End: 2024-11-02 | Stop reason: HOSPADM

## 2024-10-24 RX ORDER — ACETAMINOPHEN 500 MG
500 TABLET ORAL 3 TIMES DAILY
Status: DISCONTINUED | OUTPATIENT
Start: 2024-10-24 | End: 2024-11-02 | Stop reason: HOSPADM

## 2024-10-24 RX ORDER — AMLODIPINE BESYLATE 5 MG/1
5 TABLET ORAL
Status: DISCONTINUED | OUTPATIENT
Start: 2024-10-25 | End: 2024-11-02 | Stop reason: HOSPADM

## 2024-10-24 RX ADMIN — PANTOPRAZOLE SODIUM 40 MG: 40 TABLET, DELAYED RELEASE ORAL at 06:23

## 2024-10-24 RX ADMIN — ACETAMINOPHEN 325MG 650 MG: 325 TABLET ORAL at 20:13

## 2024-10-24 RX ADMIN — ASPIRIN 81 MG: 81 TABLET, COATED ORAL at 08:02

## 2024-10-24 RX ADMIN — NYSTATIN 500000 UNITS: 100000 SUSPENSION ORAL at 16:51

## 2024-10-24 RX ADMIN — LOSARTAN POTASSIUM 50 MG: 50 TABLET, FILM COATED ORAL at 08:02

## 2024-10-24 RX ADMIN — Medication 100 MG: at 10:34

## 2024-10-24 RX ADMIN — LOSARTAN POTASSIUM 50 MG: 50 TABLET, FILM COATED ORAL at 20:14

## 2024-10-24 RX ADMIN — Medication 2000 UNITS: at 16:51

## 2024-10-24 RX ADMIN — METOPROLOL TARTRATE 50 MG: 50 TABLET, FILM COATED ORAL at 08:02

## 2024-10-24 RX ADMIN — NYSTATIN 500000 UNITS: 100000 SUSPENSION ORAL at 15:51

## 2024-10-24 RX ADMIN — METOPROLOL TARTRATE 50 MG: 50 TABLET, FILM COATED ORAL at 20:14

## 2024-10-24 RX ADMIN — Medication 1 DROP: at 20:15

## 2024-10-24 RX ADMIN — TRAMADOL HYDROCHLORIDE 50 MG: 50 TABLET ORAL at 02:17

## 2024-10-24 RX ADMIN — Medication 400 MG: at 08:02

## 2024-10-24 RX ADMIN — AMLODIPINE BESYLATE 5 MG: 5 TABLET ORAL at 08:02

## 2024-10-24 RX ADMIN — CYANOCOBALAMIN 1000 MCG: 1000 INJECTION, SOLUTION INTRAMUSCULAR; SUBCUTANEOUS at 10:34

## 2024-10-24 RX ADMIN — APIXABAN 2.5 MG: 2.5 TABLET, FILM COATED ORAL at 20:14

## 2024-10-24 RX ADMIN — NYSTATIN 500000 UNITS: 100000 SUSPENSION ORAL at 20:13

## 2024-10-24 RX ADMIN — APIXABAN 2.5 MG: 2.5 TABLET, FILM COATED ORAL at 08:02

## 2024-10-24 RX ADMIN — RANOLAZINE 500 MG: 500 TABLET, FILM COATED, EXTENDED RELEASE ORAL at 20:14

## 2024-10-24 RX ADMIN — RANOLAZINE 500 MG: 500 TABLET, FILM COATED, EXTENDED RELEASE ORAL at 08:02

## 2024-10-24 RX ADMIN — CALCIUM 500 MG: 500 TABLET ORAL at 08:02

## 2024-10-24 RX ADMIN — ACETAMINOPHEN 500 MG: 500 TABLET ORAL at 10:33

## 2024-10-24 RX ADMIN — ACETAMINOPHEN 500 MG: 500 TABLET ORAL at 15:51

## 2024-10-24 ASSESSMENT — PAIN SCALES - GENERAL
PAINLEVEL_OUTOF10: 6
PAINLEVEL_OUTOF10: 5
PAINLEVEL_OUTOF10: 2
PAINLEVEL_OUTOF10: 8

## 2024-10-24 ASSESSMENT — PAIN - FUNCTIONAL ASSESSMENT: PAIN_FUNCTIONAL_ASSESSMENT: ACTIVITIES ARE NOT PREVENTED

## 2024-10-24 ASSESSMENT — PAIN DESCRIPTION - ONSET: ONSET: ON-GOING

## 2024-10-24 ASSESSMENT — PAIN DESCRIPTION - ORIENTATION
ORIENTATION: RIGHT
ORIENTATION: LEFT

## 2024-10-24 ASSESSMENT — PAIN SCALES - WONG BAKER: WONGBAKER_NUMERICALRESPONSE: NO HURT

## 2024-10-24 ASSESSMENT — PAIN DESCRIPTION - LOCATION
LOCATION: HIP
LOCATION: BACK

## 2024-10-24 ASSESSMENT — PAIN DESCRIPTION - DESCRIPTORS
DESCRIPTORS: ACHING
DESCRIPTORS: ACHING

## 2024-10-24 ASSESSMENT — PAIN DESCRIPTION - FREQUENCY: FREQUENCY: CONTINUOUS

## 2024-10-24 ASSESSMENT — PAIN DESCRIPTION - PAIN TYPE: TYPE: CHRONIC PAIN

## 2024-10-24 NOTE — PROGRESS NOTES
OCCUPATIONAL THERAPY  INPATIENT REHAB TREATMENT NOTE  [facility]      NAME: Loren Noyola  : 1935 (89 y.o.)  MRN: [id number]  CODE STATUS: Full Code  Room: R260/R260-01    Date of Service: 10/24/2024    Referring Physician: Dr Haro  Rehab Diagnosis: Impaired mobility ADLs dt thoracic and lumbar spinal stenosis with T12 compression fracture    Hospital course:   Comments: 89 y.o. female patient who presented to ER 10/21 with complaints of pain to head, neck and right hip after experiencing a fall 10/13. Pain worsened so she came to ER 10/17, d/c'd home after it was confirmed no injury. However, 10/21 patient admitting to another fall and worsening pain not relieved by prescribed norco. CT revealing T12 compression fx. Patient admitted under hospitalist services with neurosx consulting. NeuroSx determined fx to be chronic and no surgical needs.    Restrictions  Restrictions/Precautions  Restrictions/Precautions: Fall Risk          Patient's date of birth confirmed: Yes    SAFETY:  Safety Devices  Safety Devices in place: Yes  Type of devices: All fall risk precautions in place    SUBJECTIVE:     Pain at start of treatment: Yes: 8/10    Pain at end of treatment: Yes: 8/10    Location: R hip  Description: ache  Nursing notified: Declined  Per pt prev medicated    OBJECTIVE:    SLUMS    The patient completed the [employer] Mental Status (UMS) Examination on this date, which is a useful screening tool for detecting mild cognitive impairment and signs of dementia.  Range of impairments based on score are indicated in the chart below:      High school education  Scoring Less than High School Education   27-30 Normal 25-30   21-26 Mild Neurocognitive disorder 20-24   1-20 Dementia 1-19     Patient's level of education: high school  Patient scored 21/30 on this date, which indicates a possible mild neurocognitive disorder.     Pt. with cognitive deficits which will be addressed in

## 2024-10-24 NOTE — H&P
HISTORY & PHYSICAL       DATE OF ADMISSION:  10/23/2024    DATE OF SERVICE:  10/24/24    Subjective:    Loren Noyola, 89 y.o. female presents today with:     CHIEF COMPLAINT:  Patient was admitted to [facility] after presenting to the ER with pain in her head neck and back after falling at home striking her head on the cushions of her sofa.  She had several previous falls with ER visits in the week prior.  She was found to have a T12 compression fracture and admitted under the care of the hospitalist.     She was seen by the neurosurgeon PA and discussed with neurosurgery this is likely old and there is now planned for surgical intervention.    She complains of a dry tongue and seems to have early thrush.     Back Pain  This is a recurrent problem. The problem has been waxing and waning since onset. The pain is present in the gluteal, lumbar spine, sacro-iliac and thoracic spine. The pain radiates to the right foot. The pain is at a severity of 8/10. The pain is severe. The pain is Worse during the day. The symptoms are aggravated by bending. Stiffness is present In the morning. Associated symptoms include leg pain, paresthesias and weakness. Pertinent negatives include no abdominal pain, bladder incontinence, bowel incontinence, chest pain, dysuria, fever, headaches, numbness, paresis, pelvic pain, perianal numbness or tingling. Risk factors include sedentary lifestyle, poor posture, obesity, menopause and lack of exercise. She has tried analgesics, bed rest, heat, home exercises, ice and muscle relaxant for the symptoms. The treatment provided mild relief.   Fatigue  Associated symptoms include arthralgias, fatigue, myalgias and weakness. Pertinent negatives include no abdominal pain, chest pain, chills, coughing, fever, headaches, nausea, neck pain, numbness, rash or vomiting.       I reviewed recent nursing note, \" arrived on the unit at 1825, admitted to room 260. Alert and oriented, pt tearful,  disease)     Colitis     Hip fx, left, closed, initial encounter ([facility]) 02/10/2018    Hyperlipidemia     Hypertension     Osteoarthritis     Spinal stenosis        Past Surgical History:   Procedure Laterality Date    APPENDECTOMY      COLECTOMY  1/3/2011    sigmoid colectomy with colostomy    COLECTOMY  7/26/2011    partial colectomy with colostomy closure    CORONARY ARTERY BYPASS GRAFT      HYSTERECTOMY (CERVIX STATUS UNKNOWN)      HYSTERECTOMY, TOTAL ABDOMINAL (CERVIX REMOVED)      CT HEMIARTHROPLASTY HIP PARTIAL Left 2/8/2018    HIP HEMIARTHROPLASTY- performed by Bryan Malcolm MD at [facility] OR    SIGMOIDOSCOPY      VARICOSE VEIN SURGERY         Current Facility-Administered Medications   Medication Dose Route Frequency Provider Last Rate Last Admin    Vitamin D (CHOLECALCIFEROL) tablet 2,000 Units  2,000 Units Oral Dinner Deb Haro, DO        cyanocobalamin injection 1,000 mcg  1,000 mcg IntraMUSCular Weekly Deb Haro DO   1,000 mcg at 10/24/24 1034    coenzyme Q10 capsule 100 mg  100 mg Oral Daily ScDeb khoury, DO   100 mg at 10/24/24 1034    lidocaine 4 % external patch 3 patch  3 patch TransDERmal Daily ScDeb khoury DO   3 patch at 10/24/24 1034    camphor-menthol-methyl salicylate (BENGAY ULTRA STRENGTH) 4-10-30 % cream   Apply externally TID PRN Deb Haro, DO        HYDROcodone-acetaminophen (NORCO) 5-325 MG per tablet 1 tablet  1 tablet Oral Q4H PRN Deb Haro, DO        acetaminophen (TYLENOL) tablet 500 mg  500 mg Oral TID Deb Haro DO   500 mg at 10/24/24 1033    [START ON 10/25/2024] amLODIPine (NORVASC) tablet 5 mg  5 mg Oral Lunch Deb Haro DO        [START ON 10/25/2024] pantoprazole (PROTONIX) tablet 20 mg  20 mg Oral QAM AC Deb Haro DO        traMADol (ULTRAM) tablet 50 mg  50 mg Oral Q6H PRN Carlton Yajakob R, DO        nystatin (MYCOSTATIN) 672200 UNIT/ML suspension 500,000 Units  5 mL Oral 4x Daily Deb Haro, DO

## 2024-10-24 NOTE — CARE COORDINATION
Case Management Initial Assessment        NAME:  Loren Noyola  ROOM: R260/R260-01  :  1935  DATE: 10/24/2024        Social Functional:  Social/Functional History  Lives With: Spouse  Type of Home: House (laundry is on the first floor- children added an extra room on the house which is a bathroom and has a small washer/dryer)  Home Layout: One level  Home Access: Stairs to enter with rails  Entrance Stairs - Number of Steps: 4  Entrance Stairs - Rails: Left (one rail on the outside one to two steps then there is a landing and then 3 steps with 2 rails)  Bathroom Shower/Tub: Walk-in shower;Curtain  Bathroom Toilet: Handicap height  Bathroom Equipment: Built-in shower seat;Hand-held shower;Grab bars in shower;Grab bars around toilet  Bathroom Accessibility: Accessible  Home Equipment: Cane;Rollator (Patient owned a reacher but does not know where it is, [facility] [place] [facility])  ADL Assistance:  (Son assisted patient with her shower one time)  Homemaking Assistance: Needs assistance  Homemaking Responsibilities: Yes  Meal Prep Responsibility: Primary (goes out for breakfast at the beach cart- close parking makes it nice.  Children have started buying dinners that patient heats up in the microwave- patient unable to cook at this time because her back hurts when she stands too long- last couple of years)  Laundry Responsibility: Primary  Cleaning Responsibility: Secondary (sons complete-thinking about getting a cleaning lady once a month)  Bill Paying/Finance Responsibility: Primary  Shopping Responsibility:  (patient drives her nephew to the store and she stays in the car while he purchases items off provided list- nephew carries items into the house)  Health Care Management: Primary (patient uses a pill organizer)  Ambulation Assistance: Independent (rollator)  Active : Yes  [facility] of [facility]

## 2024-10-24 NOTE — CONSULTS
PODIATRIC MEDICINE AND SURGERY  CONSULT HISTORY AND PHYSICAL    Consulting Service:    Requesting Provider: Venus Ball  Opinion/advice regarding: right foot bunion  Staff Doctor:  Dr. Ahmadi, DPM    ASSESSMENT:  89 y.o. female has a past medical history of Bleeding ulcer, CAD (coronary artery disease), Colitis, Hip fx, left, closed, initial encounter ([facility]), Hyperlipidemia, Hypertension, Osteoarthritis, and Spinal stenosis.  for who podiatry was consulted for right foot bunion/ pain. Upon evaluation, superficial wound noted to medial 1st mpj of right foot. Appears healthy and granular, no signs of infection. Will obtain right foot xray to rule out possible underlying bone infection, though low suspicions for this. Discussed with patient offloading of area to allow wound to heal. Continue with local wound care in the meantime.    PLAN AND RECOMMENDATIONS::  Patient's case to be discussed with staff, Dr. Ahmadi, who will provide final recommendations going forward  Wound care: calcium alginate and DSD overtop, nursing orders placed appreciate assistance  Recommend prevalon boot to avoid additional pressure to area while resting/sleeping  WBAT to RLE  Elevate R at or above heart level while resting  X-rays right foot ordered   Pain management per primary team       HPI: This 89 y.o. year old female was seen today for right foot pain.  Patient states that she has had this wound for several months now. States it started as red venus from rubbing and that it opened up afterwards. States she had seen podiatrists in the past who recommended local wound care to her.  Patient denies nausea, vomiting, diarrhea, fevers, chills, chest pain, shortness of breath, head ache, or calf pain.  No other pedal complaints.     Past Medical History:   Diagnosis Date    Bleeding ulcer     CAD (coronary artery disease)     Colitis     Hip fx, left, closed, initial encounter ([facility]) 02/10/2018    Hyperlipidemia     Hypertension      b/l  Interspaces 1-4 B/L are clear and without debris  Open lesions to right foot, see below    Ulceration #1:   Location: medial aspect 1st MPJ  Measurements: 1.0 cm x 1.0 cm x 0.3 cm  Base: Mixed Granular/Fibrotic  Borders: healthy viable  Exudate: minimal drainage   Comments:   No periwound erythma extending beyond wound borders with no signs of ascending lymphangitis. No undermining, probe to bone, malodor or purulence noted.     LABS:   Lab Results   Component Value Date    WBC 8.0 10/22/2024    HGB 9.6 (L) 10/22/2024    HCT 29.5 (L) 10/22/2024    MCV 89.7 10/22/2024     10/22/2024     Lab Results   Component Value Date/Time     10/21/2024 02:41 PM    K 4.6 10/21/2024 02:41 PM    K 5.0 11/05/2023 06:30 PM     10/21/2024 02:41 PM    CO2 21 10/21/2024 02:41 PM    BUN 37 10/21/2024 02:41 PM    CREATININE 1.9 10/21/2024 02:59 PM    CREATININE 1.69 10/21/2024 02:41 PM    GLUCOSE 108 10/21/2024 02:41 PM    GLUCOSE 91 03/27/2023 10:06 AM    CALCIUM 8.6 10/21/2024 02:41 PM      No results found for: \"LABPROT\", \"LABALBU\"  No results found for: \"SEDRATE\"  No results found for: \"CRP\"  No results found for: \"LABA1C\"        Chinyere Black DPM, DPM PGY-2  Podiatric Surgery Resident  Podiatry On Call Pager: [phone]  October 24, 2024  12:22 PM

## 2024-10-24 NOTE — CARE COORDINATION
[facility]  INPATIENT REHABILITATION  TEAM CONFERENCE NOTE  Room: R260/R260-01  Admit Date: 10/23/2024       Date: 10/25/2024  Patient Name: Loren Noyola        MRN: [id number]    : 1935  (89 y.o.)  Gender: female        REHAB DIAGNOSIS:   Diagnosis: Impaired mobility and ADL's due to T12 compression fracture    CO MORBIDITIES:      Past Medical History:   Diagnosis Date    Bleeding ulcer     CAD (coronary artery disease)     Colitis     Hip fx, left, closed, initial encounter ([facility]) 02/10/2018    Hyperlipidemia     Hypertension     Osteoarthritis     Spinal stenosis      Past Surgical History:   Procedure Laterality Date    APPENDECTOMY      COLECTOMY  1/3/2011    sigmoid colectomy with colostomy    COLECTOMY  2011    partial colectomy with colostomy closure    CORONARY ARTERY BYPASS GRAFT      HYSTERECTOMY (CERVIX STATUS UNKNOWN)      HYSTERECTOMY, TOTAL ABDOMINAL (CERVIX REMOVED)      RI HEMIARTHROPLASTY HIP PARTIAL Left 2018    H[place] HEMIARTHROPLASTY[place] [facility] [place]    SIGMOIDOSCOPY      VARICOSE VEIN SURGERY          Restrictions  Restrictions/Precautions: Fall Risk  CASE MANAGEMENT    Social/Functional History  Social/Functional History  Lives With: Spouse  Type of Home: House (laundry is on the first floor- children added an extra room on the house which is a bathroom and has a small washer/dryer)  Home Layout: One level  Home Access: Stairs to enter with rails  Entrance Stairs - Number of Steps: 4  Entrance Stairs - Rails: Left (one rail on the outside one to two steps then there is a landing and then 3 steps with 2 rails)  Bathroom Shower/Tub: Walk-in shower, Curtain  Bathroom Toilet: Handicap height  Bathroom Equipment: Built-in shower seat, Hand-held shower, Grab bars in shower, Grab bars around toilet  Bathroom Accessibility: Accessible  Home Equipment: Cane, Rollator (Patient owned a reacher but does not know where it is,  THERAPY                 Diet/Swallow:                       LTG:                COGNITION  OT: Cognition  Overall Cognitive Status: Exceptions (10/25/24 1116)  Arousal/Alertness: Appears intact (10/25/24 1116)  Following Commands: Follows one step commands with increased time;Follows multistep commands with repitition (10/25/24 1116)  Attention Span: Appears intact (10/25/24 1116)  Memory: Appears intact (10/25/24 1116)  Safety Judgement: Appears intact (10/25/24 1116)  Problem Solving: Appears intact (10/25/24 1116)  Insights: Appears intact (10/25/24 1116)  Initiation: Appears intact (10/25/24 1116)  Sequencing: Requires cues for some (10/25/24 1116)          Orientation  Overall Orientation Status: Within Functional Limits (10/25/24 1116)  Orientation Level: Oriented X4 (10/24/24 0852)  SP:              RECREATIONAL THERAPY  Attendance to recreational therapy programs:    []  Pet Therapy  [] Music Therapy  [] Art Therapy    [] Recreation Therapy Group [] Support Group           Patient social interaction (mood, participation): good    Patient strengths: independent prior    Patients goal: return home w/ spouse    Problems/Barriers: pain impacts ability to use a rollator--performs better with a ww, caregiver for spouse        1. Safety:          - Intervention / Plan:    [x]  falls protocol     [x]  PT/OT    []  SP        - Results:         2. Potential DME needs:         - Intervention / Plan:  [x]  PT/OT     [x]  Assess equipment needs/access       - Results:         3.  Weakness:          - Intervention / Plan:  [x]  PT/OT      []  Other:         - Results:         4.  Discharge planning needs:          - Intervention / Plan:  [x]  Weekly team conference      [x]  family training        - Results:         5.            - Intervention / Plan:          - Results:         6.            - Intervention / Plan:         - Results:         7.            - Intervention / Plan:         - Results:           Discharge Plan    Estimated Length of Stay: TBD    Tentative Discharge date: 11/2/24      Anticipated Discharge Destination:  Home      Team recommendations:    1. Follow up Therapy :    PT  OT  RN  Social Work  HH Aide    2. Home Health    Other:     Equipment needed at Discharge: Other: TBD      Team Members Present at Conference:    Physician: Dr. Deb Haro  : Rachelle Wang RN  : RABIA Hwang, LSW  RN: Marianela Og RN  Physical Therapist: Shey Abernathy, SILVESTRE  Occupational Therapist: Olga Shelton OTR  Speech Therapist: Mickie Williamson, SLP      Electronically signed by RABIA Hwang, KIRTI on 10/25/2024 at 12:52 PM

## 2024-10-24 NOTE — CONSULTS
Consult      Patient:  Loren Noyola  YOB: 1935    MRN: [id number]     Acct: [id number]    Primary Care Physician: Evette Johnson MD    HISTORY OF PRESENT ILLNESS:   History obtained from chart review and the patient.    The patient is a 89 y.o. female whom I have been requested to see by Dr. Haro for evaluation of medical management/HTN. Patient was admitted to [facility] after mechanical fall/weakness/inability to take care herself. Was found to have T12 old compression fracture, was evaluated by neurosurgical service and after completing her acute stay was transferred to [facility] rehab  Denied fever/dyspnea/CP     Past Medical History:        Diagnosis Date    Bleeding ulcer     CAD (coronary artery disease)     Colitis     Hip fx, left, closed, initial encounter ([facility]) 02/10/2018    Hyperlipidemia     Hypertension     Osteoarthritis     Spinal stenosis        Past Surgical History:        Procedure Laterality Date    APPENDECTOMY      COLECTOMY  1/3/2011    sigmoid colectomy with colostomy    COLECTOMY  7/26/2011    partial colectomy with colostomy closure    CORONARY ARTERY BYPASS GRAFT      HYSTERECTOMY (CERVIX STATUS UNKNOWN)      HYSTERECTOMY, TOTAL ABDOMINAL (CERVIX REMOVED)      MO HEMIARTHROPLASTY HIP PARTIAL Left 2/8/2018    H[place] HEMIARTHROP[place] [facility] [place]    SIGMOIDOSCOPY      VARICOSE VEIN SURGERY         Medications:    No current facility-administered medications on file prior to encounter.     Current Outpatient Medications on File Prior to Encounter   Medication Sig Dispense Refill    ranolazine (RANEXA) 500 MG extended release tablet Take 1 tablet by mouth 2 times daily      lidocaine (LIDODERM) 5 % Place 1 patch onto the skin daily 12 hours on, 12 hours off. 30 patch 0    pantoprazole (PROTONIX) 40 MG tablet       ELIQUIS 2.5 MG TABS tablet take 1 tablet by mouth twice a day      nystatin-triamcinolone (MYCOLOG II) 241612-3.1  ideation  Neurology: no syncope, no seizures, no numbness or tingling of hands, no numbness or tingling of feet, no paresis               PHYSICAL EXAM:  BP (!) 146/60   Pulse 60   Temp 97.9 °F (36.6 °C) (Oral)   Resp 18   Ht 1.6 m (5' 3\")   Wt 84.4 kg (186 lb 1.6 oz)   SpO2 97%   BMI 32.97 kg/m²   General:  Awake, alert, not in distress. Appears to be stated age.  HEENT: Atraumatic, normocephalic. PERRLA. EOM intact. Pink conjunctiva, anicteric sclera. Pink and moist oral mucosa. No lymphadenopathy. Trachea midline. Thyroid non palpable. Neck supple. No carotid bruit. No JVD.  Chest: Bilateal air entry, Clear to auscultation, no wheezing, rhonchi or rales.  Cardiovascular: RRR, S1S2, no murmur, click, rub or gallop. No lower extremity edema. Pedal and posterior tibialis 2+.   Abdomen: Soft, non tender to palpation. Active bowel sounds x 4 quadrants.  Musculoskeletal: passive and active ROM x 4 extremities.  Integumentary: Pink, warm and dry. Free from rash or lesions.  CNS: Oriented to person, place and time. Cranial nerves 2-12 grossly intact. Speech clear. Face symmetrical. No tremor.       Review of Labs and Diagnostic Testing:    CBC:   Recent Labs     10/21/24  1441 10/22/24  0626   WBC 7.5 8.0   HGB 10.2* 9.6*    204     BMP:    Recent Labs     10/21/24  1441 10/21/24  1459     --    K 4.6  --      --    CO2 21  --    BUN 37*  --    CREATININE 1.69* 1.9*   GLUCOSE 108*  --      Calcium:  Recent Labs     10/21/24  1441   CALCIUM 8.6     Ionized Calcium:Invalid input(s): \"IONCA\"  Magnesium:No results for input(s): \"MG\" in the last 72 hours.  Phosphorus:No results for input(s): \"PHOS\" in the last 72 hours.  BNP:No results for input(s): \"BNP\" in the last 72 hours.  Glucose:No results for input(s): \"POCGLU\" in the last 72 hours.  HgbA1C: No results for input(s): \"LABA1C\" in the last 72 hours.  INR:   Recent Labs     10/21/24  1441   INR 1.5     Hepatic:   Recent Labs     10/21/24  1441

## 2024-10-24 NOTE — PROGRESS NOTES
Physical Therapy Rehab Treatment Note  [facility]  [facility]       NAME: Loren Noyola  : 1935 (89 y.o.)  MRN: [id number]  CODE STATUS: Full Code    Date of Service: 10/24/2024       Restrictions:  Restrictions/Precautions: Fall Risk       SUBJECTIVE:   Subjective: \"I sat up for too long\"    Pain  Pain: 5/10 pain R hip and R low back pre/post session- medication given      OBJECTIVE:       Transfers  Surface: Wheelchair  Additional Factors: Verbal cues;Hand placement cues;Increased time to complete  Device: Walker  Sit to Stand  Assistance Level: Stand by assist  Skilled Clinical Factors: vc's for hand placement, pt prefers and performs best with one hand on WW and on hand on WC. no significant safety concerns. standing /63  Stand to Sit  Assistance Level: Stand by assist  Skilled Clinical Factors: vc's for controlled descent with increased pain, seated /68    Ambulation  Surface: Level surface  Device: Rolling walker  Distance: 10 steps  Additional Factors: Verbal cues;Hand placement cues;Increased time to complete  Assistance Level: Minimal assistance  Gait Deviations: Slow ximena;Unsteady gait;Path deviations  Skilled Clinical Factors: vc's for technique and sequencing, distance limited d/t increased pain. pt reports feeling lightheaded with gait and requests to return to seated.    PT Exercises  Exercise Treatment: STS x 3 with static standing/ dynamic weight shifts.  PROM Exercises: seated HS/gastroc stretch x3 30 sec hold BLE  A/AROM Exercises: seated AP/LAQ/Marches/Hip Abd/Hip Add x10 BLE  Resistive Exercises: seated YTB HS curls/hip abd x10 BLE  Dynamic Standing Balance Exercises: standing weight shifts at WW x10 ea     Activity Tolerance  Activity Tolerance: Patient tolerated treatment well    ASSESSMENT/PROGRESS TOWARDS GOALS:   Assessment  Assessment: Pt pleasant and motivated to complete session, limited by significant pain increase with all on  Unable to dictate H and P due to system failure, will try again tomorrow

## 2024-10-24 NOTE — PROGRESS NOTES
Facility/Department: [facility]  Rehabilitation Initial Assessment: Occupational Therapy  Room: R260/R260-01    NAME: Loren Noyola  : 1935  MRN: [id number]    Date of Service: 10/24/2024    Rehab Diagnosis(es): Impaired mobility ADLs dt thoracic and lumbar spinal stenosis with T12 compression fracture  Patient Active Problem List    Diagnosis Date Noted    CAD (coronary artery disease)     Impaired mobility ADLs dt  thoracic and lumbar spinal stenosis with T12 compression fracture. 10/23/2024    Compression fracture of T12 vertebra with routine healing 10/23/2024    Left kidney mass 10/23/2024    Arthralgia of hip 10/22/2024    Greater trochanteric bursitis 10/22/2024    Gait instability 10/21/2024    Pacemaker 2024    Peripheral vascular disease (HCC) 2023    Right bundle branch block (RBBB) 2023    S/P CABG (coronary artery bypass graft) 2023    Trochanteric bursitis of left hip 2023    Vertigo 2023    Right wrist tendonitis 2023    Left knee pain 2018    Anemia 2018     Gait and ADL abnormality dt left subtrochanteric femoral neck fracture and left hemiarthroplasty-[facility] rehab admission February 10, 2018 2018    Acute cystitis without hematuria 2018    Closed left hip fracture, initial encounter ([facility]) 2018    DDD (degenerative disc disease), lumbar 2017    Essential hypertension 2017    Lumbar facet arthropathy 2017    Lumbosacral spondylosis without myelopathy 2017    Primary osteoarthritis of both knees 2017    Right lumbosacral radiculopathy 2017    Constipation 2016    Foot pain, left 2015    Left foot pain 2015    Low back pain radiating to left leg 2011    Spondylolisthesis of lumbar region 2011    Cervical spondylosis without myelopathy 2005    Headache 2005       Past Medical History:   Diagnosis Date    Bleeding ulcer     CAD (coronary artery  extra room on the house which is a bathroom and has a small washer/dryer)  Home Layout: One level  Home Access: Stairs to enter with rails  Entrance Stairs - Number of Steps: 4  Entrance Stairs - Rails:  (one rail on the outside one to two steps then there is a landing and then 3 steps with 2 rails)  Bathroom Shower/Tub: Walk-in shower  Bathroom Toilet: Handicap height  Bathroom Equipment: Built-in shower seat;Hand-held shower;Grab bars in shower  Home Equipment: Cane;Rollator (Patient owned a reacher but does not know where it is)  Has the patient had two or more falls in the past year or any fall with injury in the past year?: Yes  ADL Assistance:  (Son assisted patient with her shower one time)  Homemaking Assistance: Needs assistance  Homemaking Responsibilities: Yes  Meal Prep Responsibility: Primary (goes out for breakfast at the [employer] cart- close parking makes it nice.  Children have started buying dinners that patient heats up in the microwave- patient unable to cook at this time because her back hurts when she stands too long- last couple of years)  Laundry Responsibility: Primary  Cleaning Responsibility: Secondary (sons complete-thinking about getting a cleaning lady once a month)  Bill Paying/Finance Responsibility: Primary  Shopping Responsibility:  (patient drives her nephew to the store and she stays in the car while he purchases items off provided list- nephew carries items into the house)  Health Care Management: Primary (patient uses a pill organizer)  Ambulation Assistance: Independent (rollator)  Active : Yes  Mode of Transportation: Car  Education: high school grad  Occupation: Retired  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 goes out to eat daily for breakfast to get out of the house. sometimes goes to dinner. likes to ride through the parking lot when concerts are going on to hear the  Perception:  Perception  Overall Perceptual Status: WFL    Range of Motion:  LUE AROM (degrees)  LUE AROM : WFL  Left Hand AROM (degrees)  Left Hand AROM: WFL  RUE AROM (degrees)  RUE AROM : WFL  Right Hand AROM (degrees)  Right Hand AROM: WFL      Strength:  LUE Strength  LUE Strength Comment: mild decrease  RUE Strength  RUE Strength Comment: mild decrease    Quality of Movement:  Tone RUE  RUE Tone: Normotonic  Tone LUE  LUE Tone: Normotonic  Coordination  Movements Are Fluid And Coordinated: No  Coordination and Movement Description: Fine motor impairments;Right UE;Left UE    Sensation:  Sensation  Overall Sensation Status: WFL    Hand Dominance  Hand Dominance: Right    Safety:  Safety Devices  Type of Devices: All fall risk precautions in place    Assessment:  Activity Tolerance  Activity Tolerance: Patient limited by pain    Assessment  Performance deficits / Impairments: Decreased functional mobility ;Decreased ADL status;Decreased strength;Decreased endurance;Decreased cognition;Decreased balance;Decreased safe awareness;Decreased high-level IADLs;Decreased coordination  Assessment: Pt is an 89 year old woman from home with spouse who presents to [facility] with gait instability and weakness. She demonstrates decreased balance and endurance and is limited d/t weakness and fatigue and would benefit from continued OT to maximize independence and safety with ADL tasks.  Prognosis: Good  Decision Making: Low Complexity  History: Pt's medical history is moderately complex  Exam: Pt has 8 performance deficits  Assistance / Modification: Pt requires min modification  Discharge Recommendations: Continue to assess pending progress  Plan  REQUIRES OT FOLLOW-UP: Yes    Equipment needed:  OT Equipment Recommendations  Other: Continue to assess    OT Education:  Education Given To: Patient  Education Provided: Role of Therapy, Plan of Care  Education Method: Verbal  Barriers to Learning: None  Education Outcome: Verbalized

## 2024-10-24 NOTE — CONSULTS
Urology Consult      Loren Noyola  1935  [id number]    Date of Admission:  10/23/2024  6:41 PM  Date of Consultation:  10/24/2024    Consultant: Meet Kenney PA-C  SupervisingPhysician: Dr. Ortega  PCP:  Evette Johnson MD       Reason for Consultation: voiding symptoms      C/C: No chief complaint on file.      History of Present Illness: Urinary Incontinence  Patient complains of urinary incontinence. This has been present for several years. She leaks urine with urge and stress. Patient describes the symptoms as urine leaking unpredictably. Factors associated with symptoms include none known. Evaluation to date includes UA/CS: not done. Treatment to date includes none.    Allergies:  Allergies   Allergen Reactions    Lisinopril Anaphylaxis    Statins Other (See Comments)    Sulfa Antibiotics Itching       PMH: Patient has a past medical history of Bleeding ulcer, CAD (coronary artery disease), Colitis, Hip fx, left, closed, initial encounter ([facility]), Hyperlipidemia, Hypertension, Osteoarthritis, and Spinal stenosis.    PSH: Patient has a past surgical history that includes Coronary artery bypass graft; Appendectomy; Hysterectomy; Varicose vein surgery; sigmoidoscopy; colectomy (1/3/2011); colectomy (7/26/2011); pr hemiarthroplasty hip partial (Left, 2/8/2018); and Hysterectomy, total abdominal.    Social History: Patient reports that she has quit smoking. Her smoking use included cigarettes. She has never used smokeless tobacco. She reports that she does not currently use alcohol. She reports that she does not currently use drugs.    Family History: Patientsfamily history is not on file.    ROS:  Review of Systems   Constitutional:  Negative for fever.   Respiratory:  Negative for apnea.    Genitourinary:  Negative for difficulty urinating.   Neurological:  Negative for speech difficulty.       Current Meds: Vitamin D (CHOLECALCIFEROL) tablet 2,000 Units, Dinner  cyanocobalamin injection    CO2 21  --    GLUCOSE 108*  --    BUN 37*  --    CREATININE 1.69* 1.9*   ANIONGAP 11  --    LABGLOM 28.6* 25*   CALCIUM 8.6  --        Urinalysis:No results for input(s): \"COLORU\", \"PHUR\", \"LABCAST\", \"WBCUA\", \"RBCUA\", \"MUCUS\", \"TRICHOMONAS\", \"YEAST\", \"BACTERIA\", \"CLARITYU\", \"SPECGRAV\", \"LEUKOCYTESUR\", \"UROBILINOGEN\", \"BILIRUBINUR\", \"BLOODU\" in the last 72 hours.    Invalid input(s): \"NITRATE\", \"GLUCOSEUKETONESUAMORPHOUS\"     Urine Culture   Lab Results   Component Value Date/Time    LABURIN  10/08/2022 11:54 AM     Performed at [address] [id number] ([phone]      LABURIN >[id number] CFU/ML 10/08/2022 11:54 AM       Radiology:       Assessment:   Mixed stress and urge incoontinence      Plan:  No planned intervention while hospitalized   Follow up with Dr. Zuniga after discharge        TheHistory and Physical exam, radiologic and laboratory findings have all been discussed with Dr Dr. Ortega who agrees with the assessment and plan as described above.   signed by CHIARA Rojas on 10/24/2024 at 1:57 PM

## 2024-10-24 NOTE — PROGRESS NOTES
Facility/Department: [facility]  Rehabilitation Initial Assessment: Physical Therapy  Room: R260/R260-01    NAME: Loren Noyola  : 1935  MRN: [id number]    Date of Service: 10/24/2024    Rehab Diagnosis(es): Impaired mobility and ADL's due to T12 compression fracture  Patient Active Problem List    Diagnosis Date Noted    CAD (coronary artery disease)     Impaired mobility ADLs dt  thoracic and lumbar spinal stenosis with T12 compression fracture. 10/23/2024    Compression fracture of T12 vertebra with routine healing 10/23/2024    Left kidney mass 10/23/2024    Arthralgia of hip 10/22/2024    Greater trochanteric bursitis 10/22/2024    Gait instability 10/21/2024    Pacemaker 2024    Peripheral vascular disease (HCC) 2023    Right bundle branch block (RBBB) 2023    S/P CABG (coronary artery bypass graft) 2023    Trochanteric bursitis of left hip 2023    Vertigo 2023    Right wrist tendonitis 2023    Left knee pain 2018    Anemia 2018     Gait and ADL abnormality dt left subtrochanteric femoral neck fracture and left hemiarthroplasty-[facility] rehab admission February 10, 2018 2018    Acute cystitis without hematuria 2018    Closed left hip fracture, initial encounter ([facility]) 2018    DDD (degenerative disc disease), lumbar 2017    Essential hypertension 2017    Lumbar facet arthropathy 2017    Lumbosacral spondylosis without myelopathy 2017    Primary osteoarthritis of both knees 2017    Right lumbosacral radiculopathy 2017    Constipation 2016    Foot pain, left 2015    Left foot pain 2015    Low back pain radiating to left leg 2011    Spondylolisthesis of lumbar region 2011    Cervical spondylosis without myelopathy 2005    Headache 2005       Past Medical History:   Diagnosis Date    Bleeding ulcer     CAD (coronary artery disease)     Colitis     Hip fx,

## 2024-10-25 PROBLEM — N39.46 MIXED INCONTINENCE: Status: ACTIVE | Noted: 2024-10-25

## 2024-10-25 LAB
TSH SERPL-MCNC: 0.38 UIU/ML (ref 0.44–3.86)
VITAMIN D 25-HYDROXY: 14.5 NG/ML (ref 30–100)

## 2024-10-25 PROCEDURE — 97535 SELF CARE MNGMENT TRAINING: CPT

## 2024-10-25 PROCEDURE — 1180000000 HC REHAB R&B

## 2024-10-25 PROCEDURE — 97116 GAIT TRAINING THERAPY: CPT

## 2024-10-25 PROCEDURE — 6370000000 HC RX 637 (ALT 250 FOR IP): Performed by: INTERNAL MEDICINE

## 2024-10-25 PROCEDURE — 97110 THERAPEUTIC EXERCISES: CPT

## 2024-10-25 PROCEDURE — 99232 SBSQ HOSP IP/OBS MODERATE 35: CPT | Performed by: UROLOGY

## 2024-10-25 PROCEDURE — 36415 COLL VENOUS BLD VENIPUNCTURE: CPT

## 2024-10-25 PROCEDURE — 82306 VITAMIN D 25 HYDROXY: CPT

## 2024-10-25 PROCEDURE — 6370000000 HC RX 637 (ALT 250 FOR IP): Performed by: PHYSICAL MEDICINE & REHABILITATION

## 2024-10-25 PROCEDURE — 84443 ASSAY THYROID STIM HORMONE: CPT

## 2024-10-25 PROCEDURE — 99233 SBSQ HOSP IP/OBS HIGH 50: CPT | Performed by: PHYSICAL MEDICINE & REHABILITATION

## 2024-10-25 RX ORDER — NITROFURANTOIN 25; 75 MG/1; MG/1
100 CAPSULE ORAL EVERY 12 HOURS SCHEDULED
Status: DISCONTINUED | OUTPATIENT
Start: 2024-10-25 | End: 2024-10-25

## 2024-10-25 RX ADMIN — AMLODIPINE BESYLATE 5 MG: 5 TABLET ORAL at 12:40

## 2024-10-25 RX ADMIN — ACETAMINOPHEN 500 MG: 500 TABLET ORAL at 08:01

## 2024-10-25 RX ADMIN — CALCIUM 500 MG: 500 TABLET ORAL at 08:02

## 2024-10-25 RX ADMIN — APIXABAN 2.5 MG: 2.5 TABLET, FILM COATED ORAL at 21:04

## 2024-10-25 RX ADMIN — PANTOPRAZOLE SODIUM 20 MG: 20 TABLET, DELAYED RELEASE ORAL at 06:13

## 2024-10-25 RX ADMIN — HYDROCODONE BITARTRATE AND ACETAMINOPHEN 1 TABLET: 5; 325 TABLET ORAL at 00:07

## 2024-10-25 RX ADMIN — CEPHALEXIN 250 MG: 250 CAPSULE ORAL at 21:04

## 2024-10-25 RX ADMIN — NYSTATIN 500000 UNITS: 100000 SUSPENSION ORAL at 08:03

## 2024-10-25 RX ADMIN — Medication 1 DROP: at 08:05

## 2024-10-25 RX ADMIN — RANOLAZINE 500 MG: 500 TABLET, FILM COATED, EXTENDED RELEASE ORAL at 08:02

## 2024-10-25 RX ADMIN — METOPROLOL TARTRATE 50 MG: 50 TABLET, FILM COATED ORAL at 08:02

## 2024-10-25 RX ADMIN — Medication 1 DROP: at 16:21

## 2024-10-25 RX ADMIN — RANOLAZINE 500 MG: 500 TABLET, FILM COATED, EXTENDED RELEASE ORAL at 21:04

## 2024-10-25 RX ADMIN — LOSARTAN POTASSIUM 50 MG: 50 TABLET, FILM COATED ORAL at 21:03

## 2024-10-25 RX ADMIN — NYSTATIN 500000 UNITS: 100000 SUSPENSION ORAL at 12:40

## 2024-10-25 RX ADMIN — Medication 2000 UNITS: at 16:21

## 2024-10-25 RX ADMIN — ACETAMINOPHEN 500 MG: 500 TABLET ORAL at 21:03

## 2024-10-25 RX ADMIN — Medication 100 MG: at 08:03

## 2024-10-25 RX ADMIN — LOSARTAN POTASSIUM 50 MG: 50 TABLET, FILM COATED ORAL at 08:02

## 2024-10-25 RX ADMIN — ASPIRIN 81 MG: 81 TABLET, COATED ORAL at 08:02

## 2024-10-25 RX ADMIN — NYSTATIN 500000 UNITS: 100000 SUSPENSION ORAL at 16:21

## 2024-10-25 RX ADMIN — Medication 1 DROP: at 13:04

## 2024-10-25 RX ADMIN — ACETAMINOPHEN 500 MG: 500 TABLET ORAL at 12:40

## 2024-10-25 RX ADMIN — APIXABAN 2.5 MG: 2.5 TABLET, FILM COATED ORAL at 08:02

## 2024-10-25 RX ADMIN — METOPROLOL TARTRATE 50 MG: 50 TABLET, FILM COATED ORAL at 21:04

## 2024-10-25 RX ADMIN — NYSTATIN 500000 UNITS: 100000 SUSPENSION ORAL at 21:03

## 2024-10-25 RX ADMIN — Medication 400 MG: at 08:02

## 2024-10-25 RX ADMIN — HYDROCODONE BITARTRATE AND ACETAMINOPHEN 1 TABLET: 5; 325 TABLET ORAL at 15:17

## 2024-10-25 ASSESSMENT — PAIN SCALES - GENERAL
PAINLEVEL_OUTOF10: 7
PAINLEVEL_OUTOF10: 8
PAINLEVEL_OUTOF10: 8
PAINLEVEL_OUTOF10: 5
PAINLEVEL_OUTOF10: 5
PAINLEVEL_OUTOF10: 6
PAINLEVEL_OUTOF10: 0

## 2024-10-25 ASSESSMENT — PAIN DESCRIPTION - LOCATION
LOCATION: HIP

## 2024-10-25 ASSESSMENT — PAIN DESCRIPTION - DESCRIPTORS
DESCRIPTORS: ACHING

## 2024-10-25 ASSESSMENT — PAIN DESCRIPTION - FREQUENCY: FREQUENCY: CONTINUOUS

## 2024-10-25 ASSESSMENT — PAIN - FUNCTIONAL ASSESSMENT
PAIN_FUNCTIONAL_ASSESSMENT: PREVENTS OR INTERFERES SOME ACTIVE ACTIVITIES AND ADLS

## 2024-10-25 ASSESSMENT — PAIN DESCRIPTION - ORIENTATION
ORIENTATION: RIGHT

## 2024-10-25 ASSESSMENT — PAIN DESCRIPTION - PAIN TYPE: TYPE: ACUTE PAIN

## 2024-10-25 ASSESSMENT — PAIN DESCRIPTION - ONSET: ONSET: ON-GOING

## 2024-10-25 NOTE — PROGRESS NOTES
Hospitalist Progress Note      PCP: Evette Johnson MD    Date of Admission: 10/23/2024    Chief Complaint: weakness/pain    Subjective: patient in bed, awake/alert     Medications:  Reviewed    Infusion Medications    sodium chloride       Scheduled Medications    nitrofurantoin (macrocrystal-monohydrate)  100 mg Oral 2 times per day    Vitamin D  2,000 Units Oral Dinner    cyanocobalamin  1,000 mcg IntraMUSCular Weekly    coenzyme Q10  100 mg Oral Daily    lidocaine  3 patch TransDERmal Daily    acetaminophen  500 mg Oral TID    amLODIPine  5 mg Oral Lunch    pantoprazole  20 mg Oral QAM AC    nystatin  5 mL Oral 4x Daily    cinnamon oil  1 drop Oral 4x Daily    apixaban  2.5 mg Oral BID    aspirin  81 mg Oral Daily    calcium elemental  500 mg Oral Daily    losartan  50 mg Oral BID    magnesium oxide  400 mg Oral Daily    metoprolol tartrate  50 mg Oral BID    ranolazine  500 mg Oral BID     PRN Meds: camphor-menthol-methyl salicylate, HYDROcodone 5 mg - acetaminophen, traMADol, sodium chloride, polyethylene glycol, acetaminophen **OR** [DISCONTINUED] acetaminophen, hydrALAZINE      Intake/Output Summary (Last 24 hours) at 10/25/2024 1032  Last data filed at 10/24/2024 1652  Gross per 24 hour   Intake 480 ml   Output --   Net 480 ml       Exam:    BP (!) 147/63   Pulse 71   Temp 98.1 °F (36.7 °C) (Oral)   Resp 16   Ht 1.6 m (5' 3\")   Wt 83.8 kg (184 lb 12.8 oz)   SpO2 96%   BMI 32.74 kg/m²     General appearance: No apparent distress, appears stated age and cooperative.  HEENT: Pupils equal, round, and reactive to light. Conjunctivae/corneas clear.  Neck: Supple, with full range of motion. No jugular venous distention. Trachea midline.  Respiratory:  Normal respiratory effort. Clear to auscultation, bilaterally without Rales/Wheezes/Rhonchi.  Cardiovascular: Regular rate and rhythm with normal S1/S2 without murmurs, rubs or gallops.  Abdomen: Soft, non-tender, non-distended with normal bowel  sounds.  Musculoskeletal: trace edema bilaterally.  Full range of motion without deformity.  Skin: Skin color, texture, turgor normal.  No rashes or lesions.  Neurologic:  Neurovascularly intact without any focal sensory/motor deficits. Cranial nerves: II-XII intact, grossly non-focal.  Psychiatric: Alert and oriented, thought content appropriate, normal insight  Capillary Refill: Brisk,< 3 seconds   Peripheral Pulses: +2 palpable, equal bilaterally       Labs:   No results for input(s): \"WBC\", \"HGB\", \"HCT\", \"PLT\" in the last 72 hours.  No results for input(s): \"NA\", \"K\", \"CL\", \"CO2\", \"BUN\", \"CREATININE\", \"CALCIUM\", \"PHOS\" in the last 72 hours.    Invalid input(s): \"MAGNES\"  No results for input(s): \"AST\", \"ALT\", \"BILIDIR\", \"BILITOT\", \"ALKPHOS\" in the last 72 hours.  No results for input(s): \"INR\" in the last 72 hours.  No results for input(s): \"CKTOTAL\", \"TROPONINI\" in the last 72 hours.    Urinalysis:      Lab Results   Component Value Date/Time    NITRU POSITIVE 10/24/2024 08:19 PM    WBCUA >100 10/24/2024 08:19 PM    BACTERIA MANY 10/24/2024 08:19 PM    RBCUA 6-10 10/24/2024 08:19 PM    BLOODU SMALL 10/24/2024 08:19 PM    GLUCOSEU Negative 10/24/2024 08:19 PM    GLUCOSEU NEG 03/24/2012 09:35 PM       Radiology:  XR FOOT RIGHT (MIN 3 VIEWS)   Final Result   No radiographic evidence of osteomyelitis                 Assessment/Plan:    Active Hospital Problems    Diagnosis Date Noted    Urinary incontinence [R32] 10/24/2024    CAD (coronary artery disease) [I25.10]     Impaired mobility ADLs dt  thoracic and lumbar spinal stenosis with T12 compression fracture. [Z74.09, Z78.9] 10/23/2024    Compression fracture of T12 vertebra with routine healing [S22.080D] 10/23/2024    Left kidney mass [N28.89] 10/23/2024    Gait instability [R26.81] 10/21/2024    Vertigo [R42] 11/30/2023    Right bundle branch block (RBBB) [I45.10] 11/30/2023    Peripheral vascular disease (HCC) [I73.9] 11/30/2023    Anemia [D64.9] 02/16/2018

## 2024-10-25 NOTE — PROGRESS NOTES
Physical Therapy Rehab Treatment Note  Facility/Department: [facility]  [facility]       NAME: Loren Noyola  : 1935 (89 y.o.)  MRN: [id number]  CODE STATUS: Full Code    Date of Service: 10/25/2024       Restrictions:  Restrictions/Precautions: Fall Risk       SUBJECTIVE:   Subjective: \"They made me wash my mouth with nasty cinnamon oil\"    Pain  Pain: 8/10 pain R hip and R low back pre/ 4/10 post session- medication given      OBJECTIVE:     Transfers  Surface: Wheelchair  Additional Factors: Verbal cues;Hand placement cues;Increased time to complete  Device: Walker (// bars)  Sit to Stand  Assistance Level: Stand by assist  Skilled Clinical Factors: vc's for hand placement, pt prefers and performs best with one hand on WW and on hand on WC. no significant safety concerns.  Stand to Sit  Assistance Level: Stand by assist  Skilled Clinical Factors: vc's for controlled descent with increased pain    Ambulation  Surface: Level surface  Device: Parallel Bars  Distance: 6' fwd/bwd x 2  Activity: Within Unit  Additional Factors: Verbal cues;Hand placement cues;Increased time to complete  Assistance Level: Minimal assistance  Gait Deviations: Slow ximena;Unsteady gait;Path deviations  Skilled Clinical Factors: vc's for technique and sequencing, distance limited d/t increased pain. pt reports feeling lightheaded with gait, but able to increase distance this date as opposed to previous session. increased pain this PM.    PT Exercises  PROM Exercises: seated HS/gastroc stretch x3 30 sec hold BLE  A/AROM Exercises: seated AP/LAQ/Marches/Hip Abd/Hip Add x10 BLE #2 cuff weight  Resistive Exercises: seated YTB HS curls/hip abd x10 BLE    Activity Tolerance  Activity Tolerance: Patient tolerated treatment well    ASSESSMENT/PROGRESS TOWARDS GOALS:   Assessment  Assessment: Pt with good carryover of mobility from previous sessions, continues to be limited by R hip pain and L knee pain from arthritis.

## 2024-10-25 NOTE — PLAN OF CARE
PODIATRIC MEDICINE AND SURGERY   PLAN OF CARE    NAME: Loren Noyola  MRN: [id number]  DATE: October 25, 2024  TIME: 10:10 AM    PLAN AND RECOMMENDATIONS:      Patient seen and evaluated yesterday for wound on bunion to right foot. X-rays of right foot ordered and obtained. No evidence of underlying bone infection. Wound appears healthy and granular. Podiatry will sign off with wound care recommendations and will continue to follow in the outpatient setting.     PLAN AND RECOMMENDATIONS::    Wound care: calcium alginate and DSD overtop, nursing orders placed appreciate assistance  Avoid tight shoes with additional pressure to the area  Recommend prevalon boot to avoid additional pressure to area while resting/sleeping  WBAT to RLE  Elevate RLE at or above heart level while resting  Pain management per primary team   Podiatry will sign off, thank you for the consult    ACTIVITY:   - Keep children and pets away from your foot.  - Wear soft/ open shoes to the bunion area when walking    WOUND CARE INSTRUCTIONS FOR THE RIGHT/LEFT LOWER EXTREMITY  Cleanse the area with normal saline, hibiclens or other cleansing agent  Apply calcium alginate directly to the wound bed  Cover with ABDs and 4x4s  Loosely wrap with Delmer and apply tape.       IF YOU NOTICE:   Fever of 101 degrees or over.  Foul smelling or purulent (pus) drainage  Increase in drainage.  Sudden onset of pain that is unrelieved with pain medication.  Observe operative extremity for circulation problems: change in color, coldness, numbness, or tingling.   If any symptoms occur, Call office immediately or after hours emergency number.      PHONE NUMBERS:   [phone], to contact Dr. Ahmadi's office with any concerns, during office hours.  [phone], after office hours and on weekends. You will be directed to the podiatric surgery resident on call.  IN AN EMERGENCY, IF YOU ARE UNABLE TO REACH YOUR PHYSICIAN, GO TO THE NEAREST EMERGENCY

## 2024-10-25 NOTE — PROGRESS NOTES
IADLs         Social Determinants of Health     Financial Resource Strain: Not on file   Food Insecurity: No Food Insecurity (10/24/2024)    Hunger Vital Sign     Worried About Running Out of Food in the Last Year: Never true     Ran Out of Food in the Last Year: Never true   Transportation Needs: No Transportation Needs (10/24/2024)    PRAPARE - Transportation     Lack of Transportation (Medical): No     Lack of Transportation (Non-Medical): No   Physical Activity: Insufficiently Active (10/30/2023)    [facility]    Exercise Vital Sign     Days of Exercise per Week: 7 days     Minutes of Exercise per Session: 10 min   Stress: Not on file   Social Connections: Not on file   Intimate Partner Violence: Not on file   Housing Stability: Unknown (10/24/2024)    Housing Stability Vital Sign     Unable to Pay for Housing in the Last Year: No     Number of Times Moved in the Last Year: Not on file     Homeless in the Last Year: No           THERAPY, MEDICAL AND NURSING COORDINATION:    [x]  Pain medication before therapies     [x]  Check orthostatic BP and monitor heart rate and medications effects with therapy      [x]  Ambulate to the bathroom in room    [x]  Add scheduled rest beaks     [x]  In room therapies as needed      Discharge date set for:              11/2/24      Home with:   spouse (ill) with help from   sons            And:     Home Health Care:     [x]  PT    [x]  OT    []  ST   [x]  Aide       [x]  RN                    Equipment:  WW,        At D/C their function is goaled at:   PT:Long Term Goal 1: Pt to complete bed mobility with indep  Long Term Goal 2: Pt to complete transfers with indep  Long Term Goal 3: Pt to ambulate 50-150ft with LRD and indep  Long Term Goal 4: Pt to manage 4 steps with HR and SBA  OT:Eating  Assistance Needed: Independent  CARE Score: 6  Discharge Goal: Independent, Oral Hygiene  Assistance Needed: Setup or clean-up assistance  CARE Score:  5  Discharge Goal: Independent, Toileting Hygiene  Reason if not Attempted: Patient refused  CARE Score: 7  Discharge Goal: Independent, Shower/Bathe Self  Assistance Needed: Partial/moderate assistance  CARE Score: 3  Discharge Goal: Independent  Upper Body Dressing  Assistance Needed: Setup or clean-up assistance  CARE Score: 5  Discharge Goal: Independent, Lower Body Dressing  Assistance Needed: Partial/moderate assistance  CARE Score: 3  Discharge Goal: Independent, Putting On/Taking Off Footwear  Assistance Needed: Supervision or touching assistance  CARE Score: 4  Discharge Goal: Independent, Toilet Transfer  Reason if not Attempted: Patient refused  CARE Score: 7  Discharge Goal: Independent  SP:               From a cognitive standpoint they will need:        24 hr vs daily transitioning to supervision  -->progress to occasional             Significant problems/ barriers to functional progress include: Pt is at a high risk for functional loss,      []  Acute infection/UTI    []  Low BP's     []  COPD flare-up   []  Uncontrolled blood sugar     []  Progressive anemia     [x]  poor endurance    []  Severe pain     []  Impaired mental status    []  Urinary incontinence    []  Bowel incontinence           Plan to correct barriers to functional progress:   Add scheduled rest breaks, CoQ 10 and Vit B 12, control pain by using ice Lidoderm rest and massage as well as pain medications prior to therapy.  Spread therapy of 15 hours out over a 7 day window to accommodate rest breaks and medical interventions.  Patient seems to be making fair to good response to these interventions.         Based on a comprehensive evaluation of the above, the individualized therapy and Discharge plan will be:    -Times stated are an average that will be varied based on the patient's daily need.        PT   1 1/2  hrs/day 5-7 days per wk      OT   1 1/2 hrs per day 5-7 days per wk     ST  1/2    hrs /day 3-5 days per week        Estimated LOS   1-2 week(s)    -Overall functional prognosis:     [x]  Good    []  Fair    []  Poor     -Medical Prognosis:   [x]  Good    []  Fair    []  Poor    This patient was made aware of the discussion of Plan of Care, their projected dicharge date and their projected function at discharge.         Deb Haro, DO

## 2024-10-25 NOTE — PROGRESS NOTES
Comprehensive Nutrition Assessment    Type and Reason for Visit:  Initial, Positive Nutrition Screen, Wound    Nutrition Recommendations/Plan:   Continue Current Diet, Start Oral Nutrition Supplement     Malnutrition Assessment:  Malnutrition Status:  No malnutrition (10/25/24 1540)      Nutrition Assessment:    Pt presents with increased nutrient needs due to the presence of foot wound. Pt reports that she is 'not a big eater' generally, but states that her appetite is decreased since admission. To providewound healing supplement bid with meals and high calorie supplement bid to help provide adequate nutrition to promote wound healing.    Nutrition Related Findings:    PMH-CAD, hld, htn, OA, GERD (ppi); admitted d/t frequent falls, T12 compression fracture. Labs/meds reviewed. Trace BLE edema noted. Wound Type: Stage III, Pressure Injury (R foot)       Current Nutrition Intake & Therapies:    Average Meal Intake: 25-50%     ADULT DIET; Regular  ADULT ORAL NUTRITION SUPPLEMENT; Lunch, Dinner; Wound Healing Oral Supplement  ADULT ORAL NUTRITION SUPPLEMENT; Breakfast, Dinner; Standard High Calorie/High Protein Oral Supplement    Anthropometric Measures:  Height: 160 cm (5' 3\")  Ideal Body Weight (IBW): 115 lbs (52 kg)    [facility]: [facility]  Current BMI (kg/m2):  32.7  Usual Body Weight: 83.9 kg (185 lb) (11/2023 & 10/17/24 stated)                       BMI Categories: Obese Class 1 (BMI 30.0-34.9)    Estimated Daily Nutrient Needs:  Energy Requirements Based On: Kcal/kg  Weight Used for Energy Requirements: Admission  Energy (kcal/day): ~1510 (kg x 18)  Weight Used for Protein Requirements: Ideal  Protein (g/day): 68-78 (kg x 1.3-1.5)  Method Used for Fluid Requirements: 1 ml/kcal  Fluid (ml/day): 1500    Nutrition Diagnosis:   Increased nutrient needs related to increase demand for energy/nutrients as evidenced by wounds  Inadequate oral intake related to  (reported decreased appetite  at this time) as evidenced by intake 26-50%    Nutrition Interventions:   Food and/or Nutrient Delivery: Continue Current Diet, Start Oral Nutrition Supplement  Nutrition Education/Counseling: Education not indicated  Coordination of Nutrition Care: Continue to monitor while inpatient       Goals:     Goals: PO intake 75% or greater, other (specify)  Specify Other Goals: improved wound status    Nutrition Monitoring and Evaluation:         Physical Signs/Symptoms Outcomes: Skin, Weight, Biochemical Data    Discharge Planning:    No discharge needs at this time     Marie Murillo, NATALIA, LD

## 2024-10-25 NOTE — DISCHARGE INSTR - COC
Continuity of Care Form    Patient Name: Loren Noyola   :  1935  MRN:  [id number]    Admit date:  10/23/2024  Discharge date:  ***    Code Status Order: Full Code   Advance Directives:   Advance Care Flowsheet Documentation             Admitting Physician:  Deb Haro DO  PCP: Evette Johnson MD    Discharging Nurse: ***  Discharging Hospital Unit/Room#: R260/R260-01  Discharging Unit Phone Number: ***    Emergency Contact:   Extended Emergency Contact Information  Primary Emergency Contact: Jude Noyola  Home Phone: [phone]  Relation: Child  Secondary Emergency Contact: Rupert Noyola  Home Phone: [phone]  Relation: Child    Past Surgical History:  Past Surgical History:   Procedure Laterality Date    APPENDECTOMY      COLECTOMY  1/3/2011    sigmoid colectomy with colostomy    COLECTOMY  2011    partial colectomy with colostomy closure    CORONARY ARTERY BYPASS GRAFT      HYSTERECTOMY (CERVIX STATUS UNKNOWN)      HYSTERECTOMY, TOTAL ABDOMINAL (CERVIX REMOVED)      UT HEMIARTHROPLASTY HIP PARTIAL Left 2018    HIP HEMIARTHROPLASTY- [facility] Bryan Malcolm [facility]    SIGMOIDOSCOPY      VARICOSE VEIN SURGERY         Immunization History:   Immunization History   Administered Date(s) Administered    COVID-19, PFIZER Bivalent, DO NOT Dilute, (age 12y+), IM, 30 mcg/0.3 mL 10/23/2022    COVID-19, PFIZER PURPLE top, DILUTE for use, (age 12 y+), 30mcg/0.3mL 2021, 10/07/2021    COVID-19, PFIZER, (age 12y+), IM, 30mcg/0.3mL 2023       Active Problems:  Patient Active Problem List   Diagnosis Code    Constipation K59.00    Closed left hip fracture, initial encounter ([facility]) S72.002A    Cervical spondylosis without myelopathy M47.812    DDD (degenerative disc disease), lumbar M51.369    Essential hypertension I10    Foot pain, left M79.672    Headache R51.9    Low back pain radiating to left leg M54.50, M79.605    Lumbar facet arthropathy M47.816     Date:[id number]}    Treatments at the Time of Hospital Discharge:   Respiratory Treatments: ***  Oxygen Therapy:  {Therapy; copd oxygen:89650}  Ventilator:    { CC Vent List:074578155}    Rehab Therapies: {THERAPEUTIC INTERVENTION:0361304328}  Weight Bearing Status/Restrictions: No weight bearing restrictions  Other Medical Equipment (for information only, NOT a DME order):  {EQUIPMENT:855230661}  Other Treatments:     ACTIVITY:   - Keep children and pets away from your foot.  - Wear soft/ open shoes to the bunion area when walking    WOUND CARE INSTRUCTIONS FOR THE RIGHT/LEFT LOWER EXTREMITY  Cleanse the area with normal saline, hibiclens or other cleansing agent  Apply calcium alginate directly to the wound bed  Cover with ABDs and 4x4s  Loosely wrap with Delmer and apply tape.       IF YOU NOTICE:   Fever of 101 degrees or over.  Foul smelling or purulent (pus) drainage  Increase in drainage.  Sudden onset of pain that is unrelieved with pain medication.  Observe operative extremity for circulation problems: change in color, coldness, numbness, or tingling.   If any symptoms occur, Call office immediately or after hours emergency number.      PHONE NUMBERS:   [phone], to contact Dr. Ahmadi's office with any concerns, during office hours.  [phone], after office hours and on weekends. You will be directed to the podiatric surgery resident on call.  IN AN EMERGENCY, IF YOU ARE UNABLE TO REACH YOUR PHYSICIAN, GO TO THE NEAREST EMERGENCY ROOM.    Patient's personal belongings (please select all that are sent with patient):  {P DME Belongings:137401528}    RN SIGNATURE:  {Esignature:101163115}    CASE MANAGEMENT/SOCIAL WORK SECTION    Inpatient Status Date: 10/23/2024    Readmission Risk Assessment Score:  Readmission Risk              Risk of Unplanned Readmission:  12           Discharging to Facility/ Agency   Name: [facility]  Address:  Phone:[phone]  Fax:    Dialysis Facility (if

## 2024-10-25 NOTE — CARE COORDINATION
Spoke with the patient post team conference and discussed her current level of function, goals, and progress toward goals.  We discussed barriers to discharge which has been pain for her and she states the patches are helping and is motivated to do her therapy at this time.  Her discharge date to home is planned for 11/2/2024 and she would be interested in [facility] and [facility] and she was provided a list.  I asked is she would like me to update but rather took my number to give her son for another time.  Electronically signed by Rachelle Wang RN on 10/25/24 at 2:54 PM EDT

## 2024-10-25 NOTE — PROGRESS NOTES
Progress Note    10/25/2024   12:07 PM    Name:  Loren Noyola  MRN:    [id number]     Acct:     [id number]   Room:  [address] Day: 2     Admit Date: 10/23/2024  6:41 PM  PCP: Evette Johnson MD    Subjective:     C/C: No chief complaint on file.      Interval History: Status: This is a 89 yo female with h/o Renal cysts (Bosniak 2F, prior MRI) followed at [facility] locally, HTN, CAD, Colitis, admitted with T12 compression fracture due to fall and is currently in rehab. She has long h/o mixed type incontinence UI > NALDO and had been on Trospium in past by Dr Mckeon her [facility] Urologist. She still has these complaints and may be worsened due to U/A that suggests possible UTI. She had some mild dysuria and no hematuria.     Past Medical History:   Diagnosis Date    Bleeding ulcer     CAD (coronary artery disease)     Colitis     Hip fx, left, closed, initial encounter ([facility]) 02/10/2018    Hyperlipidemia     Hypertension     Osteoarthritis     Spinal stenosis        ROS:  Review of Systems   Constitutional:  Negative for fever.   Gastrointestinal:  Negative for abdominal pain.   Genitourinary:  Negative for hematuria.       Medications:     Allergies:   Allergies   Allergen Reactions    Lisinopril Anaphylaxis    Statins Other (See Comments)    Sulfa Antibiotics Itching       Current Meds: nitrofurantoin (macrocrystal-monohydrate) (MACROBID) capsule 100 mg, 2 times per day  Vitamin D (CHOLECALCIFEROL) tablet 2,000 Units, Dinner  cyanocobalamin injection 1,000 mcg, Weekly  coenzyme Q10 capsule 100 mg, Daily  lidocaine 4 % external patch 3 patch, Daily  camphor-menthol-methyl salicylate (BENGAY ULTRA STRENGTH) 4-10-30 % cream, TID PRN  HYDROcodone-acetaminophen (NORCO) 5-325 MG per tablet 1 tablet, Q4H PRN  acetaminophen (TYLENOL) tablet 500 mg, TID  amLODIPine (NORVASC) tablet 5 mg, Lunch  pantoprazole (PROTONIX) tablet 20 mg, QAM AC  traMADol (ULTRAM) tablet 50 mg, Q6H PRN  nystatin (MYCOSTATIN) 076411  24258  [phone]      LABURIN >559705 CFU/ML 10/08/2022 11:54 AM        Contains abnormal data Microscopic Urinalysis  Order: [id number]  Status: Final result       Visible to patient: No (not released)       Next appt: 11/20/2024 at 12:15 PM in Pulmonary Function Testing ([place] [facility] ROOM 1)    0 Result Notes           Component  Ref Range & Units 10/24/24 2019 10/8/22 1030 2/26/19 1023 2/10/18 1856 2/8/18 1015 3/24/12 2135   Bacteria, UA  Negative /HPF MANY Abnormal  Negative Negative R Moderate R Moderate R 1+ R   Hyaline Casts, UA  0 - 5 /HPF 1-3  3-5 CM      WBC, UA  0 - 5 /HPF >100 High  >100 High  6-10 Abnormal  CM 0-2 0-2 5-10 R   RBC, UA  0 - 5 /HPF 6-10 Abnormal  >100 Abnormal  R 3-5 Abnormal  CM 0-2 R None seen R 0-2 R   Epithelial Cells, UA  0 - 5 /HPF 3-5 0-2 6-10 CM 3-5 R 0-2 R    Resulting Agency [facility]                   Physical Examination:        Physical Exam  Abdominal:      General: There is no distension.   Neurological:      Mental Status: She is alert.   Psychiatric:         Mood and Affect: Mood normal.         Assessment:        Primary Problem    This is a 88 yo female with h/o Renal cysts (Bosniak 2F, prior MRI) followed at [facility] locally, HTN, CAD, Colitis, admitted with T12 compression fracture due to fall and is currently in rehab. She has long h/o mixed type incontinence which may currently be exacerbated by possible UTI given U/A. C/S is pending. For now would treat UTI and adjust based on final culture results.       Impaired mobility and activities of daily living     Active Hospital Problems    Diagnosis Date Noted    Urinary incontinence [R32] 10/24/2024    CAD (coronary artery disease) [I25.10]     Impaired mobility ADLs dt  thoracic and lumbar spinal stenosis with T12 compression fracture. [Z74.09, Z78.9] 10/23/2024    Compression fracture of T12 vertebra with

## 2024-10-25 NOTE — PROGRESS NOTES
OCCUPATIONAL THERAPY  INPATIENT REHAB TREATMENT NOTE  [facility]      NAME: Loren Noyola  : 1935 (89 y.o.)  MRN: [id number]  CODE STATUS: Full Code  Room: R260/R260-01    Date of Service: 10/25/2024    Referring Physician: Dr Haro  Rehab Diagnosis: Impaired mobility ADLs dt thoracic and lumbar spinal stenosis with T12 compression fracture    Hospital course:   Comments: 89 y.o. female patient who presented to ER 10/21 with complaints of pain to head, neck and right hip after experiencing a fall 10/13. Pain worsened so she came to ER 10/17, d/c'd home after it was confirmed no injury. However, 10/21 patient admitting to another fall and worsening pain not relieved by prescribed norco. CT revealing T12 compression fx. Patient admitted under hospitalist services with neurosx consulting. NeuroSx determined fx to be chronic and no surgical needs.      Restrictions  Restrictions/Precautions  Restrictions/Precautions: Fall Risk     Patient's date of birth confirmed: Yes    SAFETY:  Safety Devices  Safety Devices in place: Yes  Type of devices: All fall risk precautions in place    SUBJECTIVE: \"I can't do it.\" - sit -> stand    Pain at start of treatment: Yes: 5/10    Pain at end of treatment: Yes: 10    Location: R hip;R lateral lower back  Description: sharp;ache  Nursing notified: Declined  Intervention: None    COGNITION:  Orientation  Overall Orientation Status: Within Functional Limits  Cognition  Overall Cognitive Status: Exceptions  Arousal/Alertness: Appears intact  Following Commands: Follows one step commands with increased time;Follows multistep commands with repitition  Attention Span: Appears intact  Memory: Appears intact  Safety Judgement: Appears intact  Problem Solving: Appears intact  Insights: Appears intact  Initiation: Appears intact  Sequencing: Requires cues for some    OBJECTIVE:    Grooming/Oral Hygiene  Assistance Level: Supervision;Verbal cues  Skilled Clinical  Factors: seated in w/c at bathroom sink - verbal cues to identify toothpaste and setup assist for opening new toothbrush package  Upper Extremity Bathing  Assistance Level: Supervision  Skilled Clinical Factors: seated in w/c at bathroom sink  Lower Extremity Bathing  Assistance Level: Stand by assist;Verbal cues  Skilled Clinical Factors: seated in w/c and in standing at bathroom sink - verbal cues for hand placement and sit -> stand technique  Upper Extremity Dressing  Assistance Level: Set-up  Skilled Clinical Factors: seated in w/c at bathroom sink  Lower Extremity Dressing  Assistance Level: Stand by assist;Verbal cues  Skilled Clinical Factors: seated in w/c and in standing at bathroom sink - verbal cues for hand placement, sit -> stand technique, to utilize same figure four technique used for bathing to don L knee brace and to lower pant to floor level for threading R KAMALJIT [place] OT tech, the pants donned were worn yesterday and had an incontinent episode of urine that had dried - therapist dofffed soiled pant and donned clean pant for patient 2° time constraint  Putting On/Taking Off Footwear  Assistance Level: Supervision;Verbal cues  Skilled Clinical Factors: seated in w/c at bathroom sink - verbal cue to utilize same figure four technique used for bathing to don B socks  Toileting  Skilled Clinical Factors: denied need  Tub/Shower Transfers  Skilled Clinical Factors: sponge bath seated in w/c at bathroom sink    Supine to Sit  Assistance Level: Modified independent  Skilled Clinical Factors: HOB slightly elevated - bed rail utilized  Sit to Stand  Assistance Level: Stand by assist  Stand to Sit  Assistance Level: Stand by assist  Bed To/From Chair  Technique: Stand step  Assistance Level: Stand by assist  Skilled Clinical Factors: EOB -> w/c at ww level    ASSESSMENT: Patient pleasant while demonstrating decreased motivation to complete ADL tasks requiring MOD encouragement throughout treatment  session.    Activity Tolerance: Patient tolerated treatment well      PLAN OF CARE:  Strengthening, Balance training, Functional mobility training, Endurance training, Pain management, Safety education & training, Patient/Caregiver education & training, Self-Care / ADL, Equipment evaluation, education, & procurement, Home management training, Cognitive/Perceptual training    Continue per OT POC.    Patient goals : \"to walk. even if it is with a walker\"  Time Frame for Long Term Goals : Within 1-1.5 weeks patient to demonstrate progress in the following areas in order to meet long term goals as stated in the initial evaluation  Long Term Goal 1: increase self care independence  Long Term Goal 2: improve independence in IADLs  Long Term Goal 3: improve strength and endurance        Therapy Time:   Individual Group Co-Treat   Time In 0830       Time Out 0930         Minutes 60                   ADL/IADL trainin minutes     Electronically signed by:    SALLY Schwab,   10/25/2024, 11:25 AM

## 2024-10-25 NOTE — PROGRESS NOTES
Subjective:  The patient complains of severe acute on chronic progressive fatigue and mid and low back pain partially relieved by rest, medications, PT,  OT,     and rest and exacerbated by recent fall.      88 yo female was admitted to [facility] after presenting to the ER with pain in her head neck and back after falling at home striking her head on the cushions of her sofa.  She had several previous falls with ER visits in the week prior.  She was found to have a T12 compression fracture and admitted under the care of the hospitalist.     She was seen by the neurosurgeon PA and discussed with neurosurgery this is likely old and there is now planned for surgical intervention.     She complains of a dry tongue and seems to have early thrush-rel with mouth rince.    I am concerned about patient’s medical complexities and barriers to advancing in rehab goals including improving urinary incontinence.        I reviewed current care and plans for further care with other rehab providers including nursing and case management.  According to recent nursing note, \" Pt was independent with ADL's ans IADL's prior to being hospitalized. Pt has her spouse and 2 sons that are local. Pt lives in a one story home with 4 stairs to enter and HR on the left. Pt has a walk in shower with curtain, handicapped height toilet, built in shower seat, hand held shower, grab bars in shower and around the toilet. The BR is accessible. Pt's DME consists of a cane, BSC and a rollator. Pt is an active  and drives [employer]. Pt graduated from high school. 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 Pt is able to purchase her meds and uses a pill organizer. Pt does finances with online banking and checks.  \".    ROS x10:  The patient also complains of severely impaired mobility and activities of daily living.  Otherwise no new problems with vision, hearing, nose, mouth,  Right  ADL  Feeding: Modified independent  (10/24/24 1228)  Grooming: Setup (10/24/24 1228)  UE Bathing: Supervision (10/24/24 1228)  LE Bathing: Minimal assistance (10/24/24 1228)  UE Dressing: Setup (10/24/24 1228)  LE Dressing: Minimal assistance (10/24/24 1228)  Toileting Skilled Clinical Factors: did not need to go (10/24/24 1228)  Toilet Transfers  Toilet Transfers Comments: Patient reported that she had just gone to the bathroom using the [facility] (10/24/24 1231)          Speech Therapy:            Diet/Swallow:                      Lab/X-ray studies reviewed, analyzed and discussed with patient and staff:   Recent Results (from the past 24 hour(s))   Urinalysis with Reflex to Culture    Collection Time: 10/24/24  8:19 PM    Specimen: Urine   Result Value Ref Range    Color, UA Yellow Straw/Yellow    Clarity, UA TURBID (A) Clear    Glucose, Ur Negative Negative mg/dL    Bilirubin, Urine Negative Negative    Ketones, Urine Negative Negative mg/dL    Specific Gravity, UA 1.018 1.005 - 1.030    Blood, Urine SMALL (A) Negative    pH, Urine 5.0 5.0 - 9.0    Protein, UA 30 (A) Negative mg/dL    Urobilinogen, Urine 0.2 <2.0 E.U./dL    Nitrite, Urine POSITIVE (A) Negative    Leukocyte Esterase, Urine LARGE (A) Negative    Urine Reflex to Culture Yes    Microscopic Urinalysis    Collection Time: 10/24/24  8:19 PM   Result Value Ref Range    Bacteria, UA MANY (A) Negative /HPF    Hyaline Casts, UA 1-3 0 - 5 /HPF    WBC, UA >100 (H) 0 - 5 /HPF    RBC, UA 6-10 (A) 0 - 5 /HPF    Epithelial Cells, UA 3-5 0 - 5 /HPF   TSH    Collection Time: 10/25/24  5:47 AM   Result Value Ref Range    TSH 0.381 (L) 0.440 - 3.860 uIU/mL       XR FOOT RIGHT 10/25/2024  Moderate hallux valgus deformity is seen.  There is no cortical destruction. There is flattening of the arch.  Calcaneal heel spur formation is observed     No radiographic evidence of osteomyelitis     XR FEMUR LEFT  10/21/2024  Left hip hemiarthroplasty in anatomic alignment

## 2024-10-25 NOTE — PLAN OF CARE
Nutrition Problem #1: Increased nutrient needs  Intervention: Food and/or Nutrient Delivery: Continue Current Diet, Start Oral Nutrition Supplement

## 2024-10-25 NOTE — PROGRESS NOTES
Assessment completed this shift. Alert and oriented x 4. Up with standby to minimal assist and front wheeled walker. Stress/urge incontinence noted. Reports aching right hip pain 8/10. Lungs clear and BS present. Updated family at bedside. Will continue to monitor.  Electronically signed by Marianela Og RN on 10/25/2024 at 1:50 PM

## 2024-10-25 NOTE — PROGRESS NOTES
Physical Therapy Rehab Treatment Note  Facility/Department: [facility]  [facility]       NAME: Loren Noyola  : 1935 (89 y.o.)  MRN: [id number]  CODE STATUS: Full Code    Date of Service: 10/25/2024       Restrictions:  Restrictions/Precautions: Fall Risk       SUBJECTIVE:   Subjective: \"I am doing just okay\"    Pain  Pain: 6/10 pain R hip and R low back pre/post session- medication given      OBJECTIVE:   Transfers  Surface: Wheelchair  Additional Factors: Verbal cues;Hand placement cues;Increased time to complete  Device: Walker  Sit to Stand  Assistance Level: Stand by assist  Skilled Clinical Factors: vc's for hand placement, pt prefers and performs best with one hand on WW and on hand on WC. no significant safety concerns.  Stand to Sit  Assistance Level: Stand by assist  Skilled Clinical Factors: vc's for controlled descent with increased pain    Ambulation  Surface: Level surface  Device: Rolling walker  Distance: 20'  Activity: Within Unit  Additional Factors: Verbal cues;Hand placement cues;Increased time to complete  Assistance Level: Minimal assistance  Gait Deviations: Slow ximena;Unsteady gait;Path deviations  Skilled Clinical Factors: vc's for technique and sequencing, distance limited d/t increased pain. pt reports feeling lightheaded with gait, but able to increase distance this date as opposed to previous session.    PT Exercises  Exercise Treatment: STS x 3 with static standing/ dynamic weight shifts.  PROM Exercises: seated HS/gastroc stretch x3 30 sec hold BLE  A/AROM Exercises: seated AP/LAQ/Marches/Hip Abd/Hip Add x10 BLE  Resistive Exercises: seated YTB HS curls/hip abd x10 BLE  Dynamic Standing Balance Exercises: standing weight shifts at WW x10 ea, standing heel raises/marches x10 BLE  Postural Correction Exercises: seated YTB chest pulls/rows x10 ea - poor tolerance d/t increased back pain     Activity Tolerance  Activity Tolerance: Patient tolerated treatment  well    ASSESSMENT/PROGRESS TOWARDS GOALS:   Assessment  Assessment: Pt with good carryover of mobility from previous sessions, continues to be limited by R hip pain and L knee pain from arthritis. Decreased tolerance noted to standing tasks, requires encouragement to maintain standing tasks for extended periods of time. Increased time and effort to complete tasks with frequent rest breaks provided.  Activity Tolerance: Patient tolerated treatment well    Goals:  Long Term Goals  Long Term Goal 1: Pt to complete bed mobility with indep  Long Term Goal 2: Pt to complete transfers with indep  Long Term Goal 3: Pt to ambulate 50-150ft with LRD and indep  Long Term Goal 4: Pt to manage 4 steps with HR and SBA  Patient Goals   Patient Goals : to be able to walk without help    PLAN OF CARE/Safety:   Safety Devices  Type of Devices: All fall risk precautions in place;Chair alarm in place;Left in chair;Call light within reach      Therapy Time:   Individual   Time In 1100   Time Out 1200   Minutes 60      Minutes: 60  Transfer/Bed mobility training: 15  Gait training: 15  Neuro re education:  Therapeutic ex: 30      Elfego Reyes PTA, 10/25/24 at 12:07 PM

## 2024-10-25 NOTE — PROGRESS NOTES
Renal Adjustment Per Protocol: Keflex 500 mg BID changed to Keflex 250 mg BID based on CrCl 21 ml/min

## 2024-10-25 NOTE — PLAN OF CARE
Problem: Discharge Planning  Goal: Discharge to home or other facility with appropriate resources  Outcome: Progressing  Flowsheets (Taken 10/24/2024 2050)  Discharge to home or other facility with appropriate resources: Identify barriers to discharge with patient and caregiver     Problem: Pain  Goal: Verbalizes/displays adequate comfort level or baseline comfort level  Outcome: Progressing

## 2024-10-26 PROCEDURE — 97530 THERAPEUTIC ACTIVITIES: CPT

## 2024-10-26 PROCEDURE — 6370000000 HC RX 637 (ALT 250 FOR IP): Performed by: INTERNAL MEDICINE

## 2024-10-26 PROCEDURE — 97535 SELF CARE MNGMENT TRAINING: CPT

## 2024-10-26 PROCEDURE — 1180000000 HC REHAB R&B

## 2024-10-26 PROCEDURE — 6370000000 HC RX 637 (ALT 250 FOR IP): Performed by: PHYSICAL MEDICINE & REHABILITATION

## 2024-10-26 PROCEDURE — 6360000002 HC RX W HCPCS: Performed by: INTERNAL MEDICINE

## 2024-10-26 PROCEDURE — 99232 SBSQ HOSP IP/OBS MODERATE 35: CPT | Performed by: PHYSICAL MEDICINE & REHABILITATION

## 2024-10-26 PROCEDURE — 97110 THERAPEUTIC EXERCISES: CPT

## 2024-10-26 PROCEDURE — 97116 GAIT TRAINING THERAPY: CPT

## 2024-10-26 RX ORDER — AMIODARONE HYDROCHLORIDE 100 MG/1
100 TABLET ORAL DAILY
Status: ON HOLD | COMMUNITY

## 2024-10-26 RX ADMIN — RANOLAZINE 500 MG: 500 TABLET, FILM COATED, EXTENDED RELEASE ORAL at 20:23

## 2024-10-26 RX ADMIN — NYSTATIN 500000 UNITS: 100000 SUSPENSION ORAL at 15:01

## 2024-10-26 RX ADMIN — ACETAMINOPHEN 500 MG: 500 TABLET ORAL at 20:23

## 2024-10-26 RX ADMIN — LOSARTAN POTASSIUM 50 MG: 50 TABLET, FILM COATED ORAL at 20:23

## 2024-10-26 RX ADMIN — METOPROLOL TARTRATE 50 MG: 50 TABLET, FILM COATED ORAL at 08:22

## 2024-10-26 RX ADMIN — NYSTATIN 500000 UNITS: 100000 SUSPENSION ORAL at 20:23

## 2024-10-26 RX ADMIN — APIXABAN 2.5 MG: 2.5 TABLET, FILM COATED ORAL at 20:24

## 2024-10-26 RX ADMIN — Medication 400 MG: at 08:21

## 2024-10-26 RX ADMIN — APIXABAN 2.5 MG: 2.5 TABLET, FILM COATED ORAL at 08:22

## 2024-10-26 RX ADMIN — Medication 1 DROP: at 08:23

## 2024-10-26 RX ADMIN — RANOLAZINE 500 MG: 500 TABLET, FILM COATED, EXTENDED RELEASE ORAL at 08:23

## 2024-10-26 RX ADMIN — CALCIUM 500 MG: 500 TABLET ORAL at 08:23

## 2024-10-26 RX ADMIN — CEPHALEXIN 250 MG: 250 CAPSULE ORAL at 08:22

## 2024-10-26 RX ADMIN — METOPROLOL TARTRATE 50 MG: 50 TABLET, FILM COATED ORAL at 20:23

## 2024-10-26 RX ADMIN — AMLODIPINE BESYLATE 5 MG: 5 TABLET ORAL at 15:01

## 2024-10-26 RX ADMIN — NYSTATIN 500000 UNITS: 100000 SUSPENSION ORAL at 18:23

## 2024-10-26 RX ADMIN — ASPIRIN 81 MG: 81 TABLET, COATED ORAL at 08:23

## 2024-10-26 RX ADMIN — Medication 2000 UNITS: at 18:23

## 2024-10-26 RX ADMIN — Medication 1 DROP: at 20:54

## 2024-10-26 RX ADMIN — TRAMADOL HYDROCHLORIDE 50 MG: 50 TABLET ORAL at 08:21

## 2024-10-26 RX ADMIN — CEPHALEXIN 250 MG: 250 CAPSULE ORAL at 20:24

## 2024-10-26 RX ADMIN — HYDRALAZINE HYDROCHLORIDE 10 MG: 20 INJECTION INTRAMUSCULAR; INTRAVENOUS at 20:24

## 2024-10-26 RX ADMIN — ACETAMINOPHEN 500 MG: 500 TABLET ORAL at 15:01

## 2024-10-26 RX ADMIN — TRAMADOL HYDROCHLORIDE 50 MG: 50 TABLET ORAL at 15:10

## 2024-10-26 RX ADMIN — ACETAMINOPHEN 500 MG: 500 TABLET ORAL at 08:22

## 2024-10-26 RX ADMIN — NYSTATIN 500000 UNITS: 100000 SUSPENSION ORAL at 08:23

## 2024-10-26 RX ADMIN — PANTOPRAZOLE SODIUM 20 MG: 20 TABLET, DELAYED RELEASE ORAL at 08:23

## 2024-10-26 RX ADMIN — LOSARTAN POTASSIUM 50 MG: 50 TABLET, FILM COATED ORAL at 08:25

## 2024-10-26 RX ADMIN — HYDROCODONE BITARTRATE AND ACETAMINOPHEN 1 TABLET: 5; 325 TABLET ORAL at 03:19

## 2024-10-26 RX ADMIN — Medication 100 MG: at 08:21

## 2024-10-26 ASSESSMENT — PAIN DESCRIPTION - DESCRIPTORS
DESCRIPTORS: ACHING

## 2024-10-26 ASSESSMENT — PAIN DESCRIPTION - ORIENTATION
ORIENTATION: RIGHT

## 2024-10-26 ASSESSMENT — PAIN SCALES - GENERAL
PAINLEVEL_OUTOF10: 0
PAINLEVEL_OUTOF10: 5
PAINLEVEL_OUTOF10: 6
PAINLEVEL_OUTOF10: 4
PAINLEVEL_OUTOF10: 5
PAINLEVEL_OUTOF10: 7
PAINLEVEL_OUTOF10: 10
PAINLEVEL_OUTOF10: 3

## 2024-10-26 ASSESSMENT — PAIN DESCRIPTION - LOCATION
LOCATION: HIP

## 2024-10-26 ASSESSMENT — PAIN SCALES - WONG BAKER: WONGBAKER_NUMERICALRESPONSE: NO HURT

## 2024-10-26 ASSESSMENT — PAIN - FUNCTIONAL ASSESSMENT
PAIN_FUNCTIONAL_ASSESSMENT: PREVENTS OR INTERFERES SOME ACTIVE ACTIVITIES AND ADLS
PAIN_FUNCTIONAL_ASSESSMENT: ACTIVITIES ARE NOT PREVENTED

## 2024-10-26 NOTE — PROGRESS NOTES
OCCUPATIONAL THERAPY  INPATIENT REHAB TREATMENT NOTE  [facility]      NAME: Loren Noyola  : 1935 (89 y.o.)  MRN: [id number]  CODE STATUS: Full Code  Room: R260/R260-01    Date of Service: 10/26/2024    Referring Physician: Dr Haro  Rehab Diagnosis: Impaired mobility ADLs dt thoracic and lumbar spinal stenosis with T12 compression fracture    Hospital course:   Comments: 89 y.o. female patient who presented to ER 10/21 with complaints of pain to head, neck and right hip after experiencing a fall 10/13. Pain worsened so she came to ER 10/17, d/c'd home after it was confirmed no injury. However, 10/21 patient admitting to another fall and worsening pain not relieved by prescribed norco. CT revealing T12 compression fx. Patient admitted under hospitalist services with neurosx consulting. NeuroSx determined fx to be chronic and no surgical needs.      Restrictions  Restrictions/Precautions  Restrictions/Precautions: Fall Risk     Patient's date of birth confirmed: Yes    SAFETY:  Safety Devices  Safety Devices in place: Yes  Type of devices: All fall risk precautions in place    SUBJECTIVE: \"[employer] got a touchdown.\"    Pain at start of treatment: Yes: 4/10    Pain at end of treatment: Yes: 4/10    Location: R lateral lower back  Description: sore  Nursing notified: Declined  Intervention: None    COGNITION:  Orientation  Overall Orientation Status: Within Functional Limits  Cognition  Overall Cognitive Status: Exceptions  Arousal/Alertness: Appears intact  Following Commands: Follows one step commands with increased time;Follows multistep commands with repitition  Attention Span: Appears intact  Memory: Appears intact  Safety Judgement: Appears intact  Problem Solving: Appears intact  Insights: Appears intact  Initiation: Appears intact  Sequencing: Requires cues for some    OBJECTIVE    Parquetry:   Patient engaged in therapeutic activity to improve B UE ROM/strengthening, B FM  coordination and cognition in order to increase Amo with ADL's and IADL's leisure activities.   Patient donned B 1 # wrist weights.  Patient with 0 difficulty reaching in lateral planes.  Patient with 0 difficulty reaching in forward planes.  Patient with MIN FM difficulty.    The first design the patient completed was a 19 piece symmetrical simple starburst design that patient completed under the original example picture. Example picture was placed at midline. Patient had 0 difficulty with activity and completed in a timely manner. 19 pieces were neatly placed with 19 pieces correctly placed.     The second design the patient completed was a 14 piece symmetrical moderately complex potted flower design that patient completed under the original example picture. Example picture was placed at midline.  Patient had 0 difficulty with activity and completed in a timely manner. 14 pieces were slightly ajar with 14 pieces correctly placed.     The third design the patient completed was a 16 piece symmetrical moderately complex butterfly design that patient completed under the original example picture. Example picture was placed at midline. Patient had MIN difficulty with activity and completed in a timely manner. 12 pieces were neatly placed with 12 pieces correctly placed. Patient able to identify that there were errors. Patient able to place 16 pieces with 16 pieces being placed correctly.    ASSESSMENT: Patient pleasant and cooperative while working at a slow steady pace.    Activity Tolerance: Patient tolerated treatment well      PLAN OF CARE:  Strengthening, Balance training, Functional mobility training, Endurance training, Pain management, Safety education & training, Patient/Caregiver education & training, Self-Care / ADL, Equipment evaluation, education, & procurement, Home management training, Cognitive/Perceptual training    Continue per OT POC for planned discharge on 11-2-24.    Patient goals : \"to

## 2024-10-26 NOTE — PROGRESS NOTES
OCCUPATIONAL THERAPY  INPATIENT REHAB TREATMENT NOTE  [facility]      NAME: Loren Noyola  : 1935 (89 y.o.)  MRN: [id number]  CODE STATUS: Full Code  Room: R260/R260-01    Date of Service: 10/26/2024    Referring Physician: Dr Haro  Rehab Diagnosis: Impaired mobility ADLs dt thoracic and lumbar spinal stenosis with T12 compression fracture    Hospital course:   Comments: 89 y.o. female patient who presented to ER 10/21 with complaints of pain to head, neck and right hip after experiencing a fall 10/13. Pain worsened so she came to ER 10/17, d/c'd home after it was confirmed no injury. However, 10/21 patient admitting to another fall and worsening pain not relieved by prescribed norco. CT revealing T12 compression fx. Patient admitted under hospitalist services with neurosx consulting. NeuroSx determined fx to be chronic and no surgical needs.      Restrictions  Restrictions/Precautions  Restrictions/Precautions: Fall Risk     Patient's date of birth confirmed: Yes    SAFETY:  Safety Devices  Safety Devices in place: Yes  Type of devices: All fall risk precautions in place    SUBJECTIVE: \"I'm a little sore because I just got done going to the bathroom.\"    Pain at start of treatment: Yes: 510    Pain at end of treatment: Yes: 5/10    Location: R lower back  Description: sore  Nursing notified: Declined  Intervention: None    COGNITION:  Orientation  Overall Orientation Status: Within Functional Limits  Cognition  Overall Cognitive Status: Exceptions  Arousal/Alertness: Appears intact  Following Commands: Follows one step commands with increased time;Follows multistep commands with repitition  Attention Span: Appears intact  Memory: Appears intact  Safety Judgement: Appears intact  Problem Solving: Appears intact  Insights: Appears intact  Initiation: Appears intact  Sequencing: Requires cues for some    OBJECTIVE:    Instrumental ADL's  Instrumental ADLs: Yes  Money Management  Money  treatment well      PLAN OF CARE:  Strengthening, Balance training, Functional mobility training, Endurance training, Pain management, Safety education & training, Patient/Caregiver education & training, Self-Care / ADL, Equipment evaluation, education, & procurement, Home management training, Cognitive/Perceptual training    Continue per OT POC for planned discharge on 24.    Patient goals : \"to walk. even if it is with a walker\"  Time Frame for Long Term Goals : Within 1-1.5 weeks patient to demonstrate progress in the following areas in order to meet long term goals as stated in the initial evaluation  Long Term Goal 1: increase self care independence  Long Term Goal 2: improve independence in IADLs  Long Term Goal 3: improve strength and endurance        Therapy Time:   Individual Group Co-Treat   Time In 1100       Time Out 1200         Minutes 60                   ADL/IADL trainin minutes  Therapeutic activities: 15 minutes     Electronically signed by:    SALLY Schwab,   10/26/2024, 12:13 PM

## 2024-10-26 NOTE — PLAN OF CARE
Problem: Discharge Planning  Goal: Discharge to home or other facility with appropriate resources  10/25/2024 2245 by Leticia Hernandez RN  Outcome: Progressing  10/25/2024 1342 by Marianela Og RN  Outcome: Progressing  Flowsheets (Taken 10/25/2024 0748)  Discharge to home or other facility with appropriate resources: Identify barriers to discharge with patient and caregiver     Problem: Safety - Adult  Goal: Free from fall injury  10/25/2024 2245 by Leticia Hernandez, RN  Outcome: Progressing  10/25/2024 1342 by Marianela Og RN  Outcome: Progressing     Problem: Skin/Tissue Integrity  Goal: Absence of new skin breakdown  Description: 1.  Monitor for areas of redness and/or skin breakdown  2.  Assess vascular access sites hourly  3.  Every 4-6 hours minimum:  Change oxygen saturation probe site  4.  Every 4-6 hours:  If on nasal continuous positive airway pressure, respiratory therapy assess nares and determine need for appliance change or resting period.  10/25/2024 2245 by Leticia Hernandez RN  Outcome: Progressing  10/25/2024 1342 by Marianela Og RN  Outcome: Progressing     Problem: ABCDS Injury Assessment  Goal: Absence of physical injury  10/25/2024 2245 by Leticia Hernandez RN  Outcome: Progressing  10/25/2024 1342 by Marianela Og RN  Outcome: Progressing     Problem: Pain  Goal: Verbalizes/displays adequate comfort level or baseline comfort level  10/25/2024 2245 by Leticia Hernandez RN  Outcome: Progressing  10/25/2024 1342 by Marianela Og RN  Outcome: Progressing  Flowsheets (Taken 10/25/2024 0745)  Verbalizes/displays adequate comfort level or baseline comfort level: Encourage patient to monitor pain and request assistance

## 2024-10-26 NOTE — PROGRESS NOTES
Physical Therapy  Facility/Department: [facility] MED SURG W469/W469-01  Physical Therapy Discharge      NAME: Loren Noyola    : 1935 (89 y.o.)  MRN: [id number]    Account: [id number]  Gender: female      Patient has been discharged from [facility]. DC patient from current PT program.      Electronically signed by Benita Whitley PT on 10/26/24 at 2:46 PM EDT

## 2024-10-26 NOTE — PROGRESS NOTES
Assessment completed this shift. Alert and oriented x 4. Up with standby assist and walker. Lungs clear and BS active. Ultram given at 0821 of aching right hip pain 5/10. Patient has difficulty assigning numbers for pain, but said that it was \" much better\"  after  medication.Takes pills whole one a a time without dificulty.  Electronically signed by Marianela Og RN on 10/26/2024 at 12:10 PM

## 2024-10-26 NOTE — PROGRESS NOTES
Physical Therapy Rehab Treatment Note  Facility/Department: [facility]  [facility]       NAME: Loren Noyola  : 1935 (89 y.o.)  MRN: [id number]  CODE STATUS: Full Code    Date of Service: 10/26/2024     Restrictions:  Restrictions/Precautions: Fall Risk     SUBJECTIVE:   Subjective: \"I'm not dressed for therapy.\"  \"I've got to get someone to help me at home.\"  Pt reports ice helps.    Pain  Pain: 4/10 R side and hip Pt reports she had pain meds and pain patches and it helps.  CP applied to mid/LB for pain control x10 min during therex.  4/10 post  \"I feel the ice but I dont feel the pain.    OBJECTIVE:         Bed mobility  Supine to Sit: Stand by assistance    Transfers  Sit to Stand: Stand by assistance  Stand to Sit: Stand by assistance  Bed to Chair: Stand by assistance (WW)  Comment: VCs for handplacement and to increase anterior wt shift.    Ambulation  Surface: Level tile  Device: Rolling Walker  Other Apparatus:  (L knee brace)  Assistance: Minimal assistance  Quality of Gait: heavy UE support on WW with fwd flexed trunk.  VCs to ambulate closer proximity to WW with upright posture.  Gait Deviations: Slow Kate  Distance: 20ftx2           PT Exercises  A/AROM Exercises: seated AP/LAQ/Marches/Hip Abd with ball/Hip Add YTB x20ea  Dynamic Standing Balance Exercises: Standing posture and wt shifts at WW; sidestepping at WW EOB R and L            ASSESSMENT/PROGRESS TOWARDS GOALS: Pt tolerated increased gait distance this session.  Requires increased time to complete mobility.  VCs required to complete transfers at SBA level.         Goals:  Long Term Goals  Long Term Goal 1: Pt to complete bed mobility with indep  Long Term Goal 2: Pt to complete transfers with indep  Long Term Goal 3: Pt to ambulate 50-150ft with LRD and indep  Long Term Goal 4: Pt to manage 4 steps with HR and SBA  Patient Goals   Patient Goals : to be able to walk without help    PLAN OF CARE/Safety: Cont per POC.        Therapy Time:   Individual   Time In 0900   Time Out 1000   Minutes 60      Minutes:  Transfer/Bed mobility training:15  Gait training:15  Neuro re education:0  Therapeutic ex:30    Emma Santos PTA, 10/26/24 at 10:00 AM

## 2024-10-26 NOTE — PROGRESS NOTES
Subjective:  The patient complains of severe acute on chronic progressive fatigue and mid and low back pain partially relieved by rest, medications, PT,  OT,     and rest and exacerbated by recent fall.  88 yo female was admitted to [facility] after presenting to the ER with pain in her head neck and back after falling at home striking her head on the cushions of her sofa.  She had several previous falls with ER visits in the week prior.  She was found to have a T12 compression fracture and admitted under the care of the hospitalist.   She was seen by the neurosurgeon PA and discussed with neurosurgery this is likely old and there is now planned for surgical intervention.     She complains of a dry tongue and seems to have early thrush-rel with mouth rince.    I am concerned about patient’s medical complexities and barriers to advancing in rehab goals including improving urinary incontinence.        I reviewed current care and plans for further care with other rehab providers including nursing and case management.  According to recent nursing note, \"Her discharge date to home is planned for 11/2/2024 and she would be interested in [facility] and private duty [facility] and she was provided a list. I asked is she would like me to update but rather took my number to give her son for another time \".    Still getting Ultram for foot and hip pain.  It seems to be helping    ROS x10:  The patient also complains of severely impaired mobility and activities of daily living.  Otherwise no new problems with vision, hearing, nose, mouth, throat, dermal, cardiovascular, GI, , pulmonary, musculoskeletal, psychiatric or neurological. See also Acute Rehab PM&R H&P.       Vital signs:  BP (!) 171/68   Pulse 60   Temp 98.7 °F (37.1 °C) (Oral)   Resp 16   Ht 1.6 m (5' 3\")   Wt 83.8 kg (184 lb 12.8 oz)   SpO2 97%   BMI 32.74 kg/m²   I/O:   PO/Intake:  fair PO intake, regular diet    Bowel:   continent   Bladder: incontinent  no

## 2024-10-26 NOTE — PROGRESS NOTES
Physical Therapy Rehab Treatment Note  Facility/Department: [facility]  [facility]       NAME: Loren Noyola  : 1935 (89 y.o.)  MRN: [id number]  CODE STATUS: Full Code    Date of Service: 10/26/2024     Restrictions:  Restrictions/Precautions: Fall Risk     SUBJECTIVE:   Subjective: \"I'm not dressed for therapy.\"  \"I've got to get someone to help me at home.\"  Pt reports ice helps.    Pain  Pain: 4/10 R side and hip Pt reports she had pain meds and pain patches and it helps.  CP applied to mid/LB for pain control x10 min during therex.  4/10 post  \"I feel the ice but I dont feel the pain.    OBJECTIVE:         Bed mobility  Supine to Sit: Stand by assistance    Transfers  Sit to Stand: Stand by assistance  Stand to Sit: Stand by assistance  Bed to Chair: Stand by assistance (WW)  Comment: VCs for handplacement and to increase anterior wt shift.    Ambulation  Surface: Level tile  Device: Rolling Walker  Other Apparatus:  (L knee brace)  Assistance: Minimal assistance  Quality of Gait: heavy UE support on WW with fwd flexed trunk.  VCs to ambulate closer proximity to WW with upright posture.  Gait Deviations: Slow Kate;Decreased step length  Distance: 10ft; 40ft  Comments: Distance limited by reports of increased R hip pain.           PT Exercises  Functional Mobility [employer] STS= 43sec modified requiring UE support           ASSESSMENT/PROGRESS TOWARDS GOALS:1st trial of gait limited by pain and fatigue requiring WC to be brought up.  2nd tril pt tolerated increased distance.  Initiated 5xSTS modified requiring UE support.       Goals:  Long Term Goals  Long Term Goal 1: Pt to complete bed mobility with indep  Long Term Goal 2: Pt to complete transfers with indep  Long Term Goal 3: Pt to ambulate 50-150ft with LRD and indep  Long Term Goal 4: Pt to manage 4 steps with HR and SBA  Patient Goals   Patient Goals : to be able to walk without help    PLAN OF CARE/Safety: Cont per

## 2024-10-27 LAB
BACTERIA UR CULT: ABNORMAL
BACTERIA UR CULT: ABNORMAL
ORGANISM: ABNORMAL

## 2024-10-27 PROCEDURE — 6370000000 HC RX 637 (ALT 250 FOR IP): Performed by: INTERNAL MEDICINE

## 2024-10-27 PROCEDURE — 6370000000 HC RX 637 (ALT 250 FOR IP): Performed by: PHYSICAL MEDICINE & REHABILITATION

## 2024-10-27 PROCEDURE — 1180000000 HC REHAB R&B

## 2024-10-27 RX ORDER — POLYETHYLENE GLYCOL 3350 17 G/17G
17 POWDER, FOR SOLUTION ORAL 2 TIMES DAILY
Status: DISCONTINUED | OUTPATIENT
Start: 2024-10-27 | End: 2024-11-02 | Stop reason: HOSPADM

## 2024-10-27 RX ORDER — SENNOSIDES A AND B 8.6 MG/1
1 TABLET, FILM COATED ORAL NIGHTLY
Status: DISCONTINUED | OUTPATIENT
Start: 2024-10-27 | End: 2024-11-02 | Stop reason: HOSPADM

## 2024-10-27 RX ADMIN — RANOLAZINE 500 MG: 500 TABLET, FILM COATED, EXTENDED RELEASE ORAL at 22:13

## 2024-10-27 RX ADMIN — PANTOPRAZOLE SODIUM 20 MG: 20 TABLET, DELAYED RELEASE ORAL at 06:05

## 2024-10-27 RX ADMIN — METOPROLOL TARTRATE 50 MG: 50 TABLET, FILM COATED ORAL at 07:57

## 2024-10-27 RX ADMIN — NYSTATIN 500000 UNITS: 100000 SUSPENSION ORAL at 22:13

## 2024-10-27 RX ADMIN — ACETAMINOPHEN 500 MG: 500 TABLET ORAL at 07:57

## 2024-10-27 RX ADMIN — LOSARTAN POTASSIUM 50 MG: 50 TABLET, FILM COATED ORAL at 07:57

## 2024-10-27 RX ADMIN — CEPHALEXIN 250 MG: 250 CAPSULE ORAL at 22:13

## 2024-10-27 RX ADMIN — Medication 2000 UNITS: at 16:59

## 2024-10-27 RX ADMIN — Medication 400 MG: at 07:59

## 2024-10-27 RX ADMIN — POLYETHYLENE GLYCOL 3350 17 G: 17 POWDER, FOR SOLUTION ORAL at 11:03

## 2024-10-27 RX ADMIN — CALCIUM 500 MG: 500 TABLET ORAL at 07:57

## 2024-10-27 RX ADMIN — ACETAMINOPHEN 500 MG: 500 TABLET ORAL at 13:05

## 2024-10-27 RX ADMIN — METOPROLOL TARTRATE 50 MG: 50 TABLET, FILM COATED ORAL at 22:14

## 2024-10-27 RX ADMIN — ACETAMINOPHEN 500 MG: 500 TABLET ORAL at 22:13

## 2024-10-27 RX ADMIN — NYSTATIN 500000 UNITS: 100000 SUSPENSION ORAL at 07:57

## 2024-10-27 RX ADMIN — POLYETHYLENE GLYCOL 3350 17 G: 17 POWDER, FOR SOLUTION ORAL at 22:14

## 2024-10-27 RX ADMIN — TRAMADOL HYDROCHLORIDE 50 MG: 50 TABLET ORAL at 02:19

## 2024-10-27 RX ADMIN — SENNOSIDES 8.6 MG: 8.6 TABLET, FILM COATED ORAL at 22:13

## 2024-10-27 RX ADMIN — AMLODIPINE BESYLATE 5 MG: 5 TABLET ORAL at 13:06

## 2024-10-27 RX ADMIN — CEPHALEXIN 250 MG: 250 CAPSULE ORAL at 07:57

## 2024-10-27 RX ADMIN — NYSTATIN 500000 UNITS: 100000 SUSPENSION ORAL at 16:58

## 2024-10-27 RX ADMIN — RANOLAZINE 500 MG: 500 TABLET, FILM COATED, EXTENDED RELEASE ORAL at 07:57

## 2024-10-27 RX ADMIN — TRAMADOL HYDROCHLORIDE 50 MG: 50 TABLET ORAL at 22:13

## 2024-10-27 RX ADMIN — ASPIRIN 81 MG: 81 TABLET, COATED ORAL at 07:57

## 2024-10-27 RX ADMIN — APIXABAN 2.5 MG: 2.5 TABLET, FILM COATED ORAL at 07:58

## 2024-10-27 RX ADMIN — Medication 100 MG: at 07:57

## 2024-10-27 RX ADMIN — LOSARTAN POTASSIUM 50 MG: 50 TABLET, FILM COATED ORAL at 22:13

## 2024-10-27 RX ADMIN — APIXABAN 2.5 MG: 2.5 TABLET, FILM COATED ORAL at 22:14

## 2024-10-27 RX ADMIN — NYSTATIN 500000 UNITS: 100000 SUSPENSION ORAL at 13:06

## 2024-10-27 RX ADMIN — TRAMADOL HYDROCHLORIDE 50 MG: 50 TABLET ORAL at 11:03

## 2024-10-27 ASSESSMENT — PAIN DESCRIPTION - ORIENTATION
ORIENTATION: RIGHT

## 2024-10-27 ASSESSMENT — PAIN SCALES - WONG BAKER
WONGBAKER_NUMERICALRESPONSE: HURTS A LITTLE BIT
WONGBAKER_NUMERICALRESPONSE: NO HURT
WONGBAKER_NUMERICALRESPONSE: HURTS A LITTLE BIT

## 2024-10-27 ASSESSMENT — PAIN DESCRIPTION - DESCRIPTORS
DESCRIPTORS: ACHING
DESCRIPTORS: ACHING

## 2024-10-27 ASSESSMENT — PAIN SCALES - GENERAL
PAINLEVEL_OUTOF10: 9
PAINLEVEL_OUTOF10: 5
PAINLEVEL_OUTOF10: 0
PAINLEVEL_OUTOF10: 6
PAINLEVEL_OUTOF10: 0
PAINLEVEL_OUTOF10: 5
PAINLEVEL_OUTOF10: 7
PAINLEVEL_OUTOF10: 8

## 2024-10-27 ASSESSMENT — PAIN DESCRIPTION - LOCATION
LOCATION: HIP
LOCATION: GENERALIZED
LOCATION: HIP
LOCATION: HIP

## 2024-10-27 NOTE — PLAN OF CARE
Problem: Discharge Planning  Goal: Discharge to home or other facility with appropriate resources  10/26/2024 2034 by Pio Galdamez RN  Outcome: Progressing  10/26/2024 1203 by Marianela Og RN  Outcome: Progressing  Flowsheets (Taken 10/26/2024 0721)  Discharge to home or other facility with appropriate resources: Identify barriers to discharge with patient and caregiver     Problem: Safety - Adult  Goal: Free from fall injury  10/26/2024 2034 by Pio Galdamez RN  Outcome: Progressing  10/26/2024 1203 by Marianela Og RN  Outcome: Progressing     Problem: Skin/Tissue Integrity  Goal: Absence of new skin breakdown  Description: 1.  Monitor for areas of redness and/or skin breakdown  2.  Assess vascular access sites hourly  3.  Every 4-6 hours minimum:  Change oxygen saturation probe site  4.  Every 4-6 hours:  If on nasal continuous positive airway pressure, respiratory therapy assess nares and determine need for appliance change or resting period.  10/26/2024 2034 by Pio Galdamez RN  Outcome: Progressing  10/26/2024 1203 by Marianela Og RN  Outcome: Progressing     Problem: ABCDS Injury Assessment  Goal: Absence of physical injury  10/26/2024 1203 by Marianela Og RN  Outcome: Progressing     Problem: Pain  Goal: Verbalizes/displays adequate comfort level or baseline comfort level  10/26/2024 1203 by Marianela Og RN  Outcome: Progressing  Flowsheets (Taken 10/26/2024 0720)  Verbalizes/displays adequate comfort level or baseline comfort level: Encourage patient to monitor pain and request assistance     Problem: Nutrition Deficit:  Goal: Optimize nutritional status  10/26/2024 1203 by Marianela Og RN  Outcome: Progressing

## 2024-10-27 NOTE — PROGRESS NOTES
Hospitalist Progress Note      PCP: Evette Johnson MD    Date of Admission: 10/23/2024    Chief Complaint: weakness/constipation    Subjective: patient awake/alert     Medications:  Reviewed    Infusion Medications    sodium chloride       Scheduled Medications    polyethylene glycol  17 g Oral BID    senna  1 tablet Oral Nightly    cephALEXin  250 mg Oral 2 times per day    Vitamin D  2,000 Units Oral Dinner    cyanocobalamin  1,000 mcg IntraMUSCular Weekly    coenzyme Q10  100 mg Oral Daily    lidocaine  3 patch TransDERmal Daily    acetaminophen  500 mg Oral TID    amLODIPine  5 mg Oral Lunch    pantoprazole  20 mg Oral QAM AC    nystatin  5 mL Oral 4x Daily    cinnamon oil  1 drop Oral 4x Daily    apixaban  2.5 mg Oral BID    aspirin  81 mg Oral Daily    calcium elemental  500 mg Oral Daily    losartan  50 mg Oral BID    magnesium oxide  400 mg Oral Daily    metoprolol tartrate  50 mg Oral BID    ranolazine  500 mg Oral BID     PRN Meds: camphor-menthol-methyl salicylate, HYDROcodone 5 mg - acetaminophen, traMADol, sodium chloride, polyethylene glycol, acetaminophen **OR** [DISCONTINUED] acetaminophen, hydrALAZINE      Intake/Output Summary (Last 24 hours) at 10/27/2024 1021  Last data filed at 10/27/2024 0718  Gross per 24 hour   Intake 830 ml   Output 750 ml   Net 80 ml       Exam:    BP (!) 151/68   Pulse 60   Temp 96.8 °F (36 °C) (Oral)   Resp 14   Ht 1.6 m (5' 3\")   Wt 83.8 kg (184 lb 12.8 oz)   SpO2 96%   BMI 32.74 kg/m²     General appearance: No apparent distress, appears stated age and cooperative.  HEENT: Pupils equal, round, and reactive to light. Conjunctivae/corneas clear.  Neck: Supple, with full range of motion. No jugular venous distention. Trachea midline.  Respiratory:  Normal respiratory effort. Clear to auscultation, bilaterally without Rales/Wheezes/Rhonchi.  Cardiovascular: Regular rate and rhythm with normal S1/S2 without murmurs, rubs or gallops.  Abdomen: Soft,

## 2024-10-28 LAB
ANION GAP SERPL CALCULATED.3IONS-SCNC: 6 MEQ/L (ref 9–15)
BASOPHILS # BLD: 0 K/UL (ref 0–0.2)
BASOPHILS NFR BLD: 0.4 %
BUN SERPL-MCNC: 54 MG/DL (ref 8–23)
CALCIUM SERPL-MCNC: 8.6 MG/DL (ref 8.5–9.9)
CHLORIDE SERPL-SCNC: 107 MEQ/L (ref 95–107)
CO2 SERPL-SCNC: 27 MEQ/L (ref 20–31)
CREAT SERPL-MCNC: 1.76 MG/DL (ref 0.5–0.9)
EOSINOPHIL # BLD: 0.3 K/UL (ref 0–0.7)
EOSINOPHIL NFR BLD: 2.3 %
ERYTHROCYTE [DISTWIDTH] IN BLOOD BY AUTOMATED COUNT: 14.8 % (ref 11.5–14.5)
GLUCOSE SERPL-MCNC: 90 MG/DL (ref 70–99)
HCT VFR BLD AUTO: 30.3 % (ref 37–47)
HGB BLD-MCNC: 9.8 G/DL (ref 12–16)
LYMPHOCYTES # BLD: 1.3 K/UL (ref 1–4.8)
LYMPHOCYTES NFR BLD: 11.9 %
MCH RBC QN AUTO: 28.8 PG (ref 27–31.3)
MCHC RBC AUTO-ENTMCNC: 32.3 % (ref 33–37)
MCV RBC AUTO: 89.1 FL (ref 79.4–94.8)
MONOCYTES # BLD: 1.1 K/UL (ref 0.2–0.8)
MONOCYTES NFR BLD: 10.3 %
NEUTROPHILS # BLD: 7.9 K/UL (ref 1.4–6.5)
NEUTS SEG NFR BLD: 73 %
PLATELET # BLD AUTO: 288 K/UL (ref 130–400)
POTASSIUM SERPL-SCNC: 5.1 MEQ/L (ref 3.4–4.9)
RBC # BLD AUTO: 3.4 M/UL (ref 4.2–5.4)
SODIUM SERPL-SCNC: 140 MEQ/L (ref 135–144)
WBC # BLD AUTO: 10.9 K/UL (ref 4.8–10.8)

## 2024-10-28 PROCEDURE — 99223 1ST HOSP IP/OBS HIGH 75: CPT | Performed by: INTERNAL MEDICINE

## 2024-10-28 PROCEDURE — 6370000000 HC RX 637 (ALT 250 FOR IP): Performed by: PHYSICAL MEDICINE & REHABILITATION

## 2024-10-28 PROCEDURE — 97116 GAIT TRAINING THERAPY: CPT

## 2024-10-28 PROCEDURE — 97535 SELF CARE MNGMENT TRAINING: CPT

## 2024-10-28 PROCEDURE — 93005 ELECTROCARDIOGRAM TRACING: CPT | Performed by: INTERNAL MEDICINE

## 2024-10-28 PROCEDURE — 97530 THERAPEUTIC ACTIVITIES: CPT

## 2024-10-28 PROCEDURE — 80048 BASIC METABOLIC PNL TOTAL CA: CPT

## 2024-10-28 PROCEDURE — 6370000000 HC RX 637 (ALT 250 FOR IP): Performed by: INTERNAL MEDICINE

## 2024-10-28 PROCEDURE — 85025 COMPLETE CBC W/AUTO DIFF WBC: CPT

## 2024-10-28 PROCEDURE — 97112 NEUROMUSCULAR REEDUCATION: CPT

## 2024-10-28 PROCEDURE — 97110 THERAPEUTIC EXERCISES: CPT

## 2024-10-28 PROCEDURE — 36415 COLL VENOUS BLD VENIPUNCTURE: CPT

## 2024-10-28 PROCEDURE — 99233 SBSQ HOSP IP/OBS HIGH 50: CPT | Performed by: PHYSICAL MEDICINE & REHABILITATION

## 2024-10-28 PROCEDURE — 1180000000 HC REHAB R&B

## 2024-10-28 RX ORDER — AMIODARONE HYDROCHLORIDE 200 MG/1
100 TABLET ORAL DAILY
Status: DISCONTINUED | OUTPATIENT
Start: 2024-10-28 | End: 2024-11-02 | Stop reason: HOSPADM

## 2024-10-28 RX ORDER — SODIUM POLYSTYRENE SULFONATE 15 G/60ML
15 SUSPENSION ORAL; RECTAL ONCE
Status: COMPLETED | OUTPATIENT
Start: 2024-10-28 | End: 2024-10-28

## 2024-10-28 RX ADMIN — METOPROLOL TARTRATE 50 MG: 50 TABLET, FILM COATED ORAL at 22:33

## 2024-10-28 RX ADMIN — TRAMADOL HYDROCHLORIDE 50 MG: 50 TABLET ORAL at 17:45

## 2024-10-28 RX ADMIN — ACETAMINOPHEN 500 MG: 500 TABLET ORAL at 22:34

## 2024-10-28 RX ADMIN — NYSTATIN 500000 UNITS: 100000 SUSPENSION ORAL at 22:34

## 2024-10-28 RX ADMIN — Medication 2000 UNITS: at 17:45

## 2024-10-28 RX ADMIN — AMIODARONE HYDROCHLORIDE 100 MG: 200 TABLET ORAL at 12:17

## 2024-10-28 RX ADMIN — TRAMADOL HYDROCHLORIDE 50 MG: 50 TABLET ORAL at 12:17

## 2024-10-28 RX ADMIN — TRAMADOL HYDROCHLORIDE 50 MG: 50 TABLET ORAL at 06:23

## 2024-10-28 RX ADMIN — NYSTATIN 500000 UNITS: 100000 SUSPENSION ORAL at 08:48

## 2024-10-28 RX ADMIN — ASPIRIN 81 MG: 81 TABLET, COATED ORAL at 08:48

## 2024-10-28 RX ADMIN — ACETAMINOPHEN 500 MG: 500 TABLET ORAL at 13:58

## 2024-10-28 RX ADMIN — CALCIUM 500 MG: 500 TABLET ORAL at 08:49

## 2024-10-28 RX ADMIN — LOSARTAN POTASSIUM 50 MG: 50 TABLET, FILM COATED ORAL at 22:33

## 2024-10-28 RX ADMIN — CEPHALEXIN 250 MG: 250 CAPSULE ORAL at 22:34

## 2024-10-28 RX ADMIN — LOSARTAN POTASSIUM 50 MG: 50 TABLET, FILM COATED ORAL at 08:48

## 2024-10-28 RX ADMIN — APIXABAN 2.5 MG: 2.5 TABLET, FILM COATED ORAL at 08:49

## 2024-10-28 RX ADMIN — APIXABAN 2.5 MG: 2.5 TABLET, FILM COATED ORAL at 22:33

## 2024-10-28 RX ADMIN — SODIUM POLYSTYRENE SULFONATE 15 G: 15 SUSPENSION ORAL; RECTAL at 13:58

## 2024-10-28 RX ADMIN — NYSTATIN 500000 UNITS: 100000 SUSPENSION ORAL at 13:58

## 2024-10-28 RX ADMIN — Medication 400 MG: at 08:49

## 2024-10-28 RX ADMIN — METOPROLOL TARTRATE 50 MG: 50 TABLET, FILM COATED ORAL at 08:48

## 2024-10-28 RX ADMIN — Medication 100 MG: at 08:48

## 2024-10-28 RX ADMIN — CEPHALEXIN 250 MG: 250 CAPSULE ORAL at 08:48

## 2024-10-28 RX ADMIN — AMLODIPINE BESYLATE 5 MG: 5 TABLET ORAL at 12:17

## 2024-10-28 RX ADMIN — NYSTATIN 500000 UNITS: 100000 SUSPENSION ORAL at 17:45

## 2024-10-28 RX ADMIN — RANOLAZINE 500 MG: 500 TABLET, FILM COATED, EXTENDED RELEASE ORAL at 22:34

## 2024-10-28 RX ADMIN — RANOLAZINE 500 MG: 500 TABLET, FILM COATED, EXTENDED RELEASE ORAL at 08:49

## 2024-10-28 RX ADMIN — PANTOPRAZOLE SODIUM 20 MG: 20 TABLET, DELAYED RELEASE ORAL at 05:53

## 2024-10-28 RX ADMIN — ACETAMINOPHEN 500 MG: 500 TABLET ORAL at 08:48

## 2024-10-28 ASSESSMENT — PAIN DESCRIPTION - LOCATION
LOCATION: BACK
LOCATION: HIP

## 2024-10-28 ASSESSMENT — PAIN SCALES - GENERAL
PAINLEVEL_OUTOF10: 7
PAINLEVEL_OUTOF10: 7
PAINLEVEL_OUTOF10: 8
PAINLEVEL_OUTOF10: 1

## 2024-10-28 ASSESSMENT — PAIN DESCRIPTION - DESCRIPTORS
DESCRIPTORS: SORE;ACHING
DESCRIPTORS: ACHING;THROBBING
DESCRIPTORS: ACHING;SORE

## 2024-10-28 ASSESSMENT — PAIN DESCRIPTION - ORIENTATION
ORIENTATION: RIGHT
ORIENTATION: LOWER

## 2024-10-28 ASSESSMENT — PAIN SCALES - WONG BAKER: WONGBAKER_NUMERICALRESPONSE: HURTS A LITTLE BIT

## 2024-10-28 NOTE — CONSULTS
Inpatient consult to Cardiology  Consult performed by: Akin Reeves MD  Consult ordered by: Deb Haro DO          Patient Name: Loren Noyola  Admit Date: 10/23/2024  6:41 PM  MR #: [id number]  : 1935    Attending Physician: Deb Haro DO  Reason for consult: Clariify Home meds    History of Presenting Illness:      Loren Noyola is a 89 y.o. female on hospital day 5 with a history of .   History Obtained From:  patient, electronic medical record    Pt presented with a fall and suffered T12 compression Fx. She is now transferred to acute Rehab.     She is still sore from the fall. She has back and anterior reproducible CP. Denies SOB.     She has had other falls as well.  She is on low dose Eliquis and Amiodarone for AF.  No current ECG.    2024 Echo EF 65-70%  She has CAD s/p remote CABG  History:      EKG:  Past Medical History:   Diagnosis Date    Bleeding ulcer     CAD (coronary artery disease)     Colitis     Hip fx, left, closed, initial encounter ([facility]) 02/10/2018    Hyperlipidemia     Hypertension     Osteoarthritis     Spinal stenosis      Past Surgical History:   Procedure Laterality Date    APPENDECTOMY      COLECTOMY  1/3/2011    sigmoid colectomy with colostomy    COLECTOMY  2011    partial colectomy with colostomy closure    CORONARY ARTERY BYPASS GRAFT      HYSTERECTOMY (CERVIX STATUS UNKNOWN)      HYSTERECTOMY, TOTAL ABDOMINAL (CERVIX REMOVED)      NH HEMIARTHROPLASTY HIP PARTIAL Left 2018    HIP HEMIARTHROPLASTY- performed by Bryan Malcolm MD at [facility]    SIGMOIDOSCOPY      VARICOSE VEIN SURGERY         Family History  History reviewed. No pertinent family history.  [] Unable to obtain due to ventilated and/ or neurologic status    Social History     Socioeconomic History    Marital status:      Spouse name: Not on file    Number of children: 2    Years of education: Not on file    Highest education level: Not on file   Occupational  dictating this exam?->CRC TECHNIQUE: Axial computed tomography images of the chest, abdomen and pelvis without intravenous contrast.  This CT exam was performed using one or more of the following dose reduction techniques:  automated exposure control, adjustment of the mA and/or kV according to patient size, and/or use of iterative reconstruction technique. COMPARISON: 04/25/2023 FINDINGS: CHEST: Lungs:  Posterior basilar lung scarring and/or atelectasis but unchanged.  No mass. Pleural space:  No acute findings.  No significant effusion.  No pneumothorax. Heart:  Borderline to mild cardiomegaly, unchanged.  No significant pericardial effusion.  No significant coronary artery calcifications. ABDOMEN: Liver:  No acute findings. Gallbladder and bile ducts:  No acute findings.  No calcified stones.  No ductal dilation. Pancreas:  No acute findings.  No ductal dilation. Spleen:  No acute findings.  No splenomegaly. Adrenals:  Bilateral adrenal nodules measuring 2.6 cm on the left and 1.9 cm on the right and unchanged with density measurements consistent with benign adrenal adenomas.  These require no additional imaging follow-up. Kidneys and ureters:  1.7 cm mildly hyperdense mass involving the lateral left kidney on image 51 of series 2 which is not definitely seen on the prior study and is of indeterminate density at 58 Hounsfield units.  Multiple simple appearing bilateral renal cysts requiring no additional follow-up. Stomach and bowel:  No acute findings.  No obstruction.  No mucosal thickening. PELVIS: Appendix:  No findings to suggest acute appendicitis. Bladder:  No acute findings.  No stones. Reproductive:  5.8 cm ovoid fluid density mass with some rim calcification in the right lateral pelvis which is unchanged from the prior study consistent with a benign etiology, possibly a postoperative seroma as the patient has undergone prior hysterectomy.  Prior hysterectomy. CHEST, ABDOMEN and PELVIS: Intraperitoneal  10/25/2024     Priority: Low    Urinary incontinence [R32] 10/24/2024     Priority: Low    CAD (coronary artery disease) [I25.10]      Priority: Low    Impaired mobility ADLs dt  thoracic and lumbar spinal stenosis with T12 compression fracture. [Z74.09, Z78.9] 10/23/2024     Priority: Low    Compression fracture of T12 vertebra with routine healing [S22.080D] 10/23/2024     Priority: Low    Left kidney mass [N28.89] 10/23/2024     Priority: Low    Gait instability [R26.81] 10/21/2024     Priority: Low    Vertigo [R42] 11/30/2023     Priority: Low    Right bundle branch block (RBBB) [I45.10] 11/30/2023     Priority: Low    Peripheral vascular disease (HCC) [I73.9] 11/30/2023     Priority: Low    Anemia [D64.9] 02/16/2018     Priority: Low    Primary osteoarthritis of both knees [M17.0] 11/20/2017     Priority: Low    Essential hypertension [I10] 11/20/2017     Priority: Low    DDD (degenerative disc disease), lumbar [M51.369] 11/20/2017     Priority: Low    Spondylolisthesis of lumbar region [M43.16] 12/06/2011     Priority: Low    Cervical spondylosis without myelopathy [M47.812] 05/19/2005     Priority: Low         Impression/Plan:   Fall - recurrent.  T12 Compression FX  CAD s/p  CABG  September 2007; left internal mammary artery to the left anterior descending coronary artery, saphenous vein graft to the first obtuse marginal and second obtuse marginal, diagonal and right coronary artery, left atrial appendage ligation   PAD s/p JULISSA PTCA  PAF - she is on low dose Eliquis.  Will resume Low dose Amiodarone.  Order ECG.   She is a t risk of complications with DOAC and continued falls.  She can be considered for Watchman device as out pt.   Dual Chamber PPM      Thank you for allowing us to participate in the care of this patient.     Will continue to follow.    Please call if questions or concerns arise.    Electronically signed by EDER PRESTON MD on 10/28/2024 at 9:34 AM

## 2024-10-28 NOTE — PROGRESS NOTES
OCCUPATIONAL THERAPY  INPATIENT REHAB TREATMENT NOTE  [facility]      NAME: Loren Noyola  : 1935 (89 y.o.)  MRN: [id number]  CODE STATUS: Full Code  Room: R260/R260-01    Date of Service: 10/28/2024    Referring Physician: Dr Haro  Rehab Diagnosis: Impaired mobility ADLs dt thoracic and lumbar spinal stenosis with T12 compression fracture    Hospital course:   Comments: 89 y.o. female patient who presented to ER 10/21 with complaints of pain to head, neck and right hip after experiencing a fall 10/13. Pain worsened so she came to ER 10/17, d/c'd home after it was confirmed no injury. However, 10/21 patient admitting to another fall and worsening pain not relieved by prescribed norco. CT revealing T12 compression fx. Patient admitted under hospitalist services with neurosx consulting. NeuroSx determined fx to be chronic and no surgical needs.      Restrictions  Restrictions/Precautions  Restrictions/Precautions: Fall Risk     Patient's date of birth confirmed: Yes    SAFETY:  Safety Devices  Safety Devices in place: Yes  Type of devices: All fall risk precautions in place    SUBJECTIVE: \"I don't think I need to put my knee brace because I don't think I have anymore therapy today.\"    Pain at start of treatment: Yes: 9/10    Pain at end of treatment: Yes: 9/10    Location: R lateral lower back  Description: sharp;shooting;ache  Nursing notified: Yes  Nurse: Shannan  Intervention: RN provided pain patches    COGNITION:  Orientation  Overall Orientation Status: Within Functional Limits  Cognition  Overall Cognitive Status: Exceptions  Arousal/Alertness: Appears intact  Following Commands: Follows one step commands with increased time;Follows multistep commands with repitition  Attention Span: Appears intact  Memory: Appears intact  Safety Judgement: Appears intact  Problem Solving: Appears intact  Insights: Appears intact  Initiation: Appears intact  Sequencing: Requires cues for  some    OBJECTIVE:    Grooming/Oral Hygiene  Assistance Level: Modified independent  Skilled Clinical Factors: seated in armchair at bathroom sink  Upper Extremity Bathing  Assistance Level: Set-up  Skilled Clinical Factors: seated in shower chair at grab bars - setup of water temperature and adjusting water temperature  Lower Extremity Bathing  Assistance Level: Stand by assist;Increased time to complete  Skilled Clinical Factors: seated in shower chair and in standing at grab bars - increased time to compete 2° patient with difficulty bathing posterior - patient noted to successfully bathe osterior by competing a deep squat with one hand on grab bar and the other controlling the washcloth  Upper Extremity Dressing  Assistance Level: Modified independent  Skilled Clinical Factors: seated in armchair at bathroom sink  Lower Extremity Dressing  Assistance Level: Supervision  Skilled Clinical Factors: seated in armchair and in standing at bathroom sink  Putting On/Taking Off Footwear  Assistance Level: Modified independent  Skilled Clinical Factors: seated in armchair at bathroom sink  Toileting  Assistance Level: Supervision  Skilled Clinical Factors: grab bars  Toilet Transfers  Technique: Stand step  Equipment: Raised toilet seat with arms  Assistance Level: Stand by assist  Skilled Clinical Factors: ww  Tub/Shower Transfers  Type: Shower  Transfer From: Rolling walker  Transfer To: Shower chair with back  Assistance Level: Contact guard assist;Verbal cues  Skilled Clinical Factors: grab bars - verbal cues for walker <-> grab bars transfer technique    Functional Mobility  Device: Rolling walker  Activity: To/From bathroom  Assistance Level: Stand by assist  Skilled Clinical Factors: patient noted to have quickened pace as if in a hurry to reach destination  Sit to Stand  Assistance Level: Stand by assist  Stand to Sit  Assistance Level: Stand by assist    ASSESSMENT: Patient pleasant and cooperative while

## 2024-10-28 NOTE — PLAN OF CARE
Problem: Discharge Planning  Goal: Discharge to home or other facility with appropriate resources  10/28/2024 1255 by Melinda Proctor RN  Outcome: Progressing  10/28/2024 0024 by Erin Fishman RN  Outcome: Progressing     Problem: Safety - Adult  Goal: Free from fall injury  10/28/2024 1255 by Melinda Proctor RN  Outcome: Progressing  10/28/2024 0024 by Erin Fihsman RN  Outcome: Progressing     Problem: Skin/Tissue Integrity  Goal: Absence of new skin breakdown  Description: 1.  Monitor for areas of redness and/or skin breakdown  2.  Assess vascular access sites hourly  3.  Every 4-6 hours minimum:  Change oxygen saturation probe site  4.  Every 4-6 hours:  If on nasal continuous positive airway pressure, respiratory therapy assess nares and determine need for appliance change or resting period.  10/28/2024 1255 by Melinda Proctor RN  Outcome: Progressing  10/28/2024 0024 by Erin Fishman RN  Outcome: Progressing     Problem: ABCDS Injury Assessment  Goal: Absence of physical injury  10/28/2024 1255 by Melinda Proctor RN  Outcome: Progressing  10/28/2024 0024 by Erin Fishman RN  Outcome: Progressing     Problem: Pain  Goal: Verbalizes/displays adequate comfort level or baseline comfort level  10/28/2024 1255 by Melinda Proctor RN  Outcome: Progressing  10/28/2024 0024 by Erin Fishman RN  Outcome: Progressing     Problem: Nutrition Deficit:  Goal: Optimize nutritional status  10/28/2024 1255 by Melinda Proctor RN  Outcome: Progressing  10/28/2024 0024 by Erin Fishman RN  Outcome: Progressing

## 2024-10-28 NOTE — PROGRESS NOTES
Physical Therapy Rehab Treatment Note  [facility]  [facility]       NAME: Loren Noyola  : 1935 (89 y.o.)  MRN: [id number]  CODE STATUS: Full Code    Date of Service: 10/28/2024     Restrictions:  Restrictions/Precautions: Fall Risk       SUBJECTIVE:   Subjective: Pt with no new reports, agreeable to tx.    Pain  Pain: 8/10 pain in midback, pt medicated prior to session.      OBJECTIVE:            Transfers  Surface: Wheelchair;To chair with arms;From chair with arms  Additional Factors: Verbal cues;Increased time to complete  Device: Walker  Sit to Stand  Assistance Level: Stand by assist  Skilled Clinical Factors: intermittent safety cues required  Stand to Sit  Assistance Level: Stand by assist  Skilled Clinical Factors: vc's for controlled descent with increased pain    Ambulation  Surface: Level surface  Device: Rolling walker  Distance: 30', 30' with 2# ankle weights  Activity: Within Unit  Activity Comments: quality and posture degrades with increasing distances  Additional Factors: Verbal cues;Hand placement cues;Increased time to complete  Assistance Level: Stand by assist;Minimal assistance (touching assist)  Gait Deviations: Slow ximena;Unsteady gait;Path deviations  Skilled Clinical Factors: intermittent touching assist for safety, occasional vc's to improve approach to chair with good carry over           PT Exercises  A/AROM Exercises: seated: AP, hip abd/add with ball x15ea raul  Resistive Exercises: seated with 2# ankle weights: LAQ, march x15ea raul; resisted gait with 2# ankle weights     5 Times Sit to Stand  5 TIMES SIT TO STAND: 44.69 seconds (requires UE use)     Activity Tolerance  Activity Tolerance: Patient tolerated treatment well       ASSESSMENT/PROGRESS TOWARDS GOALS:   Assessment  Assessment: Treatment focused on LE strengthening and gait tolerance. Pt continues to be challenged by endurance during all tasks and required frequent short seated

## 2024-10-28 NOTE — FLOWSHEET NOTE
Patient assessment completed.  Patient is A&Ox4, able to make needs known, medicated with prn tramadol to good effect.  Continent of B&B, up with SBA and FWW, requested purwik for rest this shift.  See head to toe flowsheet for assessment findings.  Currently resting in bed call light in reach, bed alarm engaged, will monitor.  Electronically signed by Erin Fishman RN on 10/28/2024 at 12:24 AM

## 2024-10-28 NOTE — PROGRESS NOTES
Subjective:  The patient complains of severe acute on chronic progressive fatigue and mid and low back pain partially relieved by rest, medications, PT,  OT,     and rest and exacerbated by recent fall.  88 yo female was admitted to [facility] after presenting to the ER with pain in her head neck and back after falling at home striking her head on the cushions of her sofa.  She had several previous falls with ER visits in the week prior.  She was found to have a T12 compression fracture and admitted under the care of the hospitalist.   She was seen by the neurosurgeon PA and discussed with neurosurgery this is likely old and there is now planned for surgical intervention.  Pio Galdamez, ANDREW  Registered Nurse     Plan of Care      Signed     Date of Service: 10/26/2024  8:34 PM     Signed            Problem: Discharge Planning  Goal: Discharge to home or other facility with appropriate resources  10/26/2024 2034 by Pio Galdamez, RN  Outcome: Progressing  10/26/2024 1203 by Marianela gO RN  Outcome: Progressing  Flowsheets (Taken 10/26/2024 0721)  Discharge to home or other facility with appropriate resources: Identify barriers to discharge with patient and caregiver     Problem: Safety - Adult  Goal: Free from fall injury  10/26/2024 2034 by Pio Galdamez RN  Outcome: Progressing  10/26/2024 1203 by Marianela Og RN  Outcome: Progressing     Problem: Skin/Tissue Integrity  Goal: Absence of new skin breakdown  Description: 1.  Monitor for areas of redness and/or skin breakdown  2.  Assess vascular access sites hourly  3.  Every 4-6 hours minimum:  Change oxygen saturation probe site  4.  Every 4-6 hours:  If on nasal continuous positive airway pressure, respiratory therapy assess nares and determine need for appliance change or resting period.  10/26/2024 2034 by Pio Galdamez, RN  Outcome: Progressing  10/26/2024 1203 by Marianela Og RN  Outcome: Progressing     Problem: ABCDS      HEENT:    Pupils equal, hearing intact to loud voice, external inspection of ear and nose benign.  Inspection of lips, tongue and gums benign    Musculoskeletal: No significant change in strength or tone.  All joints stable.      Inspection and palpation of digits and nails show no clubbing, cyanosis or inflammatory conditions.   Neuro/Psychiatric: Affect: flat but pleasant.  Alert and oriented to person, place and situation with    cues.  No significant change in deep tendon reflexes or sensation  Lungs:  Diminished, CTA-B. Respiration effort is normal at rest.     Heart:   S1 = S2,   RRR.       Abdomen:  Soft, non-tender, no enlargement of liver or spleen.  Extremities:    lower extremity edema and tenderness   Skin:   Intact to general survey,  right foot bunion wrapped-superficial wound noted to medial 1st mpj of right foot. Appears healthy and granular, no signs of infection     Rehabilitation:  Physical Therapy:   Bed mobility:  Bed mobility  Rolling to Left: Stand by assistance (10/24/24 1115)  Rolling to Right: Stand by assistance (10/24/24 1115)  Supine to Sit: Stand by assistance (10/26/24 0913)  Sit to Supine: Stand by assistance (10/24/24 1115)  Scooting: Stand by assistance (10/24/24 1115)  Bed Mobility Comments: HOB flat to 2 pillow level as at home. No rails -  increased time and energy required (10/24/24 1115)  Transfers:  Transfers  Sit to Stand: Stand by assistance (10/26/24 0922)  Stand to Sit: Stand by assistance (10/26/24 0922)  Bed to Chair: Stand by assistance (WW) (10/26/24 0922)  Comment: VCs for handplacement and to increase anterior wt shift. (10/26/24 0922)  Transfers  Surface: Wheelchair (10/25/24 1417)  Additional Factors: Verbal cues;Hand placement cues;Increased time to complete (10/25/24 1417)  Device: Walker (// bars) (10/25/24 1417)  Sit to Stand  Assistance Level: Stand by assist (10/25/24 1417)  Skilled Clinical Factors: vc's for hand placement, pt prefers and performs best

## 2024-10-28 NOTE — PROGRESS NOTES
INDIVIDUALIZED OVERALL REHAB PLAN OF CARE  ADDENDUM TO REHAB PROGRESS NOTE-for audit purposes must also refer to this day's clinical note and combine the information      Date: 10/28/2024  Patient Name: Loren Noyola   Room: R260/R260-01    MRN: [id number]    : 1935  (89 y.o.)  Gender: female       Today 10/28/2024 during weekly team meeting, I reviewed the patient Loren Noyola in detail with the therapists and nurses involved in patient's care gathering complex physiatric data regarding current medical issues, progress in therapies, factors limiting progress, social issues, psychological issues, ongoing therapeutic plans and discharge planning.    Legend:  I= independent Im =Modified independent  S=Supervised SB=stand by ALANIZ=set up CG=contact rubén Min= minimal Mod=Moderate Max=maximal Max of 2 =maximal assist of 2 people      CURRENT FUNCTIONAL STATUS:    88 yo female was admitted to [facility] after presenting to the ER with pain in her head neck and back after falling at home striking her head on the cushions of her sofa.  She had several previous falls with ER visits in the week prior.  She was found to have a T12 compression fracture and admitted under the care of the hospitalist.     She was seen by the neurosurgeon PA and discussed with neurosurgery this is likely old and there is now planned for surgical intervention.    NURSING ISSUES:   Patient is A&Ox4, able to make needs known, medicated with prn tramadol to good effect. Continent of B&B, up with SBA and FWW, requested purwik for rest this shift. See head to toe flowsheet for assessment findings. Currently resting in bed call light in reach, bed alarm engaged, will monitor      Nursing will continue to focus on bowel and bladder continence transitioning toward independence by time of discharge.  Monitoring post void residuals monitoring for severe constipation and bowel obstruction.      Barriers to progress and discharge  Check orthostatic BP and monitor heart rate and medications effects with therapy      [x]  Ambulate to the bathroom in room    [x]  Add scheduled rest beaks     [x]  In room therapies as needed      Discharge date set for:              11/2/24      Home with:   spouse (ill) with help from   sons            And:     Home Health Care:     [x]  PT    [x]  OT    []  ST   [x]  Aide       [x]  RN                    Equipment:  WW,        At D/C their function is goaled at:   PT:Long Term Goal 1: Pt to complete bed mobility with indep  Long Term Goal 2: Pt to complete transfers with indep  Long Term Goal 3: Pt to ambulate 50-150ft with LRD and indep  Long Term Goal 4: Pt to manage 4 steps with HR and SBA  OT:Eating  Assistance Needed: Independent  CARE Score: 6  Discharge Goal: Independent, Oral Hygiene  Assistance Needed: Setup or clean-up assistance  CARE Score: 5  Discharge Goal: Independent, Toileting Hygiene  Reason if not Attempted: Patient refused  CARE Score: 7  Discharge Goal: Independent, Shower/Bathe Self  Assistance Needed: Partial/moderate assistance  CARE Score: 3  Discharge Goal: Independent  Upper Body Dressing  Assistance Needed: Setup or clean-up assistance  CARE Score: 5  Discharge Goal: Independent, Lower Body Dressing  Assistance Needed: Partial/moderate assistance  CARE Score: 3  Discharge Goal: Independent, Putting On/Taking Off Footwear  Assistance Needed: Supervision or touching assistance  CARE Score: 4  Discharge Goal: Independent, Toilet Transfer  Reason if not Attempted: Patient refused  CARE Score: 7  Discharge Goal: Independent  SP:               From a cognitive standpoint they will need:        24 hr vs daily transitioning to supervision  -->progress to occasional             Significant problems/ barriers to functional progress include: Pt is at a high risk for functional loss,      []  Acute infection/UTI    []  Low BP's     []  COPD flare-up   []  Uncontrolled blood sugar     []

## 2024-10-28 NOTE — CARE COORDINATION
[facility]  INPATIENT REHABILITATION  TEAM CONFERENCE NOTE  Room: R260/R260-01  Admit Date: 10/23/2024       Date: 10/28/2024  Patient Name: Loren Noyola        MRN: [id number]    : 1935  (89 y.o.)  Gender: female        REHAB DIAGNOSIS:   Diagnosis: Impaired mobility and ADL's due to T12 compression fracture    CO MORBIDITIES:      Past Medical History:   Diagnosis Date    Bleeding ulcer     CAD (coronary artery disease)     Colitis     Hip fx, left, closed, initial encounter ([facility]) 02/10/2018    Hyperlipidemia     Hypertension     Osteoarthritis     Spinal stenosis      Past Surgical History:   Procedure Laterality Date    APPENDECTOMY      COLECTOMY  1/3/2011    sigmoid colectomy with colostomy    COLECTOMY  2011    partial colectomy with colostomy closure    CORONARY ARTERY BYPASS GRAFT      HYSTERECTOMY (CERVIX STATUS UNKNOWN)      HYSTERECTOMY, TOTAL ABDOMINAL (CERVIX REMOVED)      ND HEMIARTHROPLASTY HIP PARTIAL Left 2018    HIP HEMIARTHROPLASTY- performed by Bryan Malcolm [place] [facility] [place]    SIGMOIDOSCOPY      VARICOSE VEIN SURGERY          Restrictions  Restrictions/Precautions: Fall Risk  CASE MANAGEMENT    Social/Functional History  Social/Functional History  Lives With: Spouse  Type of Home: House (laundry is on the first floor- children added an extra room on the house which is a bathroom and has a small washer/dryer)  Home Layout: One level  Home Access: Stairs to enter with rails  Entrance Stairs - Number of Steps: 4  Entrance Stairs - Rails: Left (one rail on the outside one to two steps then there is a landing and then 3 steps with 2 rails)  Bathroom Shower/Tub: Walk-in shower, Curtain  Bathroom Toilet: Handicap height  Bathroom Equipment: Built-in shower seat, Hand-held shower, Grab bars in shower, Grab bars around toilet  Bathroom Accessibility: Accessible  Home Equipment: Cane, Rollator (Patient owned a reacher but does not know where it is,  difficulties        1. Safety:          - Intervention / Plan:    [x]  falls protocol     [x]  PT/OT    []  SP        - Results:         2. Potential DME needs:         - Intervention / Plan:  [x]  PT/OT     [x]  Assess equipment needs/access       - Results:         3.  Weakness:          - Intervention / Plan:  [x]  PT/OT      []  Other:         - Results:         4.  Discharge planning needs:          - Intervention / Plan:  [x]  Weekly team conference      [x]  family training        - Results:         5.            - Intervention / Plan:          - Results:         6.            - Intervention / Plan:         - Results:         7.            - Intervention / Plan:         - Results:           Discharge Plan   Estimated Length of Stay: 11 days    Tentative Discharge date: 11/2/24      Anticipated Discharge Destination:  Home      Team recommendations:    1. Follow up Therapy :    PT  OT  RN  Social Work  HH Aide    2. Home Health    Other:     Equipment needed at Discharge: Other: TBD      Team Members Present at Conference:    Physician: Dr. Deb Haro  : RABIA Hwang, KIRTI  RN: Shannan Proctor RN  Physical Therapist: Shey Abernathy, PT  Occupational Therapist: Olga Shelton OTR  Speech Therapist: Mickie Williamson, SLP      Electronically signed by RABIA Hwang LSW on 10/28/2024 at 3:15 PM

## 2024-10-28 NOTE — PROGRESS NOTES
OCCUPATIONAL THERAPY  INPATIENT REHAB TREATMENT NOTE  [facility]      NAME: Loren Noyola  : 1935 (89 y.o.)  MRN: [id number]  CODE STATUS: Full Code  Room: R260/R260-01    Date of Service: 10/28/2024    Referring Physician: Dr Haro  Rehab Diagnosis: Impaired mobility ADLs dt thoracic and lumbar spinal stenosis with T12 compression fracture    Hospital course:   Comments: 89 y.o. female patient who presented to ER 10/21 with complaints of pain to head, neck and right hip after experiencing a fall 10/13. Pain worsened so she came to ER 10/17, d/c'd home after it was confirmed no injury. However, 10/21 patient admitting to another fall and worsening pain not relieved by prescribed norco. CT revealing T12 compression fx. Patient admitted under hospitalist services with neurosx consulting. NeuroSx determined fx to be chronic and no surgical needs.      Restrictions  Restrictions/Precautions  Restrictions/Precautions: Fall Risk     Patient's date of birth confirmed: Yes    SAFETY:  Safety Devices  Safety Devices in place: Yes  Type of devices: All fall risk precautions in place    SUBJECTIVE: \"My husbands in a w/c too but he get therapy. I think when I go home, I'll see if he'll do some of things that I learned here.\"    Pain at start of treatment: Yes: 4/10    Pain at end of treatment: Yes: 5/10    Location: R lateral lower back  Description: \"mild\"  Nursing notified: Declined  Intervention: None    COGNITION:  Orientation  Overall Orientation Status: Within Functional Limits  Cognition  Overall Cognitive Status: Exceptions  Arousal/Alertness: Appears intact  Following Commands: Follows one step commands with increased time;Follows multistep commands with repitition  Attention Span: Appears intact  Memory: Appears intact  Safety Judgement: Appears intact  Problem Solving: Appears intact  Insights: Appears intact  Initiation: Appears intact  Sequencing: Requires cues for  by:    Emperatriz Toro SALLY,   10/28/2024, 4:13 PM

## 2024-10-28 NOTE — PROGRESS NOTES
Physical Therapy Rehab Treatment Note  Facility/Department: [facility]  [facility]       NAME: Loren Noyola  : 1935 (89 y.o.)  MRN: [id number]  CODE STATUS: Full Code    Date of Service: 10/28/2024       Restrictions:  Restrictions/Precautions: Fall Risk       SUBJECTIVE:        Pain  Pain: 7/10 pain in midback on right  initially- ache - pain medications taken prior to therapy session; Pain 9/10 at end of session - med already in place prior to session      OBJECTIVE:             Bed Mobility  Overall Assistance Level: Independent  Additional Factors: Increased time to complete;Head of bed flat;Without handrails  Roll Left  Assistance Level: Independent  Roll Right  Assistance Level: Independent  Sit to Supine  Assistance Level: Independent  Supine to Sit  Assistance Level: Independent  Skilled Clinical Factors: increased time and energy required  Scooting  Assistance Level: Independent    Transfers  Surface: Wheelchair;From chair with arms;To chair with arms;To bed (multiple trials within session)  Additional Factors: Verbal cues;Increased time to complete  Sit to Stand  Assistance Level: Stand by assist  Skilled Clinical Factors: intermittent safety cues required  Stand to Sit  Assistance Level: Stand by assist  Skilled Clinical Factors: vc's for controlled descent with increased pain  Car Transfer  Assistance Level: Moderate assistance  Skilled Clinical Factors: lifting assistance out of car    Ambulation  Surface: Level surface  Device: Rolling walker  Distance: 20 ft x 3; 30 ft; 40 feet  Activity Comments: quality and posture degrades with increasing distances  Additional Factors: Verbal cues;Hand placement cues;Increased time to complete  Assistance Level: Minimal assistance  Gait Deviations: Slow ximena;Unsteady gait;Path deviations  Skilled Clinical Factors: intermittent lat LOB with intermittent physical assistance required to correct; emphasis of tx on multiple trials for short  distance to build endurance and follow thru of iinstructions - improved planning of approach to chairs noted    Stairs  Stair Height: 6''  Number of Stairs: 2 steps twice with rest between  Assistance Level: Minimal assistance  Skilled Clinical Factors - Comments: steadying assistance required to maintain balance              PT Exercises  Dynamic Standing Balance Exercises: challenges included gait in // bars sideways - emphasis on upright posture, reducing UE support and LE placement. Pt requires intermittent assistance for balance maintenance  Motor Control/Coordination: BLE strength and control activities with resisted isometric LE ex included in supine                        ASSESSMENT/PROGRESS TOWARDS GOALS:   Assessment  Assessment: Pt is demonstrating good improvement. She is progressing towards goals. She presents with overall reduced endurance at this time requiring rest between tasks    Goals:  Long Term Goals  Long Term Goal 1: Pt to complete bed mobility with indep  Long Term Goal 2: Pt to complete transfers with indep  Long Term Goal 3: Pt to ambulate 50-150ft with LRD and indep  Long Term Goal 4: Pt to manage 4 steps with HR and SBA  Patient Goals   Patient Goals : to be able to walk without help    PLAN OF CARE/Safety:   Safety Devices  Type of Devices: Chair alarm in place;Left in chair;Call light within reach      Therapy Time:   Individual   Time In 925   Time Out 1025   Minutes 60      Minutes:  Transfer/Bed mobility trainin  Gait trainin  Neuro re education:20        Shey Abernathy, PT, 10/28/24 at 11:51 AM

## 2024-10-28 NOTE — PROGRESS NOTES
Pt given PRN Tramadol for c/o back pain 7/10 see MAR. Pt in w/c in room, ate dinner well. Call light in reach. Electronically signed by Melinda Proctor RN on 10/28/2024 at 6:30 PM

## 2024-10-28 NOTE — PROGRESS NOTES
Subjective:  The patient complains of severe acute on chronic progressive fatigue and mid and low back pain partially relieved by rest, medications, PT,  OT,     and rest and exacerbated by recent fall.  88 yo female was admitted to [facility] after presenting to the ER with pain in her head neck and back after falling at home striking her head on the cushions of her sofa.  She had several previous falls with ER visits in the week prior.  She was found to have a T12 compression fracture and admitted under the care of the hospitalist.   She was seen by the neurosurgeon PA and discussed with neurosurgery this is likely old and there is now planned for surgical intervention.     She complains of a dry tongue and seems to have early thrush-rel with mouth rince.    I am concerned about patient’s medical complexities and barriers to advancing in rehab goals including improving urinary incontinence.        I reviewed current care and plans for further care with other rehab providers including nursing and case management.  According to recent nursing note, \"Patient is A&Ox4, able to make needs known, medicated with prn tramadol to good effect. Continent of B&B, up with SBA and FWW, requested purwik for rest this shift. See head to toe flowsheet for assessment findings. Currently resting in bed call light in reach, bed alarm engaged, will monitor \".    I recently changed her from Macrobid to Keflex for her UTI and her consultation with urology.    Patient is wondering whether she should be back on her amiodarone which was on her home med list somehow is off her med list when she came to us will clarify with cardiology.    ROS x10:  The patient also complains of severely impaired mobility and activities of daily living.  Otherwise no new problems with vision, hearing, nose, mouth, throat, dermal, cardiovascular, GI, , pulmonary, musculoskeletal, psychiatric or neurological. See also Acute Rehab PM&R H&P.       Vital  Increased time to complete;Head of bed flat;Without handrails (10/28/24 0947)  Roll Left  Assistance Level: Independent (10/28/24 0947)  Roll Right  Assistance Level: Independent (10/28/24 0947)  Sit to Supine  Assistance Level: Independent (10/28/24 0947)  Supine to Sit  Assistance Level: Independent (10/28/24 0947)  Skilled Clinical Factors: increased time and energy required (10/28/24 0947)  Scooting  Assistance Level: Independent (10/28/24 0947)  Transfers:  Transfers  Sit to Stand: Stand by assistance (10/26/24 0922)  Stand to Sit: Stand by assistance (10/26/24 0922)  Bed to Chair: Stand by assistance (WW) (10/26/24 0922)  Comment: VCs for handplacement and to increase anterior wt shift. (10/26/24 0922)  Transfers  Surface: Wheelchair;To chair with arms;From chair with arms (10/28/24 1313)  Additional Factors: Verbal cues;Increased time to complete (10/28/24 1313)  Device: Walker (10/28/24 1313)  Sit to Stand  Assistance Level: Stand by assist (10/28/24 1313)  Skilled Clinical Factors: intermittent safety cues required (10/28/24 1313)  Stand to Sit  Assistance Level: Stand by assist (10/28/24 1313)  Skilled Clinical Factors: vc's for controlled descent with increased pain (10/28/24 1313)  Car Transfer  Assistance Level: Moderate assistance (10/28/24 0947)  Skilled Clinical Factors: lifting assistance out of car (10/28/24 0947)  Gait:   Ambulation  Surface: Level tile (10/26/24 1410)  Device: Rolling Walker (10/26/24 1410)  Other Apparatus:  (L knee brace) (10/26/24 0945)  Assistance: Minimal assistance (10/26/24 1410)  Quality of Gait: heavy UE support on WW with fwd flexed trunk.  VCs to ambulate closer proximity to WW with upright posture. (10/26/24 1410)  Gait Deviations: Slow Kate;Decreased step length (10/26/24 1410)  Distance: 10ft; 40ft (10/26/24 1410)  Comments: Distance limited by reports of increased R hip pain. (10/26/24 1410)  Ambulation  Surface: Level surface (10/28/24 1320)  Device: Rolling

## 2024-10-28 NOTE — PLAN OF CARE
Problem: Discharge Planning  Goal: Discharge to home or other facility with appropriate resources  10/28/2024 0024 by Erin Fishman RN  Outcome: Progressing  10/27/2024 1212 by Yari Headley RN  Outcome: Progressing     Problem: Safety - Adult  Goal: Free from fall injury  10/28/2024 0024 by Erin Fishman RN  Outcome: Progressing  10/27/2024 1212 by Yari Headley RN  Outcome: Progressing     Problem: Skin/Tissue Integrity  Goal: Absence of new skin breakdown  Description: 1.  Monitor for areas of redness and/or skin breakdown  2.  Assess vascular access sites hourly  3.  Every 4-6 hours minimum:  Change oxygen saturation probe site  4.  Every 4-6 hours:  If on nasal continuous positive airway pressure, respiratory therapy assess nares and determine need for appliance change or resting period.  10/28/2024 0024 by Erin Fishman RN  Outcome: Progressing  10/27/2024 1212 by Yari Headley RN  Outcome: Progressing     Problem: ABCDS Injury Assessment  Goal: Absence of physical injury  10/28/2024 0024 by Erin Fishman RN  Outcome: Progressing  10/27/2024 1212 by Yari Headley RN  Outcome: Progressing     Problem: Pain  Goal: Verbalizes/displays adequate comfort level or baseline comfort level  10/28/2024 0024 by Erin Fishman RN  Outcome: Progressing  10/27/2024 1212 by Yari Headley RN  Outcome: Progressing     Problem: Nutrition Deficit:  Goal: Optimize nutritional status  10/28/2024 0024 by Erin Fishman RN  Outcome: Progressing  10/27/2024 1212 by Yari Headley RN  Outcome: Progressing

## 2024-10-29 PROCEDURE — 6370000000 HC RX 637 (ALT 250 FOR IP): Performed by: INTERNAL MEDICINE

## 2024-10-29 PROCEDURE — 1180000000 HC REHAB R&B

## 2024-10-29 PROCEDURE — 97110 THERAPEUTIC EXERCISES: CPT

## 2024-10-29 PROCEDURE — 97116 GAIT TRAINING THERAPY: CPT

## 2024-10-29 PROCEDURE — 97535 SELF CARE MNGMENT TRAINING: CPT

## 2024-10-29 PROCEDURE — 99231 SBSQ HOSP IP/OBS SF/LOW 25: CPT | Performed by: PHYSICIAN ASSISTANT

## 2024-10-29 PROCEDURE — 97530 THERAPEUTIC ACTIVITIES: CPT

## 2024-10-29 PROCEDURE — 6370000000 HC RX 637 (ALT 250 FOR IP): Performed by: PHYSICAL MEDICINE & REHABILITATION

## 2024-10-29 PROCEDURE — 99232 SBSQ HOSP IP/OBS MODERATE 35: CPT | Performed by: PHYSICAL MEDICINE & REHABILITATION

## 2024-10-29 RX ADMIN — ACETAMINOPHEN 500 MG: 500 TABLET ORAL at 14:27

## 2024-10-29 RX ADMIN — CEPHALEXIN 250 MG: 250 CAPSULE ORAL at 08:41

## 2024-10-29 RX ADMIN — PANTOPRAZOLE SODIUM 20 MG: 20 TABLET, DELAYED RELEASE ORAL at 08:42

## 2024-10-29 RX ADMIN — AMLODIPINE BESYLATE 5 MG: 5 TABLET ORAL at 12:35

## 2024-10-29 RX ADMIN — NYSTATIN 500000 UNITS: 100000 SUSPENSION ORAL at 23:04

## 2024-10-29 RX ADMIN — ACETAMINOPHEN 500 MG: 500 TABLET ORAL at 08:41

## 2024-10-29 RX ADMIN — NYSTATIN 500000 UNITS: 100000 SUSPENSION ORAL at 08:40

## 2024-10-29 RX ADMIN — METOPROLOL TARTRATE 50 MG: 50 TABLET, FILM COATED ORAL at 23:06

## 2024-10-29 RX ADMIN — Medication 400 MG: at 08:41

## 2024-10-29 RX ADMIN — AMIODARONE HYDROCHLORIDE 100 MG: 200 TABLET ORAL at 08:42

## 2024-10-29 RX ADMIN — TRAMADOL HYDROCHLORIDE 50 MG: 50 TABLET ORAL at 06:44

## 2024-10-29 RX ADMIN — NYSTATIN 500000 UNITS: 100000 SUSPENSION ORAL at 18:00

## 2024-10-29 RX ADMIN — ACETAMINOPHEN 500 MG: 500 TABLET ORAL at 23:06

## 2024-10-29 RX ADMIN — CALCIUM 500 MG: 500 TABLET ORAL at 08:42

## 2024-10-29 RX ADMIN — METOPROLOL TARTRATE 50 MG: 50 TABLET, FILM COATED ORAL at 08:42

## 2024-10-29 RX ADMIN — NYSTATIN 500000 UNITS: 100000 SUSPENSION ORAL at 12:35

## 2024-10-29 RX ADMIN — CEPHALEXIN 250 MG: 250 CAPSULE ORAL at 23:06

## 2024-10-29 RX ADMIN — APIXABAN 2.5 MG: 2.5 TABLET, FILM COATED ORAL at 23:06

## 2024-10-29 RX ADMIN — TRAMADOL HYDROCHLORIDE 50 MG: 50 TABLET ORAL at 00:09

## 2024-10-29 RX ADMIN — RANOLAZINE 500 MG: 500 TABLET, FILM COATED, EXTENDED RELEASE ORAL at 23:05

## 2024-10-29 RX ADMIN — Medication 100 MG: at 08:41

## 2024-10-29 RX ADMIN — APIXABAN 2.5 MG: 2.5 TABLET, FILM COATED ORAL at 08:42

## 2024-10-29 RX ADMIN — ASPIRIN 81 MG: 81 TABLET, COATED ORAL at 08:41

## 2024-10-29 RX ADMIN — HYDROCODONE BITARTRATE AND ACETAMINOPHEN 1 TABLET: 5; 325 TABLET ORAL at 15:19

## 2024-10-29 RX ADMIN — TRAMADOL HYDROCHLORIDE 50 MG: 50 TABLET ORAL at 12:35

## 2024-10-29 RX ADMIN — Medication 2000 UNITS: at 18:00

## 2024-10-29 RX ADMIN — TRAMADOL HYDROCHLORIDE 50 MG: 50 TABLET ORAL at 18:00

## 2024-10-29 RX ADMIN — LOSARTAN POTASSIUM 50 MG: 50 TABLET, FILM COATED ORAL at 23:06

## 2024-10-29 RX ADMIN — SENNOSIDES 8.6 MG: 8.6 TABLET, FILM COATED ORAL at 23:05

## 2024-10-29 RX ADMIN — LOSARTAN POTASSIUM 50 MG: 50 TABLET, FILM COATED ORAL at 08:42

## 2024-10-29 RX ADMIN — HYDROCODONE BITARTRATE AND ACETAMINOPHEN 1 TABLET: 5; 325 TABLET ORAL at 23:04

## 2024-10-29 RX ADMIN — RANOLAZINE 500 MG: 500 TABLET, FILM COATED, EXTENDED RELEASE ORAL at 08:42

## 2024-10-29 ASSESSMENT — PAIN SCALES - GENERAL
PAINLEVEL_OUTOF10: 7
PAINLEVEL_OUTOF10: 5
PAINLEVEL_OUTOF10: 5
PAINLEVEL_OUTOF10: 8
PAINLEVEL_OUTOF10: 8
PAINLEVEL_OUTOF10: 4

## 2024-10-29 ASSESSMENT — PAIN DESCRIPTION - DESCRIPTORS
DESCRIPTORS: ACHING;SORE

## 2024-10-29 ASSESSMENT — PAIN DESCRIPTION - LOCATION
LOCATION: BACK

## 2024-10-29 ASSESSMENT — PAIN DESCRIPTION - ORIENTATION
ORIENTATION: LOWER
ORIENTATION: LOWER

## 2024-10-29 ASSESSMENT — ENCOUNTER SYMPTOMS: APNEA: 0

## 2024-10-29 NOTE — PROGRESS NOTES
Subjective:      Patient ID: Loren Noyola is a 89 y.o. female    HPI This is a 89 yo female with h/o Renal cysts (Bosniak 2F, prior MRI) followed at [facility] locally, HTN, CAD, Colitis, admitted with T12 compression fracture due to fall and is currently in rehab. She has long h/o mixed type incontinence UI > NALDO and had been on Trospium in past by Dr Mckeon her [facility] Urologist. She still has these complaints and may be worsened due to U/A that suggests possible UTI. She had some mild dysuria and no hematuria.             Past Medical History:   Diagnosis Date    Bleeding ulcer     CAD (coronary artery disease)     Colitis     Hip fx, left, closed, initial encounter ([facility]) 02/10/2018    Hyperlipidemia     Hypertension     Osteoarthritis     Spinal stenosis      Past Surgical History:   Procedure Laterality Date    APPENDECTOMY      COLECTOMY  1/3/2011    sigmoid colectomy with colostomy    COLECTOMY  7/26/2011    partial colectomy with colostomy closure    CORONARY ARTERY BYPASS GRAFT      HYSTERECTOMY (CERVIX STATUS UNKNOWN)      HYSTERECTOMY, TOTAL ABDOMINAL (CERVIX REMOVED)      MS HEMIARTHROPLASTY HIP PARTIAL Left 2/8/2018    HIP HEMIARTHROPLASTY- [place] Malcolm [place] [facility] [place]    SIGMOIDOSCOPY      VARICOSE VEIN SURGERY       Social History     Socioeconomic History    Marital status:      Spouse name: None    Number of children: 2    Years of education: None    Highest education level: None   Tobacco Use    Smoking status: Former     Types: Cigarettes    Smokeless tobacco: Never   Vaping Use    Vaping status: Never Used   Substance and Sexual Activity    Alcohol use: Not Currently    Drug use: Not Currently   Social History Narrative    Lives With: Spouse-who is ill-she is his caregiver-2 sons live locally and help her with his care.    Type of Home: [address] in [place]         Home Layout: One level    Home Access: Stairs to enter with rails- Number of Steps: 4    Bathroom  10/29/24 0842    losartan (COZAAR) tablet 50 mg  50 mg Oral BID Nella Vincentzid R, DO   50 mg at 10/29/24 0842    magnesium oxide (MAG-OX) tablet 400 mg  400 mg Oral Daily Carlton, Nellazid R, DO   400 mg at 10/29/24 0841    metoprolol tartrate (LOPRESSOR) tablet 50 mg  50 mg Oral BID Carlton, Yazid R, DO   50 mg at 10/29/24 0842    ranolazine (RANEXA) extended release tablet 500 mg  500 mg Oral BID Carlton, Yazid R, DO   500 mg at 10/29/24 0842     Lisinopril, Statins, and Sulfa antibiotics  reviewed      Review of Systems   Constitutional:  Negative for fever.   HENT:  Negative for congestion.    Respiratory:  Negative for apnea.    Genitourinary:  Negative for difficulty urinating and hematuria.   Neurological:  Negative for speech difficulty.         Objective:   Physical Exam  HENT:      Mouth/Throat:      Mouth: Mucous membranes are moist.   Pulmonary:      Effort: Pulmonary effort is normal.   Neurological:      Mental Status: She is alert and oriented to person, place, and time.          Assessment:       This is a 89 yo female with h/o Renal cysts (Bosniak 2F, prior MRI) followed at [facility] locally, HTN, CAD, Colitis, admitted with T12 compression fracture due to fall and is currently in rehab. She has long h/o mixed type incontinence UI > NALDO and had been on Trospium in past by Dr Mckeon her [facility] Urologist. She still has these complaints and may be worsened due to U/A that suggests possible UTI. She had some mild dysuria and no hematuria.               Plan:     Continue antibiotics  Follow up with esstablished urologist or Dr. Zuniga after discharge        Meet Kenney PA-C

## 2024-10-29 NOTE — PROGRESS NOTES
Physical Therapy Rehab Treatment Note  [place] [facility]  [facility]       NAME: Loren Noyola  : 1935 (89 y.o.)  MRN: [id number]  CODE STATUS: Full Code    Date of Service: 10/29/2024       Restrictions:  Restrictions/Precautions: Fall Risk       SUBJECTIVE:   Subjective: \"I slept better\"    Pain  Pain: 4/10 pain in midback, pt medicated prior to session.      OBJECTIVE:     Transfers  Surface: Wheelchair;To mat;From mat  Additional Factors: Verbal cues;Increased time to complete  Device: Walker  Sit to Stand  Assistance Level: Stand by assist  Skilled Clinical Factors: intermittent safety cues required, overall improved hand placement  Stand to Sit  Skilled Clinical Factors: vc's for controlled descent with increased pain    Ambulation  Surface: Level surface  Device: Rolling walker  Distance: 30' x 2, 15' x 2  Activity: Within Unit  Activity Comments: quality and posture degrades with increasing distances  Additional Factors: Verbal cues;Hand placement cues;Increased time to complete  Assistance Level: Stand by assist (touching assist)  Gait Deviations: Slow ximena;Unsteady gait;Path deviations  Skilled Clinical Factors: intermittent touching assist for safety, occasional vc's to improve approach to seated surface with good carry over    PT Exercises  PROM Exercises: seated HS/gastroc stretch x3 30 sec hold BLE  A/AROM Exercises: seated: AP, LAQ, Marches, hip abd/add with ball x15ea raul  Resistive Exercises: seated YTB HS curls/hip abd x10 BLE  Dynamic Standing Balance Exercises: standing step-ups on 4 inch block x10 ea with focus on stair progression  Postural Correction Exercises: seated scapular retraction x10     Activity Tolerance  Activity Tolerance: Patient tolerated treatment well    ASSESSMENT/PROGRESS TOWARDS GOALS:   Assessment  Assessment: Good tolerance to session this date, overall improvements noted in pain as opposed to previous session as well as progression

## 2024-10-29 NOTE — PLAN OF CARE
Problem: Discharge Planning  Goal: Discharge to home or other facility with appropriate resources  10/29/2024 1138 by Melinda Proctor RN  Outcome: Progressing  10/29/2024 0103 by Leticia Hernandez RN  Outcome: Progressing     Problem: Safety - Adult  Goal: Free from fall injury  10/29/2024 1138 by Melinda Proctor RN  Outcome: Progressing  10/29/2024 0103 by Leticia Hernandez RN  Outcome: Progressing     Problem: Skin/Tissue Integrity  Goal: Absence of new skin breakdown  Description: 1.  Monitor for areas of redness and/or skin breakdown  2.  Assess vascular access sites hourly  3.  Every 4-6 hours minimum:  Change oxygen saturation probe site  4.  Every 4-6 hours:  If on nasal continuous positive airway pressure, respiratory therapy assess nares and determine need for appliance change or resting period.  10/29/2024 1138 by Melinda Proctor RN  Outcome: Progressing  10/29/2024 0103 by Leticia Hernandez RN  Outcome: Progressing     Problem: ABCDS Injury Assessment  Goal: Absence of physical injury  10/29/2024 1138 by Melinda Proctor RN  Outcome: Progressing  10/29/2024 0103 by Leticia Hernandez RN  Outcome: Progressing     Problem: Pain  Goal: Verbalizes/displays adequate comfort level or baseline comfort level  10/29/2024 1138 by Melinda Proctor RN  Outcome: Progressing  10/29/2024 0103 by Leticia Hernandez RN  Outcome: Progressing     Problem: Nutrition Deficit:  Goal: Optimize nutritional status  10/29/2024 1138 by Melinda Proctor RN  Outcome: Progressing  10/29/2024 0103 by Leticia Hernandez RN  Outcome: Progressing

## 2024-10-29 NOTE — PROGRESS NOTES
OCCUPATIONAL THERAPY  INPATIENT REHAB TREATMENT NOTE  [facility]      NAME: Loren Noyola  : 1935 (89 y.o.)  MRN: [id number]  CODE STATUS: Full Code  Room: R260/R260-01    Date of Service: 10/29/2024    Referring Physician: Dr Haro  Rehab Diagnosis: Impaired mobility ADLs dt thoracic and lumbar spinal stenosis with T12 compression fracture    Hospital course:   Comments: 89 y.o. female patient who presented to ER 10/21 with complaints of pain to head, neck and right hip after experiencing a fall 10/13. Pain worsened so she came to ER 10/17, d/c'd home after it was confirmed no injury. However, 10/21 patient admitting to another fall and worsening pain not relieved by prescribed norco. CT revealing T12 compression fx. Patient admitted under hospitalist services with neurosx consulting. NeuroSx determined fx to be chronic and no surgical needs.      Restrictions  Restrictions/Precautions  Restrictions/Precautions: Fall Risk     Patient's date of birth confirmed: Yes    SAFETY:  Safety Devices  Safety Devices in place: Yes  Type of devices: All fall risk precautions in place    SUBJECTIVE: \"She's a good one.\" - OT therapist    Pain at start of treatment: Yes: 8/10    Pain at end of treatment: Yes: 7/10    Location: R lateral lower back  Description: sharp;shooting;ache  Nursing notified: Yes  Nurse: Shannan  Intervention: RN provided pain medication    COGNITION:  Orientation  Overall Orientation Status: Within Functional Limits  Cognition  Overall Cognitive Status: Exceptions  Arousal/Alertness: Appears intact  Following Commands: Follows one step commands with increased time;Follows multistep commands with repitition  Attention Span: Appears intact  Memory: Appears intact  Safety Judgement: Appears intact  Problem Solving: Appears intact  Insights: Appears intact  Initiation: Appears intact  Sequencing: Requires cues for some    OBJECTIVE:    Small Peg Activity:  Patient engaged in B FM  coordination/strengthening and cognition to increase I with ADL's, IADL's and transfers.   Patient completed small peg design replications with example picture placed at midline.  Patient sorted small pegs by color with MIN errors.  Patient able to reach in lateral planes with 0 difficulty.  Patient able to reach in forward planes with 0 difficulty.   Patient able to  small pegs from table top with MIN difficulty.   Patient able to turn pegs into correct position for placement with MIN difficulty with in hand manipulation.   Patient able to place pegs into small pegboard with 0 difficulty.   Patient able to follow design with MIN difficulty.     Patient completed a simple 30 piece zig zag design with original design placed at midline.   Patient placed a total of 30 pegs with 30 being correct.  Patient completed an intermediate 58 piece straight line design with original design placed at midline.   Patient placed a total of 60 pegs with 52 being correct.  Patient completed a complex 46 piece design with multiple curves with original design placed at midline.   Patient placed a total of 32 pegs with 31 being correct.   Patient unable to finish third design 2° treatment session ended.  Patient able to remove pegs with 0 difficulty.   Patient with MIN errors with correct color choices of pegs.    ASSESSMENT: Patient pleasant and focused while completing therapeutic activity.    Activity Tolerance: Patient tolerated treatment well      PLAN OF CARE:  Strengthening, Balance training, Functional mobility training, Endurance training, Pain management, Safety education & training, Patient/Caregiver education & training, Self-Care / ADL, Equipment evaluation, education, & procurement, Home management training, Cognitive/Perceptual training    Continue per OT POC for planned discharge on 11-2-24.    Patient goals : \"to walk. even if it is with a walker\"  Time Frame for Long Term Goals : Within 1-1.5 weeks patient to

## 2024-10-29 NOTE — CARE COORDINATION
LEYDA left a message for Jude to return my call to set up family training. LEYDA then called Dot (DTR in law) and she would get in touch with Jude. Electronically signed by Yolie Young RN on 10/29/2024 at 1:46 PM     Jude called and was visiting his mom. Discussed family training and he will be tomorrow at 1 pm. Electronically signed by Yolie Young RN on 10/29/2024 at 2:05 PM

## 2024-10-29 NOTE — FLOWSHEET NOTE
Patient pure wick off ,patient used restroom ,no bowel movement , and patient refused to stay up in chair for breakfast .

## 2024-10-29 NOTE — PROGRESS NOTES
Physical Therapy Rehab Treatment Note  Facility/Department: [facility] [place]  Room: [place] [id number]       NAME: Loren Noyola  : 1935 (89 y.o.)  MRN: [id number]  CODE STATUS: Full Code    Date of Service: 10/29/2024       Restrictions:  Restrictions/Precautions: Fall Risk       SUBJECTIVE:   Subjective: \"I have family training tomorrow\"    Pain  Pain: 8/10 pain in midback, pt medicated prior to session.      OBJECTIVE:     Bed Mobility  Overall Assistance Level: Independent  Additional Factors: Increased time to complete;Head of bed flat;Without handrails  Roll Left  Assistance Level: Independent  Roll Right  Assistance Level: Independent  Sit to Supine  Assistance Level: Independent  Supine to Sit  Assistance Level: Independent  Skilled Clinical Factors: increased time and energy required  Scooting  Assistance Level: Independent    Transfers  Surface: Wheelchair;To mat;From mat;To bed;From bed  Additional Factors: Verbal cues;Increased time to complete  Device: Walker  Sit to Stand  Assistance Level: Stand by assist  Skilled Clinical Factors: intermittent safety cues required, overall improved hand placement  Stand to Sit  Assistance Level: Stand by assist  Skilled Clinical Factors: vc's for controlled descent with increased pain    Ambulation  Surface: Level surface  Device: Rolling walker  Distance: 30' x 2, 15' x 2  Activity: Within Unit  Activity Comments: quality and posture degrades with increasing distances  Additional Factors: Verbal cues;Hand placement cues;Increased time to complete  Assistance Level: Stand by assist (touching assist)  Gait Deviations: Slow ximena;Unsteady gait;Path deviations  Skilled Clinical Factors: intermittent touching assist for safety, occasional vc's to improve approach to seated surface with good carry over    PT Exercises  PROM Exercises: seated HS/gastroc stretch x3 30 sec hold BLE  A/AROM Exercises: seated: AP, LAQ, Marches, hip abd/add with ball x15ea raul  Motor

## 2024-10-29 NOTE — PROGRESS NOTES
OCCUPATIONAL THERAPY  INPATIENT REHAB TREATMENT NOTE  [place]      NAME: Loren Noyola  : 1935 (89 y.o.)  MRN: [id number]  CODE STATUS: Full Code  Room: R260/R260-01    Date of Service: 10/29/2024    Referring Physician: Dr Haro  Rehab Diagnosis: Impaired mobility ADLs dt thoracic and lumbar spinal stenosis with T12 compression fracture    Hospital course:   Comments: 89 y.o. female patient who presented to ER 10/21 with complaints of pain to head, neck and right hip after experiencing a fall 10/13. Pain worsened so she came to ER 10/17, d/c'd home after it was confirmed no injury. However, 10/21 patient admitting to another fall and worsening pain not relieved by prescribed norco. CT revealing T12 compression fx. Patient admitted under hospitalist services with neurosx consulting. NeuroSx determined fx to be chronic and no surgical needs.      Restrictions  Restrictions/Precautions  Restrictions/Precautions: Fall Risk     Patient's date of birth confirmed: Yes    SAFETY:  Safety Devices  Safety Devices in place: Yes  Type of devices: All fall risk precautions in place    SUBJECTIVE: \"Our sons are helping to take of him (spouse). They bring him meals. He knows to use the microwave to heat up the food for 4 minutes.\"    Pain at start of treatment: Yes: 5/10    Pain at end of treatment: Yes: 4/10    Location: R lateral lower R back  Description: sharp;shooting;dull;ache  Nursing notified: Declined  Intervention: None    COGNITION:  Orientation  Overall Orientation Status: Within Functional Limits  Cognition  Overall Cognitive Status: Exceptions  Arousal/Alertness: Appears intact  Following Commands: Follows one step commands with increased time;Follows multistep commands with repitition  Attention Span: Appears intact  Memory: Appears intact  Safety Judgement: Appears intact  Problem Solving: Appears intact  Insights: Appears intact  Initiation: Appears intact  Sequencing: Requires cues

## 2024-10-29 NOTE — PROGRESS NOTES
barriers to progress and strategies to address these barriers, patient encouraged to continue to discuss discharge plans with .       Complex Physical Medicine & Rehab Issues Assess & Plan:   Severe abnormality of gait and mobility and impaired self-care and ADL's secondary to flare of thoracic and lumbar stenosis status post falling.  Functional and medical status reassessed regarding patient’s ability to participate in therapies and patient found to be able to participate in acute intensive comprehensive inpatient rehabilitation program including PT/OT to improve balance, ambulation, ADL’s, and to improve the P/AROM.  Therapeutic modifications regarding activities in therapies, place, amount of time per day and intensity of therapy made daily.  In bed therapies or bedside therapies prn.   Bowel and Bladder dysfunction constipation, Neurogenic bowel and bladder:  frequent toileting, ambulate to bathroom with assistance, check post void residuals.  Check for C.difficile x1 if >2 loose stools in 24 hours, continue bowel & bladder program.  Monitor bowel and bladder function.  Lactinex 2 PO every AC.  MOM prn, Brown Bomb prn, Glycerin suppository prn, enema prn.  Encourage therapy and nursing to co-treat and problem solve re continence.    Severe back pain, thoracic and lumbar pain, as well as generalized OA pain: reassess pain every shift and prior to and after each therapy session, give prn Tylenol and consider scheduled Tylenol, modalities prn in therapy, masage, Lidoderm, K-pad prn. Consider scheduled AM pain meds. Controlled Substance Monitoring:  Acute and Chronic Pain Monitoring:   RX Monitoring Acute Pain Prescriptions Periodic Controlled Substance Monitoring   10/25/2024   8:15 AM Prescription exceeds daily limit for a specific reason. See comments or note.;Severe pain not adequately treated with lower dose.;Not required given exclusionary diagnoses... Possible medication side effects, risk of  tolerance/dependence & alternative treatments discussed.;No signs of potential drug abuse or diversion identified.;Assessed functional status (ability to engage in work or other purposeful activities, the pain intensity and its interference with activities of daily living, quality of family life and social activities, and the physical activity);Obtaining appropriate analgesic effect of treatment.       Skin healing superficial wound noted to medial 1st mpj of right foot. Appears healthy and granular, no signs of infection  and breakdown risk:  continue pressure relief program.  Daily skin exams and reports from nursing. FU with podiatry  Fatigue due to nutritional and hydration deficiency: Add and titrate vitamin B12 vitamin D and CoQ10 continue to monitor I&O’s, calorie counts prn, dietary consult prn.  Add healthy snack at night.  Acute episodic insomnia with situational adjustment disorder:  prn Ambien, monitor for day time sedation.  Falls risk elevated:  patient to use call light to get nursing assistance to get up, bed and chair alarm.  Elevated DVT risk: progressive activities in PT, continue prophylaxis EMILY hose, elevation and meds-see MAR Eliquis.   Complex discharge planning:  DC 11/2/24 home with -with help from sons and [facility].   SP patient's final weekly team meeting   Monday to re-assess progress towards goals, discuss and address social, psychological and medical comorbidities and to address difficulties they may be having progressing in therapy.  Patient and family education is in progress.  The patient is to follow-up with their family physician after discharge.        Complex Active General Medical Issues that complicate care Assess & Plan:      Primary osteoarthritis of both knees, Spondylolisthesis of lumbar region, Cervical spondylosis without myelopathy,  DDD (degenerative disc disease), lumbar  CAD,  Essential hypertension, Peripheral vascular disease,  Right bundle branch block -Acute

## 2024-10-29 NOTE — PLAN OF CARE
Problem: Discharge Planning  Goal: Discharge to home or other facility with appropriate resources  10/29/2024 0103 by Leticia Hernandez RN  Outcome: Progressing  10/28/2024 1255 by Melinda Proctor RN  Outcome: Progressing     Problem: Safety - Adult  Goal: Free from fall injury  10/29/2024 0103 by Leticia Hernandez RN  Outcome: Progressing  10/28/2024 1255 by Melinda Proctor RN  Outcome: Progressing     Problem: Skin/Tissue Integrity  Goal: Absence of new skin breakdown  Description: 1.  Monitor for areas of redness and/or skin breakdown  2.  Assess vascular access sites hourly  3.  Every 4-6 hours minimum:  Change oxygen saturation probe site  4.  Every 4-6 hours:  If on nasal continuous positive airway pressure, respiratory therapy assess nares and determine need for appliance change or resting period.  10/29/2024 0103 by Leticia Hernandez RN  Outcome: Progressing  10/28/2024 1255 by Melinda Proctor RN  Outcome: Progressing     Problem: ABCDS Injury Assessment  Goal: Absence of physical injury  10/29/2024 0103 by Leticia Hernandez RN  Outcome: Progressing  10/28/2024 1255 by Melinda Proctor RN  Outcome: Progressing     Problem: Pain  Goal: Verbalizes/displays adequate comfort level or baseline comfort level  10/29/2024 0103 by Leticia Hernandez RN  Outcome: Progressing  10/28/2024 1255 by Melinda Proctor RN  Outcome: Progressing     Problem: Nutrition Deficit:  Goal: Optimize nutritional status  10/29/2024 0103 by Leticia Hernandez RN  Outcome: Progressing  10/28/2024 1255 by Melinda Proctor RN  Outcome: Progressing

## 2024-10-29 NOTE — PROGRESS NOTES
Hospitalist Progress Note      PCP: Evette Johnson MD    Date of Admission: 10/23/2024    Chief Complaint: weakness    Subjective: patient awake/alert     Medications:  Reviewed    Infusion Medications    sodium chloride       Scheduled Medications    amiodarone  100 mg Oral Daily    polyethylene glycol  17 g Oral BID    senna  1 tablet Oral Nightly    cephALEXin  250 mg Oral 2 times per day    Vitamin D  2,000 Units Oral Dinner    cyanocobalamin  1,000 mcg IntraMUSCular Weekly    coenzyme Q10  100 mg Oral Daily    lidocaine  3 patch TransDERmal Daily    acetaminophen  500 mg Oral TID    amLODIPine  5 mg Oral Lunch    pantoprazole  20 mg Oral QAM AC    nystatin  5 mL Oral 4x Daily    cinnamon oil  1 drop Oral 4x Daily    apixaban  2.5 mg Oral BID    aspirin  81 mg Oral Daily    calcium elemental  500 mg Oral Daily    losartan  50 mg Oral BID    magnesium oxide  400 mg Oral Daily    metoprolol tartrate  50 mg Oral BID    ranolazine  500 mg Oral BID     PRN Meds: camphor-menthol-methyl salicylate, HYDROcodone 5 mg - acetaminophen, traMADol, sodium chloride, polyethylene glycol, acetaminophen **OR** [DISCONTINUED] acetaminophen, hydrALAZINE    No intake or output data in the 24 hours ending 10/29/24 1046    Exam:    /78   Pulse 60   Temp 97.7 °F (36.5 °C) (Oral)   Resp 20   Ht 1.6 m (5' 3\")   Wt 83.8 kg (184 lb 12.8 oz)   SpO2 93%   BMI 32.74 kg/m²     General appearance: No apparent distress, appears stated age and cooperative.  HEENT: Pupils equal, round, and reactive to light. Conjunctivae/corneas clear.  Neck: Supple, with full range of motion. No jugular venous distention. Trachea midline.  Respiratory:  Normal respiratory effort. Clear to auscultation, bilaterally without Rales/Wheezes/Rhonchi.  Cardiovascular: Regular rate and rhythm with normal S1/S2 without murmurs, rubs or gallops.  Abdomen: Soft, non-tender, non-distended with normal bowel sounds.  Musculoskeletal: No clubbing,     Primary osteoarthritis of both knees [M17.0] 11/20/2017    Essential hypertension [I10] 11/20/2017    DDD (degenerative disc disease), lumbar [M51.369] 11/20/2017    Spondylolisthesis of lumbar region [M43.16] 12/06/2011    Cervical spondylosis without myelopathy [M47.812] 05/19/2005         DVT Prophylaxis: apixaban  Diet: ADULT DIET; Regular  ADULT ORAL NUTRITION SUPPLEMENT; Lunch, Dinner; Wound Healing Oral Supplement  ADULT ORAL NUTRITION SUPPLEMENT; Breakfast, Dinner; Standard High Calorie/High Protein Oral Supplement  Code Status: Full Code    PT/OT Eval Status: done  Dispo -   Weakness/mechanical fall- PT and rehab orders initiated  HTN- controlled  Bladder incontinence- chronic,   urology on case  UTI-atbs initiated, UC report seeing   A fib- rate controlled, full anticoagulation as ordered per cardiology service   R great toe large bunion- appreciate podiatry  recommendations  T 12 compression fracture- conservative management, pain meds and PT   CAD-post CABG/pacer placement, continue medical management   CKD- follow up with BMP    TSH/vit D levels done  Constipation- stool softeners, follow up clinically-had BM yesterday   [facility]      TTS: 45 minutes where I focused more than 75% of my attention on rendering care, and planning treatment course for this patient, in addition to talking to RN team, mid levels, consulting with other physicians and following up on labs and imaging.    Electronically signed by Jude Ball MD on 10/29/2024 at 10:46 AM

## 2024-10-30 LAB
EKG ATRIAL RATE: 60 BPM
EKG P AXIS: 31 DEGREES
EKG P-R INTERVAL: 274 MS
EKG Q-T INTERVAL: 442 MS
EKG QRS DURATION: 142 MS
EKG QTC CALCULATION (BAZETT): 442 MS
EKG R AXIS: -48 DEGREES
EKG T AXIS: 1 DEGREES
EKG VENTRICULAR RATE: 60 BPM

## 2024-10-30 PROCEDURE — 1180000000 HC REHAB R&B

## 2024-10-30 PROCEDURE — 6370000000 HC RX 637 (ALT 250 FOR IP): Performed by: INTERNAL MEDICINE

## 2024-10-30 PROCEDURE — 6370000000 HC RX 637 (ALT 250 FOR IP): Performed by: PHYSICAL MEDICINE & REHABILITATION

## 2024-10-30 PROCEDURE — 97116 GAIT TRAINING THERAPY: CPT

## 2024-10-30 PROCEDURE — 93010 ELECTROCARDIOGRAM REPORT: CPT | Performed by: INTERNAL MEDICINE

## 2024-10-30 PROCEDURE — 97535 SELF CARE MNGMENT TRAINING: CPT

## 2024-10-30 PROCEDURE — 99232 SBSQ HOSP IP/OBS MODERATE 35: CPT | Performed by: INTERNAL MEDICINE

## 2024-10-30 PROCEDURE — 97110 THERAPEUTIC EXERCISES: CPT

## 2024-10-30 PROCEDURE — 99232 SBSQ HOSP IP/OBS MODERATE 35: CPT | Performed by: PHYSICAL MEDICINE & REHABILITATION

## 2024-10-30 RX ADMIN — CEPHALEXIN 250 MG: 250 CAPSULE ORAL at 08:35

## 2024-10-30 RX ADMIN — NYSTATIN 500000 UNITS: 100000 SUSPENSION ORAL at 20:39

## 2024-10-30 RX ADMIN — TRAMADOL HYDROCHLORIDE 50 MG: 50 TABLET ORAL at 13:39

## 2024-10-30 RX ADMIN — RANOLAZINE 500 MG: 500 TABLET, FILM COATED, EXTENDED RELEASE ORAL at 20:39

## 2024-10-30 RX ADMIN — Medication 2000 UNITS: at 16:55

## 2024-10-30 RX ADMIN — CALCIUM 500 MG: 500 TABLET ORAL at 08:36

## 2024-10-30 RX ADMIN — METOPROLOL TARTRATE 50 MG: 50 TABLET, FILM COATED ORAL at 20:39

## 2024-10-30 RX ADMIN — SENNOSIDES 8.6 MG: 8.6 TABLET, FILM COATED ORAL at 20:39

## 2024-10-30 RX ADMIN — AMLODIPINE BESYLATE 5 MG: 5 TABLET ORAL at 12:07

## 2024-10-30 RX ADMIN — POLYETHYLENE GLYCOL 3350 17 G: 17 POWDER, FOR SOLUTION ORAL at 20:38

## 2024-10-30 RX ADMIN — LOSARTAN POTASSIUM 50 MG: 50 TABLET, FILM COATED ORAL at 20:39

## 2024-10-30 RX ADMIN — APIXABAN 2.5 MG: 2.5 TABLET, FILM COATED ORAL at 08:35

## 2024-10-30 RX ADMIN — PANTOPRAZOLE SODIUM 20 MG: 20 TABLET, DELAYED RELEASE ORAL at 08:34

## 2024-10-30 RX ADMIN — APIXABAN 2.5 MG: 2.5 TABLET, FILM COATED ORAL at 20:39

## 2024-10-30 RX ADMIN — POLYETHYLENE GLYCOL 3350 17 G: 17 POWDER, FOR SOLUTION ORAL at 08:36

## 2024-10-30 RX ADMIN — Medication 100 MG: at 08:34

## 2024-10-30 RX ADMIN — METOPROLOL TARTRATE 50 MG: 50 TABLET, FILM COATED ORAL at 08:35

## 2024-10-30 RX ADMIN — AMIODARONE HYDROCHLORIDE 100 MG: 200 TABLET ORAL at 08:34

## 2024-10-30 RX ADMIN — Medication 400 MG: at 08:35

## 2024-10-30 RX ADMIN — HYDROCODONE BITARTRATE AND ACETAMINOPHEN 1 TABLET: 5; 325 TABLET ORAL at 08:44

## 2024-10-30 RX ADMIN — ACETAMINOPHEN 500 MG: 500 TABLET ORAL at 13:39

## 2024-10-30 RX ADMIN — ACETAMINOPHEN 500 MG: 500 TABLET ORAL at 20:39

## 2024-10-30 RX ADMIN — NYSTATIN 500000 UNITS: 100000 SUSPENSION ORAL at 08:42

## 2024-10-30 RX ADMIN — RANOLAZINE 500 MG: 500 TABLET, FILM COATED, EXTENDED RELEASE ORAL at 08:35

## 2024-10-30 RX ADMIN — ASPIRIN 81 MG: 81 TABLET, COATED ORAL at 08:34

## 2024-10-30 RX ADMIN — TRAMADOL HYDROCHLORIDE 50 MG: 50 TABLET ORAL at 20:39

## 2024-10-30 RX ADMIN — NYSTATIN 500000 UNITS: 100000 SUSPENSION ORAL at 16:55

## 2024-10-30 RX ADMIN — LOSARTAN POTASSIUM 50 MG: 50 TABLET, FILM COATED ORAL at 08:35

## 2024-10-30 ASSESSMENT — PAIN SCALES - WONG BAKER
WONGBAKER_NUMERICALRESPONSE: HURTS WHOLE LOT
WONGBAKER_NUMERICALRESPONSE: HURTS WHOLE LOT

## 2024-10-30 ASSESSMENT — PAIN DESCRIPTION - LOCATION
LOCATION: HIP
LOCATION: NECK;BACK
LOCATION: HIP
LOCATION: HIP;BACK

## 2024-10-30 ASSESSMENT — PAIN SCALES - GENERAL
PAINLEVEL_OUTOF10: 7
PAINLEVEL_OUTOF10: 7
PAINLEVEL_OUTOF10: 0
PAINLEVEL_OUTOF10: 7
PAINLEVEL_OUTOF10: 8

## 2024-10-30 ASSESSMENT — PAIN DESCRIPTION - DESCRIPTORS
DESCRIPTORS: ACHING
DESCRIPTORS: STABBING;THROBBING
DESCRIPTORS: THROBBING;STABBING

## 2024-10-30 ASSESSMENT — PAIN DESCRIPTION - ORIENTATION
ORIENTATION: LOWER
ORIENTATION: RIGHT;LOWER
ORIENTATION: RIGHT
ORIENTATION: RIGHT

## 2024-10-30 ASSESSMENT — ENCOUNTER SYMPTOMS
BACK PAIN: 1
SHORTNESS OF BREATH: 0
EYES NEGATIVE: 1
NAUSEA: 0
WHEEZING: 0
CHEST TIGHTNESS: 0
RESPIRATORY NEGATIVE: 1
BLOOD IN STOOL: 0
STRIDOR: 0
COUGH: 0
GASTROINTESTINAL NEGATIVE: 1

## 2024-10-30 NOTE — PROGRESS NOTES
OCCUPATIONAL THERAPY  INPATIENT REHAB TREATMENT NOTE  [facility]      NAME: Loren Noyola  : 1935 (89 y.o.)  MRN: [id number]  CODE STATUS: Full Code  Room: R260/R260-01    Date of Service: 10/30/2024    Referring Physician: Dr Haro  Rehab Diagnosis: Impaired mobility ADLs dt thoracic and lumbar spinal stenosis with T12 compression fracture    Hospital course:   Comments: 89 y.o. female patient who presented to ER 10/21 with complaints of pain to head, neck and right hip after experiencing a fall 10/13. Pain worsened so she came to ER 10/17, d/c'd home after it was confirmed no injury. However, 10/21 patient admitting to another fall and worsening pain not relieved by prescribed norco. CT revealing T12 compression fx. Patient admitted under hospitalist services with neurosx consulting. NeuroSx determined fx to be chronic and no surgical needs.      Restrictions  Restrictions/Precautions  Restrictions/Precautions: Fall Risk     Patient's date of birth confirmed: Yes    SAFETY:  Safety Devices  Safety Devices in place: Yes  Type of devices: All fall risk precautions in place    SUBJECTIVE: \"When I go home, I'm just going to go to bed naked.\"    Pain at start of treatment: Yes: 7/10    Pain at end of treatment: Yes: 9/10    Location: R lateral lower back/hip;neck;L ribcage  Description: ache  Nursing notified: Yes  Nurse: ANDREW Lynn, and Dr. Haro  Intervention: RN provided pain medication and pain patches    COGNITION:  Orientation  Overall Orientation Status: Within Functional Limits  Cognition  Overall Cognitive Status: Exceptions  Arousal/Alertness: Appears intact  Following Commands: Follows one step commands with increased time;Follows multistep commands with repitition  Attention Span: Appears intact  Memory: Appears intact  Safety Judgement: Appears intact  Problem Solving: Appears intact  Insights: Appears intact  Initiation: Appears intact  Sequencing: Requires cues for  some    OBJECTIVE:    Patient supine in bed upon therapists arrival with Leah MORALES present.    Feeding  Assistance Level: Modified independent;Increased time to complete  Skilled Clinical Factors: Patient positive hand -> mouth with medications in apple sauce and with medicated drink  Grooming/Oral Hygiene  Assistance Level: Modified independent  Skilled Clinical Factors: seated in armchair at bathroom sink  Upper Extremity Bathing  Assistance Level: Set-up  Lower Extremity Bathing  Assistance Level: Supervision  Upper Extremity Dressing  Assistance Level: Modified independent  Lower Extremity Dressing  Assistance Level: Supervision;Verbal cues;Increased time to complete  Skilled Clinical Factors: verbal cues to problem solve threading L LE after R LE  Putting On/Taking Off Footwear  Assistance Level: Modified independent  Toileting  Assistance Level: Modified independent  Toilet Transfers  Technique: Stand step  Equipment: Raised toilet seat with arms  Assistance Level: Stand by assist  Skilled Clinical Factors: ww  Tub/Shower Transfers  Skilled Clinical Factors: Patient completed sponge bath this date.    Functional Mobility  Device: Rolling walker  Activity: To/From bathroom;Transport items;Retrieve items  Assistance Level: Stand by assist  Skilled Clinical Factors: patient gathered clean clothing and transported to bathroom - patient noted to have quickened 2° in a hurry to reach destination  Supine to Sit  Assistance Level: Modified independent  Skilled Clinical Factors: HOB slightly elevated - bed rail utilized  Sit to Stand  Assistance Level: Supervision  Stand to Sit  Assistance Level: Supervision    ASSESSMENT: Patient pleasant and cooperative while requiring MIN encouragement 2° pain levels.    Activity Tolerance: Patient tolerated treatment well      PLAN OF CARE:  Strengthening, Balance training, Functional mobility training, Endurance training, Pain management, Safety education & training,

## 2024-10-30 NOTE — PLAN OF CARE
Problem: Discharge Planning  Goal: Discharge to home or other facility with appropriate resources  10/30/2024 0119 by Leticia Hernandez, RN  Outcome: Progressing  10/29/2024 1138 by Melinda Proctor RN  Outcome: Progressing     Problem: Safety - Adult  Goal: Free from fall injury  10/30/2024 0119 by Leticia Hernandez, RN  Outcome: Progressing  10/29/2024 1138 by Melinda Proctor RN  Outcome: Progressing     Problem: Skin/Tissue Integrity  Goal: Absence of new skin breakdown  Description: 1.  Monitor for areas of redness and/or skin breakdown  2.  Assess vascular access sites hourly  3.  Every 4-6 hours minimum:  Change oxygen saturation probe site  4.  Every 4-6 hours:  If on nasal continuous positive airway pressure, respiratory therapy assess nares and determine need for appliance change or resting period.  10/30/2024 0119 by Leticia Hernandez, RN  Outcome: Progressing  10/29/2024 1138 by Melinda Proctor RN  Outcome: Progressing     Problem: ABCDS Injury Assessment  Goal: Absence of physical injury  10/30/2024 0119 by Leticia Hernandez, RN  Outcome: Progressing  10/29/2024 1138 by Melinda Proctor RN  Outcome: Progressing     Problem: Pain  Goal: Verbalizes/displays adequate comfort level or baseline comfort level  10/30/2024 0119 by Leticia Hernandez RN  Outcome: Progressing  10/29/2024 1138 by Melinda Proctor RN  Outcome: Progressing     Problem: Nutrition Deficit:  Goal: Optimize nutritional status  10/30/2024 0119 by Leticia Hernandez, RN  Outcome: Progressing  10/29/2024 1138 by Melinda Proctor RN  Outcome: Progressing

## 2024-10-30 NOTE — PLAN OF CARE
Problem: Discharge Planning  Goal: Discharge to home or other facility with appropriate resources  10/30/2024 1140 by Leah Cox RN  Outcome: Progressing  10/30/2024 0119 by Leticia Hernandez RN  Outcome: Progressing     Problem: Safety - Adult  Goal: Free from fall injury  10/30/2024 1140 by Leah Cox RN  Outcome: Progressing  10/30/2024 0119 by Leticia Hernandez RN  Outcome: Progressing     Problem: Skin/Tissue Integrity  Goal: Absence of new skin breakdown  Description: 1.  Monitor for areas of redness and/or skin breakdown  2.  Assess vascular access sites hourly  3.  Every 4-6 hours minimum:  Change oxygen saturation probe site  4.  Every 4-6 hours:  If on nasal continuous positive airway pressure, respiratory therapy assess nares and determine need for appliance change or resting period.  10/30/2024 1140 by Leah Cox RN  Outcome: Progressing  10/30/2024 0119 by Leticia Hernandez RN  Outcome: Progressing     Problem: ABCDS Injury Assessment  Goal: Absence of physical injury  10/30/2024 1140 by Leah Cox RN  Outcome: Progressing  10/30/2024 0119 by Leticia Hernandez RN  Outcome: Progressing     Problem: Pain  Goal: Verbalizes/displays adequate comfort level or baseline comfort level  10/30/2024 1140 by Leah Cox RN  Outcome: Progressing  10/30/2024 0119 by Leticia Hernandez RN  Outcome: Progressing     Problem: Nutrition Deficit:  Goal: Optimize nutritional status  10/30/2024 1140 by Leah Cox RN  Outcome: Progressing  10/30/2024 0119 by Leticia Hernandez RN  Outcome: Progressing

## 2024-10-30 NOTE — PROGRESS NOTES
Subjective:  The patient complains of severe acute on chronic progressive fatigue and mid and low back pain partially relieved by rest, medications, PT,  OT,     and rest and exacerbated by recent fall.  89 yo female was admitted to [facility] after presenting to the ER with pain in her head neck and back after falling at home striking her head on the cushions of her sofa.  She had several previous falls with ER visits in the week prior.  She was found to have a T12 compression fracture and admitted under the care of the hospitalist.   She was seen by the neurosurgeon PA and discussed with neurosurgery this is likely old and there is now planned for surgical intervention.        I am concerned about patient’s medical complexities and barriers to advancing in rehab goals including improving urinary incontinence.        I reviewed current care and plans for further care with other rehab providers including nursing and case management.  According to recent nursing note, \"left a message for Jude to return my call to set up family training. LEYDA then called Dot (DTR in law) and she would get in touch with Jude.    Jude called and was visiting his mom. Discussed family training and he will be tomorrow at 1 pm\".     she is still on Keflex for UTI--stlll with mild thrush.    ROS x10:  The patient also complains of severely impaired mobility and activities of daily living.  Otherwise no new problems with vision, hearing, nose, mouth, throat, dermal, cardiovascular, GI, , pulmonary, musculoskeletal, psychiatric or neurological. See also Acute Rehab PM&R H&P.       Vital signs:  /65   Pulse 66   Temp 98.1 °F (36.7 °C) (Oral)   Resp 18   Ht 1.6 m (5' 3\")   Wt 82.7 kg (182 lb 4.8 oz)   SpO2 96%   BMI 32.29 kg/m²   I/O:   PO/Intake:  fair PO intake, regular diet    Bowel:   continent   Bladder: incontinent UTI Keflex  General:  Patient is well developed,   adequately nourished, and    well kempt.     HEENT:

## 2024-10-30 NOTE — PROGRESS NOTES
Physical Therapy Rehab Treatment Note  [facility]  [facility]       NAME: Loren Noyola  : 1935 (89 y.o.)  MRN: [id number]  CODE STATUS: Full Code    Date of Service: 10/30/2024       Restrictions:  Restrictions/Precautions: Fall Risk       SUBJECTIVE:   Subjective: \"I am still hurting\"    Pain  Pain: 8/10 pain in midback, pt medicated prior to session.      OBJECTIVE:     Transfers  Surface: Wheelchair  Additional Factors: Verbal cues;Increased time to complete  Device: Walker  Sit to Stand  Assistance Level: Stand by assist  Skilled Clinical Factors: intermittent safety cues required, overall improved hand placement  Stand to Sit  Assistance Level: Stand by assist  Skilled Clinical Factors: vc's for controlled descent with increased pain  Car Transfer  Assistance Level: Minimal assistance  Skilled Clinical Factors: lifting assistance out of car, good ability to transfer BLE in/out of car    Ambulation  Surface: Level surface  Device: Rolling walker  Distance: 45' x 2  Activity: Within Unit  Activity Comments: quality and posture degrades with increasing distances  Additional Factors: Verbal cues;Hand placement cues;Increased time to complete  Assistance Level: Stand by assist  Gait Deviations: Slow ximena;Unsteady gait;Path deviations  Skilled Clinical Factors: intermittent touching assist for safety, occasional vc's to improve approach to seated surface with good carry over. pain limiting throughout resulting in increased guarding with gait    Stairs  Stair Height: 6''  Device: Bilateral handrails  Number of Stairs: 4  Additional Factors: Verbal cues;Hand placement cues;Non-reciprocal going up;Increased time to complete;Non-reciprocal going down  Assistance Level: Minimal assistance  Skilled Clinical Factors - Comments: steadying assistance required to maintain balance, improved ability to complete 4 steps this session    Activity Tolerance  Activity Tolerance: Patient  tolerated treatment well    Education Provided: Role of Therapy;Mobility Training;Fall Prevention Strategies;Transfer Training;Energy Conservation;Home Exercise Program;Precautions;Safety;Family Education;ADL Function;Equipment;IADL Function;DME/Home Modifications  Education  Education Given To: Patient;Family  Education Provided: Role of Therapy;Mobility Training;Fall Prevention Strategies;Transfer Training;Energy Conservation;Home Exercise Program;Precautions;Safety;Family Education;ADL Function;Equipment;IADL Function;DME/Home Modifications  Education Method: Verbal;Demonstration  Barriers to Learning: None  Education Outcome: Verbalized understanding;Demonstrated understanding  Skilled Clinical Factors: Pt's children and spouse present to observe all aspects of pt's mobility. Able to observe gait, transfers, stairs, and car transfer this session. Educated on pt's current level of mobility being SBA level and educated that pt's tolerance varies on pain levels. Family states pt does not need to ambulate further than 50' at home and are able to assist patient at physical level. In agreement with recommendation of WW vs rollator at this time. Denies further questions/concerns at this time.      ASSESSMENT/PROGRESS TOWARDS GOALS:   Assessment  Assessment: Family training completed this session.  Activity Tolerance: Patient tolerated treatment well    Goals:  Long Term Goals  Long Term Goal 1: Pt to complete bed mobility with indep  Long Term Goal 2: Pt to complete transfers with indep  Long Term Goal 3: Pt to ambulate 50-150ft with LRD and indep  Long Term Goal 4: Pt to manage 4 steps with HR and SBA  Patient Goals   Patient Goals : to be able to walk without help    PLAN OF CARE/Safety:   Safety Devices  Type of Devices: All fall risk precautions in place;Chair alarm in place;Left in chair      Therapy Time:   Individual   Time In 1330   Time Out 1400   Minutes 30      Minutes: 30  Transfer/Bed mobility training:

## 2024-10-30 NOTE — PROGRESS NOTES
Medicated twice thus far for c/o lower back pain radiating to right hip. Stated Norco did make her sleepy but was able to complete therapies. Tried prn Ultram after that. Dressing changed to right lateral toe ( old bunion site). LBM 10/28. Electronically signed by Leah Cox RN on 10/30/24 at 2:57 PM EDT

## 2024-10-30 NOTE — PROGRESS NOTES
PROGRESS NOTE    Patient Name: Loren Noyola  Admit Date: 10/23/2024  6:41 PM  MR #: [id number]  : 1935    Attending Physician: Deb Haro DO  Reason for consult: [facility] meds    History of Presenting Illness:      Loren Noyola is a 89 y.o. female on hospital day 7 with a history of .   History Obtained From:  patient, electronic medical record    Pt presented with a fall and suffered T12 compression Fx. She is now transferred to acute Rehab.     She is still sore from the fall. She has back and anterior reproducible CP. Denies SOB.     She has had other falls as well.  She is on low dose Eliquis and Amiodarone for AF.  No current ECG.    2024 Echo EF 65-70%  She has CAD s/p remote CABG    10/30/24 she is progressing w rehab. Denies CP nor SOB. Back aches   History:      EKG:  Past Medical History:   Diagnosis Date    Bleeding ulcer     CAD (coronary artery disease)     Colitis     Hip fx, left, closed, initial encounter ([facility]) 02/10/2018    Hyperlipidemia     Hypertension     Osteoarthritis     Spinal stenosis      Past Surgical History:   Procedure Laterality Date    APPENDECTOMY      COLECTOMY  1/3/2011    sigmoid colectomy with colostomy    COLECTOMY  2011    partial colectomy with colostomy closure    CORONARY ARTERY BYPASS GRAFT      HYSTERECTOMY (CERVIX STATUS UNKNOWN)      HYSTERECTOMY, TOTAL ABDOMINAL (CERVIX REMOVED)      GA HEMIARTHROPLASTY HIP PARTIAL Left 2018    HIP HEMIARTHROPLASTY- performed by Bryan Malcolm MD at [facility] OR    SIGMOIDOSCOPY      VARICOSE VEIN SURGERY         Family History  History reviewed. No pertinent family history.  [] Unable to obtain due to ventilated and/ or neurologic status    Social History     Socioeconomic History    Marital status:      Spouse name: Not on file    Number of children: 2    Years of education: Not on file    Highest education level: Not on file   Occupational History    Not on file   Tobacco Use     ranolazine  500 mg Oral BID     Continuous Infusions:   sodium chloride       PRN Meds:.camphor-menthol-methyl salicylate, HYDROcodone 5 mg - acetaminophen, traMADol, sodium chloride, polyethylene glycol, acetaminophen **OR** [DISCONTINUED] acetaminophen, hydrALAZINE  .   sodium chloride          Allergies:     Allergies   Allergen Reactions    Lisinopril Anaphylaxis    Statins Other (See Comments)    Sulfa Antibiotics Itching        Review of Systems:       Review of Systems   Constitutional: Negative.  Negative for diaphoresis and fatigue.   HENT: Negative.     Eyes: Negative.    Respiratory: Negative.  Negative for cough, chest tightness, shortness of breath, wheezing and stridor.    Cardiovascular: Negative.  Negative for chest pain, palpitations and leg swelling.   Gastrointestinal: Negative.  Negative for blood in stool and nausea.   Genitourinary: Negative.    Musculoskeletal:  Positive for back pain.   Skin: Negative.    Neurological: Negative.  Negative for dizziness, syncope, weakness and light-headedness.   Hematological: Negative.    Psychiatric/Behavioral: Negative.           Objective Findings:     Vitals:/65   Pulse 66   Temp 98.1 °F (36.7 °C)   Resp 18   Ht 1.6 m (5' 3\")   Wt 82.7 kg (182 lb 4.8 oz)   SpO2 96%   BMI 32.29 kg/m²      Physical Examination:    Physical Exam   Constitutional: She appears healthy. No distress.   HENT:   Normal cephalic and Atraumatic   Eyes: Pupils are equal, round, and reactive to light.   Neck: Thyroid normal. No JVD present. No neck adenopathy. No thyromegaly present.   Cardiovascular: Normal rate, regular rhythm, intact distal pulses and normal pulses.   Murmur heard.  Pulmonary/Chest: Effort normal and breath sounds normal. She has no wheezes. She has no rales. She exhibits no tenderness.   Abdominal: Soft. Bowel sounds are normal. There is no abdominal tenderness.   Musculoskeletal:         General: No tenderness or edema. Normal range of motion.

## 2024-10-30 NOTE — PROGRESS NOTES
Physical Therapy Rehab Treatment Note  [facility]  [facility]       NAME: Loren Noyola  : 1935 (89 y.o.)  MRN: [id number]  CODE STATUS: Full Code    Date of Service: 10/30/2024       Restrictions:  Restrictions/Precautions: Fall Risk       SUBJECTIVE:   Subjective: \"It has been a rough morning\"    Pain  Pain: 9/10 pain in midback, pt medicated prior to session.      OBJECTIVE:     Transfers  Surface: Wheelchair;To mat;From mat  Additional Factors: Verbal cues;Increased time to complete  Device: Walker  Sit to Stand  Assistance Level: Stand by assist  Skilled Clinical Factors: intermittent safety cues required, overall improved hand placement  Stand to Sit  Assistance Level: Stand by assist  Skilled Clinical Factors: vc's for controlled descent with increased pain    Ambulation  Surface: Level surface  Device: Rolling walker  Distance: 15' x 2, 35' x 2  Activity: Within Unit  Activity Comments: quality and posture degrades with increasing distances  Additional Factors: Verbal cues;Hand placement cues;Increased time to complete  Assistance Level: Stand by assist  Gait Deviations: Slow ximena;Unsteady gait;Path deviations  Skilled Clinical Factors: intermittent touching assist for safety, occasional vc's to improve approach to seated surface with good carry over. pain limiting throughout resulting in increased guarding with gait    Stairs  Stair Height: 6''  Device: Bilateral handrails  Number of Stairs: 4  Additional Factors: Verbal cues;Hand placement cues;Non-reciprocal going up;Increased time to complete;Non-reciprocal going down  Assistance Level: Minimal assistance  Skilled Clinical Factors - Comments: steadying assistance required to maintain balance, improved ability to complete 4 steps this session    PT Exercises  PROM Exercises: seated HS/gastroc stretch x3 30 sec hold BLE  A/AROM Exercises: seated: AP, LAQ, Marches, hip abd/add with ball x15ea raul  Resistive

## 2024-10-30 NOTE — PROGRESS NOTES
OCCUPATIONAL THERAPY  INPATIENT REHAB TREATMENT NOTE  [facility]      NAME: Loren Noyola  : 1935 (89 y.o.)  MRN: [id number]  CODE STATUS: Full Code  Room: R260/R260-01    Date of Service: 10/30/2024    Referring Physician: Dr Haro  Rehab Diagnosis: Impaired mobility ADLs dt thoracic and lumbar spinal stenosis with T12 compression fracture    Hospital course:   Comments: 89 y.o. female patient who presented to ER 10/21 with complaints of pain to head, neck and right hip after experiencing a fall 10/13. Pain worsened so she came to ER 10/17, d/c'd home after it was confirmed no injury. However, 10/21 patient admitting to another fall and worsening pain not relieved by prescribed norco. CT revealing T12 compression fx. Patient admitted under hospitalist services with neurosx consulting. NeuroSx determined fx to be chronic and no surgical needs.      Restrictions  Restrictions/Precautions  Restrictions/Precautions: Fall Risk     Patient's date of birth confirmed: Yes    SAFETY:  Safety Devices  Safety Devices in place: Yes  Type of devices: All fall risk precautions in place    SUBJECTIVE: \"She makes me do it myself.\"    Pain at start of treatment: Yes: 10    Pain at end of treatment: Yes: 5/10    Location: R lateral lower back;L ribcage  Description: ache;sharp  Nursing notified: Yes  Nurse: Leah  Intervention: None    COGNITION:  Orientation  Overall Orientation Status: Within Functional Limits  Cognition  Overall Cognitive Status: Exceptions  Arousal/Alertness: Appears intact  Following Commands: Follows one step commands with increased time;Follows multistep commands with repitition  Attention Span: Appears intact  Memory: Appears intact  Safety Judgement: Appears intact  Problem Solving: Appears intact  Insights: Appears intact  Initiation: Appears intact  Sequencing: Requires cues for some    OBJECTIVE:    Family Training:  Spouse, Marcus, and sons, Jude and Bill, present for family

## 2024-10-30 NOTE — PROGRESS NOTES
[facility]  MUSIC THERAPY      Date:  10/30/2024        Patient Name: Loren Noyola       MRN: [id number]        YOB: 1935 (89 y.o.)       Gender: female  Diagnosis: Impaired mobility ADLs dt thoracic and lumbar spinal stenosis with T12 compression fracture  Referring Practitioner: Dr Haro    RESTRICTIONS/PRECAUTIONS:  Restrictions/Precautions: Fall Risk     Hearing: Exceptions to WFL  Hearing Exceptions: Hard of hearing/hearing concerns      TIME OF SESSION: 10:40am - 11:20am     SUBJECTIVE:  \"That sounds nice\"     OBJECTIVE:        [x] To Improve Mood     [x] To Increase Social Well-Being  [] To Increase Focus   [x] To Increase Emotional Well-Being  [] To Increase Eye Contact    [x] To Increase Spiritual Well-Being   [] To Decrease Anxiety   [x] To Increase Relaxation   [x] To Decrease Pain    [] To Increase Communication  [] To Increase Movement to Music     MUSIC INTERVENTION PROVIDED:     [x] Live Music on Voice  [] Recorded Music   [x] Live Music on Guitar  [x] Discussion Related to Music   [] Live Music on Q-chord  [x] Discussion Related to Pt Experience   [] Live Music on Percussion      PARTICIPATION LEVEL OF PATIENT:     [x] Active with discussion   [] Passive with discussion   [x] Active with singing    [] Passive with singing   [] Active with instrument playing  [] Passive with instrument playing   [x] Actively listening to music   [] Passively listening to music.     OUTCOMES OBSERVED:      [x] Improved Mood   [x] Increased Social Well-Being  [] Increased Focus   [x] Increased Emotional Well-Being  [] Increased Eye Contact    [x] Increased Spiritual Well-Being   [] Decreased Anxiety   [x] Increased Relaxation   [] Decreased Pain    [] Increased Communication   [] Increased Movement to Music     PAIN ASSESSMENT    Before MT:      [] No     [x] Yes   Location: right side, right hip     Ratin /10    Comment(s):    After MT:         [] No     [x] Yes   Location: right

## 2024-10-31 PROCEDURE — 97530 THERAPEUTIC ACTIVITIES: CPT

## 2024-10-31 PROCEDURE — 6370000000 HC RX 637 (ALT 250 FOR IP): Performed by: INTERNAL MEDICINE

## 2024-10-31 PROCEDURE — 97116 GAIT TRAINING THERAPY: CPT

## 2024-10-31 PROCEDURE — 6360000002 HC RX W HCPCS: Performed by: PHYSICAL MEDICINE & REHABILITATION

## 2024-10-31 PROCEDURE — 97535 SELF CARE MNGMENT TRAINING: CPT

## 2024-10-31 PROCEDURE — 97110 THERAPEUTIC EXERCISES: CPT

## 2024-10-31 PROCEDURE — 1180000000 HC REHAB R&B

## 2024-10-31 PROCEDURE — 99232 SBSQ HOSP IP/OBS MODERATE 35: CPT | Performed by: PHYSICAL MEDICINE & REHABILITATION

## 2024-10-31 PROCEDURE — 6370000000 HC RX 637 (ALT 250 FOR IP): Performed by: PHYSICAL MEDICINE & REHABILITATION

## 2024-10-31 RX ADMIN — NYSTATIN 500000 UNITS: 100000 SUSPENSION ORAL at 11:03

## 2024-10-31 RX ADMIN — NYSTATIN 500000 UNITS: 100000 SUSPENSION ORAL at 21:43

## 2024-10-31 RX ADMIN — TRAMADOL HYDROCHLORIDE 50 MG: 50 TABLET ORAL at 03:23

## 2024-10-31 RX ADMIN — AMLODIPINE BESYLATE 5 MG: 5 TABLET ORAL at 11:04

## 2024-10-31 RX ADMIN — METOPROLOL TARTRATE 50 MG: 50 TABLET, FILM COATED ORAL at 21:44

## 2024-10-31 RX ADMIN — AMIODARONE HYDROCHLORIDE 100 MG: 200 TABLET ORAL at 11:04

## 2024-10-31 RX ADMIN — RANOLAZINE 500 MG: 500 TABLET, FILM COATED, EXTENDED RELEASE ORAL at 21:43

## 2024-10-31 RX ADMIN — ACETAMINOPHEN 325MG 650 MG: 325 TABLET ORAL at 03:25

## 2024-10-31 RX ADMIN — CALCIUM 500 MG: 500 TABLET ORAL at 11:04

## 2024-10-31 RX ADMIN — APIXABAN 2.5 MG: 2.5 TABLET, FILM COATED ORAL at 11:04

## 2024-10-31 RX ADMIN — PANTOPRAZOLE SODIUM 20 MG: 20 TABLET, DELAYED RELEASE ORAL at 06:22

## 2024-10-31 RX ADMIN — ACETAMINOPHEN 500 MG: 500 TABLET ORAL at 11:03

## 2024-10-31 RX ADMIN — TRAMADOL HYDROCHLORIDE 50 MG: 50 TABLET ORAL at 11:26

## 2024-10-31 RX ADMIN — SENNOSIDES 8.6 MG: 8.6 TABLET, FILM COATED ORAL at 21:43

## 2024-10-31 RX ADMIN — ACETAMINOPHEN 500 MG: 500 TABLET ORAL at 21:43

## 2024-10-31 RX ADMIN — Medication 100 MG: at 11:04

## 2024-10-31 RX ADMIN — HYDROCODONE BITARTRATE AND ACETAMINOPHEN 1 TABLET: 5; 325 TABLET ORAL at 19:47

## 2024-10-31 RX ADMIN — LOSARTAN POTASSIUM 50 MG: 50 TABLET, FILM COATED ORAL at 11:11

## 2024-10-31 RX ADMIN — ACETAMINOPHEN 325MG 650 MG: 325 TABLET ORAL at 17:33

## 2024-10-31 RX ADMIN — RANOLAZINE 500 MG: 500 TABLET, FILM COATED, EXTENDED RELEASE ORAL at 11:20

## 2024-10-31 RX ADMIN — CYANOCOBALAMIN 1000 MCG: 1000 INJECTION, SOLUTION INTRAMUSCULAR; SUBCUTANEOUS at 11:14

## 2024-10-31 RX ADMIN — Medication 400 MG: at 11:04

## 2024-10-31 RX ADMIN — TRAMADOL HYDROCHLORIDE 50 MG: 50 TABLET ORAL at 17:33

## 2024-10-31 RX ADMIN — NYSTATIN 500000 UNITS: 100000 SUSPENSION ORAL at 11:11

## 2024-10-31 RX ADMIN — APIXABAN 2.5 MG: 2.5 TABLET, FILM COATED ORAL at 21:45

## 2024-10-31 RX ADMIN — LOSARTAN POTASSIUM 50 MG: 50 TABLET, FILM COATED ORAL at 21:45

## 2024-10-31 RX ADMIN — POLYETHYLENE GLYCOL 3350 17 G: 17 POWDER, FOR SOLUTION ORAL at 11:18

## 2024-10-31 RX ADMIN — POLYETHYLENE GLYCOL 3350 17 G: 17 POWDER, FOR SOLUTION ORAL at 21:43

## 2024-10-31 RX ADMIN — NYSTATIN 500000 UNITS: 100000 SUSPENSION ORAL at 17:34

## 2024-10-31 RX ADMIN — METOPROLOL TARTRATE 50 MG: 50 TABLET, FILM COATED ORAL at 11:04

## 2024-10-31 RX ADMIN — ASPIRIN 81 MG: 81 TABLET, COATED ORAL at 11:03

## 2024-10-31 RX ADMIN — Medication 2000 UNITS: at 17:33

## 2024-10-31 ASSESSMENT — PAIN SCALES - GENERAL
PAINLEVEL_OUTOF10: 0
PAINLEVEL_OUTOF10: 8
PAINLEVEL_OUTOF10: 7
PAINLEVEL_OUTOF10: 0
PAINLEVEL_OUTOF10: 4
PAINLEVEL_OUTOF10: 8
PAINLEVEL_OUTOF10: 8

## 2024-10-31 ASSESSMENT — PAIN DESCRIPTION - DESCRIPTORS
DESCRIPTORS: THROBBING;STABBING
DESCRIPTORS: STABBING;SPASM
DESCRIPTORS: ACHING
DESCRIPTORS: ACHING
DESCRIPTORS: DISCOMFORT

## 2024-10-31 ASSESSMENT — PAIN DESCRIPTION - LOCATION
LOCATION: BACK;HIP
LOCATION: RIB CAGE;BACK;HIP
LOCATION: BACK
LOCATION: BACK
LOCATION: BACK;HIP

## 2024-10-31 ASSESSMENT — PAIN SCALES - WONG BAKER
WONGBAKER_NUMERICALRESPONSE: HURTS WHOLE LOT
WONGBAKER_NUMERICALRESPONSE: HURTS WHOLE LOT

## 2024-10-31 ASSESSMENT — PAIN DESCRIPTION - ORIENTATION
ORIENTATION: LEFT
ORIENTATION: RIGHT
ORIENTATION: MID;LEFT
ORIENTATION: RIGHT;MID;LOWER

## 2024-10-31 ASSESSMENT — PAIN - FUNCTIONAL ASSESSMENT: PAIN_FUNCTIONAL_ASSESSMENT: PREVENTS OR INTERFERES SOME ACTIVE ACTIVITIES AND ADLS

## 2024-10-31 NOTE — PROGRESS NOTES
Hospitalist Progress Note      PCP: Evette Johnson MD    Date of Admission: 10/23/2024    Chief Complaint: weakness/pain    Subjective: patient in chair, eating lunch     Medications:  Reviewed    Infusion Medications    sodium chloride       Scheduled Medications    amiodarone  100 mg Oral Daily    polyethylene glycol  17 g Oral BID    senna  1 tablet Oral Nightly    Vitamin D  2,000 Units Oral Dinner    cyanocobalamin  1,000 mcg IntraMUSCular Weekly    coenzyme Q10  100 mg Oral Daily    lidocaine  3 patch TransDERmal Daily    acetaminophen  500 mg Oral TID    amLODIPine  5 mg Oral Lunch    pantoprazole  20 mg Oral QAM AC    nystatin  5 mL Oral 4x Daily    cinnamon oil  1 drop Oral 4x Daily    apixaban  2.5 mg Oral BID    aspirin  81 mg Oral Daily    calcium elemental  500 mg Oral Daily    losartan  50 mg Oral BID    magnesium oxide  400 mg Oral Daily    metoprolol tartrate  50 mg Oral BID    ranolazine  500 mg Oral BID     PRN Meds: camphor-menthol-methyl salicylate, HYDROcodone 5 mg - acetaminophen, traMADol, sodium chloride, polyethylene glycol, acetaminophen **OR** [DISCONTINUED] acetaminophen, hydrALAZINE    No intake or output data in the 24 hours ending 10/31/24 1220    Exam:    BP (!) 121/59   Pulse 69   Temp 98.1 °F (36.7 °C) (Oral)   Resp 18   Ht 1.6 m (5' 3\")   Wt 82.7 kg (182 lb 4.8 oz)   SpO2 96%   BMI 32.29 kg/m²     General appearance: No apparent distress, appears stated age and cooperative.  HEENT: Pupils equal, round, and reactive to light. Conjunctivae/corneas clear.  Neck: Supple, with full range of motion. No jugular venous distention. Trachea midline.  Respiratory:  Normal respiratory effort. Clear to auscultation, bilaterally without Rales/Wheezes/Rhonchi.  Cardiovascular: Regular rate and rhythm with normal S1/S2 without murmurs, rubs or gallops.  Abdomen: Soft, non-tender, non-distended with normal bowel sounds.  Musculoskeletal:  edema bilaterally.  Full range of motion

## 2024-10-31 NOTE — CARE COORDINATION
CM went to speak with pt about discharge. Pt wants home care. Pt given FOC and chose [facility]. Order will be placed. Electronically signed by Yolie Young RN on 10/31/2024 at 11:31 AM

## 2024-10-31 NOTE — PROGRESS NOTES
OCCUPATIONAL THERAPY  INPATIENT REHAB TREATMENT NOTE  [facility]      NAME: Loren Noyola  : 1935 (89 y.o.)  MRN: [id number]  CODE STATUS: Full Code  Room: R260/R260-01    Date of Service: 10/31/2024    Referring Physician: Dr Haro  Rehab Diagnosis: Impaired mobility ADLs dt thoracic and lumbar spinal stenosis with T12 compression fracture    Hospital course:   Comments: 89 y.o. female patient who presented to ER 10/21 with complaints of pain to head, neck and right hip after experiencing a fall 10/13. Pain worsened so she came to ER 10/17, d/c'd home after it was confirmed no injury. However, 10/21 patient admitting to another fall and worsening pain not relieved by prescribed norco. CT revealing T12 compression fx. Patient admitted under hospitalist services with neurosx consulting. NeuroSx determined fx to be chronic and no surgical needs.      Restrictions  Restrictions/Precautions  Restrictions/Precautions: Fall Risk     Patient's date of birth confirmed: Yes    SAFETY:  Safety Devices  Safety Devices in place: Yes  Type of devices: All fall risk precautions in place    SUBJECTIVE: \"It hurts when I need to breathe.\" - L ribcage    Pain at start of treatment: Yes: 10/10    Pain at end of treatment: Yes: 8/10    Location: mid upper L back;L ribcage;R lateral lower back  Description: sharp  Nursing notified: No - Alexi PTA reports that he just spoke to RN  Intervention: Cold applied    COGNITION:  Orientation  Overall Orientation Status: Within Functional Limits  Cognition  Overall Cognitive Status: Exceptions  Arousal/Alertness: Appears intact  Following Commands: Follows one step commands with increased time;Follows multistep commands with repitition  Attention Span: Appears intact  Memory: Appears intact  Safety Judgement: Appears intact  Problem Solving: Appears intact  Insights: Appears intact  Initiation: Appears intact  Sequencing: Requires cues for some    OBJECTIVE:    Health  endurance        Therapy Time:   Individual Group Co-Treat   Time In 1400       Time Out 1500         Minutes 60                   ADL/IADL trainin minutes  Therapeutic activities: 15 minutes     Electronically signed by:    SALLY Schwab,   10/31/2024, 2:59 PM

## 2024-10-31 NOTE — PROGRESS NOTES
OCCUPATIONAL THERAPY  INPATIENT REHAB TREATMENT NOTE  [facility]      NAME: Loren Noyola  : 1935 (89 y.o.)  MRN: [id number]  CODE STATUS: Full Code  Room: R260/R260-01    Date of Service: 10/31/2024    Referring Physician: Dr Haro  Rehab Diagnosis: Impaired mobility ADLs dt thoracic and lumbar spinal stenosis with T12 compression fracture    Hospital course:   Comments: 89 y.o. female patient who presented to ER 10/21 with complaints of pain to head, neck and right hip after experiencing a fall 10/13. Pain worsened so she came to ER 10/17, d/c'd home after it was confirmed no injury. However, 10/21 patient admitting to another fall and worsening pain not relieved by prescribed norco. CT revealing T12 compression fx. Patient admitted under hospitalist services with neurosx consulting. NeuroSx determined fx to be chronic and no surgical needs.      Restrictions  Restrictions/Precautions  Restrictions/Precautions: Fall Risk     Patient's date of birth confirmed: Yes    SAFETY:  Safety Devices  Safety Devices in place: Yes  Type of devices: All fall risk precautions in place    SUBJECTIVE: \"Someone told me it's (flower on shirt) is a tawnya and someone else told me it's a pansy. I don't know what it is.\"    Pain at start of treatment: Yes: 5/10    Pain at end of treatment: Yes: 10    Location: R lateral low back;L upper mid back;L ribcage  Description: ache;sharp  Nursing notified: Declined  Intervention: None    COGNITION:  Orientation  Overall Orientation Status: Within Functional Limits  Cognition  Overall Cognitive Status: Exceptions  Arousal/Alertness: Appears intact  Following Commands: Follows one step commands with increased time;Follows multistep commands with repitition  Attention Span: Appears intact  Memory: Appears intact  Safety Judgement: Appears intact  Problem Solving: Appears intact  Insights: Appears intact  Initiation: Appears intact  Sequencing: Requires cues for

## 2024-10-31 NOTE — PROGRESS NOTES
Physical Therapy Rehab Treatment Note  [facility]       NAME: Loren Noyola  : 1935 (89 y.o.)  MRN: [id number]  CODE STATUS: Full Code    Date of Service: 10/31/2024       Restrictions:  Restrictions/Precautions: Fall Risk       SUBJECTIVE:   Subjective: \"I am a little bettter\"    Pain  Pain: 5/10 pain in midback, pt medicated prior to session.      OBJECTIVE:     Bed Mobility  Overall Assistance Level: Independent  Additional Factors: Increased time to complete;Head of bed flat;Without handrails  Roll Left  Assistance Level: Independent  Roll Right  Assistance Level: Independent  Sit to Supine  Assistance Level: Independent  Supine to Sit  Assistance Level: Independent  Skilled Clinical Factors: increased time and energy required  Scooting  Assistance Level: Independent    Transfers  Surface: Wheelchair;To bed;From bed  Additional Factors: Verbal cues;Increased time to complete  Device: Walker  Sit to Stand  Assistance Level: Supervision  Skilled Clinical Factors: intermittent safety cues required, overall improved hand placement  Stand to Sit  Assistance Level: Supervision  Skilled Clinical Factors: vc's for controlled descent with increased pain    Ambulation  Surface: Level surface  Device: Rolling walker  Distance: 15' x 2  Activity: Within Unit  Activity Comments: quality and posture degrades with increasing distances  Additional Factors: Verbal cues;Hand placement cues;Increased time to complete  Assistance Level: Stand by assist  Gait Deviations: Slow ximena;Unsteady gait;Path deviations  Skilled Clinical Factors: occasional vc's to improve approach to seated surface with good carry over. pain limiting throughout resulting in increased guarding with gait    PT Exercises  A/AROM Exercises: seated: AP, LAQ, Marches, hip abd/add with ball x15ea raul #2 cuff weight  Motor Control/Coordination: BLE strength and control activities with resisted isometric LE ex

## 2024-10-31 NOTE — PROGRESS NOTES
1337)  Assistance Level: Stand by assist (10/30/24 0956)  Gait Deviations: Slow ximena;Unsteady gait;Path deviations (10/30/24 1337)  Skilled Clinical Factors: intermittent touching assist for safety, occasional vc's to improve approach to seated surface with good carry over. pain limiting throughout resulting in increased guarding with gait (10/30/24 1337)  Stairs:  Stairs/Curb  Stairs?: No (not safe to test) (10/24/24 1119)  Stairs  Stair Height: 6'' (10/30/24 1337)  Device: Bilateral handrails (10/30/24 1337)  Number of Stairs: 4 (10/30/24 1337)  Additional Factors: Verbal cues;Hand placement cues;Non-reciprocal going up;Increased time to complete;Non-reciprocal going down (10/30/24 1337)  Assistance Level: Minimal assistance (10/30/24 1337)  Skilled Clinical Factors - Comments: steadying assistance required to maintain balance, improved ability to complete 4 steps this session (10/30/24 1337)  W/C mobility:       Occupational Therapy:   Hand Dominance: Right  ADL  Feeding: Modified independent  (10/24/24 1228)  Grooming: Setup (10/24/24 1228)  UE Bathing: Supervision (10/24/24 1228)  LE Bathing: Minimal assistance (10/24/24 1228)  UE Dressing: Setup (10/24/24 1228)  LE Dressing: Minimal assistance (10/24/24 1228)  Toileting Skilled Clinical Factors: did not need to go (10/24/24 1228)  Toilet Transfers  Toilet Transfers Comments: Patient reported that she had just gone to the bathroom using the [facility] (10/24/24 1231)          Speech Therapy:            Diet/Swallow:                      Lab/X-ray studies reviewed, analyzed and discussed with patient and staff:     XR FOOT RIGHT 10/25/2024  Moderate hallux valgus deformity is seen.  There is no cortical destruction. There is flattening of the arch.  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  No acute bony  ambulation, ADL’s, and to improve the P/AROM.  Therapeutic modifications regarding activities in therapies, place, amount of time per day and intensity of therapy made daily.  In bed therapies or bedside therapies prn.   Bowel and Bladder dysfunction constipation, Neurogenic bowel and bladder:  frequent toileting, ambulate to bathroom with assistance, check post void residuals.  Check for C.difficile x1 if >2 loose stools in 24 hours, continue bowel & bladder program.  Monitor bowel and bladder function.  Lactinex 2 PO every AC.  MOM prn, Brown Bomb prn, Glycerin suppository prn, enema prn.  Encourage therapy and nursing to co-treat and problem solve re continence.    Severe back pain, thoracic and lumbar pain, as well as generalized OA pain: reassess pain every shift and prior to and after each therapy session, give prn Tylenol and consider scheduled Tylenol, modalities prn in therapy, masage, Lidoderm, K-pad prn. Consider scheduled AM pain meds. Controlled Substance Monitoring:  Acute and Chronic Pain Monitoring:   RX Monitoring Acute Pain Prescriptions Periodic Controlled Substance Monitoring   10/25/2024   8:15 AM Prescription exceeds daily limit for a specific reason. See comments or note.;Severe pain not adequately treated with lower dose.;Not required given exclusionary diagnoses... Possible medication side effects, risk of tolerance/dependence & alternative treatments discussed.;No signs of potential drug abuse or diversion identified.;Assessed functional status (ability to engage in work or other purposeful activities, the pain intensity and its interference with activities of daily living, quality of family life and social activities, and the physical activity);Obtaining appropriate analgesic effect of treatment.       Skin healing superficial wound noted to medial 1st mpj of right foot. Appears healthy and granular, no signs of infection  and breakdown risk:  continue pressure relief program.  Daily skin  therapy and posture.  Consult hospitalist for backup medical and adjust/add medications.  Monitor heart rate and blood pressure as well as medications effects on vital signs before during and after therapy with especial focus on preventing orthostasis and falls risk.  Consult cardiology especially in regards to amiodarone use.  Use of chronic blood thinners Eliquis-monitor for bleeding weekly CBCs  Graphic tongue with early thrush-Nystan thin rinse and vitamin B12 shot.  GERD and bleeding risk on Eliquis-Elevate head of bed after meals, monitor stools for blood, lowest effective dose of PPI, consider Tums.    Anemia-Monitor vital signs monitor for orthostasis and tachycardia, check H&H prn.  Vitamin B12 and iron-dose iron with food to prevent GI upset.  Monitor for constipation.  Monitor stools for blood.    Vertigo-focus on balance and therapy  Osteoporosis with compression fracture of T12 vertebra with routine healing-limit use of PPI to prevent further osteoporosis  1.7 cm mildly hyperdense mass involving the lateral left kidney -defer to medical especially given patient's age.  (This was was a finding on the acute floor.     Focus on reassessing rehab goals and coordinating therapy and medical self care issues.        Deb Haro D.O., PM&R     Attending    [phone]   [facility]

## 2024-10-31 NOTE — FLOWSHEET NOTE
Assessment as charted. C/o discomfort to lower back. Had Tramadol at 330 this am, aware that she can have it again at 930. Refusing to take Norco  because it makes her sleepy.

## 2024-10-31 NOTE — PROGRESS NOTES
Physical Therapy Rehab Treatment Note  [facility]  [facility]       NAME: Loren Noyola  : 1935 (89 y.o.)  MRN: [id number]  CODE STATUS: Full Code    Date of Service: 10/31/2024       Restrictions:  Restrictions/Precautions: Fall Risk       SUBJECTIVE:   Subjective: \"They dressed me\"    Pain  Pain: 8/10 pain in midback, pt medicated prior to session.      OBJECTIVE:     Transfers  Surface: Wheelchair;To mat;From mat  Additional Factors: Verbal cues;Increased time to complete  Device: Walker  Sit to Stand  Assistance Level: Supervision  Skilled Clinical Factors: intermittent safety cues required, overall improved hand placement  Stand to Sit  Assistance Level: Supervision  Skilled Clinical Factors: vc's for controlled descent with increased pain    Ambulation  Surface: Level surface  Device: Rolling walker  Distance: 30', 15' x 2  Activity: Within Unit  Activity Comments: quality and posture degrades with increasing distances  Additional Factors: Verbal cues;Hand placement cues;Increased time to complete  Assistance Level: Stand by assist  Gait Deviations: Slow ximena;Unsteady gait;Path deviations  Skilled Clinical Factors: occasional vc's to improve approach to seated surface with good carry over. pain limiting throughout resulting in increased guarding with gait    Stairs  Stair Height: 6''  Device: Bilateral handrails  Number of Stairs: 4  Additional Factors: Verbal cues;Hand placement cues;Non-reciprocal going up;Increased time to complete;Non-reciprocal going down  Assistance Level:  (touching assist)  Skilled Clinical Factors - Comments: steadying assistance required to maintain balance, improved ability to complete 4 steps this session. cues for proper sequencing    PT Exercises  PROM Exercises: seated HS/gastroc stretch x3 30 sec hold BLE  A/AROM Exercises: seated: AP, LAQ, Marches, hip abd/add with ball x15ea raul  Resistive Exercises: seated YTB HS curls/hip abd

## 2024-11-01 PROCEDURE — 97535 SELF CARE MNGMENT TRAINING: CPT

## 2024-11-01 PROCEDURE — 1180000000 HC REHAB R&B

## 2024-11-01 PROCEDURE — 97530 THERAPEUTIC ACTIVITIES: CPT

## 2024-11-01 PROCEDURE — 99232 SBSQ HOSP IP/OBS MODERATE 35: CPT | Performed by: PHYSICAL MEDICINE & REHABILITATION

## 2024-11-01 PROCEDURE — 6370000000 HC RX 637 (ALT 250 FOR IP): Performed by: PHYSICAL MEDICINE & REHABILITATION

## 2024-11-01 PROCEDURE — 6370000000 HC RX 637 (ALT 250 FOR IP): Performed by: INTERNAL MEDICINE

## 2024-11-01 PROCEDURE — 97116 GAIT TRAINING THERAPY: CPT

## 2024-11-01 PROCEDURE — 99232 SBSQ HOSP IP/OBS MODERATE 35: CPT | Performed by: INTERNAL MEDICINE

## 2024-11-01 PROCEDURE — 97110 THERAPEUTIC EXERCISES: CPT

## 2024-11-01 RX ORDER — CHOLECALCIFEROL (VITAMIN D3) 50 MCG
2000 TABLET ORAL
Qty: 60 TABLET | Refills: 5 | Status: ON HOLD | OUTPATIENT
Start: 2024-11-01

## 2024-11-01 RX ORDER — HYDROCODONE BITARTRATE AND ACETAMINOPHEN 5; 325 MG/1; MG/1
1 TABLET ORAL EVERY 4 HOURS PRN
Qty: 20 TABLET | Refills: 0 | Status: SHIPPED | OUTPATIENT
Start: 2024-11-01 | End: 2024-11-08

## 2024-11-01 RX ADMIN — Medication 100 MG: at 09:00

## 2024-11-01 RX ADMIN — CALCIUM 500 MG: 500 TABLET ORAL at 09:00

## 2024-11-01 RX ADMIN — LOSARTAN POTASSIUM 50 MG: 50 TABLET, FILM COATED ORAL at 09:00

## 2024-11-01 RX ADMIN — ACETAMINOPHEN 500 MG: 500 TABLET ORAL at 22:26

## 2024-11-01 RX ADMIN — ASPIRIN 81 MG: 81 TABLET, COATED ORAL at 09:00

## 2024-11-01 RX ADMIN — PANTOPRAZOLE SODIUM 20 MG: 20 TABLET, DELAYED RELEASE ORAL at 09:01

## 2024-11-01 RX ADMIN — TRAMADOL HYDROCHLORIDE 50 MG: 50 TABLET ORAL at 06:57

## 2024-11-01 RX ADMIN — LOSARTAN POTASSIUM 50 MG: 50 TABLET, FILM COATED ORAL at 22:26

## 2024-11-01 RX ADMIN — TRAMADOL HYDROCHLORIDE 50 MG: 50 TABLET ORAL at 16:00

## 2024-11-01 RX ADMIN — NYSTATIN 500000 UNITS: 100000 SUSPENSION ORAL at 16:00

## 2024-11-01 RX ADMIN — NYSTATIN 500000 UNITS: 100000 SUSPENSION ORAL at 09:02

## 2024-11-01 RX ADMIN — HYDROCODONE BITARTRATE AND ACETAMINOPHEN 1 TABLET: 5; 325 TABLET ORAL at 13:10

## 2024-11-01 RX ADMIN — AMLODIPINE BESYLATE 5 MG: 5 TABLET ORAL at 13:10

## 2024-11-01 RX ADMIN — NYSTATIN 500000 UNITS: 100000 SUSPENSION ORAL at 22:25

## 2024-11-01 RX ADMIN — METOPROLOL TARTRATE 50 MG: 50 TABLET, FILM COATED ORAL at 22:26

## 2024-11-01 RX ADMIN — Medication 400 MG: at 09:00

## 2024-11-01 RX ADMIN — SENNOSIDES 8.6 MG: 8.6 TABLET, FILM COATED ORAL at 22:26

## 2024-11-01 RX ADMIN — APIXABAN 2.5 MG: 2.5 TABLET, FILM COATED ORAL at 22:26

## 2024-11-01 RX ADMIN — RANOLAZINE 500 MG: 500 TABLET, FILM COATED, EXTENDED RELEASE ORAL at 22:26

## 2024-11-01 RX ADMIN — AMIODARONE HYDROCHLORIDE 100 MG: 200 TABLET ORAL at 09:00

## 2024-11-01 RX ADMIN — Medication 2000 UNITS: at 16:00

## 2024-11-01 RX ADMIN — ACETAMINOPHEN 500 MG: 500 TABLET ORAL at 09:00

## 2024-11-01 RX ADMIN — HYDROCODONE BITARTRATE AND ACETAMINOPHEN 1 TABLET: 5; 325 TABLET ORAL at 09:08

## 2024-11-01 RX ADMIN — APIXABAN 2.5 MG: 2.5 TABLET, FILM COATED ORAL at 09:00

## 2024-11-01 RX ADMIN — METOPROLOL TARTRATE 50 MG: 50 TABLET, FILM COATED ORAL at 09:00

## 2024-11-01 RX ADMIN — RANOLAZINE 500 MG: 500 TABLET, FILM COATED, EXTENDED RELEASE ORAL at 08:59

## 2024-11-01 ASSESSMENT — PAIN SCALES - GENERAL
PAINLEVEL_OUTOF10: 6
PAINLEVEL_OUTOF10: 7
PAINLEVEL_OUTOF10: 5
PAINLEVEL_OUTOF10: 4
PAINLEVEL_OUTOF10: 6

## 2024-11-01 ASSESSMENT — PAIN DESCRIPTION - LOCATION
LOCATION: BACK;HIP
LOCATION: HIP;RIB CAGE
LOCATION: BREAST;BACK
LOCATION: HIP

## 2024-11-01 ASSESSMENT — ENCOUNTER SYMPTOMS
EYES NEGATIVE: 1
SHORTNESS OF BREATH: 0
GASTROINTESTINAL NEGATIVE: 1
BACK PAIN: 1
COUGH: 0
NAUSEA: 0
WHEEZING: 0
BLOOD IN STOOL: 0
STRIDOR: 0
CHEST TIGHTNESS: 0
RESPIRATORY NEGATIVE: 1

## 2024-11-01 ASSESSMENT — PAIN DESCRIPTION - DESCRIPTORS
DESCRIPTORS: ACHING
DESCRIPTORS: THROBBING
DESCRIPTORS: ACHING
DESCRIPTORS: ACHING

## 2024-11-01 ASSESSMENT — PAIN DESCRIPTION - ORIENTATION
ORIENTATION: RIGHT
ORIENTATION: LOWER
ORIENTATION: RIGHT;LOWER
ORIENTATION: RIGHT

## 2024-11-01 NOTE — PROGRESS NOTES
bony abnormalities.   3. Mild soft tissue swelling suspected.     CT CHEST  10/21/2024     1.  Severe compression fracture deformity of T12 of indeterminate age though has occurred since the study of 04/25/2023.  There is mild retropulsion of the posterosuperior aspect of the T12 vertebral body resulting in a mild degree of central canal narrowing.   2.  1.7 cm mildly hyperdense mass involving the lateral left kidney on image 51 of series 2 which is not definitely seen on the prior study and is of indeterminate density at 58 Hounsfield units.  ACR White Paper guidelines (Herts, et al. JACR 2018; 15(2):264-273) recommend MRI or CT without and with intravenous contrast.   3.  Posterior basilar lung scarring and/or atelectasis but unchanged.   4.  Borderline to mild cardiomegaly, unchanged.   5.  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  ACR White Paper guidelines (Herts, et al. JACR 2018; 15(2):264-273) recommend MRI or CT without and with intravenous contrast.   6.  5.8 cm ovoid fluid density mass with some rim calcification in the right lateral pelvis which is unchanged from the prior study consistent with a benign etiology, possibly a postoperative seroma as the patient has undergone prior hysterectomy.   7.  Bilateral adrenal nodules measuring 2.6 cm on the left and 1.9 cm on the right and unchanged with density measurements consistent with benign adrenal adenomas.  These require no additional imaging follow-up.        CT CERVICAL SPINE  10/21/2024 BONES/ALIGNMENT: Straightening of the spine is seen and there is mild anterolisthesis of C7 on T1 DEGENERATIVE CHANGES: Intervertebral disc space narrowing is seen at C5-6 C6-C7 and C7-T1.  Anterior osteophytes are seen at C5-C7. No canal stenosis is seen at any level. SOFT TISSUES: There is no prevertebral soft tissue swelling.  The thyroid gland is heterogeneous and contains  Bladder dysfunction constipation, Neurogenic bowel and bladder:  frequent toileting, ambulate to bathroom with assistance, check post void residuals.  Check for C.difficile x1 if >2 loose stools in 24 hours, continue bowel & bladder program.  Monitor bowel and bladder function.  Lactinex 2 PO every AC.  MOM prn, Brown Bomb prn, Glycerin suppository prn, enema prn.  Encourage therapy and nursing to co-treat and problem solve re continence.    Severe back pain, thoracic and lumbar pain, as well as generalized OA pain: reassess pain every shift and prior to and after each therapy session, give prn Tylenol and consider scheduled Tylenol, modalities prn in therapy, masage, Lidoderm, K-pad prn. Consider scheduled AM pain meds. Controlled Substance Monitoring:  Acute and Chronic Pain Monitoring:   RX Monitoring Acute Pain Prescriptions Periodic Controlled Substance Monitoring   10/25/2024   8:15 AM Prescription exceeds daily limit for a specific reason. See comments or note.;Severe pain not adequately treated with lower dose.;Not required given exclusionary diagnoses... Possible medication side effects, risk of tolerance/dependence & alternative treatments discussed.;No signs of potential drug abuse or diversion identified.;Assessed functional status (ability to engage in work or other purposeful activities, the pain intensity and its interference with activities of daily living, quality of family life and social activities, and the physical activity);Obtaining appropriate analgesic effect of treatment.       Skin healing superficial wound noted to medial 1st mpj of right foot. Appears healthy and granular, no signs of infection  and breakdown risk:  continue pressure relief program.  Daily skin exams and reports from nursing. FU with podiatry  Fatigue due to nutritional and hydration deficiency: Add and titrate vitamin B12 vitamin D and CoQ10 continue to monitor I&O’s, calorie counts prn, dietary consult prn.  Add healthy  snack at night.  Acute episodic insomnia with situational adjustment disorder:  prn Ambien, monitor for day time sedation.  Falls risk elevated:  patient to use call light to get nursing assistance to get up, bed and chair alarm.  Elevated DVT risk: progressive activities in PT, continue prophylaxis EMILY hose, elevation and meds-see MAR Eliquis.   Complex discharge planning:  Complete medication reconciliation, patient and family education, and medication simplification coordinating follow-up care with case management and social work as we progressed toward patient's plan pending  DC 11/2/24 home with -with help from sons and C.   SP patient's final weekly team meeting   Monday to re-assess progress towards goals, discuss and address social, psychological and medical comorbidities and to address difficulties they may be having progressing in therapy.  Patient and family education is in progress.  The patient is to follow-up with their family physician after discharge.        Complex Active General Medical Issues that complicate care Assess & Plan:      Primary osteoarthritis of both knees, Spondylolisthesis of lumbar region, Cervical spondylosis without myelopathy,  DDD (degenerative disc disease), lumbar  CAD,  Essential hypertension, Peripheral vascular disease,  Right bundle branch block -Acute rehab to monitor heart rate and rhythm with the option of telemetry and the effects of chronotropic medication with respect to increasing physical activity and exercise in PT, OT, ADLs with medication titration to lowest effective dosing.  Continue blood signs every shift focusing on heart rate, rhythm and blood pressure checks with orthostatic checks-monitoring the effect of exercise, therapy and posture.  Consult hospitalist for backup medical and adjust/add medications.  Monitor heart rate and blood pressure as well as medications effects on vital signs before during and after therapy with especial focus on

## 2024-11-01 NOTE — PROGRESS NOTES
OCCUPATIONAL THERAPY  INPATIENT REHAB TREATMENT NOTE  [facility]      NAME: Loren Noyola  : 1935 (89 y.o.)  MRN: [id number]  CODE STATUS: Full Code  Room: R260/R260-01    Date of Service: 2024    Referring Physician: Dr Haro  Rehab Diagnosis: Impaired mobility ADLs dt thoracic and lumbar spinal stenosis with T12 compression fracture    Hospital course:   Comments: 89 y.o. female patient who presented to ER 10/21 with complaints of pain to head, neck and right hip after experiencing a fall 10/13. Pain worsened so she came to ER 10/17, d/c'd home after it was confirmed no injury. However, 10/21 patient admitting to another fall and worsening pain not relieved by prescribed norco. CT revealing T12 compression fx. Patient admitted under hospitalist services with neurosx consulting. NeuroSx determined fx to be chronic and no surgical needs.      Restrictions  Restrictions/Precautions  Restrictions/Precautions: Fall Risk     Patient's date of birth confirmed: Yes    SAFETY:  Safety Devices  Safety Devices in place: Yes  Type of devices: All fall risk precautions in place    SUBJECTIVE:\"I sure wish I could take you home with me.\"    Pain at start of treatment: Yes: 5/10    Pain at end of treatment: Yes: 5/10    Location: B hips;L ribcage  Description: ache  Nursing notified: Yes  Nurse: Leah  Intervention: RN provided pain medication    COGNITION:  Orientation  Overall Orientation Status: Within Functional Limits  Cognition  Overall Cognitive Status: Exceptions  Arousal/Alertness: Appears intact  Following Commands: Follows one step commands with increased time;Follows multistep commands with repitition  Attention Span: Appears intact  Memory: Appears intact  Safety Judgement: Appears intact  Problem Solving: Appears intact  Insights: Appears intact  Initiation: Appears intact  Sequencing: Requires cues for some  Cognition Comment: Comprehension: Supervision   Expression: IND   Social  Interaction: IND   Problem Solving: MIN A   Memory: MIN A    OBJECTIVE:    Feeding  Assistance Level: Modified independent  Skilled Clinical Factors: Patient positive hand -> mouth with medications and with water     UE Strengthening HEP:   Patient engaged in B UE ROM/strengthening to increase I with ADL's and transfers.   Patient issued written HEP focusing on all UE joints including scapular protraction/retraction, shoulder flexion/extension/rotation/horizontal abduction, elbow flexion/extension, supination/pronation, and wrist/digit flexion/extension..  Patient able to utilize 1 # hand weight 1 X 3 repetitions in various planes of motion.  Patient required MAX verbal cues for proper technique.  Patient required 0 verbal cues to keep correct count.  Rest breaks as needed.     ASSESSMENT: Patient pleasant and cooperative while working through pain when completing HEP exercises.    Activity Tolerance: Patient tolerated treatment well      PLAN OF CARE:  Strengthening, Balance training, Functional mobility training, Endurance training, Pain management, Safety education & training, Patient/Caregiver education & training, Self-Care / ADL, Equipment evaluation, education, & procurement, Home management training, Cognitive/Perceptual training    Continue per OT POC for planned discharge on 11-2-24.    Patient goals : \"to walk. even if it is with a walker\"  Time Frame for Long Term Goals : Within 1-1.5 weeks patient to demonstrate progress in the following areas in order to meet long term goals as stated in the initial evaluation  Long Term Goal 1: increase self care independence  Long Term Goal 2: improve independence in IADLs  Long Term Goal 3: improve strength and endurance        Therapy Time:   Individual Group Co-Treat   Time In 1300       Time Out 1330         Minutes 30                   ADL/IADL training: 10 minutes  Therapeutic activities: 20 minutes     Electronically signed by:    SALLY Schwab,

## 2024-11-01 NOTE — CARE COORDINATION
Pt is set to discharge home on 11/2, pt confirmed she feels ready. No new DME needed. Referral made to [facility] per her request. No concerns noted at this time. Electronically signed by RABIA Hwang, KIRTI on 11/1/2024 at 4:03 PM

## 2024-11-01 NOTE — PROGRESS NOTES
Physical Therapy Rehab Treatment Note  Facility/Department[facility]  [facility]       NAME: Loren Noyola  : 1935 (89 y.o.)  MRN: [id number]  CODE STATUS: Full Code    Date of Service: 2024       Restrictions:  Restrictions/Precautions: Fall Risk       SUBJECTIVE:   Subjective: \"I am doing okay\"    Pain  Pain: 3-4/10 pain in ribcage, pt medicated prior to session.      OBJECTIVE:     Transfers  Surface: Wheelchair;To chair with arms;From chair with arms  Additional Factors: Verbal cues;Increased time to complete  Device: Walker  Sit to Stand  Assistance Level: Modified independent  Skilled Clinical Factors: overall improved hand placement  Stand to Sit  Assistance Level: Modified independent  Skilled Clinical Factors: vc's for controlled descent with increased pain  Car Transfer  Assistance Level: Minimal assistance  Skilled Clinical Factors: lifting assistance out of car, good ability to transfer BLE in/out of car    Ambulation  Surface: Level surface  Device: Rolling walker  Distance: 45', 15', 35', 50'  Activity: Within Unit  Activity Comments: quality and posture degrades with increasing distances  Additional Factors: Verbal cues;Hand placement cues;Increased time to complete  Assistance Level: Stand by assist  Gait Deviations: Slow ximena;Unsteady gait;Path deviations  Skilled Clinical Factors: occasional vc's to improve approach to seated surface with good carry over. pain limiting throughout resulting in increased guarding with gait. poor tolerance to extended distances d/t increased pain.    Stairs  Stair Height: 6''  Device: Bilateral handrails  Number of Stairs: 4  Additional Factors: Verbal cues;Hand placement cues;Non-reciprocal going up;Increased time to complete;Non-reciprocal going down  Assistance Level: Stand by assist  Skilled Clinical Factors - Comments: close SBA for safety with maintaining balance, improved ability to complete 4 steps this session. cues for proper  sequencing    PT Exercises  Exercise Treatment: Pt given printed HEP for supine/seated exercises- verbalizes understanding  PROM Exercises: seated HS/gastroc stretch x3 30 sec hold BLE  A/AROM Exercises: seated: AP, LAQ, Marches, hip abd/add with ball x15ea raul #2 cuff weight  Resistive Exercises: seated YTB HS curls/hip abd x10 BLE  Dynamic Standing Balance Exercises: standing marches/heel raises/mini squats x10 BLE     Activity Tolerance  Activity Tolerance: Patient tolerated treatment well    ASSESSMENT/PROGRESS TOWARDS GOALS:   Assessment  Assessment: Pt expecting discharge home tomorrow. Denies further questions/concerns at this time, expresses gratitude to therapist for help provided.  Activity Tolerance: Patient tolerated treatment well    Goals:  Long Term Goals  Long Term Goal 1: Pt to complete bed mobility with indep  Long Term Goal 2: Pt to complete transfers with indep  Long Term Goal 3: Pt to ambulate 50-150ft with LRD and indep  Long Term Goal 4: Pt to manage 4 steps with HR and SBA  Patient Goals   Patient Goals : to be able to walk without help    PLAN OF CARE/Safety:   Safety Devices  Type of Devices: All fall risk precautions in place;Chair alarm in place;Left in chair      Therapy Time:   Individual   Time In 1000   Time Out 1100   Minutes 60      Minutes: 60  Transfer/Bed mobility training: 15  Gait trainin  Neuro re education:  Therapeutic ex: 20      Elfego Reyes PTA, 24 at 12:02 PM

## 2024-11-01 NOTE — PROGRESS NOTES
unchanged.  No mass. Pleural space:  No acute findings.  No significant effusion.  No pneumothorax. Heart:  Borderline to mild cardiomegaly, unchanged.  No significant pericardial effusion.  No significant coronary artery calcifications. ABDOMEN: Liver:  No acute findings. Gallbladder and bile ducts:  No acute findings.  No calcified stones.  No ductal dilation. Pancreas:  No acute findings.  No ductal dilation. Spleen:  No acute findings.  No splenomegaly. Adrenals:  Bilateral adrenal nodules measuring 2.6 cm on the left and 1.9 cm on the right and unchanged with density measurements consistent with benign adrenal adenomas.  These require no additional imaging follow-up. Kidneys and ureters:  1.7 cm mildly hyperdense mass involving the lateral left kidney on image 51 of series 2 which is not definitely seen on the prior study and is of indeterminate density at 58 Hounsfield units.  Multiple simple appearing bilateral renal cysts requiring no additional follow-up. Stomach and bowel:  No acute findings.  No obstruction.  No mucosal thickening. PELVIS: Appendix:  No findings to suggest acute appendicitis. Bladder:  No acute findings.  No stones. Reproductive:  5.8 cm ovoid fluid density mass with some rim calcification in the right lateral pelvis which is unchanged from the prior study consistent with a benign etiology, possibly a postoperative seroma as the patient has undergone prior hysterectomy.  Prior hysterectomy. CHEST, ABDOMEN and PELVIS: Intraperitoneal space:  No acute findings.  No significant fluid collection. No free air. Bones/joints:  Severe compression fracture deformity of T12 of indeterminate age though has occurred since the study of 04/25/2023.  There is mild retropulsion of the posterosuperior aspect of the T12 vertebral body resulting in a mild degree of central canal narrowing.  Prior median sternotomy.  Left hip prosthesis with result in artifact obscuring the adjacent structures.  Multilevel  anterolisthesis and mild disc space narrowing at L4-L5, facet arthrosis in the mid and lower lumbar spine. The pedicles are intact with no paraspinal soft tissue mass.     1. Moderate degenerative changes in the mid and lower lumbar spine. 2. Grade 1 anterolisthesis of L3 on L4.     XR HIP LEFT (2-3 VIEWS)    Result Date: 10/17/2024  EXAMINATION: TWO XRAY VIEWS OF THE LEFT HIP 10/17/2024 1:30 pm COMPARISON: None. HISTORY: ORDERING SYSTEM PROVIDED HISTORY: pain TECHNOLOGIST PROVIDED HISTORY: Reason for exam:->pain What reading provider will be dictating this exam?->CRC FINDINGS: Bones are demineralized.  No acute bony abnormalities.  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  Soft tissues appear unremarkable.     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     XR HIP RIGHT (2-3 VIEWS)    Result Date: 10/17/2024  EXAMINATION: TWO XRAY VIEWS OF THE RIGHT HIP 10/17/2024 1:30 pm COMPARISON: None. HISTORY: ORDERING SYSTEM PROVIDED HISTORY: pain TECHNOLOGIST PROVIDED HISTORY: Reason for exam:->pain What reading provider will be dictating this exam?->CRC FINDINGS: Bones are demineralized.  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 No acute bony abnormalities.  Degenerative changes in the visualized lower lumbar spine.     1. 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 No acute bony abnormalities. 2. Degenerative changes in the visualized lower lumbar spine.       Active Hospital Problems    Diagnosis Date Noted    Mixed incontinence [N39.46] 10/25/2024     Priority: Low    Urinary incontinence [R32] 10/24/2024     Priority: Low    CAD (coronary artery disease) [I25.10]      Priority: Low    Impaired mobility ADLs dt  thoracic and lumbar spinal stenosis with T12 compression fracture. [Z74.09, Z78.9] 10/23/2024     Priority: Low    Compression fracture of T12 vertebra with routine healing [S22.080D] 10/23/2024     Priority: Low    Left kidney mass [N28.89] 10/23/2024     Priority: Low    Gait

## 2024-11-01 NOTE — PROGRESS NOTES
OCCUPATIONAL THERAPY  INPATIENT REHAB TREATMENT NOTE  [facility]      NAME: Loren Noyola  : 1935 (89 y.o.)  MRN: [id number]  CODE STATUS: Full Code  Room: R260/R260-01    Date of Service: 2024    Referring Physician: Dr Haro  Rehab Diagnosis: Impaired mobility ADLs dt thoracic and lumbar spinal stenosis with T12 compression fracture    Hospital course:   Comments: 89 y.o. female patient who presented to ER 10/21 with complaints of pain to head, neck and right hip after experiencing a fall 10/13. Pain worsened so she came to ER 10/17, d/c'd home after it was confirmed no injury. However, 10/21 patient admitting to another fall and worsening pain not relieved by prescribed norco. CT revealing T12 compression fx. Patient admitted under hospitalist services with neurosx consulting. NeuroSx determined fx to be chronic and no surgical needs.      Restrictions  Restrictions/Precautions  Restrictions/Precautions: Fall Risk     Patient's date of birth confirmed: Yes    SAFETY:  Safety Devices  Safety Devices in place: Yes  Type of devices: All fall risk precautions in place    SUBJECTIVE: \"Oh boy, did I sleep good last night.\"    Pain at start of treatment: Yes: 6/10    Pain at end of treatment: Yes: 4/10    Location: B lateral low back/hips;L ribcage  Description: sharp  Nursing notified: Yes  Nurse: Leah  Intervention: RN provided pain medication and pain patches    COGNITION:  Orientation  Overall Orientation Status: Within Functional Limits  Cognition  Overall Cognitive Status: Exceptions  Arousal/Alertness: Appears intact  Following Commands: Follows one step commands with increased time;Follows multistep commands with repitition  Attention Span: Appears intact  Memory: Appears intact  Safety Judgement: Appears intact  Problem Solving: Appears intact  Insights: Appears intact  Initiation: Appears intact  Sequencing: Requires cues for some    OBJECTIVE:    Patient in bed upon

## 2024-11-01 NOTE — PROGRESS NOTES
Physical Therapy Rehab Treatment Note  [facility]/Department[facility]  [facility]       NAME: Loren Noyola  : 1935 (89 y.o.)  MRN: [id number]  CODE STATUS: Full Code    Date of Service: 2024       Restrictions:  Restrictions/Precautions: Fall Risk       SUBJECTIVE:   Subjective: \"Hi dear\"    Pain  Pain: 5/10 pain in ribcage. medicated prior to session      OBJECTIVE:   Bed Mobility  Overall Assistance Level: Independent  Additional Factors: Increased time to complete;Head of bed flat;Without handrails  Roll Left  Assistance Level: Independent  Roll Right  Assistance Level: Independent  Sit to Supine  Assistance Level: Independent  Supine to Sit  Assistance Level: Independent  Skilled Clinical Factors: increased time and energy required  Scooting  Assistance Level: Independent    Transfers  Surface: Wheelchair;To mat;From mat  Additional Factors: Verbal cues;Increased time to complete  Device: Walker  Sit to Stand  Assistance Level: Modified independent  Skilled Clinical Factors: overall improved hand placement  Stand to Sit  Assistance Level: Modified independent  Skilled Clinical Factors: vc's for controlled descent with increased pain    Ambulation  Surface: Level surface  Device: Rolling walker  Distance: 15' x 2, 50'  Activity: Within Unit  Activity Comments: quality and posture degrades with increasing distances  Additional Factors: Verbal cues;Hand placement cues;Increased time to complete  Assistance Level: Stand by assist  Gait Deviations: Slow ximena;Unsteady gait;Path deviations  Skilled Clinical Factors: occasional vc's to improve approach to seated surface with good carry over. pain limiting throughout resulting in increased guarding with gait. poor tolerance to extended distances d/t increased pain.    PT Exercises  PROM Exercises: seated HS/gastroc stretch x3 30 sec hold BLE  Postural Correction Exercises: seated sunrise/sundowns with physioball with focus on posture and  pain relief with stretching into ROMs  Motor Control/Coordination: BLE strength and control activities with resisted isometric LE ex     Activity Tolerance  Activity Tolerance: Patient tolerated treatment well    ASSESSMENT/PROGRESS TOWARDS GOALS:   Assessment  Assessment: Pt expecting discharge home tomorrow. Denies further questions/concerns at this time, expresses gratitude to therapist for help provided.  Activity Tolerance: Patient tolerated treatment well    Goals:  Long Term Goals  Long Term Goal 1: Pt to complete bed mobility with indep  Long Term Goal 2: Pt to complete transfers with indep  Long Term Goal 3: Pt to ambulate 50-150ft with LRD and indep  Long Term Goal 4: Pt to manage 4 steps with HR and SBA  Patient Goals   Patient Goals : to be able to walk without help    PLAN OF CARE/Safety:   Safety Devices  Type of Devices: All fall risk precautions in place;Chair alarm in place;Left in chair      Therapy Time:   Individual   Time In 1330   Time Out 1400   Minutes 30      Minutes: 30  Transfer/Bed mobility training: 10  Gait training: 10  Neuro re education:  Therapeutic ex: 10      Elfego Reyes PTA, 11/01/24 at 3:59 PM

## 2024-11-01 NOTE — PLAN OF CARE
Problem: Discharge Planning  Goal: Discharge to home or other facility with appropriate resources  11/1/2024 1044 by Leah Cox RN  Outcome: Progressing  11/1/2024 0041 by Leticia Hernandez RN  Outcome: Progressing     Problem: Safety - Adult  Goal: Free from fall injury  11/1/2024 1044 by Leah Cox RN  Outcome: Progressing  11/1/2024 0041 by Leticia Hernandez RN  Outcome: Progressing     Problem: Skin/Tissue Integrity  Goal: Absence of new skin breakdown  Description: 1.  Monitor for areas of redness and/or skin breakdown  2.  Assess vascular access sites hourly  3.  Every 4-6 hours minimum:  Change oxygen saturation probe site  4.  Every 4-6 hours:  If on nasal continuous positive airway pressure, respiratory therapy assess nares and determine need for appliance change or resting period.  11/1/2024 1044 by Leah Cox RN  Outcome: Progressing  11/1/2024 0041 by Leticia Hernandez RN  Outcome: Progressing     Problem: ABCDS Injury Assessment  Goal: Absence of physical injury  11/1/2024 1044 by Leah Cox RN  Outcome: Progressing  11/1/2024 0041 by Leticia Hernandez RN  Outcome: Progressing     Problem: Pain  Goal: Verbalizes/displays adequate comfort level or baseline comfort level  11/1/2024 1044 by Leah Cox RN  Outcome: Progressing  11/1/2024 0041 by Leticia Hernandez RN  Outcome: Progressing     Problem: Nutrition Deficit:  Goal: Optimize nutritional status  11/1/2024 1044 by Leah Cox RN  Outcome: Progressing  11/1/2024 0041 by Leticia Hernandez RN  Outcome: Progressing

## 2024-11-01 NOTE — PLAN OF CARE
Nutrition Problem #1: Increased nutrient needs  Intervention: Food and/or Nutrient Delivery: Continue Current Diet, Continue Oral Nutrition Supplement

## 2024-11-01 NOTE — PROGRESS NOTES
Comprehensive Nutrition Assessment    Type and Reason for Visit:  Reassess    Nutrition Recommendations/Plan:   Continue Current Diet, Continue Oral Nutrition Supplement     Malnutrition Assessment:  Malnutrition Status:  No malnutrition (10/25/24 1540)      Nutrition Assessment:    Pt presents with increased nutrient needs due to the presence of foot wound. Pt reports that she is 'not a big eater' generally and reports 'fair' appetite at this time, stated eating ~50% of trays. To continue to provide wound healing supplement bid with meals and high calorie supplement bid to help provide adequate nutrition to promote wound healing.    Nutrition Related Findings:    PMH-CAD, hld, htn, OA, GERD (ppi); admitted d/t frequent falls, T12 compression fracture. Labs/meds reviewed. +2-3  BLE edema noted. Wound Type: Stage III, Pressure Injury (R foot)       Current Nutrition Intake & Therapies:    Average Meal Intake:  (~50% per pt)     ADULT DIET; Regular  ADULT ORAL NUTRITION SUPPLEMENT; Lunch, Dinner; Wound Healing Oral Supplement  ADULT ORAL NUTRITION SUPPLEMENT; Breakfast, Dinner; Standard High Calorie/High Protein Oral Supplement    Anthropometric Measures:  Height: 160 cm (5' 3\")  Ideal Body Weight (IBW): 115 lbs (52 kg)    Admission Body Weight: 83.5 kg (184 lb) ([facility])  Current Body Weight: 82.7 kg (182 lb 5.1 oz) (10/30),   IBW. Weight Source: Bed scale  Current BMI (kg/m2): 32.3  Usual Body Weight: 83.9 kg (185 lb) (11/2023 & 10/17/24 stated)                          BMI Categories: Obese Class 1 (BMI 30.0-34.9)    Estimated Daily Nutrient Needs:  Energy Requirements Based On: Kcal/kg  Weight Used for Energy Requirements: Admission  Energy (kcal/day): ~1510 (kg x 18)  Weight Used for Protein Requirements: Ideal  Protein (g/day): 68-78 (kg x 1.3-1.5)  Method Used for Fluid Requirements: 1 ml/kcal  Fluid (ml/day): 1500    Nutrition Diagnosis:   Increased nutrient needs related to increase demand for  energy/nutrients as evidenced by wounds  Inadequate oral intake related to  (reported decreased appetite at this time) as evidenced by intake 26-50%    Nutrition Interventions:   Food and/or Nutrient Delivery: Continue Current Diet, Continue Oral Nutrition Supplement  Nutrition Education/Counseling: Education not indicated  Coordination of Nutrition Care: Continue to monitor while inpatient       Goals:  Goals: PO intake 75% or greater, other (specify)          Nutrition Monitoring and Evaluation:         Physical Signs/Symptoms Outcomes: Skin, Weight, Biochemical Data    Discharge Planning:    Continue ONS as needed    Marie Murillo, NATALIA, LD

## 2024-11-02 VITALS
WEIGHT: 182.3 LBS | BODY MASS INDEX: 32.3 KG/M2 | HEIGHT: 63 IN | TEMPERATURE: 97.6 F | DIASTOLIC BLOOD PRESSURE: 67 MMHG | HEART RATE: 65 BPM | SYSTOLIC BLOOD PRESSURE: 151 MMHG | RESPIRATION RATE: 14 BRPM | OXYGEN SATURATION: 65 %

## 2024-11-02 PROCEDURE — 6370000000 HC RX 637 (ALT 250 FOR IP): Performed by: INTERNAL MEDICINE

## 2024-11-02 PROCEDURE — 97110 THERAPEUTIC EXERCISES: CPT

## 2024-11-02 PROCEDURE — 97116 GAIT TRAINING THERAPY: CPT

## 2024-11-02 PROCEDURE — 6370000000 HC RX 637 (ALT 250 FOR IP): Performed by: PHYSICAL MEDICINE & REHABILITATION

## 2024-11-02 PROCEDURE — 99232 SBSQ HOSP IP/OBS MODERATE 35: CPT | Performed by: INTERNAL MEDICINE

## 2024-11-02 RX ORDER — AMLODIPINE BESYLATE 5 MG/1
5 TABLET ORAL
Qty: 30 TABLET | Refills: 3 | Status: ON HOLD | OUTPATIENT
Start: 2024-11-02

## 2024-11-02 RX ORDER — RANOLAZINE 500 MG/1
500 TABLET, EXTENDED RELEASE ORAL 2 TIMES DAILY
Qty: 60 TABLET | Refills: 3 | Status: ON HOLD | OUTPATIENT
Start: 2024-11-02

## 2024-11-02 RX ADMIN — Medication 400 MG: at 07:54

## 2024-11-02 RX ADMIN — APIXABAN 2.5 MG: 2.5 TABLET, FILM COATED ORAL at 07:54

## 2024-11-02 RX ADMIN — AMLODIPINE BESYLATE 5 MG: 5 TABLET ORAL at 14:29

## 2024-11-02 RX ADMIN — RANOLAZINE 500 MG: 500 TABLET, FILM COATED, EXTENDED RELEASE ORAL at 07:53

## 2024-11-02 RX ADMIN — HYDROCODONE BITARTRATE AND ACETAMINOPHEN 1 TABLET: 5; 325 TABLET ORAL at 14:29

## 2024-11-02 RX ADMIN — CALCIUM 500 MG: 500 TABLET ORAL at 07:54

## 2024-11-02 RX ADMIN — METOPROLOL TARTRATE 50 MG: 50 TABLET, FILM COATED ORAL at 07:54

## 2024-11-02 RX ADMIN — HYDROCODONE BITARTRATE AND ACETAMINOPHEN 1 TABLET: 5; 325 TABLET ORAL at 05:51

## 2024-11-02 RX ADMIN — NYSTATIN 500000 UNITS: 100000 SUSPENSION ORAL at 07:54

## 2024-11-02 RX ADMIN — ACETAMINOPHEN 500 MG: 500 TABLET ORAL at 14:19

## 2024-11-02 RX ADMIN — POLYETHYLENE GLYCOL 3350 17 G: 17 POWDER, FOR SOLUTION ORAL at 07:53

## 2024-11-02 RX ADMIN — ASPIRIN 81 MG: 81 TABLET, COATED ORAL at 07:54

## 2024-11-02 RX ADMIN — Medication 100 MG: at 07:54

## 2024-11-02 RX ADMIN — PANTOPRAZOLE SODIUM 20 MG: 20 TABLET, DELAYED RELEASE ORAL at 05:51

## 2024-11-02 RX ADMIN — NYSTATIN 500000 UNITS: 100000 SUSPENSION ORAL at 14:19

## 2024-11-02 RX ADMIN — ACETAMINOPHEN 500 MG: 500 TABLET ORAL at 07:54

## 2024-11-02 RX ADMIN — LOSARTAN POTASSIUM 50 MG: 50 TABLET, FILM COATED ORAL at 07:54

## 2024-11-02 RX ADMIN — AMIODARONE HYDROCHLORIDE 100 MG: 200 TABLET ORAL at 07:54

## 2024-11-02 ASSESSMENT — PAIN SCALES - GENERAL
PAINLEVEL_OUTOF10: 5
PAINLEVEL_OUTOF10: 5
PAINLEVEL_OUTOF10: 4
PAINLEVEL_OUTOF10: 6

## 2024-11-02 ASSESSMENT — PAIN DESCRIPTION - LOCATION
LOCATION: RIB CAGE
LOCATION: RIB CAGE
LOCATION: HIP
LOCATION: BACK

## 2024-11-02 ASSESSMENT — ENCOUNTER SYMPTOMS
RESPIRATORY NEGATIVE: 1
WHEEZING: 0
NAUSEA: 0
STRIDOR: 0
EYES NEGATIVE: 1
GASTROINTESTINAL NEGATIVE: 1
BLOOD IN STOOL: 0
BACK PAIN: 1
SHORTNESS OF BREATH: 0
COUGH: 0
CHEST TIGHTNESS: 0

## 2024-11-02 ASSESSMENT — PAIN DESCRIPTION - DESCRIPTORS
DESCRIPTORS: ACHING

## 2024-11-02 ASSESSMENT — PAIN DESCRIPTION - ORIENTATION
ORIENTATION: LEFT

## 2024-11-02 NOTE — PROGRESS NOTES
Physical Therapy Rehab Treatment Note  Facility/Department: [facility]  Room: RUSTR260-01       NAME: Loren Noyola  : 1935 (89 y.o.)  MRN: [id number]  CODE STATUS: Full Code    Date of Service: 2024       Restrictions:  Restrictions/Precautions: Fall Risk       SUBJECTIVE:   Subjective: Bathing on arrival    Pain  Pain: 5/10 pre/ post       OBJECTIVE:           Ambulation  Surface: Level surface  Device: Rolling walker  Distance: 25ft  x 2  Activity: Within Unit  Activity Comments: reduced distance this date with improved form maintenance  Additional Factors: Verbal cues;Hand placement cues;Increased time to complete  Assistance Level: Stand by assist  Gait Deviations: Slow ximena;Unsteady gait;Path deviations  Skilled Clinical Factors: occasional vc's to improve approach to seated surface with good carry over. pain limiting throughout resulting in increased guarding with gait. poor tolerance to extended distances d/t increased pain.    Stairs  Stair Height: 6''  Device: Bilateral handrails  Number of Stairs: 8 (2 bouts)  Additional Factors: Verbal cues;Hand placement cues;Non-reciprocal going up;Increased time to complete;Non-reciprocal going down (attempted reciprocal descending but DC due to poor tolerance and control)    Wheelchair  Surface: Level surface  Device: Standard wheelchair  Additional Factors: Verbal cues  Assistance Level for Propulsion: Stand by assist;Supervision  Propulsion Method: Bilateral lower extremities         PT Exercises  Dynamic Sitting Balance Exercises: TROY Colon x 10 each        ASSESSMENT/PROGRESS TOWARDS GOALS:    Assessment  Assessment: Tx limited this date due to Loren bathing at arrival returned 10 minutes later to begin therapy. Trialed improving step pattern with poor tolerance causing this to be discharged  resuming non-reciprocal pattern. Loren noted no increased pain at conclusion of tx. Progress as able.    Goals:  Long Term Goals  Long  Term Goal 1: Pt to complete bed mobility with indep  Long Term Goal 2: Pt to complete transfers with indep  Long Term Goal 3: Pt to ambulate 50-150ft with LRD and indep  Long Term Goal 4: Pt to manage 4 steps with HR and SBA  Patient Goals   Patient Goals : to be able to walk without help    PLAN OF CARE/Safety:   Safety Devices  Type of Devices: All fall risk precautions in place;Chair alarm in place;Left in chair      Therapy Time:   Individual   Time In 0840   Time Out 0903   Minutes 23   Timed Code Treatment Minutes: 23 Minutes  Minutes: 23  Gait training: 15    Therapeutic ex: 8      Steve Rosas PTA, 11/02/24 at 12:39 PM

## 2024-11-02 NOTE — DISCHARGE SUMMARY
DISCHARGE SUMMARY    Hospital Course: The patient was admitted to the Rehabilitation Unit to address ADL and mobility deficits-as detailed above and below.  The patient was enrolled in acute PT, OT program.  Weekly team meetings were held to assess functional progress toward their goals-and modify the therapy program.  The patient's medical, emotional, psychosocial and functional issues were addressed.  The patient progressed in the rehab program and is now ready for discharge home.  Refer to functional assessments summary report for detailed functional status.  Refer to the medical problem list below to see the medical issues addressed.  The social and DC complexities are detailed in the DC planning section below.    Greater than 3 5 minutes was spent on coordinating patients discharge including follow-up care, medications and patient/family education.  Extended time needed because of the potential use of controlled medications are high risk medications and a high risk population individual.  Patient and family were instructed to use lowest effective dose of these medications and slowly titrate off over the next 2 to 4 weeks.  They are not to combine opiates with sedatives.     I reviewed her [place] prescription monitoring service data sheets in hopes of eliminating polypharmacy and weaning to the lowest effective dose of pain medications and eliminating the concomitant use of benzodiazepines.  I see   no medications of concern.  I see   no habits of combining sedatives and narcotics.  Controlled Substance Monitoring:    Acute and Chronic Pain Monitoring:   RX Monitoring Acute Pain Prescriptions Periodic Controlled Substance Monitoring   11/1/2024  11:30 AM Prescription exceeds daily limit for a specific reason. See comments or note.;Severe pain not adequately treated with lower dose.;Not required given exclusionary diagnoses... Possible medication side effects, risk of tolerance/dependence & alternative treatments  Vitamin B12 and iron-dose iron with food to prevent GI upset.  Monitor for constipation.  Monitor stools for blood.    Vertigo-focus on balance and therapy  Osteoporosis with compression fracture of T12 vertebra with routine healing-limit use of PPI to prevent further osteoporosis  1.7 cm mildly hyperdense mass involving the lateral left kidney -defer to medical especially given patient's age.  (This was was a finding on the acute floor.     Focus on emotional health and caregiver involvement in care.          Deb Haro D.O., PM&R     Attending    [phone]   [facility]

## 2024-11-02 NOTE — PROGRESS NOTES
Physical Therapy  Facility/Department: [facility]  Rehabilitation Discharge note    NAME: Loren Noyola  : 1935  MRN: [id number]    Date of discharge: 24      Past Medical History:   Diagnosis Date    Bleeding ulcer     CAD (coronary artery disease)     Colitis     Hip fx, left, closed, initial encounter ([facility]) 02/10/2018    Hyperlipidemia     Hypertension     Osteoarthritis     Spinal stenosis      Past Surgical History:   Procedure Laterality Date    APPENDECTOMY      COLECTOMY  1/3/2011    sigmoid colectomy with colostomy    COLECTOMY  2011    partial colectomy with colostomy closure    CORONARY ARTERY BYPASS GRAFT      HYSTERECTOMY (CERVIX STATUS UNKNOWN)      HYSTERECTOMY, TOTAL ABDOMINAL (CERVIX REMOVED)      [place] HEMIARTHROPLASTY HIP PARTIAL Left 2018    HIP HEMIARTHROPLASTY- performed by Bryan Malcolm MD at [facility] [place]    SIGMOIDOSCOPY      VARICOSE VEIN SURGERY         Restrictions  Restrictions/Precautions  Restrictions/Precautions: Fall Risk    Objective      Bed Mobility  Overall Assistance Level: Independent  Additional Factors: Increased time to complete;Head of bed flat;Without handrails  Roll Left  Assistance Level: Independent  Roll Right  Assistance Level: Independent  Sit to Supine  Assistance Level: Independent  Supine to Sit  Assistance Level: Independent  Skilled Clinical Factors: increased time and energy required  Scooting  Assistance Level: Independent     Transfers  Surface: Wheelchair;To mat;From mat  Additional Factors: Verbal cues;Increased time to complete  Device: Walker  Sit to Stand  Assistance Level: Modified independent  Skilled Clinical Factors: overall improved hand placement  Stand to Sit  Assistance Level: Modified independent  Skilled Clinical Factors: vc's for controlled descent with increased pain     Ambulation  Surface: Level surface  Device: Rolling walker  Distance: 15' x 2, 50'  Activity: Within Unit  Activity Comments: quality and posture

## 2024-11-02 NOTE — PLAN OF CARE
Problem: Discharge Planning  Goal: Discharge to home or other facility with appropriate resources  Outcome: Progressing  Flowsheets (Taken 11/1/2024 2200)  Discharge to home or other facility with appropriate resources: Identify barriers to discharge with patient and caregiver     Problem: Safety - Adult  Goal: Free from fall injury  Outcome: Progressing     Problem: ABCDS Injury Assessment  Goal: Absence of physical injury  Outcome: Progressing

## 2024-11-02 NOTE — PROGRESS NOTES
and/or atelectasis but unchanged.  No mass. Pleural space:  No acute findings.  No significant effusion.  No pneumothorax. Heart:  Borderline to mild cardiomegaly, unchanged.  No significant pericardial effusion.  No significant coronary artery calcifications. ABDOMEN: Liver:  No acute findings. Gallbladder and bile ducts:  No acute findings.  No calcified stones.  No ductal dilation. Pancreas:  No acute findings.  No ductal dilation. Spleen:  No acute findings.  No splenomegaly. Adrenals:  Bilateral adrenal nodules measuring 2.6 cm on the left and 1.9 cm on the right and unchanged with density measurements consistent with benign adrenal adenomas.  These require no additional imaging follow-up. Kidneys and ureters:  1.7 cm mildly hyperdense mass involving the lateral left kidney on image 51 of series 2 which is not definitely seen on the prior study and is of indeterminate density at 58 Hounsfield units.  Multiple simple appearing bilateral renal cysts requiring no additional follow-up. Stomach and bowel:  No acute findings.  No obstruction.  No mucosal thickening. PELVIS: Appendix:  No findings to suggest acute appendicitis. Bladder:  No acute findings.  No stones. Reproductive:  5.8 cm ovoid fluid density mass with some rim calcification in the right lateral pelvis which is unchanged from the prior study consistent with a benign etiology, possibly a postoperative seroma as the patient has undergone prior hysterectomy.  Prior hysterectomy. CHEST, ABDOMEN and PELVIS: Intraperitoneal space:  No acute findings.  No significant fluid collection. No free air. Bones/joints:  Severe compression fracture deformity of T12 of indeterminate age though has occurred since the study of 04/25/2023.  There is mild retropulsion of the posterosuperior aspect of the T12 vertebral body resulting in a mild degree of central canal narrowing.  Prior median sternotomy.  Left hip prosthesis with result in artifact obscuring the adjacent  degenerative changes in the mid and lower lumbar spine as follows: Moderate disc space narrowing at L3-L4, grade 1 anterolisthesis and mild disc space narrowing at L4-L5, facet arthrosis in the mid and lower lumbar spine. The pedicles are intact with no paraspinal soft tissue mass.     1. Moderate degenerative changes in the mid and lower lumbar spine. 2. Grade 1 anterolisthesis of L3 on L4.     XR HIP LEFT (2-3 VIEWS)    Result Date: 10/17/2024  EXAMINATION: TWO XRAY VIEWS OF THE LEFT HIP 10/17/2024 1:30 pm COMPARISON: None. HISTORY: ORDERING SYSTEM PROVIDED HISTORY: pain TECHNOLOGIST PROVIDED HISTORY: Reason for exam:->pain What reading provider will be dictating this exam?->CRC FINDINGS: Bones are demineralized.  No acute bony abnormalities.  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  Soft tissues appear unremarkable.     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     XR HIP RIGHT (2-3 VIEWS)    Result Date: 10/17/2024  EXAMINATION: TWO XRAY VIEWS OF THE RIGHT HIP 10/17/2024 1:30 pm COMPARISON: None. HISTORY: ORDERING SYSTEM PROVIDED HISTORY: pain TECHNOLOGIST PROVIDED HISTORY: Reason for exam:->pain What reading provider will be dictating this exam?->CRC FINDINGS: Bones are demineralized.  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 No acute bony abnormalities.  Degenerative changes in the visualized lower lumbar spine.     1. 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 No acute bony abnormalities. 2. Degenerative changes in the visualized lower lumbar spine.       Active Hospital Problems    Diagnosis Date Noted    Mixed incontinence [N39.46] 10/25/2024     Priority: Low    Urinary incontinence [R32] 10/24/2024     Priority: Low    CAD (coronary artery disease) [I25.10]      Priority: Low    Impaired mobility ADLs dt  thoracic and lumbar spinal stenosis with T12 compression fracture. [Z74.09, Z78.9] 10/23/2024     Priority: Low    Compression fracture of T12 vertebra with routine healing  [S22.080D] 10/23/2024     Priority: Low    Left kidney mass [N28.89] 10/23/2024     Priority: Low    Gait instability [R26.81] 10/21/2024     Priority: Low    Vertigo [R42] 11/30/2023     Priority: Low    Right bundle branch block (RBBB) [I45.10] 11/30/2023     Priority: Low    Peripheral vascular disease (HCC) [I73.9] 11/30/2023     Priority: Low    Anemia [D64.9] 02/16/2018     Priority: Low    Primary osteoarthritis of both knees [M17.0] 11/20/2017     Priority: Low    Essential hypertension [I10] 11/20/2017     Priority: Low    DDD (degenerative disc disease), lumbar [M51.369] 11/20/2017     Priority: Low    Spondylolisthesis of lumbar region [M43.16] 12/06/2011     Priority: Low    Cervical spondylosis without myelopathy [M47.812] 05/19/2005     Priority: Low         Impression/Plan:   Fall - recurrent.  T12 Compression FX  CAD s/p  CABG  September 2007; left internal mammary artery to the left anterior descending coronary artery, saphenous vein graft to the first obtuse marginal and second obtuse marginal, diagonal and right coronary artery, left atrial appendage ligation   PAD s/p JULISSA PTCA  PAF - she is on low dose Eliquis.  resumed Low dose Amiodarone.  ECG A Paced 60 QTc 442 ms.   She is a t risk of complications with DOAC and continued falls.  She can be considered for Watchman device as out pt.   Dual Chamber PPM   Anticipate f/u Dr. Samaniego upon dc.      Thank you for allowing us to participate in the care of this patient.     Will continue to follow.    Please call if questions or concerns arise.    Electronically signed by Michael Cheng MD on 11/2/2024 at 11:28 AM

## 2024-11-04 NOTE — PROGRESS NOTES
[facility] OCCUPATIONAL THERAPY DISCHARGE SUMMARY- REHAB     Date: 2024  Patient Name: Loren Noyola        MRN: [id number]  Account: [id number]   : 1935  (89 y.o.)  Room: [place] [address]    Diagnosis:  Impaired mobility ADLs dt thoracic and lumbar spinal stenosis with T12 compression fracture    Past Medical History:   Diagnosis Date    Bleeding ulcer     CAD (coronary artery disease)     Colitis     Hip fx, left, closed, initial encounter ([facility]) 02/10/2018    Hyperlipidemia     Hypertension     Osteoarthritis     Spinal stenosis      Past Surgical History:   Procedure Laterality Date    APPENDECTOMY      COLECTOMY  1/3/2011    sigmoid colectomy with colostomy    COLECTOMY  2011    partial colectomy with colostomy closure    CORONARY ARTERY BYPASS GRAFT      HYSTERECTOMY (CERVIX STATUS UNKNOWN)      HYSTERECTOMY, TOTAL ABDOMINAL (CERVIX REMOVED)      [place] HEMIARTHROPLASTY HIP PARTIAL Left 2018    HIP HEMIARTHROPLASTY- [place] Malcolm [place] [facility] [place]    SIGMOIDOSCOPY      VARICOSE VEIN SURGERY         Precautions:   Restrictions/Precautions: Fall Risk     Social/Functional History:  Social/Functional History  Lives With: Spouse  Type of Home: House (laundry is on the first floor- children added an extra room on the house which is a bathroom and has a small washer/dryer)  Home Layout: One level  Home Access: Stairs to enter with rails  Entrance Stairs - Number of Steps: 4  Entrance Stairs - Rails: Left (one rail on the outside one to two steps then there is a landing and then 3 steps with 2 rails)  Bathroom Shower/Tub: Walk-in shower, Curtain  Bathroom Toilet: Handicap height  Bathroom Equipment: Built-in shower seat, Hand-held shower, Grab bars in shower, Grab bars around toilet  Bathroom Accessibility: Accessible  Home Equipment: Cane, Rollator (Patient owned a reacher but does not know where it is, [facility])  Has the patient had two or more falls in the past year or any fall    Tone RUE  RUE Tone: Normotonic  Tone LUE  LUE Tone: Normotonic  Coordination  Movements Are Fluid And Coordinated: No  Coordination and Movement Description: Fine motor impairments, Right UE, Left UE    D/C Recommendations:    Equipment Recommendations:  OT D/C Equipment  Equipment Needed: Yes  Equipment Recommended:  (walker basket)    OT Follow Up:  OT D/C RECOMMENDATIONS  REQUIRES OT FOLLOW-UP: Yes  Type: Home OT, Home Health Care    Home Exercise Program Provided: [x] Yes [] No  If yes, type of HEP: UE strengthening HEP     Electronically signed by:    DANIEL Hagen/L,    11/4/2024, 8:55 AM

## 2024-11-09 ENCOUNTER — APPOINTMENT (OUTPATIENT)
Dept: CT IMAGING | Age: 89
DRG: 378 | End: 2024-11-09
Payer: MEDICARE

## 2024-11-09 ENCOUNTER — HOSPITAL ENCOUNTER (INPATIENT)
Age: 89
LOS: 2 days | Discharge: NURSING, REHAB OR LONG-TERM CARE | DRG: 378 | End: 2024-11-12
Attending: INTERNAL MEDICINE | Admitting: INTERNAL MEDICINE
Payer: MEDICARE

## 2024-11-09 ENCOUNTER — APPOINTMENT (OUTPATIENT)
Dept: GENERAL RADIOLOGY | Age: 89
DRG: 378 | End: 2024-11-09
Payer: MEDICARE

## 2024-11-09 DIAGNOSIS — S22.080D COMPRESSION FRACTURE OF T12 VERTEBRA WITH ROUTINE HEALING: Primary | ICD-10-CM

## 2024-11-09 DIAGNOSIS — Z87.19 HISTORY OF GI BLEED: ICD-10-CM

## 2024-11-09 DIAGNOSIS — R10.10 PAIN OF UPPER ABDOMEN: ICD-10-CM

## 2024-11-09 DIAGNOSIS — Z79.01 ANTICOAGULATED: ICD-10-CM

## 2024-11-09 DIAGNOSIS — R07.9 CHEST PAIN, UNSPECIFIED TYPE: Primary | ICD-10-CM

## 2024-11-09 DIAGNOSIS — K92.2 GIB (GASTROINTESTINAL BLEEDING): ICD-10-CM

## 2024-11-09 DIAGNOSIS — D64.9 SYMPTOMATIC ANEMIA: ICD-10-CM

## 2024-11-09 LAB
ALBUMIN SERPL-MCNC: 3 G/DL (ref 3.5–4.6)
ALP SERPL-CCNC: 55 U/L (ref 40–130)
ALT SERPL-CCNC: 7 U/L (ref 0–33)
ANION GAP SERPL CALCULATED.3IONS-SCNC: 11 MEQ/L (ref 9–15)
ANISOCYTOSIS BLD QL SMEAR: ABNORMAL
AST SERPL-CCNC: 10 U/L (ref 0–35)
BASOPHILS # BLD: 0 K/UL (ref 0–0.2)
BASOPHILS NFR BLD: 0.1 %
BILIRUB SERPL-MCNC: <0.2 MG/DL (ref 0.2–0.7)
BUN SERPL-MCNC: 80 MG/DL (ref 8–23)
CALCIUM SERPL-MCNC: 8.5 MG/DL (ref 8.5–9.9)
CHLORIDE SERPL-SCNC: 108 MEQ/L (ref 95–107)
CO2 SERPL-SCNC: 24 MEQ/L (ref 20–31)
CREAT SERPL-MCNC: 1.8 MG/DL (ref 0.5–0.9)
EOSINOPHIL # BLD: 0 K/UL (ref 0–0.7)
EOSINOPHIL NFR BLD: 0.1 %
ERYTHROCYTE [DISTWIDTH] IN BLOOD BY AUTOMATED COUNT: 15.4 % (ref 11.5–14.5)
GLOBULIN SER CALC-MCNC: 1.8 G/DL (ref 2.3–3.5)
GLUCOSE SERPL-MCNC: 150 MG/DL (ref 70–99)
HCT VFR BLD AUTO: 15.5 % (ref 37–47)
HGB BLD-MCNC: 4.7 G/DL (ref 12–16)
LIPASE SERPL-CCNC: 16 U/L (ref 12–95)
LYMPHOCYTES # BLD: 0.9 K/UL (ref 1–4.8)
LYMPHOCYTES NFR BLD: 6.2 %
MAGNESIUM SERPL-MCNC: 2 MG/DL (ref 1.7–2.4)
MCH RBC QN AUTO: 29 PG (ref 27–31.3)
MCHC RBC AUTO-ENTMCNC: 30.3 % (ref 33–37)
MCV RBC AUTO: 95.7 FL (ref 79.4–94.8)
MONOCYTES # BLD: 0.9 K/UL (ref 0.2–0.8)
MONOCYTES NFR BLD: 6.2 %
NEUTROPHILS # BLD: 12.4 K/UL (ref 1.4–6.5)
NEUTS SEG NFR BLD: 86.4 %
PLATELET # BLD AUTO: 241 K/UL (ref 130–400)
PLATELET BLD QL SMEAR: ADEQUATE
POC CREATININE WHOLE BLOOD: 1.9
POLYCHROMASIA BLD QL SMEAR: ABNORMAL
POTASSIUM SERPL-SCNC: 4.4 MEQ/L (ref 3.4–4.9)
PROT SERPL-MCNC: 4.8 G/DL (ref 6.3–8)
RBC # BLD AUTO: 1.62 M/UL (ref 4.2–5.4)
RBC MORPH BLD: NORMAL
SLIDE REVIEW: ABNORMAL
SODIUM SERPL-SCNC: 143 MEQ/L (ref 135–144)
TROPONIN, HIGH SENSITIVITY: 48 NG/L (ref 0–19)
WBC # BLD AUTO: 14.4 K/UL (ref 4.8–10.8)

## 2024-11-09 PROCEDURE — 99285 EMERGENCY DEPT VISIT HI MDM: CPT

## 2024-11-09 PROCEDURE — 85025 COMPLETE CBC W/AUTO DIFF WBC: CPT

## 2024-11-09 PROCEDURE — 93005 ELECTROCARDIOGRAM TRACING: CPT | Performed by: PHYSICIAN ASSISTANT

## 2024-11-09 PROCEDURE — 84484 ASSAY OF TROPONIN QUANT: CPT

## 2024-11-09 PROCEDURE — 83690 ASSAY OF LIPASE: CPT

## 2024-11-09 PROCEDURE — 71045 X-RAY EXAM CHEST 1 VIEW: CPT

## 2024-11-09 PROCEDURE — 80053 COMPREHEN METABOLIC PANEL: CPT

## 2024-11-09 PROCEDURE — 83735 ASSAY OF MAGNESIUM: CPT

## 2024-11-09 PROCEDURE — 74176 CT ABD & PELVIS W/O CONTRAST: CPT

## 2024-11-09 RX ORDER — HYDROCODONE BITARTRATE AND ACETAMINOPHEN 5; 325 MG/1; MG/1
1 TABLET ORAL EVERY 4 HOURS PRN
Qty: 180 TABLET | Refills: 0 | Status: ON HOLD | OUTPATIENT
Start: 2024-11-09 | End: 2024-11-11

## 2024-11-09 RX ORDER — SODIUM CHLORIDE 9 MG/ML
INJECTION, SOLUTION INTRAVENOUS PRN
Status: DISCONTINUED | OUTPATIENT
Start: 2024-11-09 | End: 2024-11-12 | Stop reason: HOSPADM

## 2024-11-09 ASSESSMENT — PAIN DESCRIPTION - LOCATION: LOCATION: ABDOMEN

## 2024-11-09 ASSESSMENT — PAIN SCALES - GENERAL: PAINLEVEL_OUTOF10: 2

## 2024-11-10 PROBLEM — Z87.19 HISTORY OF GI BLEED: Status: ACTIVE | Noted: 2024-11-10

## 2024-11-10 PROBLEM — R10.10 PAIN OF UPPER ABDOMEN: Status: ACTIVE | Noted: 2024-11-10

## 2024-11-10 PROBLEM — K92.2 GI BLEED: Status: ACTIVE | Noted: 2024-11-10

## 2024-11-10 PROBLEM — K92.2 GIB (GASTROINTESTINAL BLEEDING): Status: ACTIVE | Noted: 2024-11-09

## 2024-11-10 LAB
ABO + RH BLD: NORMAL
ALBUMIN SERPL-MCNC: 2.9 G/DL (ref 3.5–4.6)
ALP SERPL-CCNC: 51 U/L (ref 40–130)
ALT SERPL-CCNC: 9 U/L (ref 0–33)
ANION GAP SERPL CALCULATED.3IONS-SCNC: 8 MEQ/L (ref 9–15)
ANISOCYTOSIS BLD QL SMEAR: ABNORMAL
AST SERPL-CCNC: 10 U/L (ref 0–35)
BASOPHILS # BLD: 0 K/UL (ref 0–0.2)
BASOPHILS NFR BLD: 0.1 %
BILIRUB SERPL-MCNC: 0.7 MG/DL (ref 0.2–0.7)
BLD GP AB SCN SERPL QL: NORMAL
BLOOD BANK DISPENSE STATUS: NORMAL
BLOOD BANK DISPENSE STATUS: NORMAL
BLOOD BANK PRODUCT CODE: NORMAL
BLOOD BANK PRODUCT CODE: NORMAL
BPU ID: NORMAL
BPU ID: NORMAL
BUN SERPL-MCNC: 82 MG/DL (ref 8–23)
CALCIUM SERPL-MCNC: 8.3 MG/DL (ref 8.5–9.9)
CHLORIDE SERPL-SCNC: 107 MEQ/L (ref 95–107)
CO2 SERPL-SCNC: 23 MEQ/L (ref 20–31)
CREAT SERPL-MCNC: 1.72 MG/DL (ref 0.5–0.9)
DESCRIPTION BLOOD BANK: NORMAL
DESCRIPTION BLOOD BANK: NORMAL
EOSINOPHIL # BLD: 0 K/UL (ref 0–0.7)
EOSINOPHIL NFR BLD: 0.1 %
ERYTHROCYTE [DISTWIDTH] IN BLOOD BY AUTOMATED COUNT: 15.3 % (ref 11.5–14.5)
GLOBULIN SER CALC-MCNC: 1.9 G/DL (ref 2.3–3.5)
GLUCOSE BLD-MCNC: 125 MG/DL (ref 70–99)
GLUCOSE BLD-MCNC: 136 MG/DL (ref 70–99)
GLUCOSE SERPL-MCNC: 119 MG/DL (ref 70–99)
HCT VFR BLD AUTO: 20 % (ref 37–47)
HCT VFR BLD AUTO: 20.2 % (ref 37–47)
HCT VFR BLD AUTO: 20.7 % (ref 37–47)
HCT VFR BLD AUTO: 20.8 % (ref 37–47)
HGB BLD-MCNC: 6.5 G/DL (ref 12–16)
HGB BLD-MCNC: 6.9 G/DL (ref 12–16)
HGB BLD-MCNC: 7 G/DL (ref 12–16)
HGB BLD-MCNC: 7.1 G/DL (ref 12–16)
INR PPP: 1.3
LYMPHOCYTES # BLD: 1.5 K/UL (ref 1–4.8)
LYMPHOCYTES NFR BLD: 8.5 %
MCH RBC QN AUTO: 29.5 PG (ref 27–31.3)
MCHC RBC AUTO-ENTMCNC: 32.5 % (ref 33–37)
MCV RBC AUTO: 90.9 FL (ref 79.4–94.8)
MONOCYTES # BLD: 1.5 K/UL (ref 0.2–0.8)
MONOCYTES NFR BLD: 8.5 %
NEUTROPHILS # BLD: 14.4 K/UL (ref 1.4–6.5)
NEUTS SEG NFR BLD: 81.2 %
PERFORMED ON: ABNORMAL
PERFORMED ON: ABNORMAL
PLATELET # BLD AUTO: 208 K/UL (ref 130–400)
PLATELET BLD QL SMEAR: ADEQUATE
POLYCHROMASIA BLD QL SMEAR: ABNORMAL
POTASSIUM SERPL-SCNC: 4 MEQ/L (ref 3.4–4.9)
PROT SERPL-MCNC: 4.8 G/DL (ref 6.3–8)
PROTHROMBIN TIME: 16.4 SEC (ref 12.3–14.9)
RBC # BLD AUTO: 2.2 M/UL (ref 4.2–5.4)
REASON FOR REJECTION: NORMAL
REJECTED TEST: NORMAL
SLIDE REVIEW: ABNORMAL
SODIUM SERPL-SCNC: 138 MEQ/L (ref 135–144)
TROPONIN, HIGH SENSITIVITY: 47 NG/L (ref 0–19)
TROPONIN, HIGH SENSITIVITY: 47 NG/L (ref 0–19)
WBC # BLD AUTO: 17.7 K/UL (ref 4.8–10.8)

## 2024-11-10 PROCEDURE — 97166 OT EVAL MOD COMPLEX 45 MIN: CPT

## 2024-11-10 PROCEDURE — 97162 PT EVAL MOD COMPLEX 30 MIN: CPT

## 2024-11-10 PROCEDURE — 36415 COLL VENOUS BLD VENIPUNCTURE: CPT

## 2024-11-10 PROCEDURE — 84484 ASSAY OF TROPONIN QUANT: CPT

## 2024-11-10 PROCEDURE — 85014 HEMATOCRIT: CPT

## 2024-11-10 PROCEDURE — 85018 HEMOGLOBIN: CPT

## 2024-11-10 PROCEDURE — 96374 THER/PROPH/DIAG INJ IV PUSH: CPT

## 2024-11-10 PROCEDURE — 2580000003 HC RX 258: Performed by: INTERNAL MEDICINE

## 2024-11-10 PROCEDURE — 6360000002 HC RX W HCPCS: Performed by: INTERNAL MEDICINE

## 2024-11-10 PROCEDURE — 99223 1ST HOSP IP/OBS HIGH 75: CPT | Performed by: INTERNAL MEDICINE

## 2024-11-10 PROCEDURE — 36430 TRANSFUSION BLD/BLD COMPNT: CPT

## 2024-11-10 PROCEDURE — 1210000000 HC MED SURG R&B

## 2024-11-10 PROCEDURE — 80053 COMPREHEN METABOLIC PANEL: CPT

## 2024-11-10 PROCEDURE — 86901 BLOOD TYPING SEROLOGIC RH(D): CPT

## 2024-11-10 PROCEDURE — 86923 COMPATIBILITY TEST ELECTRIC: CPT

## 2024-11-10 PROCEDURE — 6370000000 HC RX 637 (ALT 250 FOR IP): Performed by: PHYSICIAN ASSISTANT

## 2024-11-10 PROCEDURE — 93005 ELECTROCARDIOGRAM TRACING: CPT | Performed by: INTERNAL MEDICINE

## 2024-11-10 PROCEDURE — P9016 RBC LEUKOCYTES REDUCED: HCPCS

## 2024-11-10 PROCEDURE — 6360000002 HC RX W HCPCS: Performed by: PHYSICIAN ASSISTANT

## 2024-11-10 PROCEDURE — 6370000000 HC RX 637 (ALT 250 FOR IP): Performed by: INTERNAL MEDICINE

## 2024-11-10 PROCEDURE — 86850 RBC ANTIBODY SCREEN: CPT

## 2024-11-10 PROCEDURE — 85025 COMPLETE CBC W/AUTO DIFF WBC: CPT

## 2024-11-10 PROCEDURE — 85610 PROTHROMBIN TIME: CPT

## 2024-11-10 PROCEDURE — 99223 1ST HOSP IP/OBS HIGH 75: CPT | Performed by: NURSE PRACTITIONER

## 2024-11-10 PROCEDURE — 86900 BLOOD TYPING SEROLOGIC ABO: CPT

## 2024-11-10 PROCEDURE — 2580000003 HC RX 258: Performed by: PHYSICIAN ASSISTANT

## 2024-11-10 RX ORDER — SODIUM CHLORIDE 0.9 % (FLUSH) 0.9 %
5-40 SYRINGE (ML) INJECTION EVERY 12 HOURS SCHEDULED
Status: DISCONTINUED | OUTPATIENT
Start: 2024-11-10 | End: 2024-11-12 | Stop reason: HOSPADM

## 2024-11-10 RX ORDER — DEXTROSE MONOHYDRATE 100 MG/ML
INJECTION, SOLUTION INTRAVENOUS CONTINUOUS PRN
Status: DISCONTINUED | OUTPATIENT
Start: 2024-11-10 | End: 2024-11-12 | Stop reason: HOSPADM

## 2024-11-10 RX ORDER — POLYETHYLENE GLYCOL 3350 17 G/17G
17 POWDER, FOR SOLUTION ORAL DAILY PRN
Status: DISCONTINUED | OUTPATIENT
Start: 2024-11-10 | End: 2024-11-12 | Stop reason: HOSPADM

## 2024-11-10 RX ORDER — LANOLIN ALCOHOL/MO/W.PET/CERES
3 CREAM (GRAM) TOPICAL NIGHTLY PRN
Status: DISCONTINUED | OUTPATIENT
Start: 2024-11-10 | End: 2024-11-12 | Stop reason: HOSPADM

## 2024-11-10 RX ORDER — ONDANSETRON 2 MG/ML
4 INJECTION INTRAMUSCULAR; INTRAVENOUS EVERY 6 HOURS PRN
Status: DISCONTINUED | OUTPATIENT
Start: 2024-11-10 | End: 2024-11-12 | Stop reason: HOSPADM

## 2024-11-10 RX ORDER — SODIUM CHLORIDE 9 MG/ML
INJECTION, SOLUTION INTRAVENOUS PRN
Status: DISCONTINUED | OUTPATIENT
Start: 2024-11-10 | End: 2024-11-12 | Stop reason: HOSPADM

## 2024-11-10 RX ORDER — SODIUM CHLORIDE 0.9 % (FLUSH) 0.9 %
5-40 SYRINGE (ML) INJECTION PRN
Status: DISCONTINUED | OUTPATIENT
Start: 2024-11-10 | End: 2024-11-12 | Stop reason: HOSPADM

## 2024-11-10 RX ORDER — LABETALOL HYDROCHLORIDE 5 MG/ML
10 INJECTION, SOLUTION INTRAVENOUS EVERY 4 HOURS PRN
Status: DISCONTINUED | OUTPATIENT
Start: 2024-11-10 | End: 2024-11-12 | Stop reason: HOSPADM

## 2024-11-10 RX ORDER — DEXTROSE, SODIUM CHLORIDE, SODIUM LACTATE, POTASSIUM CHLORIDE, AND CALCIUM CHLORIDE 5; .6; .31; .03; .02 G/100ML; G/100ML; G/100ML; G/100ML; G/100ML
INJECTION, SOLUTION INTRAVENOUS CONTINUOUS
Status: DISCONTINUED | OUTPATIENT
Start: 2024-11-10 | End: 2024-11-12 | Stop reason: HOSPADM

## 2024-11-10 RX ORDER — ACETAMINOPHEN 650 MG/1
650 SUPPOSITORY RECTAL EVERY 6 HOURS PRN
Status: DISCONTINUED | OUTPATIENT
Start: 2024-11-10 | End: 2024-11-12 | Stop reason: HOSPADM

## 2024-11-10 RX ORDER — HYDROCODONE BITARTRATE AND ACETAMINOPHEN 5; 325 MG/1; MG/1
1 TABLET ORAL EVERY 6 HOURS PRN
Status: DISCONTINUED | OUTPATIENT
Start: 2024-11-10 | End: 2024-11-10

## 2024-11-10 RX ORDER — ONDANSETRON 4 MG/1
4 TABLET, ORALLY DISINTEGRATING ORAL EVERY 8 HOURS PRN
Status: DISCONTINUED | OUTPATIENT
Start: 2024-11-10 | End: 2024-11-12 | Stop reason: HOSPADM

## 2024-11-10 RX ORDER — MAGNESIUM HYDROXIDE/ALUMINUM HYDROXICE/SIMETHICONE 120; 1200; 1200 MG/30ML; MG/30ML; MG/30ML
30 SUSPENSION ORAL ONCE
Status: COMPLETED | OUTPATIENT
Start: 2024-11-10 | End: 2024-11-10

## 2024-11-10 RX ORDER — HYDROCODONE BITARTRATE AND ACETAMINOPHEN 5; 325 MG/1; MG/1
1 TABLET ORAL EVERY 8 HOURS PRN
Status: DISCONTINUED | OUTPATIENT
Start: 2024-11-10 | End: 2024-11-12 | Stop reason: HOSPADM

## 2024-11-10 RX ORDER — LIDOCAINE 4 G/G
1 PATCH TOPICAL DAILY
Status: DISCONTINUED | OUTPATIENT
Start: 2024-11-10 | End: 2024-11-12 | Stop reason: HOSPADM

## 2024-11-10 RX ORDER — GLUCAGON 1 MG/ML
1 KIT INJECTION PRN
Status: DISCONTINUED | OUTPATIENT
Start: 2024-11-10 | End: 2024-11-12 | Stop reason: HOSPADM

## 2024-11-10 RX ORDER — MAGNESIUM HYDROXIDE/ALUMINUM HYDROXICE/SIMETHICONE 120; 1200; 1200 MG/30ML; MG/30ML; MG/30ML
30 SUSPENSION ORAL EVERY 6 HOURS PRN
Status: DISCONTINUED | OUTPATIENT
Start: 2024-11-10 | End: 2024-11-12 | Stop reason: HOSPADM

## 2024-11-10 RX ORDER — INSULIN LISPRO 100 [IU]/ML
0-4 INJECTION, SOLUTION INTRAVENOUS; SUBCUTANEOUS EVERY 6 HOURS SCHEDULED
Status: DISCONTINUED | OUTPATIENT
Start: 2024-11-10 | End: 2024-11-12 | Stop reason: HOSPADM

## 2024-11-10 RX ORDER — ACETAMINOPHEN 500 MG
1000 TABLET ORAL ONCE
Status: COMPLETED | OUTPATIENT
Start: 2024-11-10 | End: 2024-11-10

## 2024-11-10 RX ORDER — ACETAMINOPHEN 325 MG/1
650 TABLET ORAL EVERY 6 HOURS PRN
Status: DISCONTINUED | OUTPATIENT
Start: 2024-11-10 | End: 2024-11-12 | Stop reason: HOSPADM

## 2024-11-10 RX ADMIN — Medication 10 ML: at 14:01

## 2024-11-10 RX ADMIN — LABETALOL HYDROCHLORIDE 10 MG: 5 INJECTION, SOLUTION INTRAVENOUS at 17:09

## 2024-11-10 RX ADMIN — PANTOPRAZOLE SODIUM 40 MG: 40 INJECTION, POWDER, FOR SOLUTION INTRAVENOUS at 00:23

## 2024-11-10 RX ADMIN — HYDROCODONE BITARTRATE AND ACETAMINOPHEN 1 TABLET: 5; 325 TABLET ORAL at 20:00

## 2024-11-10 RX ADMIN — SODIUM CHLORIDE, SODIUM LACTATE, POTASSIUM CHLORIDE, CALCIUM CHLORIDE AND DEXTROSE MONOHYDRATE: 5; 600; 310; 30; 20 INJECTION, SOLUTION INTRAVENOUS at 12:54

## 2024-11-10 RX ADMIN — LABETALOL HYDROCHLORIDE 10 MG: 5 INJECTION, SOLUTION INTRAVENOUS at 13:12

## 2024-11-10 RX ADMIN — SODIUM CHLORIDE, PRESERVATIVE FREE 40 MG: 5 INJECTION INTRAVENOUS at 13:12

## 2024-11-10 RX ADMIN — ACETAMINOPHEN 325MG 650 MG: 325 TABLET ORAL at 05:52

## 2024-11-10 RX ADMIN — HYDROCODONE BITARTRATE AND ACETAMINOPHEN 1 TABLET: 5; 325 TABLET ORAL at 10:55

## 2024-11-10 RX ADMIN — ALUMINUM HYDROXIDE, MAGNESIUM HYDROXIDE, AND SIMETHICONE 30 ML: 1200; 120; 1200 SUSPENSION ORAL at 08:42

## 2024-11-10 RX ADMIN — ACETAMINOPHEN 1000 MG: 500 TABLET ORAL at 01:00

## 2024-11-10 ASSESSMENT — PAIN SCALES - GENERAL
PAINLEVEL_OUTOF10: 7
PAINLEVEL_OUTOF10: 3
PAINLEVEL_OUTOF10: 2
PAINLEVEL_OUTOF10: 6
PAINLEVEL_OUTOF10: 7
PAINLEVEL_OUTOF10: 2

## 2024-11-10 ASSESSMENT — PAIN DESCRIPTION - ORIENTATION
ORIENTATION: MID;RIGHT
ORIENTATION: MID

## 2024-11-10 ASSESSMENT — PAIN DESCRIPTION - LOCATION
LOCATION: ABDOMEN
LOCATION: HEAD
LOCATION: HIP

## 2024-11-10 ASSESSMENT — PAIN - FUNCTIONAL ASSESSMENT: PAIN_FUNCTIONAL_ASSESSMENT: ACTIVITIES ARE NOT PREVENTED

## 2024-11-10 ASSESSMENT — PAIN DESCRIPTION - DESCRIPTORS
DESCRIPTORS: DULL
DESCRIPTORS: ACHING

## 2024-11-10 ASSESSMENT — PAIN DESCRIPTION - ONSET: ONSET: ON-GOING

## 2024-11-10 ASSESSMENT — PAIN DESCRIPTION - FREQUENCY: FREQUENCY: CONTINUOUS

## 2024-11-10 ASSESSMENT — PAIN DESCRIPTION - PAIN TYPE: TYPE: ACUTE PAIN

## 2024-11-10 NOTE — ED TRIAGE NOTES
Pt arrived via ambulance from [facility]. C/O of chest pain, per EMS nursing home gave 2 nitros, and pt still complained of chest pain. Pt has complaints of sharp ABD pain. Denies N/V, diarrhea.

## 2024-11-10 NOTE — PLAN OF CARE
See OT evaluation for all goals and OT POC. Electronically signed by Evelin Hess OTR/L on 11/10/2024 at 4:14 PM

## 2024-11-10 NOTE — H&P
tablet Take 1 tablet by mouth every 6 hours as needed    Provider, MD Yrn       Allergies:    Lisinopril, Statins, and Sulfa antibiotics    Social History:    reports that she has quit smoking. Her smoking use included cigarettes. She has never used smokeless tobacco. She reports that she does not currently use alcohol. She reports that she does not currently use drugs.    Family History:   No family history on file.    PHYSICAL EXAM:  Vitals:  /80   Pulse 76   Temp 98.9 °F (37.2 °C)   Resp 11   Ht 1.6 m (5' 3\")   Wt 83.9 kg (185 lb)   SpO2 99%   BMI 32.77 kg/m²   General Appearance: alert and oriented to person, place and time   Pulmonary/Chest: clear to auscultation bilaterally- no wheezes   Cardiovascular: normal rate, regular rhythm, normal S1 and S2, no murmurs, rubs   Abdomen: soft, non-tender, non-distended, normal bowel sounds, no masses or organomegaly  Neurologic: reflexes normal and symmetric, no cranial nerve deficit        LABS:  Recent Labs     11/09/24 2304      K 4.4   *   CO2 24   BUN 80*   CREATININE 1.80*   GLUCOSE 150*   CALCIUM 8.5       Recent Labs     11/09/24 2304   WBC 14.4*   RBC 1.62*   HGB 4.7*   HCT 15.5*   MCV 95.7*   MCH 29.0   MCHC 30.3*   RDW 15.4*          No results for input(s): \"POCGLU\" in the last 72 hours.    CBC with Differential:    Lab Results   Component Value Date/Time    WBC 14.4 11/09/2024 11:04 PM    RBC 1.62 11/09/2024 11:04 PM    RBC 4.05 03/24/2012 09:35 PM    HGB 4.7 11/09/2024 11:04 PM    HCT 15.5 11/09/2024 11:04 PM     11/09/2024 11:04 PM    MCV 95.7 11/09/2024 11:04 PM    MCH 29.0 11/09/2024 11:04 PM    MCHC 30.3 11/09/2024 11:04 PM    RDW 15.4 11/09/2024 11:04 PM    LYMPHOPCT 6.2 11/09/2024 11:04 PM    MONOPCT 6.2 11/09/2024 11:04 PM    EOSPCT 0.1 11/09/2024 11:04 PM    BASOPCT 0.1 11/09/2024 11:04 PM    MONOSABS 0.9 11/09/2024 11:04 PM    LYMPHSABS 0.9 11/09/2024 11:04 PM    EOSABS 0.0 11/09/2024 11:04 PM

## 2024-11-10 NOTE — CONSENT
Informed Consent for Blood Component Transfusion Note    I have discussed with the patient the rationale for blood component transfusion; its benefits in treating or preventing fatigue, organ damage, or death; and its risk which includes mild transfusion reactions, rare risk of blood borne infection, or more serious but rare reactions. I have discussed the alternatives to transfusion, including the risk and consequences of not receiving transfusion. The patient had an opportunity to ask questions and had agreed to proceed with transfusion of blood components.    Electronically signed by Gregor Asif PA-C on 11/9/24 at 11:37 PM EST

## 2024-11-10 NOTE — ED PROVIDER NOTES
Glucose 119 (*)     BUN 82 (*)     Creatinine 1.72 (*)     Est, Glom Filt Rate 28.0 (*)     Calcium 8.3 (*)     Total Protein 4.8 (*)     Albumin 2.9 (*)     Globulin 1.9 (*)     All other components within normal limits    Narrative:     CALL  Fry  [facility] tel. [phone],  BUN results called to and read back by  GUADALUPE LUCERO, 11/10/2024 06:46, by NIGEL  Collection has been rescheduled by TYLER at 11/10/2024 05:12 Reason:   Please draw at 5:45 per RN Guadalupe   CBC WITH AUTO DIFFERENTIAL - Abnormal; Notable for the following components:    WBC 17.7 (*)     RBC 2.20 (*)     Hemoglobin 6.5 (*)     Hematocrit 20.0 (*)     MCHC 32.5 (*)     RDW 15.3 (*)     Neutrophils Absolute 14.4 (*)     Monocytes Absolute 1.5 (*)     All other components within normal limits    Narrative:     CALL  Fry  [facility] tel. [phone],  HGB/HCT results called to and read back by CRISPIN NYE, 11/10/2024 06:15, by  ZAFAR  Collection has been rescheduled by TYLER at 11/10/2024 05:12 Reason:   Please draw at 5:45 per RN Guadalupe   HEMOGLOBIN AND HEMATOCRIT - Abnormal; Notable for the following components:    Hemoglobin 6.9 (*)     Hematocrit 20.2 (*)     All other components within normal limits    Narrative:     CALL  Fry  [facility] tel. [phone],  HGB HCT results called to and read back by GOLDIE FAITH, 11/10/2024 14:52, by  LENIN  Collection has been rescheduled by HA at 11/10/2024 10:22 Reason:   Blood running. Nurse will reschedule when finished  Collection has been rescheduled by JENNIFER at 11/10/2024 10:29 Reason:   Retime for 1330 per RN Marie.  Collection has been rescheduled by OVI at 11/10/2024 13:20 Reason: RN   to retime 14:30   TROPONIN - Abnormal; Notable for the following components:    Troponin, High Sensitivity 47 (*)     All other components within normal limits    Narrative:     Collection has been rescheduled by TYLER at 11/10/2024 05:12 Reason:   Please draw at 5:45 per RN Guadalupe  Collection has been rescheduled by JENNIFER at 11/10/2024

## 2024-11-10 NOTE — PLAN OF CARE
Problem: Discharge Planning  Goal: Discharge to home or other facility with appropriate resources  Recent Flowsheet Documentation  Taken 11/10/2024 0230 by Emi Sellers RN  Discharge to home or other facility with appropriate resources:   Identify barriers to discharge with patient and caregiver   Arrange for needed discharge resources and transportation as appropriate   Identify discharge learning needs (meds, wound care, etc)   Arrange for interpreters to assist at discharge as needed   Refer to discharge planning if patient needs post-hospital services based on physician order or complex needs related to functional status, cognitive ability or social support system     Problem: ABCDS Injury Assessment  Goal: Absence of physical injury  Recent Flowsheet Documentation  Taken 11/10/2024 0301 by Emi Sellers RN  Absence of Physical Injury: Implement safety measures based on patient assessment     Problem: Safety - Adult  Goal: Free from fall injury  Recent Flowsheet Documentation  Taken 11/10/2024 0307 by Emi Sellers RN  Free From Fall Injury:   Instruct family/caregiver on patient safety   Based on caregiver fall risk screen, instruct family/caregiver to ask for assistance with transferring infant if caregiver noted to have fall risk factors

## 2024-11-11 ENCOUNTER — ANESTHESIA (OUTPATIENT)
Dept: ENDOSCOPY | Age: 89
DRG: 378 | End: 2024-11-11
Payer: MEDICARE

## 2024-11-11 ENCOUNTER — ANESTHESIA EVENT (OUTPATIENT)
Dept: ENDOSCOPY | Age: 89
DRG: 378 | End: 2024-11-11
Payer: MEDICARE

## 2024-11-11 PROBLEM — R06.02 SHORTNESS OF BREATH: Status: ACTIVE | Noted: 2024-11-11

## 2024-11-11 PROBLEM — K25.9 GASTRIC ULCER: Status: ACTIVE | Noted: 2024-11-11

## 2024-11-11 PROBLEM — R07.9 CHEST PAIN: Status: ACTIVE | Noted: 2024-11-11

## 2024-11-11 LAB
ALBUMIN SERPL-MCNC: 2.6 G/DL (ref 3.5–4.6)
ALP SERPL-CCNC: 44 U/L (ref 40–130)
ALT SERPL-CCNC: 8 U/L (ref 0–33)
ANION GAP SERPL CALCULATED.3IONS-SCNC: 8 MEQ/L (ref 9–15)
ANISOCYTOSIS BLD QL SMEAR: ABNORMAL
AST SERPL-CCNC: 9 U/L (ref 0–35)
BASOPHILS # BLD: 0 K/UL (ref 0–0.2)
BASOPHILS NFR BLD: 0.3 %
BILIRUB SERPL-MCNC: 0.4 MG/DL (ref 0.2–0.7)
BLOOD BANK DISPENSE STATUS: NORMAL
BLOOD BANK PRODUCT CODE: NORMAL
BPU ID: NORMAL
BUN SERPL-MCNC: 67 MG/DL (ref 8–23)
CALCIUM SERPL-MCNC: 7.9 MG/DL (ref 8.5–9.9)
CHLORIDE SERPL-SCNC: 115 MEQ/L (ref 95–107)
CO2 SERPL-SCNC: 24 MEQ/L (ref 20–31)
CREAT SERPL-MCNC: 1.56 MG/DL (ref 0.5–0.9)
DESCRIPTION BLOOD BANK: NORMAL
EKG ATRIAL RATE: 75 BPM
EKG ATRIAL RATE: 76 BPM
EKG P AXIS: 72 DEGREES
EKG P-R INTERVAL: 202 MS
EKG Q-T INTERVAL: 430 MS
EKG Q-T INTERVAL: 444 MS
EKG QRS DURATION: 126 MS
EKG QRS DURATION: 130 MS
EKG QTC CALCULATION (BAZETT): 483 MS
EKG QTC CALCULATION (BAZETT): 499 MS
EKG R AXIS: -42 DEGREES
EKG R AXIS: -42 DEGREES
EKG T AXIS: -4 DEGREES
EKG T AXIS: -6 DEGREES
EKG VENTRICULAR RATE: 76 BPM
EKG VENTRICULAR RATE: 76 BPM
EOSINOPHIL # BLD: 0.2 K/UL (ref 0–0.7)
EOSINOPHIL NFR BLD: 1.1 %
ERYTHROCYTE [DISTWIDTH] IN BLOOD BY AUTOMATED COUNT: 16.2 % (ref 11.5–14.5)
GLOBULIN SER CALC-MCNC: 1.7 G/DL (ref 2.3–3.5)
GLUCOSE BLD-MCNC: 128 MG/DL (ref 70–99)
GLUCOSE BLD-MCNC: 128 MG/DL (ref 70–99)
GLUCOSE BLD-MCNC: 142 MG/DL (ref 70–99)
GLUCOSE SERPL-MCNC: 122 MG/DL (ref 70–99)
HCT VFR BLD AUTO: 19.6 % (ref 37–47)
HCT VFR BLD AUTO: 24.9 % (ref 37–47)
HCT VFR BLD AUTO: 25.1 % (ref 37–47)
HGB BLD-MCNC: 6.6 G/DL (ref 12–16)
HGB BLD-MCNC: 8.2 G/DL (ref 12–16)
HGB BLD-MCNC: 8.3 G/DL (ref 12–16)
LYMPHOCYTES # BLD: 1.3 K/UL (ref 1–4.8)
LYMPHOCYTES NFR BLD: 8.3 %
MCH RBC QN AUTO: 30.1 PG (ref 27–31.3)
MCHC RBC AUTO-ENTMCNC: 33.7 % (ref 33–37)
MCV RBC AUTO: 89.5 FL (ref 79.4–94.8)
MONOCYTES # BLD: 1.5 K/UL (ref 0.2–0.8)
MONOCYTES NFR BLD: 9.4 %
NEUTROPHILS # BLD: 12.4 K/UL (ref 1.4–6.5)
NEUTS SEG NFR BLD: 78.6 %
PERFORMED ON: ABNORMAL
PLATELET # BLD AUTO: 180 K/UL (ref 130–400)
PLATELET BLD QL SMEAR: ADEQUATE
POLYCHROMASIA BLD QL SMEAR: ABNORMAL
POTASSIUM SERPL-SCNC: 4.1 MEQ/L (ref 3.4–4.9)
PROT SERPL-MCNC: 4.3 G/DL (ref 6.3–8)
RBC # BLD AUTO: 2.19 M/UL (ref 4.2–5.4)
SLIDE REVIEW: ABNORMAL
SODIUM SERPL-SCNC: 147 MEQ/L (ref 135–144)
WBC # BLD AUTO: 15.7 K/UL (ref 4.8–10.8)

## 2024-11-11 PROCEDURE — 3700000000 HC ANESTHESIA ATTENDED CARE: Performed by: INTERNAL MEDICINE

## 2024-11-11 PROCEDURE — 99232 SBSQ HOSP IP/OBS MODERATE 35: CPT | Performed by: INTERNAL MEDICINE

## 2024-11-11 PROCEDURE — 6360000002 HC RX W HCPCS: Performed by: REGISTERED NURSE

## 2024-11-11 PROCEDURE — 1210000000 HC MED SURG R&B

## 2024-11-11 PROCEDURE — 97535 SELF CARE MNGMENT TRAINING: CPT

## 2024-11-11 PROCEDURE — 30233N1 TRANSFUSION OF NONAUTOLOGOUS RED BLOOD CELLS INTO PERIPHERAL VEIN, PERCUTANEOUS APPROACH: ICD-10-PCS | Performed by: INTERNAL MEDICINE

## 2024-11-11 PROCEDURE — 2709999900 HC NON-CHARGEABLE SUPPLY: Performed by: INTERNAL MEDICINE

## 2024-11-11 PROCEDURE — 36430 TRANSFUSION BLD/BLD COMPNT: CPT

## 2024-11-11 PROCEDURE — 2580000003 HC RX 258: Performed by: INTERNAL MEDICINE

## 2024-11-11 PROCEDURE — 3609017100 HC EGD: Performed by: INTERNAL MEDICINE

## 2024-11-11 PROCEDURE — 6370000000 HC RX 637 (ALT 250 FOR IP): Performed by: INTERNAL MEDICINE

## 2024-11-11 PROCEDURE — 85025 COMPLETE CBC W/AUTO DIFF WBC: CPT

## 2024-11-11 PROCEDURE — 88305 TISSUE EXAM BY PATHOLOGIST: CPT

## 2024-11-11 PROCEDURE — 88342 IMHCHEM/IMCYTCHM 1ST ANTB: CPT

## 2024-11-11 PROCEDURE — 2500000003 HC RX 250 WO HCPCS: Performed by: REGISTERED NURSE

## 2024-11-11 PROCEDURE — 85014 HEMATOCRIT: CPT

## 2024-11-11 PROCEDURE — 6360000002 HC RX W HCPCS: Performed by: INTERNAL MEDICINE

## 2024-11-11 PROCEDURE — 0DB68ZX EXCISION OF STOMACH, VIA NATURAL OR ARTIFICIAL OPENING ENDOSCOPIC, DIAGNOSTIC: ICD-10-PCS | Performed by: INTERNAL MEDICINE

## 2024-11-11 PROCEDURE — 7100000010 HC PHASE II RECOVERY - FIRST 15 MIN: Performed by: INTERNAL MEDICINE

## 2024-11-11 PROCEDURE — 85018 HEMOGLOBIN: CPT

## 2024-11-11 PROCEDURE — 36415 COLL VENOUS BLD VENIPUNCTURE: CPT

## 2024-11-11 PROCEDURE — 80053 COMPREHEN METABOLIC PANEL: CPT

## 2024-11-11 RX ORDER — SODIUM CHLORIDE 9 MG/ML
25 INJECTION, SOLUTION INTRAVENOUS PRN
Status: DISCONTINUED | OUTPATIENT
Start: 2024-11-11 | End: 2024-11-11 | Stop reason: HOSPADM

## 2024-11-11 RX ORDER — TIZANIDINE HYDROCHLORIDE 4 MG/1
4 CAPSULE, GELATIN COATED ORAL 3 TIMES DAILY
Status: ON HOLD | COMMUNITY
Start: 2024-11-05 | End: 2025-02-03

## 2024-11-11 RX ORDER — SODIUM CHLORIDE 0.9 % (FLUSH) 0.9 %
5-40 SYRINGE (ML) INJECTION PRN
Status: DISCONTINUED | OUTPATIENT
Start: 2024-11-11 | End: 2024-11-11 | Stop reason: HOSPADM

## 2024-11-11 RX ORDER — SODIUM CHLORIDE 9 MG/ML
INJECTION, SOLUTION INTRAVENOUS
Status: DISPENSED
Start: 2024-11-11 | End: 2024-11-11

## 2024-11-11 RX ORDER — SODIUM CHLORIDE 9 MG/ML
INJECTION, SOLUTION INTRAVENOUS PRN
Status: DISCONTINUED | OUTPATIENT
Start: 2024-11-11 | End: 2024-11-12 | Stop reason: HOSPADM

## 2024-11-11 RX ORDER — SODIUM CHLORIDE 0.9 % (FLUSH) 0.9 %
5-40 SYRINGE (ML) INJECTION EVERY 12 HOURS SCHEDULED
Status: DISCONTINUED | OUTPATIENT
Start: 2024-11-11 | End: 2024-11-11 | Stop reason: HOSPADM

## 2024-11-11 RX ORDER — PROPOFOL 10 MG/ML
INJECTION, EMULSION INTRAVENOUS
Status: DISCONTINUED | OUTPATIENT
Start: 2024-11-11 | End: 2024-11-11 | Stop reason: SDUPTHER

## 2024-11-11 RX ORDER — LIDOCAINE HYDROCHLORIDE 20 MG/ML
INJECTION, SOLUTION INFILTRATION; PERINEURAL
Status: DISCONTINUED | OUTPATIENT
Start: 2024-11-11 | End: 2024-11-11 | Stop reason: SDUPTHER

## 2024-11-11 RX ORDER — GLYCOPYRROLATE 1 MG/5 ML
SYRINGE (ML) INTRAVENOUS
Status: DISCONTINUED | OUTPATIENT
Start: 2024-11-11 | End: 2024-11-11 | Stop reason: SDUPTHER

## 2024-11-11 RX ADMIN — SODIUM CHLORIDE, PRESERVATIVE FREE 40 MG: 5 INJECTION INTRAVENOUS at 00:53

## 2024-11-11 RX ADMIN — PROPOFOL 20 MG: 10 INJECTION, EMULSION INTRAVENOUS at 12:15

## 2024-11-11 RX ADMIN — Medication 10 ML: at 20:21

## 2024-11-11 RX ADMIN — HYDROCODONE BITARTRATE AND ACETAMINOPHEN 1 TABLET: 5; 325 TABLET ORAL at 14:42

## 2024-11-11 RX ADMIN — SODIUM CHLORIDE, PRESERVATIVE FREE 40 MG: 5 INJECTION INTRAVENOUS at 13:04

## 2024-11-11 RX ADMIN — Medication 10 ML: at 09:47

## 2024-11-11 RX ADMIN — HYDROCODONE BITARTRATE AND ACETAMINOPHEN 1 TABLET: 5; 325 TABLET ORAL at 04:47

## 2024-11-11 RX ADMIN — Medication 0.2 MG: at 12:10

## 2024-11-11 RX ADMIN — LABETALOL HYDROCHLORIDE 10 MG: 5 INJECTION, SOLUTION INTRAVENOUS at 20:20

## 2024-11-11 RX ADMIN — LIDOCAINE HYDROCHLORIDE 60 MG: 20 INJECTION, SOLUTION INFILTRATION; PERINEURAL at 12:10

## 2024-11-11 RX ADMIN — PROPOFOL 50 MG: 10 INJECTION, EMULSION INTRAVENOUS at 12:10

## 2024-11-11 ASSESSMENT — PAIN SCALES - GENERAL
PAINLEVEL_OUTOF10: 6
PAINLEVEL_OUTOF10: 6
PAINLEVEL_OUTOF10: 0
PAINLEVEL_OUTOF10: 7
PAINLEVEL_OUTOF10: 3
PAINLEVEL_OUTOF10: 2
PAINLEVEL_OUTOF10: 0
PAINLEVEL_OUTOF10: 3

## 2024-11-11 ASSESSMENT — ENCOUNTER SYMPTOMS
NAUSEA: 0
SHORTNESS OF BREATH: 0
COUGH: 0
VOMITING: 0
DIARRHEA: 0

## 2024-11-11 ASSESSMENT — PAIN DESCRIPTION - LOCATION: LOCATION: BACK

## 2024-11-11 ASSESSMENT — PAIN - FUNCTIONAL ASSESSMENT
PAIN_FUNCTIONAL_ASSESSMENT: 0-10
PAIN_FUNCTIONAL_ASSESSMENT: PREVENTS OR INTERFERES SOME ACTIVE ACTIVITIES AND ADLS

## 2024-11-11 ASSESSMENT — PAIN DESCRIPTION - DESCRIPTORS: DESCRIPTORS: SHARP;SPASM

## 2024-11-11 ASSESSMENT — PAIN DESCRIPTION - ORIENTATION: ORIENTATION: RIGHT;LOWER

## 2024-11-11 NOTE — ANESTHESIA PRE PROCEDURE
(PROTONIX) 40 MG tablet  4/23/21   ProviderYrn MD   nystatin-triamcinolone (MYCOLOG II) 030955-8.1 UNIT/GM-% cream Apply topically 2 times daily. 4/27/21   Safia Gee MD   nitroGLYCERIN (NITROLINGUAL) 0.4 MG/SPRAY spray Place 1 spray onto the tongue every 5 minutes as needed for Chest pain    Yrn Parsons MD   acetaminophen (TYLENOL) 500 MG tablet Take 1 tablet by mouth every 6 hours as needed    Provider, MD Yrn       Current medications:    Current Facility-Administered Medications   Medication Dose Route Frequency Provider Last Rate Last Admin    sodium chloride 0.9 % infusion             influenza split vaccine (PF) (AFLURIA;FLUARIX) injection 0.5 mL  0.5 mL IntraMUSCular Prior to discharge Catrachito Tomlinson MD        0.9 % sodium chloride infusion   IntraVENous PRN Margaret Post APRN - CNP        sodium chloride flush 0.9 % injection 5-40 mL  5-40 mL IntraVENous 2 times per day Mildred Cruz MD   10 mL at 11/10/24 1401    sodium chloride flush 0.9 % injection 5-40 mL  5-40 mL IntraVENous PRN Mildred Cruz MD        0.9 % sodium chloride infusion   IntraVENous PRN Mildred Cruz MD        ondansetron (ZOFRAN-ODT) disintegrating tablet 4 mg  4 mg Oral Q8H PRN Mildred Cruz MD        Or    ondansetron (ZOFRAN) injection 4 mg  4 mg IntraVENous Q6H PRN Mildred Cruz MD        melatonin tablet 3 mg  3 mg Oral Nightly PRN Mildred Cruz MD        polyethylene glycol (GLYCOLAX) packet 17 g  17 g Oral Daily PRN Mildred Cruz MD        acetaminophen (TYLENOL) tablet 650 mg  650 mg Oral Q6H PRN Mildred Cruz MD   650 mg at 11/10/24 0552    Or    acetaminophen (TYLENOL) suppository 650 mg  650 mg Rectal Q6H PRN Mildred Cruz MD        pantoprazole (PROTONIX) 40 mg in sodium chloride (PF) 0.9 % 10 mL injection  40 mg IntraVENous Q12H Mildred Cruz MD   40 mg at 11/11/24 0053    lidocaine 4 % external patch 1 patch  1 patch TransDERmal Daily Mildred Cruz MD   1 patch

## 2024-11-11 NOTE — PLAN OF CARE
Nutrition Problem #1: Increased nutrient needs  Intervention: Food and/or Nutrient Delivery: Start Oral Diet  Nutritional

## 2024-11-11 NOTE — ACP (ADVANCE CARE PLANNING)
Advance Care Planning   Healthcare Decision Maker:    Primary Decision Maker: JazmínRupert Juanito Latif - [phone]    Secondary Decision Maker: JazmínJude Latif - [phone]    Click here to complete Healthcare Decision Makers including selection of the Healthcare Decision Maker Relationship (ie \"Primary\").

## 2024-11-11 NOTE — WOUND CARE
skin is blanchable and intact. Foam dressing removed from right great toe to reveal small open area with scant light brown eschar. Surrounding skin is erythematous. Patient reports she follow with podiatry outpatient for management of this wound.       Plan   Plan of Care: Wound 10/23/24 Foot Right-Dressing/Treatment: Hydrating gel, Foam  Wound 11/10/24 Buttocks Left-Dressing/Treatment: Foam    Specialty Bed Required : No   [] Low Air Loss   [] Pressure Redistribution  [] Fluid Immersion  [] Bariatric  [] Other:     Current Diet: Diet NPO Exceptions are: Sips of Water with Meds  Dietician consult:  No    Discharge Plan:  Placement for patient upon discharge: undetermined    Patient appropriate for Outpatient Wound Care Center: No    Referrals:  []   [] Home Health Care  [] Supplies  [] Other    Patient/Caregiver Teaching:  Level of patient/caregiver understanding able to:   [] Indicates understanding       [] Needs reinforcement  [] Unsuccessful      [x] Verbal Understanding  [] Demonstrated understanding       [] No evidence of learning  [] Refused teaching         [] N/A       Electronically signed by  Anuja Harrell RN (covering Wound/Ostomy Nurse)

## 2024-11-11 NOTE — ANESTHESIA POSTPROCEDURE EVALUATION
Department of Anesthesiology  Postprocedure Note    Patient: Loren Noyola  MRN: [id number]  YOB: 1935  Date of evaluation: 11/11/2024    Procedure Summary       Date: 11/11/24 Room / Location: [facility] OR 02 / [facility]    Anesthesia Start: 1204 Anesthesia Stop: 1220    Procedure: ESOPHAGOGASTRODUODENOSCOPY WITH BX'S Diagnosis:       GIB (gastrointestinal bleeding)      (GIB (gastrointestinal bleeding) [K92.2])    Surgeons: Diaz Solares MD Responsible Provider: Madhu Marti APRN - CRNA    Anesthesia Type: MAC ASA Status: 3 - Emergent            Anesthesia Type: No value filed.    Chaz Phase I: Chaz Score: 10    Chaz Phase II: Chaz Score: 6    Anesthesia Post Evaluation    Patient location during evaluation: bedside  Patient participation: complete - patient participated  Level of consciousness: awake and awake and alert  Airway patency: patent  Nausea & Vomiting: no nausea and no vomiting  Cardiovascular status: blood pressure returned to baseline and hemodynamically stable  Respiratory status: acceptable  Hydration status: euvolemic  Pain management: adequate        No notable events documented.

## 2024-11-12 ENCOUNTER — HOSPITAL ENCOUNTER (INPATIENT)
Age: 89
LOS: 10 days | Discharge: NURSING, REHAB OR LONG-TERM CARE | DRG: 552 | End: 2024-11-22
Attending: PHYSICAL MEDICINE & REHABILITATION | Admitting: PHYSICAL MEDICINE & REHABILITATION
Payer: MEDICARE

## 2024-11-12 VITALS
BODY MASS INDEX: 32.78 KG/M2 | HEIGHT: 63 IN | RESPIRATION RATE: 18 BRPM | SYSTOLIC BLOOD PRESSURE: 146 MMHG | TEMPERATURE: 98.4 F | DIASTOLIC BLOOD PRESSURE: 55 MMHG | OXYGEN SATURATION: 98 % | WEIGHT: 185 LBS | HEART RATE: 78 BPM

## 2024-11-12 DIAGNOSIS — R60.0 LOCALIZED EDEMA: ICD-10-CM

## 2024-11-12 DIAGNOSIS — K25.4 GASTROINTESTINAL HEMORRHAGE ASSOCIATED WITH GASTRIC ULCER: Primary | ICD-10-CM

## 2024-11-12 DIAGNOSIS — I50.20 SYSTOLIC CONGESTIVE HEART FAILURE, UNSPECIFIED HF CHRONICITY (HCC): ICD-10-CM

## 2024-11-12 DIAGNOSIS — N18.32 STAGE 3B CHRONIC KIDNEY DISEASE (HCC): ICD-10-CM

## 2024-11-12 LAB
ALBUMIN SERPL-MCNC: 2.8 G/DL (ref 3.5–4.6)
ALP SERPL-CCNC: 51 U/L (ref 40–130)
ALT SERPL-CCNC: 10 U/L (ref 0–33)
ANION GAP SERPL CALCULATED.3IONS-SCNC: 8 MEQ/L (ref 9–15)
AST SERPL-CCNC: 12 U/L (ref 0–35)
BASOPHILS # BLD: 0 K/UL (ref 0–0.2)
BASOPHILS NFR BLD: 0.3 %
BILIRUB SERPL-MCNC: 0.5 MG/DL (ref 0.2–0.7)
BUN SERPL-MCNC: 50 MG/DL (ref 8–23)
CALCIUM SERPL-MCNC: 8 MG/DL (ref 8.5–9.9)
CHLORIDE SERPL-SCNC: 116 MEQ/L (ref 95–107)
CO2 SERPL-SCNC: 23 MEQ/L (ref 20–31)
CREAT SERPL-MCNC: 1.46 MG/DL (ref 0.5–0.9)
EOSINOPHIL # BLD: 0.3 K/UL (ref 0–0.7)
EOSINOPHIL NFR BLD: 2.5 %
ERYTHROCYTE [DISTWIDTH] IN BLOOD BY AUTOMATED COUNT: 17.7 % (ref 11.5–14.5)
GLOBULIN SER CALC-MCNC: 1.8 G/DL (ref 2.3–3.5)
GLUCOSE BLD-MCNC: 104 MG/DL (ref 70–99)
GLUCOSE BLD-MCNC: 135 MG/DL (ref 70–99)
GLUCOSE BLD-MCNC: 141 MG/DL (ref 70–99)
GLUCOSE BLD-MCNC: 95 MG/DL (ref 70–99)
GLUCOSE SERPL-MCNC: 89 MG/DL (ref 70–99)
HCT VFR BLD AUTO: 22.9 % (ref 37–47)
HCT VFR BLD AUTO: 23.4 % (ref 37–47)
HCT VFR BLD AUTO: 24.4 % (ref 37–47)
HGB BLD-MCNC: 7.4 G/DL (ref 12–16)
HGB BLD-MCNC: 7.5 G/DL (ref 12–16)
HGB BLD-MCNC: 8 G/DL (ref 12–16)
LYMPHOCYTES # BLD: 1.2 K/UL (ref 1–4.8)
LYMPHOCYTES NFR BLD: 9.6 %
MCH RBC QN AUTO: 29.3 PG (ref 27–31.3)
MCHC RBC AUTO-ENTMCNC: 32.1 % (ref 33–37)
MCV RBC AUTO: 91.4 FL (ref 79.4–94.8)
MONOCYTES # BLD: 1.4 K/UL (ref 0.2–0.8)
MONOCYTES NFR BLD: 11.2 %
NEUTROPHILS # BLD: 9.6 K/UL (ref 1.4–6.5)
NEUTS SEG NFR BLD: 74.8 %
PERFORMED ON: ABNORMAL
PERFORMED ON: NORMAL
PLATELET # BLD AUTO: 196 K/UL (ref 130–400)
POC CREATININE: 1.9 MG/DL (ref 0.6–1.2)
POC SAMPLE TYPE: ABNORMAL
POTASSIUM SERPL-SCNC: 4.1 MEQ/L (ref 3.4–4.9)
PROT SERPL-MCNC: 4.6 G/DL (ref 6.3–8)
RBC # BLD AUTO: 2.56 M/UL (ref 4.2–5.4)
SODIUM SERPL-SCNC: 147 MEQ/L (ref 135–144)
WBC # BLD AUTO: 12.8 K/UL (ref 4.8–10.8)

## 2024-11-12 PROCEDURE — 2580000003 HC RX 258: Performed by: INTERNAL MEDICINE

## 2024-11-12 PROCEDURE — 85014 HEMATOCRIT: CPT

## 2024-11-12 PROCEDURE — 97116 GAIT TRAINING THERAPY: CPT

## 2024-11-12 PROCEDURE — 80053 COMPREHEN METABOLIC PANEL: CPT

## 2024-11-12 PROCEDURE — 85025 COMPLETE CBC W/AUTO DIFF WBC: CPT

## 2024-11-12 PROCEDURE — 85018 HEMOGLOBIN: CPT

## 2024-11-12 PROCEDURE — 6370000000 HC RX 637 (ALT 250 FOR IP): Performed by: INTERNAL MEDICINE

## 2024-11-12 PROCEDURE — 1180000000 HC REHAB R&B

## 2024-11-12 PROCEDURE — 36415 COLL VENOUS BLD VENIPUNCTURE: CPT

## 2024-11-12 PROCEDURE — 6360000002 HC RX W HCPCS: Performed by: INTERNAL MEDICINE

## 2024-11-12 RX ORDER — SODIUM CHLORIDE 0.9 % (FLUSH) 0.9 %
5-40 SYRINGE (ML) INJECTION PRN
Status: DISCONTINUED | OUTPATIENT
Start: 2024-11-12 | End: 2024-11-22 | Stop reason: HOSPADM

## 2024-11-12 RX ORDER — SODIUM CHLORIDE 9 MG/ML
INJECTION, SOLUTION INTRAVENOUS PRN
Status: DISCONTINUED | OUTPATIENT
Start: 2024-11-12 | End: 2024-11-22 | Stop reason: HOSPADM

## 2024-11-12 RX ORDER — DEXTROSE MONOHYDRATE 100 MG/ML
INJECTION, SOLUTION INTRAVENOUS CONTINUOUS PRN
Status: DISCONTINUED | OUTPATIENT
Start: 2024-11-12 | End: 2024-11-22 | Stop reason: HOSPADM

## 2024-11-12 RX ORDER — RANOLAZINE 500 MG/1
500 TABLET, EXTENDED RELEASE ORAL 2 TIMES DAILY
Status: DISCONTINUED | OUTPATIENT
Start: 2024-11-12 | End: 2024-11-12

## 2024-11-12 RX ORDER — HYDROCODONE BITARTRATE AND ACETAMINOPHEN 5; 325 MG/1; MG/1
1 TABLET ORAL EVERY 8 HOURS PRN
Status: CANCELLED | OUTPATIENT
Start: 2024-11-12

## 2024-11-12 RX ORDER — SODIUM CHLORIDE 0.9 % (FLUSH) 0.9 %
5-40 SYRINGE (ML) INJECTION PRN
Status: CANCELLED | OUTPATIENT
Start: 2024-11-12

## 2024-11-12 RX ORDER — CALCIUM CARBONATE 500(1250)
500 TABLET ORAL DAILY
Status: CANCELLED | OUTPATIENT
Start: 2024-11-13

## 2024-11-12 RX ORDER — AMIODARONE HYDROCHLORIDE 200 MG/1
100 TABLET ORAL DAILY
Status: DISCONTINUED | OUTPATIENT
Start: 2024-11-13 | End: 2024-11-22 | Stop reason: HOSPADM

## 2024-11-12 RX ORDER — METOPROLOL TARTRATE 50 MG
50 TABLET ORAL 2 TIMES DAILY
Status: CANCELLED | OUTPATIENT
Start: 2024-11-12

## 2024-11-12 RX ORDER — LABETALOL HYDROCHLORIDE 5 MG/ML
10 INJECTION, SOLUTION INTRAVENOUS EVERY 4 HOURS PRN
Status: CANCELLED | OUTPATIENT
Start: 2024-11-12

## 2024-11-12 RX ORDER — METOPROLOL TARTRATE 50 MG
50 TABLET ORAL 2 TIMES DAILY
Status: DISCONTINUED | OUTPATIENT
Start: 2024-11-12 | End: 2024-11-12 | Stop reason: HOSPADM

## 2024-11-12 RX ORDER — SODIUM CHLORIDE 0.9 % (FLUSH) 0.9 %
5-40 SYRINGE (ML) INJECTION EVERY 12 HOURS SCHEDULED
Status: CANCELLED | OUTPATIENT
Start: 2024-11-12

## 2024-11-12 RX ORDER — LIDOCAINE 4 G/G
1 PATCH TOPICAL DAILY
Status: CANCELLED | OUTPATIENT
Start: 2024-11-13

## 2024-11-12 RX ORDER — POLYETHYLENE GLYCOL 3350 17 G/17G
17 POWDER, FOR SOLUTION ORAL DAILY PRN
Status: CANCELLED | OUTPATIENT
Start: 2024-11-12

## 2024-11-12 RX ORDER — SODIUM CHLORIDE 9 MG/ML
INJECTION, SOLUTION INTRAVENOUS PRN
Status: CANCELLED | OUTPATIENT
Start: 2024-11-12

## 2024-11-12 RX ORDER — LANOLIN ALCOHOL/MO/W.PET/CERES
3 CREAM (GRAM) TOPICAL NIGHTLY PRN
Status: CANCELLED | OUTPATIENT
Start: 2024-11-12

## 2024-11-12 RX ORDER — ONDANSETRON 4 MG/1
4 TABLET, ORALLY DISINTEGRATING ORAL EVERY 8 HOURS PRN
Status: CANCELLED | OUTPATIENT
Start: 2024-11-12

## 2024-11-12 RX ORDER — ONDANSETRON 2 MG/ML
4 INJECTION INTRAMUSCULAR; INTRAVENOUS EVERY 6 HOURS PRN
Status: DISCONTINUED | OUTPATIENT
Start: 2024-11-12 | End: 2024-11-22 | Stop reason: HOSPADM

## 2024-11-12 RX ORDER — ACETAMINOPHEN 650 MG/1
650 SUPPOSITORY RECTAL EVERY 6 HOURS PRN
Status: CANCELLED | OUTPATIENT
Start: 2024-11-12

## 2024-11-12 RX ORDER — POLYETHYLENE GLYCOL 3350 17 G/17G
17 POWDER, FOR SOLUTION ORAL DAILY PRN
Status: DISCONTINUED | OUTPATIENT
Start: 2024-11-12 | End: 2024-11-22 | Stop reason: HOSPADM

## 2024-11-12 RX ORDER — INSULIN LISPRO 100 [IU]/ML
0-4 INJECTION, SOLUTION INTRAVENOUS; SUBCUTANEOUS EVERY 6 HOURS SCHEDULED
Status: DISCONTINUED | OUTPATIENT
Start: 2024-11-12 | End: 2024-11-13

## 2024-11-12 RX ORDER — RANOLAZINE 500 MG/1
500 TABLET, EXTENDED RELEASE ORAL 2 TIMES DAILY
Status: CANCELLED | OUTPATIENT
Start: 2024-11-12

## 2024-11-12 RX ORDER — DEXTROSE, SODIUM CHLORIDE, SODIUM LACTATE, POTASSIUM CHLORIDE, AND CALCIUM CHLORIDE 5; .6; .31; .03; .02 G/100ML; G/100ML; G/100ML; G/100ML; G/100ML
INJECTION, SOLUTION INTRAVENOUS CONTINUOUS
Status: DISCONTINUED | OUTPATIENT
Start: 2024-11-12 | End: 2024-11-12

## 2024-11-12 RX ORDER — AMIODARONE HYDROCHLORIDE 200 MG/1
100 TABLET ORAL DAILY
Status: CANCELLED | OUTPATIENT
Start: 2024-11-13

## 2024-11-12 RX ORDER — HYDROCODONE BITARTRATE AND ACETAMINOPHEN 5; 325 MG/1; MG/1
1 TABLET ORAL EVERY 8 HOURS PRN
Status: DISCONTINUED | OUTPATIENT
Start: 2024-11-12 | End: 2024-11-12

## 2024-11-12 RX ORDER — ACETAMINOPHEN 650 MG/1
650 SUPPOSITORY RECTAL EVERY 6 HOURS PRN
Status: DISCONTINUED | OUTPATIENT
Start: 2024-11-12 | End: 2024-11-13

## 2024-11-12 RX ORDER — VITAMIN B COMPLEX
2000 TABLET ORAL
Status: CANCELLED | OUTPATIENT
Start: 2024-11-12

## 2024-11-12 RX ORDER — DEXTROSE MONOHYDRATE 100 MG/ML
INJECTION, SOLUTION INTRAVENOUS CONTINUOUS PRN
Status: CANCELLED | OUTPATIENT
Start: 2024-11-12

## 2024-11-12 RX ORDER — SODIUM CHLORIDE 9 MG/ML
INJECTION, SOLUTION INTRAVENOUS PRN
Status: DISCONTINUED | OUTPATIENT
Start: 2024-11-12 | End: 2024-11-12

## 2024-11-12 RX ORDER — LIDOCAINE 4 G/G
1 PATCH TOPICAL DAILY
Status: DISCONTINUED | OUTPATIENT
Start: 2024-11-13 | End: 2024-11-22 | Stop reason: HOSPADM

## 2024-11-12 RX ORDER — CALCIUM CARBONATE 500(1250)
500 TABLET ORAL DAILY
Status: DISCONTINUED | OUTPATIENT
Start: 2024-11-12 | End: 2024-11-12 | Stop reason: HOSPADM

## 2024-11-12 RX ORDER — VITAMIN B COMPLEX
2000 TABLET ORAL
Status: DISCONTINUED | OUTPATIENT
Start: 2024-11-12 | End: 2024-11-12 | Stop reason: HOSPADM

## 2024-11-12 RX ORDER — METOPROLOL TARTRATE 50 MG
50 TABLET ORAL 2 TIMES DAILY
Status: DISCONTINUED | OUTPATIENT
Start: 2024-11-12 | End: 2024-11-22 | Stop reason: HOSPADM

## 2024-11-12 RX ORDER — ONDANSETRON 4 MG/1
4 TABLET, ORALLY DISINTEGRATING ORAL EVERY 8 HOURS PRN
Status: DISCONTINUED | OUTPATIENT
Start: 2024-11-12 | End: 2024-11-22 | Stop reason: HOSPADM

## 2024-11-12 RX ORDER — VITAMIN B COMPLEX
2000 TABLET ORAL
Status: DISCONTINUED | OUTPATIENT
Start: 2024-11-12 | End: 2024-11-22 | Stop reason: HOSPADM

## 2024-11-12 RX ORDER — SODIUM CHLORIDE 0.9 % (FLUSH) 0.9 %
5-40 SYRINGE (ML) INJECTION EVERY 12 HOURS SCHEDULED
Status: DISCONTINUED | OUTPATIENT
Start: 2024-11-12 | End: 2024-11-22 | Stop reason: HOSPADM

## 2024-11-12 RX ORDER — LABETALOL HYDROCHLORIDE 5 MG/ML
10 INJECTION, SOLUTION INTRAVENOUS EVERY 4 HOURS PRN
Status: DISCONTINUED | OUTPATIENT
Start: 2024-11-12 | End: 2024-11-22 | Stop reason: HOSPADM

## 2024-11-12 RX ORDER — AMIODARONE HYDROCHLORIDE 200 MG/1
100 TABLET ORAL DAILY
Status: DISCONTINUED | OUTPATIENT
Start: 2024-11-12 | End: 2024-11-12 | Stop reason: HOSPADM

## 2024-11-12 RX ORDER — TIZANIDINE 2 MG/1
4 TABLET ORAL 3 TIMES DAILY
Status: CANCELLED | OUTPATIENT
Start: 2024-11-12

## 2024-11-12 RX ORDER — MAGNESIUM HYDROXIDE/ALUMINUM HYDROXICE/SIMETHICONE 120; 1200; 1200 MG/30ML; MG/30ML; MG/30ML
30 SUSPENSION ORAL EVERY 6 HOURS PRN
Status: CANCELLED | OUTPATIENT
Start: 2024-11-12

## 2024-11-12 RX ORDER — DEXTROSE, SODIUM CHLORIDE, SODIUM LACTATE, POTASSIUM CHLORIDE, AND CALCIUM CHLORIDE 5; .6; .31; .03; .02 G/100ML; G/100ML; G/100ML; G/100ML; G/100ML
INJECTION, SOLUTION INTRAVENOUS CONTINUOUS
Status: CANCELLED | OUTPATIENT
Start: 2024-11-12

## 2024-11-12 RX ORDER — ACETAMINOPHEN 325 MG/1
650 TABLET ORAL EVERY 6 HOURS PRN
Status: CANCELLED | OUTPATIENT
Start: 2024-11-12

## 2024-11-12 RX ORDER — GLUCAGON 1 MG/ML
1 KIT INJECTION PRN
Status: CANCELLED | OUTPATIENT
Start: 2024-11-12

## 2024-11-12 RX ORDER — GLUCAGON 1 MG/ML
1 KIT INJECTION PRN
Status: DISCONTINUED | OUTPATIENT
Start: 2024-11-12 | End: 2024-11-22 | Stop reason: HOSPADM

## 2024-11-12 RX ORDER — ACETAMINOPHEN 325 MG/1
650 TABLET ORAL EVERY 6 HOURS PRN
Status: DISCONTINUED | OUTPATIENT
Start: 2024-11-12 | End: 2024-11-13

## 2024-11-12 RX ORDER — MAGNESIUM HYDROXIDE/ALUMINUM HYDROXICE/SIMETHICONE 120; 1200; 1200 MG/30ML; MG/30ML; MG/30ML
30 SUSPENSION ORAL EVERY 6 HOURS PRN
Status: DISCONTINUED | OUTPATIENT
Start: 2024-11-12 | End: 2024-11-22 | Stop reason: HOSPADM

## 2024-11-12 RX ORDER — CALCIUM CARBONATE 500(1250)
500 TABLET ORAL DAILY
Status: DISCONTINUED | OUTPATIENT
Start: 2024-11-13 | End: 2024-11-22 | Stop reason: HOSPADM

## 2024-11-12 RX ORDER — INSULIN LISPRO 100 [IU]/ML
0-4 INJECTION, SOLUTION INTRAVENOUS; SUBCUTANEOUS EVERY 6 HOURS SCHEDULED
Status: CANCELLED | OUTPATIENT
Start: 2024-11-12

## 2024-11-12 RX ORDER — ONDANSETRON 2 MG/ML
4 INJECTION INTRAMUSCULAR; INTRAVENOUS EVERY 6 HOURS PRN
Status: CANCELLED | OUTPATIENT
Start: 2024-11-12

## 2024-11-12 RX ADMIN — Medication 10 ML: at 09:42

## 2024-11-12 RX ADMIN — RANOLAZINE 500 MG: 500 TABLET, FILM COATED, EXTENDED RELEASE ORAL at 12:13

## 2024-11-12 RX ADMIN — METOPROLOL TARTRATE 50 MG: 50 TABLET, FILM COATED ORAL at 20:50

## 2024-11-12 RX ADMIN — Medication 10 ML: at 20:55

## 2024-11-12 RX ADMIN — METOPROLOL TARTRATE 50 MG: 50 TABLET, FILM COATED ORAL at 12:14

## 2024-11-12 RX ADMIN — SODIUM CHLORIDE, PRESERVATIVE FREE 40 MG: 5 INJECTION INTRAVENOUS at 12:13

## 2024-11-12 RX ADMIN — Medication 500 MG: at 12:23

## 2024-11-12 RX ADMIN — AMIODARONE HYDROCHLORIDE 100 MG: 200 TABLET ORAL at 12:14

## 2024-11-12 RX ADMIN — SODIUM CHLORIDE, PRESERVATIVE FREE 40 MG: 5 INJECTION INTRAVENOUS at 00:14

## 2024-11-12 RX ADMIN — ACETAMINOPHEN 325MG 650 MG: 325 TABLET ORAL at 18:30

## 2024-11-12 RX ADMIN — TIZANIDINE 4 MG: 4 TABLET ORAL at 14:07

## 2024-11-12 RX ADMIN — Medication 2000 UNITS: at 17:57

## 2024-11-12 RX ADMIN — ACETAMINOPHEN 325MG 650 MG: 325 TABLET ORAL at 12:13

## 2024-11-12 ASSESSMENT — PAIN DESCRIPTION - ORIENTATION: ORIENTATION: LOWER

## 2024-11-12 ASSESSMENT — PAIN SCALES - GENERAL
PAINLEVEL_OUTOF10: 7
PAINLEVEL_OUTOF10: 4
PAINLEVEL_OUTOF10: 3
PAINLEVEL_OUTOF10: 4
PAINLEVEL_OUTOF10: 0

## 2024-11-12 ASSESSMENT — PAIN DESCRIPTION - LOCATION
LOCATION: BACK

## 2024-11-12 ASSESSMENT — ENCOUNTER SYMPTOMS
BOWEL INCONTINENCE: 0
BACK PAIN: 1
CHEST TIGHTNESS: 1

## 2024-11-12 ASSESSMENT — PAIN DESCRIPTION - DESCRIPTORS: DESCRIPTORS: ACHING;SORE

## 2024-11-12 ASSESSMENT — PAIN SCALES - WONG BAKER: WONGBAKER_NUMERICALRESPONSE: HURTS WHOLE LOT

## 2024-11-12 NOTE — DISCHARGE SUMMARY
Discharge Summary    Date: 11/12/2024  Patient Name: Loren Noyola    YOB: 1935     Age: 89 y.o.    Admit Date: 11/9/2024  Discharge Date: 11/12/2024  Discharge Condition: Fair    Admission Diagnosis  GI bleed [K92.2];Anticoagulated [Z79.01];History of GI bleed [Z87.19];Pain of upper abdomen [R10.10];Symptomatic anemia [D64.9];Chest pain, unspecified type [R07.9]      Discharge Diagnosis  Principal Problem:    GI bleed  Active Problems:    Symptomatic anemia    History of GI bleed    Pain of upper abdomen    Chest pain    Gastric ulcer    Shortness of breath  Resolved Problems:    * No resolved hospital problems. *      Hospital Stay  Narrative of Hospital Course:  Patient with past medical history of A-fib on oral anticoagulation including aspirin comes with GI bleed status post EGD showing nonbleeding ulcer.  Resume oral anticoagulation when okay with GI.  Patient recommended to go to acute rehab for functional immobility and weakness    Consultants:  IP CONSULT TO GI  IP CONSULT TO CRITICAL CARE  IP CONSULT TO PHYSICAL MEDICINE REHAB    Surgeries/procedures Performed:      Treatments:            Discharge Plan/Disposition:  Home    Hospital/Incidental Findings Requiring Follow Up:    Patient Instructions:    Diet:    Activity:  For number of days (if applicable):      Other Instructions:    Provider Follow-Up:   No follow-ups on file.     Significant Diagnostic Studies:    Recent Labs:  Admission on 11/09/2024  No results displayed because visit has over 200 results.    ------------    Radiology last 7 days:  CT ABDOMEN PELVIS WO CONTRAST Additional Contrast? None    Result Date: 11/10/2024  1. No acute intra-abdominal or pelvic process. 2. Sigmoidectomy with uncomplicated sigmoid anastomosis. 3. Bilateral adrenal adenomas. 4. 3.5 cm infrarenal abdominal aortic aneurysm. 5. 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

## 2024-11-12 NOTE — CONSULTS
infiltrate        Assessment, plan:   Patient is at risk due to    Shortness of breath, secondary to severe anemia  Acute blood loss anemia secondary to GI bleed  GI bleed likely upper GI  Chronic kidney disease     Recommendation  Blood transfusion as needed Target hemoglobin 7  Continue PPI  GI consult  Maalox as needed  Watch volume status maintain euvolemic  Monitor and replace electrolytes as needed  SCD for DVT prophylaxis  Target blood sugar 1 40-1 80  D5 LR while n.p.o.  Transfer to floor      Thank you for consultation    Electronically signed by Arlene Estrada MD, [facility],  on 11/10/2024 at 9:12 AM    
requiring acute intensive therapy to optimize function.  They are expected to achieve meaningful functional gains.  Due to medical complexity as above, rehabilitation physician services is reasonable and necessary including face-to-face visits at least 3 days/week with anticipated need to treat, manage and modify the rehabilitation course of treatment including interdisciplinary team conferences.  They will require rehab physician care to monitor for neurogenic bowel bladder, postoperative pain, titration of opiate and high risk medications,   blood pressure and blood sugar control.    It is my opinion that they will be able to tolerate and benefit from 3 hours of therapy a day.  I reviewed the various options re: levels of care with the patient and family.  Please see pre-admission screen note for further details. I discussed acute rehab with the patient and verify that the patient is able and willing to participate in 3 hours of therapy a day.  Rehab and Acute Care Case Management has also reinforced this expectation.  This patient requires multidisciplinary rehabilitation treatment, including daily care and management from a PM&R physician, 24-hour rehabilitation nursing, Physical Therapy, Occupational Therapy, rehabilitation psychology, consideration of speech and language pathology, recreational therapy, nutritional services, and a rehabilitation .  I feel that it is reasonable to plan for a discharge to home setting after acute rehab.         Specialized nursing care to focus on:  Bowel and bladder issues-Monitor for urinary retention-check PVRs, bladder scan--cath if no void.  Wound risk and management   -pressure relief protocols-side to side turns  IVF medication administration      Monitor endurance and if necessary spread therapy out over a 7-day window-adding scheduled rest breaks when needed.  Focus on energy conservation.  Monitor heart rate and   cardiac medications effects on heart rate

## 2024-11-12 NOTE — CARE COORDINATION
[facility] Pre-Admission Screening Document      Patient Name: Loren Noyola       MRN: [id number]    : 1935    Age: 89 y.o.  Gender: female   Payor: Payor: MEDICARE / Plan: MEDICARE PART A AND B / Product Type: *No Product type* /   MSSP: No    Admitted from: [facility] Floor: 4W  Attending Care Provider: Janelle Henning MD  Inpatient Rehab Referring Care Provider: Dr. Henning  Primary Care Provider: Evette Johnson MD  Inpatient Treatment Team including Consults: Treatment Team:   Janelle Henning MD Khallafi, MD Arnaldo Perales, Marie, Arlene Naylor MD Khallafi, Hicham, MD Bennett, ANDREW Beck Bonnie, Grace Concepcion, ANDREW Terrell, Prerna Solis, [facility]  Marie Giang, Deb Fortune DO    Reason for Hospitalization:   1. Chest pain, unspecified type    2. Symptomatic anemia    3. Pain of upper abdomen    4. Anticoagulated    5. History of GI bleed    6. GIB (gastrointestinal bleeding)      Chief Complaint   Patient presents with    Chest Pain     Chest pain     Isolation:No active isolations    Hospital Course:  Admit Date: 2024 10:28 PM  Inpatient Rehab Referral Date: 2024  Narrative of hospital course/history of present illness: 89 y.o. female patient with recent hospitalization and rehab stay s/p fall with discovered T12fx. Patient was d/c'd home from rehab on . Patient presented back to ER  with CP and SOB along with reports of having dark stools. Labs notable for hemoglobin of 4.7. Patient admitted to ICU with consults from CC, pulmonology and GI. EGD was performed and no active bleeding found. Did, however, reveal x2 non-bleeding gastric ulcers.    Internal Medicine:  Assessment & Plan  : Hold oral anticoagulation monitor H&H.  Follow cardiology as outpatient.  EGD today.  Denies any needs.  Continue with hypotonic solution.  Monitor sodium.  Increase free water once 
Inpatient Rehab referral received. Met with patient to discuss recent rehab stay and [facility] therapy [facility] Notes reflective that when patient was d/c'd from rehab, she was d/c'd HOME. Other chart notes this admission reflect that patient was brought in from SNF. While I did attempt to discuss that with patient she really could not elaborate how she ended up at SNF when she was d/c'd from rehab to her home. She did advise she was having CP and that her doctor prescribed her medications that were ineffective in addition to noting she felt like she didn't have enough help at home. I inquired who will be assisting her this time around as I reinforced the goal being for patient to return home. Patient advised her two sons and DIL will available to help her and are currently assisting her  while she's here. Patient voices confidence she will be able to return home from rehab. Though patient familiar with rehab as she was just on unit, I did review, explain and reiforce [facility] Inpatient Rehab program and requirements, including 3 hours of intense therapy daily, anticipated length of stay, weekly team meetings and goal of discharge to home. Patient reports she thinks she can manage her own self care, however, feels like she needs more help within the home. FOC provided and with goal of returning home, patient requesting [facility] rehab.  Electronically signed by Tamera Rangel on 11/12/2024 at 11:48 AM    
MET W/PT W/LSW TO ASSESS NEEDS AND DISCUSS DISCHARGE PLAN. PT INDICATES SHE IS \"TOO  WEAK TO RETURN HOME AND IS HOPING TO GO TO ACUTE REHAB.\" CALLED SON CHASITY AND HE IS AGREEABLE THAT SHE WOULD BENEFIT FROM GOING TO REHAB. P/S MESSAGE SENT TO DR. JON. WILL FOLLOW FOR ORDER. NO PRECERT NEEDED FOR TRANSFER. AM-PAC 16 FOR PT, WAS 11 THE OTHER DAY.   1108- REFERRAL CALLED TO [facility] ACUTE REHAB WITH  VM MESSAGE LEFT.   
PT ACCEPTED TO ACUTE REHAB PER LSW AND WILL TRANSFER TO  251. INFORMED PT AND FAMILY.   
Per patient request this LSW sent referral to [facility], awaiting acceptance at this time.  Electronically signed by KIRTI Gilbert on 11/12/24 at 9:56 AM EST   This LSW spoke with patient at bedside.Patient has now decided that she would like to be evaluated for [facility] Acute Rehab program.  Awaiting order and evaluation at this time.  Electronically signed by KIRTI Gilbert on 11/12/24 at 10:51 AM EST   Patient has been accepted to [facility] Rehab Program. Patient will be transferred to room 251.  This LSW notified patient  and ANDREW Edwards.  Electronically signed by KIRTI Gilbert on 11/12/24 at 1:31 PM EST      
This LSW visited patient at bedside. I completed patients readmission, CMI, ACP and 1st IMM. Copy for 1st IMM given to patient.  Patient is hopeful that she will be able to return home upon discharge. She is requesting to have [facility] services for PT,OT ,Skilled nursing.  Patient is supported very well by her 2 sons, Jude and Rupert and her daughter in law.  Patients  is at home being cared for by family.  If patient is not able to return home with [facility] services upon discharge, patient is agreeable to return to [facility].  Electronically signed by KIRTI Gilbert on 11/11/24 at 11:11 AM EST   
This RN/ CM Updated 4W LSW that patient is able to admit to rehab room 251 when medically cleared. Rehab charge nurse made aware of anticipated admission and assigned room as well.  Electronically signed by Tamera Rangel on 11/12/2024 at 1:30 PM    
MEDICARE / Plan: MEDICARE PART A AND B / Product Type: *No Product type* /     Does insurance require precert for SNF: No    Potential assistance Purchasing Medications:    Meds-to-Beds request: Yes      [id number] - [place] - [address] - P [phone] - F [phone]  [address]  [place] [address]  Phone: [phone] Fax: [phone]    [id number] - [place] - [address] - P [phone] - F [phone]  [address]  [place] [address]  Phone: [phone] Fax: [phone]    [facility] [place], [place] - [address] - P [phone] - F [phone] [address]  [place] [address]  Phone: [phone] Fax: [phone]      Notes:    Factors facilitating achievement of predicted outcomes: Family support    Barriers to discharge: Medical complications    Additional Case Management Notes:   Patient is alert, oriented and pleasant.  Patient was recently here at [facility], transferred to Acute Rehab program, sent to [facility] and then was re-admitted to [facility].  Patient does not use oxygen. Patient does not receive dialysis. Patient relies on her supportive family for all transport needs.  Patient is not a .  Patient hopes to return home upon discharge with [facility] services.    The Plan for Transition of Care is related to the following treatment goals of GI bleed [K92.2]  Anticoagulated [Z79.01]  History of GI bleed [Z87.19]  Pain of upper abdomen [R10.10]  Symptomatic anemia [D64.9]  Chest pain, unspecified type [R07.9]    IF APPLICABLE: The Patient and/or patient representative Loren and her family were provided with a choice of provider and agrees with the discharge plan. Freedom of choice list with basic dialogue that supports the patient's individualized plan of care/goals and shares the quality data associated with the providers was provided to:     Patient Representative Name:       The Patient

## 2024-11-12 NOTE — PROGRESS NOTES
Pt admitted to room 251 from [facility] at 1700, orders for telemetry and PRBC transfusion clarified with Dr. Henning and he discontinued them. Pt anxious, alert and oriented, family states [place] makes pt \"psycho\", states she was given one earlier today and that caused her panic attacks. Pt appears calmer now, visiting with her son, medicated for c/o lower back pain with PRN Tylenol see MAR. VSS, bed alarm activated, call light in reach. Electronically signed by Melinda Proctor RN on 11/12/2024 at 6:53 PM

## 2024-11-13 PROBLEM — M48.062 SPINAL STENOSIS OF LUMBAR REGION WITH NEUROGENIC CLAUDICATION: Status: ACTIVE | Noted: 2024-11-08

## 2024-11-13 PROBLEM — K21.9 GASTRO-ESOPHAGEAL REFLUX DISEASE WITHOUT ESOPHAGITIS: Status: ACTIVE | Noted: 2024-11-08

## 2024-11-13 PROBLEM — Z74.09 IMPAIRED MOBILITY AND ADLS: Status: RESOLVED | Noted: 2024-11-13 | Resolved: 2024-11-13

## 2024-11-13 PROBLEM — M48.061 SPINAL STENOSIS, LUMBAR REGION WITHOUT NEUROGENIC CLAUDICATION: Status: RESOLVED | Noted: 2024-11-08 | Resolved: 2024-11-13

## 2024-11-13 PROBLEM — Z78.9 IMPAIRED MOBILITY AND ADLS: Status: ACTIVE | Noted: 2024-11-13

## 2024-11-13 PROBLEM — Z78.9 IMPAIRED MOBILITY AND ADLS: Status: RESOLVED | Noted: 2024-11-13 | Resolved: 2024-11-13

## 2024-11-13 PROBLEM — S22.080D WEDGE COMPRESSION FRACTURE OF T11-T12 VERTEBRA, SUBSEQUENT ENCOUNTER FOR FRACTURE WITH ROUTINE HEALING: Status: ACTIVE | Noted: 2024-10-23

## 2024-11-13 PROBLEM — M48.061 SPINAL STENOSIS, LUMBAR REGION WITHOUT NEUROGENIC CLAUDICATION: Status: ACTIVE | Noted: 2024-11-08

## 2024-11-13 PROBLEM — E78.5 HYPERLIPIDEMIA, UNSPECIFIED: Status: ACTIVE | Noted: 2024-11-08

## 2024-11-13 PROBLEM — Z74.09 IMPAIRED MOBILITY AND ADLS: Status: ACTIVE | Noted: 2024-11-13

## 2024-11-13 LAB
ANION GAP SERPL CALCULATED.3IONS-SCNC: 8 MEQ/L (ref 9–15)
BASOPHILS # BLD: 0 K/UL (ref 0–0.2)
BASOPHILS NFR BLD: 0.2 %
BUN SERPL-MCNC: 40 MG/DL (ref 8–23)
CALCIUM SERPL-MCNC: 7.9 MG/DL (ref 8.5–9.9)
CHLORIDE SERPL-SCNC: 113 MEQ/L (ref 95–107)
CO2 SERPL-SCNC: 22 MEQ/L (ref 20–31)
CREAT SERPL-MCNC: 1.49 MG/DL (ref 0.5–0.9)
EOSINOPHIL # BLD: 0.4 K/UL (ref 0–0.7)
EOSINOPHIL NFR BLD: 3.3 %
ERYTHROCYTE [DISTWIDTH] IN BLOOD BY AUTOMATED COUNT: 17.5 % (ref 11.5–14.5)
GLUCOSE BLD-MCNC: 106 MG/DL (ref 70–99)
GLUCOSE BLD-MCNC: 109 MG/DL (ref 70–99)
GLUCOSE BLD-MCNC: 92 MG/DL (ref 70–99)
GLUCOSE SERPL-MCNC: 91 MG/DL (ref 70–99)
HCT VFR BLD AUTO: 23.1 % (ref 37–47)
HCT VFR BLD AUTO: 29.5 % (ref 37–47)
HGB BLD-MCNC: 7.4 G/DL (ref 12–16)
HGB BLD-MCNC: 9.2 G/DL (ref 12–16)
LYMPHOCYTES # BLD: 1 K/UL (ref 1–4.8)
LYMPHOCYTES NFR BLD: 8.7 %
MCH RBC QN AUTO: 30 PG (ref 27–31.3)
MCHC RBC AUTO-ENTMCNC: 32 % (ref 33–37)
MCV RBC AUTO: 93.5 FL (ref 79.4–94.8)
MONOCYTES # BLD: 1.3 K/UL (ref 0.2–0.8)
MONOCYTES NFR BLD: 11 %
NEUTROPHILS # BLD: 8.6 K/UL (ref 1.4–6.5)
NEUTS SEG NFR BLD: 75.7 %
PERFORMED ON: ABNORMAL
PERFORMED ON: ABNORMAL
PERFORMED ON: NORMAL
PLATELET # BLD AUTO: 187 K/UL (ref 130–400)
POTASSIUM SERPL-SCNC: 4.2 MEQ/L (ref 3.4–4.9)
RBC # BLD AUTO: 2.47 M/UL (ref 4.2–5.4)
SODIUM SERPL-SCNC: 143 MEQ/L (ref 135–144)
WBC # BLD AUTO: 11.3 K/UL (ref 4.8–10.8)

## 2024-11-13 PROCEDURE — 97535 SELF CARE MNGMENT TRAINING: CPT

## 2024-11-13 PROCEDURE — 97110 THERAPEUTIC EXERCISES: CPT

## 2024-11-13 PROCEDURE — 6370000000 HC RX 637 (ALT 250 FOR IP): Performed by: PHYSICAL MEDICINE & REHABILITATION

## 2024-11-13 PROCEDURE — 92523 SPEECH SOUND LANG COMPREHEN: CPT

## 2024-11-13 PROCEDURE — 2580000003 HC RX 258: Performed by: INTERNAL MEDICINE

## 2024-11-13 PROCEDURE — 6360000002 HC RX W HCPCS: Performed by: INTERNAL MEDICINE

## 2024-11-13 PROCEDURE — 1180000000 HC REHAB R&B

## 2024-11-13 PROCEDURE — 36415 COLL VENOUS BLD VENIPUNCTURE: CPT

## 2024-11-13 PROCEDURE — 99222 1ST HOSP IP/OBS MODERATE 55: CPT | Performed by: PHYSICAL MEDICINE & REHABILITATION

## 2024-11-13 PROCEDURE — 97530 THERAPEUTIC ACTIVITIES: CPT

## 2024-11-13 PROCEDURE — 85014 HEMATOCRIT: CPT

## 2024-11-13 PROCEDURE — 6370000000 HC RX 637 (ALT 250 FOR IP): Performed by: INTERNAL MEDICINE

## 2024-11-13 PROCEDURE — 97116 GAIT TRAINING THERAPY: CPT

## 2024-11-13 PROCEDURE — 80048 BASIC METABOLIC PNL TOTAL CA: CPT

## 2024-11-13 PROCEDURE — 99223 1ST HOSP IP/OBS HIGH 75: CPT | Performed by: INTERNAL MEDICINE

## 2024-11-13 PROCEDURE — 85018 HEMOGLOBIN: CPT

## 2024-11-13 PROCEDURE — 97162 PT EVAL MOD COMPLEX 30 MIN: CPT

## 2024-11-13 PROCEDURE — 85025 COMPLETE CBC W/AUTO DIFF WBC: CPT

## 2024-11-13 PROCEDURE — 97166 OT EVAL MOD COMPLEX 45 MIN: CPT

## 2024-11-13 PROCEDURE — 6360000002 HC RX W HCPCS: Performed by: PHYSICAL MEDICINE & REHABILITATION

## 2024-11-13 RX ORDER — BISACODYL 10 MG
10 SUPPOSITORY, RECTAL RECTAL DAILY PRN
Status: DISCONTINUED | OUTPATIENT
Start: 2024-11-13 | End: 2024-11-22 | Stop reason: HOSPADM

## 2024-11-13 RX ORDER — TROSPIUM CHLORIDE 20 MG/1
20 TABLET, FILM COATED ORAL 2 TIMES DAILY
Status: ON HOLD | COMMUNITY
End: 2024-11-21 | Stop reason: HOSPADM

## 2024-11-13 RX ORDER — CYANOCOBALAMIN 1000 UG/ML
1000 INJECTION, SOLUTION INTRAMUSCULAR; SUBCUTANEOUS WEEKLY
Status: DISCONTINUED | OUTPATIENT
Start: 2024-11-13 | End: 2024-11-22 | Stop reason: HOSPADM

## 2024-11-13 RX ORDER — LIDOCAINE 4 G/G
3 PATCH TOPICAL DAILY
Status: DISCONTINUED | OUTPATIENT
Start: 2024-11-13 | End: 2024-11-22 | Stop reason: HOSPADM

## 2024-11-13 RX ORDER — UBIDECARENONE 100 MG
100 CAPSULE ORAL DAILY
Status: DISCONTINUED | OUTPATIENT
Start: 2024-11-13 | End: 2024-11-22 | Stop reason: HOSPADM

## 2024-11-13 RX ORDER — BETAMETHASONE DIPROPIONATE 0.5 MG/G
LOTION TOPICAL 2 TIMES DAILY
Status: ON HOLD | COMMUNITY
End: 2024-11-21 | Stop reason: HOSPADM

## 2024-11-13 RX ORDER — POLYETHYLENE GLYCOL 3350 17 G/17G
17 POWDER, FOR SOLUTION ORAL DAILY PRN
Status: ON HOLD | COMMUNITY
End: 2024-11-21 | Stop reason: HOSPADM

## 2024-11-13 RX ORDER — VITAMIN B COMPLEX
2000 TABLET ORAL
Status: DISCONTINUED | OUTPATIENT
Start: 2024-11-13 | End: 2024-11-13

## 2024-11-13 RX ORDER — ACETAMINOPHEN 325 MG/1
650 TABLET ORAL EVERY 4 HOURS PRN
Status: DISCONTINUED | OUTPATIENT
Start: 2024-11-13 | End: 2024-11-22 | Stop reason: HOSPADM

## 2024-11-13 RX ORDER — METHYLPREDNISOLONE 4 MG/1
4 TABLET ORAL SEE ADMIN INSTRUCTIONS
Status: ON HOLD | COMMUNITY
End: 2024-11-21 | Stop reason: HOSPADM

## 2024-11-13 RX ORDER — FERROUS SULFATE 325(65) MG
325 TABLET ORAL
Status: ON HOLD | COMMUNITY
End: 2024-11-21 | Stop reason: HOSPADM

## 2024-11-13 RX ORDER — SODIUM PHOSPHATE, DIBASIC AND SODIUM PHOSPHATE, MONOBASIC 7; 19 G/230ML; G/230ML
1 ENEMA RECTAL DAILY PRN
Status: DISCONTINUED | OUTPATIENT
Start: 2024-11-13 | End: 2024-11-22 | Stop reason: HOSPADM

## 2024-11-13 RX ADMIN — CYANOCOBALAMIN 1000 MCG: 1000 INJECTION, SOLUTION INTRAMUSCULAR; SUBCUTANEOUS at 08:01

## 2024-11-13 RX ADMIN — SODIUM CHLORIDE, PRESERVATIVE FREE 40 MG: 5 INJECTION INTRAVENOUS at 00:05

## 2024-11-13 RX ADMIN — ACETAMINOPHEN 325MG 650 MG: 325 TABLET ORAL at 21:15

## 2024-11-13 RX ADMIN — SODIUM CHLORIDE 125 MG: 9 INJECTION, SOLUTION INTRAVENOUS at 21:24

## 2024-11-13 RX ADMIN — CALCIUM 500 MG: 500 TABLET ORAL at 08:02

## 2024-11-13 RX ADMIN — AMIODARONE HYDROCHLORIDE 100 MG: 200 TABLET ORAL at 08:02

## 2024-11-13 RX ADMIN — Medication 5 ML: at 08:01

## 2024-11-13 RX ADMIN — Medication 100 MG: at 08:01

## 2024-11-13 RX ADMIN — ACETAMINOPHEN 325MG 650 MG: 325 TABLET ORAL at 00:12

## 2024-11-13 RX ADMIN — Medication 2000 UNITS: at 17:52

## 2024-11-13 RX ADMIN — SODIUM CHLORIDE, PRESERVATIVE FREE 40 MG: 5 INJECTION INTRAVENOUS at 12:11

## 2024-11-13 RX ADMIN — METOPROLOL TARTRATE 50 MG: 50 TABLET, FILM COATED ORAL at 21:15

## 2024-11-13 RX ADMIN — ACETAMINOPHEN 325MG 650 MG: 325 TABLET ORAL at 08:02

## 2024-11-13 RX ADMIN — METOPROLOL TARTRATE 50 MG: 50 TABLET, FILM COATED ORAL at 08:02

## 2024-11-13 RX ADMIN — Medication 10 ML: at 21:17

## 2024-11-13 RX ADMIN — ACETAMINOPHEN 325MG 650 MG: 325 TABLET ORAL at 12:11

## 2024-11-13 ASSESSMENT — PAIN DESCRIPTION - ORIENTATION
ORIENTATION: RIGHT
ORIENTATION: RIGHT

## 2024-11-13 ASSESSMENT — PAIN SCALES - GENERAL
PAINLEVEL_OUTOF10: 6
PAINLEVEL_OUTOF10: 6
PAINLEVEL_OUTOF10: 7
PAINLEVEL_OUTOF10: 2

## 2024-11-13 ASSESSMENT — PAIN DESCRIPTION - LOCATION
LOCATION: HIP
LOCATION: ABDOMEN;GENERALIZED
LOCATION: HIP
LOCATION: GENERALIZED

## 2024-11-13 ASSESSMENT — PAIN DESCRIPTION - DESCRIPTORS
DESCRIPTORS: DISCOMFORT
DESCRIPTORS: DISCOMFORT

## 2024-11-13 NOTE — PROGRESS NOTES
[facility]   Facility/Department: [facility]  Speech Language Pathology  Initial Speech/Language/Cognitive Assessment    NAME:Loren Noyola  : 1935 (89 y.o.)   [x]   confirmed    MRN: [id number]  ROOM: [place] [id number]  ADMISSION DATE: 2024  PATIENT DIAGNOSIS(ES): Impaired mobility and ADLs [Z74.09, Z78.9]  No chief complaint on file.    Patient Active Problem List    Diagnosis Date Noted    Chest pain 2024    Gastric ulcer 2024    Shortness of breath 2024    GI bleed 11/10/2024    History of GI bleed 11/10/2024    Pain of upper abdomen 11/10/2024    GIB (gastrointestinal bleeding) 2024    Gastro-esophageal reflux disease without esophagitis 2024    Hyperlipidemia, unspecified 2024    Spinal stenosis of lumbar region with neurogenic claudication 2024    Mixed incontinence 10/25/2024    Urinary incontinence 10/24/2024    CAD (coronary artery disease)     Impaired mobility ADLs dt  thoracic and lumbar spinal stenosis with T12 compression fracture. 10/23/2024    Compression fracture of T12 vertebra with routine healing 10/23/2024    Left kidney mass 10/23/2024    Wedge compression fracture of t11-T12 vertebra, subsequent encounter for fracture with routine healing 10/23/2024    Arthralgia of hip 10/22/2024    Greater trochanteric bursitis 10/22/2024    Gait instability 10/21/2024    Pacemaker 2024    Peripheral vascular disease (HCC) 2023    Right bundle branch block (RBBB) 2023    S/P CABG (coronary artery bypass graft) 2023    Trochanteric bursitis of left hip 2023    Vertigo 2023    Right wrist tendonitis 2023    Left knee pain 2018    Symptomatic anemia 2018     Gait and ADL abnormality dt left subtrochanteric femoral neck fracture and left hemiarthroplasty-[facility] rehab admission February 10, 2018 2018    Acute cystitis without hematuria 2018    Closed left hip fracture, initial encounter  WFL  Safety/Judgment  Safety/Judgment: Within Functional Limits    Additional Assessments     Informal Acute Rehab Cognitive-Linguistic Evaluation    Results of Cognitive-linguistic Evaluation Total Score for each section Percentage Score Severity Rating for each section   Auditory Comprehension 9 90 WFL   Verbal Reasoning 13 86 Mild   Memory 13 65 Moderate   Problem Solving/Sequencing 7 70 Moderate   Executive Function DNT       Overall Cognitive-Linguistic Severity Rating TBD         Recommendations  Requires SLP Intervention: Yes  D/C Recommendations: Ongoing speech therapy is recommended during this hospitalization  Duration of Treatment: 2-3 weeks  Frequency of Treatment: 2-4x/week    Prognosis  Speech Therapy Prognosis  Prognosis: Good  Prognosis Considerations: Previous Level of Function;Potential    Education  Patient Education: Pt was educated on SLE results  Patient Education Response: Verbalizes understanding    Treatment/Goals  Short Term Goals  Time Frame for Short Term Goals: 1-2 weeks  Goal 1: To increase safety awareness and judgment for safe completion of ADLs secondary to patient's cognitive deficits,  patient will complete high level problem solving tasks related to ADLs (e.g. scheduling, medication management, finance management, etc) with 90% accuracy and supervised cues.  Goal 2: To increase safety awareness and judgment for safe completion of ADLs secondary to patient's cognitive deficits, patient will sequence common activities of daily living with (verbal/written/pictured) steps with 90% accuracy and supervised cues.  Goal 3: To increase safety awareness and judgment for safe completion of ADLs secondary to patient's cognitive deficits, patient will complete abstract reasoning tasks (i.e. word deduction, convergent and divergent naming, similarities/differences) with 90% accuracy and supervised cues.  Goal 4: To address patient's cognitive deficits and promote recall of personal and medical

## 2024-11-13 NOTE — PROGRESS NOTES
1015: Alert and oriented x4, calm and cooperative. Pt has been NPO since midnight. To go for scope today. Lab called regarding post transfusion draw--awaiting. Telephone call to pt daughter in law, Dot, for update. Pt up to BSC with 1 assist/walker. Tolerating well. Bed alarm on. Call light within reach. Safety maintained.    1115: Pt taken to GI center at this time. Call to son, Rupert, for update.     1400:  PerfectServe to Dr. Solares regarding potential for colonoscopy following conversation with son. She states she hasnt had one in approximately 10 years prior to her colostomy reversal. Supplies in room for urine sample-- awaiting sample. PerfectServe to Dr. Henning to make aware. Per Dr. Henning, no urine needed at this time.     1830: Call to lab for scheduled H&H. Awaiting tech.    Electronically signed by Tiffany Maldonado RN on 11/11/2024 at 10:17 AM    
Assessment completed. Pt stating she has not received any of her home medications yet. This RN attempted to review pt home medication list prior to messaging doctor for reconciliation and pt stated she was no longer supposed to be taking amlodipine. This RN attempted to contact her pharmacy to verify her home medication list but sat on hold for 10 minutes. Pt states she will attempt to bring in an up to date med list or I will have AM nurse contact pharmacy.       
Comprehensive Nutrition Assessment    Type and Reason for Visit:  Initial, Positive nutrition screen, Wound    Nutrition Recommendations/Plan:   Recommend advancing to General diet as tolerated  Current weight ordered for further assessment     Malnutrition Assessment:  Malnutrition Status:  Insufficient data (11/11/24 1303)        Nutrition Assessment:    Pt known to this writer from recent admit ~ 2 weeks ago, at that time nutritional status was adequate, pt had reported frequent intake of high protien foods and denied any nutrition related concerns-was not interested in any oral nutrition supplements,. No current weight  ( actual) available, pt was off floor for EGD at time of visit, Will monitor for progression of po diet    Nutrition Related Findings:    Hx significant for : CAD,  hyperlipidemia, presents with chest pain.  Noted fall 3 weeks ago. . discharged from rehab & in the morning and started having worsening chest pain, + f shortness of breath and melanotic stool,Reports epigastric pain and esophageal dysphagia with solids that has progressively worsened over the past week, no trouble initiating swallow, at baseline is on a PPI. For EGD today, abnormal labs noted ( + hypernatremia, mild elevation in BUN/creat, low H/H), Relevant meds reviewed , IVF = D5LR @ 50 ml/hr ( ~200 kcals), mild BLE edema reported by nursing Wound Type: Pressure Injury, Stage III (R foot, + buttock wound not described)       Current Nutrition Intake & Therapies:    Average Meal Intake: NPO  Average Supplements Intake: NPO      Anthropometric Measures:  Height: 160 cm (5' 3\")  Ideal Body Weight (IBW): 115 lbs (52 kg)    Admission Body Weight: 83.9 kg (185 lb) (stated)  Current Body Weight:  (* Edema),   IBW.  Unable to determine  Current BMI (kg/m2):    Usual Body Weight: 82.6 kg (182 lb) ((10/30/24), 185# ( 11/2023))                               Estimated Daily Nutrient Needs:  Energy Requirements Based On: Kcal/kg  Weight Used 
Consults    Patient Name: Loren Noyola  Admit Date: 2024 10:28 PM  MR #: [id number]  : 1935    Attending Physician: Catrachito Tomlnison MD  Reason for consult: GIB    History of Presenting Illness:      Loren Noyola is a 89 y.o. female on hospital day 0 with a history of CAD, hyperlipidemia, hypertension, osteoarthritis, spinal stenosis, gastric ulcer.  Past surgical history of permanent pacemaker, appendectomy, sigmoid colectomy, CABG, hysterectomy, left hip arthroplasty, varicose vein surgery.  Family history is negative for GI malignancy.  Social history reports former nicotine, no EtOH or illicit drug use.  History Obtained From:  patient, electronic medical record    GI consult for GI bleed-patient presented to the emergency department with 3-day history of shortness of breath and melanotic stool, was found to be anemic with hemoglobin of 4.7 in the setting of Eliquis, now 6.5 after 2 units of packed red blood cells, she does report associated epigastric pain and esophageal dysphagia with solids that has progressively worsened over the past week, no trouble initiating swallow, at baseline is on a PPI.  Reported remote history of bleeding gastric ulcer, no records are available for review, no thrombocytopenia, no history of chronic liver disease.  CT scan with no acute abdominal process.  No recent endoscopic investigation.  She was initiated on IV PPI twice daily.    History:      Past Medical History:   Diagnosis Date    Bleeding ulcer     CAD (coronary artery disease)     Colitis     Hip fx, left, closed, initial encounter ([facility]) 02/10/2018    Hyperlipidemia     Hypertension     Osteoarthritis     Spinal stenosis      Past Surgical History:   Procedure Laterality Date    APPENDECTOMY      COLECTOMY  1/3/2011    sigmoid colectomy with colostomy    COLECTOMY  2011    partial colectomy with colostomy closure    CORONARY ARTERY BYPASS GRAFT      HYSTERECTOMY (CERVIX STATUS UNKNOWN)   
Gastroenterology Progress Note    Loren Noyola is a 89 y.o. female patient.  Hospitalization Day:1    Chief C/O: anemia GIB    SUBJECTIVE: Seen and examined, hemoglobin was 6.6 overnight, patient was transfused with an additional unit of packed red blood cells, no overt bleeding, has been on a PPI, has epigastric pain.  Has been n.p.o. for EGD today.    ROS:  Gastrointestinal ROS: reports epigastric&  abdominal pain, no change in bowel habits, or black or bloody stools    Physical    VITALS:  BP (!) 154/50   Pulse 79   Temp 97 °F (36.1 °C) (Oral)   Resp 18   Ht 1.6 m (5' 3\")   Wt 83.9 kg (185 lb)   SpO2 97%   BMI 32.77 kg/m²   TEMPERATURE:  Current - Temp: 97 °F (36.1 °C); Max - Temp  Av.7 °F (36.5 °C)  Min: 97 °F (36.1 °C)  Max: 98.1 °F (36.7 °C)    General:  Alert and oriented,  No apparent distress  Skin- without jaundice  Eyes: anicteric sclera  Cardiac: RRR, Nl s1s2, without murmurs  Lungs CTA Bilaterally, normal effort  Abdomen soft, ND, NT, no HSM, Bowel sounds normal  Ext: without edema  Neuro: no asterixis     Data    Data Review:    Recent Labs     11/09/24  2304 11/10/24  0604 11/10/24  1441 11/10/24  1543 11/10/24  1838 24  0256   WBC 14.4* 17.7*  --   --   --  15.7*   HGB 4.7* 6.5*   < > 7.1* 7.0* 6.6*   HCT 15.5* 20.0*   < > 20.7* 20.8* 19.6*   MCV 95.7* 90.9  --   --   --  89.5    208  --   --   --  180    < > = values in this interval not displayed.     Recent Labs     11/09/24  2304 11/10/24  0604 24  0253    138 147*   K 4.4 4.0 4.1   * 107 115*   CO2    BUN 80* 82* 67*   CREATININE 1.80* 1.72* 1.56*     Recent Labs     24  2304 11/10/24  0604 24  0253   AST 10 10 9   ALT 7 9 8   BILITOT <0.2 0.7 0.4   ALKPHOS 55 51 44     Recent Labs     24  2304   LIPASE 16     Recent Labs     11/10/24  1441   PROTIME 16.4*   INR 1.3           ASSESSMENT:  88 y/o female admitted to the ICU for symptomatic anemia, with 3-day history of 
Gastroenterology Progress Note    Loren Noyola is a 89 y.o. female patient.  Hospitalization Day:2    Chief C/O: anemia, GIB    SUBJECTIVE: Seen and examined, hemoglobin 7.4, no overt bleeding, status post EGD notable for 2 nonbleeding cratered and linear gastric ulcers with clean base largest lesion was 8 mm, and diffuse atrophic gastric mucosa, biopsies are pending, no fresh or old blood was identified.    ROS:  Gastrointestinal ROS: no abdominal pain, change in bowel habits, or black or bloody stools    Physical    VITALS:  BP (!) 148/53   Pulse 82   Temp 98.4 °F (36.9 °C) (Oral)   Resp 18   Ht 1.6 m (5' 3\")   Wt 83.9 kg (185 lb)   SpO2 98%   BMI 32.77 kg/m²   TEMPERATURE:  Current - Temp: 98.4 °F (36.9 °C); Max - Temp  Av.1 °F (36.7 °C)  Min: 97.7 °F (36.5 °C)  Max: 98.4 °F (36.9 °C)    General:  Alert and oriented,  No apparent distress  Skin- without jaundice  Eyes: anicteric sclera  Cardiac: RRR, Nl s1s2, without murmurs  Lungs CTA Bilaterally, normal effort  Abdomen soft, ND, NT, no HSM, Bowel sounds normal  Ext: without edema  Neuro: no asterixis     Data    Data Review:    Recent Labs     11/10/24  0604 11/10/24  1441 24  0256 24  1003 24  0243 24  0603 24  1025   WBC 17.7*  --  15.7*  --   --  12.8*  --    HGB 6.5*   < > 6.6*   < > 8.0* 7.5* 7.4*   HCT 20.0*   < > 19.6*   < > 24.4* 23.4* 22.9*   MCV 90.9  --  89.5  --   --  91.4  --      --  180  --   --  196  --     < > = values in this interval not displayed.     Recent Labs     11/10/24  0604 24  0253 24  0603    147* 147*   K 4.0 4.1 4.1    115* 116*   CO2    BUN 82* 67* 50*   CREATININE 1.72* 1.56* 1.46*     Recent Labs     11/10/24  0604 24  0253 24  0603   AST 10 9 12   ALT 9 8 10   BILITOT 0.7 0.4 0.5   ALKPHOS 51 44 51     Recent Labs     24  2304   LIPASE 16     Recent Labs     11/10/24  1441   PROTIME 16.4*   INR 1.3     Endoscopic 
Hgb 7.0 to Dr. Cruz, no new orders. Okay to wait for recheck.  
Hgb of 6.6 reported to Mackenzie Post NP. New orders received for blood transfusion. Blood transfusion started per protocol and patient observed for initial 15 minutes. No signs or symptoms of reaction noted. Patient educated on signs and symptoms of reaction, verbalized understanding of such and states she will inform staff immediately if any of the symptoms start  to happen.   
[facility] OCCUPATIONAL THERAPY EVALUATION - ACUTE     NAME: Loren Noyola  : 1935 (89 y.o.)  MRN: [id number]  CODE STATUS: Full Code  [facility]    Date of Service: 11/10/2024    Patient Diagnosis(es): GI bleed [K92.2]  Anticoagulated [Z79.01]  History of GI bleed [Z87.19]  Pain of upper abdomen [R10.10]  Symptomatic anemia [D64.9]  Chest pain, unspecified type [R07.9]   Patient Active Problem List    Diagnosis Date Noted    GI bleed 11/10/2024    History of GI bleed 11/10/2024    Pain of upper abdomen 11/10/2024    GIB (gastrointestinal bleeding) 2024    Mixed incontinence 10/25/2024    Urinary incontinence 10/24/2024    CAD (coronary artery disease)     Impaired mobility ADLs dt  thoracic and lumbar spinal stenosis with T12 compression fracture. 10/23/2024    Compression fracture of T12 vertebra with routine healing 10/23/2024    Left kidney mass 10/23/2024    Arthralgia of hip 10/22/2024    Greater trochanteric bursitis 10/22/2024    Gait instability 10/21/2024    Pacemaker 2024    Peripheral vascular disease (HCC) 2023    Right bundle branch block (RBBB) 2023    S/P CABG (coronary artery bypass graft) 2023    Trochanteric bursitis of left hip 2023    Vertigo 2023    Right wrist tendonitis 2023    Left knee pain 2018    Symptomatic anemia 2018     Gait and ADL abnormality dt left subtrochanteric femoral neck fracture and left hemiarthroplasty-[facility] rehab admission February 10, 2018 2018    Acute cystitis without hematuria 2018    Closed left hip fracture, initial encounter ([facility]) 2018    DDD (degenerative disc disease), lumbar 2017    Essential hypertension 2017    Lumbar facet arthropathy 2017    Lumbosacral spondylosis without myelopathy 2017    Primary osteoarthritis of both knees 2017    Right lumbosacral radiculopathy 2017    Constipation 2016    Foot pain, left 
OARRS report reviewed, norco outpt dose reordered.     KAUSHIK FIELD MD    
Patient assessment and vitals complete and per flowsheets. Patient up to commode with 2 assist, slightly dark bowel movement noted. No other signs of bleeding. Patient medicated per EMAR for pain, voiced relief from such. No other needs at this time. NPO except sips of water with meds, verbalized understanding of such.   
Patient has returned from Esophagogastroduodenoscopy BX'S. Denies pain at this time.   
Physical Therapy  Facility/Department: [facility] MED SURG W476/W476-01  Physical Therapy Discharge      NAME: Loren Noyola    : 1935 (89 y.o.)  MRN: [id number]    Account: [id number]  Gender: female      Patient has been discharged from [facility] hospital. DC patient from current PT program.      Electronically signed by Benita Whitley PT on 24 at 4:55 PM EST    
Physical Therapy Med Surg Daily Treatment Note  Facility/Department: [address] MED SURG UNIT  Room: [place] [address]       NAME: Loren Noyola  : 1935 (89 y.o.)  MRN: [id number]  CODE STATUS: Full Code    Date of Service: 2024    Patient Diagnosis(es): GI bleed [K92.2]  Anticoagulated [Z79.01]  History of GI bleed [Z87.19]  Pain of upper abdomen [R10.10]  Symptomatic anemia [D64.9]  Chest pain, unspecified type [R07.9]   Chief Complaint   Patient presents with    Chest Pain     Chest pain     Patient Active Problem List    Diagnosis Date Noted    Chest pain 2024    Gastric ulcer 2024    GI bleed 11/10/2024    History of GI bleed 11/10/2024    Pain of upper abdomen 11/10/2024    GIB (gastrointestinal bleeding) 2024    Mixed incontinence 10/25/2024    Urinary incontinence 10/24/2024    CAD (coronary artery disease)     Impaired mobility ADLs dt  thoracic and lumbar spinal stenosis with T12 compression fracture. 10/23/2024    Compression fracture of T12 vertebra with routine healing 10/23/2024    Left kidney mass 10/23/2024    Arthralgia of hip 10/22/2024    Greater trochanteric bursitis 10/22/2024    Gait instability 10/21/2024    Pacemaker 2024    Peripheral vascular disease (HCC) 2023    Right bundle branch block (RBBB) 2023    S/P CABG (coronary artery bypass graft) 2023    Trochanteric bursitis of left hip 2023    Vertigo 2023    Right wrist tendonitis 2023    Left knee pain 2018    Symptomatic anemia 2018     Gait and ADL abnormality dt left subtrochanteric femoral neck fracture and left hemiarthroplasty-[facility] rehab admission February 10, 2018 2018    Acute cystitis without hematuria 2018    Closed left hip fracture, initial encounter ([facility]) 2018    DDD (degenerative disc disease), lumbar 2017    Essential hypertension 2017    Lumbar facet arthropathy 2017    Lumbosacral spondylosis without 
Physical Therapy Med Surg Initial Assessment  Facility/Department: [facility] ICU  Room: [place] [address]       NAME: Loren Noyola  : 1935 (89 y.o.)  MRN: [id number]  CODE STATUS: Full Code    Date of Service: 11/10/2024    Patient Diagnosis(es): GI bleed [K92.2]  Anticoagulated [Z79.01]  History of GI bleed [Z87.19]  Pain of upper abdomen [R10.10]  Symptomatic anemia [D64.9]  Chest pain, unspecified type [R07.9]   Chief Complaint   Patient presents with    Chest Pain     Chest pain     Patient Active Problem List    Diagnosis Date Noted    GI bleed 11/10/2024    History of GI bleed 11/10/2024    Pain of upper abdomen 11/10/2024    GIB (gastrointestinal bleeding) 2024    Mixed incontinence 10/25/2024    Urinary incontinence 10/24/2024    CAD (coronary artery disease)     Impaired mobility ADLs dt  thoracic and lumbar spinal stenosis with T12 compression fracture. 10/23/2024    Compression fracture of T12 vertebra with routine healing 10/23/2024    Left kidney mass 10/23/2024    Arthralgia of hip 10/22/2024    Greater trochanteric bursitis 10/22/2024    Gait instability 10/21/2024    Pacemaker 2024    Peripheral vascular disease (HCC) 2023    Right bundle branch block (RBBB) 2023    S/P CABG (coronary artery bypass graft) 2023    Trochanteric bursitis of left hip 2023    Vertigo 2023    Right wrist tendonitis 2023    Left knee pain 2018    Symptomatic anemia 2018     Gait and ADL abnormality dt left subtrochanteric femoral neck fracture and left hemiarthroplasty-[facility] rehab admission February 10, 2018 2018    Acute cystitis without hematuria 2018    Closed left hip fracture, initial encounter (HCC) 2018    DDD (degenerative disc disease), lumbar 2017    Essential hypertension 2017    Lumbar facet arthropathy 2017    Lumbosacral spondylosis without myelopathy 2017    Primary osteoarthritis of both knees 
Physical Therapy Rehab Treatment Note  Facility/Department: [address] MED SURG UNIT  Room: [place] [address]       NAME: Loren Noyola  : 1935 (89 y.o.)  MRN: [id number]  CODE STATUS: Full Code    Date of Service: 2024       Restrictions:          SUBJECTIVE:   Subjective: Pt up in chair, agreeable to treatment    Pain   Pt reports some back pain with standing; no numerical value given      OBJECTIVE:        Transfers  Sit to Stand: Minimal Assistance;Stand by assistance  Stand to Sit: Minimal Assistance;Stand by assistance  Comment: verbal instruction for scoot fwd, using arm rests to push, and lean forward for ease with transfer    Ambulation  Surface: Level tile  Device: Rolling Walker  Assistance: Minimal assistance  Quality of Gait: antalgic gait, flexed posture, step to gait pattern, unsteady  Distance: 10 feet    Stairs/Curb  Stairs?: No      PT Exercises  Exercise Treatment: seated ex x 15; standing marching x 10          ASSESSMENT/PROGRESS TOWARDS GOALS:   Body Structures, Functions, Activity Limitations Requiring Skilled Therapeutic Intervention: Decreased functional mobility ;Decreased strength;Decreased endurance;Decreased balance  Assessment: Pt with improving tolerance to mobility.  Pt improves techniques with gait and transfer after instruction.  Pt does tire easily and also reports some back pain with standing. Pt would benefit from additional skilled physical therapy for increase gait, transfers, strength and balance training for ease with return to previous level of function.  Therapy Prognosis: Good  Assessment  Discharge Recommendations: Continue to assess pending progress    Goals:  Long Term Goals  Long Term Goal 1: Pt will demonstrate bed mobility indep  Long Term Goal 2: Pt will demonstrate transfers mod indep with safest AD  Long Term Goal 3: Pt will demonstrate amb >/= 50ft mod indep with safest AD  Long Term Goal 4: Pt will demonstrate TUG </= 20 sec for decreased risk for 
Progress Note  Date:2024       Room:[facility] OR/NONE  Patient Name:Loren Noyola     YOB: 1935     Age:89 y.o.        Subjective    Subjective:  Symptoms:  No shortness of breath, malaise, cough, chest pain, weakness, headache, chest pressure, anorexia, diarrhea or anxiety.    Diet:  No nausea or vomiting.       Review of Systems   Respiratory:  Negative for cough and shortness of breath.    Cardiovascular:  Negative for chest pain.   Gastrointestinal:  Negative for anorexia, diarrhea, nausea and vomiting.   Neurological:  Negative for weakness.     Objective         Vitals Last 24 Hours:  TEMPERATURE:  Temp  Av.8 °F (36.6 °C)  Min: 97 °F (36.1 °C)  Max: 98.3 °F (36.8 °C)  RESPIRATIONS RANGE: Resp  Av.6  Min: 14  Max: 26  PULSE OXIMETRY RANGE: SpO2  Av %  Min: 59 %  Max: 100 %  PULSE RANGE: Pulse  Av.6  Min: 70  Max: 86  BLOOD PRESSURE RANGE: Systolic (24hrs), Av , Min:105 , Max:187   ; Diastolic (24hrs), Av, Min:40, Max:77    I/O (24Hr):    Intake/Output Summary (Last 24 hours) at 2024 1148  Last data filed at 2024 0838  Gross per 24 hour   Intake 700 ml   Output 0 ml   Net 700 ml     Objective:  General Appearance:  Comfortable, well-appearing and in no acute distress.    Vital signs: (most recent): Blood pressure (!) 187/77, pulse 77, temperature 98.3 °F (36.8 °C), temperature source Temporal, resp. rate 18, height 1.6 m (5' 3\"), weight 83.9 kg (185 lb), SpO2 97%.    HEENT: Normal HEENT exam.    Lungs:  There are decreased breath sounds.    Heart: S1 normal and S2 normal.    Abdomen: Abdomen is soft.  Bowel sounds are normal.   There is no epigastric area or suprapubic area tenderness.     Pulses: Distal pulses are intact.    Neurological: Patient is alert.    Pupils:  Pupils are equal, round, and reactive to light.    Skin:  Warm.      Labs/Imaging/Diagnostics    Labs:  CBC:  Recent Labs     24  2304 11/10/24  0604 11/10/24  1441 
REPORT given to Shannan on Rehab floor. Ranexa discontinued by Dr. Henning due to patient having panic attacks after taking. Family (Rupert) called and given room number (411).  Family had no questions or concerns.   
Shift Summary [id number]    Assessment per flowsheet. Medication administration per MAR.    0730 Handoff of care received from Guadalupe NYE RN.      Around 0945 PRBCs started.  TAMIKO Chris, at bedside.  Pt and DIL informed of transfer to [facility] later today.  Pt has been on and off bedpan this am with no obvious signs of bleeding.  No BM as of yet.      Around 1010 called into patient room.  IV with blood infusing infiltrated.  IV removed and warm compress applied.  Pt crying.  Stating both arms hurt and back hurt    ARound 1020 Dr. Tomlinson notifed of request for home pain medication be reordered.    1500 Hgb 6.9 after 2nd unit PRBC.  Dr. Tomlinson notified.    1543 Repeat H&H was drawn and resulted at 7.1/20.7.      1725 Pt to transfer to [facility].  Attempted to update daughter in law at patient request.  Report was called to Jourdan MORALES.  Pt remains A&Ox4.  No signs of obviously bleeding noted.  Labetalol given for increased BP.  Pt denies any pain, SOB, dizziness or lightheadedness.  Pt has had no BM as of yet this shift.   
changes in the visualized lower lumbar spine.         IMPRESSION AND SUGGESTION:  Shortness of breath, secondary to severe anemia  Acute blood loss anemia secondary to GI bleed  GI bleed likely upper GI  Chronic kidney disease    Continue PPI.  GI is following.  Watch for volume status and maintain euvolemic.  Transfuse PRBC as needed keep hemoglobin above 7.  SCD for DVT prophylaxis.  Continue present treatment plan.    NOTE: This report was transcribed using voice recognition software. Every effort was made to ensure accuracy; however, inadvertent computerized transcription errors may be present.      Comments:   Thank you for allowing us to participate in the care of this patient. Will continue to follow.Please call if questions or concerns arise.    Electronically signed by Pete Rangel MD, FCCP on 11/11/2024 at 2:51 PM

## 2024-11-13 NOTE — CARE COORDINATION
Case Management Initial Assessment        NAME:  Loren Noyola  ROOM: R251/R251-01  :  1935  DATE: 2024        Social Functional:  Social/Functional History  Lives With: Spouse  Type of Home: House  Home Layout: Two level;Able to Live on Main level with bedroom/bathroom;Performs ADL's on one level (only stays on the first floor with full bed and bath)  Home Access: Stairs to enter with rails  Entrance Stairs - Number of Steps: 2+3  Entrance Stairs - Rails: Both  Bathroom Shower/Tub: Walk-in shower;Curtain  Bathroom Toilet: Handicap height (w/ R grab bar)  Bathroom Equipment: Shower chair;Hand-held shower;Grab bars in shower  Bathroom Accessibility: Walker accessible (36\" wide doorway)  Home Equipment: Rollator;Walker - Rolling;Cane;Lift chair  ADL Assistance: Independent  Homemaking Assistance: Independent (pt states she needs help at home upon d/c)  Homemaking Responsibilities: Yes  Meal Prep Responsibility: Primary (typically microwaves meals; would like meals on wheels (referral to be placed))  Laundry Responsibility: Primary (portable small machines on first floor and larger machines in basement (children will take loads to the basement and pt only uses the smaller machines))  Cleaning Responsibility: No (pt will do light cleaning, children mostly clean)  Bill Paying/Finance Responsibility: Primary (writes checks and completes with children at times, but children have been managing while she has been in hospital and rehab)  Shopping Responsibility: Primary (pt typically drives nephew and he would go shopping, however pt and son interested in grocery delivery)  Dependent Care Responsibility: Primary (pt assisted spouse with showering)  Health Care Management: Primary (uses a weekly pill organizer)  Ambulation Assistance: Independent (uses rollator since hip fx 6 years ago)  Transfer Assistance:

## 2024-11-13 NOTE — CARE COORDINATION
(11/13/24 1419)  Arousal/Alertness: Appears intact (11/13/24 1419)  Following Commands: Follows one step commands consistently;Follows multistep commands with repitition (11/13/24 1419)  Attention Span: Attends with cues to redirect;Appears intact (11/13/24 1419)  Memory: Impaired (11/13/24 1419)  Safety Judgement: Decreased awareness of need for assistance;Decreased awareness of need for safety (11/13/24 1419)  Problem Solving: Assistance required to implement solutions;Assistance required to correct errors made;Good awareness of errors made;Assistance required to identify errors made (11/13/24 1419)  Insights: Decreased awareness of deficits (11/13/24 1419)  Initiation: Appears intact (11/13/24 1419)  Sequencing: Requires cues for some (11/13/24 1419)  Cognition Comment: Pt w/ difficulty attending to directions d/t increased anxiety and claustrophobia w/ the door closed in the bathroom. (11/13/24 0835)          Orientation  Overall Orientation Status: Within Functional Limits (11/13/24 1419)  Orientation Level: Oriented to person;Oriented to time;Oriented to situation;Oriented to place (11/13/24 1419)  SP:Memory: Exceptions to WFL (Mild STM and working memory deficits were noted with delayed and immediate recall and time orientation,.)        Problem Solving: Exceptions to WFL (Mild high level problem solving and sequencing deficits.)    RECREATIONAL THERAPY  Attendance to recreational therapy programs:    []  Pet Therapy  [] Music Therapy  [] Art Therapy    [] Recreation Therapy Group [] Support Group           Patient social interaction (mood, participation): good but anxious    Patient strengths: good family support    Patients goal: return home    Problems/Barriers: fatigues very quickly, difficulty controlling rollator at this time and is using the ww instead currently, anxious, min assist for problem solving        1. Safety:          - Intervention / Plan:    [x]  falls protocol     [x]  PT/OT    [x]  SP         - Results:         2. Potential DME needs:         - Intervention / Plan:  [x]  PT/OT     [x]  Assess equipment needs/access       - Results:         3.  Weakness:          - Intervention / Plan:  [x]  PT/OT      []  Other:         - Results:         4.  Discharge planning needs:          - Intervention / Plan:  [x]  Weekly team conference      [x]  family training        - Results:         5.            - Intervention / Plan:          - Results:         6.            - Intervention / Plan:         - Results:         7.            - Intervention / Plan:         - Results:           Discharge Plan   Estimated Length of Stay: TBD    Tentative Discharge date: 11/22/24      Anticipated Discharge Destination:  Home      Team recommendations:    1. Follow up Therapy :    PT  OT  SLP  RN  Social Work  HH Aide    2. Home Health    Other:     Equipment needed at Discharge: Other: TBD      Team Members Present at Conference:    Physician: Dr. Deb Haro  : RABIA Hwang, LSW  RN: Payal Daniel RN  Physical Therapist: Shey Abernathy, PT  Occupational Therapist: Declan Ren OTR  Speech Therapist: Bony Aquino, SLP  Nurse Manager: Kira Lyles RN      Electronically signed by RABIA Hwang, LSW on 11/14/2024 at 9:09 AM

## 2024-11-13 NOTE — PROGRESS NOTES
Facility/Department: [facility]  Rehabilitation Initial Assessment: Physical Therapy  Room: R251/R251-01    NAME: Loren Noyola  : 1935  MRN: [id number]    Date of Service: 2024    Rehab Diagnosis(es): Impaired mobility and ADL's due to debility secondary to GI bleed  Patient Active Problem List    Diagnosis Date Noted    Chest pain 2024    Gastric ulcer 2024    Shortness of breath 2024    GI bleed 11/10/2024    History of GI bleed 11/10/2024    Pain of upper abdomen 11/10/2024    GIB (gastrointestinal bleeding) 2024    Gastro-esophageal reflux disease without esophagitis 2024    Hyperlipidemia, unspecified 2024    Spinal stenosis of lumbar region with neurogenic claudication 2024    Mixed incontinence 10/25/2024    Urinary incontinence 10/24/2024    CAD (coronary artery disease)     Impaired mobility ADLs dt  thoracic and lumbar spinal stenosis with T12 compression fracture. 10/23/2024    Compression fracture of T12 vertebra with routine healing 10/23/2024    Left kidney mass 10/23/2024    Wedge compression fracture of t11-T12 vertebra, subsequent encounter for fracture with routine healing 10/23/2024    Arthralgia of hip 10/22/2024    Greater trochanteric bursitis 10/22/2024    Gait instability 10/21/2024    Pacemaker 2024    Peripheral vascular disease (HCC) 2023    Right bundle branch block (RBBB) 2023    S/P CABG (coronary artery bypass graft) 2023    Trochanteric bursitis of left hip 2023    Vertigo 2023    Right wrist tendonitis 2023    Left knee pain 2018    Symptomatic anemia 2018     Gait and ADL abnormality dt left subtrochanteric femoral neck fracture and left hemiarthroplasty-[facility] rehab admission February 10, 2018 2018    Acute cystitis without hematuria 2018    Closed left hip fracture, initial encounter ([facility]) 2018    DDD (degenerative disc disease), lumbar 2017

## 2024-11-13 NOTE — PLAN OF CARE
Problem: Safety - Adult  Goal: Free from fall injury  11/12/2024 2205 by Holly Louis RN  Outcome: Progressing  11/12/2024 1845 by Melinda Proctor RN  Outcome: Progressing     Problem: Discharge Planning  Goal: Discharge to home or other facility with appropriate resources  11/12/2024 2205 by Holly Louis RN  Outcome: Progressing  11/12/2024 1845 by Melinda Proctor RN  Outcome: Progressing     Problem: ABCDS Injury Assessment  Goal: Absence of physical injury  11/12/2024 2205 by Holly Louis RN  Outcome: Progressing  11/12/2024 1845 by Melinda Proctor RN  Outcome: Progressing

## 2024-11-13 NOTE — ACP (ADVANCE CARE PLANNING)
Advance care planning, goals of care discussion.  Patient gave me permission to proceed with conversation  Conversation lasted for about 25 minutes.    .  We discussed her condition, status and treatment plan  .  I explained in layman's terms the difference between full code, CCA and CCP    I explained in basic terms the process of CPR including chest compression, shocks, intubation life support    Patient was able to understand conversation very well.  She was able to understand risk, benefit, options, alternatives.  She was able to answer and ask questions    I answered all of her questions related to that.    Patient stated in basic terms that she does not want any resuscitative effort.  She does not want any chest tube or shocks.  Patient stated that when her time comes just let her go    Her wishes are consistent and aligned with CCA status  .  Change CODE STATUS to DNR CCA

## 2024-11-13 NOTE — PROGRESS NOTES
OCCUPATIONAL THERAPY  INPATIENT REHAB TREATMENT NOTE  [facility]      NAME: Loren Noyola  : 1935 (89 y.o.)  MRN: [id number]  CODE STATUS: DNR-CCA  Room: R251/R251-01    Date of Service: 2024    Referring Physician: Deb Haro DO  Rehab Diagnosis: Impaired mobility and ADL's due to debility secondary to GI bleed    Hospital course:   Comments: 89 y.o. female patient with recent hospitalization and rehab stay s/p fall with discovered T12fx. Patient was d/c'd home from rehab on . Patient presented back to ER  with CP and SOB along with reports of having dark stools. Labs notable for hemoglobin of 4.7. Patient admitted to ICU with consults from CC, pulmonology and GI. EGD was performed and no active bleeding found. Did, however, reveal x2 non-bleeding gastric ulcers.      Restrictions  Restrictions/Precautions  Restrictions/Precautions: Fall Risk                 Patient's date of birth confirmed: Yes    SAFETY:  Safety Devices  Safety Devices in place: Yes  Type of devices: All fall risk precautions in place    SUBJECTIVE:       Pain at start of treatment: Yes: 5/10    Pain at end of treatment: Yes: 5/10    Location: L hip  Nursing notified: Not Applicable  Intervention: RN provided pain medication    COGNITION:  Orientation  Overall Orientation Status: Within Functional Limits  Orientation Level: Oriented to person;Oriented to time;Oriented to situation;Oriented to place  Cognition  Overall Cognitive Status: Exceptions  Arousal/Alertness: Appears intact  Following Commands: Follows one step commands consistently;Follows multistep commands with repitition  Attention Span: Attends with cues to redirect;Appears intact  Memory: Impaired  Safety Judgement: Decreased awareness of need for assistance;Decreased awareness of need for safety  Problem Solving: Assistance required to implement solutions;Assistance required to correct errors made;Good awareness of errors made;Assistance

## 2024-11-13 NOTE — PROGRESS NOTES
Physical Therapy Rehab Treatment Note  Facility/Department: [facility]  [facility]       NAME: Loren Noyola  : 1935 (89 y.o.)  MRN: [id number]  CODE STATUS: DNR-CCA    Date of Service: 2024       Restrictions:  Restrictions/Precautions: Fall Risk       SUBJECTIVE:   Subjective: \"I am back\"    Pain  Pain: 7/10 aching pain in right hip noted at beginning and end of session- pt reports pain medication given      OBJECTIVE:     Transfers  Surface: Wheelchair  Additional Factors: Verbal cues;Hand placement cues;Increased time to complete  Device: Walker  Sit to Stand  Assistance Level: Stand by assist  Skilled Clinical Factors: vc's to push up from WC vs pulling up on WW  Stand to Sit  Assistance Level: Stand by assist  Skilled Clinical Factors: vc's for hand placement and safety, good technique  Stand Pivot  Assistance Level: Minimal assistance  Skilled Clinical Factors: wc to wc, vc's for safety and sequencing, steadying assist throughout    Ambulation  Surface: Level surface  Device: Rolling walker  Distance: 35' x 2, 15', 10'  Activity: Within Unit  Additional Factors: Verbal cues;Hand placement cues;Increased time to complete  Assistance Level: Minimal assistance  Gait Deviations: Slow ximena;Narrow base of support;Unsteady gait;Path deviations  Skilled Clinical Factors: vc's for technique and sequencing, able to tolerate distance with minimal reports of pain this date. pt motivated to increase distance.    PT Exercises  PROM Exercises: seated HS/gastroc stretch x3 30 sec hold BLE  A/AROM Exercises: seated: AP, LAQ, Marches, hip abd/add with ball x15ea raul  Resistive Exercises: seated YTB HS curls/hip abd x10 BLE  Postural Correction Exercises: seated sunrise/sundowns with physioball with focus on posture and pain relief with stretching into ROMs     Activity Tolerance  Activity Tolerance: Patient tolerated treatment well    ASSESSMENT/PROGRESS TOWARDS GOALS:   Assessment  Assessment:

## 2024-11-13 NOTE — CONSULTS
Number of children: 2    Years of education: Not on file    Highest education level: Not on file   Occupational History    Not on file   Tobacco Use    Smoking status: Former     Types: Cigarettes    Smokeless tobacco: Never   Vaping Use    Vaping status: Never Used   Substance and Sexual Activity    Alcohol use: Not Currently    Drug use: Not Currently    Sexual activity: Not on file   Other Topics Concern    Not on file   Social History Narrative    Lives With: Spouse-who is ill-she is his caregiver-2 sons live locally and help her with his care.    Type of Home: [address] in [facility]         Home Layout: One level    Home Access: Stairs to enter with rails- Number of Steps: 4    Bathroom Shower/Tub: Walk-in shower,  Equipment: Built-in shower seat, Hand-held shower, Grab bars in shower    Home Equipment: Cane, Rollator    ADL Assistance: Independent    Homemaking Assistance: Independent    Ambulation Assistance: Independent (Rollator)    Transfer Assistance: Independent    Active : Yes    Additional Comments: Motorized cart in store, nephew comes and assists. Spouse requires assist for showering, pt completes IADLs         Social Determinants of Health     Financial Resource Strain: Not on file   Food Insecurity: No Food Insecurity (11/12/2024)    Hunger Vital Sign     Worried About Running Out of Food in the Last Year: Never true     Ran Out of Food in the Last Year: Never true   Transportation Needs: No Transportation Needs (11/12/2024)    PRAPARE - Transportation     Lack of Transportation (Medical): No     Lack of Transportation (Non-Medical): No   Physical Activity: Insufficiently Active (10/30/2023)    Received from [facility], [facility]    Exercise Vital Sign     Days of Exercise per Week: 7 days     Minutes of Exercise per Session: 10 min   Stress: Not on file   Social Connections: Moderately Integrated (11/13/2024)    Social Connections ([facility] HRSN)     If for any reason you  of the visualized skull or soft tissues.     No acute intracranial abnormality.     XR LUMBAR SPINE (2-3 VIEWS)    Result Date: 10/17/2024  EXAMINATION: XRAY VIEWS OF THE LUMBAR SPINE 10/17/2024 1:30 pm COMPARISON: None. HISTORY: ORDERING SYSTEM PROVIDED HISTORY: pain TECHNOLOGIST PROVIDED HISTORY: Reason for exam:->pain What reading provider will be dictating this exam?->CRC FINDINGS: The bones are demineralized.  No acute compression deformities.  There are moderate degenerative changes in the mid and lower lumbar spine as follows: Moderate disc space narrowing at L3-L4, grade 1 anterolisthesis and mild disc space narrowing at L4-L5, facet arthrosis in the mid and lower lumbar spine. The pedicles are intact with no paraspinal soft tissue mass.     1. Moderate degenerative changes in the mid and lower lumbar spine. 2. Grade 1 anterolisthesis of L3 on L4.     XR HIP LEFT (2-3 VIEWS)    Result Date: 10/17/2024  EXAMINATION: TWO XRAY VIEWS OF THE LEFT HIP 10/17/2024 1:30 pm COMPARISON: None. HISTORY: ORDERING SYSTEM PROVIDED HISTORY: pain TECHNOLOGIST PROVIDED HISTORY: Reason for exam:->pain What reading provider will be dictating this exam?->CRC FINDINGS: Bones are demineralized.  No acute bony abnormalities.  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  Soft tissues appear unremarkable.     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     XR HIP RIGHT (2-3 VIEWS)    Result Date: 10/17/2024  EXAMINATION: TWO XRAY VIEWS OF THE RIGHT HIP 10/17/2024 1:30 pm COMPARISON: None. HISTORY: ORDERING SYSTEM PROVIDED HISTORY: pain TECHNOLOGIST PROVIDED HISTORY: Reason for exam:->pain What reading provider will be dictating this exam?->CRC FINDINGS: Bones are demineralized.  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 No acute bony abnormalities.  Degenerative changes in the visualized lower lumbar spine.     1. 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 No acute bony abnormalities. 2. Degenerative changes in

## 2024-11-13 NOTE — CONSULTS
Hospital Medicine  History and Physical    Patient:  Loren Noyola  MRN: [id number]    CHIEF COMPLAINT:  No chief complaint on file.      History Obtained From:  Patient, EMR  Primary Care Physician: Evette Johnson MD    HISTORY OF PRESENT ILLNESS:   The patient is a 89 y.o. female with PMH of atrial fibrillation, coronary artery disease for which she was on Eliquis.  She was admitted to acute care with acute GI bleed.  Please refer to acute record for details.  She had an EGD which showed nonbleeding gastric ulcer.  Biopsy report was taken.  Pathology report is pending.  Patient was subsequently admitted to rehab unit for inpatient physical and Occupational Therapy.  No abdominal pain.  No nausea or vomiting.  No hematemesis.  Patient continues to have melena.    Past Medical History:      Diagnosis Date    Bleeding ulcer     CAD (coronary artery disease)     Colitis     Hip fx, left, closed, initial encounter ([facility]) 02/10/2018    Hyperlipidemia     Hypertension     Osteoarthritis     Osteoporosis     Spinal stenosis        Past Surgical History:      Procedure Laterality Date    APPENDECTOMY      COLECTOMY  1/3/2011    sigmoid colectomy with colostomy    COLECTOMY  7/26/2011    partial colectomy with colostomy closure    CORONARY ARTERY BYPASS GRAFT      HYSTERECTOMY (CERVIX STATUS UNKNOWN)      HYSTERECTOMY, TOTAL ABDOMINAL (CERVIX REMOVED)      OK HEMIARTHROPLASTY HIP PARTIAL Left 2/8/2018    HIP HEMIARTHROPLASTY- performed by Bryan Malcolm MD at [facility] OR    SIGMOIDOSCOPY      UPPER GASTROINTESTINAL ENDOSCOPY N/A 11/11/2024    ESOPHAGOGASTRODUODENOSCOPY WITH BX'S performed by Diaz Solares MD at [facility] GASTRO [facility]ENTER    VARICOSE VEIN SURGERY         Medications Prior to Admission:    Prior to Admission medications    Medication Sig Start Date End Date Taking? Authorizing Provider   tiZANidine (ZANAFLEX) 4 MG capsule Take 1 capsule by mouth 3 times daily 11/5/24 2/3/25 Yes Provider, Historical,

## 2024-11-13 NOTE — CONSULTS
Spiritual Health History and Assessment/Progress Note  [facility]    Initial Encounter,  ,  ,      Name: Loren Noyola MRN: [id number]    Age: 89 y.o.     Sex: female   Language: English   Uatsdin: Jainism   Impaired mobility and activities of daily living     Date: 11/13/2024            Total Time Calculated: 15 min              Spiritual Assessment began in [facility]        Referral/Consult From: Nurse   Encounter Overview/Reason: Initial Encounter  Service Provided For: Patient    Patient sitting in wheel chair awake and alert. Patient discussed illness ans impact for her and her  who is 89. Patient takes care of  and is concerned about getting strong enough to continue this. Patient engaged in conversation about afterlife. Patient expressed that her and her  have been getting ready for the inevitable. Patient holds on the diana for comfort about afterlife.     Diana, Belief, Meaning:   Patient has beliefs or practices that help with coping during difficult times  Family/Friends have beliefs or practices that help with coping during difficult times      Importance and Influence:  Patient has spiritual/personal beliefs that influence decisions regarding their health  Family/Friends have spiritual/personal beliefs that influence decisions regarding the patient's health    Community:  Patient feels well-supported. Support system includes: Spouse/Partner, Children, and Extended family  Family/Friends No family/friends present    Assessment and Plan of Care:     Patient Interventions include: Facilitated expression of thoughts and feelings, Explored spiritual coping/struggle/distress, Affirmed coping skills/support systems, and Facilitated life review and/ or legacy  Family/Friends Interventions include: No family/friends present    Patient Plan of Care: Spiritual Care available upon further referral  Family/Friends Plan of Care: No family/friends present    Electronically  signed by Chaplain Luis Felipe Intern on 11/13/2024 at 4:02 PM

## 2024-11-13 NOTE — PROGRESS NOTES
Patient with x1 dark/bright red formed stool in toilet. Dr. Obrien notified.    1809- lab notified of need for H+H    Electronically signed by Corrie Ren RN on 11/13/2024 at 6:10 PM

## 2024-11-13 NOTE — H&P
HISTORY & PHYSICAL       DATE OF ADMISSION:  11/12/2024    DATE OF SERVICE:  11/13/24    Subjective:    Loren Noyola, 89 y.o. female presents today with:     CHIEF COMPLAINT:    Patient was previously admitted through the ER at [facility] after falling at home and being diagnosed with a T12 compression fracture.  It was treated nonoperatively.  She progressed well in therapy and went home.        This time she came in with black stool and anemia and chest pain.  She was admitted through the ER on 11/9/2024.  EGD on 11/11 revealed \"nonbleeding\" ulcers in her duodenum.  Her H&H is since stabilized.     She admits to overusing NSAIDs for pain for her T12 fracture likely contributing to her bleeding ulcer.  We discussed the importance of avoiding the NSAIDs and getting out of pain medication it will be effective but not cause an ulcer.     Back Pain  This is a recurrent problem. The problem has been waxing and waning since onset. The pain is present in the gluteal, lumbar spine, sacro-iliac and thoracic spine. The pain radiates to the right foot. The pain is at a severity of 8/10. The pain is severe. The pain is Worse during the day. The symptoms are aggravated by bending. Stiffness is present In the morning. Associated symptoms include leg pain, paresthesias and weakness. Pertinent negatives include no abdominal pain, bladder incontinence, bowel incontinence, chest pain, dysuria, fever, headaches, numbness, paresis, pelvic pain, perianal numbness or tingling. Risk factors include sedentary lifestyle, poor posture, obesity, menopause and lack of exercise. She has tried analgesics, bed rest, heat, home exercises, ice and muscle relaxant for the symptoms. The treatment provided mild relief.   Fatigue  Associated symptoms include arthralgias, fatigue, myalgias and weakness. Pertinent negatives include no abdominal pain, chest pain, chills, coughing, fever, headaches, nausea, neck pain, numbness, rash or  left side.       Brachioradialis reflexes are 1+ on the right side and 1+ on the left side.       Patellar reflexes are 1+ on the right side and 1+ on the left side.       Achilles reflexes are 0 on the right side and 0 on the left side.  Psychiatric:         Attention and Perception: She is attentive.         Mood and Affect: Mood is not anxious or depressed. Affect is not labile, blunt, angry or inappropriate.         Speech: She is communicative. Speech is not rapid and pressured, delayed, slurred or tangential.         Behavior: Behavior normal. Behavior is not agitated, slowed, aggressive, withdrawn, hyperactive or combative.         Thought Content: Thought content normal. Thought content is not paranoid or delusional. Thought content does not include homicidal or suicidal ideation. Thought content does not include homicidal or suicidal plan.         Cognition and Memory: Memory is not impaired. She does not exhibit impaired recent memory or impaired remote memory.         Judgment: Judgment normal. Judgment is not impulsive or inappropriate.       Ortho Exam  Neurologic Exam     Mental Status   Oriented to person, place, and time.   Speech: not slurred   Level of consciousness: alert  Knowledge: good.   Able to name object. Able to read. Able to repeat.     Cranial Nerves     CN III, IV, VI   Pupils are equal, round, and reactive to light.    Motor Exam   Muscle bulk: decreased  Overall muscle tone: normal    Sensory Exam   Right leg light touch: decreased from ankle  Left leg light touch: decreased from ankle    Gait, Coordination, and Reflexes     Gait  Gait: shuffling    Coordination   Romberg: positive  Finger to nose coordination: abnormal  Heel to shin coordination: abnormal    Tremor   Resting tremor: absent  Intention tremor: absent    Reflexes   Right brachioradialis: 1+  Left brachioradialis: 1+  Right biceps: 1+  Left biceps: 1+  Right triceps: 0  Left triceps: 0  Right patellar: 1+  Left patellar:

## 2024-11-13 NOTE — PROGRESS NOTES
[facility]Department[facility]  Rehabilitation Initial Assessment: Occupational Therapy  Room: R251/R251-01    NAME: Loren Noyola  : 1935  MRN: [id number]    Date of Service: 2024    Rehab Diagnosis(es): Impaired mobility and ADL's due to debility secondary to GI bleed  Patient Active Problem List    Diagnosis Date Noted    Chest pain 2024    Gastric ulcer 2024    Shortness of breath 2024    GI bleed 11/10/2024    History of GI bleed 11/10/2024    Pain of upper abdomen 11/10/2024    GIB (gastrointestinal bleeding) 2024    Gastro-esophageal reflux disease without esophagitis 2024    Hyperlipidemia, unspecified 2024    Spinal stenosis of lumbar region with neurogenic claudication 2024    Mixed incontinence 10/25/2024    Urinary incontinence 10/24/2024    CAD (coronary artery disease)     Impaired mobility ADLs dt  thoracic and lumbar spinal stenosis with T12 compression fracture. 10/23/2024    Compression fracture of T12 vertebra with routine healing 10/23/2024    Left kidney mass 10/23/2024    Wedge compression fracture of t11-T12 vertebra, subsequent encounter for fracture with routine healing 10/23/2024    Arthralgia of hip 10/22/2024    Greater trochanteric bursitis 10/22/2024    Gait instability 10/21/2024    Pacemaker 2024    Peripheral vascular disease (HCC) 2023    Right bundle branch block (RBBB) 2023    S/P CABG (coronary artery bypass graft) 2023    Trochanteric bursitis of left hip 2023    Vertigo 2023    Right wrist tendonitis 2023    Left knee pain 2018    Symptomatic anemia 2018     Gait and ADL abnormality dt left subtrochanteric femoral neck fracture and left hemiarthroplasty-[facility] rehab admission February 10, 2018 2018    Acute cystitis without hematuria 2018    Closed left hip fracture, initial encounter ([facility]) 2018    DDD (degenerative disc disease), lumbar  2 weeks, pt to demontsrate progress in the following areas listed below to achieve specific LTG's as stated in the initial evaluation.  Long Term Goal 1: Increase standing balance to increase safety independence with ADL's  Long Term Goal 2: Increase BUE strength to increase safety independence with ADL's  Long Term Goal 3: Increase activity tolerance to increase safety and indepence with ADL's    - Patient will complete self care as followed using the recommended adaptive equipment and/or adaptive techniques as instructed:  Feeding: Independent  Grooming: Independent  Bathing: Mod I  UE Dressing: Independent  LE Dressing: Mod I  Footwear: Mod I  Toileting: Mod I  Toilet Transfer: Mod I  Shower/Tub Transfer: Mod I  - Patient will improve static and dynamic standing balance to complete pants management at Mod I level  - Patient will improve functional endurance to tolerate/complete 60 minutes of ADLs.  - Patient will improve B UE strength and endurance to 4/5 in order to participate in self-care activities as projected.  - Patient will improve B hand fine motor coordination to good in order to manage clothing fasteners/self-care containers in a timely manner  - Patient will perform kitchen mobility at device level without episodes of LOB and good safety awareness   - Patient will perform basic room mobility at Mod I level.  - Patient will access appropriate D/C site with as few architectural barriers as possible.  - Patient and/or caregiver will demonstrate understanding of recommended HEP for BUE strengthening.   - Pt's current cognitive status is:  Comprehension: Independent  Expression: Independent  Social Interaction: Independent  Problem Solving: Independent  Memory: Independent  -Patient will participate in [facility] cognitive assessment     Therapy Time:   Individual   Time In 0830   Time Out 0930   Minutes 60            Eval: 60 minutes     Electronically signed by:    Ellen Segundo OT,   11/13/2024, 11:15 AM

## 2024-11-14 ENCOUNTER — APPOINTMENT (OUTPATIENT)
Dept: INTERVENTIONAL RADIOLOGY/VASCULAR | Age: 89
DRG: 552 | End: 2024-11-14
Attending: PHYSICAL MEDICINE & REHABILITATION
Payer: MEDICARE

## 2024-11-14 PROBLEM — K92.1 MELENA: Status: ACTIVE | Noted: 2024-11-14

## 2024-11-14 LAB
BASOPHILS # BLD: 0 K/UL (ref 0–0.2)
BASOPHILS NFR BLD: 0.2 %
EOSINOPHIL # BLD: 0.3 K/UL (ref 0–0.7)
EOSINOPHIL NFR BLD: 2.3 %
ERYTHROCYTE [DISTWIDTH] IN BLOOD BY AUTOMATED COUNT: 17.1 % (ref 11.5–14.5)
HCT VFR BLD AUTO: 25.6 % (ref 37–47)
HGB BLD-MCNC: 8.2 G/DL (ref 12–16)
LYMPHOCYTES # BLD: 1 K/UL (ref 1–4.8)
LYMPHOCYTES NFR BLD: 8.5 %
MCH RBC QN AUTO: 30 PG (ref 27–31.3)
MCHC RBC AUTO-ENTMCNC: 32 % (ref 33–37)
MCV RBC AUTO: 93.8 FL (ref 79.4–94.8)
MONOCYTES # BLD: 1.4 K/UL (ref 0.2–0.8)
MONOCYTES NFR BLD: 11.9 %
NEUTROPHILS # BLD: 9 K/UL (ref 1.4–6.5)
NEUTS SEG NFR BLD: 76.3 %
PLATELET # BLD AUTO: 212 K/UL (ref 130–400)
RBC # BLD AUTO: 2.73 M/UL (ref 4.2–5.4)
WBC # BLD AUTO: 11.8 K/UL (ref 4.8–10.8)

## 2024-11-14 PROCEDURE — 6360000002 HC RX W HCPCS: Performed by: INTERNAL MEDICINE

## 2024-11-14 PROCEDURE — 99232 SBSQ HOSP IP/OBS MODERATE 35: CPT | Performed by: NURSE PRACTITIONER

## 2024-11-14 PROCEDURE — 99233 SBSQ HOSP IP/OBS HIGH 50: CPT | Performed by: PHYSICAL MEDICINE & REHABILITATION

## 2024-11-14 PROCEDURE — 97535 SELF CARE MNGMENT TRAINING: CPT

## 2024-11-14 PROCEDURE — 05H933Z INSERTION OF INFUSION DEVICE INTO RIGHT BRACHIAL VEIN, PERCUTANEOUS APPROACH: ICD-10-PCS | Performed by: RADIOLOGY

## 2024-11-14 PROCEDURE — 76937 US GUIDE VASCULAR ACCESS: CPT

## 2024-11-14 PROCEDURE — 36410 VNPNXR 3YR/> PHY/QHP DX/THER: CPT

## 2024-11-14 PROCEDURE — 6370000000 HC RX 637 (ALT 250 FOR IP): Performed by: PHYSICAL MEDICINE & REHABILITATION

## 2024-11-14 PROCEDURE — 85025 COMPLETE CBC W/AUTO DIFF WBC: CPT

## 2024-11-14 PROCEDURE — 97116 GAIT TRAINING THERAPY: CPT

## 2024-11-14 PROCEDURE — 6370000000 HC RX 637 (ALT 250 FOR IP): Performed by: INTERNAL MEDICINE

## 2024-11-14 PROCEDURE — 2580000003 HC RX 258: Performed by: INTERNAL MEDICINE

## 2024-11-14 PROCEDURE — 99233 SBSQ HOSP IP/OBS HIGH 50: CPT | Performed by: INTERNAL MEDICINE

## 2024-11-14 PROCEDURE — C1751 CATH, INF, PER/CENT/MIDLINE: HCPCS

## 2024-11-14 PROCEDURE — 36410 VNPNXR 3YR/> PHY/QHP DX/THER: CPT | Performed by: RADIOLOGY

## 2024-11-14 PROCEDURE — 1180000000 HC REHAB R&B

## 2024-11-14 PROCEDURE — 97110 THERAPEUTIC EXERCISES: CPT

## 2024-11-14 PROCEDURE — 93005 ELECTROCARDIOGRAM TRACING: CPT | Performed by: INTERNAL MEDICINE

## 2024-11-14 PROCEDURE — 36415 COLL VENOUS BLD VENIPUNCTURE: CPT

## 2024-11-14 PROCEDURE — 2500000003 HC RX 250 WO HCPCS: Performed by: INTERNAL MEDICINE

## 2024-11-14 RX ORDER — LIDOCAINE HYDROCHLORIDE 10 MG/ML
5 INJECTION, SOLUTION INFILTRATION; PERINEURAL ONCE
Status: COMPLETED | OUTPATIENT
Start: 2024-11-14 | End: 2024-11-14

## 2024-11-14 RX ORDER — TRAMADOL HYDROCHLORIDE 50 MG/1
50 TABLET ORAL EVERY 6 HOURS PRN
Status: DISCONTINUED | OUTPATIENT
Start: 2024-11-14 | End: 2024-11-22 | Stop reason: HOSPADM

## 2024-11-14 RX ORDER — SODIUM CHLORIDE 0.9 % (FLUSH) 0.9 %
5-40 SYRINGE (ML) INJECTION PRN
Status: DISCONTINUED | OUTPATIENT
Start: 2024-11-14 | End: 2024-11-22 | Stop reason: HOSPADM

## 2024-11-14 RX ORDER — SODIUM CHLORIDE 9 MG/ML
INJECTION, SOLUTION INTRAVENOUS PRN
Status: DISCONTINUED | OUTPATIENT
Start: 2024-11-14 | End: 2024-11-22 | Stop reason: HOSPADM

## 2024-11-14 RX ORDER — SODIUM CHLORIDE 0.9 % (FLUSH) 0.9 %
5-40 SYRINGE (ML) INJECTION EVERY 12 HOURS SCHEDULED
Status: DISCONTINUED | OUTPATIENT
Start: 2024-11-14 | End: 2024-11-22 | Stop reason: HOSPADM

## 2024-11-14 RX ADMIN — SODIUM CHLORIDE, PRESERVATIVE FREE 40 MG: 5 INJECTION INTRAVENOUS at 12:37

## 2024-11-14 RX ADMIN — CALCIUM 500 MG: 500 TABLET ORAL at 09:19

## 2024-11-14 RX ADMIN — Medication 100 MG: at 09:18

## 2024-11-14 RX ADMIN — Medication 10 ML: at 09:22

## 2024-11-14 RX ADMIN — Medication 10 ML: at 22:16

## 2024-11-14 RX ADMIN — LIDOCAINE HYDROCHLORIDE 5 ML: 10 SOLUTION INTRAVENOUS at 13:56

## 2024-11-14 RX ADMIN — ACETAMINOPHEN 325MG 650 MG: 325 TABLET ORAL at 17:39

## 2024-11-14 RX ADMIN — SODIUM CHLORIDE, PRESERVATIVE FREE 40 MG: 5 INJECTION INTRAVENOUS at 00:14

## 2024-11-14 RX ADMIN — METOPROLOL TARTRATE 50 MG: 50 TABLET, FILM COATED ORAL at 20:09

## 2024-11-14 RX ADMIN — AMIODARONE HYDROCHLORIDE 100 MG: 200 TABLET ORAL at 09:18

## 2024-11-14 RX ADMIN — ACETAMINOPHEN 325MG 650 MG: 325 TABLET ORAL at 22:15

## 2024-11-14 RX ADMIN — Medication 2000 UNITS: at 17:40

## 2024-11-14 RX ADMIN — Medication 10 ML: at 22:31

## 2024-11-14 RX ADMIN — SODIUM CHLORIDE, PRESERVATIVE FREE 40 MG: 5 INJECTION INTRAVENOUS at 23:33

## 2024-11-14 RX ADMIN — ACETAMINOPHEN 325MG 650 MG: 325 TABLET ORAL at 08:00

## 2024-11-14 RX ADMIN — ACETAMINOPHEN 325MG 650 MG: 325 TABLET ORAL at 13:40

## 2024-11-14 RX ADMIN — SODIUM CHLORIDE 125 MG: 9 INJECTION, SOLUTION INTRAVENOUS at 22:21

## 2024-11-14 RX ADMIN — METOPROLOL TARTRATE 50 MG: 50 TABLET, FILM COATED ORAL at 09:18

## 2024-11-14 ASSESSMENT — PAIN DESCRIPTION - LOCATION
LOCATION: ARM;BACK
LOCATION: HIP
LOCATION: HIP
LOCATION: NECK

## 2024-11-14 ASSESSMENT — PAIN - FUNCTIONAL ASSESSMENT
PAIN_FUNCTIONAL_ASSESSMENT: ACTIVITIES ARE NOT PREVENTED

## 2024-11-14 ASSESSMENT — PAIN DESCRIPTION - DESCRIPTORS
DESCRIPTORS: ACHING
DESCRIPTORS: BURNING;ACHING
DESCRIPTORS: SHARP

## 2024-11-14 ASSESSMENT — PAIN DESCRIPTION - ORIENTATION
ORIENTATION: RIGHT

## 2024-11-14 ASSESSMENT — PAIN SCALES - GENERAL
PAINLEVEL_OUTOF10: 7
PAINLEVEL_OUTOF10: 7
PAINLEVEL_OUTOF10: 6
PAINLEVEL_OUTOF10: 7

## 2024-11-14 ASSESSMENT — PAIN DESCRIPTION - PAIN TYPE: TYPE: CHRONIC PAIN

## 2024-11-14 NOTE — PLAN OF CARE
Problem: Safety - Adult  Goal: Free from fall injury  Outcome: Progressing     Problem: Discharge Planning  Goal: Discharge to home or other facility with appropriate resources  Outcome: Progressing     Problem: ABCDS Injury Assessment  Goal: Absence of physical injury  Outcome: Progressing     Problem: SLP Adult - Impaired Communication  Goal: By Discharge: Demonstrates communication skills at highest level of function for planned discharge setting.  See evaluation for individualized goals.  11/13/2024 1103 by Mickie Williamson, SLP  Outcome: Progressing     Problem: Skin/Tissue Integrity  Goal: Absence of new skin breakdown  Description: 1.  Monitor for areas of redness and/or skin breakdown  2.  Assess vascular access sites hourly  3.  Every 4-6 hours minimum:  Change oxygen saturation probe site  4.  Every 4-6 hours:  If on nasal continuous positive airway pressure, respiratory therapy assess nares and determine need for appliance change or resting period.  Outcome: Progressing

## 2024-11-14 NOTE — PROGRESS NOTES
Pain Prescriptions Periodic Controlled Substance Monitoring   11/12/2024  12:43 PM Prescription exceeds daily limit for a specific reason. See comments or note.;Severe pain not adequately treated with lower dose.;Not required given exclusionary diagnoses... Possible medication side effects, risk of tolerance/dependence & alternative treatments discussed.;No signs of potential drug abuse or diversion identified.;Assessed functional status (ability to engage in work or other purposeful activities, the pain intensity and its interference with activities of daily living, quality of family life and social activities, and the physical activity);Obtaining appropriate analgesic effect of treatment.       Skin healing superficial wound noted to medial 1st mpj of right foot. Appears healthy and granular, no signs of infection  and breakdown risk:  continue pressure relief program.  Daily skin exams and reports from nursing. FU with podiatry  Fatigue due to nutritional and hydration deficiency: Add and titrate vitamin B12 vitamin D and CoQ10 continue to monitor I&O’s, calorie counts prn, dietary consult prn.  Add healthy snack at night.  Acute episodic insomnia with situational adjustment disorder:  prn Ambien, monitor for day time sedation.  Falls risk elevated:  patient to use call light to get nursing assistance to get up, bed and chair alarm.  Elevated DVT risk: progressive activities in PT, continue prophylaxis EMILY hose, elevation and meds-see MAR Eliquis.   Complex discharge planning:  Complete medication reconciliation, patient and family education, and medication simplification coordinating follow-up care with case management and social work as we progressed toward patient's plan pending  DC 11/22/24 home with -with help from sons and [facility].   SP patient's final weekly team meeting every Thursday to re-assess progress towards goals, discuss and address social, psychological and medical comorbidities and to address

## 2024-11-14 NOTE — PROGRESS NOTES
Gastroenterology Progress Note    Loren Noyola is a 89 y.o. female patient.  Hospitalization Day:2    Chief C/O: GIB    SUBJECTIVE: Patient is currently in rehab undergoing therapy services for generalized deconditioning, was seen during recent hospitalization for severe acute on chronic anemia underwent EGD which was notable for 2 large nonbleeding gastric ulcers, anticoagulation has been held, she did develop 2 dark formed stools yesterday her hemoglobin is 8.2, she has no abdominal pain and has been tolerating a regular diet.    ROS:  Gastrointestinal ROS: no abdominal pain, or change in bowel habits, reported black  stools    Physical    VITALS:  BP (!) 169/69   Pulse 71   Temp 98.7 °F (37.1 °C) (Oral)   Resp 18   Ht 1.6 m (5' 3\")   Wt 85.1 kg (187 lb 9.6 oz)   SpO2 95%   BMI 33.23 kg/m²   TEMPERATURE:  Current - Temp: 98.7 °F (37.1 °C); Max - Temp  Av.4 °F (36.9 °C)  Min: 98.1 °F (36.7 °C)  Max: 98.7 °F (37.1 °C)    General:  Alert and oriented,  No apparent distress  Skin- without jaundice  Eyes: anicteric sclera  Cardiac: RRR, Nl s1s2, without murmurs  Lungs CTA Bilaterally, normal effort  Abdomen soft, ND, NT, no HSM, Bowel sounds normal  Ext: without edema  Neuro: no asterixis     Data    Data Review:    Recent Labs     24  0603 24  1025 24  0529 24  1827 24  0547   WBC 12.8*  --  11.3*  --  11.8*   HGB 7.5*   < > 7.4* 9.2* 8.2*   HCT 23.4*   < > 23.1* 29.5* 25.6*   MCV 91.4  --  93.5  --  93.8     --  187  --  212    < > = values in this interval not displayed.     Recent Labs     24  0603 24  0529   * 143   K 4.1 4.2   * 113*   CO2 23 22   BUN 50* 40*   CREATININE 1.46* 1.49*     Recent Labs     24  0603   AST 12   ALT 10   BILITOT 0.5   ALKPHOS 51     No results for input(s): \"LIPASE\", \"AMYLASE\" in the last 72 hours.  No results for input(s): \"PROTIME\", \"INR\" in the last 72 hours.    Endoscopic hx:  EGD Dr Solares

## 2024-11-14 NOTE — PROGRESS NOTES
INDIVIDUALIZED OVERALL REHAB PLAN OF CARE  ADDENDUM TO REHAB PROGRESS NOTE-for audit purposes must also refer to this day's clinical note and combine the information      Date: 2024  Patient Name: Loren Noyola   Room: R251/R251-01    MRN: [id number]    : 1935  (89 y.o.)  Gender: female       Today 2024 during weekly team meeting, I reviewed the patient Loren Noyola in detail with the therapists and nurses involved in patient's care gathering complex physiatric data regarding current medical issues, progress in therapies, factors limiting progress, social issues, psychological issues, ongoing therapeutic plans and discharge planning.    Legend:  I= independent Im =Modified independent  S=Supervised SB=stand by ALANIZ=set up CG=contact rubén Min= minimal Mod=Moderate Max=maximal Max of 2 =maximal assist of 2 people      CURRENT FUNCTIONAL STATUS:    89 yo female was admitted to [facility] after presenting to the ER with pain in her head neck and back after falling at home striking her head on the cushions of her sofa.  She had several previous falls with ER visits in the week prior.  She was found to have a T12 compression fracture and admitted under the care of the hospitalist.     She was seen by the neurosurgeon PA and discussed with neurosurgery this is likely old and there is now planned for surgical intervention.    NURSING ISSUES:  with x1 dark/bright red formed stool in toilet. Dr. Obrien notified.     - lab notified of need for H+H.    Nursing will continue to focus on bowel and bladder continence transitioning toward independence by time of discharge.  Monitoring post void residuals monitoring for severe constipation and bowel obstruction.      Barriers to progress and discharge complex medical conditions, severe pain, and complex social situations      Bowel function- constipation  Plans to address- laxative     Bladder function-  continent    Plans to address-

## 2024-11-14 NOTE — PROGRESS NOTES
Comprehensive Nutrition Assessment    Type and Reason for Visit:  Initial, Consult    Nutrition Recommendations/Plan:   Continue Current Diet, Start Oral Nutrition Supplement (wound healing supplement bid)     Malnutrition Assessment:  Malnutrition Status:  No malnutrition (11/14/24 1617)      Nutrition Assessment:    Pt reports usual fair to good appetite/intake currently and pta, with no GI complaints at this time. Pt presents with increased nutrient needs d/t presence of wound. To provide wound healing supplement bid. To continue to follow.    Nutrition Related Findings:    PMH-CKD, CAD, OA, htn, hld; admitted s/p GI bleed. Labs/meds reviewed. Note elevated BUN/Cr. Pt receiving ppi. +1 BLE and +1-2 BUE edema noted. Wound Type: Stage II (buttock)       Current Nutrition Intake & Therapies:    Average Meal Intake: 51-75%     ADULT DIET; Easy to Chew  Diet NPO Exceptions are: Sips of Water with Meds (after midnight)    Anthropometric Measures:  Height: 160 cm (5' 3\")  Ideal Body Weight (IBW): 115 lbs (52 kg)    Admission Body Weight: 85.1 kg (187 lb 9.8 oz) (bedscale *edema present)  Current BMI (kg/m2):  32.7 (using UBW)  Usual Body Weight: 83.9 kg (185 lb) (11/2023)                          BMI Categories: Obese Class 1 (BMI 30.0-34.9)    Estimated Daily Nutrient Needs:     Weight Used for Energy Requirements: Usual  Energy (kcal/day): ~1500 (kg x 18)  Weight Used for Protein Requirements: Ideal  Protein (g/day): 62-68 (kg x 1.2-1.3)  Method Used for Fluid Requirements: 1 ml/kcal  Fluid (ml/day): ~1500    Nutrition Diagnosis:   Increased nutrient needs related to increase demand for energy/nutrients as evidenced by wounds    Nutrition Interventions:   Food and/or Nutrient Delivery: Continue Current Diet, Start Oral Nutrition Supplement  Nutrition Education/Counseling: Education/Counseling not indicated  Coordination of Nutrition Care: Continue to monitor while inpatient       Goals:  Goals: PO intake 50% or

## 2024-11-14 NOTE — PROGRESS NOTES
Patient reports having black color stool.  Small amount of blood.  No chest pain.  No abdominal pain.  No hematemesis or melena.    General appearance: alert, appears stated age and cooperative, no apparent distress.  Central obesity  Skin: Skin color, texture, turgor normal. No rashes or lesions  HEENT: Head: Normocephalic, no lesions, without obvious abnormality.  Neck: no adenopathy, no carotid bruit, no JVD, supple, symmetrical, trachea midline, and thyroid not enlarged, symmetric, no tenderness/mass/nodules  Lungs: clear to auscultation bilaterally  Heart: regular rate and rhythm, S1, S2 normal, no murmur, click, rub or gallop  Abdomen: soft, non-tender; bowel sounds normal; no masses,  no organomegaly  Extremities: extremities normal, atraumatic, no cyanosis or edema.  Osteoarthritis deformities  Neurologic: Mental status: Alert, oriented, thought content appropriate.  Functional impairment.  Please refer to physical Occupational Therapy team note for details on functional status and treatment plan.      *Upper GI bleed the secondary to gastric ulcer in the setting of patient taking anticoagulant.  Continue PPI.  Continue to monitor H&H.       *Acute blood loss anemia status post transfusion.  Start the patient on iron supplementation  Check H&H at 1800 and tomorrow  As needed transfusion if hemoglobin drop below 7 or systolic blood pressure below 110     *Paroxysmal A-fib.  Currently patient is in sinus rhythm.  Regular heart  Patient is on amiodarone.  Eliquis is on hold.  Patient continues to have a black and slightly bloody stool..  Continue beta-blocker.     *Functional impairment.  Please refer to physical and Occupational Therapy team notes for details on her functional status and treatment plan     *Coronary artery disease.  No active chest pain or angina    *CKD stage III.  Continue to monitor closely as volume status may fluctuate

## 2024-11-14 NOTE — PROGRESS NOTES
OCCUPATIONAL THERAPY  INPATIENT REHAB TREATMENT NOTE  [facility]      NAME: Loren Noyola  : 1935 (89 y.o.)  MRN: [id number]  CODE STATUS: DNR-CCA  Room: R251/R251-01    Date of Service: 2024    Referring Physician: Deb Haro DO  Rehab Diagnosis: Impaired mobility and ADL's due to debility secondary to GI bleed    Hospital course:   Comments: 89 y.o. female patient with recent hospitalization and rehab stay s/p fall with discovered T12fx. Patient was d/c'd home from rehab on . Patient presented back to ER  with CP and SOB along with reports of having dark stools. Labs notable for hemoglobin of 4.7. Patient admitted to ICU with consults from CC, pulmonology and GI. EGD was performed and no active bleeding found. Did, however, reveal x2 non-bleeding gastric ulcers.      Restrictions  Restrictions/Precautions  Restrictions/Precautions: Fall Risk     Patient's date of birth confirmed: Yes    SAFETY:  Safety Devices  Safety Devices in place: Yes  Type of devices: All fall risk precautions in place    SUBJECTIVE: \"There you are.\"    Pain at start of treatment: No    Pain at end of treatment: No    COGNITION:  Orientation  Overall Orientation Status: Within Functional Limits  Cognition  Overall Cognitive Status: Exceptions  Arousal/Alertness: Appears intact  Following Commands: Follows one step commands consistently;Follows multistep commands with repitition  Attention Span: Attends with cues to redirect;Appears intact  Memory: Impaired  Safety Judgement: Decreased awareness of need for assistance;Decreased awareness of need for safety  Problem Solving: Assistance required to implement solutions;Assistance required to correct errors made;Good awareness of errors made;Assistance required to identify errors made  Insights: Decreased awareness of deficits  Initiation: Appears intact  Sequencing: Requires cues for some    OBJECTIVE:    Patient noted to have dark stool that appeared  Tolerance: Patient tolerated treatment well      PLAN OF CARE:  Strengthening, Balance training, Functional mobility training, Endurance training, Safety education & training, Pain management, Patient/Caregiver education & training, Equipment evaluation, education, & procurement, Positioning, Self-Care / ADL    Continue with OT POC  until discharge    Patient goals : \"Go home.\"  Time Frame for Long Term Goals : Within 2 weeks, pt to demontsrate progress in the following areas listed below to achieve specific LTG's as stated in the initial evaluation.  Long Term Goal 1: Increase standing balance to increase safety independence with ADL's  Long Term Goal 2: Increase BUE strength to increase safety independence with ADL's  Long Term Goal 3: Increase activity tolerance to increase safety and indepence with ADL's        Therapy Time:   Individual Group Co-Treat   Time In 930       Time Out 1030         Minutes 60                   ADL/IADL trainin minutes     Electronically signed by:    SALLY Schwab,   2024, 1:22 PM

## 2024-11-14 NOTE — PLAN OF CARE
Problem: Safety - Adult  Goal: Free from fall injury  Outcome: Progressing     Problem: Discharge Planning  Goal: Discharge to home or other facility with appropriate resources  Outcome: Progressing  Flowsheets (Taken 11/14/2024 0800)  Discharge to home or other facility with appropriate resources:   Identify barriers to discharge with patient and caregiver   Arrange for needed discharge resources and transportation as appropriate   Identify discharge learning needs (meds, wound care, etc)   Arrange for interpreters to assist at discharge as needed   Refer to discharge planning if patient needs post-hospital services based on physician order or complex needs related to functional status, cognitive ability or social support system     Problem: ABCDS Injury Assessment  Goal: Absence of physical injury  Outcome: Progressing     Problem: Skin/Tissue Integrity  Goal: Absence of new skin breakdown  Description: 1.  Monitor for areas of redness and/or skin breakdown  2.  Assess vascular access sites hourly  3.  Every 4-6 hours minimum:  Change oxygen saturation probe site  4.  Every 4-6 hours:  If on nasal continuous positive airway pressure, respiratory therapy assess nares and determine need for appliance change or resting period.  Outcome: Progressing     Problem: Pain  Goal: Verbalizes/displays adequate comfort level or baseline comfort level  Outcome: Progressing  Flowsheets (Taken 11/14/2024 0800)  Verbalizes/displays adequate comfort level or baseline comfort level:   Assess pain using appropriate pain scale   Encourage patient to monitor pain and request assistance   Administer analgesics based on type and severity of pain and evaluate response   Implement non-pharmacological measures as appropriate and evaluate response   Consider cultural and social influences on pain and pain management   Notify Licensed Independent Practitioner if interventions unsuccessful or patient reports new pain

## 2024-11-14 NOTE — PROGRESS NOTES
Physical Therapy Rehab Treatment Note  [facility]  [facility]       NAME: Loren Noyola  : 1935 (89 y.o.)  MRN: [id number]  CODE STATUS: DNR-CCA    Date of Service: 2024       Restrictions:  Restrictions/Precautions: Fall Risk       SUBJECTIVE:   Subjective: \"I got more blood last night\"    Pain  Pain: denies pain pre/post session      OBJECTIVE:   Transfers  Surface: Wheelchair  Additional Factors: Verbal cues;Hand placement cues;Increased time to complete  Device: Walker  Sit to Stand  Assistance Level: Stand by assist  Skilled Clinical Factors: vc's to push up from WC vs pulling up on WW  Stand to Sit  Assistance Level: Stand by assist  Skilled Clinical Factors: vc's for hand placement and safety, good technique    Ambulation  Surface: Level surface  Device: Rolling walker  Distance: 35' x 2  Activity: Within Unit  Additional Factors: Verbal cues;Hand placement cues;Increased time to complete  Assistance Level: Minimal assistance;Stand by assist  Gait Deviations: Slow ximena;Narrow base of support;Unsteady gait;Path deviations  Skilled Clinical Factors: vc's for technique and sequencing, able to tolerate distance with minimal reports of pain this date. pt motivated to increase distance.    PT Exercises  PROM Exercises: seated HS/gastroc stretch x3 30 sec hold BLE  A/AROM Exercises: seated: AP, LAQ, Marches, hip abd/add with ball x15ea raul  Dynamic Standing Balance Exercises: standing step-ups on 2 inch block x10 ea     Activity Tolerance  Activity Tolerance: Patient tolerated treatment well    ASSESSMENT/PROGRESS TOWARDS GOALS:   Assessment  Assessment: Pt with less pain noted this date, able to complete tasks with good ability and occasional assist for stability and balance with on feet activity. Pt pleasant and motivated to return to previous level of function.  Activity Tolerance: Patient tolerated treatment well    Goals:  Long Term Goals  Long Term Goal 1: Pt

## 2024-11-14 NOTE — PROGRESS NOTES
10/17/2024  EXAMINATION: TWO XRAY VIEWS OF THE RIGHT HIP 10/17/2024 1:30 pm COMPARISON: None. HISTORY: ORDERING SYSTEM PROVIDED HISTORY: pain TECHNOLOGIST PROVIDED HISTORY: Reason for exam:->pain What reading provider will be dictating this exam?->CRC FINDINGS: Bones are demineralized.  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 No acute bony abnormalities.  Degenerative changes in the visualized lower lumbar spine.     1. 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 No acute bony abnormalities. 2. Degenerative changes in the visualized lower lumbar spine.       Active Hospital Problems    Diagnosis Date Noted    History of GI bleed [Z87.19] 11/10/2024     Priority: Low    Spinal stenosis of lumbar region with neurogenic claudication [M48.062] 11/08/2024     Priority: Low    Urinary incontinence [R32] 10/24/2024     Priority: Low    CAD (coronary artery disease) [I25.10]      Priority: Low    Impaired mobility ADLs dt  thoracic and lumbar spinal stenosis with T12 compression fracture. [Z74.09, Z78.9] 10/23/2024     Priority: Low    Compression fracture of T12 vertebra with routine healing [S22.080D] 10/23/2024     Priority: Low    Peripheral vascular disease (HCC) [I73.9] 11/30/2023     Priority: Low    Right bundle branch block (RBBB) [I45.10] 11/30/2023     Priority: Low    Essential hypertension [I10] 11/20/2017     Priority: Low    DDD (degenerative disc disease), lumbar [M51.369] 11/20/2017     Priority: Low    Spondylolisthesis of lumbar region [M43.16] 12/06/2011     Priority: Low         Impression/Plan:   GIB - Transfuse to Keep Hb>7.  Fall - recurrent.  T12 Compression FX  CAD s/p  CABG  September 2007; left internal mammary artery to the left anterior descending coronary artery, saphenous vein graft to the first obtuse marginal and second obtuse marginal, diagonal and right coronary artery, left atrial appendage ligation   PAD s/p JULISSA PTCA  PAF -   Eliquis on hold.   resumed Low dose Amiodarone.   ECG SR.   She is at risk of complications with DOAC and continued falls.  She can be considered for Watchman device as out pt.   Dual Chamber PPM   Anticipate f/u Dr. Samaniego upon dc.      Thank you for allowing us to participate in the care of this patient.     Will continue to follow.    Please call if questions or concerns arise.    Electronically signed by EDER PRESTON MD on 11/14/2024 at 9:55 AM

## 2024-11-14 NOTE — CARE COORDINATION
LEYDA was speaking with Dot(TAMIKO). Dot stated that after 3 days of being home after the last admission she had to make arrangements for MIL to go to [facility]. The pt's  has a HX of a TBI and pt would tell  what to do. Pt was needing help at home and spouse was not able to help her. Dot wants the pt to go back  to [facility]. Pt's sons would have to work on a plan for their father. Dot requested a list of private care givers. LEYDA placed a list in the room. Electronically signed by Yolie Young RN on 11/14/2024 at 2:50 PM

## 2024-11-14 NOTE — PROGRESS NOTES
Physical Therapy Missed Treatment   Facility/Department: [facility] MED SURG R251/R251-01    NAME: Loren Noyola    : 1935 (89 y.o.)  MRN: [id number]    Account: [id number]  Gender: female    Chart reviewed, attempted PT at 1430. Patient unavailable 2° to:    [x] Pt declined- pt sleeping heavily upon arrival, awakes to tactile stimuli and sternal rub. Educated on scheduled PT session this PM and pt verbalizes awareness, but politely declines d/t increased fatigue and pain s/p PICC line procedure. Education and encouragement provided, pt continues to politely decline. Missed 60 mins of scheduled session this PM.     Will attempt PT treatment again at earliest convenience.      Electronically signed by Elfego Reyes PTA on 24 at 2:41 PM EST

## 2024-11-14 NOTE — PROGRESS NOTES
[facility] Occupational Therapy      Date: 2024  Patient Name: Loren Noyola        MRN: [id number]  Account: [id number]   : 1935  (89 y.o.)  Room: [place] [address]    Chart reviewed, attempted OT at 1530 for treatment. Patient not seen 2° to:    Other: Pt too fatigued to participate in session.     Will attempt again when able.    Electronically signed by MATIAS Gusman on 2024 at 4:12 PM

## 2024-11-14 NOTE — FLOWSHEET NOTE
Spoke to Rachelle MORALES who was made aware pt with Celox over midline site. Dressing change will need to be done tomorrow. Encouraged to call back with any questions or concerns. Understanding voiced.

## 2024-11-15 PROBLEM — K92.2 GI BLEEDING: Status: ACTIVE | Noted: 2024-11-12

## 2024-11-15 LAB
ABO + RH BLD: NORMAL
ALBUMIN SERPL-MCNC: 2.7 G/DL (ref 3.5–4.6)
ALP SERPL-CCNC: 51 U/L (ref 40–130)
ALT SERPL-CCNC: 7 U/L (ref 0–33)
ANION GAP SERPL CALCULATED.3IONS-SCNC: 8 MEQ/L (ref 9–15)
AST SERPL-CCNC: 9 U/L (ref 0–35)
BASOPHILS # BLD: 0 K/UL (ref 0–0.2)
BASOPHILS NFR BLD: 0.3 %
BILIRUB SERPL-MCNC: 0.4 MG/DL (ref 0.2–0.7)
BLD GP AB SCN SERPL QL: NORMAL
BLOOD BANK DISPENSE STATUS: NORMAL
BLOOD BANK PRODUCT CODE: NORMAL
BPU ID: NORMAL
BUN SERPL-MCNC: 24 MG/DL (ref 8–23)
CALCIUM SERPL-MCNC: 7.6 MG/DL (ref 8.5–9.9)
CHLORIDE SERPL-SCNC: 114 MEQ/L (ref 95–107)
CO2 SERPL-SCNC: 22 MEQ/L (ref 20–31)
CREAT SERPL-MCNC: 1.4 MG/DL (ref 0.5–0.9)
DESCRIPTION BLOOD BANK: NORMAL
EKG ATRIAL RATE: 52 BPM
EKG Q-T INTERVAL: 438 MS
EKG QRS DURATION: 126 MS
EKG QTC CALCULATION (BAZETT): 459 MS
EKG R AXIS: -48 DEGREES
EKG T AXIS: -14 DEGREES
EKG VENTRICULAR RATE: 66 BPM
EOSINOPHIL # BLD: 0.2 K/UL (ref 0–0.7)
EOSINOPHIL NFR BLD: 2.4 %
ERYTHROCYTE [DISTWIDTH] IN BLOOD BY AUTOMATED COUNT: 16.2 % (ref 11.5–14.5)
GLOBULIN SER CALC-MCNC: 1.7 G/DL (ref 2.3–3.5)
GLUCOSE BLD-MCNC: 110 MG/DL (ref 70–99)
GLUCOSE SERPL-MCNC: 108 MG/DL (ref 70–99)
HCT VFR BLD AUTO: 23.3 % (ref 37–47)
HCT VFR BLD AUTO: 28 % (ref 37–47)
HGB BLD-MCNC: 7.5 G/DL (ref 12–16)
HGB BLD-MCNC: 9.1 G/DL (ref 12–16)
LACTATE BLDV-SCNC: 1.4 MMOL/L (ref 0.5–2.2)
LYMPHOCYTES # BLD: 0.6 K/UL (ref 1–4.8)
LYMPHOCYTES NFR BLD: 6.1 %
MCH RBC QN AUTO: 30.2 PG (ref 27–31.3)
MCHC RBC AUTO-ENTMCNC: 32.2 % (ref 33–37)
MCV RBC AUTO: 94 FL (ref 79.4–94.8)
MONOCYTES # BLD: 1.3 K/UL (ref 0.2–0.8)
MONOCYTES NFR BLD: 13.1 %
NEUTROPHILS # BLD: 7.9 K/UL (ref 1.4–6.5)
NEUTS SEG NFR BLD: 77.3 %
PERFORMED ON: ABNORMAL
PLATELET # BLD AUTO: 191 K/UL (ref 130–400)
POTASSIUM SERPL-SCNC: 4.1 MEQ/L (ref 3.4–4.9)
PROCALCITONIN SERPL IA-MCNC: 0.1 NG/ML (ref 0–0.15)
PROT SERPL-MCNC: 4.4 G/DL (ref 6.3–8)
RBC # BLD AUTO: 2.48 M/UL (ref 4.2–5.4)
REASON FOR REJECTION: NORMAL
REJECTED TEST: NORMAL
SODIUM SERPL-SCNC: 144 MEQ/L (ref 135–144)
WBC # BLD AUTO: 10.2 K/UL (ref 4.8–10.8)

## 2024-11-15 PROCEDURE — 97535 SELF CARE MNGMENT TRAINING: CPT

## 2024-11-15 PROCEDURE — 1180000000 HC REHAB R&B

## 2024-11-15 PROCEDURE — 86900 BLOOD TYPING SEROLOGIC ABO: CPT

## 2024-11-15 PROCEDURE — 86850 RBC ANTIBODY SCREEN: CPT

## 2024-11-15 PROCEDURE — 86923 COMPATIBILITY TEST ELECTRIC: CPT

## 2024-11-15 PROCEDURE — 36430 TRANSFUSION BLD/BLD COMPNT: CPT

## 2024-11-15 PROCEDURE — 85014 HEMATOCRIT: CPT

## 2024-11-15 PROCEDURE — 6360000002 HC RX W HCPCS: Performed by: INTERNAL MEDICINE

## 2024-11-15 PROCEDURE — 86901 BLOOD TYPING SEROLOGIC RH(D): CPT

## 2024-11-15 PROCEDURE — 2580000003 HC RX 258: Performed by: STUDENT IN AN ORGANIZED HEALTH CARE EDUCATION/TRAINING PROGRAM

## 2024-11-15 PROCEDURE — 99232 SBSQ HOSP IP/OBS MODERATE 35: CPT | Performed by: NURSE PRACTITIONER

## 2024-11-15 PROCEDURE — 83605 ASSAY OF LACTIC ACID: CPT

## 2024-11-15 PROCEDURE — 97530 THERAPEUTIC ACTIVITIES: CPT

## 2024-11-15 PROCEDURE — 97129 THER IVNTJ 1ST 15 MIN: CPT

## 2024-11-15 PROCEDURE — P9016 RBC LEUKOCYTES REDUCED: HCPCS

## 2024-11-15 PROCEDURE — 84145 PROCALCITONIN (PCT): CPT

## 2024-11-15 PROCEDURE — 97110 THERAPEUTIC EXERCISES: CPT

## 2024-11-15 PROCEDURE — 97130 THER IVNTJ EA ADDL 15 MIN: CPT

## 2024-11-15 PROCEDURE — 2580000003 HC RX 258: Performed by: INTERNAL MEDICINE

## 2024-11-15 PROCEDURE — 6370000000 HC RX 637 (ALT 250 FOR IP): Performed by: INTERNAL MEDICINE

## 2024-11-15 PROCEDURE — 36415 COLL VENOUS BLD VENIPUNCTURE: CPT

## 2024-11-15 PROCEDURE — 30233N1 TRANSFUSION OF NONAUTOLOGOUS RED BLOOD CELLS INTO PERIPHERAL VEIN, PERCUTANEOUS APPROACH: ICD-10-PCS | Performed by: RADIOLOGY

## 2024-11-15 PROCEDURE — 85025 COMPLETE CBC W/AUTO DIFF WBC: CPT

## 2024-11-15 PROCEDURE — 85018 HEMOGLOBIN: CPT

## 2024-11-15 PROCEDURE — 6370000000 HC RX 637 (ALT 250 FOR IP): Performed by: PHYSICAL MEDICINE & REHABILITATION

## 2024-11-15 PROCEDURE — 80053 COMPREHEN METABOLIC PANEL: CPT

## 2024-11-15 PROCEDURE — 99233 SBSQ HOSP IP/OBS HIGH 50: CPT | Performed by: PHYSICAL MEDICINE & REHABILITATION

## 2024-11-15 RX ORDER — 0.9 % SODIUM CHLORIDE 0.9 %
INTRAVENOUS SOLUTION INTRAVENOUS CONTINUOUS PRN
Status: COMPLETED | OUTPATIENT
Start: 2024-11-15 | End: 2024-11-15

## 2024-11-15 RX ORDER — SODIUM CHLORIDE 9 MG/ML
INJECTION, SOLUTION INTRAVENOUS PRN
Status: DISCONTINUED | OUTPATIENT
Start: 2024-11-15 | End: 2024-11-19

## 2024-11-15 RX ADMIN — SODIUM CHLORIDE 125 MG: 9 INJECTION, SOLUTION INTRAVENOUS at 22:02

## 2024-11-15 RX ADMIN — TIZANIDINE 4 MG: 4 TABLET ORAL at 09:14

## 2024-11-15 RX ADMIN — ACETAMINOPHEN 325MG 650 MG: 325 TABLET ORAL at 21:01

## 2024-11-15 RX ADMIN — METOPROLOL TARTRATE 50 MG: 50 TABLET, FILM COATED ORAL at 21:01

## 2024-11-15 RX ADMIN — Medication 10 ML: at 09:22

## 2024-11-15 RX ADMIN — Medication 100 MG: at 09:13

## 2024-11-15 RX ADMIN — Medication 10 ML: at 22:03

## 2024-11-15 RX ADMIN — ACETAMINOPHEN 325MG 650 MG: 325 TABLET ORAL at 09:14

## 2024-11-15 RX ADMIN — METOPROLOL TARTRATE 50 MG: 50 TABLET, FILM COATED ORAL at 09:14

## 2024-11-15 RX ADMIN — TIZANIDINE 4 MG: 4 TABLET ORAL at 15:48

## 2024-11-15 RX ADMIN — CALCIUM 500 MG: 500 TABLET ORAL at 09:14

## 2024-11-15 RX ADMIN — ACETAMINOPHEN 325MG 650 MG: 325 TABLET ORAL at 15:48

## 2024-11-15 RX ADMIN — SODIUM CHLORIDE 500 ML: 9 INJECTION, SOLUTION INTRAVENOUS at 10:19

## 2024-11-15 RX ADMIN — AMIODARONE HYDROCHLORIDE 100 MG: 200 TABLET ORAL at 09:14

## 2024-11-15 RX ADMIN — Medication 10 ML: at 21:02

## 2024-11-15 ASSESSMENT — PAIN DESCRIPTION - DESCRIPTORS: DESCRIPTORS: DISCOMFORT

## 2024-11-15 ASSESSMENT — PAIN SCALES - GENERAL
PAINLEVEL_OUTOF10: 5
PAINLEVEL_OUTOF10: 8
PAINLEVEL_OUTOF10: 8

## 2024-11-15 ASSESSMENT — PAIN DESCRIPTION - LOCATION
LOCATION: BACK
LOCATION: HIP
LOCATION: HIP

## 2024-11-15 ASSESSMENT — PAIN DESCRIPTION - ORIENTATION
ORIENTATION: RIGHT;LEFT
ORIENTATION: RIGHT

## 2024-11-15 NOTE — PROGRESS NOTES
Spiritual Health History and Assessment/Progress Note  [facility]    Follow-up,  ,  ,      Name: Loren Noyola MRN: [id number]    Age: 89 y.o.     Sex: female   Language: English   Advent: Evangelical   Impaired mobility and activities of daily living     Date: 11/15/2024            Total Time Calculated: 15 min          follow up for \"Rapid Response\" called earlier today.  Upon entering room, patient is lying comfortably in bed, watching TV.  Patient's son is sitting at bedside reading the paper.  Both greeted  warmly and patient engaged in conversation easily.      provided active and empathic listening, presence and with patient's permission, a prayer of hope & healing provided.           Spiritual Assessment continued in [facility]        Referral/Consult From: Nurse   Encounter Overview/Reason: Follow-up  Service Provided For: Patient and family together    Diana, Belief, Meaning:   Patient identifies as spiritual  Family/Friends identify as spiritual        Patient Plan of Care: Spiritual Care available upon further referral  Family/Friends Plan of Care: Spiritual Care available upon further referral    Electronically signed by KIKE Bill on 11/15/2024 at 3:50 PM

## 2024-11-15 NOTE — PROGRESS NOTES
Spiritual Health History and Assessment/Progress Note  [facility]    Crisis,  ,  ,      Name: Loren Noyola MRN: [id number]    Age: 89 y.o.     Sex: female   Language: English   Hoahaoism: Confucianist   Impaired mobility and activities of daily living     Date: 11/15/2024            Total Time Calculated: 15 min              Spiritual Assessment continued in [facility]        Referral/Consult From: Nurse   Encounter Overview/Reason: Crisis  Service Provided For: Family    Diana, Belief, Meaning:   Patient unable to assess at this time  Family/Friends identify as spiritual      Importance and Influence:  Patient unable to assess at this time  Family/Friends have spiritual/personal beliefs that influence decisions regarding the patient's health    Community:  Patient Other: Unable to assess  Family/Friends feel well-supported. Support system includes: Spouse/Partner, Children, Friends, and Extended family    Assessment and Plan of Care:     Patient Interventions include: Other: Unable to assess  Family/Friends Interventions include: Facilitated expression of thoughts and feelings    Patient Plan of Care: Spiritual Care available upon further referral  Family/Friends Plan of Care: Spiritual Care available upon further referral    Electronically signed by KIKE Bill on 11/15/2024 at 2:30 PM

## 2024-11-15 NOTE — PROGRESS NOTES
noted.  There is no canal stenosis seen at any level.  Recommend ultrasound of thyroid non emergently.     CT Head   10/21/2024 BRAIN/VENTRICLES:    No acute intracranial abnormality.     XR LUMBAR SPINE   10/17/2024   1. Moderate degenerative changes in the mid and lower lumbar spine.   2. Grade 1 anterolisthesis of L3 on L4.     XR HIP LEFT  10/17/2024 Bones are demineralized.  No acute bony abnormalities.  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  Soft tissues appear unremarkable.     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     XR HIP RIGHT 10/17/2024   1. 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 No acute bony abnormalities.   2. Degenerative changes in the visualized lower lumbar spine.        Previous extensive, complex labs, notes and diagnostics reviewed and analyzed.     ALLERGIES:    Allergies as of 11/12/2024 - Fully Reviewed 11/12/2024   Allergen Reaction Noted    Lisinopril Anaphylaxis 01/29/2016    Nsaids Other (See Comments) 11/12/2024    Statins Other (See Comments) 01/29/2016    Sulfa antibiotics Itching 03/27/2012      (please also verify by checking MAR)       Recently, I evaluated this patient for periodic reassessment of medical and functional status.  The patient was discussed in detail at the treatment team meeting focusing on current medical issues, progress in therapies, social issues, psychological issues, barriers to progress and strategies to address these barriers, and discharge planning.  See the hand written addendum to rehab progress note.  The patient continues to be high risk for future disability and their medical and rehabilitation prognosis continue to be good and therefore, we will continue the patient's rehabilitation course as planned.  The patient's tentative discharge date was set. Patient and family education was discussed.  The patient was made aware of the team discussion regarding their progress.  Discharge plans were discussed along with barriers  emotional health and caregiver involvement in care. Follow BP closely--recheck CBC stat-- Upper endoscopy pending          Deb Haro D.O., PM&R     Attending    [phone]   [facility]

## 2024-11-15 NOTE — PROGRESS NOTES
OCCUPATIONAL THERAPY  INPATIENT REHAB TREATMENT NOTE  [facility]      NAME: Loren Noyola  : 1935 (89 y.o.)  MRN: [id number]  CODE STATUS: DNR-CCA  Room: R251/R251-01    Date of Service: 11/15/2024    Referring Physician: Deb Haro DO  Rehab Diagnosis: Impaired mobility and ADL's due to debility secondary to GI bleed    Hospital course:   Comments: 89 y.o. female patient with recent hospitalization and rehab stay s/p fall with discovered T12fx. Patient was d/c'd home from rehab on . Patient presented back to ER  with CP and SOB along with reports of having dark stools. Labs notable for hemoglobin of 4.7. Patient admitted to ICU with consults from CC, pulmonology and GI. EGD was performed and no active bleeding found. Did, however, reveal x2 non-bleeding gastric ulcers.    Restrictions  Restrictions/Precautions  Restrictions/Precautions: Fall Risk       Patient's date of birth confirmed: Yes    SAFETY:  Safety Devices  Type of devices: All fall risk precautions in place    SUBJECTIVE:     Pain at start of treatment: No    Pain at end of treatment: No    OBJECTIVE:    Spoke with nursing prior to starting session- ok to see bed level     BP also taken to assess for hypotension- Pt in bed with HOB raised. 139/54. 140/60    While in bed with HOB raised, challenged strength, activity tolerance, ROM, and flexibility for improved ADL performance.    Challenged pt through usage of 1# weight and AROM to complete BUE exercises. 1 sets x 10 reps completed. Exercises focused on all UE joints and planes of motion including scapular protraction/retraction, shoulder flexion/extension/rotation/horizontal abduction, elbow flexion/extension, supination/pronation, and wrist/digit flexion/extension.  No resistance used for some shoulder movements d/t pt complaining of chronic shoulder deficits/discomfort when weight was attempted. Good participation and technique with min cuing    Completed  theraputty (mod resistance) exercises and tasks. Focused on manipulation of the putty which required  strength, various pinch styles, dexterity, and compound movements of the digits to alter putty according to instruction.   Also completed item placement and retrieval of small items (10 beads) from the putty to further address dexterity.  Fair ability to manipulate putty    Patient engaged in fine motor and cognitive activity to increase [place] with ADL/IADL completion.    12 Piece Puzzle:   Pt used bilateral hands to assemble puzzle that was placed on the table in front of her.   Puzzle was of airplane, fire truck  1st puzzle- 100% accuracy with increased time. No cuing  2nd puzzle- 100% accuracy with min cuing + increased time  3rd puzzle- unable to finish d/t time restraints however pt with increased difficulty    Education:  Education  Education Given To: Patient  Education Provided: Role of Therapy;Plan of Care;Safety;ADL Function;Precautions;Transfer Training  Education Method: Demonstration;Verbal  Education Outcome: Continued education needed    ASSESSMENT:  Activity Tolerance: Patient tolerated treatment well    Pt overall with good participation in bed level tasks. Pt declines pain or lightheadedness but does demonstrate and report mild SOB and fatigue. Frequent rest breaks needed    PLAN OF CARE:  Strengthening, Balance training, Functional mobility training, Endurance training, Safety education & training, Pain management, Patient/Caregiver education & training, Equipment evaluation, education, & procurement, Positioning, Self-Care / ADL  Continue with OT POC  until discharge    Patient goals : \"Go home.\"  Time Frame for Long Term Goals : Within 2 weeks, pt to demontsrate progress in the following areas listed below to achieve specific LTG's as stated in the initial evaluation.  Long Term Goal 1: Increase standing balance to increase safety independence with ADL's  Long Term Goal 2: Increase

## 2024-11-15 NOTE — PLAN OF CARE
Problem: Safety - Adult  Goal: Free from fall injury  11/15/2024 0012 by Kaila Marquis, RN  Outcome: Progressing     Problem: Discharge Planning  Goal: Discharge to home or other facility with appropriate resources  11/15/2024 0012 by Kaila Marquis, RN  Outcome: Progressing     Problem: ABCDS Injury Assessment  Goal: Absence of physical injury  11/15/2024 0012 by Kaila Marquis, RN  Outcome: Progressing

## 2024-11-15 NOTE — SIGNIFICANT EVENT
Responded to rapid response called on patient at 10:15.  Patient was reportedly up using the restroom with PT/OT and began to feel lightheaded and so she was brought back to the bed.  At that time patient was found to have BP of 91/47.  Dr. Henning was already present and had requested 500 mL bolus as well as a stat H&H with patient's history of GI bleed.  Upon my evaluation patient was noted to be sleepy but easily awakened to voice.  Repeat BP at this time was 75/39.  Initial difficulty with initiation of fluid bolus due to midline malfunctioning.  An 18-gauge IV was placed in patient's LUE and bolus was started.  Patient became more awake and alert with fluid resuscitation and after 500 mL BP improved to 115/55.  Stat repeat labs were largely unremarkable other than patient's hemoglobin was noted to drop from 8.2 yesterday morning to 7.5.  With improvement after fluid resuscitation, patient will remain on the rehab floor and will give an additional 500 mL bolus.  Discussed with bedside RN to update GI on patient's condition and drop in hemoglobin and reassess if she would be appropriate for EGD today.  Patient awake and alert and left in stable condition with daughter at bedside.    Electronically signed by Holly Olvera MD on 11/15/2024 at 11:10 AM

## 2024-11-15 NOTE — PROGRESS NOTES
PRBCs transfusing at this time, tolerating well.  Pts son at the bedside.  No needs stated by the pt at this time.  No dizziness reported since rapid response called this AM.  See flowsheets for VS.

## 2024-11-15 NOTE — PROGRESS NOTES
OCCUPATIONAL THERAPY  INPATIENT REHAB TREATMENT NOTE  [facility]      NAME: Loren Noyola  : 1935 (89 y.o.)  MRN: [id number]  CODE STATUS: DNR-CCA  Room: R251/R251-01    Date of Service: 11/15/2024    Referring Physician: Deb Haro DO  Rehab Diagnosis: Impaired mobility and ADL's due to debility secondary to GI bleed    Hospital course:   Comments: 89 y.o. female patient with recent hospitalization and rehab stay s/p fall with discovered T12fx. Patient was d/c'd home from rehab on . Patient presented back to ER  with CP and SOB along with reports of having dark stools. Labs notable for hemoglobin of 4.7. Patient admitted to ICU with consults from CC, pulmonology and GI. EGD was performed and no active bleeding found. Did, however, reveal x2 non-bleeding gastric ulcers.      Restrictions  Restrictions/Precautions  Restrictions/Precautions: Fall Risk     Patient's date of birth confirmed: Yes    SAFETY:  Safety Devices  Safety Devices in place: Yes  Type of devices: All fall risk precautions in place    SUBJECTIVE: \"I'm a little better. They gave me vitamin D last night.\"    Pain at start of treatment: Yes: 8/10    Pain at end of treatment: Unable to assess 2° patient passed out with a rapid response being called    Location: B hips, R UE  Description: \"hurts\"  Nursing notified: Yes  Nurse: Jennifer  Intervention: None    COGNITION:  Orientation  Overall Orientation Status: Within Functional Limits  Cognition  Overall Cognitive Status: Exceptions  Arousal/Alertness: Appears intact  Following Commands: Follows one step commands consistently;Follows multistep commands with repitition  Attention Span: Attends with cues to redirect;Appears intact  Memory: Impaired  Safety Judgement: Decreased awareness of need for assistance;Decreased awareness of need for safety  Problem Solving: Assistance required to implement solutions;Assistance required to correct errors made;Good awareness of  errors made;Assistance required to identify errors made  Insights: Decreased awareness of deficits  Initiation: Appears intact  Sequencing: Requires cues for some      Pt's current cognitive status is:  Comprehension: Mod I  Expression: Mod I  Social Interaction: Independent  Problem Solving: Supervision  Memory: Min A    OBJECTIVE:    Patient supine in bed upon therapists arrival.  Patient okay to be seen as tolerated per Jennifer MORALES.  Patient agreeable to toileting and getting dressed.  Patient declines washing up this date.  After completing sit -> sand from tolet, patient become dizzy.  Theraist instructed patient to sit back dack down if needed or to stand still until it passed.  Patient stood for a minte or two and then stated \"I can make it.\"  Patient then ambulated with ww to armchair in front of bathroom sink.  While sitting in armchair, therapist noted patient's eyelids becoming squinty and speech becoming slower.  Therapist notified ANDREW Rodriguez.  Jennifer MORALES assessed patient.  While assessing patient, patient became unresponsive.  Therapist called [id number] for a rapid response.  Therapist grasped the back of the chair that the patient was sitting in and pulled the patient from the bathroom to the side of the hospital bed.  Therapist retrieved a gate belt, secured the gate belt around the patient and with the assistance of 2 other staff members, patient was transferred from the chair to supine in bed.  Patient slightly opened eyes and mumbled something incoherently.  Therapist left patient supine in bed with RN and rapid response team present.    Grooming/Oral Hygiene  Assistance Level: Supervision  Skilled Clinical Factors: seated in armchair at bathroom sink - Supervision 2° becoming dizzy  Upper Extremity Bathing  Skilled Clinical Factors: declined  Lower Extremity Bathing  Skilled Clinical Factors: declined  Upper Extremity Dressing  Assistance Level: Dependent  Skilled Clinical Factors: seated in armchair at

## 2024-11-15 NOTE — PROGRESS NOTES
Pts BP continues to improve.  She is more alert.  PRBCs ordered by hospitalist, will given.  Pts type and screen had to be drawn twice per tech r/t not enough for test when collected initially.  Pt sitting up eating dinner and talking to family at this time.

## 2024-11-15 NOTE — PROGRESS NOTES
Physical Therapy Rehab Treatment Note  Facility/Department: [facility] RE[facility]  Room: [place] [address]       NAME: Loren Noyola  : 1935 (89 y.o.)  MRN: [id number]  CODE STATUS: DNR-CCA    Date of Service: 11/15/2024       Restrictions:  Restrictions/Precautions: Fall Risk       SUBJECTIVE:   Subjective: \"Rough day\"    Pain  Pain: denies pain pre/post session      OBJECTIVE:     PT Exercises  A/AROM Exercises: supine AP/GS/QS/Hip Abd/SLR/HS x10 BLE     Activity Tolerance  Activity Tolerance: Patient tolerated treatment well;Treatment limited secondary to medical complications    ASSESSMENT/PROGRESS TOWARDS GOALS:   Assessment  Assessment: Session limited to bed exercises d/t low H & H awaiting blood transfusion. Frequent rest breaks needed as well as increased time to complete tasks. Unable to tolerate full session.  Activity Tolerance: Patient tolerated treatment well;Treatment limited secondary to medical complications    Goals:  Long Term Goals  Long Term Goal 1: Pt will demonstrate bed mobility indep  Long Term Goal 2: Pt will demonstrate bed, sit to stand and car transfers mod indep with safest AD  Long Term Goal 3: Pt will demonstrate amb 50ft mod indep with safest AD - SBA for 150 feet  Long Term Goal 4: Pt will demonstrate TUG </= 20 sec for decreased risk for falls  Long Term Goal 5: SBA 4-5 stairs for safe home entry  Patient Goals   Patient Goals : to go home    PLAN OF CARE/Safety:   Safety Devices  Type of Devices: All fall risk precautions in place;Bed alarm in place;Call light within reach;Left in bed      Therapy Time:   Individual   Time In 1430   Time Out 1500   Minutes 30      Minutes: 30  Transfer/Bed mobility training:  Gait training:  Neuro re education:  Therapeutic ex: 30  30 mins missed d/t fatigue and lethargy.    Elfego Reyes PTA, 11/15/24 at 3:42 PM

## 2024-11-15 NOTE — PROGRESS NOTES
Physical Therapy Missed Treatment   Facility/Department: [facility] MED SURG R251/R251-01    NAME: Loren Noyola    : 1935 (89 y.o.)  MRN: [id number]    Account: [id number]  Gender: female    Chart reviewed, attempted PT at 1130. Patient unavailable 2° to:    [x] Hold per nsg request- hold per RN d/t rapid response this AM and low BP. Missed 30 mins of scheduled session this AM.     Will attempt PT treatment again at earliest convenience.      Electronically signed by Elfego Reyes PTA on 11/15/24 at 11:35 AM EST

## 2024-11-15 NOTE — PROGRESS NOTES
[facility]   Facility/Department: [facility]  Speech Language Pathology    Loren Noyola  1935  R251/R251-01    Date: 11/15/2024      Speech Therapy attempted to see Loren Noyola at 1030 for 30 minute session.  Patient not seen due to:  Rapid response was called secondary to low BP. ST will f/u with patient when able to             Electronically signed by ALEJANDRA Cruz on 11/15/24 at 10:36 AM EST

## 2024-11-15 NOTE — PROGRESS NOTES
Gastroenterology Progress Note    Loren Noyola is a 89 y.o. female patient.  Hospitalization Day:3    Chief C/O: anemia, gastric ulcers    SUBJECTIVE: Seen and examined, hemoglobin 8.2, no further melena overnight had been n.p.o. for possible EGD today, no abdominal pain, no nausea or vomiting    ROS:  Gastrointestinal ROS: no abdominal pain, change in bowel habits, or black or bloody stools    Physical    VITALS:  BP (!) 161/60   Pulse 64   Temp 97.5 °F (36.4 °C) (Oral)   Resp 20   Ht 1.6 m (5' 3\")   Wt 85.1 kg (187 lb 9.6 oz)   SpO2 97%   BMI 33.23 kg/m²   TEMPERATURE:  Current - Temp: 97.5 °F (36.4 °C); Max - Temp  Av.5 °F (36.4 °C)  Min: 97.5 °F (36.4 °C)  Max: 97.5 °F (36.4 °C)    General:  Alert and oriented,  No apparent distress  Skin- without jaundice  Eyes: anicteric sclera  Cardiac: RRR, Nl s1s2, without murmurs  Lungs CTA Bilaterally, normal effort  Abdomen soft, ND, NT, no HSM, Bowel sounds normal  Ext: without edema  Neuro: no asterixis     Data    Data Review:    Recent Labs     24  0529 24  1827 24  0547   WBC 11.3*  --  11.8*   HGB 7.4* 9.2* 8.2*   HCT 23.1* 29.5* 25.6*   MCV 93.5  --  93.8     --  212     Recent Labs     24  0529      K 4.2   *   CO2 22   BUN 40*   CREATININE 1.49*     No results for input(s): \"AST\", \"ALT\", \"BILIDIR\", \"BILITOT\", \"ALKPHOS\" in the last 72 hours.    Invalid input(s): \"ALB\"  No results for input(s): \"LIPASE\", \"AMYLASE\" in the last 72 hours.  No results for input(s): \"PROTIME\", \"INR\" in the last 72 hours.    Endoscopic hx:  EGD Dr Solares 24  Impression:         -  Normal esophagus.         -  Non-bleeding gastric ulcers with a clean ulcer base (Flavio Class III).         -  Gastric mucosal atrophy.  Biopsied.         -  Normal examined duodenum.         - Lax lower esophageal sphincter on retroflexed views         - No old or fresh blood seen    ASSESSMENT:  89 y/o female admitted to the ICU for  symptomatic anemia, with 3-day history of shortness of breath and melanotic stool, was found to be anemic with hemoglobin of 4.7 in the setting of Eliquis, now 6.5 after 2 units of packed red blood cells, she does report associated epigastric pain and esophageal dysphagia with solids that has progressively worsened over the past week, no trouble initiating swallow, at baseline is on a PPI.  Reported remote history of bleeding gastric ulcer, no records are available for review, no thrombocytopenia, no history of chronic liver disease.  CT scan with no acute abdominal process.  No recent endoscopic investigation.  At baseline is on a PPI.  She was initiated on IV PPI twice daily.  Noted patient was hospitalized approximately 3 weeks prior for generalized deconditioning after a fall at home.  Ddx anemia: Peptic ulcer disease, esophagitis, gastritis, AVM  11/11/24 hemoglobin was 6.6 overnight, patient was transfused with an additional unit of packed red blood cells, no overt bleeding, has been on a PPI, has epigastric pain.  Has been n.p.o. for EGD today  11/12/24  hemoglobin 7.4, no overt bleeding, status post EGD notable for 2 nonbleeding cratered and linear gastric ulcers with clean base largest lesion was 8 mm, and diffuse atrophic gastric mucosa, biopsies are pending, no fresh or old blood was identified.  11/14/24 currently in rehab undergoing therapy services for generalized deconditioning, was seen during recent hospitalization for severe acute on chronic anemia underwent EGD which was notable for 2 large nonbleeding gastric ulcers, anticoagulation has been held, she has been on PPI twice daily, she did develop 2 dark formed stools yesterday with some red tinged blood, she reports increased constipation, her hemoglobin is 8.2, she has no abdominal pain and has been tolerating a regular diet.  11/15/24 hemoglobin 8.2, no further melena overnight had been n.p.o. for possible EGD today, no abdominal pain, no nausea

## 2024-11-15 NOTE — PROGRESS NOTES
OCCUPATIONAL THERAPY  INPATIENT REHAB TREATMENT NOTE  [facility]      NAME: Loren Noyola  : 1935 (89 y.o.)  MRN: [id number]  CODE STATUS: DNR-CCA  Room: R251/R251-01    Date of Service: 11/15/2024    Referring Physician: Deb Haro DO  Rehab Diagnosis: Impaired mobility and ADL's due to debility secondary to GI bleed    Hospital course:   Comments: 89 y.o. female patient with recent hospitalization and rehab stay s/p fall with discovered T12fx. Patient was d/c'd home from rehab on . Patient presented back to ER  with CP and SOB along with reports of having dark stools. Labs notable for hemoglobin of 4.7. Patient admitted to ICU with consults from CC, pulmonology and GI. EGD was performed and no active bleeding found. Did, however, reveal x2 non-bleeding gastric ulcers.      Restrictions  Restrictions/Precautions  Restrictions/Precautions: Fall Risk     Patient's date of birth confirmed: Yes    SAFETY:  Safety Devices  Safety Devices in place: Yes  Type of devices: All fall risk precautions in place    SUBJECTIVE: \"I don't think so. I'm tired.\"    Pain at start of treatment: Yes: 8/10    Pain at end of treatment: Yes: 8/10    Location: B hips, R UE  Description: \"hurts\"  Nursing notified: Yes  Nurse: Jennifer  Intervention: RN provided pain medication    COGNITION:  Orientation  Overall Orientation Status: Within Functional Limits  Cognition  Overall Cognitive Status: Exceptions  Arousal/Alertness: Appears intact  Following Commands: Follows one step commands consistently;Follows multistep commands with repitition  Attention Span: Attends with cues to redirect;Appears intact  Memory: Impaired  Safety Judgement: Decreased awareness of need for assistance;Decreased awareness of need for safety  Problem Solving: Assistance required to implement solutions;Assistance required to correct errors made;Good awareness of errors made;Assistance required to identify errors made  Insights:  Self-Care / ADL    Continue per OT POC for planned discharge on 24.    Patient goals : \"Go home.\"  Time Frame for Long Term Goals : Within 2 weeks, pt to demontsrate progress in the following areas listed below to achieve specific LTG's as stated in the initial evaluation.  Long Term Goal 1: Increase standing balance to increase safety independence with ADL's  Long Term Goal 2: Increase BUE strength to increase safety independence with ADL's  Long Term Goal 3: Increase activity tolerance to increase safety and indepence with ADL's        Therapy Time:   Individual Group Co-Treat   Time In 1540       Time Out 1605         Minutes 25          Variance: 15 (Patient with a rapid response call.)  Reason: Patient care needs        ADL/IADL trainin minutes  Missed 5 minutes due to fatigue, will attempt makeup as able     Electronically signed by:    SLALY Schwab,   11/15/2024, 4:38 PM

## 2024-11-15 NOTE — PROGRESS NOTES
Pt assessed this AM.  Later on in AM, OT therapist informed this writer that the pt stated she felt dizzy, lightheaded and was going to pass out.  When assessed, pt was sitting in w/c in BR, very pale in appearance.  Pts head down and very difficulty to arouse-rapid called.  Hgb now 7.5.   NS bolus ordered and infusing after BP was found to be 75/39 P 60.  See code summary for details.  Plan was for pt to have EGD today with GI, cancelled.  Per Hospitalist, GI contacted and updated on pts status and latest HGB results-awaiting response.  Pts BP improving-see flow sheets.  She is more awake and talking to family and nursing staff present.  Pt is aware of situation and able to recall feeling dizzy.  Pt denies feeling that way at this time.  Will continue to monitor.  Safety maintained.

## 2024-11-15 NOTE — PROGRESS NOTES
Rapid response was called in this morning due to hypotension while patient is using the bathroom.  Hemoglobin dropped further down to 7.5.  Patient was given IV fluid infusion.  Blood pressure improved    General appearance: alert, appears stated age and cooperative, no apparent distress.  Central obesity  Skin: Skin color, texture, turgor normal. No rashes or lesions  HEENT: Head: Normocephalic, no lesions, without obvious abnormality.  Neck: no adenopathy, no carotid bruit, no JVD, supple, symmetrical, trachea midline, and thyroid not enlarged, symmetric, no tenderness/mass/nodules  Lungs: clear to auscultation bilaterally  Heart: regular rate and rhythm, S1, S2 normal, no murmur, click, rub or gallop  Abdomen: soft, non-tender; bowel sounds normal; no masses,  no organomegaly  Extremities: extremities normal, atraumatic, no cyanosis or edema.  Osteoarthritis deformities  Neurologic: Mental status: Alert, oriented, thought content appropriate.  Functional impairment.  Please refer to physical Occupational Therapy team note for details on functional status and treatment plan.      *Upper GI bleed the secondary to gastric ulcer in the setting of patient taking anticoagulant.  Continue PPI.  Continue to monitor H&H.  Defer further needed diagnostic endoscopy and the timing of that intervention to GI team  Meanwhile I recommend patient to have RBC transfusion       *Acute blood loss anemia status post transfusion.  This is associated with hypotension suggestive of hemorrhagic shock.  Recommend patient to have blood transfusion.     *Paroxysmal A-fib.  Currently patient is in sinus rhythm.  Regular heart  Patient is on amiodarone.  Eliquis is on hold.  Patient continues to have a black and slightly bloody stool..  Continue beta-blocker.     *Functional impairment.  Please refer to physical and Occupational Therapy team notes for details on her functional status and treatment plan     *Coronary artery disease.  No

## 2024-11-16 ENCOUNTER — ANESTHESIA EVENT (OUTPATIENT)
Dept: ENDOSCOPY | Age: 89
End: 2024-11-16
Payer: MEDICARE

## 2024-11-16 ENCOUNTER — ANESTHESIA (OUTPATIENT)
Dept: ENDOSCOPY | Age: 89
End: 2024-11-16
Payer: MEDICARE

## 2024-11-16 LAB
BASOPHILS # BLD: 0 K/UL (ref 0–0.2)
BASOPHILS NFR BLD: 0.3 %
EOSINOPHIL # BLD: 0.1 K/UL (ref 0–0.7)
EOSINOPHIL NFR BLD: 1 %
ERYTHROCYTE [DISTWIDTH] IN BLOOD BY AUTOMATED COUNT: 16.6 % (ref 11.5–14.5)
HCT VFR BLD AUTO: 28.8 % (ref 37–47)
HGB BLD-MCNC: 9.1 G/DL (ref 12–16)
LYMPHOCYTES # BLD: 0.8 K/UL (ref 1–4.8)
LYMPHOCYTES NFR BLD: 7 %
MCH RBC QN AUTO: 29.2 PG (ref 27–31.3)
MCHC RBC AUTO-ENTMCNC: 31.6 % (ref 33–37)
MCV RBC AUTO: 92.3 FL (ref 79.4–94.8)
MONOCYTES # BLD: 0.4 K/UL (ref 0.2–0.8)
MONOCYTES NFR BLD: 2.9 %
NEUTROPHILS # BLD: 10.4 K/UL (ref 1.4–6.5)
NEUTS SEG NFR BLD: 89 %
PLATELET # BLD AUTO: 209 K/UL (ref 130–400)
PLATELET BLD QL SMEAR: ADEQUATE
RBC # BLD AUTO: 3.12 M/UL (ref 4.2–5.4)
SLIDE REVIEW: ABNORMAL
SMUDGE CELLS BLD QL SMEAR: 7.7
WBC # BLD AUTO: 11.7 K/UL (ref 4.8–10.8)

## 2024-11-16 PROCEDURE — 6360000002 HC RX W HCPCS: Performed by: INTERNAL MEDICINE

## 2024-11-16 PROCEDURE — 6370000000 HC RX 637 (ALT 250 FOR IP): Performed by: INTERNAL MEDICINE

## 2024-11-16 PROCEDURE — 2580000003 HC RX 258: Performed by: INTERNAL MEDICINE

## 2024-11-16 PROCEDURE — 6370000000 HC RX 637 (ALT 250 FOR IP): Performed by: PHYSICAL MEDICINE & REHABILITATION

## 2024-11-16 PROCEDURE — 7100000010 HC PHASE II RECOVERY - FIRST 15 MIN: Performed by: SPECIALIST

## 2024-11-16 PROCEDURE — 2709999900 HC NON-CHARGEABLE SUPPLY: Performed by: SPECIALIST

## 2024-11-16 PROCEDURE — 6360000002 HC RX W HCPCS: Performed by: STUDENT IN AN ORGANIZED HEALTH CARE EDUCATION/TRAINING PROGRAM

## 2024-11-16 PROCEDURE — 7100000011 HC PHASE II RECOVERY - ADDTL 15 MIN: Performed by: SPECIALIST

## 2024-11-16 PROCEDURE — 0DJ08ZZ INSPECTION OF UPPER INTESTINAL TRACT, VIA NATURAL OR ARTIFICIAL OPENING ENDOSCOPIC: ICD-10-PCS | Performed by: SPECIALIST

## 2024-11-16 PROCEDURE — 97110 THERAPEUTIC EXERCISES: CPT

## 2024-11-16 PROCEDURE — 2580000003 HC RX 258: Performed by: SPECIALIST

## 2024-11-16 PROCEDURE — 99233 SBSQ HOSP IP/OBS HIGH 50: CPT | Performed by: PHYSICAL MEDICINE & REHABILITATION

## 2024-11-16 PROCEDURE — 3609017100 HC EGD: Performed by: SPECIALIST

## 2024-11-16 PROCEDURE — 1180000000 HC REHAB R&B

## 2024-11-16 PROCEDURE — 43235 EGD DIAGNOSTIC BRUSH WASH: CPT | Performed by: SPECIALIST

## 2024-11-16 PROCEDURE — 97535 SELF CARE MNGMENT TRAINING: CPT

## 2024-11-16 PROCEDURE — 85025 COMPLETE CBC W/AUTO DIFF WBC: CPT

## 2024-11-16 PROCEDURE — 36415 COLL VENOUS BLD VENIPUNCTURE: CPT

## 2024-11-16 PROCEDURE — 3700000000 HC ANESTHESIA ATTENDED CARE: Performed by: SPECIALIST

## 2024-11-16 RX ORDER — NYSTATIN 100000 [USP'U]/ML
5 SUSPENSION ORAL 4 TIMES DAILY
Status: DISCONTINUED | OUTPATIENT
Start: 2024-11-16 | End: 2024-11-22 | Stop reason: HOSPADM

## 2024-11-16 RX ORDER — PANTOPRAZOLE SODIUM 40 MG/1
40 TABLET, DELAYED RELEASE ORAL
Status: DISCONTINUED | OUTPATIENT
Start: 2024-11-17 | End: 2024-11-22 | Stop reason: HOSPADM

## 2024-11-16 RX ORDER — SODIUM CHLORIDE 9 MG/ML
INJECTION, SOLUTION INTRAVENOUS PRN
Status: DISCONTINUED | OUTPATIENT
Start: 2024-11-16 | End: 2024-11-16 | Stop reason: HOSPADM

## 2024-11-16 RX ORDER — SODIUM CHLORIDE 9 MG/ML
25 INJECTION, SOLUTION INTRAVENOUS PRN
Status: DISCONTINUED | OUTPATIENT
Start: 2024-11-16 | End: 2024-11-16 | Stop reason: HOSPADM

## 2024-11-16 RX ORDER — NALOXONE HYDROCHLORIDE 0.4 MG/ML
INJECTION, SOLUTION INTRAMUSCULAR; INTRAVENOUS; SUBCUTANEOUS PRN
Status: CANCELLED | OUTPATIENT
Start: 2024-11-16

## 2024-11-16 RX ORDER — SODIUM CHLORIDE 0.9 % (FLUSH) 0.9 %
5-40 SYRINGE (ML) INJECTION PRN
Status: CANCELLED | OUTPATIENT
Start: 2024-11-16

## 2024-11-16 RX ORDER — ONDANSETRON 2 MG/ML
4 INJECTION INTRAMUSCULAR; INTRAVENOUS
Status: CANCELLED | OUTPATIENT
Start: 2024-11-16 | End: 2024-11-17

## 2024-11-16 RX ORDER — SODIUM CHLORIDE 0.9 % (FLUSH) 0.9 %
5-40 SYRINGE (ML) INJECTION EVERY 12 HOURS SCHEDULED
Status: DISCONTINUED | OUTPATIENT
Start: 2024-11-16 | End: 2024-11-16 | Stop reason: HOSPADM

## 2024-11-16 RX ORDER — SODIUM CHLORIDE 9 MG/ML
INJECTION, SOLUTION INTRAVENOUS PRN
Status: CANCELLED | OUTPATIENT
Start: 2024-11-16

## 2024-11-16 RX ORDER — SODIUM CHLORIDE 0.9 % (FLUSH) 0.9 %
5-40 SYRINGE (ML) INJECTION PRN
Status: DISCONTINUED | OUTPATIENT
Start: 2024-11-16 | End: 2024-11-16 | Stop reason: HOSPADM

## 2024-11-16 RX ORDER — LIDOCAINE HYDROCHLORIDE 10 MG/ML
1 INJECTION, SOLUTION EPIDURAL; INFILTRATION; INTRACAUDAL; PERINEURAL
Status: DISCONTINUED | OUTPATIENT
Start: 2024-11-16 | End: 2024-11-16 | Stop reason: HOSPADM

## 2024-11-16 RX ORDER — PROPOFOL 10 MG/ML
INJECTION, EMULSION INTRAVENOUS
Status: DISCONTINUED | OUTPATIENT
Start: 2024-11-16 | End: 2024-11-16 | Stop reason: SDUPTHER

## 2024-11-16 RX ORDER — POVIDONE-IODINE 10 MG/G
OINTMENT TOPICAL PRN
Status: DISCONTINUED | OUTPATIENT
Start: 2024-11-16 | End: 2024-11-22 | Stop reason: HOSPADM

## 2024-11-16 RX ORDER — SODIUM CHLORIDE 0.9 % (FLUSH) 0.9 %
5-40 SYRINGE (ML) INJECTION EVERY 12 HOURS SCHEDULED
Status: CANCELLED | OUTPATIENT
Start: 2024-11-16

## 2024-11-16 RX ADMIN — LABETALOL HYDROCHLORIDE 10 MG: 5 INJECTION, SOLUTION INTRAVENOUS at 23:24

## 2024-11-16 RX ADMIN — ACETAMINOPHEN 325MG 650 MG: 325 TABLET ORAL at 00:47

## 2024-11-16 RX ADMIN — NYSTATIN 500000 UNITS: 100000 SUSPENSION ORAL at 22:25

## 2024-11-16 RX ADMIN — SODIUM CHLORIDE 125 MG: 9 INJECTION, SOLUTION INTRAVENOUS at 22:56

## 2024-11-16 RX ADMIN — PROPOFOL 30 MG: 10 INJECTION, EMULSION INTRAVENOUS at 09:43

## 2024-11-16 RX ADMIN — NYSTATIN 500000 UNITS: 100000 SUSPENSION ORAL at 15:25

## 2024-11-16 RX ADMIN — Medication 10 ML: at 22:55

## 2024-11-16 RX ADMIN — Medication 10 ML: at 10:54

## 2024-11-16 RX ADMIN — Medication 100 MG: at 10:44

## 2024-11-16 RX ADMIN — METOPROLOL TARTRATE 50 MG: 50 TABLET, FILM COATED ORAL at 22:25

## 2024-11-16 RX ADMIN — SODIUM CHLORIDE, PRESERVATIVE FREE 40 MG: 5 INJECTION INTRAVENOUS at 00:42

## 2024-11-16 RX ADMIN — AMIODARONE HYDROCHLORIDE 100 MG: 200 TABLET ORAL at 10:44

## 2024-11-16 RX ADMIN — ACETAMINOPHEN 325MG 650 MG: 325 TABLET ORAL at 04:50

## 2024-11-16 RX ADMIN — SODIUM CHLORIDE, PRESERVATIVE FREE 40 MG: 5 INJECTION INTRAVENOUS at 10:44

## 2024-11-16 RX ADMIN — ACETAMINOPHEN 325MG 650 MG: 325 TABLET ORAL at 10:45

## 2024-11-16 RX ADMIN — PROPOFOL 20 MG: 10 INJECTION, EMULSION INTRAVENOUS at 09:39

## 2024-11-16 RX ADMIN — ACETAMINOPHEN 325MG 650 MG: 325 TABLET ORAL at 22:30

## 2024-11-16 RX ADMIN — ACETAMINOPHEN 325MG 650 MG: 325 TABLET ORAL at 15:26

## 2024-11-16 RX ADMIN — TIZANIDINE 4 MG: 4 TABLET ORAL at 10:45

## 2024-11-16 RX ADMIN — Medication 10 ML: at 22:33

## 2024-11-16 RX ADMIN — Medication 2000 UNITS: at 16:12

## 2024-11-16 RX ADMIN — CALCIUM 500 MG: 500 TABLET ORAL at 10:44

## 2024-11-16 RX ADMIN — METOPROLOL TARTRATE 50 MG: 50 TABLET, FILM COATED ORAL at 10:44

## 2024-11-16 ASSESSMENT — PAIN SCALES - GENERAL
PAINLEVEL_OUTOF10: 8
PAINLEVEL_OUTOF10: 4

## 2024-11-16 ASSESSMENT — PAIN DESCRIPTION - ORIENTATION
ORIENTATION: RIGHT;LEFT
ORIENTATION: LEFT

## 2024-11-16 ASSESSMENT — PAIN DESCRIPTION - LOCATION
LOCATION: LEG
LOCATION: GENERALIZED
LOCATION: HIP
LOCATION: HIP;LEG;BACK

## 2024-11-16 ASSESSMENT — PAIN DESCRIPTION - DESCRIPTORS
DESCRIPTORS: ACHING
DESCRIPTORS: DISCOMFORT
DESCRIPTORS: DISCOMFORT

## 2024-11-16 ASSESSMENT — ENCOUNTER SYMPTOMS: SHORTNESS OF BREATH: 1

## 2024-11-16 ASSESSMENT — PAIN SCALES - WONG BAKER: WONGBAKER_NUMERICALRESPONSE: HURTS WHOLE LOT

## 2024-11-16 NOTE — PROGRESS NOTES
Subjective:  The patient complains of severe acute on chronic progressive fatigue and mid and low back pain partially relieved by rest, medications, PT,  OT,     and rest and exacerbated by recent fall.  89 yo female was admitted to [facility] after presenting to the ER with pain in her head neck and back after falling at home striking her head on the cushions of her sofa.  She had several previous falls with ER visits in the week prior.  She was found to have a T12 compression fracture and admitted under the care of the hospitalist.  She was seen by the neurosurgeon PA and discussed with neurosurgery this is likely old and there is now planned for surgical intervention.     I am concerned about patient’s medical complexities and barriers to advancing in rehab goals including improving urinary incontinence and GIB.        I reviewed current care and plans for further care with other rehab providers including nursing and case management.  According to recent nursing note, \"RBCs transfusing at this time, tolerating well. Pts son at the bedside. No needs stated by the pt at this time. No dizziness reported since rapid response called this AM. See flowsheets for VS.  \"    Today I reevaluated and discussed her GI status.  She has some thrush for which we will treat of course she had the upper endoscopy earlier today we are awaiting official results but we are told that they did not perform any procedures.  It looks like she has an old ulcer that is still there.  They are considering doing a lower endoscopy as well.  She is uncomfortable in her back where the T12 fracture is and she states that it is relieved by the Lidoderm patches and heat.  I have reinforced that she should have these when she wants them.  She is also going to have her family bring in some special support stockings from home.  She has an ulcer in her right great toe base which it was covered with a Mepilex I would like that to be cleaned Betadine  concern for edge of bed awareness and safety (11/13/24 1014)  Transfers:  Transfers  Sit to Stand: Minimal Assistance (steadying assistance) (11/13/24 1015)  Stand to Sit: Minimal Assistance (11/13/24 1015)  Bed to Chair: Minimal assistance (with ww - increased lateral sway noted) (11/13/24 1015)  Transfers  Surface: Wheelchair (11/14/24 1145)  Additional Factors: Verbal cues;Hand placement cues;Increased time to complete (11/14/24 1145)  Device: Walker (11/14/24 1145)  Sit to Stand  Assistance Level: Stand by assist (11/14/24 1145)  Skilled Clinical Factors: vc's to push up from WC vs pulling up on WW (11/14/24 1145)  Stand to Sit  Assistance Level: Stand by assist (11/14/24 1145)  Skilled Clinical Factors: vc's for hand placement and safety, good technique (11/14/24 1145)  Stand Pivot  Assistance Level: Minimal assistance (11/13/24 1442)  Skilled Clinical Factors: wc to wc, vc's for safety and sequencing, steadying assist throughout (11/13/24 1442)  Gait:   Ambulation  Surface: Level tile (11/13/24 1025)  Device: Rollator (11/13/24 1025)  Assistance: Minimal assistance;Moderate assistance (11/13/24 1025)  Quality of Gait: antalgic gait with decreased LLE stance time and pelvic stability, flexed posture, step to gait pattern, unsteady with LOB lat in change of direction requiring assistance at trunk for correction, short step length, poor scanning of environment (11/13/24 1025)  Distance: 30 feet (11/13/24 1025)  Comments: additional gait with ww 30 feet - improved quality noted and pt reports less pain and greater confidence (11/13/24 1025)  Ambulation  Surface: Level surface (11/14/24 1145)  Device: Rolling walker (11/14/24 1145)  Distance: 35' x 2 (11/14/24 1145)  Activity: Within Unit (11/14/24 1145)  Additional Factors: Verbal cues;Hand placement cues;Increased time to complete (11/14/24 1145)  Assistance Level: Minimal assistance;Stand by assist (11/14/24 1145)  Gait Deviations: Slow ximena;Narrow base of

## 2024-11-16 NOTE — PLAN OF CARE
Problem: Safety - Adult  Goal: Free from fall injury  11/16/2024 1347 by Yari Headley RN  Outcome: Progressing     Problem: Discharge Planning  Goal: Discharge to home or other facility with appropriate resources  11/16/2024 1347 by Yari Headley RN  Outcome: Progressing     Problem: ABCDS Injury Assessment  Goal: Absence of physical injury  11/16/2024 1347 by Yari Headley RN  Outcome: Progressing     Problem: Skin/Tissue Integrity  Goal: Absence of new skin breakdown  Description: 1.  Monitor for areas of redness and/or skin breakdown  2.  Assess vascular access sites hourly  3.  Every 4-6 hours minimum:  Change oxygen saturation probe site  4.  Every 4-6 hours:  If on nasal continuous positive airway pressure, respiratory therapy assess nares and determine need for appliance change or resting period.  11/16/2024 1347 by Yari Headley, RN  Outcome: Progressing

## 2024-11-16 NOTE — PROGRESS NOTES
OCCUPATIONAL THERAPY  INPATIENT REHAB TREATMENT NOTE  [facility]      NAME: Loren Noyola  : 1935 (89 y.o.)  MRN: [id number]  CODE STATUS: DNR-CCA  Room: R251/R251-01    Date of Service: 2024    Referring Physician: Deb Haro DO  Rehab Diagnosis: Impaired mobility and ADL's due to debility secondary to GI bleed    Hospital course:   Comments: 89 y.o. female patient with recent hospitalization and rehab stay s/p fall with discovered T12fx. Patient was d/c'd home from rehab on . Patient presented back to ER  with CP and SOB along with reports of having dark stools. Labs notable for hemoglobin of 4.7. Patient admitted to ICU with consults from CC, pulmonology and GI. EGD was performed and no active bleeding found. Did, however, reveal x2 non-bleeding gastric ulcers.      Restrictions  Restrictions/Precautions  Restrictions/Precautions: Fall Risk                 Patient's date of birth confirmed: Yes    SAFETY:  Safety Devices  Safety Devices in place: Yes  Type of devices: All fall risk precautions in place    SUBJECTIVE: Pt agreeable to OT treatment.        Pain at start of treatment: Yes: Pt. unable to rate pain-      Pain at end of treatment: Yes: Pt. unable to rate pain-      Location: R hip  Description: did not state description   Nursing notified: Declined  Nurse: Yari  Intervention: Repositioned    COGNITION:  Orientation  Overall Orientation Status: Within Functional Limits  Orientation Level: Oriented to person;Oriented to time;Oriented to situation;Oriented to place  Cognition  Overall Cognitive Status: Exceptions  Arousal/Alertness: Appears intact  Following Commands: Follows all commands without difficulty  Attention Span: Attends with cues to redirect;Appears intact  Insights: Decreased awareness of deficits  Initiation: Appears intact  Sequencing: Requires cues for some    OBJECTIVE:  Pt agreeable to be seen at bed level for OT treatment with multiple family    11/16/2024, 2:33 PM

## 2024-11-16 NOTE — ANESTHESIA PRE PROCEDURE
Deb Haro,    metoprolol tartrate (LOPRESSOR) 50 MG tablet Take 1 tablet by mouth 2 times daily 2/22/18  Yes Woo Samaniego MD   losartan (COZAAR) 50 MG tablet Take 1 tablet by mouth in the morning and at bedtime   Yes Yrn Parsons MD   nitroGLYCERIN (NITROLINGUAL) 0.4 MG/SPRAY spray Place 1 spray onto the tongue every 5 minutes as needed for Chest pain   Yes Yrn Parsons MD   calcium carbonate (OSCAL) 500 MG TABS tablet Take 1 tablet by mouth daily   Yes Yrn Parsons MD   aspirin 81 MG EC tablet Take 1 tablet by mouth daily   Yes Yrn Parsons MD   acetaminophen (TYLENOL) 500 MG tablet Take 1 tablet by mouth every 6 hours as needed   Yes Yrn Parsons MD   methylPREDNISolone (MEDROL DOSEPACK) 4 MG tablet Take 1 tablet by mouth See Admin Instructions Take by mouth.    Yrn Parsons MD   tiZANidine (ZANAFLEX) 4 MG capsule Take 1 capsule by mouth 3 times daily  Patient not taking: Reported on 11/13/2024 11/5/24 2/3/25  ProviderYrn MD       Current medications:    Current Facility-Administered Medications   Medication Dose Route Frequency Provider Last Rate Last Admin    sodium chloride flush 0.9 % injection 5-40 mL  5-40 mL IntraVENous 2 times per day Melvi Shahid MD        sodium chloride flush 0.9 % injection 5-40 mL  5-40 mL IntraVENous PRN Melvi Shahid MD        0.9 % sodium chloride infusion  25 mL IntraVENous PRN Melvi Shahid MD        0.9 % sodium chloride infusion   IntraVENous PRN Emily Obrien MD        sodium chloride flush 0.9 % injection 5-40 mL  5-40 mL IntraVENous 2 times per day Emily Obrien MD   10 mL at 11/15/24 2203    sodium chloride flush 0.9 % injection 5-40 mL  5-40 mL IntraVENous PRN Emily Obrien MD        0.9 % sodium chloride infusion   IntraVENous PRN Emily Obrien MD        traMADol (ULTRAM) tablet 50 mg  50 mg Oral Q6H PRN Deb Haro DO        cyanocobalamin injection 1,000 mcg  1,000

## 2024-11-16 NOTE — PROGRESS NOTES
Physical Therapy Rehab Treatment Note  Facility/Department: [facility]  Room: [place] [address]       NAME: Loren Noyola  : 1935 (89 y.o.)  MRN: [id number]  CODE STATUS: DNR-CCA    Date of Service: 2024       Restrictions:  Restrictions/Precautions: Fall Risk       SUBJECTIVE:   Subjective: \"I can't get out of the bed\"    Pain  Pain: denies pain pre/post session      OBJECTIVE:      PT Exercises  A/AROM Exercises: supine AP/GS/QS/Hip Abd/SLR/HS x10 BLE     Activity Tolerance  Activity Tolerance: Patient limited by fatigue;Patient limited by endurance;Treatment limited secondary to medical complications    ASSESSMENT/PROGRESS TOWARDS GOALS:   Assessment  Assessment: Pt continues to be limited from EGD this date and increased fatigue from procedure. Initially declines therapy, but agreeable with encouragement to attempt bed level exercises. Fatigues quickly and unable to tolerate full session.  Activity Tolerance: Patient limited by fatigue;Patient limited by endurance;Treatment limited secondary to medical complications    Goals:  Long Term Goals  Long Term Goal 1: Pt will demonstrate bed mobility indep  Long Term Goal 2: Pt will demonstrate bed, sit to stand and car transfers mod indep with safest AD  Long Term Goal 3: Pt will demonstrate amb 50ft mod indep with safest AD - SBA for 150 feet  Long Term Goal 4: Pt will demonstrate TUG </= 20 sec for decreased risk for falls  Long Term Goal 5: SBA 4-5 stairs for safe home entry  Patient Goals   Patient Goals : to go home    PLAN OF CARE/Safety:   Safety Devices  Type of Devices: All fall risk precautions in place;Bed alarm in place;Call light within reach;Left in bed      Therapy Time:   Individual   Time In 1300   Time Out 1315   Minutes 15      Minutes: 15  Transfer/Bed mobility training:  Gait training:  Neuro re education:  Therapeutic ex: 15  Pt missed 15 mins d/t fatigue and falling asleep.     Elfego Reyes PTA, 24 at 1:21 PM

## 2024-11-16 NOTE — PLAN OF CARE
Problem: Safety - Adult  Goal: Free from fall injury  Outcome: Progressing     Problem: Discharge Planning  Goal: Discharge to home or other facility with appropriate resources  Outcome: Progressing  Flowsheets (Taken 11/15/2024 0902 by Jennifer Marquis, RN)  Discharge to home or other facility with appropriate resources: Identify barriers to discharge with patient and caregiver     Problem: ABCDS Injury Assessment  Goal: Absence of physical injury  Outcome: Progressing     Problem: Skin/Tissue Integrity  Goal: Absence of new skin breakdown  Description: 1.  Monitor for areas of redness and/or skin breakdown  2.  Assess vascular access sites hourly  3.  Every 4-6 hours minimum:  Change oxygen saturation probe site  4.  Every 4-6 hours:  If on nasal continuous positive airway pressure, respiratory therapy assess nares and determine need for appliance change or resting period.  Outcome: Progressing     Problem: Pain  Goal: Verbalizes/displays adequate comfort level or baseline comfort level  Outcome: Progressing     Problem: Nutrition Deficit:  Goal: Optimize nutritional status  Outcome: Progressing

## 2024-11-16 NOTE — ANESTHESIA POSTPROCEDURE EVALUATION
Department of Anesthesiology  Postprocedure Note    Patient: Loren Noyola  MRN: [id number]  YOB: 1935  Date of evaluation: 11/16/2024    Procedure Summary       Date: 11/16/24 Room / Location: [facility] OR 02 / [facility]    Anesthesia Start: 0937 Anesthesia Stop:     Procedure: ESOPHAGOGASTRODUODENOSCOPY Diagnosis:       GI bleeding      (GI bleeding [K92.2])    Surgeons: Melvi Shahid MD Responsible Provider: Jorge A Snell DO    Anesthesia Type: MAC ASA Status: 4 - Emergent            Anesthesia Type: MAC    Chaz Phase I: Chaz Score: 10    Chaz Phase II:      Anesthesia Post Evaluation    Patient location during evaluation: bedside  Patient participation: complete - patient participated  Level of consciousness: awake and awake and alert  Pain score: 0  Airway patency: patent  Nausea & Vomiting: no nausea and no vomiting  Cardiovascular status: blood pressure returned to baseline and hemodynamically stable  Respiratory status: acceptable  Hydration status: euvolemic  Pain management: adequate        No notable events documented.

## 2024-11-16 NOTE — PROGRESS NOTES
Physical Therapy Missed Treatment   Facility/Department: [facility] MED SURG [facility]- [facility] OR/NONE    NAME: Lroen Noyola    : 1935 (89 y.o.)  MRN: [id number]    Account: [id number]  Gender: female    Chart reviewed, attempted PT at 1000. Patient unavailable 2° to:    [x] Hold per nsg request- pt initially off floor with first attempt at 1000 for EGD, pt back in room at 1025 attempt with elevated BP reading 201/66, RN requesting to hold therapy at this time d/t increased readings. Missed 60 mins of scheduled session this AM.     Will attempt PT treatment again at earliest convenience.      Electronically signed by Elfego Reyes PTA on 24 at 10:31 AM EST

## 2024-11-16 NOTE — PROGRESS NOTES
Rapid response was called in this morning due to hypotension while patient is using the bathroom.  Hemoglobin dropped further down to 7.5.  Patient was given IV fluid infusion.  Blood pressure improved    General appearance: alert, appears stated age and cooperative, no apparent distress.  Central obesity  Skin: Skin color, texture, turgor normal. No rashes or lesions  HEENT: Head: Normocephalic, no lesions, without obvious abnormality.  Neck: no adenopathy, no carotid bruit, no JVD, supple, symmetrical, trachea midline, and thyroid not enlarged, symmetric, no tenderness/mass/nodules  Lungs: clear to auscultation bilaterally  Heart: regular rate and rhythm, S1, S2 normal, no murmur, click, rub or gallop  Abdomen: soft, non-tender; bowel sounds normal; no masses,  no organomegaly  Extremities: extremities normal, atraumatic, no cyanosis or edema.  Osteoarthritis deformities  Neurologic: Mental status: Alert, oriented, thought content appropriate.  Functional impairment.  Please refer to physical Occupational Therapy team note for details on functional status and treatment plan.      *Upper GI bleed the secondary to gastric ulcer in the setting of patient taking anticoagulant.  Continue PPI.  Patient had another EGD which did not show any active bleed.  Hemoglobin is stable posttransfusion       *Acute blood loss anemia status post transfusion.  This is associated with hypotension suggestive of hemorrhagic shock..  Status post 1 unit of RBC transfusion on 11/15 hemoglobin stable posttransfusion     *Paroxysmal A-fib.  Currently patient is in sinus rhythm.  Regular heart  Patient is on amiodarone.  Eliquis is on hold.  Patient continues to have a black and slightly bloody stool..  Continue beta-blocker.     *Functional impairment.  Please refer to physical and Occupational Therapy team notes for details on her functional status and treatment plan     *Coronary artery disease.  No active chest pain or angina    *CKD

## 2024-11-17 LAB
BASOPHILS # BLD: 0 K/UL (ref 0–0.2)
BASOPHILS NFR BLD: 0.3 %
EOSINOPHIL # BLD: 0.2 K/UL (ref 0–0.7)
EOSINOPHIL NFR BLD: 2.3 %
ERYTHROCYTE [DISTWIDTH] IN BLOOD BY AUTOMATED COUNT: 16.5 % (ref 11.5–14.5)
HCT VFR BLD AUTO: 28.8 % (ref 37–47)
HGB BLD-MCNC: 9 G/DL (ref 12–16)
LYMPHOCYTES # BLD: 1 K/UL (ref 1–4.8)
LYMPHOCYTES NFR BLD: 9.7 %
MCH RBC QN AUTO: 29.2 PG (ref 27–31.3)
MCHC RBC AUTO-ENTMCNC: 31.3 % (ref 33–37)
MCV RBC AUTO: 93.5 FL (ref 79.4–94.8)
MONOCYTES # BLD: 1.3 K/UL (ref 0.2–0.8)
MONOCYTES NFR BLD: 12.4 %
NEUTROPHILS # BLD: 7.8 K/UL (ref 1.4–6.5)
NEUTS SEG NFR BLD: 74.6 %
PLATELET # BLD AUTO: 216 K/UL (ref 130–400)
RBC # BLD AUTO: 3.08 M/UL (ref 4.2–5.4)
WBC # BLD AUTO: 10.4 K/UL (ref 4.8–10.8)

## 2024-11-17 PROCEDURE — 2580000003 HC RX 258: Performed by: INTERNAL MEDICINE

## 2024-11-17 PROCEDURE — 6370000000 HC RX 637 (ALT 250 FOR IP): Performed by: INTERNAL MEDICINE

## 2024-11-17 PROCEDURE — 97535 SELF CARE MNGMENT TRAINING: CPT

## 2024-11-17 PROCEDURE — 99232 SBSQ HOSP IP/OBS MODERATE 35: CPT | Performed by: PHYSICAL MEDICINE & REHABILITATION

## 2024-11-17 PROCEDURE — 85025 COMPLETE CBC W/AUTO DIFF WBC: CPT

## 2024-11-17 PROCEDURE — 97116 GAIT TRAINING THERAPY: CPT

## 2024-11-17 PROCEDURE — 99232 SBSQ HOSP IP/OBS MODERATE 35: CPT | Performed by: INTERNAL MEDICINE

## 2024-11-17 PROCEDURE — 6370000000 HC RX 637 (ALT 250 FOR IP): Performed by: PHYSICAL MEDICINE & REHABILITATION

## 2024-11-17 PROCEDURE — 6360000002 HC RX W HCPCS: Performed by: INTERNAL MEDICINE

## 2024-11-17 PROCEDURE — 1180000000 HC REHAB R&B

## 2024-11-17 PROCEDURE — 36415 COLL VENOUS BLD VENIPUNCTURE: CPT

## 2024-11-17 PROCEDURE — 97530 THERAPEUTIC ACTIVITIES: CPT

## 2024-11-17 PROCEDURE — 2700000000 HC OXYGEN THERAPY PER DAY

## 2024-11-17 RX ORDER — HYDRALAZINE HYDROCHLORIDE 50 MG/1
50 TABLET, FILM COATED ORAL EVERY 12 HOURS SCHEDULED
Status: DISCONTINUED | OUTPATIENT
Start: 2024-11-17 | End: 2024-11-18

## 2024-11-17 RX ORDER — SUCRALFATE 1 G/1
1 TABLET ORAL EVERY 12 HOURS SCHEDULED
Status: DISCONTINUED | OUTPATIENT
Start: 2024-11-17 | End: 2024-11-22 | Stop reason: HOSPADM

## 2024-11-17 RX ADMIN — Medication 2000 UNITS: at 17:00

## 2024-11-17 RX ADMIN — Medication 10 ML: at 08:32

## 2024-11-17 RX ADMIN — ACETAMINOPHEN 325MG 650 MG: 325 TABLET ORAL at 17:00

## 2024-11-17 RX ADMIN — NYSTATIN 500000 UNITS: 100000 SUSPENSION ORAL at 08:32

## 2024-11-17 RX ADMIN — Medication 100 MG: at 08:33

## 2024-11-17 RX ADMIN — SODIUM CHLORIDE 125 MG: 9 INJECTION, SOLUTION INTRAVENOUS at 22:19

## 2024-11-17 RX ADMIN — SUCRALFATE 1 G: 1 TABLET ORAL at 11:24

## 2024-11-17 RX ADMIN — Medication 10 ML: at 21:09

## 2024-11-17 RX ADMIN — METOPROLOL TARTRATE 50 MG: 50 TABLET, FILM COATED ORAL at 08:33

## 2024-11-17 RX ADMIN — LABETALOL HYDROCHLORIDE 10 MG: 5 INJECTION, SOLUTION INTRAVENOUS at 04:14

## 2024-11-17 RX ADMIN — PANTOPRAZOLE SODIUM 40 MG: 40 TABLET, DELAYED RELEASE ORAL at 06:38

## 2024-11-17 RX ADMIN — ACETAMINOPHEN 325MG 650 MG: 325 TABLET ORAL at 08:33

## 2024-11-17 RX ADMIN — AMIODARONE HYDROCHLORIDE 100 MG: 200 TABLET ORAL at 08:32

## 2024-11-17 RX ADMIN — ACETAMINOPHEN 325MG 650 MG: 325 TABLET ORAL at 12:30

## 2024-11-17 RX ADMIN — SUCRALFATE 1 G: 1 TABLET ORAL at 20:51

## 2024-11-17 RX ADMIN — NYSTATIN 500000 UNITS: 100000 SUSPENSION ORAL at 12:30

## 2024-11-17 RX ADMIN — NYSTATIN 500000 UNITS: 100000 SUSPENSION ORAL at 17:01

## 2024-11-17 RX ADMIN — METOPROLOL TARTRATE 50 MG: 50 TABLET, FILM COATED ORAL at 20:51

## 2024-11-17 RX ADMIN — Medication 10 ML: at 21:08

## 2024-11-17 RX ADMIN — LABETALOL HYDROCHLORIDE 10 MG: 5 INJECTION, SOLUTION INTRAVENOUS at 11:30

## 2024-11-17 RX ADMIN — CALCIUM 500 MG: 500 TABLET ORAL at 08:32

## 2024-11-17 RX ADMIN — ACETAMINOPHEN 325MG 650 MG: 325 TABLET ORAL at 04:19

## 2024-11-17 RX ADMIN — Medication 10 ML: at 08:36

## 2024-11-17 RX ADMIN — NYSTATIN 500000 UNITS: 100000 SUSPENSION ORAL at 20:51

## 2024-11-17 RX ADMIN — ACETAMINOPHEN 325MG 650 MG: 325 TABLET ORAL at 20:51

## 2024-11-17 RX ADMIN — HYDRALAZINE HYDROCHLORIDE 50 MG: 50 TABLET ORAL at 20:51

## 2024-11-17 ASSESSMENT — PAIN SCALES - GENERAL
PAINLEVEL_OUTOF10: 7
PAINLEVEL_OUTOF10: 5
PAINLEVEL_OUTOF10: 7
PAINLEVEL_OUTOF10: 6
PAINLEVEL_OUTOF10: 7
PAINLEVEL_OUTOF10: 8
PAINLEVEL_OUTOF10: 7
PAINLEVEL_OUTOF10: 8

## 2024-11-17 ASSESSMENT — PAIN DESCRIPTION - LOCATION
LOCATION: BACK;HIP
LOCATION: HIP;LEG
LOCATION: BACK;HIP

## 2024-11-17 ASSESSMENT — PAIN DESCRIPTION - DESCRIPTORS
DESCRIPTORS: ACHING

## 2024-11-17 ASSESSMENT — PAIN SCALES - WONG BAKER: WONGBAKER_NUMERICALRESPONSE: HURTS WHOLE LOT

## 2024-11-17 ASSESSMENT — PAIN DESCRIPTION - ORIENTATION
ORIENTATION: LEFT;LOWER
ORIENTATION: RIGHT
ORIENTATION: LOWER;RIGHT
ORIENTATION: LOWER;RIGHT

## 2024-11-17 NOTE — PROGRESS NOTES
Subjective:  The patient complains of severe acute on chronic progressive fatigue and mid and low back pain partially relieved by rest, medications, PT,  OT,     and rest and exacerbated by recent fall.  89 yo female was admitted to [facility] after presenting to the ER with pain in her head neck and back after falling at home striking her head on the cushions of her sofa.  She had several previous falls with ER visits in the week prior.  She was found to have a T12 compression fracture and admitted under the care of the hospitalist.  She was seen by the neurosurgeon PA and discussed with neurosurgery this is likely old and there is now planned for surgical intervention.     I am concerned about patient’s medical complexities and barriers to advancing in rehab goals including improving urinary incontinence and GIB.        I reviewed current care and plans for further care with other rehab providers including nursing and case management.  According to recent nursing note, \"C/o 8/10 pain to right hip, leg and lower back. Tramadol offered, patient refused. States she only takes Tylenol. Patient's blood pressure is quiet elevated. Will medicate with scheduled Lopressor and for pain to see if that helps before giving PRN Labetalol. Patient is noted to be very anxious and concerned about several different things. Patient stuck on why she has so much bruising to her RUE, recent double lumen midline just placed, explained bruising is normal. Old drainage noted to midline dressing, this nurse offered to change the dressing which patient refused at this time. Patient also c/o pain to left wrist IV when flushing, however IV CDI and flushes well. This nurse noted patient to be very sensitive to touch and movement. Needs much attention. Patient assisted to bed side commode. Large bowel movement, no blood noted in stool. Returned to bed. Bed locked and low with alarm engaged. Call light within reach.     Patient's son and  Nasal mucosa clear.  Mouth with normal mucosa  Throat has no vesicles, Exudate in right oropharynx. Uvula is midline.

## 2024-11-17 NOTE — PROGRESS NOTES
OCCUPATIONAL THERAPY  INPATIENT REHAB TREATMENT NOTE  [facility]      NAME: Loren Noyola  : 1935 (89 y.o.)  MRN: [id number]  CODE STATUS: DNR-CCA  Room: R251/R251-01    Date of Service: 2024    Referring Physician: Deb Haro DO  Rehab Diagnosis: Impaired mobility and ADL's due to debility secondary to GI bleed    Hospital course:   Comments: 89 y.o. female patient with recent hospitalization and rehab stay s/p fall with discovered T12fx. Patient was d/c'd home from rehab on . Patient presented back to ER  with CP and SOB along with reports of having dark stools. Labs notable for hemoglobin of 4.7. Patient admitted to ICU with consults from CC, pulmonology and GI. EGD was performed and no active bleeding found. Did, however, reveal x2 non-bleeding gastric ulcers.      Restrictions  Restrictions/Precautions  Restrictions/Precautions: Fall Risk                 Patient's date of birth confirmed: Yes    SAFETY:  Safety Devices  Type of devices: All fall risk precautions in place;Chair alarm in place;Left in chair    SUBJECTIVE:       Pain at start of treatment: Yes: 7/10 \"little bit of pain\"    Pain at end of treatment: Yes: 6/10    Location: R hip  Description: pain   Nursing notified: Yes  Nurse: Whit  Intervention: Other: Pt due for pain meds in 1 hour- nsg aware of pain    OBJECTIVE:    Pt participated in BUE strengthening activities using 2 # weight with RUE and against gravity with LUE due to pain in elbow and shoulder. Pt tolerated 10 reps of elbow flex/extension, shoulder flexion, horizontal ABD/ADD, and scapular protraction/retraction.      Pt completed Sit to stand transfer from bed to w/c with SBA and verbal cues for hand placement. Left in chair at end of session.     Education:  Education  Education Given To: Patient  Education Provided Comments: Pt educated on BUE exercises and modifications d/t pain  Education Method: Demonstration;Verbal  Barriers to

## 2024-11-17 NOTE — PLAN OF CARE
Problem: Safety - Adult  Goal: Free from fall injury  11/16/2024 2244 by Gabriela Butler RN  Outcome: Progressing     Problem: Discharge Planning  Goal: Discharge to home or other facility with appropriate resources  11/16/2024 2244 by Gabriela Butler RN  Outcome: Progressing     Problem: ABCDS Injury Assessment  Goal: Absence of physical injury  11/16/2024 2244 by Gabriela Butler RN  Outcome: Progressing     Problem: Skin/Tissue Integrity  Goal: Absence of new skin breakdown  Description: 1.  Monitor for areas of redness and/or skin breakdown  2.  Assess vascular access sites hourly  3.  Every 4-6 hours minimum:  Change oxygen saturation probe site  4.  Every 4-6 hours:  If on nasal continuous positive airway pressure, respiratory therapy assess nares and determine need for appliance change or resting period.  11/16/2024 2244 by Gabriela Butler RN  Outcome: Progressing     Problem: Pain  Goal: Verbalizes/displays adequate comfort level or baseline comfort level  11/16/2024 2244 by Gabriela Butler RN  Outcome: Progressing     Problem: Nutrition Deficit:  Goal: Optimize nutritional status  11/16/2024 2244 by Gabriela Butler RN  Outcome: Progressing

## 2024-11-17 NOTE — PROGRESS NOTES
Physical Therapy Rehab Treatment Note  [facility] [facility]  [facility]       NAME: Loren Noyola  : 1935 (89 y.o.)  MRN: [id number]  CODE STATUS: DNR-CCA    Date of Service: 2024     Restrictions:  Restrictions/Precautions: Fall Risk    SUBJECTIVE: Patient supine in bed, agreeable to therapy.      Pain   Reports pain to right hip, states ok for therapy, no number given on pain scale, declined RN or med intervention, patient given cp post session, rn notified    OBJECTIVE:         Bed mobility  Rolling to Left: Stand by assistance  Rolling to Right: Stand by assistance  Supine to Sit: Stand by assistance  Sit to Supine: Unable to assess  Bed Mobility Comments: hob elevated, hr used, increased time and effort    Transfers  Sit to Stand: Stand by assistance;Minimal Assistance  Stand to Sit: Stand by assistance;Minimal Assistance  Bed to Chair: Stand by assistance;Minimal assistance  Comment: vc for hand placement, fair carryover, improved anterior weight shift without cues, intermitent steadying  Sit to stand several times throughout session for quality and strength.    Ambulation  Surface: Level tile  Device: Rollator  Assistance: Minimal assistance;Stand by assistance  Quality of Gait: intermit steadying with turns, downward gaze, cues for upright posture, short step length and height  Distance: 40 feet x2 several turns, rest break between     Seated hip and knee exercises x20     ASSESSMENT/PROGRESS TOWARDS GOALS:   Body Structures, Functions, Activity Limitations Requiring Skilled Therapeutic Intervention: Decreased functional mobility ;Decreased strength;Decreased endurance;Decreased balance;Increased pain  Assessment: Improved mobility noted this session, worked toward gait and transfer with focus on improved quality and strength. Blocked practice for sit to stand x6 throughout session. Verbal cues for technque with fair carryover. Seated hip and knee therex for ble

## 2024-11-17 NOTE — FLOWSHEET NOTE
1030 Patient assessment complete. C/o 8/10 pain to right hip, leg and lower back. Tramadol offered, patient refused. States she only takes Tylenol. Patient's blood pressure is quiet elevated. Will medicate with scheduled Lopressor and for pain to see if that helps before giving PRN Labetalol. Patient is noted to be very anxious and concerned about several different things. Patient stuck on why she has so much bruising to her RUE, recent double lumen midline just placed, explained bruising is normal. Old drainage noted to midline dressing, this nurse offered to change the dressing which patient refused at this time. Patient also c/o pain to left wrist IV when flushing, however IV CDI and flushes well. This nurse noted patient to be very sensitive to touch and movement. Needs much attention. Patient assisted to bed side commode. Large bowel movement, no blood noted in stool. Returned to bed. Bed locked and low with alarm engaged. Call light within reach.    Patient's son and daughter in law called this nurse stating patient called them stating she was confused why she was receiving the \"red fluid\" and that patient was worried about her blood pressure being elevated. Family stating that we need to stay on top of patient's pain or it will get out of control as it has in the past. Family members educated that patient was receiving IV Ferric Gluconate and that patient refused Tramadol and only wants to take Tylenol, which is PRN so patient would have to ask for it since it is not a scheduled medication. Also educated on the overuse of Tylenol around the clock everyday is not good, nonetheless that this nurse will relay to day shift that family and patient wants Tylenol to be scheduled for her.    Blood pressure still elevated at 180/78. PRN Labetalol given. Electronically signed by Gabriela Butler RN on 11/16/2024 at 11:11 PM    0015 Blood pressure rechecked and still elevated at 173/71, however too soon for another dose of

## 2024-11-17 NOTE — PROGRESS NOTES
PROGRESS NOTE    Patient Name: Loren Noyola  Admit Date: 2024  5:04 PM  MR #: [id number]  : 1935    Attending Physician: Deb Haro DO  Reason for consult: AF and off A/C due to GIB    History of Presenting Illness:      Loren Noyola is a 89 y.o. female on hospital day 5 with a history of .   History Obtained From:  patient, electronic medical record    Pt readmitted w GIB with black stools.  Hb 4.7   He had been on Eliquis for PAF.   She has no CP nor SOB.  She is eating fine. She is doing well w Acute Rehab.   Hb 7.4 this am.     Recent fall and suffered T12 compression Fx.      She has had other falls as well.  She was on low dose Eliquis and Amiodarone for AF.      24   ECG this AM SR. NO CP no SOB transfused.  Continued Melena    24 continued HTN.  Feels well no cp no sob Hb stable at 9.0  History:      EKG:  Past Medical History:   Diagnosis Date    Bleeding ulcer     CAD (coronary artery disease)     Colitis     Hip fx, left, closed, initial encounter ([facility]) 02/10/2018    Hyperlipidemia     Hypertension     Osteoarthritis     Osteoporosis     Spinal stenosis      Past Surgical History:   Procedure Laterality Date    APPENDECTOMY      COLECTOMY  1/3/2011    sigmoid colectomy with colostomy    COLECTOMY  2011    partial colectomy with colostomy closure    CORONARY ARTERY BYPASS GRAFT      HYSTERECTOMY (CERVIX STATUS UNKNOWN)      HYSTERECTOMY, TOTAL ABDOMINAL (CERVIX REMOVED)      IR MIDLINE CATH  2024    IR MIDLINE CATH 2024 [facility] PROCEDURE    [place] HEMIARTHROPLASTY HIP PARTIAL Left 2018    HIP HEMIARTHROPLASTY- performed by Bryan Malcolm MD at [facility] OR    SIGMOIDOSCOPY      UPPER GASTROINTESTINAL ENDOSCOPY N/A 2024    ESOPHAGOGASTRODUODENOSCOPY WITH BX'S performed by Diaz Solares MD at [facility] GASTRO CENTER    VARICOSE VEIN SURGERY         Family History  History reviewed. No pertinent family history.  [] Unable to obtain  by mouth daily  acetaminophen (TYLENOL) 500 MG tablet, Take 1 tablet by mouth every 6 hours as needed  methylPREDNISolone (MEDROL DOSEPACK) 4 MG tablet, Take 1 tablet by mouth See Admin Instructions Take by mouth.  tiZANidine (ZANAFLEX) 4 MG capsule, Take 1 capsule by mouth 3 times daily (Patient not taking: Reported on 11/13/2024)    Current Hospital Medications:     Scheduled Meds:   sucralfate  1 g Oral 2 times per day    nystatin  5 mL Oral 4x Daily    pantoprazole  40 mg Oral QAM AC    sodium chloride flush  5-40 mL IntraVENous 2 times per day    cyanocobalamin  1,000 mcg IntraMUSCular Weekly    coenzyme Q10  100 mg Oral Daily    lidocaine  3 patch TransDERmal Daily    ferric gluconate (FERRLECIT) 125 mg in sodium chloride 0.9 % 100 mL IVPB  125 mg IntraVENous Daily    sodium chloride flush  5-40 mL IntraVENous 2 times per day    lidocaine  1 patch TransDERmal Daily    influenza virus vaccine  0.5 mL IntraMUSCular Prior to discharge    amiodarone  100 mg Oral Daily    calcium elemental  500 mg Oral Daily    metoprolol tartrate  50 mg Oral BID    Vitamin D  2,000 Units Oral Dinner     Continuous Infusions:   sodium chloride      sodium chloride      sodium chloride      dextrose       PRN Meds:.povidone-iodine, sodium chloride, sodium chloride flush, sodium chloride, traMADol, acetaminophen, bisacodyl, sodium phosphate, sodium chloride flush, sodium chloride, ondansetron **OR** ondansetron, melatonin, polyethylene glycol, aluminum & magnesium hydroxide-simethicone, glucose, dextrose bolus **OR** dextrose bolus, glucagon (rDNA), dextrose, labetalol  .   sodium chloride      sodium chloride      sodium chloride      dextrose          Allergies:     Allergies   Allergen Reactions    Lisinopril Anaphylaxis    Nsaids Other (See Comments)     Severe GIB    Statins Other (See Comments)    Sulfa Antibiotics Itching        Review of Systems:       Review of Systems   Constitutional: Negative.  Negative for diaphoresis

## 2024-11-18 LAB
BASOPHILS # BLD: 0 K/UL (ref 0–0.2)
BASOPHILS NFR BLD: 0.2 %
EOSINOPHIL # BLD: 0.1 K/UL (ref 0–0.7)
EOSINOPHIL NFR BLD: 1 %
ERYTHROCYTE [DISTWIDTH] IN BLOOD BY AUTOMATED COUNT: 16.6 % (ref 11.5–14.5)
HCT VFR BLD AUTO: 29.8 % (ref 37–47)
HGB BLD-MCNC: 9.6 G/DL (ref 12–16)
LYMPHOCYTES # BLD: 1 K/UL (ref 1–4.8)
LYMPHOCYTES NFR BLD: 9 %
MCH RBC QN AUTO: 30.1 PG (ref 27–31.3)
MCHC RBC AUTO-ENTMCNC: 32.2 % (ref 33–37)
MCV RBC AUTO: 93.4 FL (ref 79.4–94.8)
MONOCYTES # BLD: 0.7 K/UL (ref 0.2–0.8)
MONOCYTES NFR BLD: 6.7 %
MYELOCYTES NFR BLD MANUAL: 1 %
NEUTROPHILS # BLD: 8.9 K/UL (ref 1.4–6.5)
NEUTS SEG NFR BLD: 83 %
PLATELET # BLD AUTO: 238 K/UL (ref 130–400)
PLATELET BLD QL SMEAR: ADEQUATE
RBC # BLD AUTO: 3.19 M/UL (ref 4.2–5.4)
SLIDE REVIEW: ABNORMAL
WBC # BLD AUTO: 10.6 K/UL (ref 4.8–10.8)

## 2024-11-18 PROCEDURE — 97530 THERAPEUTIC ACTIVITIES: CPT

## 2024-11-18 PROCEDURE — 97116 GAIT TRAINING THERAPY: CPT

## 2024-11-18 PROCEDURE — 97535 SELF CARE MNGMENT TRAINING: CPT

## 2024-11-18 PROCEDURE — 85025 COMPLETE CBC W/AUTO DIFF WBC: CPT

## 2024-11-18 PROCEDURE — 6370000000 HC RX 637 (ALT 250 FOR IP): Performed by: INTERNAL MEDICINE

## 2024-11-18 PROCEDURE — 36415 COLL VENOUS BLD VENIPUNCTURE: CPT

## 2024-11-18 PROCEDURE — 99232 SBSQ HOSP IP/OBS MODERATE 35: CPT | Performed by: INTERNAL MEDICINE

## 2024-11-18 PROCEDURE — 6370000000 HC RX 637 (ALT 250 FOR IP): Performed by: PHYSICAL MEDICINE & REHABILITATION

## 2024-11-18 PROCEDURE — 2580000003 HC RX 258: Performed by: INTERNAL MEDICINE

## 2024-11-18 PROCEDURE — 1180000000 HC REHAB R&B

## 2024-11-18 PROCEDURE — 99233 SBSQ HOSP IP/OBS HIGH 50: CPT | Performed by: PHYSICAL MEDICINE & REHABILITATION

## 2024-11-18 PROCEDURE — 97110 THERAPEUTIC EXERCISES: CPT

## 2024-11-18 PROCEDURE — 97129 THER IVNTJ 1ST 15 MIN: CPT

## 2024-11-18 PROCEDURE — 97130 THER IVNTJ EA ADDL 15 MIN: CPT

## 2024-11-18 RX ORDER — HYDRALAZINE HYDROCHLORIDE 100 MG/1
100 TABLET, FILM COATED ORAL EVERY 12 HOURS SCHEDULED
Status: DISCONTINUED | OUTPATIENT
Start: 2024-11-18 | End: 2024-11-19

## 2024-11-18 RX ORDER — FERROUS SULFATE 325(65) MG
325 TABLET ORAL 2 TIMES DAILY WITH MEALS
Status: DISCONTINUED | OUTPATIENT
Start: 2024-11-18 | End: 2024-11-22 | Stop reason: HOSPADM

## 2024-11-18 RX ADMIN — ACETAMINOPHEN 325MG 650 MG: 325 TABLET ORAL at 02:12

## 2024-11-18 RX ADMIN — Medication 2000 UNITS: at 18:24

## 2024-11-18 RX ADMIN — ACETAMINOPHEN 325MG 650 MG: 325 TABLET ORAL at 14:48

## 2024-11-18 RX ADMIN — AMIODARONE HYDROCHLORIDE 100 MG: 200 TABLET ORAL at 08:49

## 2024-11-18 RX ADMIN — ACETAMINOPHEN 325MG 650 MG: 325 TABLET ORAL at 18:24

## 2024-11-18 RX ADMIN — Medication 10 ML: at 08:58

## 2024-11-18 RX ADMIN — FERROUS SULFATE TAB 325 MG (65 MG ELEMENTAL FE) 325 MG: 325 (65 FE) TAB at 12:58

## 2024-11-18 RX ADMIN — SUCRALFATE 1 G: 1 TABLET ORAL at 08:49

## 2024-11-18 RX ADMIN — CALCIUM 500 MG: 500 TABLET ORAL at 08:48

## 2024-11-18 RX ADMIN — ACETAMINOPHEN 325MG 650 MG: 325 TABLET ORAL at 10:56

## 2024-11-18 RX ADMIN — Medication 10 ML: at 19:58

## 2024-11-18 RX ADMIN — NYSTATIN 500000 UNITS: 100000 SUSPENSION ORAL at 19:54

## 2024-11-18 RX ADMIN — PANTOPRAZOLE SODIUM 40 MG: 40 TABLET, DELAYED RELEASE ORAL at 06:43

## 2024-11-18 RX ADMIN — METOPROLOL TARTRATE 50 MG: 50 TABLET, FILM COATED ORAL at 19:54

## 2024-11-18 RX ADMIN — HYDRALAZINE HYDROCHLORIDE 50 MG: 50 TABLET ORAL at 08:49

## 2024-11-18 RX ADMIN — FERROUS SULFATE TAB 325 MG (65 MG ELEMENTAL FE) 325 MG: 325 (65 FE) TAB at 18:24

## 2024-11-18 RX ADMIN — HYDRALAZINE HYDROCHLORIDE 100 MG: 100 TABLET ORAL at 19:54

## 2024-11-18 RX ADMIN — NYSTATIN 500000 UNITS: 100000 SUSPENSION ORAL at 12:58

## 2024-11-18 RX ADMIN — Medication 100 MG: at 08:49

## 2024-11-18 RX ADMIN — NYSTATIN 500000 UNITS: 100000 SUSPENSION ORAL at 18:25

## 2024-11-18 RX ADMIN — ACETAMINOPHEN 325MG 650 MG: 325 TABLET ORAL at 06:44

## 2024-11-18 RX ADMIN — SUCRALFATE 1 G: 1 TABLET ORAL at 19:54

## 2024-11-18 RX ADMIN — ACETAMINOPHEN 325MG 650 MG: 325 TABLET ORAL at 22:30

## 2024-11-18 RX ADMIN — NYSTATIN 500000 UNITS: 100000 SUSPENSION ORAL at 08:48

## 2024-11-18 RX ADMIN — METOPROLOL TARTRATE 50 MG: 50 TABLET, FILM COATED ORAL at 08:49

## 2024-11-18 ASSESSMENT — PAIN SCALES - GENERAL
PAINLEVEL_OUTOF10: 7
PAINLEVEL_OUTOF10: 6
PAINLEVEL_OUTOF10: 6
PAINLEVEL_OUTOF10: 8

## 2024-11-18 ASSESSMENT — ENCOUNTER SYMPTOMS
EYES NEGATIVE: 1
BACK PAIN: 1
BLOOD IN STOOL: 0
NAUSEA: 0
RESPIRATORY NEGATIVE: 1
GASTROINTESTINAL NEGATIVE: 1
COUGH: 0
STRIDOR: 0
SHORTNESS OF BREATH: 0
CHEST TIGHTNESS: 0
WHEEZING: 0

## 2024-11-18 ASSESSMENT — PAIN DESCRIPTION - ORIENTATION
ORIENTATION: LOWER
ORIENTATION: RIGHT;LOWER
ORIENTATION: LOWER
ORIENTATION: LEFT;RIGHT
ORIENTATION: RIGHT
ORIENTATION: LOWER;RIGHT

## 2024-11-18 ASSESSMENT — PAIN DESCRIPTION - DESCRIPTORS
DESCRIPTORS: ACHING
DESCRIPTORS: ACHING;SORE
DESCRIPTORS: ACHING
DESCRIPTORS: ACHING
DESCRIPTORS: ACHING;SORE

## 2024-11-18 ASSESSMENT — PAIN DESCRIPTION - LOCATION
LOCATION: HIP;BACK
LOCATION: BACK
LOCATION: BACK
LOCATION: HAND;HIP
LOCATION: HIP;BACK
LOCATION: HIP
LOCATION: BACK;HIP

## 2024-11-18 NOTE — PROGRESS NOTES
Physical Therapy Rehab Treatment Note  [place] [facility]       NAME: Loren Noyola  : 1935 (89 y.o.)  MRN: [id number]  CODE STATUS: DNR-CCA    Date of Service: 2024       Restrictions:  Restrictions/Precautions: Fall Risk       SUBJECTIVE:   Subjective: \"I am doing okay\"    Pain  Pain: 5/10 LBP pain pre/post session- received meds      OBJECTIVE:     Transfers  Surface: Wheelchair  Additional Factors: Verbal cues;Hand placement cues;Increased time to complete  Device: Walker  Sit to Stand  Assistance Level: Stand by assist  Skilled Clinical Factors: vc's to push up from WC vs pulling up on WW  Stand to Sit  Assistance Level: Stand by assist  Skilled Clinical Factors: vc's for hand placement and safety, good technique    Ambulation  Surface: Level surface  Device: Rolling walker  Distance: 50' x 2, 60'  Activity: Within Unit  Additional Factors: Verbal cues;Hand placement cues;Increased time to complete  Assistance Level: Minimal assistance;Stand by assist  Gait Deviations: Slow ximena;Narrow base of support;Unsteady gait;Path deviations  Skilled Clinical Factors: vc's for technique and sequencing, able to tolerate distance with minimal reports of pain this date. pt motivated to increase distance. no reports of dizziness/lightheadedness    PT Exercises  PROM Exercises: seated HS/gastroc stretch x3 30 sec hold BLE  A/AROM Exercises: seated: AP, LAQ, Marches, hip abd/add with ball x15ea raul seated: AP, LAQ, Marches,  Resistive Exercises: seated YTB HS curls/hip abd x10 BLE  Dynamic Standing Balance Exercises: standing step-ups on 2 inch block x10 ea  Postural Correction Exercises: seated sunrise/owns with physioball with focus on posture and pain relief with stretching into ROMs     Activity Tolerance  Activity Tolerance: Patient tolerated treatment well    ASSESSMENT/PROGRESS TOWARDS GOALS:   Assessment  Assessment: Pt with improved participation this date as

## 2024-11-18 NOTE — PROGRESS NOTES
Physical Therapy Rehab Treatment Note  [facility]  [facility]       NAME: Loren Noyola  : 1935 (89 y.o.)  MRN: [id number]  CODE STATUS: DNR-CCA    Date of Service: 2024       Restrictions:  Restrictions/Precautions: Fall Risk       SUBJECTIVE:   Subjective: \"I think I am doing better\"    Pain  Pain: denies pain pre/post session      OBJECTIVE:     Transfers  Surface: Wheelchair  Additional Factors: Verbal cues;Hand placement cues;Increased time to complete  Device: Walker  Sit to Stand  Assistance Level: Stand by assist  Skilled Clinical Factors: vc's to push up from WC vs pulling up on WW  Stand to Sit  Assistance Level: Stand by assist  Skilled Clinical Factors: vc's for hand placement and safety, good technique    Ambulation  Surface: Level surface  Device: Rolling walker  Distance: 35' x 2  Activity: Within Unit  Additional Factors: Verbal cues;Hand placement cues;Increased time to complete  Assistance Level: Minimal assistance;Stand by assist  Gait Deviations: Slow ximena;Narrow base of support;Unsteady gait;Path deviations  Skilled Clinical Factors: vc's for technique and sequencing, able to tolerate distance with minimal reports of pain this date. pt motivated to increase distance. no reports of dizziness/lightheadedness    PT Exercises  PROM Exercises: seated HS/gastroc stretch x3 30 sec hold BLE  A/AROM Exercises: seated: AP, LAQ, Marches, hip abd/add with ball x15ea raul seated: AP, LAQ, Marches,     Activity Tolerance  Activity Tolerance: Patient tolerated treatment well    ASSESSMENT/PROGRESS TOWARDS GOALS:   Assessment  Assessment: Pt with improved participation this date as opposed to previous sessions, able to tolerate gait this session without lightheadedness this date and no safety concerns. Maintains SBA for mobility.  Activity Tolerance: Patient tolerated treatment well    Goals:  Long Term Goals  Long Term Goal 1: Pt will demonstrate bed mobility

## 2024-11-18 NOTE — PROGRESS NOTES
with vision, hearing, nose, mouth, throat, dermal, cardiovascular, GI, , pulmonary, musculoskeletal, psychiatric or neurological. See also Acute Rehab PM&R H&P.       Vital signs:  BP (!) 146/59   Pulse 71   Temp 97.5 °F (36.4 °C)   Resp 18   Ht 1.6 m (5' 3\")   Wt 85.9 kg (189 lb 4.8 oz)   SpO2 97%   BMI 33.53 kg/m²   I/O:   PO/Intake:  fair PO intake, regular diet    Bowel:   continent   Bladder: incontinent UTI Keflex  General:  Patient is well developed,   adequately nourished, and    well kempt.     HEENT:    Pupils equal, hearing intact to loud voice, external inspection of ear and nose benign.  Inspection of lips, tongue and gums  thrush    Musculoskeletal: No significant change in strength or tone.  All joints stable.      Inspection and palpation of digits and nails show no clubbing, cyanosis or inflammatory conditions.   Neuro/Psychiatric: Affect: flat but pleasant.  Alert and oriented to person, place and situation with    cues.  No significant change in deep tendon reflexes or sensation  Lungs:  Diminished, CTA-B. Respiration effort is normal at rest.     Heart:   S1 = S2,   RRR.       Abdomen:  Soft, non-tender, no enlargement of liver or spleen.  Extremities:    lower extremity edema and tenderness   Skin:   Intact to general survey,  right foot bunion wrapped-superficial wound noted to medial 1st mpj of right foot. Appears healthy and granular, no signs of infection     Rehabilitation:  Physical Therapy:   Bed mobility:  Bed mobility  Rolling to Left: Stand by assistance (11/17/24 1023)  Rolling to Right: Stand by assistance (11/17/24 1023)  Supine to Sit: Stand by assistance (11/17/24 1023)  Sit to Supine: Unable to assess (11/17/24 1023)  Scooting: Stand by assistance (11/13/24 1014)  Bed Mobility Comments: hob elevated, hr used, increased time and effort (11/17/24 1023)  Transfers:  Transfers  Sit to Stand: Stand by assistance;Minimal Assistance (11/17/24 1023)  Stand to Sit: Stand by  regards to amiodarone use.  Use of chronic blood thinners off Eliquis-dt GIB--monitor for bleeding weekly CBCs  Graphic tongue with early thrush-Nystan thin rinse and vitamin B12 shot.  GERD and bleeding risk was on Eliquis-Elevate head of bed after meals, monitor stools for blood, lowest effective dose of PPI, consider Tums.  Transition IV Protonix to p.o.   add carafate  Acute GI bleed with significant Anemia-Monitor vital signs monitor for orthostasis and tachycardia, check H&H prn.  Vitamin B12 and iron-dose iron with food to prevent GI upset.  Monitor for constipation.  Monitor stools for blood.  Monitor BP    Vertigo-focus on balance and therapy  Osteoporosis with compression fracture of T12 vertebra with routine healing-limit use of PPI to prevent further osteoporosis  1.7 cm mildly hyperdense mass involving the lateral left kidney -defer to medical especially given patient's age.  (This was was a finding on the acute floor.     Focus on endurance, activity pacing, reassessing rehab goals and discharge planning.          Deb Haro D.O., PM&R     Attending    [phone]   [facility]

## 2024-11-18 NOTE — PROGRESS NOTES
[place] [facility]  Rehabilitation Goal update: Physical Therapy  Room: R251/R251-01        Pts goals updated as listed below:  Long Term Goal 1: Pt will demonstrate bed mobility indep  Long Term Goal 2: Pt will demonstrate bed, sit to stand and car transfers SBA - indep  Long Term Goal 3: Pt will demonstrate amb 50ft -150 ft SBA - indep with safest AD  Long Term Goal 4: Pt will demonstrate TUG </= 20 sec for decreased risk for falls  Long Term Goal 5: SBA 4-5 stairs for safe home entry

## 2024-11-18 NOTE — FLOWSHEET NOTE
Patient assessment complete. C/o 8/10 pain to left hip. PRN Tylenol given with scheduled HS medications in applesauce per patient request. Ice applied to hip. Blood pressure elevated at 186/77, scheduled HTN medications given will recheck blood pressure in a couple of hours and if not lower will medicate with PRN meds. Bed locked and low with alarm engaged. Call light within patient's reach. Electronically signed by Gabriela Butler RN on 11/17/2024 at 9:01 PM    Recheck blood pressure 143/78.    0210 patient awake with c/o 8/10 pain to right hip and left hand. Requesting PRN Tylenol-given. Ice applied to hand.

## 2024-11-18 NOTE — PROGRESS NOTES
INDIVIDUALIZED OVERALL REHAB PLAN OF CARE  ADDENDUM TO REHAB PROGRESS NOTE-for audit purposes must also refer to this day's clinical note and combine the information      Date: 2024  Patient Name: Loren Noyola   Room: R251/R251-01    MRN: [id number]    : 1935  (89 y.o.)  Gender: female       Today 2024 during weekly team meeting, I reviewed the patient Loren Noyola in detail with the therapists and nurses involved in patient's care gathering complex physiatric data regarding current medical issues, progress in therapies, factors limiting progress, social issues, psychological issues, ongoing therapeutic plans and discharge planning.    Legend:  I= independent Im =Modified independent  S=Supervised SB=stand by ALANIZ=set up CG=contact rubén Min= minimal Mod=Moderate Max=maximal Max of 2 =maximal assist of 2 people      CURRENT FUNCTIONAL STATUS:    88 yo female was admitted to [facility] after presenting to the ER with pain in her head neck and back after falling at home striking her head on the cushions of her sofa.  She had several previous falls with ER visits in the week prior.  She was found to have a T12 compression fracture and admitted under the care of the hospitalist.     She was seen by the neurosurgeon PA and discussed with neurosurgery this is likely old and there is now planned for surgical intervention.    NURSING ISSUES:  with x1 dark/bright red formed stool in toilet. Dr. Obrien notified.     - lab notified of need for H+H.    Nursing will continue to focus on bowel and bladder continence transitioning toward independence by time of discharge.  Monitoring post void residuals monitoring for severe constipation and bowel obstruction.      Barriers to progress and discharge complex medical conditions, severe pain, and complex social situations      Bowel function- constipation  Plans to address- laxative     Bladder function-  continent    Plans to address-

## 2024-11-18 NOTE — PLAN OF CARE
Problem: Safety - Adult  Goal: Free from fall injury  11/17/2024 2059 by Gabriela Butler RN  Outcome: Progressing     Problem: Discharge Planning  Goal: Discharge to home or other facility with appropriate resources  11/17/2024 2059 by Gabriela Butler RN  Outcome: Progressing     Problem: ABCDS Injury Assessment  Goal: Absence of physical injury  11/17/2024 2059 by Gabriela Butler RN  Outcome: Progressing     Problem: Skin/Tissue Integrity  Goal: Absence of new skin breakdown  Description: 1.  Monitor for areas of redness and/or skin breakdown  2.  Assess vascular access sites hourly  3.  Every 4-6 hours minimum:  Change oxygen saturation probe site  4.  Every 4-6 hours:  If on nasal continuous positive airway pressure, respiratory therapy assess nares and determine need for appliance change or resting period.  11/17/2024 2059 by Gabriela Butler RN  Outcome: Progressing     Problem: Pain  Goal: Verbalizes/displays adequate comfort level or baseline comfort level  11/17/2024 2059 by Gabriela Butler RN  Outcome: Not Progressing     Problem: Nutrition Deficit:  Goal: Optimize nutritional status  11/17/2024 2059 by Gabriela Butler RN  Outcome: Progressing     Problem: Pain  Goal: Verbalizes/displays adequate comfort level or baseline comfort level  11/17/2024 2059 by Gabriela Butler RN  Outcome: Not Progressing  11/17/2024 1814 by Whit Olvera, RN  Outcome: Progressing

## 2024-11-18 NOTE — PROGRESS NOTES
OCCUPATIONAL THERAPY  INPATIENT REHAB TREATMENT NOTE  [facility]      NAME: Loren Noyola  : 1935 (89 y.o.)  MRN: [id number]  CODE STATUS: DNR-CCA  Room: R251/R251-01    Date of Service: 2024    Referring Physician: Deb Haro DO  Rehab Diagnosis: Impaired mobility and ADL's due to debility secondary to GI bleed    Hospital course:   Comments: 89 y.o. female patient with recent hospitalization and rehab stay s/p fall with discovered T12fx. Patient was d/c'd home from rehab on . Patient presented back to ER  with CP and SOB along with reports of having dark stools. Labs notable for hemoglobin of 4.7. Patient admitted to ICU with consults from CC, pulmonology and GI. EGD was performed and no active bleeding found. Did, however, reveal x2 non-bleeding gastric ulcers.      Restrictions  Restrictions/Precautions  Restrictions/Precautions: Fall Risk     Patient's date of birth confirmed: Yes    SAFETY:  Safety Devices  Safety Devices in place: Yes  Type of devices: All fall risk precautions in place    SUBJECTIVE: \"I don't want to go to a rest home. I'll just lay in bed all day. They make you lay in bed then you get up for a little bit of therapy and they put you back to bed. Why can't they just hire someone to clean the house for a couple of hours, dust, do dishes and straighten up?\"    Pain at start of treatment: Yes: 8/10    Pain at end of treatment: Yes: 6/10    Location: low back, R hip  Description: ache  Nursing notified: Yes  Nurse: Shannan  Intervention: RN provided pain patches    COGNITION:  Orientation  Overall Orientation Status: Within Functional Limits  Cognition  Overall Cognitive Status: Exceptions  Arousal/Alertness: Appears intact  Following Commands: Follows all commands without difficulty  Attention Span: Attends with cues to redirect;Appears intact  Memory: Impaired  Safety Judgement: Decreased awareness of need for assistance;Decreased awareness of need for

## 2024-11-18 NOTE — PLAN OF CARE
Problem: Safety - Adult  Goal: Free from fall injury  Outcome: Progressing     Problem: Discharge Planning  Goal: Discharge to home or other facility with appropriate resources  Outcome: Progressing     Problem: ABCDS Injury Assessment  Goal: Absence of physical injury  Outcome: Progressing     Problem: Skin/Tissue Integrity  Goal: Absence of new skin breakdown  Description: 1.  Monitor for areas of redness and/or skin breakdown  2.  Assess vascular access sites hourly  3.  Every 4-6 hours minimum:  Change oxygen saturation probe site  4.  Every 4-6 hours:  If on nasal continuous positive airway pressure, respiratory therapy assess nares and determine need for appliance change or resting period.  Outcome: Progressing     Problem: Pain  Goal: Verbalizes/displays adequate comfort level or baseline comfort level  Outcome: Progressing     Problem: Nutrition Deficit:  Goal: Optimize nutritional status  Outcome: Progressing

## 2024-11-18 NOTE — PROGRESS NOTES
Gastroenterology Progress Note    Loren Noyola is a 89 y.o. female patient.  Hospitalization Day:6    Chief C/O: GIB    SUBJECTIVE: Patient was admitted to hospital 10/9/2024.  Patient had 3-day history of shortness of breath and melanotic stools.  Upon arrival patient hemoglobin 4.7 in the setting of Eliquis.  Patient had received 2 units of packed red blood cells.  Patient does have remote history of PUD and is on PPI and daily basis.  Patient had EGD completed 2024 noting nonbleeding gastric ulcers.  Repeat EGD 2024 noting peripyloric ulcer measuring 8 mm in size.  Patient's hemoglobin today 9.6 with no overt GI blood loss.    ROS:  Gastrointestinal ROS: no abdominal pain, change in bowel habits, or black or bloody stools    Previous Endoscopic Evaluation:  EGD 2024, Rylie Madrid  Normal esophagus.  Z-line regular, 35 cm from the incisors.  Gastroesophageal flap valve classified as Hill Grade II (fold present, opens with respiration).  Atrophic mucosa in the gastric fundus and gastric body. Previously documented ulcer seen in the prepyloric area and the ulcer was measuring about 8 mm in size and this was a superficial ulcer and no stigmata of recent bleed.  Normal examined duodenum.  No specimens collected.    EGD 2024, Ryile Yates  Normal esophagus.  Non-bleeding gastric ulcers with a clean ulcer base (Flavio Class III).  Gastric mucosal atrophy.  Biopsied.  Normal examined duodenum. Lax lower esophageal sphincter on retroflexed views. No old or fresh blood seen.    Physical    VITALS:  BP (!) 146/59   Pulse 71   Temp 97.5 °F (36.4 °C)   Resp 18   Ht 1.6 m (5' 3\")   Wt 85.9 kg (189 lb 4.8 oz)   SpO2 97%   BMI 33.53 kg/m²   TEMPERATURE:  Current - Temp: 97.5 °F (36.4 °C); Max - Temp  Av.7 °F (36.5 °C)  Min: 97.5 °F (36.4 °C)  Max: 97.9 °F (36.6 °C)    General -no acute distress  Eyes -no icterus, no pallor  Cardiovascular - RRR, no murmur  Lungs -clear to

## 2024-11-18 NOTE — PROGRESS NOTES
Condition (MA) What reading provider will be dictating this exam?->CRC FINDINGS: BRAIN/VENTRICLES: There is no acute intracranial hemorrhage, mass effect or midline shift.  No abnormal extra-axial fluid collection.  The gray-white differentiation is maintained without evidence of an acute infarct.  Mild periventricular white matter changes are seen.  There is no evidence of hydrocephalus. Global involutional changes are noted. ORBITS: The visualized portion of the orbits demonstrate no acute abnormality. SINUSES: The visualized paranasal sinuses shows mucoperiosteal thickening in the right sphenoid sinus.  Mastoid air cells demonstrate no acute abnormality. SOFT TISSUES/SKULL:  No acute abnormality of the visualized skull or soft tissues.     No acute intracranial abnormality.     XR LUMBAR SPINE (2-3 VIEWS)    Result Date: 10/17/2024  EXAMINATION: XRAY VIEWS OF THE LUMBAR SPINE 10/17/2024 1:30 pm COMPARISON: None. HISTORY: ORDERING SYSTEM PROVIDED HISTORY: pain TECHNOLOGIST PROVIDED HISTORY: Reason for exam:->pain What reading provider will be dictating this exam?->CRC FINDINGS: The bones are demineralized.  No acute compression deformities.  There are moderate degenerative changes in the mid and lower lumbar spine as follows: Moderate disc space narrowing at L3-L4, grade 1 anterolisthesis and mild disc space narrowing at L4-L5, facet arthrosis in the mid and lower lumbar spine. The pedicles are intact with no paraspinal soft tissue mass.     1. Moderate degenerative changes in the mid and lower lumbar spine. 2. Grade 1 anterolisthesis of L3 on L4.     XR HIP LEFT (2-3 VIEWS)    Result Date: 10/17/2024  EXAMINATION: TWO XRAY VIEWS OF THE LEFT HIP 10/17/2024 1:30 pm COMPARISON: None. HISTORY: ORDERING SYSTEM PROVIDED HISTORY: pain TECHNOLOGIST PROVIDED HISTORY: Reason for exam:->pain What reading provider will be dictating this exam?->CRC FINDINGS: Bones are demineralized.  No acute bony abnormalities.  Left hip

## 2024-11-18 NOTE — PROGRESS NOTES
[facility]  Facility/Department: [facility] REHAB  Speech Language Pathology   Treatment Note          Loren Noyola  1935  R251/R251-01  [x]   confirmed    Date: 2024      Restrictions/Precautions: Fall Risk     ADULT DIET; Easy to Chew     Respiratory Status: O2 Flow Rate (L/min): 4 L/min (24 0952)   No active isolations    Rehab Diagnosis: Impaired mobility and ADL's due to debility secondary to GI bleed     Subjective:  Alert and Cooperative        Interventions used this date:  Cognitive Skill Development    Objective/Assessment:  Patient progressing towards goals:  Short Term Goals  Time Frame for Short Term Goals: 1-2 weeks  Goal 1: To increase safety awareness and judgment for safe completion of ADLs secondary to patient's cognitive deficits,  patient will complete high level problem solving tasks related to ADLs (e.g. scheduling, medication management, finance management, etc) with 90% accuracy and supervised cues. Not addressed    Goal 2: To increase safety awareness and judgment for safe completion of ADLs secondary to patient's cognitive deficits, patient will sequence common activities of daily living with (verbal/written/pictured) steps with 90% accuracy and supervised cues. Patient placed 3 items in order by occurrence I with 100% accuracy.    Goal 3: To increase safety awareness and judgment for safe completion of ADLs secondary to patient's cognitive deficits, patient will complete abstract reasoning tasks (i.e. word deduction, convergent and divergent naming, similarities/differences) with 90% accuracy and supervised cues. Patient completed a word deduction task I with 80% accuracy, with min cueing 90% accuracy.    Goal 4: To address patient's cognitive deficits and promote recall of personal and medical information, the patient will answer questions addressing remote, recent, delayed recall with use of memory strategies with 90% accuracy and min cues. Patient completed 3

## 2024-11-18 NOTE — PROGRESS NOTES
Patient is doing well.  No chest pain or palpitation.  No abdominal pain, nausea or vomiting.  No melena    General appearance: alert, appears stated age and cooperative, no apparent distress.  Central obesity  Skin: Skin color, texture, turgor normal. No rashes or lesions  HEENT: Head: Normocephalic, no lesions, without obvious abnormality.  Neck: no adenopathy, no carotid bruit, no JVD, supple, symmetrical, trachea midline, and thyroid not enlarged, symmetric, no tenderness/mass/nodules  Lungs: clear to auscultation bilaterally  Heart: regular rate and rhythm, S1, S2 normal, no murmur, click, rub or gallop  Abdomen: soft, non-tender; bowel sounds normal; no masses,  no organomegaly  Extremities: extremities normal, atraumatic, no cyanosis or edema.  Osteoarthritis deformities  Neurologic: Mental status: Alert, oriented, thought content appropriate.  Functional impairment.  Please refer to physical Occupational Therapy team note for details on functional status and treatment plan.      *Upper GI bleed the secondary to gastric ulcer in the setting of patient taking anticoagulant.  Continue PPI.  Patient had another EGD which did not show any active bleed.  Hemoglobin is stable posttransfusion.       *Acute blood loss anemia status post transfusion.  This is associated with hypotension suggestive of hemorrhagic shock..  Status post 1 unit of RBC transfusion on 11/15 hemoglobin stable posttransfusion     *Paroxysmal A-fib.  Currently patient is in sinus rhythm.  Regular heart  Patient is on amiodarone.  Eliquis is on hold.  Patient continues to have a black and slightly bloody stool..  Continue beta-blocker.     *Functional impairment.  Please refer to physical and Occupational Therapy team notes for details on her functional status and treatment plan     *Coronary artery disease.  No active chest pain or angina    *CKD stage III.  Continue to monitor closely as volume status may fluctuate.    Patient has multiple  other medical issues not listed above.    Some of pt medical issues, chronic symptoms, abnormalities seen on labs and imaging may not be addressed while patient is currently on the rehab unit.    Pharmacological DVT prophylaxis is to be addressed by physiatrist based on patient's ambulation status and risk of DVT in collaboration with the surgical team and other specialists ( GI, neuro and cardio )  based on risk of bleed and or embolization.    We may not follow patient daily.  Sometimes we follow patient only on an as-needed basis.  Please call or alert hospitalist if patient develops any signs or symptoms that may require physical evaluation and intervention.    Some of the medical and surgical issues are managed by medical and surgical specialists..  Defer further needed diagnostic and therapeutic intervention relative to specialty care to specialists.    Patient would need to follow-up with his primary care doctor and other needed outpatient providers to address all of his chronic, subacute medical issues and all of the abnormalities seen on labs and imaging that have not been addressed during this stay on the rehab unit.    I will be off service starting this weekend.  Patient would be managed by one of my colleagues after that.    On discharge, please make sure that patient has an outpatient appointment with his primary care doctor as well as all of the other specialists that had seen here at [facility].  [facility] arrival to [facility] admission.

## 2024-11-18 NOTE — CARE COORDINATION
(11/14/24 1317)  Skilled Clinical Factors: unable to assess 2° rapid response called (11/15/24 1204)  Putting On/Taking Off Footwear  Assistance Level: Stand by assist;Increased time to complete (11/14/24 1317)  Skilled Clinical Factors: unable to assess 2° rapid response called (11/15/24 1204)  Toileting  Assistance Level: Supervision;Increased time to complete (11/15/24 1626)  Skilled Clinical Factors: assist to pull up over posterior (11/14/24 1317)  Toilet Transfers  Technique: Stand step (11/15/24 1626)  Equipment: Raised toilet seat with arms (11/15/24 1626)  Assistance Level: Stand by assist (11/15/24 1626)  Skilled Clinical Factors: w/c level (11/15/24 1626)  Tub/Shower Transfers  Skilled Clinical Factors: declined (11/15/24 1204)        LTG:  Eating:Discharge Goal: Independent  Oral Hygiene:Discharge Goal: Independent  Shower/Bathe self: Discharge Goal: Independent  Upper body dressing:Discharge Goal: Independent  Lower body dressing:Discharge Goal: Independent  Putting on taking off footwear:Discharge Goal: Independent   Toileting: Discharge Goal: Independent  Toilet transfer:Discharge Goal: Independent  OT Treatment Time: 1.5 hrs      SPEECH THERAPY       Comprehension: Within Functional Limits  Verbal Expression: Exceptions to functional limits (Mild high level naming deficits with convergent abstract naming and similarities and differences.)      Diet/Swallow:                       LTG:  Long Term Goals  Time Frame for Long Term Goals: 2-3 weeks  Goal 1: Patient will demonstrate functional cognitive-linguistic abilities in all opportunities with modified independence in order to safely complete ADLs.             COGNITION  OT: Cognition  Overall Cognitive Status: Exceptions (11/16/24 1353)  Arousal/Alertness: Appears intact (11/16/24 1353)  Following Commands: Follows all commands without difficulty (11/16/24 1353)  Attention Span: Attends with cues to redirect;Appears intact (11/16/24 1353)  Memory:  Impaired (11/15/24 1156)  Safety Judgement: Decreased awareness of need for assistance;Decreased awareness of need for safety (11/15/24 1156)  Problem Solving: Assistance required to implement solutions;Assistance required to correct errors made;Good awareness of errors made;Assistance required to identify errors made (11/15/24 1156)  Insights: Decreased awareness of deficits (11/16/24 1353)  Initiation: Appears intact (11/16/24 1353)  Sequencing: Requires cues for some (11/16/24 1353)  Cognition Comment: Pt w/ difficulty attending to directions d/t increased anxiety and claustrophobia w/ the door closed in the bathroom. (11/13/24 8930)          Orientation  Overall Orientation Status: Within Functional Limits (11/16/24 1353)  Orientation Level: Oriented to person;Oriented to time;Oriented to situation;Oriented to place (11/16/24 1353)  SP:Memory: Exceptions to WFL (Mild STM and working memory deficits were noted with delayed and immediate recall and time orientation,.)        Problem Solving: Exceptions to WFL (Mild high level problem solving and sequencing deficits.)    RECREATIONAL THERAPY  Attendance to recreational therapy programs:    []  Pet Therapy  [] Music Therapy  [] Art Therapy    [] Recreation Therapy Group [] Support Group           Patient social interaction (mood, participation): good but anxious    Patient strengths: good family support    Patients goal: return home w/ spouse    Problems/Barriers: fatigues very quickly, difficulty controlling rollator at this time and is using the ww instead currently, anxious, min assist for problem solving         1. Safety:          - Intervention / Plan:    [x]  falls protocol     [x]  PT/OT    [x]  SP        - Results:         2. Potential DME needs:         - Intervention / Plan:  [x]  PT/OT     [x]  Assess equipment needs/access       - Results:         3.  Weakness:          - Intervention / Plan:  [x]  PT/OT      []  Other:         - Results:         4.

## 2024-11-18 NOTE — PROGRESS NOTES
OCCUPATIONAL THERAPY  INPATIENT REHAB TREATMENT NOTE  [facility]      NAME: Loren Noyola  : 1935 (89 y.o.)  MRN: [id number]  CODE STATUS: DNR-CCA  Room: R251/R251-01    Date of Service: 2024    Referring Physician: Deb Haro DO  Rehab Diagnosis: Impaired mobility and ADL's due to debility secondary to GI bleed    Hospital course:   Comments: 89 y.o. female patient with recent hospitalization and rehab stay s/p fall with discovered T12fx. Patient was d/c'd home from rehab on . Patient presented back to ER  with CP and SOB along with reports of having dark stools. Labs notable for hemoglobin of 4.7. Patient admitted to ICU with consults from CC, pulmonology and GI. EGD was performed and no active bleeding found. Did, however, reveal x2 non-bleeding gastric ulcers.      Restrictions  Restrictions/Precautions  Restrictions/Precautions: Fall Risk     Patient's date of birth confirmed: Yes    SAFETY:  Safety Devices  Safety Devices in place: Yes  Type of devices: All fall risk precautions in place    SUBJECTIVE: \"I kept dozing off in this chair (w/c).\"    Pain at start of treatment: Yes: 6/10    Pain at end of treatment: Yes: 5/10    Location: R hip, low back  Description: ache  Nursing notified: Yes  Nurse: Shannan  Intervention: RN provided pain medication Cold applied    COGNITION:  Orientation  Overall Orientation Status: Within Functional Limits  Cognition  Overall Cognitive Status: Exceptions  Arousal/Alertness: Appears intact  Following Commands: Follows all commands without difficulty  Attention Span: Attends with cues to redirect;Appears intact  Memory: Impaired  Safety Judgement: Decreased awareness of need for assistance;Decreased awareness of need for safety  Problem Solving: Assistance required to implement solutions;Assistance required to correct errors made;Good awareness of errors made;Assistance required to identify errors made  Insights: Decreased awareness of

## 2024-11-19 ENCOUNTER — APPOINTMENT (OUTPATIENT)
Dept: CARDIOLOGY | Facility: CLINIC | Age: 89
End: 2024-11-19
Payer: MEDICARE

## 2024-11-19 ENCOUNTER — APPOINTMENT (OUTPATIENT)
Dept: ULTRASOUND IMAGING | Age: 89
DRG: 552 | End: 2024-11-19
Attending: PHYSICAL MEDICINE & REHABILITATION
Payer: MEDICARE

## 2024-11-19 LAB
ANION GAP SERPL CALCULATED.3IONS-SCNC: 9 MEQ/L (ref 9–15)
BASOPHILS # BLD: 0 K/UL (ref 0–0.2)
BASOPHILS NFR BLD: 0.3 %
BUN SERPL-MCNC: 17 MG/DL (ref 8–23)
CALCIUM SERPL-MCNC: 8.3 MG/DL (ref 8.5–9.9)
CHLORIDE SERPL-SCNC: 114 MEQ/L (ref 95–107)
CO2 SERPL-SCNC: 22 MEQ/L (ref 20–31)
CREAT SERPL-MCNC: 1.4 MG/DL (ref 0.5–0.9)
EOSINOPHIL # BLD: 0.3 K/UL (ref 0–0.7)
EOSINOPHIL NFR BLD: 2.8 %
ERYTHROCYTE [DISTWIDTH] IN BLOOD BY AUTOMATED COUNT: 16.5 % (ref 11.5–14.5)
GLUCOSE SERPL-MCNC: 88 MG/DL (ref 70–99)
HCT VFR BLD AUTO: 29.7 % (ref 37–47)
HGB BLD-MCNC: 9.3 G/DL (ref 12–16)
LYMPHOCYTES # BLD: 1.1 K/UL (ref 1–4.8)
LYMPHOCYTES NFR BLD: 10.3 %
MCH RBC QN AUTO: 29.7 PG (ref 27–31.3)
MCHC RBC AUTO-ENTMCNC: 31.3 % (ref 33–37)
MCV RBC AUTO: 94.9 FL (ref 79.4–94.8)
MONOCYTES # BLD: 1.5 K/UL (ref 0.2–0.8)
MONOCYTES NFR BLD: 14.5 %
NEUTROPHILS # BLD: 7.4 K/UL (ref 1.4–6.5)
NEUTS SEG NFR BLD: 71.4 %
PLATELET # BLD AUTO: 251 K/UL (ref 130–400)
POTASSIUM SERPL-SCNC: 4.4 MEQ/L (ref 3.4–4.9)
RBC # BLD AUTO: 3.13 M/UL (ref 4.2–5.4)
SODIUM SERPL-SCNC: 145 MEQ/L (ref 135–144)
WBC # BLD AUTO: 10.4 K/UL (ref 4.8–10.8)

## 2024-11-19 PROCEDURE — 93970 EXTREMITY STUDY: CPT

## 2024-11-19 PROCEDURE — 2580000003 HC RX 258: Performed by: INTERNAL MEDICINE

## 2024-11-19 PROCEDURE — 97530 THERAPEUTIC ACTIVITIES: CPT

## 2024-11-19 PROCEDURE — 99231 SBSQ HOSP IP/OBS SF/LOW 25: CPT | Performed by: INTERNAL MEDICINE

## 2024-11-19 PROCEDURE — 80048 BASIC METABOLIC PNL TOTAL CA: CPT

## 2024-11-19 PROCEDURE — 6370000000 HC RX 637 (ALT 250 FOR IP): Performed by: INTERNAL MEDICINE

## 2024-11-19 PROCEDURE — 85025 COMPLETE CBC W/AUTO DIFF WBC: CPT

## 2024-11-19 PROCEDURE — 97110 THERAPEUTIC EXERCISES: CPT

## 2024-11-19 PROCEDURE — 6370000000 HC RX 637 (ALT 250 FOR IP): Performed by: PHYSICAL MEDICINE & REHABILITATION

## 2024-11-19 PROCEDURE — 97535 SELF CARE MNGMENT TRAINING: CPT

## 2024-11-19 PROCEDURE — 99232 SBSQ HOSP IP/OBS MODERATE 35: CPT | Performed by: PHYSICAL MEDICINE & REHABILITATION

## 2024-11-19 PROCEDURE — 36415 COLL VENOUS BLD VENIPUNCTURE: CPT

## 2024-11-19 PROCEDURE — 6360000002 HC RX W HCPCS: Performed by: INTERNAL MEDICINE

## 2024-11-19 PROCEDURE — 1180000000 HC REHAB R&B

## 2024-11-19 RX ORDER — HYDRALAZINE HYDROCHLORIDE 50 MG/1
50 TABLET, FILM COATED ORAL EVERY 12 HOURS SCHEDULED
Status: DISCONTINUED | OUTPATIENT
Start: 2024-11-19 | End: 2024-11-22 | Stop reason: HOSPADM

## 2024-11-19 RX ORDER — TORSEMIDE 20 MG/1
20 TABLET ORAL DAILY
Status: DISCONTINUED | OUTPATIENT
Start: 2024-11-19 | End: 2024-11-21

## 2024-11-19 RX ADMIN — Medication 100 MG: at 09:42

## 2024-11-19 RX ADMIN — Medication 10 ML: at 20:49

## 2024-11-19 RX ADMIN — FERROUS SULFATE TAB 325 MG (65 MG ELEMENTAL FE) 325 MG: 325 (65 FE) TAB at 09:41

## 2024-11-19 RX ADMIN — FERROUS SULFATE TAB 325 MG (65 MG ELEMENTAL FE) 325 MG: 325 (65 FE) TAB at 18:10

## 2024-11-19 RX ADMIN — ACETAMINOPHEN 325MG 650 MG: 325 TABLET ORAL at 05:37

## 2024-11-19 RX ADMIN — Medication 10 ML: at 09:44

## 2024-11-19 RX ADMIN — ACETAMINOPHEN 325MG 650 MG: 325 TABLET ORAL at 09:42

## 2024-11-19 RX ADMIN — NYSTATIN 500000 UNITS: 100000 SUSPENSION ORAL at 09:43

## 2024-11-19 RX ADMIN — METOPROLOL TARTRATE 50 MG: 50 TABLET, FILM COATED ORAL at 20:47

## 2024-11-19 RX ADMIN — Medication 10 ML: at 20:48

## 2024-11-19 RX ADMIN — SUCRALFATE 1 G: 1 TABLET ORAL at 20:47

## 2024-11-19 RX ADMIN — ACETAMINOPHEN 325MG 650 MG: 325 TABLET ORAL at 22:54

## 2024-11-19 RX ADMIN — HYDRALAZINE HYDROCHLORIDE 50 MG: 50 TABLET ORAL at 20:47

## 2024-11-19 RX ADMIN — TORSEMIDE 20 MG: 20 TABLET ORAL at 18:10

## 2024-11-19 RX ADMIN — Medication 2000 UNITS: at 18:09

## 2024-11-19 RX ADMIN — METOPROLOL TARTRATE 50 MG: 50 TABLET, FILM COATED ORAL at 09:40

## 2024-11-19 RX ADMIN — LABETALOL HYDROCHLORIDE 10 MG: 5 INJECTION, SOLUTION INTRAVENOUS at 05:37

## 2024-11-19 RX ADMIN — SUCRALFATE 1 G: 1 TABLET ORAL at 09:43

## 2024-11-19 RX ADMIN — TRAMADOL HYDROCHLORIDE 50 MG: 50 TABLET, FILM COATED ORAL at 09:42

## 2024-11-19 RX ADMIN — NYSTATIN 500000 UNITS: 100000 SUSPENSION ORAL at 20:47

## 2024-11-19 RX ADMIN — AMIODARONE HYDROCHLORIDE 100 MG: 200 TABLET ORAL at 09:42

## 2024-11-19 RX ADMIN — CALCIUM 500 MG: 500 TABLET ORAL at 09:42

## 2024-11-19 RX ADMIN — PANTOPRAZOLE SODIUM 40 MG: 40 TABLET, DELAYED RELEASE ORAL at 05:37

## 2024-11-19 RX ADMIN — HYDRALAZINE HYDROCHLORIDE 100 MG: 100 TABLET ORAL at 09:41

## 2024-11-19 ASSESSMENT — PAIN DESCRIPTION - ORIENTATION
ORIENTATION: RIGHT
ORIENTATION: RIGHT;LOWER
ORIENTATION: RIGHT
ORIENTATION: RIGHT;LOWER

## 2024-11-19 ASSESSMENT — PAIN SCALES - GENERAL
PAINLEVEL_OUTOF10: 4
PAINLEVEL_OUTOF10: 7
PAINLEVEL_OUTOF10: 7
PAINLEVEL_OUTOF10: 8
PAINLEVEL_OUTOF10: 5

## 2024-11-19 ASSESSMENT — PAIN DESCRIPTION - DESCRIPTORS
DESCRIPTORS: ACHING

## 2024-11-19 ASSESSMENT — PAIN DESCRIPTION - LOCATION
LOCATION: BACK;HIP
LOCATION: HIP
LOCATION: HIP;BACK
LOCATION: BACK;HIP

## 2024-11-19 NOTE — PROGRESS NOTES
Physical Therapy Missed Treatment   Facility/Department: [facility] MED SURG R251/R251-01    NAME: Loren Noyola    : 1935 (89 y.o.)  MRN: [id number]    Account: [id number]  Gender: female    Chart reviewed, attempted PT at 1500. Patient unavailable 2° to:    [x] Pt declined- pt emotional upon arrival to session, crying over discharge situation and expressed frustration and sadness to therapist over disagreement with family wanting pt to go to SNF and pt wanting to go home. Increased time spent conversing and providing emotional support to pt with frustrations. Missed 30 mins of scheduled session this PM.     Will attempt PT treatment again at earliest convenience.      Electronically signed by Elfego Reyes PTA on 24 at 3:36 PM EST

## 2024-11-19 NOTE — PROGRESS NOTES
OCCUPATIONAL THERAPY  INPATIENT REHAB TREATMENT NOTE  [facility]      NAME: Loren Noyola  : 1935 (89 y.o.)  MRN: [id number]  CODE STATUS: DNR-CCA  Room: R251/R251-01    Date of Service: 2024    Referring Physician: Deb Haro DO  Rehab Diagnosis: Impaired mobility and ADL's due to debility secondary to GI bleed    Hospital course:   Comments: 89 y.o. female patient with recent hospitalization and rehab stay s/p fall with discovered T12fx. Patient was d/c'd home from rehab on . Patient presented back to ER  with CP and SOB along with reports of having dark stools. Labs notable for hemoglobin of 4.7. Patient admitted to ICU with consults from CC, pulmonology and GI. EGD was performed and no active bleeding found. Did, however, reveal x2 non-bleeding gastric ulcers.      Restrictions  Restrictions/Precautions  Restrictions/Precautions: Fall Risk     Patient's date of birth confirmed: Yes    SAFETY:  Safety Devices  Safety Devices in place: Yes  Type of devices: All fall risk precautions in place    SUBJECTIVE: \"I figured that I would rest in between (therapies).\"    Pain at start of treatment: Yes: 6/10    Pain at end of treatment: Yes: 6/10    Location: R hip  Description: ache  Nursing notified: Declined  Intervention: None    COGNITION:  Orientation  Overall Orientation Status: Within Functional Limits  Cognition  Overall Cognitive Status: WFL  Arousal/Alertness: Appears intact  Following Commands: Follows all commands without difficulty  Attention Span: Appears intact  Memory: Appears intact  Safety Judgement: Appears intact  Problem Solving: Assistance required to identify errors made  Insights: Appears intact  Initiation: Appears intact  Sequencing: Appears intact      Pt's current cognitive status is:  Comprehension: Supervision  Expression: Independent  Social Interaction: Independent  Problem Solving: Min A  Memory: Supervision    OBJECTIVE:    Instrumental

## 2024-11-19 NOTE — PROGRESS NOTES
Physical Therapy Rehab Treatment Note  Facility/Department: [facility]  [facility]       NAME: Loren Noyola  : 1935 (89 y.o.)  MRN: [id number]  CODE STATUS: DNR-CCA    Date of Service: 2024       Restrictions:  Restrictions/Precautions: Fall Risk       SUBJECTIVE:   Subjective: \"I was hurting so much after yesterday\"    Pain  Pain: 7/10 LBP pain pre/post session- received meds      OBJECTIVE:     Transfers  Surface: Wheelchair  Additional Factors: Verbal cues;Hand placement cues;Increased time to complete  Device: Walker  Sit to Stand  Assistance Level: Stand by assist  Skilled Clinical Factors: vc's to push up from WC vs pulling up on WW  Stand to Sit  Assistance Level: Stand by assist  Skilled Clinical Factors: vc's for hand placement and safety, good technique    Ambulation  Surface: Level surface  Device: Rolling walker  Distance: 8'  Activity: Within Unit  Additional Factors: Verbal cues;Hand placement cues;Increased time to complete  Assistance Level: Minimal assistance;Stand by assist  Gait Deviations: Slow ximena;Narrow base of support;Unsteady gait;Path deviations  Skilled Clinical Factors: decreased tolerance this date d/t increased pain, requires WC to be brought up behind pt to sit down after short distance.    PT Exercises  PROM Exercises: seated HS/gastroc stretch x3 30 sec hold BLE  A/AROM Exercises: seated: AP, LAQ, Marches, hip abd/add with ball x15ea raul  Resistive Exercises: seated GTB HS curls/hip abd x10 BLE  Static Standing Balance Exercises: static standing tolerance at WW while playing cards ~10 mins  Postural Correction Exercises: seated trunk flexion stretch with physioball for pain relief x10 BLE     Activity Tolerance  Activity Tolerance: Patient limited by pain;Patient limited by fatigue    ASSESSMENT/PROGRESS TOWARDS GOALS:   Assessment  Assessment: Pt with increased pain this date limiting tolerance to on feet activity, unable to tolerate increased gait

## 2024-11-19 NOTE — PLAN OF CARE
Problem: Safety - Adult  Goal: Free from fall injury  11/18/2024 1958 by Gabriela Butler RN  Outcome: Progressing     Problem: Discharge Planning  Goal: Discharge to home or other facility with appropriate resources  11/18/2024 1958 by Gabriela Butler RN  Outcome: Progressing     Problem: ABCDS Injury Assessment  Goal: Absence of physical injury  11/18/2024 1958 by Gabriela Butler RN  Outcome: Progressing     Problem: Skin/Tissue Integrity  Goal: Absence of new skin breakdown  Description: 1.  Monitor for areas of redness and/or skin breakdown  2.  Assess vascular access sites hourly  3.  Every 4-6 hours minimum:  Change oxygen saturation probe site  4.  Every 4-6 hours:  If on nasal continuous positive airway pressure, respiratory therapy assess nares and determine need for appliance change or resting period.  11/18/2024 1958 by Gabriela Butler RN  Outcome: Progressing     Problem: Pain  Goal: Verbalizes/displays adequate comfort level or baseline comfort level  11/18/2024 1958 by Gabriela Butler RN  Outcome: Not Progressing     Problem: Nutrition Deficit:  Goal: Optimize nutritional status  11/18/2024 1958 by Gabriela Butler RN  Outcome: Progressing     Problem: Pain  Goal: Verbalizes/displays adequate comfort level or baseline comfort level  11/18/2024 1958 by Gabriela Butler RN  Outcome: Not Progressing  11/18/2024 1225 by Melinda Proctor, RN  Outcome: Progressing

## 2024-11-19 NOTE — PROGRESS NOTES
OCCUPATIONAL THERAPY  INPATIENT REHAB TREATMENT NOTE  [facility]      NAME: Loren Noyola  : 1935 (89 y.o.)  MRN: [id number]  CODE STATUS: DNR-CCA  Room: R251/R251-01    Date of Service: 2024    Referring Physician: Deb Haro DO  Rehab Diagnosis: Impaired mobility and ADL's due to debility secondary to GI bleed    Hospital course:   Comments: 89 y.o. female patient with recent hospitalization and rehab stay s/p fall with discovered T12fx. Patient was d/c'd home from rehab on . Patient presented back to ER  with CP and SOB along with reports of having dark stools. Labs notable for hemoglobin of 4.7. Patient admitted to ICU with consults from CC, pulmonology and GI. EGD was performed and no active bleeding found. Did, however, reveal x2 non-bleeding gastric ulcers.    Restrictions  Restrictions/Precautions  Restrictions/Precautions: Fall Risk          Patient's date of birth confirmed: Yes    SAFETY:  Safety Devices  Type of devices: All fall risk precautions in place    SUBJECTIVE:       Pain at start of treatment: Yes: 10    Pain at end of treatment: Yes: 7/10    Location: R hip  Description: ache  Nursing notified: Not Applicable  Pt medicated just prior to session    OBJECTIVE:    Pt presented on toilet- handoff from PCA. Partial ADL completed       Grooming/Oral Hygiene  Assistance Level: Modified independent;Increased time to complete    Upper Extremity Bathing  Assistance Level: Set-up- sponge bath only  Lower Extremity Bathing  Assistance Level: Set-up. Pt able to figure 4 to reach her feet    Upper Extremity Dressing  Assistance Level: Modified independent  Lower Extremity Dressing  Assistance Level: Minimal assistance  Skilled Clinical Factors: assist threading R leg. CGA over hips in standing (touhcing assist)    Putting On/Taking Off Footwear  Assistance Level: Moderate assistance  Skilled Clinical Factors: time limited- pt able to doff/assist to

## 2024-11-19 NOTE — PROGRESS NOTES
Subjective:  The patient complains of severe acute on chronic progressive fatigue and mid and low back pain partially relieved by rest, medications, PT,  OT,     and rest and exacerbated by recent fall.  88 yo female was admitted to [facility] after presenting to the ER with pain in her head neck and back after falling at home striking her head on the cushions of her sofa.  She had several previous falls with ER visits in the week prior.  She was found to have a T12 compression fracture and admitted under the care of the hospitalist.  She was seen by the neurosurgeon PA and discussed with neurosurgery this is likely old and there is now planned for surgical intervention.     I am concerned about patient’s medical complexities and barriers to advancing in rehab goals including improving urinary incontinence and GIB.        I reviewed current care and plans for further care with other rehab providers including nursing and case management.  According to recent nursing note, \"Patient's breathing labored, audible wheezing, 3-4+ pitting edema to BLE noted.   C/o 8/10 pain to right hip and lower back. Patient just received PRN Tylenol at 1830 so unable to give at this time. Ice applied and patient repositioned in bed. Patient's blood pressure quiet elevated at 194/76. Scheduled HTN mediations given and will recheck pressure to see if PRN med is need in the future. Bed locked and low with alarm engaged. Call light within patient's reach.    Patient's BP continues to remain elevated. 171/72 at this time. PRN Labetalol given\".    I am still concerned about her uncontrolled hypertension, recent GI bleed and balancing her renal with cardiac function.  I have consulted cardiology and pulmonology.  We are monitoring her renal function as well.    I am concerned about her worsening lower extremity edema and her persistently high blood pressure but defer to cardiology.    ROS x10:  The patient also complains of severely  elevation and meds-see MAR Eliquis.   Complex discharge planning:  Complete medication reconciliation, patient and family education, and medication simplification coordinating follow-up care with case management and social work as we progressed toward patient's plan pending  DC either ASAP to a skilled facility of family's choosing versus 11/22/24 home with -with help from sons and HHC.   SP patient's final weekly team meeting every Thursday to re-assess progress towards goals, discuss and address social, psychological and medical comorbidities and to address difficulties they may be having progressing in therapy.  Patient and family education is in progress.  The patient is to follow-up with their family physician after discharge.        Complex Active General Medical Issues that complicate care Assess & Plan:      Primary osteoarthritis of both knees, Spondylolisthesis of lumbar region, Cervical spondylosis without myelopathy,  DDD (degenerative disc disease), lumbar  CAD,  Essential hypertension with occ ortho stasis, Peripheral vascular disease,  Right bundle branch block -Acute rehab to monitor heart rate and rhythm with the option of telemetry and the effects of chronotropic medication with respect to increasing physical activity and exercise in PT, OT, ADLs with medication titration to lowest effective dosing.  Continue blood signs every shift focusing on heart rate, rhythm and blood pressure checks with orthostatic checks-monitoring the effect of exercise, therapy and posture.  Consult hospitalist for backup medical and adjust/add medications.  Monitor heart rate and blood pressure as well as medications effects on vital signs before during and after therapy with especial focus on preventing orthostasis and falls risk.  Consult cardiology especially in regards to amiodarone use.  Use of chronic blood thinners off Eliquis-dt GIB--monitor for bleeding weekly CBCs  Graphic tongue with early thrush-Nystan

## 2024-11-19 NOTE — FLOWSHEET NOTE
Patient assessment complete. Patient's breathing labored, audible wheezing, 3-4+ pitting edema to BLE noted.   C/o 8/10 pain to right hip and lower back. Patient just received PRN Tylenol at 1830 so unable to give at this time. Ice applied and patient repositioned in bed. Patient's blood pressure quiet elevated at 194/76. Scheduled HTN mediations given and will recheck pressure to see if PRN med is need in the future. Bed locked and low with alarm engaged. Call light within patient's reach. Electronically signed by Gabriela Butler RN on 11/18/2024 at 8:02 PM    Patient's BP continues to remain elevated. 171/72 at this time. PRN Labetalol given. Electronically signed by Gabriela Butler RN on 11/19/2024 at 5:37 AM

## 2024-11-19 NOTE — CARE COORDINATION
LSW spoke with pt and discussed progress in therapy and discharge plan. Pt is aware that her family wants her to go to a SNF but at this time is not in agreement. Pt stated that she wants to go home with [facility]. LSW also provided a list of private hire [facility] agencies per her request. Pt gave permission for LSW to call Rupert to provide an update. LSW called Rupert and notified him that the discharge is still planned for 11/22 and pt is stating she would like to return home. Rupert plans to talk to pt and family tonight further. VERNAW discussed scheduling family training but he declined scheduling until he speaks with his family further. Electronically signed by RABIA Hwang, KIRTI on 11/19/2024 at 1:45 PM

## 2024-11-19 NOTE — PROGRESS NOTES
Patient reported having increased swelling in both legs.  No chest pain or palpitation.  No abdominal pain    General appearance: alert, appears stated age and cooperative, no apparent distress.  Central obesity  Skin: Skin color, texture, turgor normal. No rashes or lesions  HEENT: Head: Normocephalic, no lesions, without obvious abnormality.  Neck: no adenopathy, no carotid bruit, no JVD, supple, symmetrical, trachea midline, and thyroid not enlarged, symmetric, no tenderness/mass/nodules  Lungs: clear to auscultation bilaterally  Heart: regular rate and rhythm, S1, S2 normal, no murmur, click, rub or gallop  Abdomen: soft, non-tender; bowel sounds normal; no masses,  no organomegaly  Extremities: extremities normal, atraumatic, no cyanosis or edema.  Osteoarthritis deformities.,  +2 pitting edema  Neurologic: Mental status: Alert, oriented, thought content appropriate.  Functional impairment.  Please refer to physical Occupational Therapy team note for details on functional status and treatment plan.    *.  Edema in both legs.  Multifactorial.  Rule out the possibility of DVT, requested venous study  .  Could be related to hydralazine side effect.  Reduced hydralazine down to 50 mg twice daily  .  Could be related to low albumin and third spacing  .  Started patient on Demadex 20 mg daily.  Also requested echocardiogram rule out right-sided heart failure or diastolic heart failure      *Upper GI bleed the secondary to gastric ulcer in the setting of patient taking anticoagulant.  Continue PPI.  Patient had another EGD which did not show any active bleed.  Hemoglobin is stable posttransfusion.       *Acute blood loss anemia status post transfusion.  This is associated with hypotension suggestive of hemorrhagic shock..  Status post 1 unit of RBC transfusion on 11/15 hemoglobin stable posttransfusion     *Paroxysmal A-fib.  Currently patient is in sinus rhythm.  Regular heart  Patient is on amiodarone.  Eliquis is  on hold.  Patient continues to have a black and slightly bloody stool..  Continue beta-blocker.     *Functional impairment.  Please refer to physical and Occupational Therapy team notes for details on her functional status and treatment plan     *Coronary artery disease.  No active chest pain or angina    *CKD stage III.  Continue to monitor closely as volume status may fluctuate.    Patient has multiple other medical issues not listed above.    Some of pt medical issues, chronic symptoms, abnormalities seen on labs and imaging may not be addressed while patient is currently on the rehab unit.

## 2024-11-20 PROCEDURE — 6370000000 HC RX 637 (ALT 250 FOR IP): Performed by: PHYSICAL MEDICINE & REHABILITATION

## 2024-11-20 PROCEDURE — 99232 SBSQ HOSP IP/OBS MODERATE 35: CPT | Performed by: INTERNAL MEDICINE

## 2024-11-20 PROCEDURE — 2580000003 HC RX 258: Performed by: INTERNAL MEDICINE

## 2024-11-20 PROCEDURE — 97129 THER IVNTJ 1ST 15 MIN: CPT

## 2024-11-20 PROCEDURE — 6370000000 HC RX 637 (ALT 250 FOR IP): Performed by: INTERNAL MEDICINE

## 2024-11-20 PROCEDURE — 97535 SELF CARE MNGMENT TRAINING: CPT

## 2024-11-20 PROCEDURE — 1180000000 HC REHAB R&B

## 2024-11-20 PROCEDURE — 97110 THERAPEUTIC EXERCISES: CPT

## 2024-11-20 PROCEDURE — 97530 THERAPEUTIC ACTIVITIES: CPT

## 2024-11-20 PROCEDURE — 97116 GAIT TRAINING THERAPY: CPT

## 2024-11-20 PROCEDURE — 6360000002 HC RX W HCPCS: Performed by: PHYSICAL MEDICINE & REHABILITATION

## 2024-11-20 PROCEDURE — 97130 THER IVNTJ EA ADDL 15 MIN: CPT

## 2024-11-20 RX ADMIN — Medication 2000 UNITS: at 16:52

## 2024-11-20 RX ADMIN — Medication 10 ML: at 22:52

## 2024-11-20 RX ADMIN — ACETAMINOPHEN 325MG 650 MG: 325 TABLET ORAL at 16:52

## 2024-11-20 RX ADMIN — AMIODARONE HYDROCHLORIDE 100 MG: 200 TABLET ORAL at 08:50

## 2024-11-20 RX ADMIN — METOPROLOL TARTRATE 50 MG: 50 TABLET, FILM COATED ORAL at 22:50

## 2024-11-20 RX ADMIN — PANTOPRAZOLE SODIUM 40 MG: 40 TABLET, DELAYED RELEASE ORAL at 06:49

## 2024-11-20 RX ADMIN — FERROUS SULFATE TAB 325 MG (65 MG ELEMENTAL FE) 325 MG: 325 (65 FE) TAB at 08:50

## 2024-11-20 RX ADMIN — ACETAMINOPHEN 325MG 650 MG: 325 TABLET ORAL at 12:42

## 2024-11-20 RX ADMIN — ACETAMINOPHEN 325MG 650 MG: 325 TABLET ORAL at 22:50

## 2024-11-20 RX ADMIN — Medication 10 ML: at 08:59

## 2024-11-20 RX ADMIN — METOPROLOL TARTRATE 50 MG: 50 TABLET, FILM COATED ORAL at 08:50

## 2024-11-20 RX ADMIN — Medication 100 MG: at 08:50

## 2024-11-20 RX ADMIN — ACETAMINOPHEN 325MG 650 MG: 325 TABLET ORAL at 06:49

## 2024-11-20 RX ADMIN — CYANOCOBALAMIN 1000 MCG: 1000 INJECTION, SOLUTION INTRAMUSCULAR; SUBCUTANEOUS at 08:50

## 2024-11-20 RX ADMIN — SUCRALFATE 1 G: 1 TABLET ORAL at 08:50

## 2024-11-20 RX ADMIN — CAMPHOR (SYNTHETIC), MENTHOL, UNSPECIFIED FORM, AND METHYL SALICYLATE: 40; 100; 300 CREAM TOPICAL at 22:51

## 2024-11-20 RX ADMIN — NYSTATIN 500000 UNITS: 100000 SUSPENSION ORAL at 16:53

## 2024-11-20 RX ADMIN — TORSEMIDE 20 MG: 20 TABLET ORAL at 08:50

## 2024-11-20 RX ADMIN — HYDRALAZINE HYDROCHLORIDE 50 MG: 50 TABLET ORAL at 08:50

## 2024-11-20 RX ADMIN — NYSTATIN 500000 UNITS: 100000 SUSPENSION ORAL at 22:51

## 2024-11-20 RX ADMIN — SUCRALFATE 1 G: 1 TABLET ORAL at 22:51

## 2024-11-20 RX ADMIN — Medication 10 ML: at 09:00

## 2024-11-20 RX ADMIN — CALCIUM 500 MG: 500 TABLET ORAL at 08:50

## 2024-11-20 RX ADMIN — HYDRALAZINE HYDROCHLORIDE 50 MG: 50 TABLET ORAL at 22:50

## 2024-11-20 RX ADMIN — FERROUS SULFATE TAB 325 MG (65 MG ELEMENTAL FE) 325 MG: 325 (65 FE) TAB at 16:52

## 2024-11-20 RX ADMIN — NYSTATIN 500000 UNITS: 100000 SUSPENSION ORAL at 08:49

## 2024-11-20 ASSESSMENT — ENCOUNTER SYMPTOMS
NAUSEA: 0
SHORTNESS OF BREATH: 0
STRIDOR: 0
EYES NEGATIVE: 1
GASTROINTESTINAL NEGATIVE: 1
BLOOD IN STOOL: 0
CHEST TIGHTNESS: 0
BACK PAIN: 1
WHEEZING: 0
RESPIRATORY NEGATIVE: 1
COUGH: 0

## 2024-11-20 ASSESSMENT — PAIN SCALES - GENERAL
PAINLEVEL_OUTOF10: 7
PAINLEVEL_OUTOF10: 7
PAINLEVEL_OUTOF10: 8
PAINLEVEL_OUTOF10: 5

## 2024-11-20 ASSESSMENT — PAIN DESCRIPTION - ORIENTATION
ORIENTATION: RIGHT;LOWER
ORIENTATION: RIGHT;LOWER

## 2024-11-20 ASSESSMENT — PAIN DESCRIPTION - LOCATION
LOCATION: HIP;BACK
LOCATION: HIP;BACK
LOCATION: HIP

## 2024-11-20 ASSESSMENT — PAIN DESCRIPTION - DESCRIPTORS: DESCRIPTORS: ACHING

## 2024-11-20 NOTE — PROGRESS NOTES
Physical Therapy Rehab Treatment Note  [facility]  [facility]       NAME: Loren Noyola  : 1935 (89 y.o.)  MRN: [id number]  CODE STATUS: DNR-CCA    Date of Service: 2024       Restrictions:  Restrictions/Precautions: Fall Risk       SUBJECTIVE:   Subjective: pt states her left wrist is sore and warm to the touch    Pain  Pain: right hip pain 6/10 . reports meds earlier. tylenol every 4 hours.  Notified ROBERTO Lovett that pt is asking for pain meds if available.       OBJECTIVE:   Orientation  Overall Orientation Status: Within Functional Limits  Cognition  Overall Cognitive Status: WFL  Arousal/Alertness: Appears intact              Transfers  Surface: Wheelchair  Device: Walker  Sit to Stand  Assistance Level: Supervision  Stand to Sit  Assistance Level: Supervision  Skilled Clinical Factors: vc's for hand placement and safety, good technique    Ambulation  Surface: Level surface  Device: Rolling walker  Distance: 50 feet x 2 with turns  Activity: Within Unit  Activity Comments: flexed trunk, antalgic gait on right.  Additional Factors: Verbal cues;Hand placement cues;Increased time to complete  Assistance Level: Supervision                   PT Exercises  Exercise Treatment: CP applied to R hip during seated LE therex for pain control.  Exercise Equipment: longseated swiss ball lateral trunk flexion and knee flexion.                        ASSESSMENT/PROGRESS TOWARDS GOALS: pt talked at length about going to another facility and that she was sad but knows she needs to get stronger and that this is temporary.       Goals:  Long Term Goals  Long Term Goal 1: Pt will demonstrate bed mobility indep  Long Term Goal 2: Pt will demonstrate bed, sit to stand and car transfers SBA - indep  Long Term Goal 3: Pt will demonstrate amb 50ft -150 ft SBA - indep with safest AD  Long Term Goal 4: Pt will demonstrate TUG </= 20 sec for decreased risk for falls  Long Term Goal 5: SBA

## 2024-11-20 NOTE — PROGRESS NOTES
[facility]  Facility/Department: [facility]  Speech Language Pathology   Treatment Note          Loren Noyola  1935  R251/R251-01  [x]   confirmed    Date: 2024      Restrictions/Precautions: Fall Risk     ADULT DIET; Easy to Chew     Respiratory Status: O2 Flow Rate (L/min): 0 L/min (24)   No active isolations    Rehab Diagnosis: Impaired mobility and ADL's due to debility secondary to GI bleed     Subjective:  Alert and Cooperative        Interventions used this date:  Cognitive Skill Development    Objective/Assessment:  Patient progressing towards goals:  Short Term Goals  Time Frame for Short Term Goals: 1-2 weeks  Goal 1: To increase safety awareness and judgment for safe completion of ADLs secondary to patient's cognitive deficits,  patient will complete high level problem solving tasks related to ADLs (e.g. scheduling, medication management, finance management, etc) with 90% accuracy and supervised cues. Patient completed bill and coin addition task I with 87% accuracy, with supervised assist 100% accuracy.     Goal 3: To increase safety awareness and judgment for safe completion of ADLs secondary to patient's cognitive deficits, patient will complete abstract reasoning tasks (i.e. word deduction, convergent and divergent naming, similarities/differences) with 90% accuracy and supervised cues. Patient compared and contrasted 2 items I with the following accuracy  Compare: I with 100% accuracy  Contrast: I with 80% accuracy, with supervised assist 100% accuracy    Goal 5: Within 1-5 days of implementing the ST POC, the patient's functional reading and writing skills will be assessed in relation to executive functioning (e.g. bill paying, reading rx labels, completing personal information), with additional goals added to patient's POC as deemed necesary by treating SLP. Goal met      Treatment/Activity Tolerance:  Patient tolerated treatment well    Plan:  Continue per  POC    Pain Assessment:  Patient does not c/o pain.    Pain Re-assessment:  Patient does not c/o pain.    Patient/Caregiver Education:  Patient educated on session and progression towards goals.    Safety Devices:  Chair alarm in place        Speech Therapy Level of Assistance Scale    AUDITORY COMPREHENSION  Rating:  Independent-Modified Independent    VERBAL EXPRESSION  Rating:  Independent-Modified Independent    MOTOR SPEECH  Rating: Independent    PROBLEM SOLVING  Rating: Modified Independent-Supervised Assistance    MEMORY  Rating:  Modified Independent-Supervised Assistance        Therapy Time  SLP Individual Minutes  Time In: 1130  Time Out: 1200  Minutes: 30      Patient participated in above treatment session to complete previously scheduled minutes from 11/19/24.        Signature: Electronically signed by ALEJANDRA Cruz on 11/20/2024 at 11:40 AM

## 2024-11-20 NOTE — PROGRESS NOTES
Patient is feeling better.  Less swelling in the legs  General appearance: alert, appears stated age and cooperative, no apparent distress.  Central obesity  Skin: Skin color, texture, turgor normal. No rashes or lesions  HEENT: Head: Normocephalic, no lesions, without obvious abnormality.  Neck: no adenopathy, no carotid bruit, no JVD, supple, symmetrical, trachea midline, and thyroid not enlarged, symmetric, no tenderness/mass/nodules  Lungs: clear to auscultation bilaterally  Heart: regular rate and rhythm, S1, S2 normal, no murmur, click, rub or gallop  Abdomen: soft, non-tender; bowel sounds normal; no masses,  no organomegaly  Extremities: extremities normal, atraumatic, no cyanosis or edema.  Osteoarthritis deformities.,  +2 pitting edema  Neurologic: Mental status: Alert, oriented, thought content appropriate.  Functional impairment.  Please refer to physical Occupational Therapy team note for details on functional status and treatment plan.    *.  Edema in both legs.  Multifactorial.  Rule out the possibility of DVT, requested venous study.  Which came back negative for DVT  .  Could be related to hydralazine side effect.  Reduced hydralazine down to 50 mg twice daily  .  Could be related to low albumin and third spacing  .  Started patient on Demadex 20 mg daily.  Also requested echocardiogram rule out right-sided heart failure or diastolic heart failure      *Upper GI bleed the secondary to gastric ulcer in the setting of patient taking anticoagulant.  Continue PPI.  Patient had another EGD which did not show any active bleed.  Hemoglobin is stable posttransfusion.  He repeat H&H in a.m.       *Acute blood loss anemia status post transfusion.  This is associated with hypotension suggestive of hemorrhagic shock..  Status post 1 unit of RBC transfusion on 11/15 hemoglobin stable posttransfusion     *Paroxysmal A-fib.  Currently patient is in sinus rhythm.  Regular heart  Patient is on amiodarone.  Eliquis  is on hold.  Patient continues to have a black and slightly bloody stool..  Continue beta-blocker.     *Functional impairment.  Please refer to physical and Occupational Therapy team notes for details on her functional status and treatment plan     *Coronary artery disease.  No active chest pain or angina    *CKD stage III.  Continue to monitor closely as volume status may fluctuate.    Patient has multiple other medical issues not listed above.    Some of pt medical issues, chronic symptoms, abnormalities seen on labs and imaging may not be addressed while patient is currently on the rehab unit.

## 2024-11-20 NOTE — PROGRESS NOTES
Exception - unselect if not a suspected or confirmed emergency medical condition->Emergency Medical Condition (MA) What reading provider will be dictating this exam?->CRC FINDINGS: BRAIN/VENTRICLES: There is no acute intracranial hemorrhage, mass effect or midline shift.  No abnormal extra-axial fluid collection.  The gray-white differentiation is maintained without evidence of an acute infarct.  Mild periventricular white matter changes are seen.  There is no evidence of hydrocephalus. Global involutional changes are noted. ORBITS: The visualized portion of the orbits demonstrate no acute abnormality. SINUSES: The visualized paranasal sinuses shows mucoperiosteal thickening in the right sphenoid sinus.  Mastoid air cells demonstrate no acute abnormality. SOFT TISSUES/SKULL:  No acute abnormality of the visualized skull or soft tissues.     No acute intracranial abnormality.     XR LUMBAR SPINE (2-3 VIEWS)    Result Date: 10/17/2024  EXAMINATION: XRAY VIEWS OF THE LUMBAR SPINE 10/17/2024 1:30 pm COMPARISON: None. HISTORY: ORDERING SYSTEM PROVIDED HISTORY: pain TECHNOLOGIST PROVIDED HISTORY: Reason for exam:->pain What reading provider will be dictating this exam?->CRC FINDINGS: The bones are demineralized.  No acute compression deformities.  There are moderate degenerative changes in the mid and lower lumbar spine as follows: Moderate disc space narrowing at L3-L4, grade 1 anterolisthesis and mild disc space narrowing at L4-L5, facet arthrosis in the mid and lower lumbar spine. The pedicles are intact with no paraspinal soft tissue mass.     1. Moderate degenerative changes in the mid and lower lumbar spine. 2. Grade 1 anterolisthesis of L3 on L4.     XR HIP LEFT (2-3 VIEWS)    Result Date: 10/17/2024  EXAMINATION: TWO XRAY VIEWS OF THE LEFT HIP 10/17/2024 1:30 pm COMPARISON: None. 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   resumed Low dose Amiodarone.  ECG SR.   She is at risk of complications with DOAC and continued falls.  She can be considered for Watchman device as out pt.   HTN Not to goal - advance [id number] bid.   Dual Chamber PPM   Anticipate f/u Dr. Samaniego upon dc.      Thank you for allowing us to participate in the care of this patient.     Will continue to follow.    Please call if questions or concerns arise.    Electronically signed by EDER PRESTON MD on 11/20/2024 at 6:27 AM

## 2024-11-20 NOTE — PROGRESS NOTES
OCCUPATIONAL THERAPY  INPATIENT REHAB TREATMENT NOTE  [facility]      NAME: Loren Noyola  : 1935 (89 y.o.)  MRN: [id number]  CODE STATUS: DNR-CCA  Room: R251/R251-01    Date of Service: 2024    Referring Physician: Deb Haro DO  Rehab Diagnosis: Impaired mobility and ADL's due to debility secondary to GI bleed    Hospital course:   Comments: 89 y.o. female patient with recent hospitalization and rehab stay s/p fall with discovered T12fx. Patient was d/c'd home from rehab on . Patient presented back to ER  with CP and SOB along with reports of having dark stools. Labs notable for hemoglobin of 4.7. Patient admitted to ICU with consults from CC, pulmonology and GI. EGD was performed and no active bleeding found. Did, however, reveal x2 non-bleeding gastric ulcers.      Restrictions  Restrictions/Precautions  Restrictions/Precautions: Fall Risk     Patient's date of birth confirmed: Yes    SAFETY:  Safety Devices  Safety Devices in place: Yes  Type of devices: All fall risk precautions in place    SUBJECTIVE: \"I'm beautiful alright. I got one foot on a banana peel and one foot in the grave.\"    Pain at start of treatment: Yes: 5/10    Pain at end of treatment: Yes: 5/10    Location: R hip  Description: ache  Nursing notified: Yes  Nurse: ROBERTO Lovett  Intervention: RN provided pain patches    COGNITION:  Orientation  Overall Orientation Status: Within Functional Limits  Cognition  Overall Cognitive Status: WFL  Arousal/Alertness: Appears intact  Following Commands: Follows all commands without difficulty  Attention Span: Appears intact  Memory: Appears intact  Safety Judgement: Appears intact  Problem Solving: Assistance required to identify errors made  Insights: Appears intact  Initiation: Appears intact  Sequencing: Appears intact      Pt's current cognitive status is:  Comprehension: Supervision  Expression: Independent  Social Interaction: Independent  Problem

## 2024-11-20 NOTE — PROGRESS NOTES
OCCUPATIONAL THERAPY  INPATIENT REHAB TREATMENT NOTE  [facility]      NAME: Loren Noyola  : 1935 (89 y.o.)  MRN: [id number]  CODE STATUS: DNR-CCA  Room: R251/R251-01    Date of Service: 2024    Referring Physician: Deb Haro DO  Rehab Diagnosis: Impaired mobility and ADL's due to debility secondary to GI bleed    Hospital course:   Comments: 89 y.o. female patient with recent hospitalization and rehab stay s/p fall with discovered T12fx. Patient was d/c'd home from rehab on . Patient presented back to ER  with CP and SOB along with reports of having dark stools. Labs notable for hemoglobin of 4.7. Patient admitted to ICU with consults from CC, pulmonology and GI. EGD was performed and no active bleeding found. Did, however, reveal x2 non-bleeding gastric ulcers.      Restrictions  Restrictions/Precautions  Restrictions/Precautions: Fall Risk     Patient's date of birth confirmed: Yes    SAFETY:  Safety Devices  Safety Devices in place: Yes  Type of devices: All fall risk precautions in place    SUBJECTIVE: \"I guess I'm going to [place]. So hit me up girly.\"    Pain at start of treatment: Yes: 8/10    Pain at end of treatment: Yes: 6/10    Location: R hip  Description: ache  Nursing notified: Yes  Nurse: Stephania  Intervention: None    COGNITION:  Orientation  Overall Orientation Status: Within Functional Limits  Cognition  Overall Cognitive Status: WFL  Arousal/Alertness: Appears intact  Following Commands: Follows all commands without difficulty  Attention Span: Appears intact  Memory: Appears intact  Safety Judgement: Appears intact  Problem Solving: Assistance required to identify errors made  Insights: Appears intact  Initiation: Appears intact  Sequencing: Appears intact      Pt's current cognitive status is:  Comprehension: Supervision  Expression: Independent  Social Interaction: Independent  Problem Solving: Supervision  Memory:  cooperative while working at a slow steady pace throughout treatment session.     Activity Tolerance: Patient tolerated treatment well      PLAN OF CARE:  Strengthening, Balance training, Functional mobility training, Endurance training, Safety education & training, Pain management, Patient/Caregiver education & training, Equipment evaluation, education, & procurement, Positioning, Self-Care / ADL    Continue per OT POC for planned discharge on 11-22-24.    Patient goals : \"Go home.\"  Time Frame for Long Term Goals : Within 2 weeks, pt to demontsrate progress in the following areas listed below to achieve specific LTG's as stated in the initial evaluation.  Long Term Goal 1: Increase standing balance to increase safety independence with ADL's  Long Term Goal 2: Increase BUE strength to increase safety independence with ADL's  Long Term Goal 3: Increase activity tolerance to increase safety and indepence with ADL's        Therapy Time:   Individual Group Co-Treat   Time In 1400       Time Out 1500         Minutes 60                   ADL/IADL training: 10 minutes  Therapeutic activities: 50 minutes     Electronically signed by:    SALLY Schwab,   11/20/2024, 3:10 PM

## 2024-11-20 NOTE — PROGRESS NOTES
[facility] Rehabilitation  Occupational Therapy      NAME:  Loren Noyola  ROOM: R251/R251-01  :  1935  DATE: 2024    Attempted to see Loren Noyola on this date at 0710 and 0735 for a 30 minute session for   []  Initial Evaluation   []  Scheduled therapy    [x]  Make-up therapy during get up and go   []  Group therapy   []  Family training    Patient was unable to be seen due to:   [] Off unit for testing/procedure   [] Patient refused, stating \"    [] Therapy on hold due to     [] Nursing deferred due to    [x] Other:  Patient was eating her breakfast and continued to be eating during second attempt as well       Electronically signed by DANIEL Hagen/L on 24 at 1:19 PM EST

## 2024-11-20 NOTE — PROGRESS NOTES
Physical Therapy Rehab Treatment Note  Facility/Department: [facility]  Room: [place] [address]       NAME: Loren Noyola  : 1935 (89 y.o.)  MRN: [id number]  CODE STATUS: DNR-CCA    Date of Service: 2024     Restrictions:  Restrictions/Precautions: Fall Risk     SUBJECTIVE:   Subjective: Pt staes cold packs help with pain.  Pt states she is feeling alot better.    Pain  Pain: 5/10 R hip and L wrist pre/post session-Pt declined intervention  CP applied to R hip during session.  4/10 post    OBJECTIVE:        Sit to Stand  Assistance Level: Supervision  Stand to Sit  Assistance Level: Supervision      PT Exercises  Exercise Treatment: CP applied to R hip during seated LE therex for pain control.  PROM Exercises: seated HS/gastroc stretch x3 30 sec hold BLE  A/AROM Exercises: seated: LAQ 2x10, Marches, hip abd with ball, hip abd manual resistance x 20ea raul  Static Standing Balance Exercises: stood 60sec at ayden rail supervision  Standing Open/Closed Kinetic Chain Exercises: sidestepping facing hemirail 5 syeps R and 5steps L  C/o increased R hip pain        ASSESSMENT/PROGRESS TOWARDS GOALS: Pt tolerated sitting ex well.  Reports pain relief with CP.  Standing ex limited by hip pain.         Goals:  Long Term Goals  Long Term Goal 1: Pt will demonstrate bed mobility indep  Long Term Goal 2: Pt will demonstrate bed, sit to stand and car transfers SBA - indep  Long Term Goal 3: Pt will demonstrate amb 50ft -150 ft SBA - indep with safest AD  Long Term Goal 4: Pt will demonstrate TUG </= 20 sec for decreased risk for falls  Long Term Goal 5: SBA 4-5 stairs for safe home entry  Patient Goals   Patient Goals : to go home    PLAN OF CARE/Safety: Cont per POC.       Therapy Time:   Individual   Time In 1100   Time Out 1130   Minutes 30      Minutes:  Transfer/Bed mobility trainin  Gait trainin  Neuro re education:0  Therapeutic ex:25    Emma Santos PTA, 24 at 12:15 PM

## 2024-11-20 NOTE — PROGRESS NOTES
Subjective:  The patient complains of severe acute on chronic progressive fatigue and mid and low back pain partially relieved by rest, medications, PT,  OT,     and rest and exacerbated by recent fall.  89 yo female was admitted to [facility] after presenting to the ER with pain in her head neck and back after falling at home striking her head on the cushions of her sofa.  She had several previous falls with ER visits in the week prior.  She was found to have a T12 compression fracture and admitted under the care of the hospitalist.  She was seen by the neurosurgeon PA and discussed with neurosurgery this is likely old and there is now planned for surgical intervention.   Gabriela Butler, ANDREW  Registered Nurse  Nursing     Flowsheet Note      Addendum     Date of Service: 11/19/2024  8:55 PM       Patient assessment complete. C/o 8/10 pain to right hip and lower back. Too soon for PRN Tylenol. Patient repositioned and ice applied at this time. Scheduled HS medications given per MAR. Patient had a vascular bilateral lower duplex on day shift today, results back. Bed locked and low with alarm engaged. Call light within patient's reach. Electronically signed by Gabriela Butler RN on 11/19/2024 at 8:56 PM     PRN Tylenol given for right hip and lower back pain. Electronically signed by Gabriela Butler RN on 11/19/2024 at 10:57 PM           I am concerned about patient’s medical complexities and barriers to advancing in rehab goals including improving urinary incontinence and GIB.        I reviewed current care and plans for further care with other rehab providers including nursing and case management.  According to recent nursing note, \"Patient's breathing labored, audible wheezing, 3-4+ pitting edema to BLE noted.   C/o 8/10 pain to right hip and lower back. Patient just received PRN Tylenol at 1830 so unable to give at this time. Ice applied and patient repositioned in bed. Patient's blood pressure quiet elevated at  extremity edema and tenderness   Skin:   Intact to general survey,  right foot bunion wrapped-superficial wound noted to medial 1st mpj of right foot. Appears healthy and granular, no signs of infection     Rehabilitation:  Physical Therapy:   Bed mobility:  Bed mobility  Rolling to Left: Stand by assistance (11/17/24 1023)  Rolling to Right: Stand by assistance (11/17/24 1023)  Supine to Sit: Stand by assistance (11/17/24 1023)  Sit to Supine: Unable to assess (11/17/24 1023)  Scooting: Stand by assistance (11/13/24 1014)  Bed Mobility Comments: hob elevated, hr used, increased time and effort (11/17/24 1023)  Bed Mobility  Overall Assistance Level: Stand By Assist (11/20/24 1049)  Additional Factors: Verbal cues;Increased time to complete;Head of bed flat;Without handrails (11/20/24 1049)  Overall Assistance Level: Stand By Assist (11/20/24 1049)  Additional Factors: Verbal cues;Increased time to complete;Head of bed flat;Without handrails (11/20/24 1049)  Roll Left  Assistance Level: Stand by assist (11/20/24 1049)  Roll Right  Assistance Level: Stand by assist (11/20/24 1049)  Sit to Supine  Assistance Level: Stand by assist (11/20/24 1049)  Skilled Clinical Factors: increased time and effort (11/20/24 1049)  Supine to Sit  Assistance Level: Stand by assist (11/20/24 1049)  Skilled Clinical Factors: increased time and effort (11/20/24 1049)  Scooting  Assistance Level: Stand by assist (11/20/24 1049)  Transfers:  Transfers  Sit to Stand: Stand by assistance;Minimal Assistance (11/17/24 1023)  Stand to Sit: Stand by assistance;Minimal Assistance (11/17/24 1023)  Bed to Chair: Stand by assistance;Minimal assistance (11/17/24 1023)  Comment: vc for hand placement, fair carryover, improved anterior weight shift without cues, intermitent steadying (11/17/24 1023)  Transfers  Surface: Wheelchair (11/20/24 1500)  Additional Factors: Verbal cues;Hand placement cues;Increased time to complete (11/20/24 1049)  Device:

## 2024-11-20 NOTE — PLAN OF CARE
Problem: Pain  Goal: Verbalizes/displays adequate comfort level or baseline comfort level  11/19/2024 2051 by Sandy Barajas RN  Outcome: Not Progressing     Problem: Safety - Adult  Goal: Free from fall injury  11/19/2024 2051 by Sandy Barajas RN  Outcome: Progressing     Problem: ABCDS Injury Assessment  Goal: Absence of physical injury  11/19/2024 2051 by Sandy Barajas RN  Outcome: Progressing     Problem: Skin/Tissue Integrity  Goal: Absence of new skin breakdown  Description: 1.  Monitor for areas of redness and/or skin breakdown  2.  Assess vascular access sites hourly  3.  Every 4-6 hours minimum:  Change oxygen saturation probe site  4.  Every 4-6 hours:  If on nasal continuous positive airway pressure, respiratory therapy assess nares and determine need for appliance change or resting period.  11/19/2024 2051 by Sandy Barajas RN  Outcome: Progressing     Problem: Nutrition Deficit:  Goal: Optimize nutritional status  11/19/2024 2051 by Sandy Barajas RN  Outcome: Progressing     Problem: Pain  Goal: Verbalizes/displays adequate comfort level or baseline comfort level  11/19/2024 2051 by Sandy Barajas RN  Outcome: Not Progressing  11/19/2024 1259 by Mary Carmen Oropeza RN  Outcome: Progressing

## 2024-11-20 NOTE — FLOWSHEET NOTE
Patient assessment complete. C/o 8/10 pain to right hip and lower back. Too soon for PRN Tylenol. Patient repositioned and ice applied at this time. Scheduled HS medications given per MAR. Patient had a vascular bilateral lower duplex on day shift today, results back. Bed locked and low with alarm engaged. Call light within patient's reach. Electronically signed by Gabriela Butler RN on 11/19/2024 at 8:56 PM    PRN Tylenol given for right hip and lower back pain. Electronically signed by Gabriela Butler RN on 11/19/2024 at 10:57 PM

## 2024-11-20 NOTE — CARE COORDINATION
VERNAW spoke with Rupert (son) and discussed pt's discharge plan. Rupert relayed the concerns that they are having with pt returning home. He explained that when pt discharged home from rehab recently, she was requesting for him or his brother to be over frequently. In particular she would ask for Jude (son) to spend the night in case she needed help. He stated that the family was hoping for another week or so of therapy to get stronger before going home. Their goal is to have pt ambulate independently about 50ft, microwave her own meals, and toilet independently. He noted that pt's spouse is safe at home and independent, except they have been stopping over to ensure he takes his medications. He voiced concern of pt wanting to assist her spouse by making him meals and over exerting herself, which could result in an injury. He also acknowledged that he is aware that pt does not want to go to a SNF but that they are strongly encouraging her. VERNAW will discuss further with IDT to determine if pt can be extended to reach a more independent level so that she can return home safely. Electronically signed by RABIA Hwang LSW on 11/20/2024 at 10:18 AM    LSW was notified by SLP that pt is now agreeing to SNF placement. LSW met with pt and she confirmed that she does need more assistance and feels that discharging to a SNF is her best option at this time. LSW provided a list of SNF options but pt declined and stated that she wants to go back to [facility]. LSW called Rupert (son) and confirmed this. VERNAW then called Yari Izquierdo and left a voicemail with the referral info. Electronically signed by RABIA Hwang LSW on 11/20/2024 at 4:30 PM

## 2024-11-21 ENCOUNTER — HOSPITAL ENCOUNTER (INPATIENT)
Age: 89
Discharge: HOME | DRG: 552 | End: 2024-11-23
Attending: INTERNAL MEDICINE
Payer: MEDICARE

## 2024-11-21 VITALS
WEIGHT: 186 LBS | HEIGHT: 63 IN | DIASTOLIC BLOOD PRESSURE: 56 MMHG | SYSTOLIC BLOOD PRESSURE: 138 MMHG | BODY MASS INDEX: 32.96 KG/M2

## 2024-11-21 LAB
ANION GAP SERPL CALCULATED.3IONS-SCNC: 8 MEQ/L (ref 9–15)
BASOPHILS # BLD: 0 K/UL (ref 0–0.2)
BASOPHILS NFR BLD: 0.3 %
BUN SERPL-MCNC: 24 MG/DL (ref 8–23)
CALCIUM SERPL-MCNC: 8.1 MG/DL (ref 8.5–9.9)
CHLORIDE SERPL-SCNC: 109 MEQ/L (ref 95–107)
CO2 SERPL-SCNC: 25 MEQ/L (ref 20–31)
CREAT SERPL-MCNC: 1.7 MG/DL (ref 0.5–0.9)
ECHO AO ROOT DIAM: 3.1 CM
ECHO AO ROOT INDEX: 1.66 CM/M2
ECHO AV AREA PEAK VELOCITY: 2 CM2
ECHO AV AREA VTI: 2.3 CM2
ECHO AV AREA/BSA PEAK VELOCITY: 1.1 CM2/M2
ECHO AV AREA/BSA VTI: 1.2 CM2/M2
ECHO AV MEAN GRADIENT: 5 MMHG
ECHO AV MEAN VELOCITY: 1.1 M/S
ECHO AV PEAK GRADIENT: 11 MMHG
ECHO AV PEAK VELOCITY: 1.8 M/S
ECHO AV VELOCITY RATIO: 0.67
ECHO AV VTI: 42.3 CM
ECHO BSA: 1.94 M2
ECHO EST RA PRESSURE: 3 MMHG
ECHO LA VOL A-L A2C: 68 ML (ref 22–52)
ECHO LA VOL A-L A4C: 108 ML (ref 22–52)
ECHO LA VOL MOD A2C: 66 ML (ref 22–52)
ECHO LA VOL MOD A4C: 104 ML (ref 22–52)
ECHO LA VOLUME AREA LENGTH: 87 ML
ECHO LA VOLUME INDEX A-L A2C: 36 ML/M2 (ref 16–34)
ECHO LA VOLUME INDEX A-L A4C: 58 ML/M2 (ref 16–34)
ECHO LA VOLUME INDEX AREA LENGTH: 47 ML/M2 (ref 16–34)
ECHO LA VOLUME INDEX MOD A2C: 35 ML/M2 (ref 16–34)
ECHO LA VOLUME INDEX MOD A4C: 56 ML/M2 (ref 16–34)
ECHO LV E' LATERAL VELOCITY: 13.26 CM/S
ECHO LV E' SEPTAL VELOCITY: 9.88 CM/S
ECHO LV EDV A2C: 65 ML
ECHO LV EDV A4C: 66 ML
ECHO LV EDV BP: 67 ML (ref 56–104)
ECHO LV EDV INDEX A4C: 35 ML/M2
ECHO LV EDV INDEX BP: 36 ML/M2
ECHO LV EDV NDEX A2C: 35 ML/M2
ECHO LV EJECTION FRACTION A2C: 67 %
ECHO LV EJECTION FRACTION A4C: 76 %
ECHO LV EJECTION FRACTION BIPLANE: 75 % (ref 55–100)
ECHO LV ESV A2C: 22 ML
ECHO LV ESV A4C: 16 ML
ECHO LV ESV BP: 17 ML (ref 19–49)
ECHO LV ESV INDEX A2C: 12 ML/M2
ECHO LV ESV INDEX A4C: 9 ML/M2
ECHO LV ESV INDEX BP: 9 ML/M2
ECHO LVOT AREA: 2.8 CM2
ECHO LVOT AV VTI INDEX: 0.78
ECHO LVOT DIAM: 1.9 CM
ECHO LVOT MEAN GRADIENT: 3 MMHG
ECHO LVOT PEAK GRADIENT: 5 MMHG
ECHO LVOT PEAK VELOCITY: 1.2 M/S
ECHO LVOT STROKE VOLUME INDEX: 49.9 ML/M2
ECHO LVOT SV: 93.2 ML
ECHO LVOT VTI: 32.9 CM
ECHO MV A VELOCITY: 0.64 M/S
ECHO MV E DECELERATION TIME (DT): 337.9 MS
ECHO MV E VELOCITY: 0.78 M/S
ECHO MV E/A RATIO: 1.22
ECHO MV E/E' LATERAL: 5.88
ECHO MV E/E' RATIO (AVERAGED): 6.89
ECHO MV E/E' SEPTAL: 7.89
ECHO MV REGURGITANT PEAK GRADIENT: 46 MMHG
ECHO MV REGURGITANT PEAK VELOCITY: 3.4 M/S
ECHO PV MAX VELOCITY: 1.4 M/S
ECHO PV PEAK GRADIENT: 8 MMHG
ECHO RIGHT VENTRICULAR SYSTOLIC PRESSURE (RVSP): 29 MMHG
ECHO RV TAPSE: 1.9 CM (ref 1.7–?)
ECHO TV REGURGITANT MAX VELOCITY: 2.56 M/S
ECHO TV REGURGITANT PEAK GRADIENT: 26 MMHG
EOSINOPHIL # BLD: 0.3 K/UL (ref 0–0.7)
EOSINOPHIL NFR BLD: 2.9 %
ERYTHROCYTE [DISTWIDTH] IN BLOOD BY AUTOMATED COUNT: 15.9 % (ref 11.5–14.5)
GLUCOSE SERPL-MCNC: 100 MG/DL (ref 70–99)
HCT VFR BLD AUTO: 27.9 % (ref 37–47)
HGB BLD-MCNC: 8.9 G/DL (ref 12–16)
LYMPHOCYTES # BLD: 1 K/UL (ref 1–4.8)
LYMPHOCYTES NFR BLD: 10.7 %
MCH RBC QN AUTO: 29.5 PG (ref 27–31.3)
MCHC RBC AUTO-ENTMCNC: 31.9 % (ref 33–37)
MCV RBC AUTO: 92.4 FL (ref 79.4–94.8)
MONOCYTES # BLD: 1.2 K/UL (ref 0.2–0.8)
MONOCYTES NFR BLD: 12.7 %
NEUTROPHILS # BLD: 6.8 K/UL (ref 1.4–6.5)
NEUTS SEG NFR BLD: 73 %
PLATELET # BLD AUTO: 253 K/UL (ref 130–400)
POTASSIUM SERPL-SCNC: 3.7 MEQ/L (ref 3.4–4.9)
RBC # BLD AUTO: 3.02 M/UL (ref 4.2–5.4)
SODIUM SERPL-SCNC: 142 MEQ/L (ref 135–144)
WBC # BLD AUTO: 9.3 K/UL (ref 4.8–10.8)

## 2024-11-21 PROCEDURE — 1180000000 HC REHAB R&B

## 2024-11-21 PROCEDURE — 99232 SBSQ HOSP IP/OBS MODERATE 35: CPT | Performed by: INTERNAL MEDICINE

## 2024-11-21 PROCEDURE — 93306 TTE W/DOPPLER COMPLETE: CPT

## 2024-11-21 PROCEDURE — 97530 THERAPEUTIC ACTIVITIES: CPT

## 2024-11-21 PROCEDURE — 97535 SELF CARE MNGMENT TRAINING: CPT

## 2024-11-21 PROCEDURE — 36415 COLL VENOUS BLD VENIPUNCTURE: CPT

## 2024-11-21 PROCEDURE — 6370000000 HC RX 637 (ALT 250 FOR IP): Performed by: INTERNAL MEDICINE

## 2024-11-21 PROCEDURE — 80048 BASIC METABOLIC PNL TOTAL CA: CPT

## 2024-11-21 PROCEDURE — 97110 THERAPEUTIC EXERCISES: CPT

## 2024-11-21 PROCEDURE — 97116 GAIT TRAINING THERAPY: CPT

## 2024-11-21 PROCEDURE — 6370000000 HC RX 637 (ALT 250 FOR IP): Performed by: PHYSICAL MEDICINE & REHABILITATION

## 2024-11-21 PROCEDURE — 2580000003 HC RX 258: Performed by: INTERNAL MEDICINE

## 2024-11-21 PROCEDURE — 85025 COMPLETE CBC W/AUTO DIFF WBC: CPT

## 2024-11-21 PROCEDURE — 93306 TTE W/DOPPLER COMPLETE: CPT | Performed by: INTERNAL MEDICINE

## 2024-11-21 RX ORDER — FERROUS SULFATE 325(65) MG
325 TABLET ORAL 2 TIMES DAILY WITH MEALS
DISCHARGE
Start: 2024-11-21

## 2024-11-21 RX ORDER — TORSEMIDE 10 MG/1
10 TABLET ORAL DAILY
Status: DISCONTINUED | OUTPATIENT
Start: 2024-11-22 | End: 2024-11-22 | Stop reason: HOSPADM

## 2024-11-21 RX ORDER — HYDRALAZINE HYDROCHLORIDE 50 MG/1
50 TABLET, FILM COATED ORAL EVERY 12 HOURS SCHEDULED
Qty: 90 TABLET | Refills: 3 | Status: SHIPPED | OUTPATIENT
Start: 2024-11-21

## 2024-11-21 RX ORDER — TORSEMIDE 10 MG/1
10 TABLET ORAL DAILY
Qty: 30 TABLET | Refills: 3 | Status: SHIPPED | OUTPATIENT
Start: 2024-11-22

## 2024-11-21 RX ORDER — LIDOCAINE 4 G/G
3 PATCH TOPICAL DAILY
DISCHARGE
Start: 2024-11-22

## 2024-11-21 RX ADMIN — AMIODARONE HYDROCHLORIDE 100 MG: 200 TABLET ORAL at 09:13

## 2024-11-21 RX ADMIN — HYDRALAZINE HYDROCHLORIDE 50 MG: 50 TABLET ORAL at 09:15

## 2024-11-21 RX ADMIN — METOPROLOL TARTRATE 50 MG: 50 TABLET, FILM COATED ORAL at 13:26

## 2024-11-21 RX ADMIN — CAMPHOR (SYNTHETIC), MENTHOL, UNSPECIFIED FORM, AND METHYL SALICYLATE: 40; 100; 300 CREAM TOPICAL at 10:28

## 2024-11-21 RX ADMIN — NYSTATIN 500000 UNITS: 100000 SUSPENSION ORAL at 18:11

## 2024-11-21 RX ADMIN — METOPROLOL TARTRATE 50 MG: 50 TABLET, FILM COATED ORAL at 21:06

## 2024-11-21 RX ADMIN — ACETAMINOPHEN 325MG 650 MG: 325 TABLET ORAL at 05:55

## 2024-11-21 RX ADMIN — Medication 10 ML: at 09:17

## 2024-11-21 RX ADMIN — ACETAMINOPHEN 325MG 650 MG: 325 TABLET ORAL at 14:10

## 2024-11-21 RX ADMIN — Medication 10 ML: at 21:07

## 2024-11-21 RX ADMIN — FERROUS SULFATE TAB 325 MG (65 MG ELEMENTAL FE) 325 MG: 325 (65 FE) TAB at 09:12

## 2024-11-21 RX ADMIN — FERROUS SULFATE TAB 325 MG (65 MG ELEMENTAL FE) 325 MG: 325 (65 FE) TAB at 16:07

## 2024-11-21 RX ADMIN — HYDRALAZINE HYDROCHLORIDE 50 MG: 50 TABLET ORAL at 21:06

## 2024-11-21 RX ADMIN — CALCIUM 500 MG: 500 TABLET ORAL at 09:11

## 2024-11-21 RX ADMIN — NYSTATIN 500000 UNITS: 100000 SUSPENSION ORAL at 09:22

## 2024-11-21 RX ADMIN — Medication 10 ML: at 09:16

## 2024-11-21 RX ADMIN — ACETAMINOPHEN 325MG 650 MG: 325 TABLET ORAL at 21:17

## 2024-11-21 RX ADMIN — PANTOPRAZOLE SODIUM 40 MG: 40 TABLET, DELAYED RELEASE ORAL at 05:55

## 2024-11-21 RX ADMIN — NYSTATIN 500000 UNITS: 100000 SUSPENSION ORAL at 21:06

## 2024-11-21 RX ADMIN — Medication 100 MG: at 09:10

## 2024-11-21 RX ADMIN — POLYETHYLENE GLYCOL 3350 17 G: 17 POWDER, FOR SOLUTION ORAL at 09:07

## 2024-11-21 RX ADMIN — NYSTATIN 500000 UNITS: 100000 SUSPENSION ORAL at 13:26

## 2024-11-21 RX ADMIN — Medication 2000 UNITS: at 16:07

## 2024-11-21 RX ADMIN — SUCRALFATE 1 G: 1 TABLET ORAL at 21:06

## 2024-11-21 RX ADMIN — CAMPHOR (SYNTHETIC), MENTHOL, UNSPECIFIED FORM, AND METHYL SALICYLATE: 40; 100; 300 CREAM TOPICAL at 16:09

## 2024-11-21 RX ADMIN — SUCRALFATE 1 G: 1 TABLET ORAL at 09:11

## 2024-11-21 ASSESSMENT — PAIN DESCRIPTION - DESCRIPTORS
DESCRIPTORS: ACHING

## 2024-11-21 ASSESSMENT — PAIN SCALES - GENERAL
PAINLEVEL_OUTOF10: 5
PAINLEVEL_OUTOF10: 6

## 2024-11-21 ASSESSMENT — PAIN DESCRIPTION - LOCATION
LOCATION: HIP
LOCATION: HIP
LOCATION: HIP;BACK
LOCATION: HIP
LOCATION: HIP

## 2024-11-21 ASSESSMENT — PAIN DESCRIPTION - ORIENTATION
ORIENTATION: RIGHT
ORIENTATION: RIGHT
ORIENTATION: RIGHT;LOWER
ORIENTATION: RIGHT
ORIENTATION: RIGHT

## 2024-11-21 NOTE — PROGRESS NOTES
OCCUPATIONAL THERAPY  INPATIENT REHAB TREATMENT NOTE  [facility]      NAME: Loren Noyola  : 1935 (89 y.o.)  MRN: [id number]  CODE STATUS: DNR-CCA  Room: R251/R251-01    Date of Service: 2024    Referring Physician: Deb Haro DO  Rehab Diagnosis: Impaired mobility and ADL's due to debility secondary to GI bleed    Hospital course:   Comments: 89 y.o. female patient with recent hospitalization and rehab stay s/p fall with discovered T12fx. Patient was d/c'd home from rehab on . Patient presented back to ER  with CP and SOB along with reports of having dark stools. Labs notable for hemoglobin of 4.7. Patient admitted to ICU with consults from CC, pulmonology and GI. EGD was performed and no active bleeding found. Did, however, reveal x2 non-bleeding gastric ulcers.      Restrictions  Restrictions/Precautions  Restrictions/Precautions: Fall Risk     Patient's date of birth confirmed: Yes    SAFETY:  Safety Devices  Safety Devices in place: Yes  Type of devices: All fall risk precautions in place    SUBJECTIVE: \"Bye my girl. Come see me.\"    Pain at start of treatment: Yes: 6/10    Pain at end of treatment: Yes: 6/10    Location: R hip  Description: ache  Nursing notified: Yes  Nurse: Jacque  Intervention: None    COGNITION:  Orientation  Overall Orientation Status: Within Functional Limits  Cognition  Overall Cognitive Status: WFL  Arousal/Alertness: Appears intact  Following Commands: Follows all commands without difficulty  Attention Span: Appears intact  Memory: Appears intact  Safety Judgement: Appears intact  Problem Solving: Assistance required to identify errors made  Insights: Appears intact  Initiation: Appears intact  Sequencing: Appears intact  Cognition Comment: Comprehension Supervision   Expression IND   Social Interaction IND   Problem Solving Supervision   Memory Supervision      Pt's current cognitive status is:  Comprehension: Supervision  Expression:

## 2024-11-21 NOTE — PROGRESS NOTES
PROGRESS NOTE    Patient Name: Loren Noyola  Admit Date: 2024  5:04 PM  MR #: [id number]  : 1935    Attending Physician: Deb Haro DO  Reason for consult: AF and off A/C due to GIB    History of Presenting Illness:      Loren Noyola is a 89 y.o. female on hospital day 9 with a history of .   History Obtained From:  patient, electronic medical record    Pt readmitted w GIB with black stools.  Hb 4.7   He had been on Eliquis for PAF.   She has no CP nor SOB.  She is eating fine. She is doing well w Acute Rehab.   Hb 7.4 this am.     Recent fall and suffered T12 compression Fx.      She has had other falls as well.  She was on low dose Eliquis and Amiodarone for AF.      24   ECG this AM SR. NO CP no SOB transfused.  Continued Melena    24 continued HTN.  Feels well no cp no sob Hb stable at 9.0    24 /59 No CP NO sob. Tired. Getting stroger    24 BP is stable doing well no cp no sob. Getting stronger.     24 Mild MAURICIO this am. No events overnight  History:      EKG:  Past Medical History:   Diagnosis Date    Bleeding ulcer     CAD (coronary artery disease)     Colitis     Hip fx, left, closed, initial encounter ([facility]) 02/10/2018    Hyperlipidemia     Hypertension     Osteoarthritis     Osteoporosis     Spinal stenosis      Past Surgical History:   Procedure Laterality Date    APPENDECTOMY      COLECTOMY  1/3/2011    sigmoid colectomy with colostomy    COLECTOMY  2011    partial colectomy with colostomy closure    CORONARY ARTERY BYPASS GRAFT      HYSTERECTOMY (CERVIX STATUS UNKNOWN)      HYSTERECTOMY, TOTAL ABDOMINAL (CERVIX REMOVED)      IR MIDLINE CATH  2024    IR MIDLINE CATH 2024 [facility] PROCEDURE    NM HEMIARTHROPLASTY HIP PARTIAL Left 2018    HIP HEMIARTHROPLASTY- performed by Bryan Malcolm MD at [facility]    SIGMOIDOSCOPY      UPPER GASTROINTESTINAL ENDOSCOPY N/A 2024    ESOPHAGOGASTRODUODENOSCOPY WITH  the mid and lower lumbar spine as follows: Moderate disc space narrowing at L3-L4, grade 1 anterolisthesis and mild disc space narrowing at L4-L5, facet arthrosis in the mid and lower lumbar spine. The pedicles are intact with no paraspinal soft tissue mass.     1. Moderate degenerative changes in the mid and lower lumbar spine. 2. Grade 1 anterolisthesis of L3 on L4.     XR HIP LEFT (2-3 VIEWS)    Result Date: 10/17/2024  EXAMINATION: TWO XRAY VIEWS OF THE LEFT HIP 10/17/2024 1:30 pm COMPARISON: None. HISTORY: ORDERING SYSTEM PROVIDED HISTORY: pain TECHNOLOGIST PROVIDED HISTORY: Reason for exam:->pain What reading provider will be dictating this exam?->CRC FINDINGS: Bones are demineralized.  No acute bony abnormalities.  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  Soft tissues appear unremarkable.     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     XR HIP RIGHT (2-3 VIEWS)    Result Date: 10/17/2024  EXAMINATION: TWO XRAY VIEWS OF THE RIGHT HIP 10/17/2024 1:30 pm COMPARISON: None. HISTORY: ORDERING SYSTEM PROVIDED HISTORY: pain TECHNOLOGIST PROVIDED HISTORY: Reason for exam:->pain What reading provider will be dictating this exam?->CRC FINDINGS: Bones are demineralized.  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 No acute bony abnormalities.  Degenerative changes in the visualized lower lumbar spine.     1. 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 No acute bony abnormalities. 2. Degenerative changes in the visualized lower lumbar spine.       Active Hospital Problems    Diagnosis Date Noted    Melena [K92.1] 11/14/2024     Priority: Low    History of GI bleed [Z87.19] 11/10/2024     Priority: Low    Spinal stenosis of lumbar region with neurogenic claudication [M48.062] 11/08/2024     Priority: Low    Urinary incontinence [R32] 10/24/2024     Priority: Low    CAD (coronary artery disease) [I25.10]      Priority: Low    Impaired mobility ADLs dt  thoracic and lumbar spinal stenosis with T12

## 2024-11-21 NOTE — PROGRESS NOTES
Modified independent  (11/21/24 1421)  Grooming: Modified independent  (11/21/24 1421)  Grooming Skilled Clinical Factors: pt brushed her teeth seated in w/c at sink. (11/13/24 0955)  UE Bathing: Modified independent  (11/21/24 1421)  UE Bathing Skilled Clinical Factors: sponge bath completed at sink (11/13/24 0955)  LE Bathing: Modified independent  (11/21/24 1421)  LE Bathing Skilled Clinical Factors: pt bathed anterior/posterior kimberli areas w/ bath wipe following toileting. (11/13/24 0955)  UE Dressing: Modified independent  (11/21/24 1421)  LE Dressing: Modified independent  (11/21/24 1421)  LE Dressing Skilled Clinical Factors: Assist to place sanitary pad in underwear. (11/13/24 0955)  Putting On/Taking Off Footwear: Modified independent  (11/21/24 1421)  Putting On/Taking Off Footwear Skilled Clinical Factors: Assist to adjust R sock once donned (11/13/24 0955)  Toileting: Modified independent  (11/21/24 1421)  Functional Mobility: Supervision (11/21/24 1421)  Functional Mobility Skilled Clinical Factors: pivoting to/from toilet and w/c (11/13/24 0955)  Additional Comments: Pt completes all ADL's w/ increased time and effort with multiple rest breaks.. Mild SOB observed after completing. (11/13/24 0955)  Skin Care: Soap and water (11/21/24 1421)  Toilet Transfers  Toilet - Technique: Ambulating (11/21/24 1421)  Equipment Used: Extra wide bedside commode (11/21/24 1421)  Toilet Transfer: Modified independent (11/21/24 1421)  Toilet Transfers Comments: Steadying assist required, heavy use of handrails. (11/13/24 1024)     Shower Transfers  Shower - Transfer From: Walker (11/21/24 1421)  Shower - Transfer Type: To and From (11/21/24 1421)  Shower - Transfer To: Shower seat with back (11/21/24 1421)  Shower Transfers: Supervision (11/21/24 1421)  Shower Transfers Comments: grab bars (11/21/24 1609)    Speech Therapy:      Comprehension: Within Functional Limits  Verbal Expression:  (Mod I to supervised assist with  treatment team meeting focusing on current medical issues, progress in therapies, social issues, psychological issues, barriers to progress and strategies to address these barriers, and discharge planning.  See the hand written addendum to rehab progress note.  The patient continues to be high risk for future disability and their medical and rehabilitation prognosis continue to be good and therefore, we will continue the patient's rehabilitation course as planned.  The patient's tentative discharge date was set. Patient and family education was discussed.  The patient was made aware of the team discussion regarding their progress.  Discharge plans were discussed along with barriers to progress and strategies to address these barriers, patient encouraged to continue to discuss discharge plans with .       Complex Physical Medicine & Rehab Issues Assess & Planned:   Severe abnormality of gait and mobility and impaired self-care and ADL's secondary to flare of thoracic and lumbar stenosis status post falling.  Functional and medical status reassessed regarding patient’s ability to participate in therapies and patient found to be able to participate in acute intensive comprehensive inpatient rehabilitation program including PT/OT to improve balance, ambulation, ADL’s, and to improve the P/AROM.  Therapeutic modifications regarding activities in therapies, place, amount of time per day and intensity of therapy made daily.  In bed therapies or bedside therapies prn.   Bowel and Bladder dysfunction constipation, Neurogenic bowel and bladder:  frequent toileting, ambulate to bathroom with assistance, check post void residuals.  Check for C.difficile x1 if >2 loose stools in 24 hours, continue bowel & bladder program.  Monitor bowel and bladder function.  Lactinex 2 PO every AC.  MOM prn, Brown Bomb prn, Glycerin suppository prn, enema prn.  Encourage therapy and nursing to co-treat and problem solve re

## 2024-11-21 NOTE — PROGRESS NOTES
Comprehensive Nutrition Assessment    Type and Reason for Visit:  Reassess    Nutrition Recommendations/Plan:   Continue Current Diet, Continue Oral Nutrition Supplement     Malnutrition Assessment:  Malnutrition Status:  No malnutrition (11/14/24 1617)      Nutrition Assessment:    Pt reports fair appetite/intake currently. Pt continues with increased nutrient needs d/t presence of wound. Pt advised on the importance of adequate nutrition (including protein-rich food sources) after discharge to promote wound healing.    Nutrition Related Findings:    PMH-CKD, CAD, OA, htn, hld; admitted s/p GI bleed. Labs/meds reviewed. Note elevated BUN/Cr. Pt receiving ppi. +1 LUE and +2 BLE edema noted. Wound Type: Stage II (buttock)       Current Nutrition Intake & Therapies:    Average Meal Intake: 26-50%, 51-75%     ADULT DIET; Easy to Chew  ADULT ORAL NUTRITION SUPPLEMENT; Lunch, Dinner; Wound Healing Oral Supplement    Anthropometric Measures:  Height: 160 cm (5' 3\")  Ideal Body Weight (IBW): 115 lbs (52 kg)    Admission Body Weight: 85.1 kg (187 lb 9.8 oz) (bedscale *edema present)  Current Body Weight: 84.6 kg (186 lb 8.2 oz) (11/20),   Weight Source: Bed scale  Current BMI (kg/m2): 33  Usual Body Weight: 83.9 kg (185 lb) (11/2023)                          BMI Categories: Obese Class 1 (BMI 30.0-34.9)    Estimated Daily Nutrient Needs:     Weight Used for Energy Requirements: Usual  Energy (kcal/day): ~1500 (kg x 18)  Weight Used for Protein Requirements: Ideal  Protein (g/day): 62-68 (kg x 1.2-1.3)  Method Used for Fluid Requirements: 1 ml/kcal  Fluid (ml/day): ~1500    Nutrition Diagnosis:   Increased nutrient needs related to increase demand for energy/nutrients as evidenced by wounds    Nutrition Interventions:   Food and/or Nutrient Delivery: Continue Current Diet, Continue Oral Nutrition Supplement  Nutrition Education/Counseling: Education/Counseling not indicated  Coordination of Nutrition Care: Continue to

## 2024-11-21 NOTE — PROGRESS NOTES
Physical Therapy Rehab Treatment Note  Facility/Department: [facility]  [facility]       NAME: Loren Noyola  : 1935 (89 y.o.)  MRN: [id number]  CODE STATUS: DNR-CCA    Date of Service: 2024       Restrictions:  Restrictions/Precautions: Fall Risk       SUBJECTIVE:   Subjective: \"I am going to [place]\"    Pain  Pain: 6/10 R hip pre/post session - recently medicated.      OBJECTIVE:     Transfers  Surface: Wheelchair  Additional Factors: Verbal cues;Hand placement cues;Increased time to complete  Device: Walker  Sit to Stand  Assistance Level: Supervision  Skilled Clinical Factors: good ability to come to stand  Stand to Sit  Assistance Level: Supervision  Skilled Clinical Factors: vc's for hand placement and safety, good technique    Ambulation  Surface: Level surface  Device: Rolling walker  Distance: 50'  Activity: Within Unit  Activity Comments: flexed trunk, antalgic gait on right.  Additional Factors: Verbal cues;Hand placement cues;Increased time to complete  Assistance Level: Supervision  Gait Deviations: Slow ximena;Narrow base of support;Unsteady gait;Path deviations  Skilled Clinical Factors: pt with good tolerance to gait this date, less pain noted this date and no LOB or significant safety concerns. fatigued with short distance but recovers with rest break.    PT Exercises  PROM Exercises: seated HS/gastroc stretch x3 30 sec hold BLE  A/AROM Exercises: seated: LAQ 2x10, Marches, hip abd with ball, hip abd manual resistance x 20ea raul  Resistive Exercises: seated GTB HS curls/hip abd x10 BLE     Activity Tolerance  Activity Tolerance: Patient tolerated treatment well    ASSESSMENT/PROGRESS TOWARDS GOALS:   Assessment  Assessment: Pt expecting discharge to SNF either this date or tomorrow. Slightly increased pain this PM, limiting pt's tolerance to on feet activity. Expresses gratitude to therapist for help provided.  Activity Tolerance: Patient tolerated treatment

## 2024-11-21 NOTE — CARE COORDINATION
LSW called Prerna (Admissions) at [facility] and inquired on referral. Prerna confirmed that they can accept pt either today or tomorrow. LSW will notify LPN to see if pt will be cleared medically to d/c today. Electronically signed by RABIA Hwang, KIRTI on 11/21/2024 at 10:10 AM

## 2024-11-21 NOTE — PROGRESS NOTES
[facility] OCCUPATIONAL THERAPY DISCHARGE SUMMARY- REHAB     Date: 2024  Patient Name: Loren Noyola        MRN: [id number]  Account: [id number]   : 1935  (89 y.o.)  Room: [place] [id number]    Diagnosis:  Impaired mobility and ADL's due to debility secondary to GI bleed    Past Medical History:   Diagnosis Date    Bleeding ulcer     CAD (coronary artery disease)     Colitis     Hip fx, left, closed, initial encounter ([facility]) 02/10/2018    Hyperlipidemia     Hypertension     Osteoarthritis     Osteoporosis     Spinal stenosis      Past Surgical History:   Procedure Laterality Date    APPENDECTOMY      COLECTOMY  1/3/2011    sigmoid colectomy with colostomy    COLECTOMY  2011    partial colectomy with colostomy closure    CORONARY ARTERY BYPASS GRAFT      HYSTERECTOMY (CERVIX STATUS UNKNOWN)      HYSTERECTOMY, TOTAL ABDOMINAL (CERVIX REMOVED)      IR MIDLINE CATH  2024    IR MIDLINE CATH 2024 [facility] PROCEDURE    TX HEMIARTHROPLASTY HIP PARTIAL Left 2018    HIP HEMIARTHROPLASTY- performed by Bryan Malcolm MD at [facility] OR    SIGMOIDOSCOPY      UPPER GASTROINTESTINAL ENDOSCOPY N/A 2024    ESOPHAGOGASTRODUODENOSCOPY WITH BX'S performed by Diaz Solares MD at [facility]    UPPER GASTROINTESTINAL ENDOSCOPY N/A 2024    ESOPHAGOGASTRODUODENOSCOPY performed by Melvi Shahid MD at [facility]    VARICOSE VEIN SURGERY         Precautions:   Restrictions/Precautions: Fall Risk     Social/Functional History:  Social/Functional History  Lives With: Spouse  Type of Home: House  Home Layout: Two level, Able to Live on Main level with bedroom/bathroom, Performs ADL's on one level (only stays on the first floor with full bed and bath)  Home Access: Stairs to enter with rails  Entrance Stairs - Number of Steps: 2+3  Entrance Stairs - Rails: Both  Bathroom Shower/Tub: Walk-in shower, Curtain  Bathroom Toilet: Handicap height (w/ R grab bar)  Bathroom

## 2024-11-21 NOTE — DISCHARGE INSTRUCTIONS
Follow up with  @PCP@ in the next 7 days or sooner if needed.    For nursing home provider  Fall precautions  DNR CCA  Please check BMP and CBC every Monday and Thursday at the nursing facility for 3 weeks to monitor kidney function, electrolytes, WBC and hemoglobin.  Please adjust her medications to keep her in a euvolemic state.  Adjust her medication to keep her systolic blood pressure between 120 and 145    Please return to ER or call 911 if you develop any significant signs or symptoms.    I may or may not have addressed all of your symptoms, medical issues,  Illnesses, or all of the abnormal blood work or imaging therefore please ask your PCP and other specialists to obtain [facility] record entirely to follow up on all of your symptoms, illnesses, abnormal labs, imaging and findings that I have and have not addressed during your hospitalization.     Discharging you from the hospital does not mean that your medical care ends here and now. You may still need to have additional work up, investigation, monitoring, surveillance, and treatment to be handled from this point on by in the out patient setting by  out patient providers including your PCP, Specialists and other healthcare providers.     For medication questions, contact your retail pharmacy and your PCP.    Your medical team at [facility] appreciates the opportunity to work with you to get well!    Emily Obrien MD

## 2024-11-21 NOTE — PROGRESS NOTES
Physical Therapy Rehab Treatment Note  Facility/Department: [facility]  [facility]       NAME: Loren Noyola  : 1935 (89 y.o.)  MRN: [id number]  CODE STATUS: DNR-CCA    Date of Service: 2024  Chart Reviewed: Yes    Restrictions:  Restrictions/Precautions: Fall Risk       SUBJECTIVE:   Subjective: I'm going to go to [place] for a little bit.    Pain  Pain: 10 R hip pre session, 4/10 post session Recently medicated.       OBJECTIVE:   Orientation  Overall Orientation Status: Within Functional Limits  Cognition  Overall Cognitive Status: WFL  Arousal/Alertness: Appears intact         Bed mobility  Bridging: Supervision  Rolling to Left: Supervision  Rolling to Right: Supervision  Supine to Sit: Supervision  Sit to Supine: Supervision  Bed Mobility Comments: Bed flat without use of hand rails; Increased time and effort to complete.    Transfers  Sit to Stand: Supervision  Stand to Sit: Supervision  Bed to Chair: Supervision  Car Transfer: Stand by assistance  Comment: Good hand placement and technique; Increased time to complete. vc's for posture; Pt assist own LLE into and out car.    Ambulation  Surface: Level tile  Device: Rolling Walker  Assistance: Supervision  Quality of Gait: WBOS, Slow ximena; downward gaze; vc's for scanning environment and pacing  Gait Deviations: Increased CARSON;Decreased step length;Decreased step height  Distance: 50'x2 - seated rest break    Stairs/Curb  Stairs?: Yes  Stairs  # Steps : 8  Stairs Height: 6\"  Assistance: Stand by assistance  Comment: Non-reciprocal pattern              PT Exercises  Exercise Treatment: Bridges - x10; SKTC - x10  Dynamic Standing Balance Exercises: TUG = 22.08;  object from floor with reacher - x2 - Sup.          Activity Tolerance  Activity Tolerance: Patient tolerated treatment well             ASSESSMENT/PROGRESS TOWARDS GOALS:    Assessment: Pt able to increase ambulatory distance with rested rest breaks. She

## 2024-11-21 NOTE — FLOWSHEET NOTE
Patient assessment complete. C/o 7/10 pain to right hip and lower back. PRN Tylenol given along with scheduled HS medications. Topical Bengay cream applied to right hip and lower back. Patient SBA with FWW to bedside commode. Bed locked and low with alarm engaged. Call light within patient's reach. Electronically signed by Gabriela Butler RN on 11/20/2024 at 10:52 PM

## 2024-11-21 NOTE — PLAN OF CARE
Problem: Safety - Adult  Goal: Free from fall injury  11/20/2024 2253 by Gabriela Butler RN  Outcome: Progressing     Problem: Discharge Planning  Goal: Discharge to home or other facility with appropriate resources  11/20/2024 2253 by Gabriela Butler RN  Outcome: Progressing     Problem: ABCDS Injury Assessment  Goal: Absence of physical injury  11/20/2024 2253 by Gabriela Butler RN  Outcome: Progressing     Problem: Skin/Tissue Integrity  Goal: Absence of new skin breakdown  Description: 1.  Monitor for areas of redness and/or skin breakdown  2.  Assess vascular access sites hourly  3.  Every 4-6 hours minimum:  Change oxygen saturation probe site  4.  Every 4-6 hours:  If on nasal continuous positive airway pressure, respiratory therapy assess nares and determine need for appliance change or resting period.  11/20/2024 2253 by Gabriela Butler RN  Outcome: Progressing     Problem: Pain  Goal: Verbalizes/displays adequate comfort level or baseline comfort level  11/20/2024 2253 by Gabriela Butler RN  Outcome: Progressing     Problem: Nutrition Deficit:  Goal: Optimize nutritional status  11/20/2024 2253 by Gabriela Butler RN  Outcome: Progressing

## 2024-11-21 NOTE — PROGRESS NOTES
Patient complains of pain to right hip. She was previously medicated with tylenol and declined tramadol. She would like the bengay after she gets washed up.  Electronically signed by Jacque Fry LPN on 11/21/2024 at 9:25 AM    Patient complains of pain to right hip. PRN tylenol given.  Electronically signed by Jacque Fry LPN on 11/21/2024 at 2:13 PM

## 2024-11-21 NOTE — PROGRESS NOTES
Prefer discharge.  Patient is doing better    General appearance: alert, appears stated age and cooperative, no apparent distress.  Central obesity  Skin: Skin color, texture, turgor normal. No rashes or lesions  HEENT: Head: Normocephalic, no lesions, without obvious abnormality.  Neck: no adenopathy, no carotid bruit, no JVD, supple, symmetrical, trachea midline, and thyroid not enlarged, symmetric, no tenderness/mass/nodules  Lungs: clear to auscultation bilaterally  Heart: regular rate and rhythm, S1, S2 normal, no murmur, click, rub or gallop  Abdomen: soft, non-tender; bowel sounds normal; no masses,  no organomegaly  Extremities: extremities normal, atraumatic, no cyanosis or edema.  Osteoarthritis deformities.,  +2 pitting edema  Neurologic: Mental status: Alert, oriented, thought content appropriate.  Functional impairment.  Please refer to physical Occupational Therapy team note for details on functional status and treatment plan.    *.  Edema in both legs.  Multifactorial.  Rule out the possibility of DVT, requested venous study.  Which came back negative for DVT  .  Could be related to hydralazine side effect.  Reduced hydralazine down to 50 mg twice daily  .  Could be related to low albumin and third spacing  .  Started patient on Demadex 20 mg daily.  This was increased down to 10 mg daily.  For some reason the echocardiogram was not completed.  Investigating why        *Upper GI bleed and acute blood loss anemia secondary to gastric ulcer in the setting of patient taking anticoagulant.  Continue PPI.  Patient had another EGD which did not show any active bleed.  Hemoglobin is stable posttransfusion.  He repeat H&H in a.m.  .  Risk and benefit of anticoagulation were discussed with the patient and her family.  Given the significance of her bleed, patient does not feel comfortable taking blood thinner anymore.  Specifically given her recent GI bleed and functional impairment, increased risk of  falls  Dr. Reeves is in agreement for patient not to resume anticoagulation.  Recommended for patient to follow-up with cardiology team and consideration for Watchman device.  I instructed nursing home providers to check a CBC twice a week for 3 weeks to monitor her hemoglobin level       *Paroxysmal A-fib.  Currently patient is in sinus rhythm.  Regular heart  Patient is on amiodarone.  Eliquis is on hold.  Patient continues to have a black and slightly bloody stool..  Continue beta-blocker..  Risk and benefit of anticoagulation were discussed with the patient and her family.  Given the significance of her bleed, patient does not feel comfortable taking blood thinner anymore.  Specifically given her recent GI bleed and functional impairment, increased risk of falls  Dr. Reeves is in agreement for patient not to resume anticoagulation.  Recommended for patient to follow-up with cardiology team and consideration for Watchman device.  Patient is in agreement to continue to take aspirin     *Functional impairment.  Please refer to physical and Occupational Therapy team notes for details on her functional status and treatment plan     *Coronary artery disease.  No active chest pain or angina    *CKD stage III borderline stage IV.  Her creatinine has been hovering between 1.5 and 1.9 since 2022.  Continue to monitor closely as volume status may fluctuate.  Continue gentle diuresis, torsemide 10 mg daily to prevent fluid overload    I instructed nursing staff at the nursing facility to draw BMP twice a week to monitor her electrolytes and kidney function    I instructed nursing staff at the nursing facility to adjust the diuretics and the blood pressure medication to keep her in euvolemic state    Patient has multiple other medical issues not listed above.    Some of pt medical issues, chronic symptoms, abnormalities seen on labs and imaging may not be addressed while patient is currently on the rehab unit.    Discharging the

## 2024-11-22 VITALS
HEART RATE: 65 BPM | HEIGHT: 63 IN | DIASTOLIC BLOOD PRESSURE: 63 MMHG | RESPIRATION RATE: 16 BRPM | OXYGEN SATURATION: 97 % | SYSTOLIC BLOOD PRESSURE: 120 MMHG | BODY MASS INDEX: 33.04 KG/M2 | TEMPERATURE: 98.1 F | WEIGHT: 186.5 LBS

## 2024-11-22 PROCEDURE — 6370000000 HC RX 637 (ALT 250 FOR IP): Performed by: INTERNAL MEDICINE

## 2024-11-22 PROCEDURE — 2580000003 HC RX 258: Performed by: INTERNAL MEDICINE

## 2024-11-22 PROCEDURE — 6370000000 HC RX 637 (ALT 250 FOR IP): Performed by: PHYSICAL MEDICINE & REHABILITATION

## 2024-11-22 PROCEDURE — 97110 THERAPEUTIC EXERCISES: CPT

## 2024-11-22 PROCEDURE — 99233 SBSQ HOSP IP/OBS HIGH 50: CPT | Performed by: INTERNAL MEDICINE

## 2024-11-22 PROCEDURE — 97530 THERAPEUTIC ACTIVITIES: CPT

## 2024-11-22 PROCEDURE — 97116 GAIT TRAINING THERAPY: CPT

## 2024-11-22 RX ORDER — AMLODIPINE BESYLATE 5 MG/1
5 TABLET ORAL 2 TIMES DAILY
DISCHARGE
Start: 2024-11-22

## 2024-11-22 RX ORDER — AMLODIPINE BESYLATE 5 MG/1
5 TABLET ORAL 2 TIMES DAILY
Status: DISCONTINUED | OUTPATIENT
Start: 2024-11-22 | End: 2024-11-22 | Stop reason: HOSPADM

## 2024-11-22 RX ADMIN — HYDRALAZINE HYDROCHLORIDE 50 MG: 50 TABLET ORAL at 09:49

## 2024-11-22 RX ADMIN — SUCRALFATE 1 G: 1 TABLET ORAL at 09:48

## 2024-11-22 RX ADMIN — CALCIUM 500 MG: 500 TABLET ORAL at 09:47

## 2024-11-22 RX ADMIN — ACETAMINOPHEN 325MG 650 MG: 325 TABLET ORAL at 06:49

## 2024-11-22 RX ADMIN — ACETAMINOPHEN 325MG 650 MG: 325 TABLET ORAL at 01:08

## 2024-11-22 RX ADMIN — NYSTATIN 500000 UNITS: 100000 SUSPENSION ORAL at 09:46

## 2024-11-22 RX ADMIN — AMIODARONE HYDROCHLORIDE 100 MG: 200 TABLET ORAL at 09:46

## 2024-11-22 RX ADMIN — TORSEMIDE 10 MG: 10 TABLET ORAL at 09:48

## 2024-11-22 RX ADMIN — AMLODIPINE BESYLATE 5 MG: 5 TABLET ORAL at 09:47

## 2024-11-22 RX ADMIN — ACETAMINOPHEN 325MG 650 MG: 325 TABLET ORAL at 16:16

## 2024-11-22 RX ADMIN — FERROUS SULFATE TAB 325 MG (65 MG ELEMENTAL FE) 325 MG: 325 (65 FE) TAB at 09:49

## 2024-11-22 RX ADMIN — METOPROLOL TARTRATE 50 MG: 50 TABLET, FILM COATED ORAL at 09:49

## 2024-11-22 RX ADMIN — PANTOPRAZOLE SODIUM 40 MG: 40 TABLET, DELAYED RELEASE ORAL at 06:49

## 2024-11-22 RX ADMIN — Medication 100 MG: at 09:46

## 2024-11-22 RX ADMIN — Medication 10 ML: at 10:01

## 2024-11-22 ASSESSMENT — PAIN DESCRIPTION - LOCATION
LOCATION: HIP
LOCATION: HIP;BACK
LOCATION: HIP;BACK

## 2024-11-22 ASSESSMENT — ENCOUNTER SYMPTOMS
STRIDOR: 0
GASTROINTESTINAL NEGATIVE: 1
COUGH: 0
WHEEZING: 0
BACK PAIN: 1
SHORTNESS OF BREATH: 0
BLOOD IN STOOL: 0
CHEST TIGHTNESS: 0
NAUSEA: 0
RESPIRATORY NEGATIVE: 1
EYES NEGATIVE: 1

## 2024-11-22 ASSESSMENT — PAIN SCALES - GENERAL
PAINLEVEL_OUTOF10: 4
PAINLEVEL_OUTOF10: 2
PAINLEVEL_OUTOF10: 6
PAINLEVEL_OUTOF10: 7

## 2024-11-22 ASSESSMENT — PAIN DESCRIPTION - ORIENTATION
ORIENTATION: RIGHT;LOWER
ORIENTATION: RIGHT
ORIENTATION: RIGHT;LOWER

## 2024-11-22 ASSESSMENT — PAIN DESCRIPTION - DESCRIPTORS
DESCRIPTORS: ACHING

## 2024-11-22 NOTE — DISCHARGE INSTR - DIET
Good nutrition is important when healing from an illness, injury, or surgery.  Follow any nutrition recommendations given to you during your hospital stay.   If you were given an oral nutrition supplement while in the hospital, continue to take this supplement at home.  You can take it with meals, in-between meals, and/or before bedtime. These supplements can be purchased at most local grocery stores, pharmacies, and chain [employer]-stores.   If you have any questions about your diet or nutrition, call the hospital and ask for the dietitian.    ADULT DIET; Easy to Chew [[id number]

## 2024-11-22 NOTE — FLOWSHEET NOTE
Patient assessment completed. A&Ox4. Patient denies pain at this time. Patient medications given per MAR. LBM 11/20/2024. Patient incontinent of bladder and continent of bowel. Patient in bed, call light within reach, and bed alarm.    1400: Patient midline removed by this nurse with no complications.

## 2024-11-22 NOTE — CARE COORDINATION
Pt is set to discharge to [facility] today. LSW confirmed with Prerna at [facility] that she can admit. LSW also confirmed with Bill (son) and pt that Bill and his brother will provide transportation by family car. No concerns or questions noted. Pt confirmed she feels ready to discharge and is in agreement to go to [facility]. Patient satisfaction survey completed. Electronically signed by RABIA Hwang, VERNAW on 11/22/2024 at 3:38 PM

## 2024-11-22 NOTE — FLOWSHEET NOTE
Discharge instruction given. Patient instructed on follow up appointments and medications. Patient verbalized understanding. Midline removed. Patient left with son via wheelcahir with all belongings.

## 2024-11-22 NOTE — PROGRESS NOTES
PROGRESS NOTE    Patient Name: Loren Noyola  Admit Date: 2024  5:04 PM  MR #: [id number]  : 1935    Attending Physician: Deb Haro DO  Reason for consult: AF and off A/C due to GIB    History of Presenting Illness:      Loren Noyola is a 89 y.o. female on hospital day 10 with a history of .   History Obtained From:  patient, electronic medical record    Pt readmitted w GIB with black stools.  Hb 4.7   He had been on Eliquis for PAF.   She has no CP nor SOB.  She is eating fine. She is doing well w Acute Rehab.   Hb 7.4 this am.     Recent fall and suffered T12 compression Fx.      She has had other falls as well.  She was on low dose Eliquis and Amiodarone for AF.      24   ECG this AM SR. NO CP no SOB transfused.  Continued Melena    24 continued HTN.  Feels well no cp no sob Hb stable at 9.0    24 /59 No CP NO sob. Tired. Getting stroger    24 BP is stable doing well no cp no sob. Getting stronger.     24 Mild MAURICIO this am. No events overnight    24 /62.  Feels better no cp no sob getting stronger  History:      EKG:  Past Medical History:   Diagnosis Date    Bleeding ulcer     CAD (coronary artery disease)     Colitis     Hip fx, left, closed, initial encounter ([facility]) 02/10/2018    Hyperlipidemia     Hypertension     Osteoarthritis     Osteoporosis     Spinal stenosis      Past Surgical History:   Procedure Laterality Date    APPENDECTOMY      COLECTOMY  1/3/2011    sigmoid colectomy with colostomy    COLECTOMY  2011    partial colectomy with colostomy closure    CORONARY ARTERY BYPASS GRAFT      HYSTERECTOMY (CERVIX STATUS UNKNOWN)      HYSTERECTOMY, TOTAL ABDOMINAL (CERVIX REMOVED)      IR MIDLINE CATH  2024    IR MIDLINE CATH 2024 [facility] PROCEDURE    [place] HEMIARTHROPLASTY HIP PARTIAL Left 2018    HIP HEMIARTHROPLASTY- performed by Bryan Malcolm MD at [facility] [place]    SIGMOIDOSCOPY      UPPER  adrenal adenomas.  These require no additional imaging follow-up.  NOTE: Imaging studies do not address the functional status of an adrenal mass.  If there are clinical signs or symptoms of adrenal hyperfunction, biochemical testing may be required to determine if the lesion is secreting access hormone. 8.  Otherwise unchanged.     CT ABDOMEN PELVIS WO CONTRAST Additional Contrast? None    Result Date: 10/21/2024  EXAM: CT Chest, Abdomen and Pelvis Without Intravenous Contrast EXAM DATE/TIME: 10/21/2024 3:45 pm CLINICAL HISTORY: ORDERING SYSTEM PROVIDED HISTORY: fall 10/13/2024  TECHNOLOGIST PROVIDED HISTORY:  Reason for exam:->fall 10/13/2024  Additional Contrast?->None Decision Support Exception - unselect if not a suspected or confirmed emergency medical condition->Emergency Medical Condition (MA)  What reading provider will be dictating this exam?->CRC; ORDERING SYSTEM PROVIDED HISTORY: Right side rib pain secondary to fall 10/13/2024  TECHNOLOGIST PROVIDED HISTORY:  Reason for exam:->Right side rib pain secondary to fall 10/13/2024 Decision Support Exception - unselect if not a suspected or confirmed emergency medical condition->Emergency Medical Condition (MA)  What reading provider will be dictating this exam?->CRC TECHNIQUE: Axial computed tomography images of the chest, abdomen and pelvis without intravenous contrast.  This CT exam was performed using one or more of the following dose reduction techniques:  automated exposure control, adjustment of the mA and/or kV according to patient size, and/or use of iterative reconstruction technique. COMPARISON: 04/25/2023 FINDINGS: CHEST: Lungs:  Posterior basilar lung scarring and/or atelectasis but unchanged.  No mass. Pleural space:  No acute findings.  No significant effusion.  No pneumothorax. Heart:  Borderline to mild cardiomegaly, unchanged.  No significant pericardial effusion.  No significant coronary artery calcifications. ABDOMEN: Liver:  No acute

## 2024-11-22 NOTE — PROGRESS NOTES
OCCUPATIONAL THERAPY  INPATIENT REHAB TREATMENT NOTE  [facility]      NAME: Loren Noyola  : 1935 (89 y.o.)  MRN: [id number]  CODE STATUS: DNR-CCA  Room: R251/R251-01    Date of Service: 2024    Referring Physician: Deb Haro DO  Rehab Diagnosis: Impaired mobility and ADL's due to debility secondary to GI bleed    Hospital course:   Comments: 89 y.o. female patient with recent hospitalization and rehab stay s/p fall with discovered T12fx. Patient was d/c'd home from rehab on . Patient presented back to ER  with CP and SOB along with reports of having dark stools. Labs notable for hemoglobin of 4.7. Patient admitted to ICU with consults from CC, pulmonology and GI. EGD was performed and no active bleeding found. Did, however, reveal x2 non-bleeding gastric ulcers.    Restrictions  Restrictions/Precautions  Restrictions/Precautions: Fall Risk         Patient's date of birth confirmed: Yes    SAFETY:  Safety Devices  Type of devices: All fall risk precautions in place    SUBJECTIVE:   \"Its my last session\"    Pain at start of treatment: No    Pain at end of treatment: No    OBJECTIVE:    Patient engaged in seated fine motor and cognitive activities to increase [place] with ADL/IADL completion.    Pt used bilateral hands to assemble puzzle that was placed on the table in front of her.   Puzzle was of pirate ship. Puzzle included small peg on pieces to decrease difficulty of picking them up. Puzzle also included pattern to follow as visual cue  Pt with good overall ability to manipulate pieces and place pieces correctly     Engaged pt in task that required her to put together and separate medium sized plastic puzzle pieces by color and then connect them. Pieces of the puzzle are difficult to snap together to induce digit exercise/challenge.   Tarun difficulty snapping pieces together due to decreased dexterity and finger strength however pt able to complete independently

## 2024-11-22 NOTE — PROGRESS NOTES
Patient complains of pain to left hip. PRN tylenol given.  Electronically signed by Jacque Fry LPN on 11/22/2024 at 4:22 PM    Transport contacted for patient's discharge. Son is here to pick her up.  Electronically signed by Jacque Fry LPN on 11/22/2024 at 4:22 PM

## 2024-11-22 NOTE — DISCHARGE INSTR - COC
Continuity of Care Form    Patient Name: Loren Noyola   :  1935  MRN:  [id number]    Admit date:  2024  Discharge date:  2024    Code Status Order: DNR-CCA   Advance Directives:   Advance Care Flowsheet Documentation        Date/Time Healthcare Directive Type of Healthcare Directive Copy in Chart Healthcare Agent Appointed Healthcare Agent's Name Healthcare Agent's Phone Number    [phone] Yes, patient has an advance directive for healthcare treatment  Durable power of  for health care  No, copy requested from family  Healthcare power of   Rupert Roque Nancy  --                     Admitting Physician:  Deb Haro DO  PCP: Evette Johnson MD    Discharging Nurse: Rehab nurse  Discharging Hospital Unit/Room#: R251/R251-01  Discharging Unit Phone Number: [phone]    Emergency Contact:   Extended Emergency Contact Information  Primary Emergency Contact: Rupert Noyola  Home Phone: [phone]  Relation: Child  Secondary Emergency Contact: Jude Noyola  Home Phone: [phone]  Relation: Child    Past Surgical History:  Past Surgical History:   Procedure Laterality Date    APPENDECTOMY      COLECTOMY  1/3/2011    sigmoid colectomy with colostomy    COLECTOMY  2011    partial colectomy with colostomy closure    CORONARY ARTERY BYPASS GRAFT      HYSTERECTOMY (CERVIX STATUS UNKNOWN)      HYSTERECTOMY, TOTAL ABDOMINAL (CERVIX REMOVED)      IR MIDLINE CATH  2024    IR MIDLINE CATH 2024 [facility] PROCEDURE    [place] HEMIARTHROPLASTY HIP PARTIAL Left 2018    HIP HEMIARTHROPLASTY- performed by Bryan Malcolm MD at [facility] [place]    SIGMOIDOSCOPY      UPPER GASTROINTESTINAL ENDOSCOPY N/A 2024    ESOPHAGOGASTRODUODENOSCOPY WITH BX'S performed by Diaz Solares MD at [facility]    UPPER GASTROINTESTINAL ENDOSCOPY N/A 2024    ESOPHAGOGASTRODUODENOSCOPY performed by Melvi Shahid MD at [facility]    VARICOSE VEIN  0850   Margins Defined edges 11/18/24 0850   Wound Thickness Description not for Pressure Injury Full thickness 11/16/24 0700   Number of days: 30       Wound 11/10/24 Buttocks Left (Active)   Wound Etiology Pressure Stage 2 11/16/24 0700   Dressing Status Clean;Dry;Intact 11/11/24 1032   Dressing/Treatment Zinc paste 11/21/24 2327   Wound Length (cm) 1.8 cm 11/11/24 1032   Wound Width (cm) 1.8 cm 11/11/24 1032   Wound Depth (cm) 0.2 cm 11/11/24 1032   Wound Surface Area (cm^2) 3.24 cm^2 11/11/24 1032   Wound Volume (cm^3) 0.648 cm^3 11/11/24 1032   Wound Assessment Pink/red 11/21/24 2327   Drainage Amount None (dry) 11/21/24 2327   Odor None 11/21/24 2327   Nel-wound Assessment Blanchable erythema 11/21/24 2327   Number of days: 11        Elimination:  Continence:   Bowel: Yes  Bladder: No  Urinary Catheter: None   Colostomy/Ileostomy/Ileal Conduit: No       Date of Last BM: 11/21/2024  No intake or output data in the 24 hours ending 11/22/24 0808  No intake/output data recorded.    Safety Concerns:     None    Impairments/Disabilities:      None    Nutrition Therapy:  Current Nutrition Therapy:   - Oral Diet:  General    Routes of Feeding: Oral  Liquids: Thin Liquids  Daily Fluid Restriction: no  Last Modified Barium Swallow with Video (Video Swallowing Test): not done    Treatments at the Time of Hospital Discharge:   Respiratory Treatments: NA  Oxygen Therapy:  is not on home oxygen therapy.  Ventilator:    - No ventilator support    Rehab Therapies: Physical Therapy and Occupational Therapy  Weight Bearing Status/Restrictions: No weight bearing restrictions  Other Medical Equipment (for information only, NOT a DME order):  wheelchair, cane, and walker  Other Treatments: NA      Patient's personal belongings (please select all that are sent with patient):  Kj    RN SIGNATURE:  Electronically signed by Mary Carmen Oropeza RN on 11/22/24 at 1:28 PM EST    CASE MANAGEMENT/SOCIAL WORK SECTION    Inpatient Status

## 2024-11-22 NOTE — PLAN OF CARE
Problem: Safety - Adult  Goal: Free from fall injury  11/22/2024 1426 by Mary Carmen Oropeza RN  Outcome: Adequate for Discharge  11/22/2024 1425 by Mary Carmen Oropeza RN  Outcome: Progressing     Problem: Discharge Planning  Goal: Discharge to home or other facility with appropriate resources  Outcome: Adequate for Discharge     Problem: ABCDS Injury Assessment  Goal: Absence of physical injury  11/22/2024 1426 by Mary Carmen Oropeza RN  Outcome: Adequate for Discharge  11/22/2024 1425 by Mary Carmen Oropeza RN  Outcome: Progressing     Problem: Skin/Tissue Integrity  Goal: Absence of new skin breakdown  Description: 1.  Monitor for areas of redness and/or skin breakdown  2.  Assess vascular access sites hourly  3.  Every 4-6 hours minimum:  Change oxygen saturation probe site  4.  Every 4-6 hours:  If on nasal continuous positive airway pressure, respiratory therapy assess nares and determine need for appliance change or resting period.  11/22/2024 1426 by Mary Carmen Oropeza RN  Outcome: Adequate for Discharge  11/22/2024 1425 by Mary Carmen Oropeza RN  Outcome: Progressing     Problem: Pain  Goal: Verbalizes/displays adequate comfort level or baseline comfort level  Outcome: Adequate for Discharge     Problem: Nutrition Deficit:  Goal: Optimize nutritional status  Outcome: Adequate for Discharge

## 2024-11-22 NOTE — DISCHARGE SUMMARY
DISCHARGE SUMMARY    Hospital Course: The patient was admitted to the Rehabilitation Unit to address ADL and mobility deficits-as detailed above and below.  The patient was enrolled in acute PT, OT program.  Weekly team meetings were held to assess functional progress toward their goals-and modify the therapy program.  The patient's medical, emotional, psychosocial and functional issues were addressed.  The patient progressed in the rehab program and is now ready for discharge home.  Refer to functional assessments summary report for detailed functional status.  Refer to the medical problem list below to see the medical issues addressed.  The social and DC complexities are detailed in the DC planning section below.    Greater than 3 5 minutes was spent on coordinating patients discharge including follow-up care, medications and patient/family education.  Extended time needed because of the potential use of controlled medications are high risk medications and a high risk population individual.  Patient and family were instructed to use lowest effective dose of these medications and slowly titrate off over the next 2 to 4 weeks.  They are not to combine opiates with sedatives.     I reviewed her [place] prescription monitoring service data sheets in hopes of eliminating polypharmacy and weaning to the lowest effective dose of pain medications and eliminating the concomitant use of benzodiazepines.  I see   no medications of concern.  I see   no habits of combining sedatives and narcotics.  Controlled Substance Monitoring:    Acute and Chronic Pain Monitoring:   RX Monitoring Acute Pain Prescriptions Periodic Controlled Substance Monitoring   11/12/2024  12:43 PM Prescription exceeds daily limit for a specific reason. See comments or note.;Severe pain not adequately treated with lower dose.;Not required given exclusionary diagnoses... Possible medication side effects, risk of tolerance/dependence & alternative treatments  disorder:  prn Ambien, monitor for day time sedation.  Falls risk elevated:  patient to use call light to get nursing assistance to get up, bed and chair alarm.  Elevated DVT risk: progressive activities in PT, continue prophylaxis EMILY hose, elevation and meds-see MAR Eliquis.   Complex discharge planning:  Complete medication reconciliation, patient and family education, and medication simplification coordinating follow-up care with case management and social work as we progressed toward patient's plan pending  DC either ASAP to a skilled facility of family's choosing versus 11/22/24 home with -with help from sons and C.   SP patient's final weekly team meeting every Thursday to re-assess progress towards goals, discuss and address social, psychological and medical comorbidities and to address difficulties they may be having progressing in therapy.  Patient and family education is in progress.  The patient is to follow-up with their family physician after discharge.        Complex Active General Medical Issues that complicate care Assess & Plan:      Primary osteoarthritis of both knees, Spondylolisthesis of lumbar region, Cervical spondylosis without myelopathy,  DDD (degenerative disc disease), lumbar  CAD,  Essential hypertension with occ ortho stasis, Peripheral vascular disease,  Right bundle branch block -Acute rehab to monitor heart rate and rhythm with the option of telemetry and the effects of chronotropic medication with respect to increasing physical activity and exercise in PT, OT, ADLs with medication titration to lowest effective dosing.  Continue blood signs every shift focusing on heart rate, rhythm and blood pressure checks with orthostatic checks-monitoring the effect of exercise, therapy and posture.  Consult hospitalist for backup medical and adjust/add medications.  Monitor heart rate and blood pressure as well as medications effects on vital signs before during and after therapy with

## 2024-11-22 NOTE — PROGRESS NOTES
Physical Therapy Rehab Treatment Note  Facility/Department: [facility]  [facility]       NAME: Loren Noyola  : 1935 (89 y.o.)  MRN: [id number]  CODE STATUS: DNR-CCA    Date of Service: 2024       Restrictions:  Restrictions/Precautions: Fall Risk       SUBJECTIVE:   Subjective: \"I came back for one more\"    Pain  Pain: 6/10 R hip pre/post session - recently medicated.      OBJECTIVE:      Transfers  Surface: Wheelchair  Additional Factors: Verbal cues;Hand placement cues;Increased time to complete  Device: Walker  Sit to Stand  Assistance Level: Supervision  Skilled Clinical Factors: good ability to come to stand  Stand to Sit  Assistance Level: Supervision  Skilled Clinical Factors: vc's for hand placement and safety, good technique    Ambulation  Surface: Level surface  Device: Rolling walker  Distance: 75'  Activity: Within Unit  Activity Comments: flexed trunk, antalgic gait on right.  Additional Factors: Verbal cues;Hand placement cues;Increased time to complete  Assistance Level: Supervision  Gait Deviations: Slow ximena;Narrow base of support;Unsteady gait;Path deviations  Skilled Clinical Factors: pt with good tolerance to gait this date, less pain noted this date and no LOB or significant safety concerns. fatigued with short distance but recovers with rest break.    PT Exercises  PROM Exercises: seated HS/gastroc stretch x3 30 sec hold BLE  A/AROM Exercises: seated: LAQ 2x10, Marches, hip abd with ball, hip abd manual resistance x 20ea raul  Resistive Exercises: seated GTB HS curls/hip abd x10 BLE     Activity Tolerance  Activity Tolerance: Patient tolerated treatment well    ASSESSMENT/PROGRESS TOWARDS GOALS:   Assessment  Assessment: Pt expecting discharge to SNF this date. Denies further questions/concerns at this time. Expresses gratitude to therapist for help provided.  Activity Tolerance: Patient tolerated treatment well    Goals:  Long Term Goals  Long Term Goal 1: Pt

## 2024-11-23 NOTE — PROGRESS NOTES
Physical Therapy  Facility/Department: [facility]  Rehabilitation Discharge note    NAME: Loren Noyola  : 1935  MRN: [id number]    Date of discharge: 23    Past Medical History:   Diagnosis Date    Bleeding ulcer     CAD (coronary artery disease)     Colitis     Hip fx, left, closed, initial encounter ([facility]) 02/10/2018    Hyperlipidemia     Hypertension     Osteoarthritis     Osteoporosis     Spinal stenosis      Past Surgical History:   Procedure Laterality Date    APPENDECTOMY      COLECTOMY  1/3/2011    sigmoid colectomy with colostomy    COLECTOMY  2011    partial colectomy with colostomy closure    CORONARY ARTERY BYPASS GRAFT      HYSTERECTOMY (CERVIX STATUS UNKNOWN)      HYSTERECTOMY, TOTAL ABDOMINAL (CERVIX REMOVED)      IR MIDLINE CATH  2024    IR MIDLINE CATH 2024 [facility] SPECIAL PROCEDURE    [place] HEMIARTHROPLASTY HIP PARTIAL Left 2018    HIP HEMIARTHROPLASTY- performed by Bryan Malcolm MD at [facility] OR    SIGMOIDOSCOPY      UPPER GASTROINTESTINAL ENDOSCOPY N/A 2024    ESOPHAGOGASTRODUODENOSCOPY WITH BX'S performed by Diaz Solares MD at [facility]    UPPER GASTROINTESTINAL ENDOSCOPY N/A 2024    ESOPHAGOGASTRODUODENOSCOPY performed by Melvi Shahid MD at [facility]    VARICOSE VEIN SURGERY         Restrictions  Restrictions/Precautions  Restrictions/Precautions: Fall Risk    Objective      Bed mobility  Bridging: Supervision  Rolling to Left: Supervision  Rolling to Right: Supervision  Supine to Sit: Supervision  Sit to Supine: Supervision  Bed Mobility Comments: Bed flat without use of hand rails; Increased time and effort to complete.     Transfers  Sit to Stand: Supervision  Stand to Sit: Supervision  Bed to Chair: Supervision  Car Transfer: Stand by assistance  Comment: Good hand placement and technique; Increased time to complete. vc's for posture; Pt assist own LLE into and out car.     Ambulation  Surface: Level tile  Device:  Rolling Walker  Assistance: Supervision  Quality of Gait: WBOS, Slow ximena; downward gaze; vc's for scanning environment and pacing  Gait Deviations: Increased CARSON;Decreased step length;Decreased step height  Distance: 50'x2 - seated rest break     Stairs/Curb  Stairs?: Yes  Stairs  # Steps : 8  Stairs Height: 6\"  Assistance: Stand by assistance  Comment: Non-reciprocal pattern         Pt/ family education/training: Pt has been educated in safe mobility. She is able to complete tasks at above level. Pts family has been educated in pts abilities    Assessment: Pt demonstrated good gains. She is unable to complete tasks at an indep level      LTG established:  Long Term Goal 1: Pt will demonstrate bed mobility indep  Long Term Goal 2: Pt will demonstrate bed, sit to stand and car transfers SBA - indep  Long Term Goal 3: Pt will demonstrate amb 50ft -150 ft SBA - indep with safest AD  Long Term Goal 4: Pt will demonstrate TUG </= 20 sec for decreased risk for falls  Long Term Goal 5: SBA 4-5 stairs for safe home entry    Discharge Plan: Pt discharged to snf level of care        Electronically signed by Shey Abernathy PT on 11/23/2024 at 3:58 PM          Wound Care: Petrolatum

## 2024-11-25 ENCOUNTER — OFFICE VISIT (OUTPATIENT)
Dept: GERIATRIC MEDICINE | Age: 88
End: 2024-11-25
Payer: MEDICARE

## 2024-11-25 DIAGNOSIS — I10 ESSENTIAL HYPERTENSION: Primary | ICD-10-CM

## 2024-11-25 DIAGNOSIS — Z74.09 IMPAIRED MOBILITY AND ACTIVITIES OF DAILY LIVING: ICD-10-CM

## 2024-11-25 DIAGNOSIS — Z78.9 IMPAIRED MOBILITY AND ACTIVITIES OF DAILY LIVING: ICD-10-CM

## 2024-11-25 DIAGNOSIS — K21.9 GASTRO-ESOPHAGEAL REFLUX DISEASE WITHOUT ESOPHAGITIS: ICD-10-CM

## 2024-11-25 DIAGNOSIS — S22.080D COMPRESSION FRACTURE OF T12 VERTEBRA WITH ROUTINE HEALING: ICD-10-CM

## 2024-11-25 DIAGNOSIS — M54.17 RIGHT LUMBOSACRAL RADICULOPATHY: ICD-10-CM

## 2024-11-25 DIAGNOSIS — I48.91 ATRIAL FIBRILLATION, UNSPECIFIED TYPE (HCC): ICD-10-CM

## 2024-11-25 DIAGNOSIS — I25.10 CORONARY ARTERY DISEASE WITHOUT ANGINA PECTORIS, UNSPECIFIED VESSEL OR LESION TYPE, UNSPECIFIED WHETHER NATIVE OR TRANSPLANTED HEART: ICD-10-CM

## 2024-11-25 DIAGNOSIS — M47.812 CERVICAL SPONDYLOSIS WITHOUT MYELOPATHY: ICD-10-CM

## 2024-11-25 PROCEDURE — G8484 FLU IMMUNIZE NO ADMIN: HCPCS | Performed by: NURSE PRACTITIONER

## 2024-11-25 PROCEDURE — 1123F ACP DISCUSS/DSCN MKR DOCD: CPT | Performed by: NURSE PRACTITIONER

## 2024-11-25 PROCEDURE — 99310 SBSQ NF CARE HIGH MDM 45: CPT | Performed by: NURSE PRACTITIONER

## 2024-11-26 ENCOUNTER — OFFICE VISIT (OUTPATIENT)
Dept: GERIATRIC MEDICINE | Age: 88
End: 2024-11-26
Payer: MEDICARE

## 2024-11-26 ENCOUNTER — APPOINTMENT (OUTPATIENT)
Dept: CARDIOLOGY | Facility: CLINIC | Age: 89
End: 2024-11-26
Payer: MEDICARE

## 2024-11-26 DIAGNOSIS — S22.080D COMPRESSION FRACTURE OF T12 VERTEBRA WITH ROUTINE HEALING: ICD-10-CM

## 2024-11-26 DIAGNOSIS — Z78.9 IMPAIRED MOBILITY AND ACTIVITIES OF DAILY LIVING: ICD-10-CM

## 2024-11-26 DIAGNOSIS — M54.17 RIGHT LUMBOSACRAL RADICULOPATHY: Primary | ICD-10-CM

## 2024-11-26 DIAGNOSIS — Z74.09 IMPAIRED MOBILITY AND ACTIVITIES OF DAILY LIVING: ICD-10-CM

## 2024-11-26 PROCEDURE — G8484 FLU IMMUNIZE NO ADMIN: HCPCS | Performed by: NURSE PRACTITIONER

## 2024-11-26 PROCEDURE — 99309 SBSQ NF CARE MODERATE MDM 30: CPT | Performed by: NURSE PRACTITIONER

## 2024-11-26 PROCEDURE — 1123F ACP DISCUSS/DSCN MKR DOCD: CPT | Performed by: NURSE PRACTITIONER

## 2024-11-27 ENCOUNTER — OFFICE VISIT (OUTPATIENT)
Dept: GERIATRIC MEDICINE | Age: 88
End: 2024-11-27

## 2024-11-27 DIAGNOSIS — I13.10 HYPERTENSIVE HEART AND CHRONIC KIDNEY DISEASE WITHOUT HEART FAILURE, WITH STAGE 1 THROUGH STAGE 4 CHRONIC KIDNEY DISEASE, OR UNSPECIFIED CHRONIC KIDNEY DISEASE: ICD-10-CM

## 2024-11-27 DIAGNOSIS — I50.33 ACUTE ON CHRONIC DIASTOLIC CHF (CONGESTIVE HEART FAILURE) (HCC): ICD-10-CM

## 2024-11-27 DIAGNOSIS — I10 ESSENTIAL HYPERTENSION: ICD-10-CM

## 2024-11-27 DIAGNOSIS — M43.16 SPONDYLOLISTHESIS OF LUMBAR REGION: Primary | ICD-10-CM

## 2024-11-29 ENCOUNTER — OFFICE VISIT (OUTPATIENT)
Dept: GERIATRIC MEDICINE | Age: 88
End: 2024-11-29
Payer: MEDICARE

## 2024-11-29 DIAGNOSIS — I10 ESSENTIAL HYPERTENSION: ICD-10-CM

## 2024-11-29 DIAGNOSIS — I50.33 ACUTE ON CHRONIC DIASTOLIC CHF (CONGESTIVE HEART FAILURE) (HCC): ICD-10-CM

## 2024-11-29 DIAGNOSIS — M47.812 CERVICAL SPONDYLOSIS WITHOUT MYELOPATHY: Primary | ICD-10-CM

## 2024-11-29 PROCEDURE — G8484 FLU IMMUNIZE NO ADMIN: HCPCS | Performed by: INTERNAL MEDICINE

## 2024-11-29 PROCEDURE — 1123F ACP DISCUSS/DSCN MKR DOCD: CPT | Performed by: INTERNAL MEDICINE

## 2024-11-29 PROCEDURE — 99309 SBSQ NF CARE MODERATE MDM 30: CPT | Performed by: INTERNAL MEDICINE

## 2024-12-02 ENCOUNTER — OFFICE VISIT (OUTPATIENT)
Dept: GERIATRIC MEDICINE | Age: 88
End: 2024-12-02

## 2024-12-02 DIAGNOSIS — M54.17 RIGHT LUMBOSACRAL RADICULOPATHY: ICD-10-CM

## 2024-12-02 DIAGNOSIS — R60.0 BILATERAL LOWER EXTREMITY EDEMA: Primary | ICD-10-CM

## 2024-12-02 ASSESSMENT — ENCOUNTER SYMPTOMS
RESPIRATORY NEGATIVE: 1
BACK PAIN: 1
GASTROINTESTINAL NEGATIVE: 1

## 2024-12-02 NOTE — PROGRESS NOTES
[facility]- [address] [place] [address]    11/25/2024    Loren Noyola  is a 89 y.o. in the  being seen for a f/u of   Chief Complaint   Patient presents with    Follow-up       Loren Noyola is an 89 year old female recently admitted to [facility] for rehab.  Patient has had multiple ER visits recently for falls.  On 10/21 patient presented to the ED with complaints of gait instability.  Patient was found to have a T12 compression fracture.  Neurosurgery consulted and patient was not a candidate for surgical intervention.  Patient was discharged to inpatient acute rehab.  Patient completed therapy and was discharged on 11/2.  On 11/9 patient returned to the ED with complaints of shortness of breath and chest pain.  Patient found to have GI bleed with anemia, hemoglobin 4.9.  Hemoglobin improved to 7.4 after 2 units of PRBCs.  EGD showed 2 nonbleeding ulcers.  Patient stabilized and was discharged back to inpatient rehab.  Patient completed acute rehab and was discharged to SNF for continued therapy.  Patient with significant past medical history of A-fib previously on Eliquis, hypertension, CAD, hyperlipidemia.  At time of visit patient sitting in dining area.  She is alert and oriented x 4.  She denies nausea vomiting diarrhea constipation.  Patient reports chronic right hip and low back pain.  No recent fever or fall reported.  No change in bowel or bladder habits.          Past Medical History:   Diagnosis Date    Bleeding ulcer     CAD (coronary artery disease)     Colitis     Hip fx, left, closed, initial encounter ([facility]) 02/10/2018    Hyperlipidemia     Hypertension     Osteoarthritis     Osteoporosis     Spinal stenosis      Past Surgical History:   Procedure Laterality Date    APPENDECTOMY      COLECTOMY  1/3/2011    sigmoid colectomy with colostomy    COLECTOMY  7/26/2011    partial colectomy with colostomy closure    CORONARY ARTERY BYPASS GRAFT      HYSTERECTOMY

## 2024-12-06 ENCOUNTER — OFFICE VISIT (OUTPATIENT)
Dept: GERIATRIC MEDICINE | Age: 88
End: 2024-12-06
Payer: MEDICARE

## 2024-12-06 DIAGNOSIS — I48.91 ATRIAL FIBRILLATION, UNSPECIFIED TYPE (HCC): ICD-10-CM

## 2024-12-06 DIAGNOSIS — Z78.9 IMPAIRED MOBILITY AND ACTIVITIES OF DAILY LIVING: Primary | ICD-10-CM

## 2024-12-06 DIAGNOSIS — Z74.09 IMPAIRED MOBILITY AND ACTIVITIES OF DAILY LIVING: Primary | ICD-10-CM

## 2024-12-06 PROCEDURE — G8484 FLU IMMUNIZE NO ADMIN: HCPCS | Performed by: NURSE PRACTITIONER

## 2024-12-06 PROCEDURE — 1123F ACP DISCUSS/DSCN MKR DOCD: CPT | Performed by: NURSE PRACTITIONER

## 2024-12-06 PROCEDURE — 99309 SBSQ NF CARE MODERATE MDM 30: CPT | Performed by: NURSE PRACTITIONER

## 2024-12-07 ENCOUNTER — APPOINTMENT (OUTPATIENT)
Dept: CARDIOLOGY | Facility: HOSPITAL | Age: 89
End: 2024-12-07
Payer: MEDICARE

## 2024-12-07 ENCOUNTER — APPOINTMENT (OUTPATIENT)
Dept: RADIOLOGY | Facility: HOSPITAL | Age: 89
End: 2024-12-07
Payer: MEDICARE

## 2024-12-07 ENCOUNTER — HOSPITAL ENCOUNTER (EMERGENCY)
Facility: HOSPITAL | Age: 89
Discharge: HOME | End: 2024-12-07
Attending: EMERGENCY MEDICINE
Payer: MEDICARE

## 2024-12-07 VITALS
RESPIRATION RATE: 19 BRPM | OXYGEN SATURATION: 94 % | TEMPERATURE: 98.2 F | HEART RATE: 61 BPM | SYSTOLIC BLOOD PRESSURE: 186 MMHG | HEIGHT: 62 IN | BODY MASS INDEX: 33.68 KG/M2 | WEIGHT: 183 LBS | DIASTOLIC BLOOD PRESSURE: 76 MMHG

## 2024-12-07 DIAGNOSIS — U07.1 COVID: Primary | ICD-10-CM

## 2024-12-07 LAB
ALBUMIN SERPL BCP-MCNC: 3.5 G/DL (ref 3.4–5)
ALP SERPL-CCNC: 49 U/L (ref 33–136)
ALT SERPL W P-5'-P-CCNC: 10 U/L (ref 7–45)
ANION GAP SERPL CALC-SCNC: 12 MMOL/L (ref 10–20)
AST SERPL W P-5'-P-CCNC: 20 U/L (ref 9–39)
ATRIAL RATE: 57 BPM
BASOPHILS # BLD AUTO: 0.03 X10*3/UL (ref 0–0.1)
BASOPHILS NFR BLD AUTO: 0.5 %
BILIRUB DIRECT SERPL-MCNC: 0.1 MG/DL (ref 0–0.3)
BILIRUB SERPL-MCNC: 0.4 MG/DL (ref 0–1.2)
BNP SERPL-MCNC: 595 PG/ML (ref 0–99)
BUN SERPL-MCNC: 41 MG/DL (ref 6–23)
CALCIUM SERPL-MCNC: 8.8 MG/DL (ref 8.6–10.3)
CARDIAC TROPONIN I PNL SERPL HS: 37 NG/L (ref 0–13)
CARDIAC TROPONIN I PNL SERPL HS: 38 NG/L (ref 0–13)
CHLORIDE SERPL-SCNC: 106 MMOL/L (ref 98–107)
CO2 SERPL-SCNC: 28 MMOL/L (ref 21–32)
CREAT SERPL-MCNC: 1.8 MG/DL (ref 0.5–1.05)
EGFRCR SERPLBLD CKD-EPI 2021: 27 ML/MIN/1.73M*2
EOSINOPHIL # BLD AUTO: 0.04 X10*3/UL (ref 0–0.4)
EOSINOPHIL NFR BLD AUTO: 0.6 %
ERYTHROCYTE [DISTWIDTH] IN BLOOD BY AUTOMATED COUNT: 14.9 % (ref 11.5–14.5)
GLUCOSE SERPL-MCNC: 94 MG/DL (ref 74–99)
HCT VFR BLD AUTO: 31.7 % (ref 36–46)
HGB BLD-MCNC: 9.7 G/DL (ref 12–16)
IMM GRANULOCYTES # BLD AUTO: 0.03 X10*3/UL (ref 0–0.5)
IMM GRANULOCYTES NFR BLD AUTO: 0.5 % (ref 0–0.9)
LYMPHOCYTES # BLD AUTO: 0.68 X10*3/UL (ref 0.8–3)
LYMPHOCYTES NFR BLD AUTO: 10.9 %
MCH RBC QN AUTO: 29 PG (ref 26–34)
MCHC RBC AUTO-ENTMCNC: 30.6 G/DL (ref 32–36)
MCV RBC AUTO: 95 FL (ref 80–100)
MONOCYTES # BLD AUTO: 1.11 X10*3/UL (ref 0.05–0.8)
MONOCYTES NFR BLD AUTO: 17.8 %
NEUTROPHILS # BLD AUTO: 4.36 X10*3/UL (ref 1.6–5.5)
NEUTROPHILS NFR BLD AUTO: 69.7 %
NRBC BLD-RTO: 0 /100 WBCS (ref 0–0)
PLATELET # BLD AUTO: 170 X10*3/UL (ref 150–450)
POTASSIUM SERPL-SCNC: 4.3 MMOL/L (ref 3.5–5.3)
PR INTERVAL: 238 MS
PROT SERPL-MCNC: 6 G/DL (ref 6.4–8.2)
Q ONSET: 206 MS
QRS COUNT: 11 BEATS
QRS DURATION: 124 MS
QT INTERVAL: 448 MS
QTC CALCULATION(BAZETT): 458 MS
QTC FREDERICIA: 455 MS
R AXIS: -30 DEGREES
RBC # BLD AUTO: 3.35 X10*6/UL (ref 4–5.2)
SARS-COV-2 RNA RESP QL NAA+PROBE: DETECTED
SODIUM SERPL-SCNC: 142 MMOL/L (ref 136–145)
T AXIS: -3 DEGREES
T OFFSET: 430 MS
VENTRICULAR RATE: 63 BPM
WBC # BLD AUTO: 6.3 X10*3/UL (ref 4.4–11.3)

## 2024-12-07 PROCEDURE — 80051 ELECTROLYTE PANEL: CPT | Performed by: EMERGENCY MEDICINE

## 2024-12-07 PROCEDURE — 71045 X-RAY EXAM CHEST 1 VIEW: CPT | Mod: FOREIGN READ | Performed by: RADIOLOGY

## 2024-12-07 PROCEDURE — 36415 COLL VENOUS BLD VENIPUNCTURE: CPT | Performed by: EMERGENCY MEDICINE

## 2024-12-07 PROCEDURE — 83880 ASSAY OF NATRIURETIC PEPTIDE: CPT | Performed by: EMERGENCY MEDICINE

## 2024-12-07 PROCEDURE — 82248 BILIRUBIN DIRECT: CPT | Performed by: EMERGENCY MEDICINE

## 2024-12-07 PROCEDURE — 85025 COMPLETE CBC W/AUTO DIFF WBC: CPT | Performed by: EMERGENCY MEDICINE

## 2024-12-07 PROCEDURE — 80053 COMPREHEN METABOLIC PANEL: CPT | Performed by: EMERGENCY MEDICINE

## 2024-12-07 PROCEDURE — 84484 ASSAY OF TROPONIN QUANT: CPT | Performed by: EMERGENCY MEDICINE

## 2024-12-07 PROCEDURE — 99285 EMERGENCY DEPT VISIT HI MDM: CPT | Performed by: EMERGENCY MEDICINE

## 2024-12-07 PROCEDURE — 71045 X-RAY EXAM CHEST 1 VIEW: CPT

## 2024-12-07 PROCEDURE — 93005 ELECTROCARDIOGRAM TRACING: CPT

## 2024-12-07 PROCEDURE — 87635 SARS-COV-2 COVID-19 AMP PRB: CPT | Performed by: EMERGENCY MEDICINE

## 2024-12-07 PROCEDURE — 80076 HEPATIC FUNCTION PANEL: CPT | Performed by: EMERGENCY MEDICINE

## 2024-12-07 PROCEDURE — 2500000001 HC RX 250 WO HCPCS SELF ADMINISTERED DRUGS (ALT 637 FOR MEDICARE OP): Performed by: EMERGENCY MEDICINE

## 2024-12-07 RX ORDER — ACETAMINOPHEN 325 MG/1
650 TABLET ORAL ONCE
Status: COMPLETED | OUTPATIENT
Start: 2024-12-07 | End: 2024-12-07

## 2024-12-07 RX ADMIN — ACETAMINOPHEN 650 MG: 325 TABLET ORAL at 15:04

## 2024-12-07 ASSESSMENT — LIFESTYLE VARIABLES
EVER HAD A DRINK FIRST THING IN THE MORNING TO STEADY YOUR NERVES TO GET RID OF A HANGOVER: NO
EVER FELT BAD OR GUILTY ABOUT YOUR DRINKING: NO
HAVE PEOPLE ANNOYED YOU BY CRITICIZING YOUR DRINKING: NO
TOTAL SCORE: 0
HAVE YOU EVER FELT YOU SHOULD CUT DOWN ON YOUR DRINKING: NO

## 2024-12-07 ASSESSMENT — COLUMBIA-SUICIDE SEVERITY RATING SCALE - C-SSRS
6. HAVE YOU EVER DONE ANYTHING, STARTED TO DO ANYTHING, OR PREPARED TO DO ANYTHING TO END YOUR LIFE?: NO
2. HAVE YOU ACTUALLY HAD ANY THOUGHTS OF KILLING YOURSELF?: NO
1. IN THE PAST MONTH, HAVE YOU WISHED YOU WERE DEAD OR WISHED YOU COULD GO TO SLEEP AND NOT WAKE UP?: NO

## 2024-12-07 ASSESSMENT — PAIN SCALES - GENERAL: PAINLEVEL_OUTOF10: 0 - NO PAIN

## 2024-12-07 ASSESSMENT — PAIN - FUNCTIONAL ASSESSMENT: PAIN_FUNCTIONAL_ASSESSMENT: 0-10

## 2024-12-07 NOTE — DISCHARGE INSTRUCTIONS
Please send the patient back to the hospital if she starts having respiratory distress O2 saturations go down nausea vomiting or any new symptoms.

## 2024-12-07 NOTE — ED PROVIDER NOTES
HPI   Chief Complaint   Patient presents with    Shortness of Breath       Patient is a pleasant 89-year-old female with history of recent GI bleed atrial fibrillation comes from the [facility] hospital with complaints of shortness of breath that started earlier today, no fever no chills complains of cough dry throat sore throat, also swelling in the legs which was worse earlier but not getting better.              Patient History   No past medical history on file.  Past Surgical History:   Procedure Laterality Date    OTHER SURGICAL HISTORY  2022    Arterial ligation    OTHER SURGICAL HISTORY  2022    Hip replacement    OTHER SURGICAL HISTORY  2022    Complete colonoscopy     No family history on file.  Social History     Tobacco Use    Smoking status: Former     Current packs/day: 0.00     Types: Cigarettes     Quit date:      Years since quittin.9    Smokeless tobacco: Never   Substance Use Topics    Alcohol use: Never    Drug use: Never       Physical Exam   ED Triage Vitals [24 1152]   Temperature Heart Rate Respirations BP   36.8 °C (98.2 °F) 65 20 174/79      Pulse Ox Temp Source Heart Rate Source Patient Position   95 % Temporal Monitor Sitting      BP Location FiO2 (%)     Right arm --       Physical Exam  Vitals and nursing note reviewed.   Constitutional:       Appearance: She is ill-appearing.   Cardiovascular:      Rate and Rhythm: Normal rate. Rhythm irregular.   Pulmonary:      Effort: Pulmonary effort is normal.   Musculoskeletal:      Cervical back: Normal range of motion.      Right lower leg: Edema present.      Left lower leg: Edema present.   Skin:     General: Skin is warm.      Capillary Refill: Capillary refill takes less than 2 seconds.   Neurological:      Mental Status: She is alert.           ED Course & MDM   Diagnoses as of 24 1422   COVID                 No data recorded                                 Medical Decision Making  EKG interpreted by me  shows atrial paced rhythm rate of 63 right bundle branch block nonspecific ST changes  Differential diagnosis  Acute congestive heart failure acute COPD exacerbation acute coronary syndrome acute GI bleed acute anemia acute COVID  I ordered CBC chemistries BN peptide troponin EKG and a chest x-ray on the patient further workup and management were done based upon results the test ordered.  Patient's BN peptide was slightly elevated but chest x-ray is clear and patient is already on torsemide and leg swelling is getting better is unlikely to be CHF acute, also pulse ox on room initially around 95 to 97%, troponin was normal COVID came back positive.  Patient's GFR is 27 will discuss with infectious diseases to see if the patient is a candidate for Paxlovid  Per Dr. Valverde patient GFR is a contraindication but I also spoke to pharmacy and patient is on amiodarone which is a contraindication.  At this time patient continues to be maintaining oxygen levels, hemodynamically stable in no other acute distress not showing signs of any systemic infection and will be sending her back to the rehabilitation hospital recently discharged and sent back if any other concerns like shortness of breath nausea vomiting weakness,.  Labs Reviewed  B-TYPE NATRIURETIC PEPTIDE - Abnormal     BNP                           595 (*)                  Narrative:    <100 pg/mL - Heart failure unlikely                100-299 pg/mL - Intermediate probability of acute heart                                failure exacerbation. Correlate with clinical                                context and patient history.                  >=300 pg/mL - Heart Failure likely. Correlate with clinical                                context and patient history.                                BNP testing is performed using different testing methodology at [facility] than at other [facility] hospitals. Direct result comparisons should only be made within the same  method.                   HEPATIC FUNCTION PANEL - Abnormal     Albumin                       3.5                    Bilirubin, Total              0.4                    Bilirubin, Direct             0.1                    Alkaline Phosphatase          49                     ALT                           10                     AST                           20                     Total Protein                 6.0 (*)             BASIC METABOLIC PANEL - Abnormal     Glucose                       94                     Sodium                        142                    Potassium                     4.3                    Chloride                      106                    Bicarbonate                   28                     Anion Gap                     12                     Urea Nitrogen                 41 (*)                 Creatinine                    1.80 (*)               eGFR                          27 (*)                 Calcium                       8.8                 CBC WITH AUTO DIFFERENTIAL - Abnormal     WBC                           6.3                    nRBC                          0.0                    RBC                           3.35 (*)               Hemoglobin                    9.7 (*)                Hematocrit                    31.7 (*)               MCV                           95                     MCH                           29.0                   MCHC                          30.6 (*)               RDW                           14.9 (*)               Platelets                     170                    Neutrophils %                 69.7                   Immature Granulocytes %, Automated   0.5                    Lymphocytes %                 10.9                   Monocytes %                   17.8                   Eosinophils %                 0.6                    Basophils %                   0.5                    Neutrophils Absolute          4.36                    Immature Granulocytes Absolute, Au*   0.03                   Lymphocytes Absolute          0.68 (*)               Monocytes Absolute            1.11 (*)               Eosinophils Absolute          0.04                   Basophils Absolute            0.03                SERIAL TROPONIN-INITIAL - Abnormal     Troponin I, High Sensitivity   38 (*)                   Narrative: Less than 99th percentile of normal range cutoff-                Female and children under 18 years old <14 ng/L; Male <21 ng/L: Negative                Repeat testing should be performed if clinically indicated.                                 Female and children under 18 years old 14-50 ng/L; Male 21-50 ng/L:                Consistent with possible cardiac damage and possible increased clinical                 risk. Serial measurements may help to assess extent of myocardial damage.                                 >50 ng/L: Consistent with cardiac damage, increased clinical risk and                myocardial infarction. Serial measurements may help assess extent of                 myocardial damage.                                  NOTE: Children less than 1 year old may have higher baseline troponin                 levels and results should be interpreted in conjunction with the overall                 clinical context.                                 NOTE: Troponin I testing is performed using a different                 testing methodology at [facility] than at other                 [facility]. Direct result comparisons should only                 be made within the same method.  SARS-COV-2 PCR - Abnormal     Coronavirus 2019, PCR         Detected (*)                 Narrative: This assay has received FDA Emergency Use Authorization (EUA) and is only authorized for the duration of time that circumstances exist to justify the authorization of the emergency use of in vitro diagnostic tests for the detection of SARS-CoV-2  virus and/or diagnosis of COVID-19 infection under section 564(b)(1) of the Act, 21 U.S.C. 360bbb-3(b)(1). This assay is an in vitro diagnostic nucleic acid amplification test for the qualitative detection of SARS-CoV-2 from nasopharyngeal specimens and has been validated for use at [facility]. Negative results do not preclude COVID-19 infections and should not be used as the sole basis for diagnosis, treatment, or other management decisions.                  TROPONIN SERIES- (INITIAL, 1 HR)       Narrative: The following orders were created for panel order Troponin I Series, High Sensitivity (0, 1 HR).                Procedure                               Abnormality         Status                                   ---------                               -----------         ------                                   Troponin I, High Sensiti...[[id number]]  Abnormal            Final result                             Troponin, High Sensitivi...[[id number]]                      In process                                               Please view results for these tests on the individual orders.  SERIAL TROPONIN, 1 HOUR     XR chest 1 view   Final Result    No acute findings on single AP view of the chest.    Signed by Jose Silva MD              Procedure  Procedures     Chata Freitas MD  12/07/24 1203

## 2024-12-09 ENCOUNTER — OFFICE VISIT (OUTPATIENT)
Dept: GERIATRIC MEDICINE | Age: 88
End: 2024-12-09
Payer: MEDICARE

## 2024-12-09 DIAGNOSIS — U07.1 COVID-19: Primary | ICD-10-CM

## 2024-12-09 PROCEDURE — 99309 SBSQ NF CARE MODERATE MDM 30: CPT | Performed by: NURSE PRACTITIONER

## 2024-12-09 PROCEDURE — G8484 FLU IMMUNIZE NO ADMIN: HCPCS | Performed by: NURSE PRACTITIONER

## 2024-12-09 PROCEDURE — 1123F ACP DISCUSS/DSCN MKR DOCD: CPT | Performed by: NURSE PRACTITIONER

## 2024-12-09 ASSESSMENT — ENCOUNTER SYMPTOMS
RESPIRATORY NEGATIVE: 1
BACK PAIN: 1

## 2024-12-09 NOTE — PROGRESS NOTES
[address] [place] [address]    11/26/2024    Loren Noyola  is a 89 y.o. in the  being seen for    Chief Complaint   Patient presents with    Medication Refill    Back Pain       Remains at [facility] for rehab.  Patient resting in bed at time of visit.  Patient reports chronic back pain.  She denies nausea vomiting or diarrhea.  No recent fever or fall.  No change in bowel or bladder habits.  Patient continues working with therapy.          Past Medical History:   Diagnosis Date    Bleeding ulcer     CAD (coronary artery disease)     Colitis     Hip fx, left, closed, initial encounter ([facility]) 02/10/2018    Hyperlipidemia     Hypertension     Osteoarthritis     Osteoporosis     Spinal stenosis      Past Surgical History:   Procedure Laterality Date    APPENDECTOMY      COLECTOMY  1/3/2011    sigmoid colectomy with colostomy    COLECTOMY  7/26/2011    partial colectomy with colostomy closure    CORONARY ARTERY BYPASS GRAFT      HYSTERECTOMY (CERVIX STATUS UNKNOWN)      HYSTERECTOMY, TOTAL ABDOMINAL (CERVIX REMOVED)      IR MIDLINE CATH  11/14/2024    IR MIDLINE CATH 11/14/2024 [facility] SPECIAL PROCEDURE    [place] HEMIARTHROPLASTY HIP PARTIAL Left 2/8/2018    HIP HEMIARTHROPLASTY- performed by Bryan Malcolm MD at [facility] OR    SIGMOIDOSCOPY      UPPER GASTROINTESTINAL ENDOSCOPY N/A 11/11/2024    ESOPHAGOGASTRODUODENOSCOPY WITH BX'S performed by Diaz Solares MD at [facility]    UPPER GASTROINTESTINAL ENDOSCOPY N/A 11/16/2024    ESOPHAGOGASTRODUODENOSCOPY performed by Melvi Shahid MD at [facility]    VARICOSE VEIN SURGERY       No family history on file.  Social History     Socioeconomic History    Marital status:      Spouse name: Not on file    Number of children: 2    Years of education: Not on file    Highest education level: Not on file   Occupational History    Not on file   Tobacco Use    Smoking status: Former     Types: Cigarettes

## 2024-12-10 ENCOUNTER — APPOINTMENT (OUTPATIENT)
Dept: CT IMAGING | Age: 88
DRG: 291 | End: 2024-12-10
Payer: MEDICARE

## 2024-12-10 ENCOUNTER — APPOINTMENT (OUTPATIENT)
Dept: GENERAL RADIOLOGY | Age: 88
DRG: 291 | End: 2024-12-10
Payer: MEDICARE

## 2024-12-10 ENCOUNTER — HOSPITAL ENCOUNTER (INPATIENT)
Age: 88
LOS: 1 days | Discharge: NURSING, REHAB OR LONG-TERM CARE | DRG: 291 | End: 2024-12-12
Attending: FAMILY MEDICINE | Admitting: FAMILY MEDICINE
Payer: MEDICARE

## 2024-12-10 DIAGNOSIS — E87.79 CARDIAC VOLUME OVERLOAD: Primary | ICD-10-CM

## 2024-12-10 DIAGNOSIS — R60.0 PERIPHERAL EDEMA: ICD-10-CM

## 2024-12-10 LAB
ALBUMIN SERPL-MCNC: 3.4 G/DL (ref 3.5–4.6)
ALP SERPL-CCNC: 50 U/L (ref 40–130)
ALT SERPL-CCNC: 11 U/L (ref 0–33)
ANION GAP SERPL CALCULATED.3IONS-SCNC: 8 MEQ/L (ref 9–15)
AST SERPL-CCNC: 19 U/L (ref 0–35)
BASOPHILS # BLD: 0 K/UL (ref 0–0.2)
BASOPHILS NFR BLD: 0 %
BILIRUB SERPL-MCNC: 0.4 MG/DL (ref 0.2–0.7)
BNP BLD-MCNC: 9077 PG/ML
BUN SERPL-MCNC: 33 MG/DL (ref 8–23)
CALCIUM SERPL-MCNC: 8.3 MG/DL (ref 8.5–9.9)
CHLORIDE SERPL-SCNC: 106 MEQ/L (ref 95–107)
CO2 SERPL-SCNC: 27 MEQ/L (ref 20–31)
CREAT SERPL-MCNC: 1.69 MG/DL (ref 0.5–0.9)
EOSINOPHIL # BLD: 0.1 K/UL (ref 0–0.7)
EOSINOPHIL NFR BLD: 1.7 %
ERYTHROCYTE [DISTWIDTH] IN BLOOD BY AUTOMATED COUNT: 15.1 % (ref 11.5–14.5)
GLOBULIN SER CALC-MCNC: 2.4 G/DL (ref 2.3–3.5)
GLUCOSE SERPL-MCNC: 94 MG/DL (ref 70–99)
HCT VFR BLD AUTO: 30.3 % (ref 37–47)
HGB BLD-MCNC: 9.1 G/DL (ref 12–16)
LACTIC ACID, SEPSIS: 1 MMOL/L (ref 0.5–1.9)
LYMPHOCYTES # BLD: 1.1 K/UL (ref 1–4.8)
LYMPHOCYTES NFR BLD: 15.3 %
MAGNESIUM SERPL-MCNC: 2 MG/DL (ref 1.7–2.4)
MCH RBC QN AUTO: 28.5 PG (ref 27–31.3)
MCHC RBC AUTO-ENTMCNC: 30 % (ref 33–37)
MCV RBC AUTO: 95 FL (ref 79.4–94.8)
MONOCYTES # BLD: 0.9 K/UL (ref 0.2–0.8)
MONOCYTES NFR BLD: 12.6 %
NEUTROPHILS # BLD: 4.8 K/UL (ref 1.4–6.5)
NEUTS SEG NFR BLD: 69.5 %
PLATELET # BLD AUTO: 174 K/UL (ref 130–400)
POTASSIUM SERPL-SCNC: 3.9 MEQ/L (ref 3.4–4.9)
PROCALCITONIN SERPL IA-MCNC: 0.12 NG/ML (ref 0–0.15)
PROT SERPL-MCNC: 5.8 G/DL (ref 6.3–8)
RBC # BLD AUTO: 3.19 M/UL (ref 4.2–5.4)
SODIUM SERPL-SCNC: 141 MEQ/L (ref 135–144)
TROPONIN, HIGH SENSITIVITY: 37 NG/L (ref 0–19)
TROPONIN, HIGH SENSITIVITY: 38 NG/L (ref 0–19)
WBC # BLD AUTO: 6.9 K/UL (ref 4.8–10.8)

## 2024-12-10 PROCEDURE — G0378 HOSPITAL OBSERVATION PER HR: HCPCS

## 2024-12-10 PROCEDURE — 71275 CT ANGIOGRAPHY CHEST: CPT

## 2024-12-10 PROCEDURE — 6360000004 HC RX CONTRAST MEDICATION: Performed by: PHYSICIAN ASSISTANT

## 2024-12-10 PROCEDURE — 96375 TX/PRO/DX INJ NEW DRUG ADDON: CPT

## 2024-12-10 PROCEDURE — 6360000002 HC RX W HCPCS: Performed by: FAMILY MEDICINE

## 2024-12-10 PROCEDURE — 6360000002 HC RX W HCPCS: Performed by: PHYSICIAN ASSISTANT

## 2024-12-10 PROCEDURE — 84145 PROCALCITONIN (PCT): CPT

## 2024-12-10 PROCEDURE — 83880 ASSAY OF NATRIURETIC PEPTIDE: CPT

## 2024-12-10 PROCEDURE — 71045 X-RAY EXAM CHEST 1 VIEW: CPT

## 2024-12-10 PROCEDURE — 96361 HYDRATE IV INFUSION ADD-ON: CPT

## 2024-12-10 PROCEDURE — 84484 ASSAY OF TROPONIN QUANT: CPT

## 2024-12-10 PROCEDURE — 96374 THER/PROPH/DIAG INJ IV PUSH: CPT

## 2024-12-10 PROCEDURE — 6370000000 HC RX 637 (ALT 250 FOR IP): Performed by: FAMILY MEDICINE

## 2024-12-10 PROCEDURE — 80053 COMPREHEN METABOLIC PANEL: CPT

## 2024-12-10 PROCEDURE — 2580000003 HC RX 258: Performed by: PHYSICIAN ASSISTANT

## 2024-12-10 PROCEDURE — 6370000000 HC RX 637 (ALT 250 FOR IP): Performed by: PHYSICIAN ASSISTANT

## 2024-12-10 PROCEDURE — 85025 COMPLETE CBC W/AUTO DIFF WBC: CPT

## 2024-12-10 PROCEDURE — 99285 EMERGENCY DEPT VISIT HI MDM: CPT

## 2024-12-10 PROCEDURE — 83735 ASSAY OF MAGNESIUM: CPT

## 2024-12-10 PROCEDURE — 83605 ASSAY OF LACTIC ACID: CPT

## 2024-12-10 PROCEDURE — 93005 ELECTROCARDIOGRAM TRACING: CPT | Performed by: PHYSICIAN ASSISTANT

## 2024-12-10 PROCEDURE — 36415 COLL VENOUS BLD VENIPUNCTURE: CPT

## 2024-12-10 PROCEDURE — 87040 BLOOD CULTURE FOR BACTERIA: CPT

## 2024-12-10 PROCEDURE — 2580000003 HC RX 258: Performed by: FAMILY MEDICINE

## 2024-12-10 RX ORDER — ENOXAPARIN SODIUM 100 MG/ML
30 INJECTION SUBCUTANEOUS DAILY
Status: DISCONTINUED | OUTPATIENT
Start: 2024-12-10 | End: 2024-12-11

## 2024-12-10 RX ORDER — 0.9 % SODIUM CHLORIDE 0.9 %
250 INTRAVENOUS SOLUTION INTRAVENOUS ONCE
Status: COMPLETED | OUTPATIENT
Start: 2024-12-10 | End: 2024-12-10

## 2024-12-10 RX ORDER — ACETAMINOPHEN 500 MG
1000 TABLET ORAL ONCE
Status: COMPLETED | OUTPATIENT
Start: 2024-12-10 | End: 2024-12-10

## 2024-12-10 RX ORDER — LOSARTAN POTASSIUM 25 MG/1
25 TABLET ORAL 2 TIMES DAILY
COMMUNITY

## 2024-12-10 RX ORDER — ACETAMINOPHEN 325 MG/1
650 TABLET ORAL EVERY 6 HOURS PRN
Status: DISCONTINUED | OUTPATIENT
Start: 2024-12-10 | End: 2024-12-12 | Stop reason: HOSPADM

## 2024-12-10 RX ORDER — NITROGLYCERIN 0.4 MG/1
0.4 TABLET SUBLINGUAL EVERY 5 MIN PRN
COMMUNITY

## 2024-12-10 RX ORDER — ONDANSETRON 4 MG/1
4 TABLET, ORALLY DISINTEGRATING ORAL EVERY 8 HOURS PRN
Status: DISCONTINUED | OUTPATIENT
Start: 2024-12-10 | End: 2024-12-12 | Stop reason: HOSPADM

## 2024-12-10 RX ORDER — ONDANSETRON 2 MG/ML
4 INJECTION INTRAMUSCULAR; INTRAVENOUS EVERY 6 HOURS PRN
Status: DISCONTINUED | OUTPATIENT
Start: 2024-12-10 | End: 2024-12-12 | Stop reason: HOSPADM

## 2024-12-10 RX ORDER — RANOLAZINE 500 MG/1
500 TABLET, EXTENDED RELEASE ORAL 2 TIMES DAILY
COMMUNITY

## 2024-12-10 RX ORDER — SODIUM CHLORIDE 0.9 % (FLUSH) 0.9 %
5-40 SYRINGE (ML) INJECTION PRN
Status: DISCONTINUED | OUTPATIENT
Start: 2024-12-10 | End: 2024-12-12 | Stop reason: HOSPADM

## 2024-12-10 RX ORDER — FUROSEMIDE 10 MG/ML
40 INJECTION INTRAMUSCULAR; INTRAVENOUS ONCE
Status: COMPLETED | OUTPATIENT
Start: 2024-12-10 | End: 2024-12-10

## 2024-12-10 RX ORDER — SODIUM CHLORIDE 0.9 % (FLUSH) 0.9 %
5-40 SYRINGE (ML) INJECTION EVERY 12 HOURS SCHEDULED
Status: DISCONTINUED | OUTPATIENT
Start: 2024-12-10 | End: 2024-12-12 | Stop reason: HOSPADM

## 2024-12-10 RX ORDER — CALCIUM CARBONATE 500 MG/1
1 TABLET, CHEWABLE ORAL
COMMUNITY

## 2024-12-10 RX ORDER — FERROUS SULFATE 325(65) MG
325 TABLET ORAL
COMMUNITY

## 2024-12-10 RX ORDER — ACETAMINOPHEN 650 MG/1
650 SUPPOSITORY RECTAL EVERY 6 HOURS PRN
Status: DISCONTINUED | OUTPATIENT
Start: 2024-12-10 | End: 2024-12-12 | Stop reason: HOSPADM

## 2024-12-10 RX ORDER — FUROSEMIDE 10 MG/ML
20 INJECTION INTRAMUSCULAR; INTRAVENOUS 2 TIMES DAILY
Status: DISCONTINUED | OUTPATIENT
Start: 2024-12-10 | End: 2024-12-12 | Stop reason: HOSPADM

## 2024-12-10 RX ORDER — IOPAMIDOL 755 MG/ML
75 INJECTION, SOLUTION INTRAVASCULAR
Status: COMPLETED | OUTPATIENT
Start: 2024-12-10 | End: 2024-12-10

## 2024-12-10 RX ORDER — POTASSIUM CHLORIDE 1500 MG/1
40 TABLET, EXTENDED RELEASE ORAL PRN
Status: DISCONTINUED | OUTPATIENT
Start: 2024-12-10 | End: 2024-12-12 | Stop reason: HOSPADM

## 2024-12-10 RX ORDER — POLYETHYLENE GLYCOL 3350 17 G/17G
17 POWDER, FOR SOLUTION ORAL DAILY PRN
Status: DISCONTINUED | OUTPATIENT
Start: 2024-12-10 | End: 2024-12-12 | Stop reason: HOSPADM

## 2024-12-10 RX ORDER — METOPROLOL TARTRATE 25 MG/1
25 TABLET, FILM COATED ORAL 2 TIMES DAILY
COMMUNITY

## 2024-12-10 RX ORDER — POTASSIUM CHLORIDE 7.45 MG/ML
10 INJECTION INTRAVENOUS PRN
Status: DISCONTINUED | OUTPATIENT
Start: 2024-12-10 | End: 2024-12-12 | Stop reason: HOSPADM

## 2024-12-10 RX ORDER — BETAMETHASONE DIPROPIONATE 0.5 MG/G
1 LOTION TOPICAL 2 TIMES DAILY
COMMUNITY

## 2024-12-10 RX ORDER — SODIUM CHLORIDE 9 MG/ML
INJECTION, SOLUTION INTRAVENOUS PRN
Status: DISCONTINUED | OUTPATIENT
Start: 2024-12-10 | End: 2024-12-12 | Stop reason: HOSPADM

## 2024-12-10 RX ORDER — DEXAMETHASONE SODIUM PHOSPHATE 10 MG/ML
6 INJECTION INTRAMUSCULAR; INTRAVENOUS ONCE
Status: COMPLETED | OUTPATIENT
Start: 2024-12-10 | End: 2024-12-10

## 2024-12-10 RX ORDER — HYDRALAZINE HYDROCHLORIDE 50 MG/1
50 TABLET, FILM COATED ORAL
COMMUNITY

## 2024-12-10 RX ORDER — POLYETHYLENE GLYCOL 3350 17 G/17G
17 POWDER, FOR SOLUTION ORAL DAILY
COMMUNITY

## 2024-12-10 RX ADMIN — FUROSEMIDE 40 MG: 10 INJECTION, SOLUTION INTRAMUSCULAR; INTRAVENOUS at 12:15

## 2024-12-10 RX ADMIN — ACETAMINOPHEN 650 MG: 325 TABLET ORAL at 23:24

## 2024-12-10 RX ADMIN — FUROSEMIDE 20 MG: 10 INJECTION, SOLUTION INTRAMUSCULAR; INTRAVENOUS at 17:40

## 2024-12-10 RX ADMIN — IOPAMIDOL 75 ML: 755 INJECTION, SOLUTION INTRAVENOUS at 09:53

## 2024-12-10 RX ADMIN — SODIUM CHLORIDE 250 ML: 9 INJECTION, SOLUTION INTRAVENOUS at 09:17

## 2024-12-10 RX ADMIN — DEXAMETHASONE SODIUM PHOSPHATE 6 MG: 10 INJECTION INTRAMUSCULAR; INTRAVENOUS at 08:22

## 2024-12-10 RX ADMIN — ACETAMINOPHEN 1000 MG: 500 TABLET ORAL at 12:15

## 2024-12-10 RX ADMIN — ENOXAPARIN SODIUM 30 MG: 100 INJECTION SUBCUTANEOUS at 17:40

## 2024-12-10 RX ADMIN — ACETAMINOPHEN 650 MG: 325 TABLET ORAL at 17:40

## 2024-12-10 RX ADMIN — SODIUM CHLORIDE, PRESERVATIVE FREE 10 ML: 5 INJECTION INTRAVENOUS at 20:15

## 2024-12-10 ASSESSMENT — PAIN DESCRIPTION - PAIN TYPE
TYPE: ACUTE PAIN
TYPE: ACUTE PAIN

## 2024-12-10 ASSESSMENT — PAIN - FUNCTIONAL ASSESSMENT
PAIN_FUNCTIONAL_ASSESSMENT: ACTIVITIES ARE NOT PREVENTED
PAIN_FUNCTIONAL_ASSESSMENT: ACTIVITIES ARE NOT PREVENTED
PAIN_FUNCTIONAL_ASSESSMENT: 0-10

## 2024-12-10 ASSESSMENT — ENCOUNTER SYMPTOMS
DIARRHEA: 0
PHOTOPHOBIA: 0
BACK PAIN: 0
RESPIRATORY NEGATIVE: 1
SHORTNESS OF BREATH: 1
NAUSEA: 0
EYE PAIN: 0
ABDOMINAL PAIN: 0
SORE THROAT: 0
RHINORRHEA: 0
VOMITING: 0
GASTROINTESTINAL NEGATIVE: 1
COUGH: 0
BACK PAIN: 1

## 2024-12-10 ASSESSMENT — PAIN DESCRIPTION - DESCRIPTORS
DESCRIPTORS: ACHING
DESCRIPTORS: ACHING;DISCOMFORT
DESCRIPTORS: TIGHTNESS
DESCRIPTORS: ACHING;DISCOMFORT

## 2024-12-10 ASSESSMENT — PAIN DESCRIPTION - LOCATION
LOCATION: BACK;HIP
LOCATION: BACK
LOCATION: CHEST
LOCATION: BACK;HIP
LOCATION: BACK

## 2024-12-10 ASSESSMENT — PAIN DESCRIPTION - ORIENTATION
ORIENTATION: LOWER
ORIENTATION: MID
ORIENTATION: RIGHT;LOWER
ORIENTATION: LOWER
ORIENTATION: LEFT

## 2024-12-10 ASSESSMENT — PAIN SCALES - GENERAL
PAINLEVEL_OUTOF10: 7
PAINLEVEL_OUTOF10: 6
PAINLEVEL_OUTOF10: 7
PAINLEVEL_OUTOF10: 9
PAINLEVEL_OUTOF10: 6

## 2024-12-10 NOTE — H&P
Hospital Medicine  History and Physical    Patient:  Loren Noyola  MRN: [id number]    CHIEF COMPLAINT:    Chief Complaint   Patient presents with    Chest Pain     Lower chest tightness that patient states feels at her bra line in the front. Patient states she was recently diagnosed with COVID, Patient also admits that she feels like she is SOB, patient has audible wheezing. Patient is a/ox4.        History Obtained From:  patient  Primary Care Physician: Evette Johnson MD    HISTORY OF PRESENT ILLNESS:   The patient is a 89 y.o. female who presents with a history of coronary disease, hypertension, hyperlipidemia, osteoarthritis who presents with a progressively worsening shortness of breath, chest discomfort and bilateral leg swelling x 3 to 4 weeks.  The patient was also recently diagnosed with COVID which seems to have exacerbated the symptoms.  She denies fever and chills she denies nausea vomiting diarrhea.  She states she was wearing compression stockings on her legs however the legs have swollen so much that she is unable to tolerate them.    Past Medical History:      Diagnosis Date    Bleeding ulcer     CAD (coronary artery disease)     Colitis     Hip fx, left, closed, initial encounter ([facility]) 02/10/2018    Hyperlipidemia     Hypertension     Osteoarthritis     Osteoporosis     Spinal stenosis        Past Surgical History:      Procedure Laterality Date    APPENDECTOMY      COLECTOMY  1/3/2011    sigmoid colectomy with colostomy    COLECTOMY  7/26/2011    partial colectomy with colostomy closure    CORONARY ARTERY BYPASS GRAFT      HYSTERECTOMY (CERVIX STATUS UNKNOWN)      HYSTERECTOMY, TOTAL ABDOMINAL (CERVIX REMOVED)      IR MIDLINE CATH  11/14/2024    IR MIDLINE CATH 11/14/2024 [facility] SPECIAL PROCEDURE    [place] HEMIARTHROPLASTY HIP PARTIAL Left 2/8/2018    HIP HEMIARTHROPLASTY- performed by Bryan Malcolm MD at [facility] OR    SIGMOIDOSCOPY      UPPER GASTROINTESTINAL ENDOSCOPY N/A 11/11/2024

## 2024-12-10 NOTE — ED TRIAGE NOTES
Lower chest tightness that patient states feels at her bra line in the front. Patient states she was recently diagnosed with COVID, Patient also admits that she feels like she is SOB, patient has audible wheezing. Patient also has increased BLE swelling Patient is a/ox4.

## 2024-12-10 NOTE — PLAN OF CARE
Care plan initiated and remains ongoing. Positive for covid, requiring no oxygen at this time. However, does De-sat with exertion. Lower extremity edema noted, receiving IV lasix. Monitor lab work.    Electronically signed by Coco Maynard RN on 12/10/2024 at 4:15 PM

## 2024-12-10 NOTE — ED NOTES
Pt was ambulated to bathroom with standby assist and walker. Pt pulse ox 89%, pt was tachypneic and tachycardic during walk. Urine sample obtained.

## 2024-12-10 NOTE — FLOWSHEET NOTE
1350- Patient arrived to room 169 via stretcher from ER. Ambulated from hallway into bathroom and then from bathroom to bed. Oriented to room and call light. Her 2 sons are at bedside. She denies any chest pain. Remains NSR on the monitor. +2 pitting edema noted to bilateral lower extremities. Pedal pulses palpable. Shortness of breath noted with exertion. Lungs are diminished with expiratory wheezes noted bilaterally. SATS 97% on RA. She is A/Ox3 and his hard of hearing. She denies any pain with elimination. Pure wick catheter placed at this time and set to medium wall suction. Skin remains warm and dry. Stage 2/excoriation noted to buttocks. Wound nurse consult placed. #20g SL to right AC, flushed and patent. Vital signs stable and admission completed.     1400- Dr Sosa present to see patient.     1740- Medicated with tylenol 650mg PO for complaints of 7/10 lower back pain. Linens changed from pure wick leaking. Nel care provided and a new pure wick placed at this time. Vital signs stable. Call light remains in reach.     1845- Patient resting in bed with TV on. Has no complaints or concerns at this time. Call light remains in reach.     Electronically signed by Coco Maynard RN on 12/10/2024 at 6:45 PM

## 2024-12-10 NOTE — PROGRESS NOTES
[address] [place] [address]    12/2/2024    Loren Noyola  is a 89 y.o. in the  being seen for    Chief Complaint   Patient presents with    Follow-up    Other     Edema       Remains [facility] for rehab.  Patient eating breakfast at time of visit.  Patient reports ongoing pain to right hip and back.  Patient denies nausea vomiting diarrhea constipation.  No recent fever or fall reported.  Patient with pitting edema to bilateral lower extremities.          Past Medical History:   Diagnosis Date    Bleeding ulcer     CAD (coronary artery disease)     Colitis     Hip fx, left, closed, initial encounter ([facility]) 02/10/2018    Hyperlipidemia     Hypertension     Osteoarthritis     Osteoporosis     Spinal stenosis      Past Surgical History:   Procedure Laterality Date    APPENDECTOMY      COLECTOMY  1/3/2011    sigmoid colectomy with colostomy    COLECTOMY  7/26/2011    partial colectomy with colostomy closure    CORONARY ARTERY BYPASS GRAFT      HYSTERECTOMY (CERVIX STATUS UNKNOWN)      HYSTERECTOMY, TOTAL ABDOMINAL (CERVIX REMOVED)      IR MIDLINE CATH  11/14/2024    IR MIDLINE CATH 11/14/2024 [facility]EDURE    [place] HEMIARTHROPLASTY HIP PARTIAL Left 2/8/2018    HIP HEMIARTHROPLASTY- performed by Bryan Malcolm MD at [facility] [place]    SIGMOIDOSCOPY      UPPER GASTROINTESTINAL ENDOSCOPY N/A 11/11/2024    ESOPHAGOGASTRODUODENOSCOPY WITH BX'S performed by Diaz Solares MD at [facility]    UPPER GASTROINTESTINAL ENDOSCOPY N/A 11/16/2024    ESOPHAGOGASTRODUODENOSCOPY performed by Melvi Shahid MD at [facility]    VARICOSE VEIN SURGERY       No family history on file.  Social History     Socioeconomic History    Marital status:      Spouse name: Not on file    Number of children: 2    Years of education: Not on file    Highest education level: Not on file   Occupational History    Not on file   Tobacco Use    Smoking status: Former     Types:

## 2024-12-10 NOTE — ED PROVIDER NOTES
[facility] 1W TELEMETRY  EMERGENCY DEPARTMENT ENCOUNTER      Pt Name: Loren Noyola  MRN: [id number]  Birthdate 1935  Date of evaluation: 12/10/2024  Provider: Mallory Elliott PA-C  5:03 PM EST    CHIEF COMPLAINT       Chief Complaint   Patient presents with    Chest Pain     Lower chest tightness that patient states feels at her bra line in the front. Patient states she was recently diagnosed with COVID, Patient also admits that she feels like she is SOB, patient has audible wheezing. Patient is a/ox4.          HISTORY OF PRESENT ILLNESS   (Location/Symptom, Timing/Onset, Context/Setting, Quality, Duration, Modifying Factors, Severity)  Note limiting factors.   Loren Noyola is a 89 y.o. female who presents to the emergency department shortness of breath and leg swelling.  She has shortness of breath with exertion and worsening leg swelling that is progressed over the last 3 or 4 weeks.  She is unable to recall her medication but after reviewing with the pharmacist it looks like she was on torsemide 20 and this was reduced to 10 mg due to CKD.  She of note was diagnosed with COVID on 12 7.    HPI    Nursing Notes were reviewed.    REVIEW OF SYSTEMS    (2-9 systems for level 4, 10 or more for level 5)     Review of Systems   Constitutional:  Negative for chills, diaphoresis, fatigue and fever.   HENT:  Negative for congestion, rhinorrhea and sore throat.    Eyes:  Negative for photophobia and pain.   Respiratory:  Positive for shortness of breath. Negative for cough.    Cardiovascular:  Positive for leg swelling. Negative for chest pain and palpitations.   Gastrointestinal:  Negative for abdominal pain, diarrhea, nausea and vomiting.   Genitourinary:  Negative for dysuria and flank pain.   Musculoskeletal:  Negative for back pain.   Skin:  Negative for rash.   Neurological:  Negative for dizziness, light-headedness and headaches.   Psychiatric/Behavioral: Negative.     All other systems reviewed and are

## 2024-12-11 ENCOUNTER — APPOINTMENT (OUTPATIENT)
Dept: CARDIOLOGY | Facility: CLINIC | Age: 89
End: 2024-12-11
Payer: MEDICARE

## 2024-12-11 ENCOUNTER — APPOINTMENT (OUTPATIENT)
Dept: ULTRASOUND IMAGING | Age: 88
DRG: 291 | End: 2024-12-11
Payer: MEDICARE

## 2024-12-11 ENCOUNTER — OFFICE VISIT (OUTPATIENT)
Dept: GERIATRIC MEDICINE | Age: 88
End: 2024-12-11
Payer: MEDICARE

## 2024-12-11 ENCOUNTER — APPOINTMENT (OUTPATIENT)
Dept: GENERAL RADIOLOGY | Age: 88
DRG: 291 | End: 2024-12-11
Payer: MEDICARE

## 2024-12-11 DIAGNOSIS — I10 ESSENTIAL HYPERTENSION: Primary | ICD-10-CM

## 2024-12-11 DIAGNOSIS — M48.062 SPINAL STENOSIS OF LUMBAR REGION WITH NEUROGENIC CLAUDICATION: ICD-10-CM

## 2024-12-11 DIAGNOSIS — R26.81 UNSTEADINESS ON FEET: ICD-10-CM

## 2024-12-11 LAB
ALBUMIN SERPL-MCNC: 3.2 G/DL (ref 3.5–4.6)
ALP SERPL-CCNC: 47 U/L (ref 40–130)
ALT SERPL-CCNC: 12 U/L (ref 0–33)
ANION GAP SERPL CALCULATED.3IONS-SCNC: 11 MEQ/L (ref 9–15)
AST SERPL-CCNC: 18 U/L (ref 0–35)
BILIRUB DIRECT SERPL-MCNC: <0.2 MG/DL (ref 0–0.4)
BILIRUB INDIRECT SERPL-MCNC: ABNORMAL MG/DL (ref 0–0.6)
BILIRUB SERPL-MCNC: 0.5 MG/DL (ref 0.2–0.7)
BUN SERPL-MCNC: 31 MG/DL (ref 8–23)
CALCIUM SERPL-MCNC: 8.2 MG/DL (ref 8.5–9.9)
CHLORIDE SERPL-SCNC: 108 MEQ/L (ref 95–107)
CHOLEST SERPL-MCNC: 146 MG/DL (ref 0–199)
CO2 SERPL-SCNC: 24 MEQ/L (ref 20–31)
CREAT SERPL-MCNC: 1.46 MG/DL (ref 0.5–0.9)
EKG ATRIAL RATE: 68 BPM
EKG Q-T INTERVAL: 466 MS
EKG QRS DURATION: 124 MS
EKG QTC CALCULATION (BAZETT): 488 MS
EKG R AXIS: -37 DEGREES
EKG T AXIS: -1 DEGREES
EKG VENTRICULAR RATE: 66 BPM
FERRITIN: 550 NG/ML
GLUCOSE SERPL-MCNC: 99 MG/DL (ref 70–99)
HDLC SERPL-MCNC: 39 MG/DL (ref 40–59)
IRON % SATURATION: 23 % (ref 20–55)
IRON: 45 UG/DL (ref 37–145)
LDLC SERPL CALC-MCNC: 93 MG/DL (ref 0–129)
MAGNESIUM SERPL-MCNC: 2.1 MG/DL (ref 1.7–2.4)
POTASSIUM SERPL-SCNC: 3.7 MEQ/L (ref 3.4–4.9)
PROT SERPL-MCNC: 5.6 G/DL (ref 6.3–8)
SODIUM SERPL-SCNC: 143 MEQ/L (ref 135–144)
T4 FREE SERPL-MCNC: 1.71 NG/DL (ref 0.84–1.68)
TOTAL IRON BINDING CAPACITY: 200 UG/DL (ref 250–450)
TRIGL SERPL-MCNC: 71 MG/DL (ref 0–150)
TROPONIN, HIGH SENSITIVITY: 35 NG/L (ref 0–19)
TSH REFLEX: 0.06 UIU/ML (ref 0.44–3.86)
UNSATURATED IRON BINDING CAPACITY: 155 UG/DL (ref 112–347)

## 2024-12-11 PROCEDURE — 80076 HEPATIC FUNCTION PANEL: CPT

## 2024-12-11 PROCEDURE — 83550 IRON BINDING TEST: CPT

## 2024-12-11 PROCEDURE — G0378 HOSPITAL OBSERVATION PER HR: HCPCS

## 2024-12-11 PROCEDURE — 82728 ASSAY OF FERRITIN: CPT

## 2024-12-11 PROCEDURE — 1123F ACP DISCUSS/DSCN MKR DOCD: CPT | Performed by: INTERNAL MEDICINE

## 2024-12-11 PROCEDURE — 2580000003 HC RX 258: Performed by: FAMILY MEDICINE

## 2024-12-11 PROCEDURE — 6360000002 HC RX W HCPCS: Performed by: FAMILY MEDICINE

## 2024-12-11 PROCEDURE — 93971 EXTREMITY STUDY: CPT

## 2024-12-11 PROCEDURE — 84484 ASSAY OF TROPONIN QUANT: CPT

## 2024-12-11 PROCEDURE — 6370000000 HC RX 637 (ALT 250 FOR IP): Performed by: NURSE PRACTITIONER

## 2024-12-11 PROCEDURE — 71045 X-RAY EXAM CHEST 1 VIEW: CPT

## 2024-12-11 PROCEDURE — 80048 BASIC METABOLIC PNL TOTAL CA: CPT

## 2024-12-11 PROCEDURE — 97166 OT EVAL MOD COMPLEX 45 MIN: CPT

## 2024-12-11 PROCEDURE — 93010 ELECTROCARDIOGRAM REPORT: CPT | Performed by: INTERNAL MEDICINE

## 2024-12-11 PROCEDURE — 99308 SBSQ NF CARE LOW MDM 20: CPT | Performed by: INTERNAL MEDICINE

## 2024-12-11 PROCEDURE — 80061 LIPID PANEL: CPT

## 2024-12-11 PROCEDURE — 84439 ASSAY OF FREE THYROXINE: CPT

## 2024-12-11 PROCEDURE — 83735 ASSAY OF MAGNESIUM: CPT

## 2024-12-11 PROCEDURE — 83540 ASSAY OF IRON: CPT

## 2024-12-11 PROCEDURE — 36415 COLL VENOUS BLD VENIPUNCTURE: CPT

## 2024-12-11 PROCEDURE — 6370000000 HC RX 637 (ALT 250 FOR IP): Performed by: FAMILY MEDICINE

## 2024-12-11 PROCEDURE — 84443 ASSAY THYROID STIM HORMONE: CPT

## 2024-12-11 RX ORDER — BETAMETHASONE DIPROPIONATE 0.5 MG/G
1 LOTION TOPICAL 2 TIMES DAILY
Status: DISCONTINUED | OUTPATIENT
Start: 2024-12-11 | End: 2024-12-11 | Stop reason: SDUPTHER

## 2024-12-11 RX ORDER — TRIAMCINOLONE ACETONIDE 1 MG/ML
LOTION TOPICAL 2 TIMES DAILY
Status: DISCONTINUED | OUTPATIENT
Start: 2024-12-11 | End: 2024-12-12 | Stop reason: HOSPADM

## 2024-12-11 RX ORDER — TORSEMIDE 20 MG/1
20 TABLET ORAL EVERY MORNING
Status: DISCONTINUED | OUTPATIENT
Start: 2024-12-11 | End: 2024-12-12 | Stop reason: HOSPADM

## 2024-12-11 RX ORDER — AMIODARONE HYDROCHLORIDE 200 MG/1
100 TABLET ORAL DAILY
Status: DISCONTINUED | OUTPATIENT
Start: 2024-12-11 | End: 2024-12-12 | Stop reason: HOSPADM

## 2024-12-11 RX ORDER — NITROGLYCERIN 0.4 MG/1
0.4 TABLET SUBLINGUAL EVERY 5 MIN PRN
Status: DISCONTINUED | OUTPATIENT
Start: 2024-12-11 | End: 2024-12-12 | Stop reason: HOSPADM

## 2024-12-11 RX ORDER — FERROUS SULFATE 325(65) MG
325 TABLET ORAL
Status: DISCONTINUED | OUTPATIENT
Start: 2024-12-11 | End: 2024-12-12 | Stop reason: HOSPADM

## 2024-12-11 RX ORDER — RANOLAZINE 500 MG/1
500 TABLET, EXTENDED RELEASE ORAL 2 TIMES DAILY
Status: DISCONTINUED | OUTPATIENT
Start: 2024-12-11 | End: 2024-12-12 | Stop reason: HOSPADM

## 2024-12-11 RX ORDER — HYDROCODONE BITARTRATE AND HOMATROPINE METHYLBROMIDE ORAL SOLUTION 5; 1.5 MG/5ML; MG/5ML
5 LIQUID ORAL EVERY 4 HOURS PRN
Status: DISCONTINUED | OUTPATIENT
Start: 2024-12-11 | End: 2024-12-12 | Stop reason: HOSPADM

## 2024-12-11 RX ORDER — PANTOPRAZOLE SODIUM 40 MG/1
40 TABLET, DELAYED RELEASE ORAL
Status: DISCONTINUED | OUTPATIENT
Start: 2024-12-12 | End: 2024-12-12 | Stop reason: HOSPADM

## 2024-12-11 RX ORDER — LIDOCAINE 4 G/G
3 PATCH TOPICAL DAILY
Status: DISCONTINUED | OUTPATIENT
Start: 2024-12-11 | End: 2024-12-12 | Stop reason: HOSPADM

## 2024-12-11 RX ORDER — ALBUTEROL SULFATE 90 UG/1
2 INHALANT RESPIRATORY (INHALATION) EVERY 4 HOURS PRN
Status: DISCONTINUED | OUTPATIENT
Start: 2024-12-11 | End: 2024-12-12 | Stop reason: HOSPADM

## 2024-12-11 RX ORDER — ASPIRIN 81 MG/1
81 TABLET ORAL DAILY
Status: DISCONTINUED | OUTPATIENT
Start: 2024-12-11 | End: 2024-12-12 | Stop reason: HOSPADM

## 2024-12-11 RX ORDER — GUAIFENESIN 600 MG/1
600 TABLET, EXTENDED RELEASE ORAL 2 TIMES DAILY
Status: DISCONTINUED | OUTPATIENT
Start: 2024-12-11 | End: 2024-12-12 | Stop reason: HOSPADM

## 2024-12-11 RX ORDER — IPRATROPIUM BROMIDE AND ALBUTEROL SULFATE 2.5; .5 MG/3ML; MG/3ML
1 SOLUTION RESPIRATORY (INHALATION)
Status: DISCONTINUED | OUTPATIENT
Start: 2024-12-11 | End: 2024-12-11

## 2024-12-11 RX ORDER — METOPROLOL TARTRATE 25 MG/1
25 TABLET, FILM COATED ORAL 2 TIMES DAILY
Status: DISCONTINUED | OUTPATIENT
Start: 2024-12-11 | End: 2024-12-12 | Stop reason: HOSPADM

## 2024-12-11 RX ORDER — LOSARTAN POTASSIUM 25 MG/1
25 TABLET ORAL 2 TIMES DAILY
Status: DISCONTINUED | OUTPATIENT
Start: 2024-12-11 | End: 2024-12-12 | Stop reason: HOSPADM

## 2024-12-11 RX ORDER — CALCIUM CARBONATE 500 MG/1
1 TABLET, CHEWABLE ORAL
Status: DISCONTINUED | OUTPATIENT
Start: 2024-12-11 | End: 2024-12-12 | Stop reason: HOSPADM

## 2024-12-11 RX ORDER — HYDROXYZINE HYDROCHLORIDE 25 MG/1
12.5 TABLET, FILM COATED ORAL ONCE
Status: COMPLETED | OUTPATIENT
Start: 2024-12-11 | End: 2024-12-11

## 2024-12-11 RX ORDER — HYDRALAZINE HYDROCHLORIDE 20 MG/ML
5 INJECTION INTRAMUSCULAR; INTRAVENOUS EVERY 6 HOURS PRN
Status: DISCONTINUED | OUTPATIENT
Start: 2024-12-11 | End: 2024-12-12 | Stop reason: HOSPADM

## 2024-12-11 RX ADMIN — AMIODARONE HYDROCHLORIDE 100 MG: 200 TABLET ORAL at 13:10

## 2024-12-11 RX ADMIN — GUAIFENESIN 600 MG: 600 TABLET ORAL at 11:07

## 2024-12-11 RX ADMIN — LOSARTAN POTASSIUM 25 MG: 25 TABLET, FILM COATED ORAL at 20:18

## 2024-12-11 RX ADMIN — METOPROLOL TARTRATE 25 MG: 25 TABLET, FILM COATED ORAL at 20:18

## 2024-12-11 RX ADMIN — FERROUS SULFATE TAB 325 MG (65 MG ELEMENTAL FE) 325 MG: 325 (65 FE) TAB at 17:18

## 2024-12-11 RX ADMIN — ASPIRIN 81 MG: 81 TABLET, COATED ORAL at 13:10

## 2024-12-11 RX ADMIN — SODIUM CHLORIDE, PRESERVATIVE FREE 10 ML: 5 INJECTION INTRAVENOUS at 20:18

## 2024-12-11 RX ADMIN — APIXABAN 2.5 MG: 2.5 TABLET, FILM COATED ORAL at 20:17

## 2024-12-11 RX ADMIN — GUAIFENESIN 600 MG: 600 TABLET ORAL at 20:18

## 2024-12-11 RX ADMIN — SODIUM CHLORIDE, PRESERVATIVE FREE 10 ML: 5 INJECTION INTRAVENOUS at 09:11

## 2024-12-11 RX ADMIN — METOPROLOL TARTRATE 25 MG: 25 TABLET, FILM COATED ORAL at 13:10

## 2024-12-11 RX ADMIN — FUROSEMIDE 20 MG: 10 INJECTION, SOLUTION INTRAMUSCULAR; INTRAVENOUS at 09:12

## 2024-12-11 RX ADMIN — TORSEMIDE 20 MG: 20 TABLET ORAL at 13:10

## 2024-12-11 RX ADMIN — RANOLAZINE 500 MG: 500 TABLET, FILM COATED, EXTENDED RELEASE ORAL at 13:09

## 2024-12-11 RX ADMIN — HYDROXYZINE HYDROCHLORIDE 12.5 MG: 25 TABLET ORAL at 05:24

## 2024-12-11 RX ADMIN — LOSARTAN POTASSIUM 25 MG: 25 TABLET, FILM COATED ORAL at 13:10

## 2024-12-11 RX ADMIN — ACETAMINOPHEN 650 MG: 325 TABLET ORAL at 20:24

## 2024-12-11 RX ADMIN — RANOLAZINE 500 MG: 500 TABLET, FILM COATED, EXTENDED RELEASE ORAL at 20:17

## 2024-12-11 RX ADMIN — FUROSEMIDE 20 MG: 10 INJECTION, SOLUTION INTRAMUSCULAR; INTRAVENOUS at 18:23

## 2024-12-11 RX ADMIN — ANTACID TABLETS 500 MG: 500 TABLET, CHEWABLE ORAL at 13:09

## 2024-12-11 RX ADMIN — ENOXAPARIN SODIUM 30 MG: 100 INJECTION SUBCUTANEOUS at 09:12

## 2024-12-11 RX ADMIN — HYDRALAZINE HYDROCHLORIDE 5 MG: 20 INJECTION INTRAMUSCULAR; INTRAVENOUS at 17:16

## 2024-12-11 RX ADMIN — ACETAMINOPHEN 650 MG: 325 TABLET ORAL at 11:07

## 2024-12-11 ASSESSMENT — PAIN SCALES - GENERAL
PAINLEVEL_OUTOF10: 7
PAINLEVEL_OUTOF10: 8

## 2024-12-11 ASSESSMENT — PAIN DESCRIPTION - LOCATION
LOCATION: BACK
LOCATION: BACK

## 2024-12-11 ASSESSMENT — PAIN DESCRIPTION - DESCRIPTORS
DESCRIPTORS: ACHING;DISCOMFORT
DESCRIPTORS: ACHING

## 2024-12-11 ASSESSMENT — PAIN - FUNCTIONAL ASSESSMENT: PAIN_FUNCTIONAL_ASSESSMENT: ACTIVITIES ARE NOT PREVENTED

## 2024-12-11 ASSESSMENT — PAIN DESCRIPTION - ORIENTATION: ORIENTATION: LOWER

## 2024-12-11 NOTE — FLOWSHEET NOTE
0914- Patients /64 left upper arm, rechecked /69. Dr. Sosa aware. No new orders at this time.    1201- BP now 151/55.    1526- /64, nurse will reassesses once patient has been resting, patient just finished ambulating.    1642- Patients BP now 186/71, Dr. Sosa notified, orders for PRN hydralazine obtained.Electronically signed by Maite Serna RN on 12/11/2024 at 5:34 PM

## 2024-12-11 NOTE — PLAN OF CARE
See OT evaluation for all goals and OT POC. Electronically signed by Ellen Segundo OT on 12/11/2024 at 11:55 AM

## 2024-12-12 VITALS
RESPIRATION RATE: 18 BRPM | HEART RATE: 59 BPM | SYSTOLIC BLOOD PRESSURE: 157 MMHG | HEIGHT: 62 IN | TEMPERATURE: 97.7 F | BODY MASS INDEX: 35.28 KG/M2 | OXYGEN SATURATION: 98 % | WEIGHT: 191.7 LBS | DIASTOLIC BLOOD PRESSURE: 58 MMHG

## 2024-12-12 PROBLEM — U07.1 COVID-19: Status: ACTIVE | Noted: 2024-12-12

## 2024-12-12 PROBLEM — I50.33 ACUTE ON CHRONIC DIASTOLIC CHF (CONGESTIVE HEART FAILURE) (HCC): Status: ACTIVE | Noted: 2024-12-12

## 2024-12-12 PROBLEM — R26.81 UNSTEADINESS ON FEET: Status: ACTIVE | Noted: 2024-11-23

## 2024-12-12 PROBLEM — R29.6 REPEATED FALLS: Status: ACTIVE | Noted: 2024-11-23

## 2024-12-12 PROBLEM — Z87.19 PERSONAL HISTORY OF OTHER DISEASES OF THE DIGESTIVE SYSTEM: Status: ACTIVE | Noted: 2024-11-10

## 2024-12-12 PROBLEM — I13.10 HYPERTENSIVE HEART AND CHRONIC KIDNEY DISEASE WITHOUT HEART FAILURE, WITH STAGE 1 THROUGH STAGE 4 CHRONIC KIDNEY DISEASE, OR UNSPECIFIED CHRONIC KIDNEY DISEASE: Status: ACTIVE | Noted: 2024-11-22

## 2024-12-12 LAB
ANION GAP SERPL CALCULATED.3IONS-SCNC: 7 MEQ/L (ref 9–15)
BNP BLD-MCNC: NORMAL PG/ML
BUN SERPL-MCNC: 31 MG/DL (ref 8–23)
CALCIUM SERPL-MCNC: 7.8 MG/DL (ref 8.5–9.9)
CHLORIDE SERPL-SCNC: 106 MEQ/L (ref 95–107)
CO2 SERPL-SCNC: 29 MEQ/L (ref 20–31)
CREAT SERPL-MCNC: 1.55 MG/DL (ref 0.5–0.9)
GLUCOSE SERPL-MCNC: 83 MG/DL (ref 70–99)
MAGNESIUM SERPL-MCNC: 1.9 MG/DL (ref 1.7–2.4)
POTASSIUM SERPL-SCNC: 3.5 MEQ/L (ref 3.4–4.9)
SODIUM SERPL-SCNC: 142 MEQ/L (ref 135–144)

## 2024-12-12 PROCEDURE — 99221 1ST HOSP IP/OBS SF/LOW 40: CPT | Performed by: PHYSICAL MEDICINE & REHABILITATION

## 2024-12-12 PROCEDURE — 2060000000 HC ICU INTERMEDIATE R&B

## 2024-12-12 PROCEDURE — 2580000003 HC RX 258: Performed by: FAMILY MEDICINE

## 2024-12-12 PROCEDURE — 6360000002 HC RX W HCPCS: Performed by: FAMILY MEDICINE

## 2024-12-12 PROCEDURE — 6370000000 HC RX 637 (ALT 250 FOR IP): Performed by: FAMILY MEDICINE

## 2024-12-12 PROCEDURE — 83735 ASSAY OF MAGNESIUM: CPT

## 2024-12-12 PROCEDURE — 97162 PT EVAL MOD COMPLEX 30 MIN: CPT

## 2024-12-12 PROCEDURE — 36415 COLL VENOUS BLD VENIPUNCTURE: CPT

## 2024-12-12 PROCEDURE — 80048 BASIC METABOLIC PNL TOTAL CA: CPT

## 2024-12-12 PROCEDURE — 83880 ASSAY OF NATRIURETIC PEPTIDE: CPT

## 2024-12-12 RX ORDER — TORSEMIDE 20 MG/1
20 TABLET ORAL EVERY MORNING
Qty: 30 TABLET | Refills: 3 | Status: SHIPPED | OUTPATIENT
Start: 2024-12-13

## 2024-12-12 RX ADMIN — TORSEMIDE 20 MG: 20 TABLET ORAL at 08:25

## 2024-12-12 RX ADMIN — TRIAMCINOLONE ACETONIDE: 1 LOTION TOPICAL at 12:48

## 2024-12-12 RX ADMIN — ACETAMINOPHEN 650 MG: 325 TABLET ORAL at 08:25

## 2024-12-12 RX ADMIN — METOPROLOL TARTRATE 25 MG: 25 TABLET, FILM COATED ORAL at 08:25

## 2024-12-12 RX ADMIN — PANTOPRAZOLE SODIUM 40 MG: 40 TABLET, DELAYED RELEASE ORAL at 05:57

## 2024-12-12 RX ADMIN — ACETAMINOPHEN 650 MG: 325 TABLET ORAL at 18:33

## 2024-12-12 RX ADMIN — RANOLAZINE 500 MG: 500 TABLET, FILM COATED, EXTENDED RELEASE ORAL at 08:25

## 2024-12-12 RX ADMIN — APIXABAN 2.5 MG: 2.5 TABLET, FILM COATED ORAL at 08:25

## 2024-12-12 RX ADMIN — FUROSEMIDE 20 MG: 10 INJECTION, SOLUTION INTRAMUSCULAR; INTRAVENOUS at 08:24

## 2024-12-12 RX ADMIN — ANTACID TABLETS 500 MG: 500 TABLET, CHEWABLE ORAL at 12:48

## 2024-12-12 RX ADMIN — LOSARTAN POTASSIUM 25 MG: 25 TABLET, FILM COATED ORAL at 08:25

## 2024-12-12 RX ADMIN — AMIODARONE HYDROCHLORIDE 100 MG: 200 TABLET ORAL at 08:25

## 2024-12-12 RX ADMIN — FERROUS SULFATE TAB 325 MG (65 MG ELEMENTAL FE) 325 MG: 325 (65 FE) TAB at 18:27

## 2024-12-12 RX ADMIN — SODIUM CHLORIDE, PRESERVATIVE FREE 10 ML: 5 INJECTION INTRAVENOUS at 08:25

## 2024-12-12 RX ADMIN — ASPIRIN 81 MG: 81 TABLET, COATED ORAL at 08:25

## 2024-12-12 RX ADMIN — GUAIFENESIN 600 MG: 600 TABLET ORAL at 08:25

## 2024-12-12 ASSESSMENT — PAIN DESCRIPTION - DESCRIPTORS: DESCRIPTORS: ACHING

## 2024-12-12 ASSESSMENT — ENCOUNTER SYMPTOMS
BACK PAIN: 1
CHEST TIGHTNESS: 1
BOWEL INCONTINENCE: 0

## 2024-12-12 ASSESSMENT — PAIN DESCRIPTION - LOCATION
LOCATION: HIP
LOCATION: BACK

## 2024-12-12 ASSESSMENT — PAIN SCALES - GENERAL
PAINLEVEL_OUTOF10: 7
PAINLEVEL_OUTOF10: 0
PAINLEVEL_OUTOF10: 0
PAINLEVEL_OUTOF10: 7

## 2024-12-12 ASSESSMENT — PAIN DESCRIPTION - ORIENTATION: ORIENTATION: LEFT

## 2024-12-12 NOTE — CONSULTS
[facility]  Pharmacy Home Medication Reconciliation Note      Medication reconciliation was attempted for patient Loren Noyola 1935.   Admit date: 12/10/2024  Consult: Yes    Med rec status: Medrec Status: Completed    Information provided by: MED REC HISTORY: Medication list from [facility]    Pharmacy: Managed by [facility]    Pharmacy Updated: Not applicable    MEDICATIONS     Prior to Admission medications    Medication Sig Start Date End Date Taking? Authorizing Provider   amLODIPine (NORVASC) 5 MG tablet Take 1 tablet by mouth in the morning and at bedtime  Patient taking differently: Take 1 tablet by mouth in the morning and at bedtime Indications: High Blood Pressure 11/22/24 12/10/24  Akin Reeves MD   hydrALAZINE (APRESOLINE) 50 MG tablet Take 1 tablet by mouth every 12 hours 11/21/24   Akin Reeves MD   torsemide (DEMADEX) 10 MG tablet Take 1 tablet by mouth daily 11/22/24   Akin Reeves MD   glucose 4 g chewable tablet Take 4 tablets by mouth as needed for Low blood sugar 11/21/24   Emily Obrien MD   ferrous sulfate (IRON 325) 325 (65 Fe) MG tablet Take 1 tablet by mouth 2 times daily (with meals) 11/21/24   Emily Obrien MD   lidocaine 4 % external patch Place 3 patches onto the skin daily  Patient taking differently: Place 3 patches onto the skin daily Indications: Pain Lower back 11/22/24   Lorena Shah MD   Vitamin D (CHOLECALCIFEROL) 50 MCG (2000 UT) TABS tablet Take 1 tablet by mouth Daily with supper 11/1/24   Deb Haro DO   amiodarone (PACERONE) 100 MG tablet Take 1 tablet by mouth daily Indications: Atrial Fibrillation    Yrn Parsons MD   pantoprazole (PROTONIX) 40 MG tablet Take 1 tablet by mouth every morning (before breakfast) 4/23/21   Yrn Parsons MD   magnesium oxide (MAG-OX) 400 (241.3 Mg) MG TABS tablet Take 1 tablet by mouth daily 2/23/18   Deb Haro DO   metoprolol tartrate (LOPRESSOR) 50 
Spiritual Health History and Assessment/Progress Note  [facility]    Initial Encounter, Spiritual/Emotional Needs,  ,  ,      Name: Loren Noyola MRN: [id number]    Age: 89 y.o.     Sex: female   Language: English   Christianity: Anabaptist   Peripheral edema     Date: 12/10/2024            Total Time Calculated: 15 min              Spiritual Assessment began in [facility]        Referral/Consult From: Nurse, Patient   Encounter Overview/Reason: Initial Encounter, Spiritual/Emotional Needs  Service Provided For: Patient    Patient awake and alert laying in bed. Patient was calm and friendly. Patient has a good sense of humor and laugh and joked frequently. Patient has concerns about senia deteriorating but has diana that hospital will help with diagnosis and treatment to get better. Patient finds strength ion prayer and her religous beliefs and traditions. Patient wanted prayer,  prayed.     Diana, Belief, Meaning:   Patient is connected with a diana tradition or spiritual practice and has beliefs or practices that help with coping during difficult times  Family/Friends No family/friends present      Importance and Influence:  Patient has spiritual/personal beliefs that influence decisions regarding their health  Family/Friends No family/friends present    Community:  Patient feels well-supported. Support system includes: Spouse/Partner, Children, [place] Community, and Extended family  Family/Friends No family/friends present    Assessment and Plan of Care:     Patient Interventions include: Facilitated expression of thoughts and feelings and Affirmed coping skills/support systems  Family/Friends Interventions include: No family/friends present    Patient Plan of Care: Spiritual Care available upon further referral  Family/Friends Plan of Care: No family/friends present    Electronically signed by Chaplain Luis Felipe Intern on 12/10/2024 at 3:32 PM   
unchanged.  2.9 x 3.2 cm left adrenal nodule, unchanged.  1.8 cm left upper pole cortical mass, posteriorly, unchanged. Soft Tissues/Bones: No acute bone or soft tissue abnormality. Musculoskeletal: Compression fracture, T12, unchanged.     No CT evidence pulmonary embolism. 1.8 cm cortical nodule posterior upper pole left kidney.  If clinical concern warrants, CT urogram may be obtained for further evaluation to rule out malignancy. Bilateral adrenal nodules, measuring 2.9 x 1.4 cm on the right, and 2.9 x 3.2 cm on left.  If clinical concern warrants, CT abdomen utilizing adrenal protocol may be obtained for further evaluation to rule out malignancy. Remote T12 compression fracture.     XR CHEST  12/10/2024 Single view of the chest is submitted. There is a left-sided CCD device.  Leads overlying the cardiac silhouette. Unchanged. There are multiple median sternotomy wires overlying the cardiac silhouette. The cardiac silhouette is enlarged unchanged configuration.  The Pulmonary vasculature is unremarkable. Right-sided trachea. Small area of atelectasis left lower lobe. No effusion No pneumothoraces.     1. No acute active cardiopulmonary process.   2. Cardiomegaly.     XR CHEST 12/9/2024  No acute cardiopulmonary disease    XR CHEST  12/7/2024  Median sternotomy sutures are present.  No changes are seen within the left-sided pacemaker.  No acute opacities or effusions are visualized.  Heart size is top normal.  There is no evidence of a pneumothorax.    No acute findings on single AP view of the chest.          XR THORACIC GENERAL 12/1/2024  Counting reference:  Thoracic Ribs. Anatomic Variants:  None. Severe T12 compression fracture, new when compared to the 08/10/2017 lumbar spine radiographs as well as the 11/20/2018 chest radiographs.   Accentuated thoracic kyphosis.  Remaining vertebral body heights are maintained.  Minimal right convex curvature in the midthoracic spine.   Multilevel degenerative disc

## 2024-12-12 NOTE — FLOWSHEET NOTE
1611- report called to [facility]. All questions answered, nurse states she has no further questions at time, nurse aware of estimated time of 6 pm for family pickup.Electronically signed by Maite Serna RN on 12/12/2024 at 6:16 PM    1850- IV and heart monitor removed, patient dressed, now awaiting family for transport to [facility]. Family states they will be here in 30 minutes to pick patient up.Electronically signed by Maite Serna RN on 12/12/2024 at 6:51 PM    Family arrived transport called.Patient now off unit.Electronically signed by Maite Serna RN on 12/12/2024 at 7:13 PM

## 2024-12-12 NOTE — DISCHARGE INSTR - DIET
Good nutrition is important when healing from an illness, injury, or surgery.  Follow any nutrition recommendations given to you during your hospital stay.   If you were given an oral nutrition supplement while in the hospital, continue to take this supplement at home.  You can take it with meals, in-between meals, and/or before bedtime. These supplements can be purchased at most local grocery stores, pharmacies, and chain [employer]-stores.   If you have any questions about your diet or nutrition, call the hospital and ask for the dietitian.  ADULT DIET; Regular; No Added Salt (3-4 gm)

## 2024-12-12 NOTE — CARE COORDINATION
CTN to see the patient for CHF exacerbation. Patient is planning to return home with HHC at discharge. Patient is currently in droplet plus isolation. CTN to mail CHF booklet and zone to the patients residence for her review.   
Case Management Assessment  Initial Evaluation    Date/Time of Evaluation: 12/11/2024 11:31 AM  Assessment Completed by: KIRTI Knott    If patient is discharged prior to next notation, then this note serves as note for discharge by case management.    Patient Name: Loren Noyola                   YOB: 1935  Diagnosis: Peripheral edema [R60.0]                   Date / Time: 12/10/2024  7:47 AM    Patient Admission Status: Observation   Readmission Risk (Low < 19, Mod (19-27), High > 27): Readmission Risk Score: 27.8    Current PCP: Evette Johnson MD  PCP verified by CM? Yes    Chart Reviewed: Yes      History Provided by: Patient  Patient Orientation: Alert and Oriented    Patient Cognition: Alert    Hospitalization in the last 30 days (Readmission):  Yes    If yes, Readmission Assessment in CM Navigator will be completed.    Readmission Assessment  Number of Days since last admission?: 8-30 days  Previous Disposition: Acute Rehab  Who is being Interviewed: Patient  What was the patient's/caregiver's perception as to why they think they needed to return back to the hospital?: Other (Comment) (MY LEGS WAS SWOLLEN)  Did you visit your Primary Care Physician after you left the hospital, before you returned this time?: Yes  Did you see a specialist, such as Cardiac, Pulmonary, Orthopedic Physician, etc. after you left the hospital?: Yes  Who advised the patient to return to the hospital?: Self-referral  Does the patient report anything that got in the way of taking their medications?: No  In our efforts to provide the best possible care to you and others like you, can you think of anything that we could have done to help you after you left the hospital the first time, so that you might not have needed to return so soon?: Identify patient's health literacy needs    Advance Directives:      Code Status: DNR-CCA   Patient's Primary Decision Maker is: Named in Scanned ACP Document    Primary 
[facility] is able to accept pt.     Electronically signed by KIRTI Knott on 12/11/2024 at 11:57 AM    SPOKE WITH PT'S DTR IN LAW. [facility] CONFIRMED WITH PT. PT REFUSE TO GO BACK TO [facility]. WOULD LIKE TO SEE IF [facility] WOULD TAKE HER BACK.     NOTIFY RN TO MESSAGE DR. LINARES FOR REHAB ORDER.     [facility].     DC PLAN: [facility] RE[facility] VS HOME WITH [facility] (ACCEPTED)    Electronically signed by KIRTI Knott on 12/11/2024 at 5:08 PM    
Inpatient Rehab referral received. Met with patient and explained [facility] Inpatient Rehab program and requirements, including 3 hours of intense therapy daily,weekly team meetings,  anticipated length of stay and goal of discharge to home. All questions answered and patient verbalized understanding. Freedom of choice provided and patient requests admit to [facility]. Pt stated she would like to come to rehab ,do therapy and go home. Pt stated she will not go back to [facility]. If she needs SNF she will choose another facility. Electronically signed by Yolie Young RN on 12/12/2024 at 10:41 AM   
MESSAGE LEFT FOR REHAB, FOR REFERRAL  
Patient to be picked up by sonJude @ 6:00pm.  [facility] notified. Nurse and family notified.  Electronically signed by KIRTI Killian on 12/12/24 at 3:15 PM EST    
SPOKE WITH [facility], PT HAS BEEN DECLINED AS PT IS TOO FUNCTIONAL FOR ACUTE REHAB.   
This LSW met with patient at bedside.  Patient agreed to go back to [facility], she is not appropriate for [facility].  Patient's son, nurse and facility all aware.  Patient's son will transport.  Patient signed her IMM, copy left with patient and original placed on chart.  Electronically signed by KIRTI Killian on 12/12/24 at 2:47 PM EST    
\"LABCAST\", \"WBCUA\", \"RBCUA\", \"MUCUS\", \"TRICHOMONAS\", \"YEAST\", \"BACTERIA\", \"LEUKOCYTESUR\", \"BLOODU\", \"GLUCOSEU\", \"KETUA\", \"AMORPHOUS\", \"LABURIN\" in the last 72 hours.    Radiology:  Vascular duplex lower extremity venous left  Result Date: 12/11/2024  No evidence of DVT in the left lower extremity.     XR CHEST PORTABLE  Result Date: 12/11/2024  Cardiomegaly. No acute cardiopulmonary process.     CTA CHEST W WO CONTRAST PE Eval  Result Date: 12/10/2024  No CT evidence pulmonary embolism. 1.8 cm cortical nodule posterior upper pole left kidney.  If clinical concern warrants, CT urogram may be obtained for further evaluation to rule out malignancy. Bilateral adrenal nodules, measuring 2.9 x 1.4 cm on the right, and 2.9 x 3.2 cm on left.  If clinical concern warrants, CT abdomen utilizing adrenal protocol may be obtained for further evaluation to rule out malignancy. Remote T12 compression fracture.     XR CHEST PORTABLE  Result Date: 12/10/2024  1. No acute active cardiopulmonary process. 2. Cardiomegaly.     XR CHEST (2 VW)  Result Date: 12/9/2024  No acute cardiopulmonary disease    XR CHEST 1 VIEW  Result Date: 12/7/2024  No acute findings on single AP view of the chest. Signed by Jabari Mcdonough MD    XR THORACIC GENERAL  3V AP/LAT/SWIMMERS  Result Date: 12/1/2024  * * *Final Report* * * DATE OF EXAM: Nov   IMPRESSION: 1.  Severe T12 compression fracture, new when compared to the 11/20/2018 chest radiographs. 2.  Degenerative changes, as described. : PSCB   Transcribe Date/Time: Dec  1 2024 11:22P Dictated by : RAMEZ GANT MD This examination was interpreted and the report reviewed and electronically signed by: RAMEZ GANT MD on Dec  1 2024 11:24PM  EST    Echo (TTE) complete (PRN contrast/bubble/strain/3D)  Result Date: 11/21/2024    Left Ventricle: Normal left ventricular systolic function with a visually estimated EF of 55 - 60%. Left ventricle size is normal. Normal wall thickness.

## 2024-12-12 NOTE — PROGRESS NOTES
[facility]  Occupational Therapy        NAME:  Loren Noyola  ROOM: W169/W169-01  :  1935  DATE: 2024    Attempted to see Loren Noyola at 0840 on this date for:   [x]  Initial Evaluation   []  Treatment       Patient was unable to be seen due to:   [] Off unit for testing/procedure    [] Patient refused, stating \"    [] Therapy on hold due to     [] Nursing deferred due to    [x] Other:  pt's BP noted to be 183/76 at last check. Nsg to check pt's BP again prior to evaluation attempt.    Discussed with ANDREW Arora    Electronically signed by Ellen Segundo OT on 24 at 8:42 AM EST  
Physical Therapy Med Surg Initial Assessment  Facility/Department: [facility] TELEMETRY  Room: [place] [address]       NAME: Loren Noyola  : 1935 (89 y.o.)  MRN: [id number]  CODE STATUS: DNR-CCA    Date of Service: 2024    Patient Diagnosis(es): Peripheral edema [R60.0]  Acute on chronic diastolic CHF (congestive heart failure) (HCC) [I50.33]   Chief Complaint   Patient presents with    Chest Pain     Lower chest tightness that patient states feels at her bra line in the front. Patient states she was recently diagnosed with COVID, Patient also admits that she feels like she is SOB, patient has audible wheezing. Patient is a/ox4.      Patient Active Problem List    Diagnosis Date Noted    Acute on chronic diastolic CHF (congestive heart failure) (HCC) 2024    Peripheral edema 12/10/2024    Melena 2024    GI bleeding 2024    Chest pain 2024    Gastric ulcer 2024    Shortness of breath 2024    GI bleed 11/10/2024    History of GI bleed 11/10/2024    Pain of upper abdomen 11/10/2024    GIB (gastrointestinal bleeding) 2024    Gastro-esophageal reflux disease without esophagitis 2024    Hyperlipidemia, unspecified 2024    Spinal stenosis of lumbar region with neurogenic claudication 2024    Mixed incontinence 10/25/2024    Urinary incontinence 10/24/2024    CAD (coronary artery disease)     Impaired mobility ADLs dt  thoracic and lumbar spinal stenosis with T12 compression fracture. 10/23/2024    Compression fracture of T12 vertebra with routine healing 10/23/2024    Left kidney mass 10/23/2024    Wedge compression fracture of t11-T12 vertebra, subsequent encounter for fracture with routine healing 10/23/2024    Arthralgia of hip 10/22/2024    Greater trochanteric bursitis 10/22/2024    Gait instability 10/21/2024    Pacemaker 2024    Peripheral vascular disease (HCC) 2023    Right bundle branch block (RBBB) 2023    S/P CABG 
Wound Ostomy Continence Nurse  Consult Note       NAME:  Loren Noyola  MEDICAL RECORD NUMBER:  [id number]  AGE: 89 y.o.   GENDER: female  : 1935  TODAY'S DATE:  2024    Subjective   Reason for [facility] Nurse Evaluation and Assessment: buttocks      Loren Noyola is a 89 y.o. female referred by:   [] Physician  [x] Nursing  [] Other:     Wound Identification:  Wound Type:  None  Contributing Factors: none    Wound History: Patient admitted to [facility] on 12/10/24. Wound ostomy consult placed for evaluation of buttocks. Patient with scar to left buttock, reports she had a pimple that she popped and has since healed.   Current Wound Care Treatment:  Recommending 1) zinc application bid and prn for pressure injury prevention    Patient Goal of Care:  [x] Wound Healing  [] Odor Control  [] Palliative Care  [] Pain Control   [] Other:         PAST MEDICAL HISTORY        Diagnosis Date    Bleeding ulcer     CAD (coronary artery disease)     Colitis     Hip fx, left, closed, initial encounter ([facility]) 02/10/2018    Hyperlipidemia     Hypertension     Osteoarthritis     Osteoporosis     Spinal stenosis        PAST SURGICAL HISTORY    Past Surgical History:   Procedure Laterality Date    APPENDECTOMY      COLECTOMY  1/3/2011    sigmoid colectomy with colostomy    COLECTOMY  2011    partial colectomy with colostomy closure    CORONARY ARTERY BYPASS GRAFT      HYSTERECTOMY (CERVIX STATUS UNKNOWN)      HYSTERECTOMY, TOTAL ABDOMINAL (CERVIX REMOVED)      IR MIDLINE CATH  2024    IR MIDLINE CATH 2024 [facility]    FL HEMIARTHROPLASTY HIP PARTIAL Left 2018    HIP HEMIARTHROPLASTY- performed by Bryan Malcolm MD at [facility] [place]    SIGMOIDOSCOPY      UPPER GASTROINTESTINAL ENDOSCOPY N/A 2024    ESOPHAGOGASTRODUODENOSCOPY WITH BX'S performed by Diaz Solares MD at [facility]    UPPER GASTROINTESTINAL ENDOSCOPY N/A 2024    
Patient Tolerated treatment well    D/C Recommendations:  OT D/C RECOMMENDATIONS  REQUIRES OT FOLLOW-UP: Yes    Equipment Recommendations:  OT Equipment Recommendations  Other: continue to assess    OT Education:   Patient Education  Education Given To: Patient  Education Provided: Role of Therapy;Plan of Care  Education Method: Verbal  Barriers to Learning: None  Education Outcome: Verbalized understanding    OT Follow Up:   OT D/C RECOMMENDATIONS  REQUIRES OT FOLLOW-UP: Yes       Assessment/Discharge Disposition:  Assessment: Pt is an 89-year-old female presenting to [facility] from [facility] with worsening SOB, chest discomfort, and BLE edema secondary to Covid. Pt does not require physical assist for ADL completion, however is limited in safe completion d/t decreased activity tolerance and strength. Pt would benefit from continued OT to address deficits listed to maximize safety and independence with ADL completion.  Performance deficits / Impairments: Decreased functional mobility , Decreased ADL status, Decreased strength, Decreased endurance  Prognosis: Good  Discharge Recommendations: Continue to assess pending progress  Decision Making: Medium Complexity     History: moderately complex chart review  Exam: 4 performance deficits  Assistance / Modification: SBA    AMPAC (Six Click) Self care Score   How much help is needed for putting on and taking off regular lower body clothing?: A Little  How much help is needed for bathing (which includes washing, rinsing, drying)?: A Little  How much help is needed for toileting (which includes using toilet, bedpan, or urinal)?: A Little  How much help is needed for putting on and taking off regular upper body clothing?: A Little  How much help is needed for taking care of personal grooming?: None  How much help for eating meals?: None  AM-PAC Inpatient Daily Activity Raw Score: 20  AM-PAC Inpatient ADL T-Scale Score : 42.03  ADL Inpatient CMS 0-100% Score: 
cardiomediastinal silhouette is without acute process.    There is no evidence of pneumothorax.    The osseous structures are without acute process.    Impression  No acute process.       Portable: Results for orders placed during the hospital encounter of 12/10/24    XR CHEST PORTABLE    Narrative  EXAMINATION:  ONE XRAY VIEW OF THE CHEST    12/11/2024 10:38 am    COMPARISON:  December 10th    HISTORY:  ORDERING SYSTEM PROVIDED HISTORY: sob  TECHNOLOGIST PROVIDED HISTORY:  Reason for exam:->sob  What reading provider will be dictating this exam?->CRC    FINDINGS:  Left-sided dual chamber pacer.  The cardiac silhouette is enlarged.  No  alveolar consolidation.  No edema or effusion    Impression  Cardiomegaly. No acute cardiopulmonary process.      Echo No results found for this or any previous visit.            Assessment/Plan:    Active Hospital Problems    Diagnosis Date Noted    Peripheral edema [R60.0] 12/10/2024     BLE edema, pulm edema, likely mild HFpEF exac  IV lasix, mild, compression stockings, dc plan back to snf when able.  12/11 - significant diuresis achieved, however left calf tender, obtain u/s venous.  SOB/chest discomfort check cxr.  Neg cta yesterday.  Covid infection  No hypoxia, cont supportive care, isolation.  12/11 - add mucinex and hycodan  Hx HTN, HLD, CAD  Restart home meds as able.  DVT proph    Electronically signed by KINA LINARES MD on 12/11/2024 at 11:32 AM

## 2024-12-12 NOTE — DISCHARGE SUMMARY
Physician Discharge Summary     Patient ID:  Loren Noyola  [id number]  89 y.o.  1935    Admit date: 12/10/2024    Discharge date : 12/12/24     Admitting Physician: Judson Sosa MD     Discharge Physician: JUDSON SOSA MD     Admission Diagnoses: Peripheral edema [R60.0]  Acute on chronic diastolic CHF (congestive heart failure) ([facility]) [I50.33]    Discharge Diagnoses: ***    Admission Condition: {condition:60419}    Discharged Condition: {condition:54974}    Hospital Course: ***    Consults: {consultation:45559}    Significant Diagnostic Studies: {diagnostics:06466}    Discharge Exam:  {exam; complete normal and system select:97248}    Labs:   Recent Labs     12/10/24  0818   WBC 6.9   HGB 9.1*   HCT 30.3*        Recent Labs     12/10/24  0818 12/11/24  0511 12/12/24  0557    143 142   K 3.9 3.7 3.5    108* 106   CO2 27 24 29   BUN 33* 31* 31*   CREATININE 1.69* 1.46* 1.55*   CALCIUM 8.3* 8.2* 7.8*     Recent Labs     12/10/24  0818 12/11/24  0511   AST 19 18   ALT 11 12   BILIDIR  --  <0.2   BILITOT 0.4 0.5   ALKPHOS 50 47     No results for input(s): \"INR\" in the last 72 hours.  No results for input(s): \"CKTOTAL\", \"TROPONINI\" in the last 72 hours.    Urinalysis:   Lab Results   Component Value Date/Time    NITRU POSITIVE 10/24/2024 08:19 PM    WBCUA >100 10/24/2024 08:19 PM    BACTERIA MANY 10/24/2024 08:19 PM    RBCUA 6-10 10/24/2024 08:19 PM    BLOODU SMALL 10/24/2024 08:19 PM    GLUCOSEU Negative 10/24/2024 08:19 PM    GLUCOSEU NEG 03/24/2012 09:35 PM       Radiology:   Most recent    Chest CT      WITH CONTRAST:Results for orders placed during the hospital encounter of 04/25/23    CT CHEST W CONTRAST    Narrative  EXAMINATION:  CT OF THE CHEST WITH CONTRAST 4/25/2023 10:55 pm    TECHNIQUE:  CT of the chest was performed with the administration of intravenous  contrast. Multiplanar reformatted images are provided for review. Automated  exposure control, iterative

## 2024-12-12 NOTE — DISCHARGE INSTR - COC
Continuity of Care Form    Patient Name: Loren Noyola   :  1935  MRN:  [id number]    Admit date:  12/10/2024  Discharge date:  24    Code Status Order: DNR-CCA   Advance Directives:   Advance Care Flowsheet Documentation             Admitting Physician:  Judson Sosa MD  PCP: Evette Johnson MD    Discharging Nurse: SD  Discharging Hospital Unit/Room#: W169/W169-01  Discharging Unit Phone Number: [phone]    Emergency Contact:   Extended Emergency Contact Information  Primary Emergency Contact: Rupert Noyola  Home Phone: [phone]  Relation: Child  Secondary Emergency Contact: Jude Noyola  Home Phone: [phone]  Relation: Child    Past Surgical History:  Past Surgical History:   Procedure Laterality Date    APPENDECTOMY      COLECTOMY  1/3/2011    sigmoid colectomy with colostomy    COLECTOMY  2011    partial colectomy with colostomy closure    CORONARY ARTERY BYPASS GRAFT      HYSTERECTOMY (CERVIX STATUS UNKNOWN)      HYSTERECTOMY, TOTAL ABDOMINAL (CERVIX REMOVED)      IR MIDLINE CATH  2024    IR MIDLINE CATH 2024 [facility] PROCEDURE    NC HEMIARTHROPLASTY HIP PARTIAL Left 2018    HIP HEMIARTHROPLASTY- performed by Bryan Malcolm MD at [facility] [place]    SIGMOIDOSCOPY      UPPER GASTROINTESTINAL ENDOSCOPY N/A 2024    ESOPHAGOGASTRODUODENOSCOPY WITH BX'S performed by Diaz Solares MD at [facility]    UPPER GASTROINTESTINAL ENDOSCOPY N/A 2024    ESOPHAGOGASTRODUODENOSCOPY performed by Melvi Shahid MD at [facility]    VARICOSE VEIN SURGERY         Immunization History:   Immunization History   Administered Date(s) Administered    COVID-19, PFIZER Bivalent, DO NOT Dilute, (age 12y+), IM, 30 mcg/0.3 mL 10/23/2022    COVID-19, PFIZER PURPLE top, DILUTE for use, (age 12 y+), 30mcg/0.3mL 2021, 10/07/2021    COVID-19, PFIZER, (age 12y+), IM, 30mcg/0.3mL 2023       Active Problems:  Patient Active Problem List

## 2024-12-15 LAB
ATRIAL RATE: 57 BPM
BACTERIA BLD CULT ORG #2: NORMAL
BACTERIA BLD CULT: NORMAL
PR INTERVAL: 238 MS
Q ONSET: 206 MS
QRS COUNT: 11 BEATS
QRS DURATION: 124 MS
QT INTERVAL: 448 MS
QTC CALCULATION(BAZETT): 458 MS
QTC FREDERICIA: 455 MS
R AXIS: -30 DEGREES
T AXIS: -3 DEGREES
T OFFSET: 430 MS
VENTRICULAR RATE: 63 BPM

## 2024-12-16 ENCOUNTER — APPOINTMENT (OUTPATIENT)
Dept: CARDIOLOGY | Facility: CLINIC | Age: 89
End: 2024-12-16
Payer: MEDICARE

## 2024-12-16 ASSESSMENT — ENCOUNTER SYMPTOMS
GASTROINTESTINAL NEGATIVE: 1
COUGH: 1
SHORTNESS OF BREATH: 1
RESPIRATORY NEGATIVE: 1
GASTROINTESTINAL NEGATIVE: 1

## 2024-12-16 NOTE — PROGRESS NOTES
REVIEWED IN NF CHART  Current Outpatient Medications on File Prior to Visit   Medication Sig Dispense Refill    lidocaine 4 % external patch Place 3 patches onto the skin daily      Vitamin D (CHOLECALCIFEROL) 50 MCG (2000 UT) TABS tablet Take 1 tablet by mouth Daily with supper 60 tablet 5    amiodarone (PACERONE) 100 MG tablet Take 1 tablet by mouth daily Indications: Atrial Fibrillation      pantoprazole (PROTONIX) 40 MG tablet Take 1 tablet by mouth every morning (before breakfast)      aspirin 81 MG EC tablet Take 1 tablet by mouth daily       No current facility-administered medications on file prior to visit.         Review of Systems   Constitutional:  Positive for activity change.   Respiratory: Negative.     Cardiovascular: Negative.    Gastrointestinal: Negative.    All other systems reviewed and are negative.         There were no vitals taken for this visit.  VS per facility records    Physical Exam  Constitutional:       Appearance: Normal appearance.   Cardiovascular:      Rate and Rhythm: Normal rate.   Pulmonary:      Effort: Pulmonary effort is normal.      Breath sounds: Normal breath sounds.   Abdominal:      General: Bowel sounds are normal.      Palpations: Abdomen is soft.   Musculoskeletal:         General: Swelling present. No tenderness.   Neurological:      Mental Status: She is alert and oriented to person, place, and time.      Motor: Weakness present.   Psychiatric:         Mood and Affect: Mood normal.         Behavior: Behavior normal.        Refer to detailed nursing notes for skin assessment       ASSESSMENT:     Diagnosis Orders   1. Impaired mobility ADLs dt  thoracic and lumbar spinal stenosis with T12 compression fracture.        2. Atrial fibrillation, unspecified type (HCC)            PLAN:  PT OT  Eliquis 2.5 mg twice daily  Amiodarone 100 mg to  Metoprolol 25 mg twice daily    Pt/POA agrees with POC   No orders of the defined types were placed in this encounter.      adhere

## 2024-12-16 NOTE — PROGRESS NOTES
[address] [place] [address]    12/9/2024    Loren Noyola  is a 89 y.o. in the NF being seen for    Chief Complaint   Patient presents with    Other     COVID-19       Remains at [facility] for rehab.  Patient was sent to the ED on 12/7 with complaints of shortness of breath.  Patient was diagnosed with COVID-19 and returned back to SNF.  At time of visit patient resting in bed.  She reports moist cough with shortness of breath with minimal exertion.  She denies nausea vomiting diarrhea or constipation.  No recent fever or fall reported.          Past Medical History:   Diagnosis Date    Bleeding ulcer     CAD (coronary artery disease)     Colitis     Hip fx, left, closed, initial encounter ([facility]) 02/10/2018    Hyperlipidemia     Hypertension     Osteoarthritis     Osteoporosis     Spinal stenosis      Past Surgical History:   Procedure Laterality Date    APPENDECTOMY      COLECTOMY  1/3/2011    sigmoid colectomy with colostomy    COLECTOMY  7/26/2011    partial colectomy with colostomy closure    CORONARY ARTERY BYPASS GRAFT      HYSTERECTOMY (CERVIX STATUS UNKNOWN)      HYSTERECTOMY, TOTAL ABDOMINAL (CERVIX REMOVED)      IR MIDLINE CATH  11/14/2024    IR MIDLINE CATH 11/14/2024 [facility]URE    [place] HEMIARTHROPLASTY HIP PARTIAL Left 2/8/2018    HIP HEMIARTHROPLASTY- performed by Bryan Malcolm MD at [facility] [place]    SIGMOIDOSCOPY      UPPER GASTROINTESTINAL ENDOSCOPY N/A 11/11/2024    ESOPHAGOGASTRODUODENOSCOPY WITH BX'S performed by Diaz Solares MD at [facility]    UPPER GASTROINTESTINAL ENDOSCOPY N/A 11/16/2024    ESOPHAGOGASTRODUODENOSCOPY performed by Melvi Shahid MD at [facility]    VARICOSE VEIN SURGERY       No family history on file.  Social History     Socioeconomic History    Marital status:      Spouse name: Not on file    Number of children: 2    Years of education: Not on file    Highest education level: Not on file

## 2024-12-17 ENCOUNTER — OFFICE VISIT (OUTPATIENT)
Dept: GERIATRIC MEDICINE | Age: 88
End: 2024-12-17

## 2024-12-17 DIAGNOSIS — U07.1 COVID-19: Primary | ICD-10-CM

## 2024-12-19 ASSESSMENT — ENCOUNTER SYMPTOMS
GASTROINTESTINAL NEGATIVE: 1
COUGH: 1

## 2024-12-19 NOTE — PROGRESS NOTES
Moved in the Last Year: 1     Homeless in the Last Year: No       Allergies: Lisinopril, Nsaids, Statins, and Sulfa antibiotics  NF MEDICATIONS REVIEWED AND ALLERGIES REVIEWED IN NF CHART  Current Outpatient Medications on File Prior to Visit   Medication Sig Dispense Refill    torsemide (DEMADEX) 20 MG tablet Take 1 tablet by mouth every morning 30 tablet 3    ferrous sulfate (IRON 325) 325 (65 Fe) MG tablet Take 1 tablet by mouth Daily with supper      hydrALAZINE (APRESOLINE) 50 MG tablet Take 1 tablet by mouth nightly      ipratropium (ATROVENT HFA) 17 MCG/ACT inhaler Inhale 2 puffs into the lungs 4 times daily SOB      polyethylene glycol (MIRALAX) 17 g PACK packet Take 17 g by mouth daily      nitroGLYCERIN (NITROSTAT) 0.4 MG SL tablet Place 1 tablet under the tongue every 5 minutes as needed for Chest pain up to max of 3 total doses. If no relief after 1 dose, call 911.      calcium carbonate (TUMS) 500 MG chewable tablet Take 1 tablet by mouth Daily with lunch      betamethasone dipropionate 0.05 % lotion Apply 1 Application topically 2 times daily Apply topically 2 times daily.      apixaban (ELIQUIS) 2.5 MG TABS tablet Take 1 tablet by mouth 2 times daily      losartan (COZAAR) 25 MG tablet Take 1 tablet by mouth 2 times daily      metoprolol tartrate (LOPRESSOR) 25 MG tablet Take 1 tablet by mouth 2 times daily      ranolazine (RANEXA) 500 MG extended release tablet Take 1 tablet by mouth 2 times daily      diclofenac sodium (VOLTAREN) 1 % GEL Apply 2 g topically 4 times daily as needed for Pain      lidocaine 4 % external patch Place 3 patches onto the skin daily      Vitamin D (CHOLECALCIFEROL) 50 MCG (2000 UT) TABS tablet Take 1 tablet by mouth Daily with supper 60 tablet 5    amiodarone (PACERONE) 100 MG tablet Take 1 tablet by mouth daily Indications: Atrial Fibrillation      pantoprazole (PROTONIX) 40 MG tablet Take 1 tablet by mouth every morning (before breakfast)      aspirin 81 MG EC tablet Take

## 2024-12-24 NOTE — PROGRESS NOTES
11/16/2024    ESOPHAGOGASTRODUODENOSCOPY performed by Melvi Shahid MD at [facility]    VARICOSE VEIN SURGERY           Current Outpatient Medications on File Prior to Visit   Medication Sig Dispense Refill    lidocaine 4 % external patch Place 3 patches onto the skin daily      Vitamin D (CHOLECALCIFEROL) 50 MCG (2000 UT) TABS tablet Take 1 tablet by mouth Daily with supper 60 tablet 5    amiodarone (PACERONE) 100 MG tablet Take 1 tablet by mouth daily Indications: Atrial Fibrillation      pantoprazole (PROTONIX) 40 MG tablet Take 1 tablet by mouth every morning (before breakfast)      aspirin 81 MG EC tablet Take 1 tablet by mouth daily       No current facility-administered medications on file prior to visit.         No family history on file.    Social History     Socioeconomic History    Marital status:      Spouse name: Not on file    Number of children: 2    Years of education: Not on file    Highest education level: Not on file   Occupational History    Not on file   Tobacco Use    Smoking status: Former     Types: Cigarettes    Smokeless tobacco: Never   Vaping Use    Vaping status: Never Used   Substance and Sexual Activity    Alcohol use: Not Currently    Drug use: Not Currently    Sexual activity: Not on file   Other Topics Concern    Not on file   Social History Narrative    Lives With: Spouse-who is ill-(old TBI) she is his caregiver-2 sons live locally and help her with his care.    Type of Home: [address] in [place]         Home Layout: One level    Home Access: Stairs to enter with rails- Number of Steps: 4    Bathroom Shower/Tub: Walk-in shower,  Equipment: Built-in shower seat, Hand-held shower, Grab bars in shower    Home Equipment: Cane, Rollator    ADL Assistance: Independent    Homemaking Assistance: Independent    Ambulation Assistance: Independent (Rollator)    Transfer Assistance: Independent    Active : Yes    Additional Comments: Motorized cart in store,

## 2024-12-27 NOTE — PROGRESS NOTES
Patient Name: Loren Noyola     YOB: 1935  Medical Record Number: [id number]       History of Present Illness:  This 89-year-old woman seen follows for hypertension afibrillation weakness patient chest palpitations change in bowel habits has been cough intermittently been hospitalized did undergo functional weakness has been coughing he has ongoing low show edema no recent emesis fevers or chills patient is recurrent falls with functional decline.  Patient did undergo eval for GI bleed functionally weak had evidence of melanotic stools patient was eval by gastroenterology blood sugar stabilized patient had been anticoagulated Eliquis renal function monitored closely.  Patient was debilitated to the point required inpatient rotation patient was stabilized transferred for ongoing course of care functional weak at this time.  Patient has a prior T12 compression fracture patient was treated nonoperatively having globally weak    Review of Systems   Constitutional:  Positive for activity change, appetite change and fatigue.   HENT:  Positive for congestion.    Respiratory:  Positive for cough.    Cardiovascular:  Negative for leg swelling.   Gastrointestinal:  Positive for constipation.   Musculoskeletal:  Positive for arthralgias and back pain.   Neurological:  Positive for weakness.   All other systems reviewed and are negative.      Review of Systems: All 14 review of systems negative other than as stated above    Social History     Tobacco Use    Smoking status: Former     Types: Cigarettes    Smokeless tobacco: Never   Vaping Use    Vaping status: Never Used   Substance Use Topics    Alcohol use: Not Currently    Drug use: Not Currently         Past Medical History:   Diagnosis Date    Bleeding ulcer     CAD (coronary artery disease)     Colitis     Hip fx, left, closed, initial encounter ([facility]) 02/10/2018    Hyperlipidemia     Hypertension     Osteoarthritis     Osteoporosis     Spinal stenosis

## 2024-12-28 ASSESSMENT — ENCOUNTER SYMPTOMS
CONSTIPATION: 1
COUGH: 1
BACK PAIN: 1

## 2024-12-31 NOTE — PROGRESS NOTES
SUBJECTIVE:  This 89-year-old woman seen for office for hypertension sadness and allergic claudication weakness patient at baseline under course of therapy functional weak coughing without change in bowel bladder habit      ROS: Coughing at time  The rest of the 14 point ROS negative    PHYSICAL EXAM: VSS per facility record  Oral mucosa moist chest no crackles cardiovascular showed a regular abdomen soft tender no rash    ASSESSMENT & PLAN:   Diagnosis Orders   1. Essential hypertension        2. Unsteadiness on feet        3. Spinal stenosis of lumbar region with neurogenic claudication          No orthostasis monitor function closely no acute pain crisis            Past Medical History:   Diagnosis Date    Bleeding ulcer     CAD (coronary artery disease)     Colitis     Hip fx, left, closed, initial encounter ([facility]) 02/10/2018    Hyperlipidemia     Hypertension     Osteoarthritis     Osteoporosis     Spinal stenosis          Past Surgical History:   Procedure Laterality Date    APPENDECTOMY      COLECTOMY  1/3/2011    sigmoid colectomy with colostomy    COLECTOMY  7/26/2011    partial colectomy with colostomy closure    CORONARY ARTERY BYPASS GRAFT      HYSTERECTOMY (CERVIX STATUS UNKNOWN)      HYSTERECTOMY, TOTAL ABDOMINAL (CERVIX REMOVED)      IR MIDLINE CATH  11/14/2024    IR MIDLINE CATH 11/14/2024 [facility] SPECIAL PROCEDURE    IN HEMIARTHROPLASTY HIP PARTIAL Left 2/8/2018    HIP HEMIARTHROPLASTY- performed by Bryan Malcolm MD at [facility] OR    SIGMOIDOSCOPY      UPPER GASTROINTESTINAL ENDOSCOPY N/A 11/11/2024    ESOPHAGOGASTRODUODENOSCOPY WITH BX'S performed by Diaz Solares MD at [facility]    UPPER GASTROINTESTINAL ENDOSCOPY N/A 11/16/2024    ESOPHAGOGASTRODUODENOSCOPY performed by Melvi Shahid MD at [facility]    VARICOSE VEIN SURGERY           Current Outpatient Medications on File Prior to Visit   Medication Sig Dispense Refill    torsemide (DEMADEX) 20 MG tablet Take 1 tablet by

## 2025-01-14 ENCOUNTER — HOSPITAL ENCOUNTER (OUTPATIENT)
Dept: CARDIOLOGY | Age: 89
Discharge: HOME | End: 2025-01-14
Payer: MEDICARE

## 2025-01-14 PROCEDURE — 93294 REM INTERROG EVL PM/LDLS PM: CPT | Performed by: INTERNAL MEDICINE

## 2025-01-14 PROCEDURE — 93296 REM INTERROG EVL PM/IDS: CPT

## 2025-01-21 ENCOUNTER — APPOINTMENT (OUTPATIENT)
Dept: CARDIOLOGY | Facility: CLINIC | Age: OVER 89
End: 2025-01-21
Payer: MEDICARE

## 2025-01-21 VITALS
BODY MASS INDEX: 32.68 KG/M2 | DIASTOLIC BLOOD PRESSURE: 64 MMHG | WEIGHT: 177.6 LBS | HEART RATE: 61 BPM | HEIGHT: 62 IN | SYSTOLIC BLOOD PRESSURE: 114 MMHG

## 2025-01-21 DIAGNOSIS — I10 HYPERTENSION, UNSPECIFIED TYPE: ICD-10-CM

## 2025-01-21 DIAGNOSIS — D64.9 ANEMIA, UNSPECIFIED TYPE: Primary | ICD-10-CM

## 2025-01-21 DIAGNOSIS — Z79.899 HIGH RISK MEDICATION USE: ICD-10-CM

## 2025-01-21 DIAGNOSIS — R60.9 EDEMA, UNSPECIFIED TYPE: ICD-10-CM

## 2025-01-21 DIAGNOSIS — I25.10 ASHD (ARTERIOSCLEROTIC HEART DISEASE): ICD-10-CM
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
## 2025-01-21 DIAGNOSIS — I71.40 ABDOMINAL AORTIC ANEURYSM (AAA) WITHOUT RUPTURE, UNSPECIFIED PART (CMS-HCC): ICD-10-CM

## 2025-01-21 DIAGNOSIS — I50.32 CHRONIC DIASTOLIC HEART FAILURE: ICD-10-CM

## 2025-01-21 DIAGNOSIS — I45.10 RIGHT BUNDLE BRANCH BLOCK (RBBB): ICD-10-CM

## 2025-01-21 DIAGNOSIS — Z95.1 S/P CABG (CORONARY ARTERY BYPASS GRAFT): ICD-10-CM

## 2025-01-21 DIAGNOSIS — I10 PRIMARY HYPERTENSION: ICD-10-CM

## 2025-01-21 DIAGNOSIS — N28.9 RENAL INSUFFICIENCY: ICD-10-CM

## 2025-01-21 DIAGNOSIS — R42 VERTIGO: ICD-10-CM

## 2025-01-21 DIAGNOSIS — K92.2 GASTROINTESTINAL HEMORRHAGE, UNSPECIFIED GASTROINTESTINAL HEMORRHAGE TYPE: ICD-10-CM

## 2025-01-21 DIAGNOSIS — I73.9 PERIPHERAL VASCULAR DISEASE (CMS-HCC): ICD-10-CM

## 2025-01-21 DIAGNOSIS — E05.90 HYPERTHYROIDISM: ICD-10-CM

## 2025-01-21 DIAGNOSIS — E78.01 FAMILIAL HYPERCHOLESTEROLEMIA: ICD-10-CM

## 2025-01-21 DIAGNOSIS — I48.91 ATRIAL FIBRILLATION (MULTI): ICD-10-CM

## 2025-01-21 DIAGNOSIS — R94.31 ABNORMAL ELECTROCARDIOGRAM: ICD-10-CM

## 2025-01-21 DIAGNOSIS — Z86.16 HISTORY OF COVID-19: ICD-10-CM

## 2025-01-21 DIAGNOSIS — E78.2 MIXED HYPERLIPIDEMIA: ICD-10-CM

## 2025-01-21 DIAGNOSIS — K21.9 GASTROESOPHAGEAL REFLUX DISEASE WITHOUT ESOPHAGITIS: ICD-10-CM

## 2025-01-21 DIAGNOSIS — Z95.0 PACEMAKER: ICD-10-CM

## 2025-01-21 DIAGNOSIS — K27.9 PUD (PEPTIC ULCER DISEASE): ICD-10-CM

## 2025-01-21 DIAGNOSIS — I48.0 PAROXYSMAL ATRIAL FIBRILLATION (MULTI): ICD-10-CM

## 2025-01-21 DIAGNOSIS — I44.4 LAFB (LEFT ANTERIOR FASCICULAR BLOCK): ICD-10-CM
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
## 2025-01-21 PROCEDURE — 3074F SYST BP LT 130 MM HG: CPT | Performed by: INTERNAL MEDICINE

## 2025-01-21 PROCEDURE — 99214 OFFICE O/P EST MOD 30 MIN: CPT | Performed by: INTERNAL MEDICINE

## 2025-01-21 PROCEDURE — 93000 ELECTROCARDIOGRAM COMPLETE: CPT | Performed by: INTERNAL MEDICINE

## 2025-01-21 PROCEDURE — 3078F DIAST BP <80 MM HG: CPT | Performed by: INTERNAL MEDICINE

## 2025-01-21 PROCEDURE — 1159F MED LIST DOCD IN RCRD: CPT | Performed by: INTERNAL MEDICINE

## 2025-01-21 RX ORDER — RANOLAZINE 500 MG/1
500 TABLET, EXTENDED RELEASE ORAL 2 TIMES DAILY
Qty: 180 TABLET | Refills: 3 | Status: SHIPPED | OUTPATIENT
Start: 2025-01-21 | End: 2026-01-21

## 2025-01-21 RX ORDER — LOSARTAN POTASSIUM 25 MG/1
25 TABLET ORAL 2 TIMES DAILY
Qty: 180 TABLET | Refills: 3 | Status: SHIPPED | OUTPATIENT
Start: 2025-01-21 | End: 2026-01-21

## 2025-01-21 RX ORDER — CHOLECALCIFEROL (VITAMIN D3) 50 MCG
50 TABLET ORAL DAILY
COMMUNITY

## 2025-01-21 RX ORDER — METOPROLOL TARTRATE 25 MG/1
25 TABLET, FILM COATED ORAL 2 TIMES DAILY
Qty: 180 TABLET | Refills: 3 | Status: SHIPPED | OUTPATIENT
Start: 2025-01-21 | End: 2026-01-21

## 2025-01-21 RX ORDER — LIDOCAINE 560 MG/1
1 PATCH PERCUTANEOUS; TOPICAL; TRANSDERMAL DAILY
COMMUNITY

## 2025-01-21 RX ORDER — AMLODIPINE BESYLATE 2.5 MG/1
2.5 TABLET ORAL DAILY
Qty: 90 TABLET | Refills: 3 | Status: SHIPPED | OUTPATIENT
Start: 2025-01-21 | End: 2026-01-21

## 2025-01-21 NOTE — PROGRESS NOTES
CARDIOLOGY OFFICE NOTE     Date:   1/21/2025    Patient:    Lou Oliva    YOB: 1935    Primary Physician: Alexa Chatman MD       Reason for Visit: 6-month cardiology follow-up.    HPI:     Lou Oliva was seen in cardiac evaluation at the  Cardiology office January 21, 2025.      The patients problems are listed as in the impression below.    Electronic medical records reviewed.    The patient returns.  She has been in the nursing home and hospitalized twice.  At [facility] and [facility].  She had pneumonia as well as congestive heart failure.  Echocardiogram was performed which noted normal LV function.  Ejection fraction 60%.  She has COVID.    She notes that she also fell.  She fractured her tailbone.    She was noted to have hypothyroidism and her amiodarone was discontinued.    She is remaining atrial paced rhythm.    She has dyspnea on exertion.  She is tired.    Patient denies Chest Pain, Lightheadedness, Dizziness, TIA or CVA symptoms.  No CHF or Edema.  No Palpitations.  No GI,  or Bleeding Issues. No Recent Fever or Chills.     Cardiovascular and general review of systems is otherwise negative.    A 14-system review is otherwise negative, other than noted.     PHYSICAL EXAMINATION:      Vitals:    01/21/25 1032   BP: 114/64   Pulse: 61     No acute distress.  Alert and oriented.   Head and neck examination: No jugular venous distention, no carotid bruits, no mass. Carotid upstrokes preserved. Oral mucosa moist. No xanthelasma. Head and neck examination otherwise unremarkable.   Lungs: Clear to auscultation and percussion. No wheezes, no rales, and no rhonchi. Chest excursion appeared to be normal. No chest wall tenderness on palpation. Well-healed mid incisional scar.   Heart: Normal S1 and S2. No S3. No S4. No rub. No murmur. Point of maximal impulse was within normal limits. Pacemaker left chest. Abdomen was soft. Nontender. No organomegaly. No bruits. No masses.    Extremities: No bipedal edema. No clubbing. No cyanosis. Pulses are strong throughout. No bruits.   Musculoskeletal exam: No ulcers, otherwise unremarkable.   Neurologically appeared grossly intact.   .  IMPRESSION:       Cardiovascular status stable  Shortness of breath  Fatigue  Edema, improved  Fall  GI bleed due to peptic ulcer disease  Hypothyroidism, possibly secondary to amiodarone use.  Congestive heart failure, diastolic, compensated  Symptomatic bradycardia, post permanent pacemaker currently 2023, NEXAGE sensor XT DR LOVELL.  Coronary artery disease, status post coronary artery bypass graft surgery, September 2007; left internal mammary artery to the left anterior descending coronary artery, saphenous vein graft to the first obtuse marginal and second obtuse marginal, diagonal and right coronary artery, left atrial appendage ligation  Negative Lexiscan myocardial perfusion stress test, ejection fraction 65%, 2012 , 2020 and June 2024.  Normal left ventricular systolic function. LVEF 60%.,  Echocardiogram 12/2024.  Hypertension.  Familial hyperlipidemia (intolerant to statins and Juxtapid).  Peripheral vascular disease with stable 2.6 cm abdominal aortic ectasia; status post left iliac balloon angioplasty.   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   History of appendectomy.  History of total abdominal hysterectomy.  Obesity.  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   Possible obstructive sleep apnea.  Statin intolerance due to myalgias  GERD / PUD  Anemia  COVID infection 1/2024.      Otherwise as per assessment below.    RECOMMENDATIONS:      Patient is now off amiodarone for 2 weeks.  Would suggest that she stay off of it at this time.  Will reevaluate need in the future if we need to consider restarting.  Will check her thyroid going forward.    She is anemic.  She has mild renal insufficiency.  Would suggest hydration.    She will continue her other  medications.  Refills were provided.    We did talk about possible Watchman device if she falls again or has GI bleeding and cannot tolerate her Eliquis.  Otherwise she will continue her Eliquis treatment as current.    Exercise dietary program.    Hydration.    MyChart portal use was encouraged.    We will plan to see back in 6 months with Laboratory Studies and ECG as ordered.     Patient will follow up with their primary physician for general care.    The patient knows to contact medical care earlier if need be.      ALLERGIES:     Allergies   Allergen Reactions    Lisinopril Unknown    Nsaids (Non-Steroidal Anti-Inflammatory Drug) GI bleeding    Statins-Hmg-Coa Reductase Inhibitors Unknown    Sulfa (Sulfonamide Antibiotics) Unknown        MEDICATIONS:     Current Outpatient Medications   Medication Instructions    acetaminophen (TYLENOL) 1,000 mg, Every 6 hours PRN    amiodarone (Pacerone) 200 mg tablet TAKE 1/2 TABLET(100 MG) BY MOUTH DAILY    amLODIPine (NORVASC) 2.5 mg, oral, Daily    apixaban (Eliquis) 2.5 mg tablet TAKE 1 TABLET(2.5 MG) BY MOUTH TWICE DAILY    aspirin 81 mg EC tablet 1 tablet, Daily    betamethasone dipropionate (Diprosone) 0.05 % lotion 2 times daily    cholecalciferol (VITAMIN D3) 50 mcg, Daily    diclofenac sodium (VOLTAREN ARTHRITIS PAIN) 4 g, 4 times daily PRN    FeroSuL tablet 1 tablet, Daily with breakfast    ferrous gluconate 324 (38 Fe) MG tablet 1 tablet, Daily    ipratropium/albuterol sulfate (DUONEB INHL) Inhale.    lidocaine (Salonpas, lidocaine,) 4 % patch 1 patch, Daily    losartan (COZAAR) 25 mg, oral, 2 times daily    metoprolol tartrate (LOPRESSOR) 25 mg, oral, 2 times daily    nitroglycerin (NitrolinguaL) 400 mcg/spray spray Place 1 spray under the tongue every 5 minutes if needed for chest pain.    polyethylene glycol (GLYCOLAX, MIRALAX) 17 g, Daily RT    ranolazine (RANEXA) 500 mg, oral, 2 times daily       ELECTROCARDIOGRAM:      Atrial paced rhythm.  Right bundle  branch block, left anterior fascicular block.  Rate 63.    CARDIAC TESTING:      Echocardiogram, 12/2024:  Normal LV systolic function.  Ejection fraction 60%.  Normal chamber sizes.  No significant valvular heart disease  RVSP 29.    LABORATORY DATA:      Reviewed with patient.    CBC:   Lab Results   Component Value Date    WBC 6.3 12/07/2024    RBC 3.35 (L) 12/07/2024    HGB 9.7 (L) 12/07/2024    HCT 31.7 (L) 12/07/2024     12/07/2024        CMP:    Lab Results   Component Value Date     12/07/2024    K 4.3 12/07/2024     12/07/2024    CO2 28 12/07/2024    BUN 41 (H) 12/07/2024    CREATININE 1.80 (H) 12/07/2024    GLUCOSE 94 12/07/2024    CALCIUM 8.8 12/07/2024       Lipid Profile:    Lab Results   Component Value Date    CHOL 216 (H) 12/05/2023    TRIG 92 12/05/2023    HDL 42.6 12/05/2023    LDLF 132 (H) 01/04/2023   LDL, 12/2024: 93.    Hepatic Function Panel:    Lab Results   Component Value Date    ALKPHOS 49 12/07/2024    ALT 10 12/07/2024    AST 20 12/07/2024    PROT 6.0 (L) 12/07/2024    BILITOT 0.4 12/07/2024    BILIDIR 0.1 12/07/2024                   PROBLEM LIST:     Patient Active Problem List   Diagnosis    Abnormal electrocardiogram    Aneurysm of abdominal aorta (CMS-[facility])    ASHD (arteriosclerotic heart disease)    Atrial fibrillation (Multi)    GERD (gastroesophageal reflux disease)    GI bleed    Edema    Hyperlipidemia    Pain of left hip joint    Hypertension    Peripheral vascular disease (CMS-HCC)    Right bundle branch block (RBBB)    S/P CABG (coronary artery bypass graft)    Trochanteric bursitis of left hip    Vertigo    Pacemaker    Sinus node dysfunction (Multi)    High risk medication use    BMI 32.0-32.9,adult    Former smoker    Anemia    PUD (peptic ulcer disease)    LAFB (left anterior fascicular block)    Hyperthyroidism    Renal insufficiency    Fall    History of COVID-19    Chronic diastolic heart failure             Delroy Avila MD, FACC   [facility] /  [facility] /  Cardiology      Of Note:  Access Information Management voice recognition dictation software was utilized partially in the preparation of this note, therefore, inaccuracies in spelling, word choice and punctuation may have occurred which were not recognized the time of signing.    Patient was seen and examined with total time of visit including chart preparation, rooming, and chart completion exceeding 40 minutes.      ----

## 2025-01-21 NOTE — PATIENT INSTRUCTIONS
FASTING LABS PRIOR TO NEXT OFFICE VISIT   DISCONTINUE AMIODARONE   FOLLOW UP IN 3 MONTHS      Stay plenty HYDRATED     Use My Chart portal for reviewing records, testing and contacting office.      Please bring all medicines, vitamins, and herbal supplements with you in original bottles to every appointment!!    DID YOU KNOW  We have a pharmacy here in the [facility].  They can fill all prescriptions, not just cardiac medications.  Prescriptions from other pharmacies can easily be transferred to the  pharmacy by the  pharmacist on site.   pharmacies offer FREE HOME DELIVERY on medications.   Typically prescriptions can be ready in 10 - 15 minutes. Pharmacy phone # [phone].    Prescriptions will not be filled unless you are compliant with your follow up appointments or have a follow up appointment scheduled as per instruction of your physician. Refills should be requested at the time of your visit.

## 2025-02-03 ENCOUNTER — APPOINTMENT (OUTPATIENT)
Dept: CARDIOLOGY | Facility: CLINIC | Age: OVER 89
End: 2025-02-03
Payer: MEDICARE

## 2025-02-03 VITALS
HEIGHT: 62 IN | DIASTOLIC BLOOD PRESSURE: 68 MMHG | WEIGHT: 175.2 LBS | HEART RATE: 62 BPM | BODY MASS INDEX: 32.24 KG/M2 | SYSTOLIC BLOOD PRESSURE: 132 MMHG

## 2025-02-03 DIAGNOSIS — Z79.899 HIGH RISK MEDICATION USE: ICD-10-CM

## 2025-02-03 DIAGNOSIS — K21.9 GASTROESOPHAGEAL REFLUX DISEASE WITHOUT ESOPHAGITIS: Primary | ICD-10-CM

## 2025-02-03 DIAGNOSIS — E05.90 HYPERTHYROIDISM: ICD-10-CM

## 2025-02-03 DIAGNOSIS — I50.32 CHRONIC DIASTOLIC HEART FAILURE: ICD-10-CM

## 2025-02-03 DIAGNOSIS — I45.10 RIGHT BUNDLE BRANCH BLOCK (RBBB): ICD-10-CM
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
## 2025-02-03 DIAGNOSIS — N28.9 RENAL INSUFFICIENCY: ICD-10-CM

## 2025-02-03 DIAGNOSIS — I48.0 PAROXYSMAL ATRIAL FIBRILLATION (MULTI): ICD-10-CM

## 2025-02-03 DIAGNOSIS — Z95.0 PACEMAKER: ICD-10-CM

## 2025-02-03 DIAGNOSIS — R94.31 ABNORMAL ELECTROCARDIOGRAM: ICD-10-CM

## 2025-02-03 DIAGNOSIS — Z95.1 S/P CABG (CORONARY ARTERY BYPASS GRAFT): ICD-10-CM

## 2025-02-03 DIAGNOSIS — Z87.891 FORMER SMOKER: ICD-10-CM

## 2025-02-03 DIAGNOSIS — I10 PRIMARY HYPERTENSION: ICD-10-CM

## 2025-02-03 DIAGNOSIS — D64.9 ANEMIA, UNSPECIFIED TYPE: ICD-10-CM

## 2025-02-03 DIAGNOSIS — E78.2 MIXED HYPERLIPIDEMIA: ICD-10-CM

## 2025-02-03 DIAGNOSIS — I49.5 SINUS NODE DYSFUNCTION (MULTI): ICD-10-CM

## 2025-02-03 DIAGNOSIS — K27.9 PUD (PEPTIC ULCER DISEASE): ICD-10-CM

## 2025-02-03 DIAGNOSIS — I44.4 LAFB (LEFT ANTERIOR FASCICULAR BLOCK): ICD-10-CM

## 2025-02-03 DIAGNOSIS — I73.9 PERIPHERAL VASCULAR DISEASE (CMS-HCC): ICD-10-CM

## 2025-02-03 DIAGNOSIS — K92.2 GASTROINTESTINAL HEMORRHAGE, UNSPECIFIED GASTROINTESTINAL HEMORRHAGE TYPE: ICD-10-CM

## 2025-02-03 PROCEDURE — 93000 ELECTROCARDIOGRAM COMPLETE: CPT | Performed by: INTERNAL MEDICINE

## 2025-02-03 PROCEDURE — 3078F DIAST BP <80 MM HG: CPT | Performed by: INTERNAL MEDICINE

## 2025-02-03 PROCEDURE — 1159F MED LIST DOCD IN RCRD: CPT | Performed by: INTERNAL MEDICINE

## 2025-02-03 PROCEDURE — 3075F SYST BP GE 130 - 139MM HG: CPT | Performed by: INTERNAL MEDICINE

## 2025-02-03 PROCEDURE — 1036F TOBACCO NON-USER: CPT | Performed by: INTERNAL MEDICINE

## 2025-02-03 PROCEDURE — 99215 OFFICE O/P EST HI 40 MIN: CPT | Performed by: INTERNAL MEDICINE

## 2025-02-03 NOTE — PATIENT INSTRUCTIONS
DR JOHNS IS REFERRING YOU TO DR LARES REGARDING A WATCHMAN DEVICE    FASTING LABS, FASTING FROM MIDNIGHT THE NIGHT BEFORE TO BE DONE 1 WEEK PRIOR TO YOUR APPOINTMENT WITH DR JOHNS IN  3 MONTHS       DID YOU KNOW  We have a pharmacy here in the [facility].  They can fill all prescriptions, not just cardiac medications.  Prescriptions from other pharmacies can easily be transferred to the  pharmacy by the  pharmacist on site.   pharmacies offer FREE HOME DELIVERY on medications to anywhere in [place]. They can sync your medications. Typically prescriptions can be ready in 10 - 15 minutes. If pharmacy is unable to fill your  prescription or if cost is more than your paying now the Pharmacist can easily transfer back to your Pharmacy of choice. Pharmacy phone # [phone].

## 2025-02-03 NOTE — PROGRESS NOTES
CARDIOLOGY OFFICE NOTE     Date:   2/3/2025    Patient:    Lou Oliva    YOB: 1935    Primary Physician: Alexa Chatman MD       Reason for Visit: Cardiology follow-up    HPI:     Lou Oliva was seen in cardiac evaluation at the  Cardiology office February 3, 2025.      The patients problems are listed as in the impression below.    Electronic medical records reviewed.    Patient returns.  She feels well overall.  She did have a thyroid issues and had her amiodarone discontinued.  She remains in a paced rhythm.  She has atrial fibrillation paroxysmally.  She does have a history of long-term anticoagulation on Eliquis.  She has a history of recent GI bleed and falls.    She is interested in Watchman device as previously discussed.  She understands the risk goals and alternatives.    Other than shortness of breath and fatigue and chronic edema she feels well otherwise.  She has had no bleeding issues.    Patient denies Chest Pain, Lightheadedness, Dizziness, TIA or CVA symptoms.  No CHF.  No Palpitations.  No recent GI,  or Bleeding Issues. No Recent Fever or Chills.     Cardiovascular and general review of systems is otherwise negative.    A 14-system review is otherwise negative, other than noted.     PHYSICAL EXAMINATION:      Vitals:    02/03/25 1421   BP: 132/68   Pulse: 62     No acute distress.  Alert and oriented.   Head and neck examination: No jugular venous distention, no carotid bruits, no mass. Carotid upstrokes preserved. Oral mucosa moist. No xanthelasma. Head and neck examination otherwise unremarkable.   Lungs: Clear to auscultation and percussion. No wheezes, no rales, and no rhonchi. Chest excursion appeared to be normal. No chest wall tenderness on palpation. Well-healed mid incisional scar.   Heart: Normal S1 and S2. No S3. No S4. No rub. No murmur. Point of maximal impulse was within normal limits. Pacemaker left chest. Abdomen was soft. Nontender.  No organomegaly. No bruits. No masses.   Extremities: Mild bipedal edema. No clubbing. No cyanosis. Pulses are strong throughout. No bruits.   Musculoskeletal exam: No ulcers, otherwise unremarkable.   Neurologically appeared grossly intact.   .  IMPRESSION:       Cardiovascular status stable  Shortness of breath  Fatigue  Edema, improved  Fall  GI bleed due to peptic ulcer disease  Hypothyroidism, possibly secondary to amiodarone use.  Congestive heart failure, diastolic, compensated  Symptomatic bradycardia, post permanent pacemaker currently 2023, Medleaselock sensor XT DR LOVELL.  Coronary artery disease, status post coronary artery bypass graft surgery, September 2007; left internal mammary artery to the left anterior descending coronary artery, saphenous vein graft to the first obtuse marginal and second obtuse marginal, diagonal and right coronary artery, left atrial appendage ligation  Negative Lexiscan myocardial perfusion stress test, ejection fraction 65%, 2012 , 2020 and June 2024.  Normal left ventricular systolic function. LVEF 60%.,  Echocardiogram 12/2024.  Hypertension.  Familial hyperlipidemia (intolerant to statins and Juxtapid).  Peripheral vascular disease with stable 2.6 cm abdominal aortic ectasia; status post left iliac balloon angioplasty.   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   History of appendectomy.  History of total abdominal hysterectomy.  Obesity.  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   Possible obstructive sleep apnea.  Statin intolerance due to myalgias  GERD / PUD  Anemia  COVID infection 1/2024.      Otherwise as per assessment below.    RECOMMENDATIONS:      Patient returns.  Has atrial fibrillation with history of falls and GI bleeding.  She has hypothyroidism.  Her amiodarone was discontinued.  She states that she feels well overall.  She remains in paced rhythm.    We had discussed on her last visit possible watchman  implantation for left atrial appendage closure due to her above history.  She is interested in proceeding.  Will schedule her for appointment with Dr. Chamorro, structural heart disease regarding watchman discussion and implantation.    From cardiovascular standpoint she will continue her current medications.  Refills were provided.    Exercise dietary program as tolerated.  Hydration.    Pacemaker follow-up as scheduled.    [employer] portal use was encouraged.    We will plan to see back in 3 months with Laboratory Studies and ECG as ordered.     Patient will follow up with their primary physician for general care.    The patient knows to contact medical care earlier if need be.      ALLERGIES:     Allergies   Allergen Reactions    Lisinopril Unknown    Nsaids (Non-Steroidal Anti-Inflammatory Drug) GI bleeding    Statins-Hmg-Coa Reductase Inhibitors Unknown    Sulfa (Sulfonamide Antibiotics) Unknown        MEDICATIONS:     Current Outpatient Medications   Medication Instructions    acetaminophen (TYLENOL) 1,000 mg, Every 6 hours PRN    amLODIPine (NORVASC) 2.5 mg, oral, Daily    apixaban (Eliquis) 2.5 mg tablet TAKE 1 TABLET(2.5 MG) BY MOUTH TWICE DAILY    aspirin 81 mg EC tablet 1 tablet, Daily    betamethasone dipropionate (Diprosone) 0.05 % lotion 2 times daily    cholecalciferol (VITAMIN D3) 50 mcg, Daily    diclofenac sodium (VOLTAREN ARTHRITIS PAIN) 4 g, 4 times daily PRN    FeroSuL tablet 1 tablet, Daily with breakfast    ferrous gluconate 324 (38 Fe) MG tablet 1 tablet, Daily    ipratropium/albuterol sulfate (DUONEB INHL) Inhale.    lidocaine (Salonpas, lidocaine,) 4 % patch 1 patch, Daily    losartan (COZAAR) 25 mg, oral, 2 times daily    metoprolol tartrate (LOPRESSOR) 25 mg, oral, 2 times daily    nitroglycerin (NitrolinguaL) 400 mcg/spray spray Place 1 spray under the tongue every 5 minutes if needed for chest pain.    polyethylene glycol (GLYCOLAX, MIRALAX) 17 g, Daily RT    ranolazine (RANEXA) 500 mg,  oral, 2 times daily       ELECTROCARDIOGRAM:      Atrial paced rhythm ,right medial branch block, left ventricular hypertrophy rate 62.    CARDIAC TESTING:      None this visit    LABORATORY DATA:      CBC:   Lab Results   Component Value Date    WBC 6.3 12/07/2024    RBC 3.35 (L) 12/07/2024    HGB 9.7 (L) 12/07/2024    HCT 31.7 (L) 12/07/2024     12/07/2024        CMP:    Lab Results   Component Value Date     12/07/2024    K 4.3 12/07/2024     12/07/2024    CO2 28 12/07/2024    BUN 41 (H) 12/07/2024    CREATININE 1.80 (H) 12/07/2024    GLUCOSE 94 12/07/2024    CALCIUM 8.8 12/07/2024       Magnesium:    Lab Results   Component Value Date    MG 2.03 12/05/2023       Lipid Profile:    Lab Results   Component Value Date    CHOL 216 (H) 12/05/2023    TRIG 92 12/05/2023    HDL 42.6 12/05/2023    LDLF 132 (H) 01/04/2023       Hepatic Function Panel:    Lab Results   Component Value Date    ALKPHOS 49 12/07/2024    ALT 10 12/07/2024    AST 20 12/07/2024    PROT 6.0 (L) 12/07/2024    BILITOT 0.4 12/07/2024    BILIDIR 0.1 12/07/2024       TSH:    Lab Results   Component Value Date    TSH 0.51 05/14/2024       BNP:   Lab Results   Component Value Date     (H) 12/07/2024        PT/INR:    Lab Results   Component Value Date    PROTIME 14.3 (H) 03/08/2023    INR 1.2 (H) 03/08/2023             PROBLEM LIST:     Patient Active Problem List   Diagnosis    Abnormal electrocardiogram    Aneurysm of abdominal aorta (CMS-[facility])    ASHD (arteriosclerotic heart disease)    Atrial fibrillation (Multi)    GERD (gastroesophageal reflux disease)    GI bleed    Edema    Hyperlipidemia    Pain of left hip joint    Hypertension    Peripheral vascular disease (CMS-HCC)    Right bundle branch block (RBBB)    S/P CABG (coronary artery bypass graft)    Trochanteric bursitis of left hip    Vertigo    Pacemaker    Sinus node dysfunction (Multi)    High risk medication use    BMI 32.0-32.9,adult    Former smoker    Anemia     PUD (peptic ulcer disease)    LAFB (left anterior fascicular block)    Hyperthyroidism    Renal insufficiency    Fall    History of COVID-19    Chronic diastolic heart failure             Delory Avila MD, [facility] / [facility] /  Cardiology      Of Note:  [employer] voice recognition dictation software was utilized partially in the preparation of this note, therefore, inaccuracies in spelling, word choice and punctuation may have occurred which were not recognized the time of signing.    Patient was seen and examined with total time of visit including chart preparation, rooming, and chart completion exceeding 40 minutes.      ----                            Shared Decision Tool - Referral for Left Atrial Appendage Occlusion    My patient Lou Oliva 1935 has been evaluated by me and is referred to [facility] for a left atrial appendage implant due thromboembolic stroke risk from atrial fibrillation with CHADS2 score >= 2 or a [id number] score >= 3.    This patient is not a good candidate for long term anticoagulation for the following reason(s):    This patient however should be able to tolerate short term anticoagulation as necessary for LAAO device implant.  My Patient and I, the referring physician, have reviewed the following:  Yes  Atrial Fibrillation increases the risk of stroke.  Yes Anticoagulants or blood thinners help reduce the risk of stroke for most people.  Yes    Blood thinners may cause minor or serious bleeding issues.  Yes  Blood thinners include the medications aspirin, warfarin, and NOAC (dabigatran, edoxaban, rivaroxaban, apixaban...), each with unique risk and safety profiles.  Yes We reviewed his/her anticoagulation history and previous experiences.  Yes We discussed lack of other alternative treatments available to prevent stroke risk.  Yes We discussed the LAAO device implant vs taking anticoagulation to reduce stroke risk.  Yes We referred the patient to a LAAO outpatient  clinic for further explanation and consideration.          Yes The LAAO device has been adequately explained along with the risks and benefits of treatment. Alternative treatment has been discussed with all questions answered. After discussion of the above considerations, it has been decided to be evaluated for the LAAO therapies.          Reviewed and approved by KAYLAH JOHNS on 2/3/25 at 3:30 PM.

## 2025-02-19 ENCOUNTER — TELEMEDICINE (OUTPATIENT)
Dept: CARDIOLOGY | Facility: CLINIC | Age: OVER 89
End: 2025-02-19
Payer: MEDICARE

## 2025-02-19 DIAGNOSIS — I44.4 LAFB (LEFT ANTERIOR FASCICULAR BLOCK): ICD-10-CM

## 2025-02-19 DIAGNOSIS — K92.2 GASTROINTESTINAL HEMORRHAGE, UNSPECIFIED GASTROINTESTINAL HEMORRHAGE TYPE: ICD-10-CM

## 2025-02-19 DIAGNOSIS — N28.9 RENAL INSUFFICIENCY: ICD-10-CM

## 2025-02-19 DIAGNOSIS — Z87.891 FORMER SMOKER: ICD-10-CM

## 2025-02-19 DIAGNOSIS — I48.0 PAROXYSMAL ATRIAL FIBRILLATION (MULTI): Primary | ICD-10-CM

## 2025-02-19 DIAGNOSIS — I10 PRIMARY HYPERTENSION: ICD-10-CM

## 2025-02-19 DIAGNOSIS — D64.9 ANEMIA, UNSPECIFIED TYPE: ICD-10-CM

## 2025-02-19 DIAGNOSIS — R94.31 ABNORMAL ELECTROCARDIOGRAM: ICD-10-CM

## 2025-02-19 DIAGNOSIS — I50.32 CHRONIC DIASTOLIC HEART FAILURE: ICD-10-CM

## 2025-02-19 DIAGNOSIS — E05.90 HYPERTHYROIDISM: ICD-10-CM

## 2025-02-19 DIAGNOSIS — K21.9 GASTROESOPHAGEAL REFLUX DISEASE WITHOUT ESOPHAGITIS: ICD-10-CM

## 2025-02-19 DIAGNOSIS — I73.9 PERIPHERAL VASCULAR DISEASE (CMS-HCC): ICD-10-CM

## 2025-02-19 DIAGNOSIS — E78.2 MIXED HYPERLIPIDEMIA: ICD-10-CM

## 2025-02-19 DIAGNOSIS — Z79.899 HIGH RISK MEDICATION USE: ICD-10-CM
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
## 2025-02-19 DIAGNOSIS — I45.10 RIGHT BUNDLE BRANCH BLOCK (RBBB): ICD-10-CM

## 2025-02-19 DIAGNOSIS — I49.5 SINUS NODE DYSFUNCTION (MULTI): ICD-10-CM

## 2025-02-19 DIAGNOSIS — Z95.0 PACEMAKER: ICD-10-CM

## 2025-02-19 DIAGNOSIS — Z95.1 S/P CABG (CORONARY ARTERY BYPASS GRAFT): ICD-10-CM

## 2025-02-19 DIAGNOSIS — K27.9 PUD (PEPTIC ULCER DISEASE): ICD-10-CM

## 2025-02-19 PROCEDURE — 99214 OFFICE O/P EST MOD 30 MIN: CPT | Mod: 95 | Performed by: INTERNAL MEDICINE

## 2025-02-19 PROCEDURE — 99204 OFFICE O/P NEW MOD 45 MIN: CPT | Performed by: INTERNAL MEDICINE

## 2025-02-19 NOTE — PROGRESS NOTES
Virtual Telephone visit: Total time spent 60 minutes including chart preparation with 30 minutes spent on the phone.    PCP: Dr. Chatman  Cardio: Dr. Avila    I was asked by Dr. Avila to evaluate this patient in consultation for evaluation of left atrial appendage closure.    The patient is a 89 y.o.  female with PMH of HTN, HLD, LATISHA, CAD s/p CABG in 2007, CKD, PAD with AAA and s/p left iliac angioplasty, s/p PPM d/t bradycardia and paroxysmal Afib. The patient has normal LVEF.    Patient reports two past episodes of GIB requiring hospital admission. She received one unit of blood 5 years ago and most recently required 4 units of blood. In addition, she ambulates at home with a Rollator and endorses a fall in 10/2024, when she fell back and hit her head, requiring hospital admission. She had other falls in the past with R hip and L hip fracture.    Given the patient's significant GI bleeding and fall risk,  the patient is referred for consideration of left atrial appendage closure for stroke risk reduction.       ROS:  Constitutional: no fatigue, no fevers, no body aches  Eyes: no acute eye problems, no blurred vision, no diplopia, no eye pain  ENT:  no acute hearing loss, no earache, no sore throat  Cardiovascular: dyspnea on exertion, no chest pain  Respiratory: no chronic cough, not coughing up sputum, no wheezing that is consistent with asthma  Gastrointestinal: no acute bowel complaints, GI Bleeding  Musculoskeletal: no acute arthralgias, no acute myalgias, no acute joint swelling  Skin: no skin rashes, no change in skin color and pigmentation, no skin lesions and no skin lumps.   Neurological: gait instabilty, no headaches, no dizziness, no tingling, no fainting and no limb weakness.   Psychiatric:  no suicidal ideation, no confusion, no personality change and no emotional problems.   Hematologic/Lymphatic: no bleeding issues, other then mentioned in HPI  All other systems have been reviewed  and are negative for complaint.     Physical Exam:     Visit Vitals  Smoking Status Former          Labs:    Results for orders placed or performed during the hospital encounter of 12/07/24   B-Type Natriuretic Peptide    Collection Time: 12/07/24 12:05 PM   Result Value Ref Range     (H) 0 - 99 pg/mL   Hepatic Function Panel    Collection Time: 12/07/24 12:05 PM   Result Value Ref Range    Albumin 3.5 3.4 - 5.0 g/dL    Bilirubin, Total 0.4 0.0 - 1.2 mg/dL    Bilirubin, Direct 0.1 0.0 - 0.3 mg/dL    Alkaline Phosphatase 49 33 - 136 U/L    ALT 10 7 - 45 U/L    AST 20 9 - 39 U/L    Total Protein 6.0 (L) 6.4 - 8.2 g/dL   Basic Metabolic Panel    Collection Time: 12/07/24 12:05 PM   Result Value Ref Range    Glucose 94 74 - 99 mg/dL    Sodium 142 136 - 145 mmol/L    Potassium 4.3 3.5 - 5.3 mmol/L    Chloride 106 98 - 107 mmol/L    Bicarbonate 28 21 - 32 mmol/L    Anion Gap 12 10 - 20 mmol/L    Urea Nitrogen 41 (H) 6 - 23 mg/dL    Creatinine 1.80 (H) 0.50 - 1.05 mg/dL    eGFR 27 (L) >60 mL/min/1.73m*2    Calcium 8.8 8.6 - 10.3 mg/dL   CBC and Auto Differential    Collection Time: 12/07/24 12:05 PM   Result Value Ref Range    WBC 6.3 4.4 - 11.3 x10*3/uL    nRBC 0.0 0.0 - 0.0 /100 WBCs    RBC 3.35 (L) 4.00 - 5.20 x10*6/uL    Hemoglobin 9.7 (L) 12.0 - 16.0 g/dL    Hematocrit 31.7 (L) 36.0 - 46.0 %    MCV 95 80 - 100 fL    MCH 29.0 26.0 - 34.0 pg    MCHC 30.6 (L) 32.0 - 36.0 g/dL    RDW 14.9 (H) 11.5 - 14.5 %    Platelets 170 150 - 450 x10*3/uL    Neutrophils % 69.7 40.0 - 80.0 %    Immature Granulocytes %, Automated 0.5 0.0 - 0.9 %    Lymphocytes % 10.9 13.0 - 44.0 %    Monocytes % 17.8 2.0 - 10.0 %    Eosinophils % 0.6 0.0 - 6.0 %    Basophils % 0.5 0.0 - 2.0 %    Neutrophils Absolute 4.36 1.60 - 5.50 x10*3/uL    Immature Granulocytes Absolute, Automated 0.03 0.00 - 0.50 x10*3/uL    Lymphocytes Absolute 0.68 (L) 0.80 - 3.00 x10*3/uL    Monocytes Absolute 1.11 (H) 0.05 - 0.80 x10*3/uL    Eosinophils Absolute 0.04  0.00 - 0.40 x10*3/uL    Basophils Absolute 0.03 0.00 - 0.10 x10*3/uL   Troponin I, High Sensitivity, Initial    Collection Time: 12/07/24 12:05 PM   Result Value Ref Range    Troponin I, High Sensitivity 38 (H) 0 - 13 ng/L   Sars-CoV-2 PCR    Collection Time: 12/07/24 12:11 PM   Result Value Ref Range    Coronavirus 2019, PCR Detected (A) Not Detected   ECG 12 Lead    Collection Time: 12/07/24 12:19 PM   Result Value Ref Range    Ventricular Rate 63 BPM    Atrial Rate 57 BPM    TX Interval 238 ms    QRS Duration 124 ms    QT Interval 448 ms    QTC Calculation(Bazett) 458 ms    R Axis -30 degrees    T Axis -3 degrees    QRS Count 11 beats    Q Onset 206 ms    T Offset 430 ms    QTC Fredericia 455 ms   Troponin, High Sensitivity, 1 Hour    Collection Time: 12/07/24  2:14 PM   Result Value Ref Range    Troponin I, High Sensitivity 37 (H) 0 - 13 ng/L          Medications:    Current Outpatient Medications   Medication Instructions    acetaminophen (TYLENOL) 1,000 mg, Every 6 hours PRN    amLODIPine (NORVASC) 2.5 mg, oral, Daily    apixaban (Eliquis) 2.5 mg tablet TAKE 1 TABLET(2.5 MG) BY MOUTH TWICE DAILY    aspirin 81 mg EC tablet 1 tablet, Daily    betamethasone dipropionate (Diprosone) 0.05 % lotion 2 times daily    cholecalciferol (VITAMIN D3) 50 mcg, Daily    diclofenac sodium (VOLTAREN ARTHRITIS PAIN) 4 g, 4 times daily PRN    FeroSuL tablet 1 tablet, Daily with breakfast    ferrous gluconate 324 (38 Fe) MG tablet 1 tablet, Daily    ipratropium/albuterol sulfate (DUONEB INHL) Inhale.    lidocaine (Salonpas, lidocaine,) 4 % patch 1 patch, Daily    losartan (COZAAR) 25 mg, oral, 2 times daily    metoprolol tartrate (LOPRESSOR) 25 mg, oral, 2 times daily    nitroglycerin (NitrolinguaL) 400 mcg/spray spray Place 1 spray under the tongue every 5 minutes if needed for chest pain.    polyethylene glycol (GLYCOLAX, MIRALAX) 17 g, Daily RT    ranolazine (RANEXA) 500 mg, oral, 2 times daily          Assessmen/Plan:       This is a 89 y.o. female with PMH of paroxsymal AF, HTN, HLD, LATISHA, CAD s/p CABG in 2007, CKD, PAD with AAA and s/p left iliac angioplasty, s/p PPM d/t bradycardia .      The CHADS-VASC score is 5 (sex, age, vascular, HTN) and HAS-BLED score is 5. The patient is at increased risk of both bleeding and stroke.  As such the patient is a reasonable candidate for consideration of left atrial appendage occluder placement.    Today we discussed the left atrial appendage closure procedure. The patient was given written educational handout materials and watched an educational video. All risks, benefits and alternative were discussed.     The risks discussed included but were not limited to vascular complications, sedation related complications, risk of MI, CVA, device embolization, pericardial tamponade and death. The patient verbalized understanding and decided to proceed.         The patient requires CELINA planning (d/t CKD with 22 eGFR) and to exclude ROSAMARIA thrombus. In addition, the patient will also need a CT scan 4 months after the procedure, to evaluate the device for position, thrombus and preston-device leak. Following device implant, strategy will be for dual antiplatelet therapy with aspirin and clopidogrel for 6 months then aspirin for life.    Thank you, Dr. Avila, for this consultation and for allowing me to participate in the care of this patient.      Omar Chamorro MD  , Interventional Cardiology Fellowship Program   [facility]   [facility] [employer]  Office Phone Number: [phone]

## 2025-02-25 DIAGNOSIS — I48.20 CHRONIC A-FIB (MULTI): ICD-10-CM

## 2025-02-25 DIAGNOSIS — I48.91 ATRIAL FIBRILLATION, UNSPECIFIED TYPE (MULTI): ICD-10-CM

## 2025-03-04 ENCOUNTER — OFFICE VISIT (OUTPATIENT)
Dept: WOUND CARE | Facility: CLINIC | Age: OVER 89
End: 2025-03-04
Payer: MEDICARE

## 2025-03-04 PROCEDURE — 99213 OFFICE O/P EST LOW 20 MIN: CPT | Mod: 25

## 2025-03-04 PROCEDURE — 11042 DBRDMT SUBQ TIS 1ST 20SQCM/<: CPT | Performed by: SURGERY

## 2025-03-04 PROCEDURE — 11042 DBRDMT SUBQ TIS 1ST 20SQCM/<: CPT

## 2025-03-11 ENCOUNTER — OFFICE VISIT (OUTPATIENT)
Dept: WOUND CARE | Facility: CLINIC | Age: OVER 89
End: 2025-03-11
Payer: MEDICARE

## 2025-03-11 PROCEDURE — 11042 DBRDMT SUBQ TIS 1ST 20SQCM/<: CPT | Performed by: SURGERY

## 2025-03-11 PROCEDURE — 11042 DBRDMT SUBQ TIS 1ST 20SQCM/<: CPT

## 2025-03-18 ENCOUNTER — OFFICE VISIT (OUTPATIENT)
Dept: WOUND CARE | Facility: CLINIC | Age: OVER 89
End: 2025-03-18
Payer: MEDICARE

## 2025-03-18 PROCEDURE — 11042 DBRDMT SUBQ TIS 1ST 20SQCM/<: CPT | Performed by: SURGERY

## 2025-03-18 PROCEDURE — 11042 DBRDMT SUBQ TIS 1ST 20SQCM/<: CPT

## 2025-03-24 ENCOUNTER — APPOINTMENT (OUTPATIENT)
Dept: GENERAL RADIOLOGY | Age: 89
DRG: 378 | End: 2025-03-24
Payer: MEDICARE

## 2025-03-24 ENCOUNTER — HOSPITAL ENCOUNTER (INPATIENT)
Age: 89
LOS: 8 days | Discharge: HOME | DRG: 378 | End: 2025-04-01
Attending: EMERGENCY MEDICINE | Admitting: INTERNAL MEDICINE
Payer: MEDICARE

## 2025-03-24 DIAGNOSIS — N18.9 CHRONIC KIDNEY DISEASE, UNSPECIFIED CKD STAGE: ICD-10-CM

## 2025-03-24 DIAGNOSIS — D62 ANEMIA DUE TO ACUTE BLOOD LOSS: ICD-10-CM

## 2025-03-24 DIAGNOSIS — K92.1 GASTROINTESTINAL HEMORRHAGE WITH MELENA: Primary | ICD-10-CM

## 2025-03-24 LAB
ABO/RH: NORMAL
ALBUMIN SERPL-MCNC: 3.5 G/DL (ref 3.5–4.6)
ALP SERPL-CCNC: 25 U/L (ref 40–130)
ALT SERPL-CCNC: 6 U/L (ref 0–33)
ANION GAP SERPL CALCULATED.3IONS-SCNC: 12 MEQ/L (ref 9–15)
ANTIBODY SCREEN: NORMAL
APTT PPP: 32.3 SEC (ref 24.4–36.8)
AST SERPL-CCNC: 12 U/L (ref 0–35)
BASOPHILS # BLD: 0 K/UL (ref 0–0.2)
BASOPHILS NFR BLD: 0.2 %
BILIRUB SERPL-MCNC: <0.2 MG/DL (ref 0.2–0.7)
BLOOD BANK DISPENSE STATUS: NORMAL
BLOOD BANK PRODUCT CODE: NORMAL
BPU ID: NORMAL
BUN SERPL-MCNC: 119 MG/DL (ref 8–23)
CALCIUM SERPL-MCNC: 10.2 MG/DL (ref 8.5–9.9)
CHLORIDE SERPL-SCNC: 108 MEQ/L (ref 95–107)
CO2 SERPL-SCNC: 25 MEQ/L (ref 20–31)
CREAT SERPL-MCNC: 2.72 MG/DL (ref 0.5–0.9)
DESCRIPTION BLOOD BANK: NORMAL
EOSINOPHIL # BLD: 0.1 K/UL (ref 0–0.7)
EOSINOPHIL NFR BLD: 0.5 %
ERYTHROCYTE [DISTWIDTH] IN BLOOD BY AUTOMATED COUNT: 16 % (ref 11.5–14.5)
GLOBULIN SER CALC-MCNC: 1.8 G/DL (ref 2.3–3.5)
GLUCOSE SERPL-MCNC: 115 MG/DL (ref 70–99)
HCT VFR BLD AUTO: 21.1 % (ref 37–47)
HCT VFR BLD AUTO: 22.1 % (ref 37–47)
HGB BLD-MCNC: 6.7 G/DL (ref 12–16)
HGB BLD-MCNC: 7.5 G/DL (ref 12–16)
INR PPP: 1.7
LYMPHOCYTES # BLD: 0.9 K/UL (ref 1–4.8)
LYMPHOCYTES NFR BLD: 7.6 %
MAGNESIUM SERPL-MCNC: 2.2 MG/DL (ref 1.7–2.4)
MCH RBC QN AUTO: 28.8 PG (ref 27–31.3)
MCHC RBC AUTO-ENTMCNC: 31.8 % (ref 33–37)
MCV RBC AUTO: 90.6 FL (ref 79.4–94.8)
MONOCYTES # BLD: 0.6 K/UL (ref 0.2–0.8)
MONOCYTES NFR BLD: 5.5 %
NEUTROPHILS # BLD: 9.9 K/UL (ref 1.4–6.5)
NEUTS SEG NFR BLD: 85.2 %
PLATELET # BLD AUTO: 199 K/UL (ref 130–400)
POTASSIUM SERPL-SCNC: 4.5 MEQ/L (ref 3.4–4.9)
PROT SERPL-MCNC: 5.3 G/DL (ref 6.3–8)
PROTHROMBIN TIME: 20.5 SEC (ref 12.3–14.9)
RBC # BLD AUTO: 2.33 M/UL (ref 4.2–5.4)
SODIUM SERPL-SCNC: 145 MEQ/L (ref 135–144)
TROPONIN, HIGH SENSITIVITY: 39 NG/L (ref 0–19)
TROPONIN, HIGH SENSITIVITY: 41 NG/L (ref 0–19)
WBC # BLD AUTO: 11.6 K/UL (ref 4.8–10.8)

## 2025-03-24 PROCEDURE — 86850 RBC ANTIBODY SCREEN: CPT

## 2025-03-24 PROCEDURE — 2580000003 HC RX 258: Performed by: EMERGENCY MEDICINE

## 2025-03-24 PROCEDURE — P9016 RBC LEUKOCYTES REDUCED: HCPCS

## 2025-03-24 PROCEDURE — 1210000000 HC MED SURG R&B

## 2025-03-24 PROCEDURE — 86900 BLOOD TYPING SEROLOGIC ABO: CPT

## 2025-03-24 PROCEDURE — 83735 ASSAY OF MAGNESIUM: CPT

## 2025-03-24 PROCEDURE — 71045 X-RAY EXAM CHEST 1 VIEW: CPT

## 2025-03-24 PROCEDURE — 83540 ASSAY OF IRON: CPT

## 2025-03-24 PROCEDURE — 6360000002 HC RX W HCPCS: Performed by: EMERGENCY MEDICINE

## 2025-03-24 PROCEDURE — 86923 COMPATIBILITY TEST ELECTRIC: CPT

## 2025-03-24 PROCEDURE — 82607 VITAMIN B-12: CPT

## 2025-03-24 PROCEDURE — 85014 HEMATOCRIT: CPT

## 2025-03-24 PROCEDURE — 85018 HEMOGLOBIN: CPT

## 2025-03-24 PROCEDURE — 85025 COMPLETE CBC W/AUTO DIFF WBC: CPT

## 2025-03-24 PROCEDURE — 84484 ASSAY OF TROPONIN QUANT: CPT

## 2025-03-24 PROCEDURE — 30233N1 TRANSFUSION OF NONAUTOLOGOUS RED BLOOD CELLS INTO PERIPHERAL VEIN, PERCUTANEOUS APPROACH: ICD-10-PCS | Performed by: INTERNAL MEDICINE

## 2025-03-24 PROCEDURE — 82728 ASSAY OF FERRITIN: CPT

## 2025-03-24 PROCEDURE — 36415 COLL VENOUS BLD VENIPUNCTURE: CPT

## 2025-03-24 PROCEDURE — 6360000002 HC RX W HCPCS: Performed by: INTERNAL MEDICINE

## 2025-03-24 PROCEDURE — 86901 BLOOD TYPING SEROLOGIC RH(D): CPT

## 2025-03-24 PROCEDURE — 85610 PROTHROMBIN TIME: CPT

## 2025-03-24 PROCEDURE — 85730 THROMBOPLASTIN TIME PARTIAL: CPT

## 2025-03-24 PROCEDURE — 99285 EMERGENCY DEPT VISIT HI MDM: CPT

## 2025-03-24 PROCEDURE — 82746 ASSAY OF FOLIC ACID SERUM: CPT

## 2025-03-24 PROCEDURE — 93005 ELECTROCARDIOGRAM TRACING: CPT | Performed by: EMERGENCY MEDICINE

## 2025-03-24 PROCEDURE — 80053 COMPREHEN METABOLIC PANEL: CPT

## 2025-03-24 PROCEDURE — 83550 IRON BINDING TEST: CPT

## 2025-03-24 PROCEDURE — 2580000003 HC RX 258: Performed by: INTERNAL MEDICINE

## 2025-03-24 PROCEDURE — 2500000003 HC RX 250 WO HCPCS: Performed by: INTERNAL MEDICINE

## 2025-03-24 RX ORDER — SODIUM CHLORIDE 9 MG/ML
INJECTION, SOLUTION INTRAVENOUS CONTINUOUS
Status: DISCONTINUED | OUTPATIENT
Start: 2025-03-24 | End: 2025-03-25

## 2025-03-24 RX ORDER — ONDANSETRON 4 MG/1
4 TABLET, ORALLY DISINTEGRATING ORAL EVERY 8 HOURS PRN
Status: DISCONTINUED | OUTPATIENT
Start: 2025-03-24 | End: 2025-04-01 | Stop reason: HOSPADM

## 2025-03-24 RX ORDER — ONDANSETRON 2 MG/ML
4 INJECTION INTRAMUSCULAR; INTRAVENOUS EVERY 6 HOURS PRN
Status: DISCONTINUED | OUTPATIENT
Start: 2025-03-24 | End: 2025-04-01 | Stop reason: HOSPADM

## 2025-03-24 RX ORDER — 0.9 % SODIUM CHLORIDE 0.9 %
500 INTRAVENOUS SOLUTION INTRAVENOUS ONCE
Status: COMPLETED | OUTPATIENT
Start: 2025-03-24 | End: 2025-03-24

## 2025-03-24 RX ORDER — SODIUM CHLORIDE 0.9 % (FLUSH) 0.9 %
5-40 SYRINGE (ML) INJECTION EVERY 12 HOURS SCHEDULED
Status: DISCONTINUED | OUTPATIENT
Start: 2025-03-24 | End: 2025-04-01 | Stop reason: HOSPADM

## 2025-03-24 RX ORDER — SODIUM CHLORIDE 0.9 % (FLUSH) 0.9 %
5-40 SYRINGE (ML) INJECTION PRN
Status: DISCONTINUED | OUTPATIENT
Start: 2025-03-24 | End: 2025-04-01 | Stop reason: HOSPADM

## 2025-03-24 RX ORDER — ACETAMINOPHEN 325 MG/1
650 TABLET ORAL EVERY 6 HOURS PRN
Status: DISCONTINUED | OUTPATIENT
Start: 2025-03-24 | End: 2025-03-25

## 2025-03-24 RX ORDER — SODIUM CHLORIDE 9 MG/ML
INJECTION, SOLUTION INTRAVENOUS PRN
Status: DISCONTINUED | OUTPATIENT
Start: 2025-03-24 | End: 2025-04-01 | Stop reason: HOSPADM

## 2025-03-24 RX ORDER — ACETAMINOPHEN 650 MG/1
650 SUPPOSITORY RECTAL EVERY 6 HOURS PRN
Status: DISCONTINUED | OUTPATIENT
Start: 2025-03-24 | End: 2025-03-25

## 2025-03-24 RX ORDER — POLYETHYLENE GLYCOL 3350 17 G/17G
17 POWDER, FOR SOLUTION ORAL DAILY PRN
Status: DISCONTINUED | OUTPATIENT
Start: 2025-03-24 | End: 2025-04-01 | Stop reason: HOSPADM

## 2025-03-24 RX ADMIN — WATER 1000 MG: 1 INJECTION INTRAMUSCULAR; INTRAVENOUS; SUBCUTANEOUS at 16:41

## 2025-03-24 RX ADMIN — SODIUM CHLORIDE 500 ML: 0.9 INJECTION, SOLUTION INTRAVENOUS at 13:15

## 2025-03-24 RX ADMIN — SODIUM CHLORIDE: 0.9 INJECTION, SOLUTION INTRAVENOUS at 17:27

## 2025-03-24 RX ADMIN — Medication 10 ML: at 20:19

## 2025-03-24 RX ADMIN — SODIUM CHLORIDE 80 MG: 9 INJECTION, SOLUTION INTRAVENOUS at 17:27

## 2025-03-24 RX ADMIN — SODIUM CHLORIDE 8 MG/HR: 9 INJECTION, SOLUTION INTRAVENOUS at 17:58

## 2025-03-24 ASSESSMENT — PAIN SCALES - GENERAL
PAINLEVEL_OUTOF10: 3
PAINLEVEL_OUTOF10: 8

## 2025-03-24 ASSESSMENT — PAIN DESCRIPTION - PAIN TYPE
TYPE: SURGICAL PAIN
TYPE: CHRONIC PAIN

## 2025-03-24 ASSESSMENT — PAIN - FUNCTIONAL ASSESSMENT: PAIN_FUNCTIONAL_ASSESSMENT: 0-10

## 2025-03-24 ASSESSMENT — LIFESTYLE VARIABLES
HOW MANY STANDARD DRINKS CONTAINING ALCOHOL DO YOU HAVE ON A TYPICAL DAY: PATIENT DOES NOT DRINK
HOW MANY STANDARD DRINKS CONTAINING ALCOHOL DO YOU HAVE ON A TYPICAL DAY: PATIENT DOES NOT DRINK
HOW OFTEN DO YOU HAVE A DRINK CONTAINING ALCOHOL: NEVER
HOW OFTEN DO YOU HAVE A DRINK CONTAINING ALCOHOL: NEVER

## 2025-03-24 ASSESSMENT — PAIN DESCRIPTION - ORIENTATION
ORIENTATION: OTHER (COMMENT)
ORIENTATION: RIGHT

## 2025-03-24 ASSESSMENT — PAIN DESCRIPTION - LOCATION
LOCATION: HIP
LOCATION: HIP

## 2025-03-24 NOTE — CONSENT
Informed Consent for Blood Component Transfusion Note    I have discussed with the patient the rationale for blood component transfusion; its benefits in treating or preventing fatigue, organ damage, or death; and its risk which includes mild transfusion reactions, rare risk of blood borne infection, or more serious but rare reactions. I have discussed the alternatives to transfusion, including the risk and consequences of not receiving transfusion. The patient had an opportunity to ask questions and had agreed to proceed with transfusion of blood components.    Electronically signed by Samantha Zelaya DO on 3/24/25 at 1:00 PM EDT

## 2025-03-24 NOTE — ED TRIAGE NOTES
Patient arrived to ED by EMS from home. EMS reported C/O generalized weakness. Patient stated that it started this morning. Patient stated that she was trying to get out of bed and felt weak. Patient has a wound vac present on left hand. EMS reported that patient is scheduled to have it removed today. Patient stated that she has chronic bilateral hip pain. Patient rates pain level at an 8 out of 10. EMS reported B/P 152/67, 97% on RA.

## 2025-03-24 NOTE — CARE COORDINATION
Case Management Assessment  Initial Evaluation    Date/Time of Evaluation: 3/24/2025 2:53 PM  Assessment Completed by: Coco Rebollar, RN, BSN    If patient is discharged prior to next notation, then this note serves as note for discharge by case management.    Patient Name: Loren Noyola                   YOB: 1935  Diagnosis: GI bleed [K92.2]                   Date / Time: 3/24/2025 11:44 AM    Patient Admission Status: Inpatient   Readmission Risk (Low < 19, Mod (19-27), High > 27): Readmission Risk Score: 27.2    Current PCP: Evette Johnson MD  PCP verified by CM? Yes    Chart Reviewed: Yes      History Provided by: Patient, Child/Family, Physician, Medical Record, Other (see comment) (pt's daughter in law Dot via telephone & grand dtr Emma)  Patient Orientation: Alert and Oriented    Patient Cognition: Alert    Hospitalization in the last 30 days (Readmission):  No    If yes, Readmission Assessment in  Navigator will be completed.    Advance Directives:      Code Status: Full Code   Patient's Primary Decision Maker is: Legal Next of Kin    Primary Decision Maker: Rupert Noyola - [phone]    Secondary Decision Maker: Jude Noyola - [phone]    Discharge Planning:    Patient lives with: Spouse/Significant Other Type of Home: House  Primary Care Giver: Self  Patient Support Systems include: Spouse/Significant Other, Children, Home Care Staff   Current Financial resources: Medicare  Current community resources: ECF/Home Care, Other (Comment) (PRIVATE HIRE AID W HOUSEKEEPING  [facility] NEEDED WOUND CENTER )  Current services prior to admission: Durable Medical Equipment, Private Duty Homecare, Home Care            Current DME: Shower Chair, Walker, Cane, Wound Vac, Other (Comment) (WOUND VAC SUPPLIES)            Type of Home Care services:  Nursing Services, Skilled Therapy    ADLS  Prior functional level: Assistance with the following:, Mobility,

## 2025-03-24 NOTE — ED PROVIDER NOTES
[facility] 2W ORTHO TELE  EMERGENCY DEPARTMENT ENCOUNTER      Pt Name: Loren Noyola  MRN: [id number]  Birthdate 4/21/1935  Date of evaluation: 3/24/2025  Provider: Samantha Zelaya DO    CHIEF COMPLAINT     No chief complaint on file.        HISTORY OF PRESENT ILLNESS   (Location/Symptom, Timing/Onset, Context/Setting, Quality, Duration, Modifying Factors, Severity)  Note limiting factors.   Loren Noyola is a 89 y.o. female who presents to the emergency department .  Patient comes in stating that she felt very weak when she got up this morning.  She was going to get up from bed to walk approximately 5 feet to the bathroom and felt like her legs were too weak.  She had a little bit of chest discomfort but she does not have it now.  No shortness of breath.  No cough congestion fevers or chills.    HPI    Nursing Notes were reviewed.    REVIEW OF SYSTEMS    (2-9 systems for level 4, 10 or more for level 5)     Review of Systems   Constitutional:  Negative for activity change, appetite change and fatigue.   HENT:  Negative for congestion and sore throat.    Eyes:  Negative for pain and visual disturbance.   Respiratory:  Negative for chest tightness and shortness of breath.    Cardiovascular:  Positive for chest pain.   Gastrointestinal:  Negative for abdominal pain, nausea and vomiting.   Endocrine: Negative for polydipsia.   Genitourinary:  Negative for flank pain and urgency.   Musculoskeletal:  Negative for gait problem and neck stiffness.   Skin:  Negative for rash.   Neurological:  Positive for weakness. Negative for light-headedness and headaches.   Psychiatric/Behavioral:  Negative for confusion and sleep disturbance.        Except as noted above the remainder of the review of systems was reviewed and negative.       PAST MEDICAL HISTORY     Past Medical History:   Diagnosis Date    Bleeding ulcer     CAD (coronary artery disease)     Colitis     Hip fx, left, closed, initial encounter (HCC)

## 2025-03-24 NOTE — ACP (ADVANCE CARE PLANNING)
Advance Care Planning   Healthcare Decision Maker:    Primary Decision Maker: Rupert Noyola - [phone]    Secondary Decision Maker: Jude Noyola - [phone]    Click here to complete Healthcare Decision Makers including selection of the Healthcare Decision Maker Relationship (ie \"Primary\").  Today we documented Decision Maker(s) consistent with Legal Next of Kin hierarchy.       Per dtr in law Dot florian has a POA document, enc to bring in for EMR scanning, I was not able to locate such document in EMR.     Electronically signed by Coco Rebollar, RN, BSN on 3/24/2025 at 3:23 PM

## 2025-03-24 NOTE — H&P
Patient currently feeling weakness tired pale found to have hemoglobin of 6.8 scheduled to get a unit of PRBC on Eliquis for history of coronary disease and A-fib.  Patient recently EGD done showing superficial ulcers in prepyloric area..  Patient's current CKD stage IV., no Cp, has dysuria, awaiting UA to be done, spoke with nursing.        Past Medical History:   Diagnosis Date    Bleeding ulcer     CAD (coronary artery disease)     Colitis     Hip fx, left, closed, initial encounter ([facility]) 02/10/2018    Hyperlipidemia     Hypertension     Osteoarthritis     Osteoporosis     Spinal stenosis      Past Surgical History:   Procedure Laterality Date    APPENDECTOMY      COLECTOMY  1/3/2011    sigmoid colectomy with colostomy    COLECTOMY  7/26/2011    partial colectomy with colostomy closure    CORONARY ARTERY BYPASS GRAFT      HYSTERECTOMY (CERVIX STATUS UNKNOWN)      HYSTERECTOMY, TOTAL ABDOMINAL (CERVIX REMOVED)      IR MIDLINE CATH  11/14/2024    IR MIDLINE CATH 11/14/2024 [facility] SPECIAL PROCEDURE    FL HEMIARTHROPLASTY HIP PARTIAL Left 2/8/2018    HIP HEMIARTHROPLASTY- performed by Bryan Malcolm MD at [facility] OR    SIGMOIDOSCOPY      UPPER GASTROINTESTINAL ENDOSCOPY N/A 11/11/2024    ESOPHAGOGASTRODUODENOSCOPY WITH BX'S performed by Diaz Solares MD at [facility]    UPPER GASTROINTESTINAL ENDOSCOPY N/A 11/16/2024    ESOPHAGOGASTRODUODENOSCOPY performed by Melvi Shahid MD at [facility]    VARICOSE VEIN SURGERY       Social Connections: Not on file     No family history on file.  No current facility-administered medications on file prior to encounter.     Current Outpatient Medications on File Prior to Encounter   Medication Sig Dispense Refill    apixaban (ELIQUIS) 2.5 MG TABS tablet Take 1 tablet by mouth 2 times daily      torsemide (DEMADEX) 20 MG tablet Take 1 tablet by mouth every morning 30 tablet 3    ferrous sulfate (IRON 325) 325 (65 Fe) MG tablet Take 1 tablet by mouth Daily with

## 2025-03-25 ENCOUNTER — PREP FOR PROCEDURE (OUTPATIENT)
Dept: GASTROENTEROLOGY | Age: 89
End: 2025-03-25

## 2025-03-25 ENCOUNTER — ANESTHESIA EVENT (OUTPATIENT)
Dept: ENDOSCOPY | Age: 89
End: 2025-03-25
Payer: MEDICARE

## 2025-03-25 ENCOUNTER — APPOINTMENT (OUTPATIENT)
Dept: WOUND CARE | Facility: CLINIC | Age: OVER 89
End: 2025-03-25
Payer: MEDICARE

## 2025-03-25 ENCOUNTER — ANESTHESIA (OUTPATIENT)
Dept: ENDOSCOPY | Age: 89
End: 2025-03-25
Payer: MEDICARE

## 2025-03-25 PROBLEM — Z71.89 GOALS OF CARE, COUNSELING/DISCUSSION: Status: ACTIVE | Noted: 2025-03-25

## 2025-03-25 PROBLEM — Z51.5 PALLIATIVE CARE ENCOUNTER: Status: ACTIVE | Noted: 2025-03-25

## 2025-03-25 PROBLEM — Z71.89 ADVANCED CARE PLANNING/COUNSELING DISCUSSION: Status: ACTIVE | Noted: 2025-03-25

## 2025-03-25 LAB
ANION GAP SERPL CALCULATED.3IONS-SCNC: 11 MEQ/L (ref 9–15)
BACTERIA URNS QL MICRO: NEGATIVE /HPF
BASOPHILS # BLD: 0 K/UL (ref 0–0.2)
BASOPHILS NFR BLD: 0.2 %
BILIRUB UR QL STRIP: NEGATIVE
BLOOD BANK DISPENSE STATUS: NORMAL
BLOOD BANK PRODUCT CODE: NORMAL
BPU ID: NORMAL
BUN SERPL-MCNC: 126 MG/DL (ref 8–23)
CALCIUM SERPL-MCNC: 8.9 MG/DL (ref 8.5–9.9)
CHLORIDE SERPL-SCNC: 113 MEQ/L (ref 95–107)
CLARITY UR: CLEAR
CO2 SERPL-SCNC: 22 MEQ/L (ref 20–31)
COLOR UR: YELLOW
CREAT SERPL-MCNC: 2.36 MG/DL (ref 0.5–0.9)
DESCRIPTION BLOOD BANK: NORMAL
EKG ATRIAL RATE: 61 BPM
EKG P AXIS: 137 DEGREES
EKG P-R INTERVAL: 234 MS
EKG Q-T INTERVAL: 462 MS
EKG QRS DURATION: 148 MS
EKG QTC CALCULATION (BAZETT): 465 MS
EKG R AXIS: -41 DEGREES
EKG T AXIS: -4 DEGREES
EKG VENTRICULAR RATE: 61 BPM
EOSINOPHIL # BLD: 0.2 K/UL (ref 0–0.7)
EOSINOPHIL NFR BLD: 1.1 %
EPI CELLS #/AREA URNS AUTO: ABNORMAL /HPF (ref 0–5)
ERYTHROCYTE [DISTWIDTH] IN BLOOD BY AUTOMATED COUNT: 16 % (ref 11.5–14.5)
FERRITIN: 166 NG/ML
FOLATE: 5.7 NG/ML (ref 4.8–24.2)
GLUCOSE SERPL-MCNC: 95 MG/DL (ref 70–99)
GLUCOSE UR STRIP-MCNC: NEGATIVE MG/DL
HCT VFR BLD AUTO: 19.8 % (ref 37–47)
HCT VFR BLD AUTO: 21.2 % (ref 37–47)
HCT VFR BLD AUTO: 21.6 % (ref 37–47)
HCT VFR BLD AUTO: 23.2 % (ref 37–47)
HGB BLD-MCNC: 6.7 G/DL (ref 12–16)
HGB BLD-MCNC: 7.2 G/DL (ref 12–16)
HGB BLD-MCNC: 7.2 G/DL (ref 12–16)
HGB BLD-MCNC: 7.7 G/DL (ref 12–16)
HGB UR QL STRIP: ABNORMAL
HYALINE CASTS #/AREA URNS AUTO: ABNORMAL /HPF (ref 0–5)
IRON % SATURATION: 28 % (ref 20–55)
IRON: 63 UG/DL (ref 37–145)
KETONES UR STRIP-MCNC: NEGATIVE MG/DL
LEUKOCYTE ESTERASE UR QL STRIP: ABNORMAL
LYMPHOCYTES # BLD: 1.4 K/UL (ref 1–4.8)
LYMPHOCYTES NFR BLD: 10.7 %
MCH RBC QN AUTO: 29.8 PG (ref 27–31.3)
MCHC RBC AUTO-ENTMCNC: 33.3 % (ref 33–37)
MCV RBC AUTO: 89.3 FL (ref 79.4–94.8)
MONOCYTES # BLD: 1.1 K/UL (ref 0.2–0.8)
MONOCYTES NFR BLD: 8.5 %
NEUTROPHILS # BLD: 10.3 K/UL (ref 1.4–6.5)
NEUTS SEG NFR BLD: 78.7 %
NITRITE UR QL STRIP: NEGATIVE
PH UR STRIP: 5.5 [PH] (ref 5–9)
PLATELET # BLD AUTO: 163 K/UL (ref 130–400)
POTASSIUM SERPL-SCNC: 3.6 MEQ/L (ref 3.4–4.9)
PROT UR STRIP-MCNC: NEGATIVE MG/DL
RBC # BLD AUTO: 2.42 M/UL (ref 4.2–5.4)
RBC #/AREA URNS AUTO: ABNORMAL /HPF (ref 0–5)
SODIUM SERPL-SCNC: 146 MEQ/L (ref 135–144)
SP GR UR STRIP: 1.01 (ref 1–1.03)
TOTAL IRON BINDING CAPACITY: 227 UG/DL (ref 250–450)
UNSATURATED IRON BINDING CAPACITY: 164 UG/DL (ref 112–347)
URINE REFLEX TO CULTURE: YES
UROBILINOGEN UR STRIP-ACNC: 0.2 E.U./DL
VITAMIN B-12: 386 PG/ML (ref 232–1245)
WBC # BLD AUTO: 13.1 K/UL (ref 4.8–10.8)
WBC #/AREA URNS AUTO: >100 /HPF (ref 0–5)

## 2025-03-25 PROCEDURE — 6370000000 HC RX 637 (ALT 250 FOR IP): Performed by: INTERNAL MEDICINE

## 2025-03-25 PROCEDURE — 2500000003 HC RX 250 WO HCPCS: Performed by: INTERNAL MEDICINE

## 2025-03-25 PROCEDURE — 1210000000 HC MED SURG R&B

## 2025-03-25 PROCEDURE — C1889 IMPLANT/INSERT DEVICE, NOC: HCPCS | Performed by: INTERNAL MEDICINE

## 2025-03-25 PROCEDURE — 85018 HEMOGLOBIN: CPT

## 2025-03-25 PROCEDURE — 81001 URINALYSIS AUTO W/SCOPE: CPT

## 2025-03-25 PROCEDURE — 7100000010 HC PHASE II RECOVERY - FIRST 15 MIN: Performed by: INTERNAL MEDICINE

## 2025-03-25 PROCEDURE — 94761 N-INVAS EAR/PLS OXIMETRY MLT: CPT

## 2025-03-25 PROCEDURE — 97162 PT EVAL MOD COMPLEX 30 MIN: CPT

## 2025-03-25 PROCEDURE — 36415 COLL VENOUS BLD VENIPUNCTURE: CPT

## 2025-03-25 PROCEDURE — 7100000011 HC PHASE II RECOVERY - ADDTL 15 MIN: Performed by: INTERNAL MEDICINE

## 2025-03-25 PROCEDURE — 2709999900 HC NON-CHARGEABLE SUPPLY: Performed by: INTERNAL MEDICINE

## 2025-03-25 PROCEDURE — 43255 EGD CONTROL BLEEDING ANY: CPT | Performed by: INTERNAL MEDICINE

## 2025-03-25 PROCEDURE — 87086 URINE CULTURE/COLONY COUNT: CPT

## 2025-03-25 PROCEDURE — 85025 COMPLETE CBC W/AUTO DIFF WBC: CPT

## 2025-03-25 PROCEDURE — 3700000001 HC ADD 15 MINUTES (ANESTHESIA): Performed by: INTERNAL MEDICINE

## 2025-03-25 PROCEDURE — 99223 1ST HOSP IP/OBS HIGH 75: CPT | Performed by: INTERNAL MEDICINE

## 2025-03-25 PROCEDURE — 6360000002 HC RX W HCPCS: Performed by: NURSE ANESTHETIST, CERTIFIED REGISTERED

## 2025-03-25 PROCEDURE — 85014 HEMATOCRIT: CPT

## 2025-03-25 PROCEDURE — 3700000000 HC ANESTHESIA ATTENDED CARE: Performed by: INTERNAL MEDICINE

## 2025-03-25 PROCEDURE — 94640 AIRWAY INHALATION TREATMENT: CPT

## 2025-03-25 PROCEDURE — 0W3P8ZZ CONTROL BLEEDING IN GASTROINTESTINAL TRACT, VIA NATURAL OR ARTIFICIAL OPENING ENDOSCOPIC: ICD-10-PCS | Performed by: INTERNAL MEDICINE

## 2025-03-25 PROCEDURE — 6360000002 HC RX W HCPCS: Performed by: INTERNAL MEDICINE

## 2025-03-25 PROCEDURE — 3609017100 HC EGD: Performed by: INTERNAL MEDICINE

## 2025-03-25 PROCEDURE — 93010 ELECTROCARDIOGRAM REPORT: CPT | Performed by: INTERNAL MEDICINE

## 2025-03-25 PROCEDURE — 99222 1ST HOSP IP/OBS MODERATE 55: CPT

## 2025-03-25 PROCEDURE — 36430 TRANSFUSION BLD/BLD COMPNT: CPT

## 2025-03-25 PROCEDURE — 97166 OT EVAL MOD COMPLEX 45 MIN: CPT

## 2025-03-25 PROCEDURE — 2580000003 HC RX 258: Performed by: INTERNAL MEDICINE

## 2025-03-25 PROCEDURE — 6370000000 HC RX 637 (ALT 250 FOR IP)

## 2025-03-25 PROCEDURE — 80048 BASIC METABOLIC PNL TOTAL CA: CPT

## 2025-03-25 DEVICE — CLIP
Type: IMPLANTABLE DEVICE | Site: STOMACH | Status: FUNCTIONAL
Brand: RESOLUTION 360™ ULTRA CLIP

## 2025-03-25 RX ORDER — LIDOCAINE 4 G/G
1 PATCH TOPICAL DAILY
Status: DISCONTINUED | OUTPATIENT
Start: 2025-03-25 | End: 2025-04-01 | Stop reason: HOSPADM

## 2025-03-25 RX ORDER — LIDOCAINE HYDROCHLORIDE 20 MG/ML
INJECTION, SOLUTION EPIDURAL; INFILTRATION; INTRACAUDAL; PERINEURAL
Status: DISCONTINUED | OUTPATIENT
Start: 2025-03-25 | End: 2025-03-25 | Stop reason: SDUPTHER

## 2025-03-25 RX ORDER — SODIUM CHLORIDE 9 MG/ML
INJECTION, SOLUTION INTRAVENOUS PRN
Status: CANCELLED | OUTPATIENT
Start: 2025-03-25

## 2025-03-25 RX ORDER — SODIUM CHLORIDE 9 MG/ML
INJECTION, SOLUTION INTRAVENOUS PRN
Status: DISCONTINUED | OUTPATIENT
Start: 2025-03-25 | End: 2025-03-28

## 2025-03-25 RX ORDER — ACETAMINOPHEN 650 MG/1
650 SUPPOSITORY RECTAL EVERY 6 HOURS PRN
Status: DISCONTINUED | OUTPATIENT
Start: 2025-03-25 | End: 2025-04-01 | Stop reason: HOSPADM

## 2025-03-25 RX ORDER — FERROUS SULFATE 325(65) MG
325 TABLET ORAL
Status: DISCONTINUED | OUTPATIENT
Start: 2025-03-25 | End: 2025-04-01 | Stop reason: HOSPADM

## 2025-03-25 RX ORDER — ACETAMINOPHEN 650 MG/1
650 SUPPOSITORY RECTAL EVERY 6 HOURS PRN
Status: DISCONTINUED | OUTPATIENT
Start: 2025-03-25 | End: 2025-03-25

## 2025-03-25 RX ORDER — DEXTROSE MONOHYDRATE 50 MG/ML
INJECTION, SOLUTION INTRAVENOUS CONTINUOUS
Status: DISPENSED | OUTPATIENT
Start: 2025-03-25 | End: 2025-03-25

## 2025-03-25 RX ORDER — ACETAMINOPHEN 325 MG/1
650 TABLET ORAL EVERY 6 HOURS PRN
Status: DISCONTINUED | OUTPATIENT
Start: 2025-03-25 | End: 2025-03-25

## 2025-03-25 RX ORDER — ACETAMINOPHEN 325 MG/1
650 TABLET ORAL EVERY 4 HOURS PRN
Status: DISCONTINUED | OUTPATIENT
Start: 2025-03-25 | End: 2025-04-01 | Stop reason: HOSPADM

## 2025-03-25 RX ORDER — PROPOFOL 10 MG/ML
INJECTION, EMULSION INTRAVENOUS
Status: DISCONTINUED | OUTPATIENT
Start: 2025-03-25 | End: 2025-03-25 | Stop reason: SDUPTHER

## 2025-03-25 RX ORDER — METOPROLOL TARTRATE 25 MG/1
25 TABLET, FILM COATED ORAL 2 TIMES DAILY
Status: DISCONTINUED | OUTPATIENT
Start: 2025-03-25 | End: 2025-04-01 | Stop reason: HOSPADM

## 2025-03-25 RX ORDER — SODIUM CHLORIDE 0.9 % (FLUSH) 0.9 %
5-40 SYRINGE (ML) INJECTION EVERY 12 HOURS SCHEDULED
Status: CANCELLED | OUTPATIENT
Start: 2025-03-25

## 2025-03-25 RX ORDER — SODIUM CHLORIDE 9 MG/ML
INJECTION, SOLUTION INTRAVENOUS CONTINUOUS
Status: CANCELLED | OUTPATIENT
Start: 2025-03-25

## 2025-03-25 RX ORDER — SODIUM CHLORIDE 0.9 % (FLUSH) 0.9 %
5-40 SYRINGE (ML) INJECTION PRN
Status: CANCELLED | OUTPATIENT
Start: 2025-03-25

## 2025-03-25 RX ADMIN — Medication 10 ML: at 20:04

## 2025-03-25 RX ADMIN — ACETAMINOPHEN 650 MG: 325 TABLET ORAL at 19:59

## 2025-03-25 RX ADMIN — DICLOFENAC SODIUM 2 G: 10 GEL TOPICAL at 13:43

## 2025-03-25 RX ADMIN — IPRATROPIUM BROMIDE 0.5 MG: 0.5 SOLUTION RESPIRATORY (INHALATION) at 11:40

## 2025-03-25 RX ADMIN — METOPROLOL TARTRATE 25 MG: 25 TABLET, FILM COATED ORAL at 20:00

## 2025-03-25 RX ADMIN — ACETAMINOPHEN 650 MG: 325 TABLET ORAL at 08:07

## 2025-03-25 RX ADMIN — ACETAMINOPHEN 650 MG: 325 TABLET ORAL at 13:44

## 2025-03-25 RX ADMIN — FERROUS SULFATE TAB 325 MG (65 MG ELEMENTAL FE) 325 MG: 325 (65 FE) TAB at 16:18

## 2025-03-25 RX ADMIN — PROPOFOL 50 MG: 10 INJECTION, EMULSION INTRAVENOUS at 12:28

## 2025-03-25 RX ADMIN — WATER 1000 MG: 1 INJECTION INTRAMUSCULAR; INTRAVENOUS; SUBCUTANEOUS at 16:19

## 2025-03-25 RX ADMIN — LIDOCAINE HYDROCHLORIDE 70 MG: 20 INJECTION, SOLUTION EPIDURAL; INFILTRATION; INTRACAUDAL; PERINEURAL at 12:20

## 2025-03-25 RX ADMIN — SODIUM CHLORIDE 8 MG/HR: 9 INJECTION, SOLUTION INTRAVENOUS at 04:53

## 2025-03-25 RX ADMIN — PROPOFOL 120 MG: 10 INJECTION, EMULSION INTRAVENOUS at 12:20

## 2025-03-25 RX ADMIN — DICLOFENAC SODIUM 2 G: 10 GEL TOPICAL at 20:01

## 2025-03-25 RX ADMIN — ACETAMINOPHEN 650 MG: 325 TABLET ORAL at 02:24

## 2025-03-25 RX ADMIN — PROPOFOL 50 MG: 10 INJECTION, EMULSION INTRAVENOUS at 12:24

## 2025-03-25 RX ADMIN — METOPROLOL TARTRATE 25 MG: 25 TABLET, FILM COATED ORAL at 14:32

## 2025-03-25 RX ADMIN — PROPOFOL 50 MG: 10 INJECTION, EMULSION INTRAVENOUS at 12:33

## 2025-03-25 RX ADMIN — DEXTROSE MONOHYDRATE: 50 INJECTION, SOLUTION INTRAVENOUS at 11:27

## 2025-03-25 RX ADMIN — SODIUM CHLORIDE 8 MG/HR: 9 INJECTION, SOLUTION INTRAVENOUS at 14:41

## 2025-03-25 ASSESSMENT — PAIN DESCRIPTION - LOCATION
LOCATION: BACK
LOCATION: HIP

## 2025-03-25 ASSESSMENT — PAIN DESCRIPTION - ORIENTATION
ORIENTATION: RIGHT;LEFT

## 2025-03-25 ASSESSMENT — PAIN DESCRIPTION - DESCRIPTORS
DESCRIPTORS: ACHING

## 2025-03-25 ASSESSMENT — PAIN - FUNCTIONAL ASSESSMENT: PAIN_FUNCTIONAL_ASSESSMENT: NONE - DENIES PAIN

## 2025-03-25 ASSESSMENT — PAIN SCALES - GENERAL
PAINLEVEL_OUTOF10: 7
PAINLEVEL_OUTOF10: 6
PAINLEVEL_OUTOF10: 3
PAINLEVEL_OUTOF10: 3
PAINLEVEL_OUTOF10: 0
PAINLEVEL_OUTOF10: 3

## 2025-03-25 ASSESSMENT — PAIN SCALES - WONG BAKER: WONGBAKER_NUMERICALRESPONSE: NO HURT

## 2025-03-25 ASSESSMENT — ENCOUNTER SYMPTOMS: SHORTNESS OF BREATH: 1

## 2025-03-25 NOTE — RT PROTOCOL NOTE
RT Inhaler-Nebulizer Bronchodilator Protocol Note    There is a bronchodilator order in the chart from a provider indicating to follow the RT Bronchodilator Protocol and there is an “Initiate RT Inhaler-Nebulizer Bronchodilator Protocol” order as well (see protocol at bottom of note).    CXR Findings:  XR CHEST PORTABLE  Result Date: 3/24/2025  No acute process. Suspect mild cardiomegaly.       The findings from the last RT Protocol Assessment were as follows:   History Pulmonary Disease: Smoker 15 pack years or more  Respiratory Pattern: Regular pattern and RR 12-20 bpm  Breath Sounds: Slightly diminished and/or crackles  Cough: Strong, spontaneous, non-productive  Indication for Bronchodilator Therapy: Decreased or absent breath sounds  Bronchodilator Assessment Score: 3    Aerosolized bronchodilator medication orders have been revised according to the RT Inhaler-Nebulizer Bronchodilator Protocol below.    Respiratory Therapist to perform RT Therapy Protocol Assessment initially then follow the protocol.  Repeat RT Therapy Protocol Assessment PRN for score 0-3 or on second treatment, BID, and PRN for scores above 3.    No Indications - adjust the frequency to every 6 hours PRN wheezing or bronchospasm, if no treatments needed after 48 hours then discontinue using Per Protocol order mode.     If indication present, adjust the RT bronchodilator orders based on the Bronchodilator Assessment Score as indicated below.  Use Inhaler orders unless patient has one or more of the following: on home nebulizer, not able to hold breath for 10 seconds, is not alert and oriented, cannot activate and use MDI correctly, or respiratory rate 25 breaths per minute or more, then use the equivalent nebulizer order(s) with same Frequency and PRN reasons based on the score.  If a patient is on this medication at home then do not decrease Frequency below that used at home.    0-3 - enter or revise RT bronchodilator order(s) to equivalent

## 2025-03-25 NOTE — CONSULTS
Inpatient consult to GI  Consult performed by: Kirk Sanchez MD  Consult ordered by: Janelle Henning MD      Patient Name: Loren Noyola  Admit Date: 3/24/2025 11:44 AM  MR #: [id number]  : 1935    Attending Physician: Janelle Henning MD    History of Presenting Illness:      Loren Noyola is a 89 y.o. female on hospital day 1, admitted with weakness. Patient with  has a past medical history of Bleeding ulcer, CAD (coronary artery disease), Colitis, Hip fx, left, closed, initial encounter ([facility]), Hyperlipidemia, Hypertension, Osteoarthritis, Osteoporosis, and Spinal stenosis.    Reason for GI consult: GI bleed, anemia, melena    History Obtained From:  patient, electronic medical record    Patient reports weakness, with walking yesterday morning.  Patient had some chest discomfort, however had resolved in ER.  Patient's hemoglobin upon arrival 6.7, patient received 1 unit PRBC with posttransfusion hemoglobin of 7.5.  Patient reports having dark stools for the past 3 days.  She denies any blood nor mucus in stool.  Patient does endorse intermittent upper and lower abdominal discomfort.  Patient is taking iron supplementation daily.  Patient has history of gastric ulcer in 2024.  Patient denies any change in weight, dysphagia, hematemesis, or hematochezia.  Patient has been taking Protonix 40 mg daily with GERD symptoms well-controlled.     Previous endoscopic evaluation:  EGD 2024, [facility]  Normal esophagus.  Z-line regular, 35 cm from the incisors.  Gastroesophageal flap valve classified as Hill Grade II (fold present, opens with respiration).  Atrophic mucosa in the gastric fundus and gastric body. Previously documented ulcer seen in the prepyloric area and the ulcer was measuring about 8 mm in size and this was a superficial ulcer and no stigmata of recent bleed.  Normal examined duodenum.  No specimens collected.    EGD 2024, [facility] Yates  Normal esophagus. 
diet today.        -  No aspirin, ibuprofen, naproxen, or other non-steroidal           anti-inflammatory drugs for 3 days.        -  [facility].        - GI consult team to follow Procedure Code(s):        - 59092, Esophagogastroduodenoscopy, flexible, transoral; with           control of bleeding, any method Diagnosis Code(s):        - K92.1, Melena (includes Hematochezia)        - D62, Acute posthemorrhagic anemia        - K92.2, Gastrointestinal hemorrhage, unspecified        - K25.4, Chronic or unspecified gastric ulcer with hemorrhage        - K25.9, Gastric ulcer, unspecified as acute or chronic, without           hemorrhage or perforation CPT(R) - 2023 copyright American Medical Association. All Rights Reserved.       The CPT codes, CCI edits and ICD codes generated are intended as       suggestions and were generated based on input data.  These codes are       preliminary and upon  review may be revised to meet current       compliance and payer requirements.  The provider is responsible for       the final determination of appropriate codes, and modifiers. Kirk Sanchez MD This document has been electronically signed. Note Initiated:3/25/2025 Note Completed:3/25/2025 12:54 PM    XR CHEST PORTABLE  Result Date: 3/24/2025  EXAMINATION: ONE XRAY VIEW OF THE CHEST 3/24/2025 12:14 pm COMPARISON: None. HISTORY: ORDERING SYSTEM PROVIDED HISTORY: Chest Pain TECHNOLOGIST PROVIDED HISTORY: Reason for exam:->Chest Pain What reading provider will be dictating this exam?->CRC FINDINGS: The lungs are without acute focal process.  There is no effusion or pneumothorax. The cardiomediastinal silhouette is without acute process. The osseous structures are without acute process.  Pacemaker present.     No acute process. Suspect mild cardiomegaly.          Assessment and plan:  Palliative care encounter:   Chronic pain syndrome   Lidocaine 4% patch  Voltaren gel apply BID only.

## 2025-03-25 NOTE — ANESTHESIA PRE PROCEDURE
Department of Anesthesiology  Preprocedure Note       Name:  Loren Noyola   Age:  89 y.o.  :  1935                                          MRN:  [id number]         Date:  3/25/2025      Surgeon: Surgeon(s):  Kirk Sanchez MD    Procedure: Procedure(s):  ESOPHAGOGASTRODUODENOSCOPY    Medications prior to admission:   Prior to Admission medications    Medication Sig Start Date End Date Taking? Authorizing Provider   torsemide (DEMADEX) 20 MG tablet Take 1 tablet by mouth every morning 24  Yes Judson Sosa MD   ferrous sulfate (IRON 325) 325 (65 Fe) MG tablet Take 1 tablet by mouth Daily with supper   Yes Yrn Parsons MD   betamethasone dipropionate 0.05 % lotion Apply 1 Application topically 2 times daily Apply topically 2 times daily.   Yes Yrn Parsons MD   apixaban (ELIQUIS) 2.5 MG TABS tablet Take 1 tablet by mouth 2 times daily   Yes Yrn Parsons MD   losartan (COZAAR) 25 MG tablet Take 1 tablet by mouth 2 times daily   Yes Yrn Parsons MD   metoprolol tartrate (LOPRESSOR) 25 MG tablet Take 1 tablet by mouth 2 times daily   Yes Yrn Parsons MD   Vitamin D (CHOLECALCIFEROL) 50 MCG (2000) TABS tablet Take 1 tablet by mouth Daily with supper 24  Yes Deb Haro DO   aspirin 81 MG EC tablet Take 1 tablet by mouth daily   Yes Yrn Parsons MD   hydrALAZINE (APRESOLINE) 50 MG tablet Take 1 tablet by mouth nightly  Patient not taking: Reported on 3/24/2025    Yrn Parsons MD   ipratropium (ATROVENT HFA) 17 MCG/ACT inhaler Inhale 2 puffs into the lungs 4 times daily SOB    Yrn Parsons MD   polyethylene glycol (MIRALAX) 17 g PACK packet Take 17 g by mouth daily    Yrn Parsons MD   nitroGLYCERIN (NITROSTAT) 0.4 MG SL tablet Place 1 tablet under the tongue every 5 minutes as needed for Chest pain up to max of 3 total doses. If no relief after 1 dose, call 911.    Yrn Parsons MD   calcium

## 2025-03-25 NOTE — ANESTHESIA POSTPROCEDURE EVALUATION
Department of Anesthesiology  Postprocedure Note    Patient: Loren Noyola  MRN: [id number]  YOB: 1935  Date of evaluation: 3/25/2025    Procedure Summary       Date: 03/25/25 [facility]    Anesthesia Start: 1214 Anesthesia Stop: 1245    Procedure: ESOPHAGOGASTRODUODENOSCOPY Diagnosis:       Anemia, unspecified type      Melena      (Anemia, unspecified type [D64.9])      (Melena [K92.1])    Surgeons: Kirk Sanchez MD Responsible Provider: Meg Ojeda APRN - CRNA    Anesthesia Type: MAC ASA Status: 4            Anesthesia Type: No value filed.    Chaz Phase I:      Chaz Phase II:      Anesthesia Post Evaluation    Patient location during evaluation: PACU  Level of consciousness: awake  Pain score: 0  Airway patency: patent  Nausea & Vomiting: no vomiting and no nausea  Cardiovascular status: hemodynamically stable  Respiratory status: acceptable  Hydration status: stable  Pain management: adequate and satisfactory to patient        No notable events documented.

## 2025-03-25 NOTE — CARE COORDINATION
Met with pt and son Bill at bedside. Pt was from home with spouse, whom she cares for. Pt unsure of dc needs at this time, PT/OT pending. Pt to have EGD today.

## 2025-03-25 NOTE — DISCHARGE INSTRUCTIONS
WOUND CARE INSTRUCTION  Left Hand  Discontinue NPWT/Wound VAC  Start the following dressing regimen:  1. Cleanse with saline, dry with 4x4s  2. Apply Hydrofera blue to wound bed (Moisten with few drops of saline prior to application)  3. Cover with 3x3 Border foam dressing  Change dressing every 3 days and as needed  Follow-up with Established Wound Care provider, Dr. Riley, 7 days after discharge

## 2025-03-26 PROBLEM — D62 ACUTE BLOOD LOSS ANEMIA: Status: ACTIVE | Noted: 2025-03-26

## 2025-03-26 LAB
ANION GAP SERPL CALCULATED.3IONS-SCNC: 9 MEQ/L (ref 9–15)
ANISOCYTOSIS BLD QL SMEAR: ABNORMAL
BACTERIA UR CULT: NORMAL
BASOPHILS # BLD: 0 K/UL (ref 0–0.2)
BASOPHILS NFR BLD: 0.2 %
BLOOD BANK DISPENSE STATUS: NORMAL
BLOOD BANK PRODUCT CODE: NORMAL
BPU ID: NORMAL
BUN SERPL-MCNC: 97 MG/DL (ref 8–23)
CALCIUM SERPL-MCNC: 8.6 MG/DL (ref 8.5–9.9)
CHLORIDE SERPL-SCNC: 110 MEQ/L (ref 95–107)
CO2 SERPL-SCNC: 24 MEQ/L (ref 20–31)
CREAT SERPL-MCNC: 1.85 MG/DL (ref 0.5–0.9)
DESCRIPTION BLOOD BANK: NORMAL
EOSINOPHIL # BLD: 0.4 K/UL (ref 0–0.7)
EOSINOPHIL NFR BLD: 3.4 %
ERYTHROCYTE [DISTWIDTH] IN BLOOD BY AUTOMATED COUNT: 17.3 % (ref 11.5–14.5)
GLUCOSE SERPL-MCNC: 88 MG/DL (ref 70–99)
HCT VFR BLD AUTO: 19.6 % (ref 37–47)
HCT VFR BLD AUTO: 23.6 % (ref 37–47)
HCT VFR BLD AUTO: 26.1 % (ref 37–47)
HGB BLD-MCNC: 6.6 G/DL (ref 12–16)
HGB BLD-MCNC: 8.2 G/DL (ref 12–16)
HGB BLD-MCNC: 8.4 G/DL (ref 12–16)
HYPOCHROMIA BLD QL SMEAR: ABNORMAL
LYMPHOCYTES # BLD: 1.4 K/UL (ref 1–4.8)
LYMPHOCYTES NFR BLD: 12.5 %
MCH RBC QN AUTO: 30 PG (ref 27–31.3)
MCHC RBC AUTO-ENTMCNC: 33.7 % (ref 33–37)
MCV RBC AUTO: 89.1 FL (ref 79.4–94.8)
MICROCYTES BLD QL SMEAR: ABNORMAL
MONOCYTES # BLD: 1.1 K/UL (ref 0.2–0.8)
MONOCYTES NFR BLD: 9.8 %
NEUTROPHILS # BLD: 8.1 K/UL (ref 1.4–6.5)
NEUTS SEG NFR BLD: 73.2 %
PLATELET # BLD AUTO: 167 K/UL (ref 130–400)
POIKILOCYTOSIS BLD QL SMEAR: ABNORMAL
POLYCHROMASIA BLD QL SMEAR: ABNORMAL
POTASSIUM SERPL-SCNC: 3.5 MEQ/L (ref 3.4–4.9)
RBC # BLD AUTO: 2.2 M/UL (ref 4.2–5.4)
SLIDE REVIEW: ABNORMAL
SODIUM SERPL-SCNC: 143 MEQ/L (ref 135–144)
WBC # BLD AUTO: 11 K/UL (ref 4.8–10.8)

## 2025-03-26 PROCEDURE — 85018 HEMOGLOBIN: CPT

## 2025-03-26 PROCEDURE — 85014 HEMATOCRIT: CPT

## 2025-03-26 PROCEDURE — 97535 SELF CARE MNGMENT TRAINING: CPT

## 2025-03-26 PROCEDURE — 36430 TRANSFUSION BLD/BLD COMPNT: CPT

## 2025-03-26 PROCEDURE — 2500000003 HC RX 250 WO HCPCS: Performed by: INTERNAL MEDICINE

## 2025-03-26 PROCEDURE — 6370000000 HC RX 637 (ALT 250 FOR IP): Performed by: INTERNAL MEDICINE

## 2025-03-26 PROCEDURE — 97116 GAIT TRAINING THERAPY: CPT

## 2025-03-26 PROCEDURE — 36415 COLL VENOUS BLD VENIPUNCTURE: CPT

## 2025-03-26 PROCEDURE — 6370000000 HC RX 637 (ALT 250 FOR IP)

## 2025-03-26 PROCEDURE — 1210000000 HC MED SURG R&B

## 2025-03-26 PROCEDURE — 99232 SBSQ HOSP IP/OBS MODERATE 35: CPT

## 2025-03-26 PROCEDURE — 6360000002 HC RX W HCPCS: Performed by: INTERNAL MEDICINE

## 2025-03-26 PROCEDURE — 2580000003 HC RX 258: Performed by: INTERNAL MEDICINE

## 2025-03-26 PROCEDURE — 85025 COMPLETE CBC W/AUTO DIFF WBC: CPT

## 2025-03-26 PROCEDURE — 99232 SBSQ HOSP IP/OBS MODERATE 35: CPT | Performed by: INTERNAL MEDICINE

## 2025-03-26 PROCEDURE — 80048 BASIC METABOLIC PNL TOTAL CA: CPT

## 2025-03-26 RX ORDER — ROPINIROLE 0.5 MG/1
0.5 TABLET, FILM COATED ORAL 3 TIMES DAILY
Status: DISCONTINUED | OUTPATIENT
Start: 2025-03-26 | End: 2025-04-01 | Stop reason: HOSPADM

## 2025-03-26 RX ORDER — SODIUM CHLORIDE 9 MG/ML
INJECTION, SOLUTION INTRAVENOUS PRN
Status: DISCONTINUED | OUTPATIENT
Start: 2025-03-26 | End: 2025-03-28

## 2025-03-26 RX ADMIN — ACETAMINOPHEN 650 MG: 325 TABLET ORAL at 15:30

## 2025-03-26 RX ADMIN — ROPINIROLE HYDROCHLORIDE 0.5 MG: 0.5 TABLET, FILM COATED ORAL at 19:55

## 2025-03-26 RX ADMIN — ROPINIROLE HYDROCHLORIDE 0.5 MG: 0.5 TABLET, FILM COATED ORAL at 11:56

## 2025-03-26 RX ADMIN — SODIUM CHLORIDE 8 MG/HR: 9 INJECTION, SOLUTION INTRAVENOUS at 00:57

## 2025-03-26 RX ADMIN — WATER 1000 MG: 1 INJECTION INTRAMUSCULAR; INTRAVENOUS; SUBCUTANEOUS at 15:31

## 2025-03-26 RX ADMIN — DICLOFENAC SODIUM 2 G: 10 GEL TOPICAL at 19:51

## 2025-03-26 RX ADMIN — DICLOFENAC SODIUM 2 G: 10 GEL TOPICAL at 09:01

## 2025-03-26 RX ADMIN — Medication 10 ML: at 19:55

## 2025-03-26 RX ADMIN — SODIUM CHLORIDE, PRESERVATIVE FREE 10 ML: 5 INJECTION INTRAVENOUS at 15:31

## 2025-03-26 RX ADMIN — ACETAMINOPHEN 650 MG: 325 TABLET ORAL at 19:53

## 2025-03-26 RX ADMIN — ACETAMINOPHEN 650 MG: 325 TABLET ORAL at 09:00

## 2025-03-26 RX ADMIN — SODIUM CHLORIDE 8 MG/HR: 9 INJECTION, SOLUTION INTRAVENOUS at 23:16

## 2025-03-26 RX ADMIN — FERROUS SULFATE TAB 325 MG (65 MG ELEMENTAL FE) 325 MG: 325 (65 FE) TAB at 17:07

## 2025-03-26 RX ADMIN — SODIUM CHLORIDE 8 MG/HR: 9 INJECTION, SOLUTION INTRAVENOUS at 12:33

## 2025-03-26 RX ADMIN — ACETAMINOPHEN 650 MG: 325 TABLET ORAL at 03:52

## 2025-03-26 RX ADMIN — Medication 10 ML: at 09:03

## 2025-03-26 RX ADMIN — METOPROLOL TARTRATE 25 MG: 25 TABLET, FILM COATED ORAL at 09:00

## 2025-03-26 RX ADMIN — METOPROLOL TARTRATE 25 MG: 25 TABLET, FILM COATED ORAL at 19:53

## 2025-03-26 ASSESSMENT — PAIN SCALES - GENERAL
PAINLEVEL_OUTOF10: 8
PAINLEVEL_OUTOF10: 7
PAINLEVEL_OUTOF10: 3
PAINLEVEL_OUTOF10: 7

## 2025-03-26 ASSESSMENT — PAIN DESCRIPTION - LOCATION
LOCATION: HIP;BACK
LOCATION: HIP
LOCATION: HIP;BACK

## 2025-03-26 ASSESSMENT — PAIN DESCRIPTION - DESCRIPTORS
DESCRIPTORS: ACHING

## 2025-03-26 ASSESSMENT — PAIN SCALES - WONG BAKER: WONGBAKER_NUMERICALRESPONSE: NO HURT

## 2025-03-26 ASSESSMENT — PAIN DESCRIPTION - ORIENTATION
ORIENTATION: RIGHT;LEFT
ORIENTATION: RIGHT;LEFT;LOWER
ORIENTATION: RIGHT;LEFT;LOWER

## 2025-03-26 NOTE — DISCHARGE INSTR - COC
Continuity of Care Form    Patient Name: Loren Noyola   :  1935  MRN:  [id number]    Admit date:  3/24/2025  Discharge date:  25    Code Status Order: Full Code   Advance Directives:    Date/Time Healthcare Directive Type of Healthcare Directive Copy in Chart Healthcare Agent Appointed Healthcare Agent's Name Healthcare Agent's Phone Number    [phone] Yes, patient has an advance directive for healthcare treatment  Living will  --  --  --  --             Admitting Physician:  Janelle Henning MD  PCP: Evette Johnson MD    Discharging Nurse: MT  Discharging Hospital Unit/Room#: W273/W273-01  Discharging Unit Phone Number: [phone]    Emergency Contact:   Extended Emergency Contact Information  Primary Emergency Contact: Rupert Noyola  Home Phone: [phone]  Relation: Child  Secondary Emergency Contact: Jude Noyola  Home Phone: [phone]  Relation: Child    Past Surgical History:  Past Surgical History:   Procedure Laterality Date    APPENDECTOMY      COLECTOMY  1/3/2011    sigmoid colectomy with colostomy    COLECTOMY  2011    partial colectomy with colostomy closure    CORONARY ARTERY BYPASS GRAFT      HYSTERECTOMY (CERVIX STATUS UNKNOWN)      HYSTERECTOMY, TOTAL ABDOMINAL (CERVIX REMOVED)      IR MIDLINE CATH  2024    IR MIDLINE CATH 2024 [facility] SPECIAL PROCEDURE    OR HEMIARTHROPLASTY HIP PARTIAL Left 2018    HIP HEMIARTHROPLASTY- performed by Bryan Malcolm MD at [facility] OR    SIGMOIDOSCOPY      UPPER GASTROINTESTINAL ENDOSCOPY N/A 2024    ESOPHAGOGASTRODUODENOSCOPY WITH BX'S performed by Diaz Solares MD at [facility]    UPPER GASTROINTESTINAL ENDOSCOPY N/A 2024    ESOPHAGOGASTRODUODENOSCOPY performed by Melvi Shahid MD at [facility]    UPPER GASTROINTESTINAL ENDOSCOPY N/A 3/25/2025    ESOPHAGOGASTRODUODENOSCOPY performed by Kirk Sanchez MD at [facility]    VARICOSE VEIN SURGERY         Immunization History:

## 2025-03-26 NOTE — CARE COORDINATION
CALL PLACED TO [facility]. SPOKE WITH ASIA. PT IS CURRENT WITH SN ONLY. CARMELO DONE, WITH ADDITION OF PT/OT.

## 2025-03-26 NOTE — CARE COORDINATION
LSW MET WITH THE PT AND HER GRANDSON AT THE BEDSIDE TO DISCUSS HER DC NEEDS.  THERAPY IS SUGGESTING THAT SNF MAY BE A GOOD IDEA.  PT SAID THAT SHE IS WEAKER SINCE HER FALL BUT SHE FEELS THAT WITH THE HELP OF HER SPOUSE AND KIDS THAT SHE CAN MANAGE FINE AT HOME.  SHE WOULD LIKE [facility] TO CONTINUE TO FOLLOW TO ASSIST WITH THERAPY.

## 2025-03-27 LAB
ANION GAP SERPL CALCULATED.3IONS-SCNC: 7 MEQ/L (ref 9–15)
BASOPHILS # BLD: 0 K/UL (ref 0–0.2)
BASOPHILS NFR BLD: 0.3 %
BUN SERPL-MCNC: 69 MG/DL (ref 8–23)
CALCIUM SERPL-MCNC: 8.3 MG/DL (ref 8.5–9.9)
CHLORIDE SERPL-SCNC: 113 MEQ/L (ref 95–107)
CO2 SERPL-SCNC: 22 MEQ/L (ref 20–31)
CREAT SERPL-MCNC: 1.52 MG/DL (ref 0.5–0.9)
EOSINOPHIL # BLD: 0.3 K/UL (ref 0–0.7)
EOSINOPHIL NFR BLD: 3.4 %
ERYTHROCYTE [DISTWIDTH] IN BLOOD BY AUTOMATED COUNT: 18.4 % (ref 11.5–14.5)
GLUCOSE SERPL-MCNC: 89 MG/DL (ref 70–99)
HCT VFR BLD AUTO: 23.6 % (ref 37–47)
HCT VFR BLD AUTO: 23.9 % (ref 37–47)
HGB BLD-MCNC: 7.7 G/DL (ref 12–16)
HGB BLD-MCNC: 7.9 G/DL (ref 12–16)
LYMPHOCYTES # BLD: 1.2 K/UL (ref 1–4.8)
LYMPHOCYTES NFR BLD: 12.5 %
MCH RBC QN AUTO: 30 PG (ref 27–31.3)
MCHC RBC AUTO-ENTMCNC: 33.5 % (ref 33–37)
MCV RBC AUTO: 89.7 FL (ref 79.4–94.8)
MONOCYTES # BLD: 1.1 K/UL (ref 0.2–0.8)
MONOCYTES NFR BLD: 11.8 %
NEUTROPHILS # BLD: 6.8 K/UL (ref 1.4–6.5)
NEUTS SEG NFR BLD: 71 %
PLATELET # BLD AUTO: 159 K/UL (ref 130–400)
POTASSIUM SERPL-SCNC: 3.9 MEQ/L (ref 3.4–4.9)
RBC # BLD AUTO: 2.63 M/UL (ref 4.2–5.4)
SODIUM SERPL-SCNC: 142 MEQ/L (ref 135–144)
WBC # BLD AUTO: 9.6 K/UL (ref 4.8–10.8)

## 2025-03-27 PROCEDURE — 85025 COMPLETE CBC W/AUTO DIFF WBC: CPT

## 2025-03-27 PROCEDURE — 6370000000 HC RX 637 (ALT 250 FOR IP): Performed by: INTERNAL MEDICINE

## 2025-03-27 PROCEDURE — 97116 GAIT TRAINING THERAPY: CPT

## 2025-03-27 PROCEDURE — 85018 HEMOGLOBIN: CPT

## 2025-03-27 PROCEDURE — 85014 HEMATOCRIT: CPT

## 2025-03-27 PROCEDURE — 6370000000 HC RX 637 (ALT 250 FOR IP)

## 2025-03-27 PROCEDURE — 1210000000 HC MED SURG R&B

## 2025-03-27 PROCEDURE — 97110 THERAPEUTIC EXERCISES: CPT

## 2025-03-27 PROCEDURE — 6360000002 HC RX W HCPCS: Performed by: STUDENT IN AN ORGANIZED HEALTH CARE EDUCATION/TRAINING PROGRAM

## 2025-03-27 PROCEDURE — 2580000003 HC RX 258: Performed by: STUDENT IN AN ORGANIZED HEALTH CARE EDUCATION/TRAINING PROGRAM

## 2025-03-27 PROCEDURE — 2500000003 HC RX 250 WO HCPCS: Performed by: INTERNAL MEDICINE

## 2025-03-27 PROCEDURE — 6360000002 HC RX W HCPCS: Performed by: INTERNAL MEDICINE

## 2025-03-27 PROCEDURE — 80048 BASIC METABOLIC PNL TOTAL CA: CPT

## 2025-03-27 PROCEDURE — 99231 SBSQ HOSP IP/OBS SF/LOW 25: CPT | Performed by: INTERNAL MEDICINE

## 2025-03-27 PROCEDURE — 36415 COLL VENOUS BLD VENIPUNCTURE: CPT

## 2025-03-27 RX ADMIN — ROPINIROLE HYDROCHLORIDE 0.5 MG: 0.5 TABLET, FILM COATED ORAL at 13:37

## 2025-03-27 RX ADMIN — ACETAMINOPHEN 650 MG: 325 TABLET ORAL at 03:18

## 2025-03-27 RX ADMIN — FERROUS SULFATE TAB 325 MG (65 MG ELEMENTAL FE) 325 MG: 325 (65 FE) TAB at 16:06

## 2025-03-27 RX ADMIN — ACETAMINOPHEN 650 MG: 325 TABLET ORAL at 10:55

## 2025-03-27 RX ADMIN — METOPROLOL TARTRATE 25 MG: 25 TABLET, FILM COATED ORAL at 19:57

## 2025-03-27 RX ADMIN — METOPROLOL TARTRATE 25 MG: 25 TABLET, FILM COATED ORAL at 09:54

## 2025-03-27 RX ADMIN — WATER 1000 MG: 1 INJECTION INTRAMUSCULAR; INTRAVENOUS; SUBCUTANEOUS at 16:07

## 2025-03-27 RX ADMIN — Medication 10 ML: at 19:58

## 2025-03-27 RX ADMIN — ROPINIROLE HYDROCHLORIDE 0.5 MG: 0.5 TABLET, FILM COATED ORAL at 19:57

## 2025-03-27 RX ADMIN — ROPINIROLE HYDROCHLORIDE 0.5 MG: 0.5 TABLET, FILM COATED ORAL at 09:54

## 2025-03-27 RX ADMIN — Medication 10 ML: at 10:55

## 2025-03-27 RX ADMIN — SODIUM CHLORIDE 40 MG: 9 INJECTION, SOLUTION INTRAMUSCULAR; INTRAVENOUS; SUBCUTANEOUS at 19:57

## 2025-03-27 RX ADMIN — ACETAMINOPHEN 650 MG: 325 TABLET ORAL at 16:06

## 2025-03-27 RX ADMIN — DICLOFENAC SODIUM 2 G: 10 GEL TOPICAL at 20:12

## 2025-03-27 ASSESSMENT — PAIN SCALES - GENERAL: PAINLEVEL_OUTOF10: 3

## 2025-03-27 ASSESSMENT — PAIN DESCRIPTION - LOCATION: LOCATION: HIP

## 2025-03-27 NOTE — CARE COORDINATION
Dc plan remains home with spouse and [facility], CARMELO has been sent. Per physician rounds pt is not medically cleared for dc today.

## 2025-03-28 LAB
ALBUMIN SERPL-MCNC: 3 G/DL (ref 3.5–4.6)
ALP SERPL-CCNC: 29 U/L (ref 40–130)
ALT SERPL-CCNC: 6 U/L (ref 0–33)
ANION GAP SERPL CALCULATED.3IONS-SCNC: 7 MEQ/L (ref 9–15)
AST SERPL-CCNC: 11 U/L (ref 0–35)
BASOPHILS # BLD: 0 K/UL (ref 0–0.2)
BASOPHILS NFR BLD: 0.2 %
BILIRUB SERPL-MCNC: 0.3 MG/DL (ref 0.2–0.7)
BUN SERPL-MCNC: 51 MG/DL (ref 8–23)
CALCIUM SERPL-MCNC: 8.5 MG/DL (ref 8.5–9.9)
CHLORIDE SERPL-SCNC: 114 MEQ/L (ref 95–107)
CO2 SERPL-SCNC: 25 MEQ/L (ref 20–31)
CREAT SERPL-MCNC: 1.39 MG/DL (ref 0.5–0.9)
EOSINOPHIL # BLD: 0.3 K/UL (ref 0–0.7)
EOSINOPHIL NFR BLD: 2.9 %
ERYTHROCYTE [DISTWIDTH] IN BLOOD BY AUTOMATED COUNT: 18.8 % (ref 11.5–14.5)
GLOBULIN SER CALC-MCNC: 1.5 G/DL (ref 2.3–3.5)
GLUCOSE SERPL-MCNC: 93 MG/DL (ref 70–99)
HCT VFR BLD AUTO: 24.1 % (ref 37–47)
HGB BLD-MCNC: 7.9 G/DL (ref 12–16)
LYMPHOCYTES # BLD: 1.3 K/UL (ref 1–4.8)
LYMPHOCYTES NFR BLD: 13.2 %
MAGNESIUM SERPL-MCNC: 2.3 MG/DL (ref 1.7–2.4)
MCH RBC QN AUTO: 29.8 PG (ref 27–31.3)
MCHC RBC AUTO-ENTMCNC: 32.8 % (ref 33–37)
MCV RBC AUTO: 90.9 FL (ref 79.4–94.8)
MONOCYTES # BLD: 1.1 K/UL (ref 0.2–0.8)
MONOCYTES NFR BLD: 10.9 %
NEUTROPHILS # BLD: 7.1 K/UL (ref 1.4–6.5)
NEUTS SEG NFR BLD: 72 %
PLATELET # BLD AUTO: 178 K/UL (ref 130–400)
POTASSIUM SERPL-SCNC: 3.6 MEQ/L (ref 3.4–4.9)
PROT SERPL-MCNC: 4.5 G/DL (ref 6.3–8)
RBC # BLD AUTO: 2.65 M/UL (ref 4.2–5.4)
SODIUM SERPL-SCNC: 146 MEQ/L (ref 135–144)
WBC # BLD AUTO: 9.9 K/UL (ref 4.8–10.8)

## 2025-03-28 PROCEDURE — 80053 COMPREHEN METABOLIC PANEL: CPT

## 2025-03-28 PROCEDURE — 85025 COMPLETE CBC W/AUTO DIFF WBC: CPT

## 2025-03-28 PROCEDURE — 36415 COLL VENOUS BLD VENIPUNCTURE: CPT

## 2025-03-28 PROCEDURE — 6360000002 HC RX W HCPCS: Performed by: STUDENT IN AN ORGANIZED HEALTH CARE EDUCATION/TRAINING PROGRAM

## 2025-03-28 PROCEDURE — 6360000002 HC RX W HCPCS: Performed by: INTERNAL MEDICINE

## 2025-03-28 PROCEDURE — 2580000003 HC RX 258: Performed by: STUDENT IN AN ORGANIZED HEALTH CARE EDUCATION/TRAINING PROGRAM

## 2025-03-28 PROCEDURE — 2500000003 HC RX 250 WO HCPCS: Performed by: INTERNAL MEDICINE

## 2025-03-28 PROCEDURE — 1210000000 HC MED SURG R&B

## 2025-03-28 PROCEDURE — 6370000000 HC RX 637 (ALT 250 FOR IP)

## 2025-03-28 PROCEDURE — 6370000000 HC RX 637 (ALT 250 FOR IP): Performed by: INTERNAL MEDICINE

## 2025-03-28 PROCEDURE — 83735 ASSAY OF MAGNESIUM: CPT

## 2025-03-28 RX ORDER — HEPARIN SODIUM 5000 [USP'U]/ML
5000 INJECTION, SOLUTION INTRAVENOUS; SUBCUTANEOUS EVERY 8 HOURS SCHEDULED
Status: DISCONTINUED | OUTPATIENT
Start: 2025-03-28 | End: 2025-03-28

## 2025-03-28 RX ORDER — HEPARIN SODIUM 5000 [USP'U]/ML
5000 INJECTION, SOLUTION INTRAVENOUS; SUBCUTANEOUS EVERY 12 HOURS
Status: DISCONTINUED | OUTPATIENT
Start: 2025-03-28 | End: 2025-03-28

## 2025-03-28 RX ORDER — LABETALOL HYDROCHLORIDE 5 MG/ML
5 INJECTION, SOLUTION INTRAVENOUS EVERY 6 HOURS PRN
Status: DISCONTINUED | OUTPATIENT
Start: 2025-03-28 | End: 2025-04-01 | Stop reason: HOSPADM

## 2025-03-28 RX ORDER — DEXTROSE MONOHYDRATE 50 MG/ML
INJECTION, SOLUTION INTRAVENOUS CONTINUOUS
Status: DISCONTINUED | OUTPATIENT
Start: 2025-03-28 | End: 2025-03-29

## 2025-03-28 RX ADMIN — SODIUM CHLORIDE 40 MG: 9 INJECTION, SOLUTION INTRAMUSCULAR; INTRAVENOUS; SUBCUTANEOUS at 21:12

## 2025-03-28 RX ADMIN — DICLOFENAC SODIUM 2 G: 10 GEL TOPICAL at 08:10

## 2025-03-28 RX ADMIN — Medication 10 ML: at 08:10

## 2025-03-28 RX ADMIN — METOPROLOL TARTRATE 25 MG: 25 TABLET, FILM COATED ORAL at 21:11

## 2025-03-28 RX ADMIN — ROPINIROLE HYDROCHLORIDE 0.5 MG: 0.5 TABLET, FILM COATED ORAL at 13:48

## 2025-03-28 RX ADMIN — ACETAMINOPHEN 650 MG: 325 TABLET ORAL at 21:11

## 2025-03-28 RX ADMIN — METOPROLOL TARTRATE 25 MG: 25 TABLET, FILM COATED ORAL at 08:10

## 2025-03-28 RX ADMIN — HEPARIN SODIUM 5000 UNITS: 5000 INJECTION INTRAVENOUS; SUBCUTANEOUS at 06:04

## 2025-03-28 RX ADMIN — ACETAMINOPHEN 650 MG: 325 TABLET ORAL at 06:04

## 2025-03-28 RX ADMIN — ROPINIROLE HYDROCHLORIDE 0.5 MG: 0.5 TABLET, FILM COATED ORAL at 08:09

## 2025-03-28 RX ADMIN — DEXTROSE MONOHYDRATE: 50 INJECTION, SOLUTION INTRAVENOUS at 13:49

## 2025-03-28 RX ADMIN — ROPINIROLE HYDROCHLORIDE 0.5 MG: 0.5 TABLET, FILM COATED ORAL at 21:11

## 2025-03-28 RX ADMIN — WATER 1000 MG: 1 INJECTION INTRAMUSCULAR; INTRAVENOUS; SUBCUTANEOUS at 15:36

## 2025-03-28 RX ADMIN — ACETAMINOPHEN 650 MG: 325 TABLET ORAL at 01:14

## 2025-03-28 RX ADMIN — FERROUS SULFATE TAB 325 MG (65 MG ELEMENTAL FE) 325 MG: 325 (65 FE) TAB at 17:47

## 2025-03-28 RX ADMIN — DICLOFENAC SODIUM 2 G: 10 GEL TOPICAL at 21:12

## 2025-03-28 RX ADMIN — SODIUM CHLORIDE 40 MG: 9 INJECTION, SOLUTION INTRAMUSCULAR; INTRAVENOUS; SUBCUTANEOUS at 08:10

## 2025-03-28 ASSESSMENT — PAIN SCALES - GENERAL
PAINLEVEL_OUTOF10: 3
PAINLEVEL_OUTOF10: 7
PAINLEVEL_OUTOF10: 7

## 2025-03-28 NOTE — CARE COORDINATION
Dc plan is home with [facility] when medically cleared.   Per physician rounds pt is not medically cleared for dc today due to hypernatremia. Possible dc home Saturday.   Update sent to [facility].

## 2025-03-28 NOTE — CARE COORDINATION
MMO benefits obtained.  Precert sent thru portal, awaiting insurance review.  We will notify Rylie Mackey when we receive a decision.

## 2025-03-28 NOTE — FLOWSHEET NOTE
0813- Secure message sent to Dr. Siddiqui regarding patient's blood pressure. New orders received.

## 2025-03-28 NOTE — CARE COORDINATION
VERNAW MET WITH PT AND HER SON TO DISCUSS HER POSSIBLE DC TO HOME TOMORROW.  PT'S SON DOES NOT FEEL IT WILL BE SAFE FOR THE PT TO GO HOME AS WEAK AS SHE IS RIGHT NOW AND HE WOULD LIKE HER TO GO FOR THERAPY.  OPTIONS WERE DISCUSSED AND SHE CHOSE [facility].  REFERRAL WAS CALLED TO JERRY AT [facility] AND SHE WILL REVIEW AND START THE PRECERT IF APPROPRIATE.

## 2025-03-29 LAB
ALBUMIN SERPL-MCNC: 3 G/DL (ref 3.5–4.6)
ALP SERPL-CCNC: 28 U/L (ref 40–130)
ALT SERPL-CCNC: 6 U/L (ref 0–33)
ANION GAP SERPL CALCULATED.3IONS-SCNC: 8 MEQ/L (ref 9–15)
AST SERPL-CCNC: 11 U/L (ref 0–35)
BASOPHILS # BLD: 0 K/UL (ref 0–0.2)
BASOPHILS NFR BLD: 0.1 %
BILIRUB SERPL-MCNC: <0.2 MG/DL (ref 0.2–0.7)
BUN SERPL-MCNC: 42 MG/DL (ref 8–23)
CALCIUM SERPL-MCNC: 8.5 MG/DL (ref 8.5–9.9)
CHLORIDE SERPL-SCNC: 110 MEQ/L (ref 95–107)
CO2 SERPL-SCNC: 24 MEQ/L (ref 20–31)
CREAT SERPL-MCNC: 1.51 MG/DL (ref 0.5–0.9)
EOSINOPHIL # BLD: 0.2 K/UL (ref 0–0.7)
EOSINOPHIL NFR BLD: 2.3 %
ERYTHROCYTE [DISTWIDTH] IN BLOOD BY AUTOMATED COUNT: 18.4 % (ref 11.5–14.5)
GLOBULIN SER CALC-MCNC: 1.9 G/DL (ref 2.3–3.5)
GLUCOSE SERPL-MCNC: 104 MG/DL (ref 70–99)
HCT VFR BLD AUTO: 24.5 % (ref 37–47)
HGB BLD-MCNC: 7.9 G/DL (ref 12–16)
LYMPHOCYTES # BLD: 1.2 K/UL (ref 1–4.8)
LYMPHOCYTES NFR BLD: 13 %
MAGNESIUM SERPL-MCNC: 2.2 MG/DL (ref 1.7–2.4)
MCH RBC QN AUTO: 29.7 PG (ref 27–31.3)
MCHC RBC AUTO-ENTMCNC: 32.2 % (ref 33–37)
MCV RBC AUTO: 92.1 FL (ref 79.4–94.8)
MONOCYTES # BLD: 1.1 K/UL (ref 0.2–0.8)
MONOCYTES NFR BLD: 12.1 %
NEUTROPHILS # BLD: 6.8 K/UL (ref 1.4–6.5)
NEUTS SEG NFR BLD: 71.7 %
PLATELET # BLD AUTO: 195 K/UL (ref 130–400)
POTASSIUM SERPL-SCNC: 3.7 MEQ/L (ref 3.4–4.9)
PROT SERPL-MCNC: 4.9 G/DL (ref 6.3–8)
RBC # BLD AUTO: 2.66 M/UL (ref 4.2–5.4)
SODIUM SERPL-SCNC: 142 MEQ/L (ref 135–144)
WBC # BLD AUTO: 9.5 K/UL (ref 4.8–10.8)

## 2025-03-29 PROCEDURE — 80053 COMPREHEN METABOLIC PANEL: CPT

## 2025-03-29 PROCEDURE — 83735 ASSAY OF MAGNESIUM: CPT

## 2025-03-29 PROCEDURE — 2580000003 HC RX 258: Performed by: STUDENT IN AN ORGANIZED HEALTH CARE EDUCATION/TRAINING PROGRAM

## 2025-03-29 PROCEDURE — 2500000003 HC RX 250 WO HCPCS: Performed by: INTERNAL MEDICINE

## 2025-03-29 PROCEDURE — 6370000000 HC RX 637 (ALT 250 FOR IP)

## 2025-03-29 PROCEDURE — 85025 COMPLETE CBC W/AUTO DIFF WBC: CPT

## 2025-03-29 PROCEDURE — 1210000000 HC MED SURG R&B

## 2025-03-29 PROCEDURE — 36415 COLL VENOUS BLD VENIPUNCTURE: CPT

## 2025-03-29 PROCEDURE — 6370000000 HC RX 637 (ALT 250 FOR IP): Performed by: INTERNAL MEDICINE

## 2025-03-29 PROCEDURE — 6360000002 HC RX W HCPCS: Performed by: STUDENT IN AN ORGANIZED HEALTH CARE EDUCATION/TRAINING PROGRAM

## 2025-03-29 RX ORDER — PANTOPRAZOLE SODIUM 40 MG/1
40 TABLET, DELAYED RELEASE ORAL 2 TIMES DAILY
Status: DISCONTINUED | OUTPATIENT
Start: 2025-03-29 | End: 2025-04-01 | Stop reason: HOSPADM

## 2025-03-29 RX ADMIN — DICLOFENAC SODIUM 2 G: 10 GEL TOPICAL at 21:45

## 2025-03-29 RX ADMIN — ROPINIROLE HYDROCHLORIDE 0.5 MG: 0.5 TABLET, FILM COATED ORAL at 21:17

## 2025-03-29 RX ADMIN — METOPROLOL TARTRATE 25 MG: 25 TABLET, FILM COATED ORAL at 08:38

## 2025-03-29 RX ADMIN — ACETAMINOPHEN 650 MG: 325 TABLET ORAL at 06:29

## 2025-03-29 RX ADMIN — METOPROLOL TARTRATE 25 MG: 25 TABLET, FILM COATED ORAL at 21:17

## 2025-03-29 RX ADMIN — Medication 5 ML: at 08:38

## 2025-03-29 RX ADMIN — SODIUM CHLORIDE 40 MG: 9 INJECTION, SOLUTION INTRAMUSCULAR; INTRAVENOUS; SUBCUTANEOUS at 08:38

## 2025-03-29 RX ADMIN — Medication 10 ML: at 21:20

## 2025-03-29 RX ADMIN — ACETAMINOPHEN 650 MG: 325 TABLET ORAL at 17:14

## 2025-03-29 RX ADMIN — ACETAMINOPHEN 650 MG: 325 TABLET ORAL at 01:33

## 2025-03-29 RX ADMIN — ROPINIROLE HYDROCHLORIDE 0.5 MG: 0.5 TABLET, FILM COATED ORAL at 14:44

## 2025-03-29 RX ADMIN — FERROUS SULFATE TAB 325 MG (65 MG ELEMENTAL FE) 325 MG: 325 (65 FE) TAB at 17:14

## 2025-03-29 RX ADMIN — PANTOPRAZOLE SODIUM 40 MG: 40 TABLET, DELAYED RELEASE ORAL at 21:17

## 2025-03-29 RX ADMIN — DICLOFENAC SODIUM 2 G: 10 GEL TOPICAL at 08:39

## 2025-03-29 RX ADMIN — ACETAMINOPHEN 650 MG: 325 TABLET ORAL at 21:17

## 2025-03-29 RX ADMIN — ACETAMINOPHEN 650 MG: 325 TABLET ORAL at 10:18

## 2025-03-29 RX ADMIN — ROPINIROLE HYDROCHLORIDE 0.5 MG: 0.5 TABLET, FILM COATED ORAL at 08:38

## 2025-03-29 ASSESSMENT — PAIN SCALES - GENERAL
PAINLEVEL_OUTOF10: 6
PAINLEVEL_OUTOF10: 5
PAINLEVEL_OUTOF10: 7
PAINLEVEL_OUTOF10: 5
PAINLEVEL_OUTOF10: 7

## 2025-03-29 ASSESSMENT — PAIN DESCRIPTION - ORIENTATION
ORIENTATION: RIGHT;LEFT
ORIENTATION: RIGHT

## 2025-03-29 ASSESSMENT — PAIN DESCRIPTION - LOCATION
LOCATION: HIP

## 2025-03-29 ASSESSMENT — PAIN DESCRIPTION - DESCRIPTORS: DESCRIPTORS: ACHING

## 2025-03-29 ASSESSMENT — PAIN - FUNCTIONAL ASSESSMENT: PAIN_FUNCTIONAL_ASSESSMENT: ACTIVITIES ARE NOT PREVENTED

## 2025-03-30 LAB
ALBUMIN SERPL-MCNC: 2.8 G/DL (ref 3.5–4.6)
ANION GAP SERPL CALCULATED.3IONS-SCNC: 8 MEQ/L (ref 9–15)
BUN SERPL-MCNC: 35 MG/DL (ref 8–23)
CALCIUM SERPL-MCNC: 8.2 MG/DL (ref 8.5–9.9)
CHLORIDE SERPL-SCNC: 110 MEQ/L (ref 95–107)
CO2 SERPL-SCNC: 24 MEQ/L (ref 20–31)
CREAT SERPL-MCNC: 1.52 MG/DL (ref 0.5–0.9)
ERYTHROCYTE [DISTWIDTH] IN BLOOD BY AUTOMATED COUNT: 18.4 % (ref 11.5–14.5)
GLUCOSE SERPL-MCNC: 102 MG/DL (ref 70–99)
HCT VFR BLD AUTO: 24.7 % (ref 37–47)
HGB BLD-MCNC: 7.8 G/DL (ref 12–16)
MAGNESIUM SERPL-MCNC: 2.1 MG/DL (ref 1.7–2.4)
MCH RBC QN AUTO: 28.8 PG (ref 27–31.3)
MCHC RBC AUTO-ENTMCNC: 31.6 % (ref 33–37)
MCV RBC AUTO: 91.1 FL (ref 79.4–94.8)
PHOSPHATE SERPL-MCNC: 2.7 MG/DL (ref 2.3–4.8)
PLATELET # BLD AUTO: 190 K/UL (ref 130–400)
POTASSIUM SERPL-SCNC: 4.1 MEQ/L (ref 3.4–4.9)
RBC # BLD AUTO: 2.71 M/UL (ref 4.2–5.4)
SODIUM SERPL-SCNC: 142 MEQ/L (ref 135–144)
WBC # BLD AUTO: 13.4 K/UL (ref 4.8–10.8)

## 2025-03-30 PROCEDURE — 85027 COMPLETE CBC AUTOMATED: CPT

## 2025-03-30 PROCEDURE — 97110 THERAPEUTIC EXERCISES: CPT

## 2025-03-30 PROCEDURE — 36415 COLL VENOUS BLD VENIPUNCTURE: CPT

## 2025-03-30 PROCEDURE — 6370000000 HC RX 637 (ALT 250 FOR IP): Performed by: INTERNAL MEDICINE

## 2025-03-30 PROCEDURE — 83735 ASSAY OF MAGNESIUM: CPT

## 2025-03-30 PROCEDURE — 2500000003 HC RX 250 WO HCPCS: Performed by: INTERNAL MEDICINE

## 2025-03-30 PROCEDURE — 97116 GAIT TRAINING THERAPY: CPT

## 2025-03-30 PROCEDURE — 6370000000 HC RX 637 (ALT 250 FOR IP)

## 2025-03-30 PROCEDURE — 80069 RENAL FUNCTION PANEL: CPT

## 2025-03-30 PROCEDURE — 1210000000 HC MED SURG R&B

## 2025-03-30 RX ADMIN — DICLOFENAC SODIUM 2 G: 10 GEL TOPICAL at 07:54

## 2025-03-30 RX ADMIN — PANTOPRAZOLE SODIUM 40 MG: 40 TABLET, DELAYED RELEASE ORAL at 20:58

## 2025-03-30 RX ADMIN — ACETAMINOPHEN 650 MG: 325 TABLET ORAL at 01:46

## 2025-03-30 RX ADMIN — ROPINIROLE HYDROCHLORIDE 0.5 MG: 0.5 TABLET, FILM COATED ORAL at 20:58

## 2025-03-30 RX ADMIN — ACETAMINOPHEN 650 MG: 325 TABLET ORAL at 14:40

## 2025-03-30 RX ADMIN — ROPINIROLE HYDROCHLORIDE 0.5 MG: 0.5 TABLET, FILM COATED ORAL at 14:31

## 2025-03-30 RX ADMIN — Medication 10 ML: at 07:54

## 2025-03-30 RX ADMIN — PANTOPRAZOLE SODIUM 40 MG: 40 TABLET, DELAYED RELEASE ORAL at 07:53

## 2025-03-30 RX ADMIN — METOPROLOL TARTRATE 25 MG: 25 TABLET, FILM COATED ORAL at 07:53

## 2025-03-30 RX ADMIN — METOPROLOL TARTRATE 25 MG: 25 TABLET, FILM COATED ORAL at 20:58

## 2025-03-30 RX ADMIN — FERROUS SULFATE TAB 325 MG (65 MG ELEMENTAL FE) 325 MG: 325 (65 FE) TAB at 18:24

## 2025-03-30 RX ADMIN — ACETAMINOPHEN 650 MG: 325 TABLET ORAL at 19:43

## 2025-03-30 RX ADMIN — ROPINIROLE HYDROCHLORIDE 0.5 MG: 0.5 TABLET, FILM COATED ORAL at 07:53

## 2025-03-30 RX ADMIN — DICLOFENAC SODIUM 2 G: 10 GEL TOPICAL at 20:58

## 2025-03-30 RX ADMIN — Medication 10 ML: at 21:01

## 2025-03-30 ASSESSMENT — PAIN DESCRIPTION - LOCATION
LOCATION: HIP

## 2025-03-30 ASSESSMENT — PAIN DESCRIPTION - ORIENTATION
ORIENTATION: LEFT;RIGHT
ORIENTATION: RIGHT;LEFT
ORIENTATION: RIGHT;LEFT

## 2025-03-30 ASSESSMENT — PAIN SCALES - GENERAL
PAINLEVEL_OUTOF10: 5
PAINLEVEL_OUTOF10: 3
PAINLEVEL_OUTOF10: 3
PAINLEVEL_OUTOF10: 7
PAINLEVEL_OUTOF10: 8

## 2025-03-30 ASSESSMENT — PAIN DESCRIPTION - DESCRIPTORS: DESCRIPTORS: ACHING

## 2025-03-30 NOTE — CARE COORDINATION
SPOKE WITH CHAD AT [facility] AND PATIENT HAS BEEN ACCEPTED PENDING PRE CERT. PER CHAD WITH [facility] PATIENT NEEDS UPDATED PT/OT NOTES. PIERRE WITH PT UPDATED. DISCHARGE PLAN [facility] PENDING PRE CERT AND UPDATED PT/OT NOTES.

## 2025-03-31 LAB
ALBUMIN SERPL-MCNC: 2.8 G/DL (ref 3.5–4.6)
ANION GAP SERPL CALCULATED.3IONS-SCNC: 6 MEQ/L (ref 9–15)
BUN SERPL-MCNC: 34 MG/DL (ref 8–23)
CALCIUM SERPL-MCNC: 8.6 MG/DL (ref 8.5–9.9)
CHLORIDE SERPL-SCNC: 114 MEQ/L (ref 95–107)
CO2 SERPL-SCNC: 24 MEQ/L (ref 20–31)
CREAT SERPL-MCNC: 1.59 MG/DL (ref 0.5–0.9)
ERYTHROCYTE [DISTWIDTH] IN BLOOD BY AUTOMATED COUNT: 17.8 % (ref 11.5–14.5)
GLUCOSE SERPL-MCNC: 93 MG/DL (ref 70–99)
HCT VFR BLD AUTO: 23.9 % (ref 37–47)
HGB BLD-MCNC: 7.6 G/DL (ref 12–16)
MAGNESIUM SERPL-MCNC: 2.1 MG/DL (ref 1.7–2.4)
MCH RBC QN AUTO: 29.2 PG (ref 27–31.3)
MCHC RBC AUTO-ENTMCNC: 31.8 % (ref 33–37)
MCV RBC AUTO: 91.9 FL (ref 79.4–94.8)
PHOSPHATE SERPL-MCNC: 3.1 MG/DL (ref 2.3–4.8)
PLATELET # BLD AUTO: 184 K/UL (ref 130–400)
POTASSIUM SERPL-SCNC: 4.9 MEQ/L (ref 3.4–4.9)
RBC # BLD AUTO: 2.6 M/UL (ref 4.2–5.4)
SODIUM SERPL-SCNC: 144 MEQ/L (ref 135–144)
WBC # BLD AUTO: 9.6 K/UL (ref 4.8–10.8)

## 2025-03-31 PROCEDURE — 97535 SELF CARE MNGMENT TRAINING: CPT

## 2025-03-31 PROCEDURE — 80069 RENAL FUNCTION PANEL: CPT

## 2025-03-31 PROCEDURE — 6370000000 HC RX 637 (ALT 250 FOR IP)

## 2025-03-31 PROCEDURE — 2500000003 HC RX 250 WO HCPCS: Performed by: INTERNAL MEDICINE

## 2025-03-31 PROCEDURE — 1210000000 HC MED SURG R&B

## 2025-03-31 PROCEDURE — 36415 COLL VENOUS BLD VENIPUNCTURE: CPT

## 2025-03-31 PROCEDURE — 6370000000 HC RX 637 (ALT 250 FOR IP): Performed by: INTERNAL MEDICINE

## 2025-03-31 PROCEDURE — 85027 COMPLETE CBC AUTOMATED: CPT

## 2025-03-31 PROCEDURE — 83735 ASSAY OF MAGNESIUM: CPT

## 2025-03-31 RX ORDER — SODIUM CHLORIDE 9 MG/ML
INJECTION, SOLUTION INTRAVENOUS PRN
OUTPATIENT
Start: 2025-03-31

## 2025-03-31 RX ORDER — LIDOCAINE 4 G/G
1 PATCH TOPICAL DAILY
OUTPATIENT
Start: 2025-04-01

## 2025-03-31 RX ORDER — ROPINIROLE 0.5 MG/1
0.5 TABLET, FILM COATED ORAL 3 TIMES DAILY
OUTPATIENT
Start: 2025-03-31

## 2025-03-31 RX ORDER — ONDANSETRON 4 MG/1
4 TABLET, ORALLY DISINTEGRATING ORAL EVERY 8 HOURS PRN
OUTPATIENT
Start: 2025-03-31

## 2025-03-31 RX ORDER — PANTOPRAZOLE SODIUM 40 MG/1
40 TABLET, DELAYED RELEASE ORAL 2 TIMES DAILY
OUTPATIENT
Start: 2025-03-31

## 2025-03-31 RX ORDER — ACETAMINOPHEN 325 MG/1
650 TABLET ORAL EVERY 4 HOURS PRN
OUTPATIENT
Start: 2025-03-31

## 2025-03-31 RX ORDER — POLYETHYLENE GLYCOL 3350 17 G/17G
17 POWDER, FOR SOLUTION ORAL DAILY PRN
OUTPATIENT
Start: 2025-03-31

## 2025-03-31 RX ORDER — SODIUM CHLORIDE 0.9 % (FLUSH) 0.9 %
5-40 SYRINGE (ML) INJECTION EVERY 12 HOURS SCHEDULED
OUTPATIENT
Start: 2025-03-31

## 2025-03-31 RX ORDER — LABETALOL HYDROCHLORIDE 5 MG/ML
5 INJECTION, SOLUTION INTRAVENOUS EVERY 6 HOURS PRN
OUTPATIENT
Start: 2025-03-31

## 2025-03-31 RX ORDER — FERROUS SULFATE 325(65) MG
325 TABLET ORAL
OUTPATIENT
Start: 2025-03-31

## 2025-03-31 RX ORDER — ONDANSETRON 2 MG/ML
4 INJECTION INTRAMUSCULAR; INTRAVENOUS EVERY 6 HOURS PRN
OUTPATIENT
Start: 2025-03-31

## 2025-03-31 RX ORDER — ACETAMINOPHEN 650 MG/1
650 SUPPOSITORY RECTAL EVERY 6 HOURS PRN
OUTPATIENT
Start: 2025-03-31

## 2025-03-31 RX ORDER — SODIUM CHLORIDE 0.9 % (FLUSH) 0.9 %
5-40 SYRINGE (ML) INJECTION PRN
OUTPATIENT
Start: 2025-03-31

## 2025-03-31 RX ORDER — METOPROLOL TARTRATE 25 MG/1
25 TABLET, FILM COATED ORAL 2 TIMES DAILY
OUTPATIENT
Start: 2025-03-31

## 2025-03-31 RX ADMIN — PANTOPRAZOLE SODIUM 40 MG: 40 TABLET, DELAYED RELEASE ORAL at 09:04

## 2025-03-31 RX ADMIN — ROPINIROLE HYDROCHLORIDE 0.5 MG: 0.5 TABLET, FILM COATED ORAL at 20:38

## 2025-03-31 RX ADMIN — ACETAMINOPHEN 650 MG: 325 TABLET ORAL at 18:31

## 2025-03-31 RX ADMIN — ACETAMINOPHEN 650 MG: 325 TABLET ORAL at 13:44

## 2025-03-31 RX ADMIN — Medication 10 ML: at 09:04

## 2025-03-31 RX ADMIN — ROPINIROLE HYDROCHLORIDE 0.5 MG: 0.5 TABLET, FILM COATED ORAL at 13:44

## 2025-03-31 RX ADMIN — PANTOPRAZOLE SODIUM 40 MG: 40 TABLET, DELAYED RELEASE ORAL at 20:38

## 2025-03-31 RX ADMIN — DICLOFENAC SODIUM 2 G: 10 GEL TOPICAL at 20:37

## 2025-03-31 RX ADMIN — ROPINIROLE HYDROCHLORIDE 0.5 MG: 0.5 TABLET, FILM COATED ORAL at 09:03

## 2025-03-31 RX ADMIN — ACETAMINOPHEN 650 MG: 325 TABLET ORAL at 22:48

## 2025-03-31 RX ADMIN — FERROUS SULFATE TAB 325 MG (65 MG ELEMENTAL FE) 325 MG: 325 (65 FE) TAB at 19:16

## 2025-03-31 RX ADMIN — METOPROLOL TARTRATE 25 MG: 25 TABLET, FILM COATED ORAL at 20:38

## 2025-03-31 RX ADMIN — METOPROLOL TARTRATE 25 MG: 25 TABLET, FILM COATED ORAL at 09:03

## 2025-03-31 RX ADMIN — ACETAMINOPHEN 650 MG: 325 TABLET ORAL at 09:03

## 2025-03-31 RX ADMIN — ACETAMINOPHEN 650 MG: 325 TABLET ORAL at 03:44

## 2025-03-31 RX ADMIN — Medication 10 ML: at 22:49

## 2025-03-31 ASSESSMENT — PAIN SCALES - WONG BAKER: WONGBAKER_NUMERICALRESPONSE: NO HURT

## 2025-03-31 ASSESSMENT — PAIN DESCRIPTION - LOCATION
LOCATION: HIP
LOCATION: GENERALIZED;HIP

## 2025-03-31 ASSESSMENT — PAIN SCALES - GENERAL
PAINLEVEL_OUTOF10: 5
PAINLEVEL_OUTOF10: 8
PAINLEVEL_OUTOF10: 3
PAINLEVEL_OUTOF10: 3
PAINLEVEL_OUTOF10: 5
PAINLEVEL_OUTOF10: 2
PAINLEVEL_OUTOF10: 6
PAINLEVEL_OUTOF10: 5
PAINLEVEL_OUTOF10: 6

## 2025-03-31 ASSESSMENT — PAIN DESCRIPTION - DESCRIPTORS: DESCRIPTORS: ACHING;SORE

## 2025-03-31 ASSESSMENT — ENCOUNTER SYMPTOMS
NAUSEA: 0
SHORTNESS OF BREATH: 0
COUGH: 0
VOMITING: 0
DIARRHEA: 0

## 2025-03-31 ASSESSMENT — PAIN DESCRIPTION - ORIENTATION: ORIENTATION: RIGHT;LEFT

## 2025-03-31 NOTE — CARE COORDINATION
[facility] RECEIVED A REQUEST FOR A P2P FOR THE PT TO BE TRANSFERRED TO THEM.  [phone] OPTION 1, THEN OPTION 2.  MUST BE COMPLETED BY END OF DAY 4/3//25.   REFERENCE # [id number].

## 2025-03-31 NOTE — CARE COORDINATION
Quality round completed with care management team. Plan remain to [facility]. Pre-cert started on 3/28/2025.   Pending pre-cert at this time.   Per [facility] supervisor, need updated PT/OT notes.   LSW paged PT/OT.     Electronically signed by KIRTI Knott on 3/31/2025 at 10:17 AM

## 2025-03-31 NOTE — FLOWSHEET NOTE
Son has concerns of possible depression for patient. Per son patient just sits at home and chayo. He is wondering if she could possible start on medication for this.     Perfect serve message sent to Dr. Henning to notify of above request.

## 2025-04-01 VITALS
SYSTOLIC BLOOD PRESSURE: 159 MMHG | OXYGEN SATURATION: 100 % | TEMPERATURE: 97.5 F | WEIGHT: 170 LBS | DIASTOLIC BLOOD PRESSURE: 56 MMHG | RESPIRATION RATE: 18 BRPM | BODY MASS INDEX: 31.28 KG/M2 | HEIGHT: 62 IN | HEART RATE: 64 BPM

## 2025-04-01 PROCEDURE — 2500000003 HC RX 250 WO HCPCS: Performed by: INTERNAL MEDICINE

## 2025-04-01 PROCEDURE — 6370000000 HC RX 637 (ALT 250 FOR IP)

## 2025-04-01 PROCEDURE — 97535 SELF CARE MNGMENT TRAINING: CPT

## 2025-04-01 PROCEDURE — 6360000002 HC RX W HCPCS: Performed by: STUDENT IN AN ORGANIZED HEALTH CARE EDUCATION/TRAINING PROGRAM

## 2025-04-01 PROCEDURE — 6370000000 HC RX 637 (ALT 250 FOR IP): Performed by: INTERNAL MEDICINE

## 2025-04-01 RX ORDER — ROPINIROLE 0.5 MG/1
0.5 TABLET, FILM COATED ORAL 3 TIMES DAILY
Qty: 90 TABLET | Refills: 3 | Status: SHIPPED | OUTPATIENT
Start: 2025-04-01

## 2025-04-01 RX ORDER — PANTOPRAZOLE SODIUM 40 MG/1
TABLET, DELAYED RELEASE ORAL
Qty: 30 TABLET | Refills: 3 | Status: SHIPPED | OUTPATIENT
Start: 2025-04-01

## 2025-04-01 RX ADMIN — Medication 10 ML: at 08:33

## 2025-04-01 RX ADMIN — DICLOFENAC SODIUM 2 G: 10 GEL TOPICAL at 08:34

## 2025-04-01 RX ADMIN — PANTOPRAZOLE SODIUM 40 MG: 40 TABLET, DELAYED RELEASE ORAL at 08:33

## 2025-04-01 RX ADMIN — LABETALOL HYDROCHLORIDE 5 MG: 5 INJECTION, SOLUTION INTRAVENOUS at 08:37

## 2025-04-01 RX ADMIN — ACETAMINOPHEN 650 MG: 325 TABLET ORAL at 08:39

## 2025-04-01 RX ADMIN — ROPINIROLE HYDROCHLORIDE 0.5 MG: 0.5 TABLET, FILM COATED ORAL at 08:33

## 2025-04-01 RX ADMIN — ROPINIROLE HYDROCHLORIDE 0.5 MG: 0.5 TABLET, FILM COATED ORAL at 15:01

## 2025-04-01 RX ADMIN — METOPROLOL TARTRATE 25 MG: 25 TABLET, FILM COATED ORAL at 08:33

## 2025-04-01 ASSESSMENT — PAIN SCALES - GENERAL
PAINLEVEL_OUTOF10: 6
PAINLEVEL_OUTOF10: 4

## 2025-04-01 NOTE — DISCHARGE SUMMARY
Discharge Summary    Date: 4/1/2025  Patient Name: Loren Noyola    YOB: 1935     Age: 89 y.o.    Admit Date: 3/24/2025  Discharge Date: 4/1/2025  Discharge Condition: Fair    Admission Diagnosis  Anemia due to acute blood loss [D62];GI bleed [K92.2];Gastrointestinal hemorrhage with melena [K92.1];Chronic kidney disease, unspecified CKD stage [N18.9]      Discharge Diagnosis  Principal Problem:    GI bleed  Active Problems:    Palliative care encounter    Goals of care, counseling/discussion    Advanced care planning/counseling discussion    Acute blood loss anemia  Resolved Problems:    * No resolved hospital problems. *      Hospital Stay  Narrative of Hospital Course:  Patient comes with  GI bleed on Eliquis, Eliquis was held.  Status post 2 units of PRBC.  Patient also received ropirinol for restless leg syndrome, EGD found to have Bleeding gastric ulcer with visible vessel, hemostasis achieved            with clip placement.  A few other nonbleeding ulcers in the gastric            antrum.  Normal esophagus.         - Large amount of altered blood/coffee-ground material in the            gastric body and fundus, large volume lavage and aspiration to            achieve adequate views.         - Deformed pylorus suggestive of previous peptic ulcer disease,            J-shaped stomach with looping.      Consultants:  IP CONSULT TO GI  IP CONSULT TO GI  IP CONSULT TO PALLIATIVE CARE  IP CONSULT TO GI    Surgeries/procedures Performed:      Treatments:            Discharge Plan/Disposition:  Home    Hospital/Incidental Findings Requiring Follow Up:    Patient Instructions:    Diet:    Activity:Activity as Tolerated  For number of days (if applicable):      Other Instructions:    Provider Follow-Up:   No follow-ups on file.     Significant Diagnostic Studies:    Recent Labs:  Admission on 03/24/2025  No results displayed because visit has over 200 results.    ------------    Radiology last 7

## 2025-04-01 NOTE — PLAN OF CARE
Problem: Chronic Conditions and Co-morbidities  Goal: Patient's chronic conditions and co-morbidity symptoms are monitored and maintained or improved  3/25/2025 0841 by Payal Daniel RN  Outcome: Progressing  Flowsheets (Taken 3/25/2025 0800)  Care Plan - Patient's Chronic Conditions and Co-Morbidity Symptoms are Monitored and Maintained or Improved:   Monitor and assess patient's chronic conditions and comorbid symptoms for stability, deterioration, or improvement   Collaborate with multidisciplinary team to address chronic and comorbid conditions and prevent exacerbation or deterioration   Update acute care plan with appropriate goals if chronic or comorbid symptoms are exacerbated and prevent overall improvement and discharge  3/25/2025 0407 by Christelle Alberts RN  Outcome: Progressing  Flowsheets (Taken 3/24/2025 1621 by Payal Daniel RN)  Care Plan - Patient's Chronic Conditions and Co-Morbidity Symptoms are Monitored and Maintained or Improved:   Monitor and assess patient's chronic conditions and comorbid symptoms for stability, deterioration, or improvement   Update acute care plan with appropriate goals if chronic or comorbid symptoms are exacerbated and prevent overall improvement and discharge   Collaborate with multidisciplinary team to address chronic and comorbid conditions and prevent exacerbation or deterioration     Problem: Discharge Planning  Goal: Discharge to home or other facility with appropriate resources  3/25/2025 0841 by Payal Daniel RN  Outcome: Progressing  Flowsheets (Taken 3/25/2025 0800)  Discharge to home or other facility with appropriate resources:   Identify barriers to discharge with patient and caregiver   Arrange for needed discharge resources and transportation as appropriate   Identify discharge learning needs (meds, wound care, etc)   Arrange for interpreters to assist at discharge as needed   Refer to discharge planning if patient needs post-hospital services based on 
  Problem: Chronic Conditions and Co-morbidities  Goal: Patient's chronic conditions and co-morbidity symptoms are monitored and maintained or improved  3/25/2025 2100 by Christelle Alberts RN  Outcome: Progressing  3/25/2025 0841 by Payal Daniel RN  Outcome: Progressing  Flowsheets (Taken 3/25/2025 0800)  Care Plan - Patient's Chronic Conditions and Co-Morbidity Symptoms are Monitored and Maintained or Improved:   Monitor and assess patient's chronic conditions and comorbid symptoms for stability, deterioration, or improvement   Collaborate with multidisciplinary team to address chronic and comorbid conditions and prevent exacerbation or deterioration   Update acute care plan with appropriate goals if chronic or comorbid symptoms are exacerbated and prevent overall improvement and discharge     Problem: Discharge Planning  Goal: Discharge to home or other facility with appropriate resources  3/25/2025 2100 by Christelle Alberts RN  Outcome: Progressing  3/25/2025 0841 by Payal Daniel RN  Outcome: Progressing  Flowsheets (Taken 3/25/2025 0800)  Discharge to home or other facility with appropriate resources:   Identify barriers to discharge with patient and caregiver   Arrange for needed discharge resources and transportation as appropriate   Identify discharge learning needs (meds, wound care, etc)   Arrange for interpreters to assist at discharge as needed   Refer to discharge planning if patient needs post-hospital services based on physician order or complex needs related to functional status, cognitive ability or social support system     Problem: Pain  Goal: Verbalizes/displays adequate comfort level or baseline comfort level  3/25/2025 2100 by Christelle Alberts RN  Outcome: Progressing  3/25/2025 0841 by Payal Daniel RN  Outcome: Progressing  Flowsheets (Taken 3/25/2025 0720)  Verbalizes/displays adequate comfort level or baseline comfort level:   Encourage patient to monitor pain and request assistance   Assess 
  Problem: Chronic Conditions and Co-morbidities  Goal: Patient's chronic conditions and co-morbidity symptoms are monitored and maintained or improved  3/29/2025 2218 by Mica Knapp RN  Outcome: Progressing  3/29/2025 1236 by Corrie Ren RN  Outcome: Progressing  Flowsheets (Taken 3/25/2025 0800 by Payal Daniel RN)  Care Plan - Patient's Chronic Conditions and Co-Morbidity Symptoms are Monitored and Maintained or Improved:   Monitor and assess patient's chronic conditions and comorbid symptoms for stability, deterioration, or improvement   Collaborate with multidisciplinary team to address chronic and comorbid conditions and prevent exacerbation or deterioration   Update acute care plan with appropriate goals if chronic or comorbid symptoms are exacerbated and prevent overall improvement and discharge     Problem: Discharge Planning  Goal: Discharge to home or other facility with appropriate resources  3/29/2025 2218 by Mica Knapp RN  Outcome: Progressing  3/29/2025 1236 by Corrie Ren RN  Outcome: Progressing  Flowsheets (Taken 3/25/2025 0800 by Payal Daniel RN)  Discharge to home or other facility with appropriate resources:   Identify barriers to discharge with patient and caregiver   Arrange for needed discharge resources and transportation as appropriate   Identify discharge learning needs (meds, wound care, etc)   Arrange for interpreters to assist at discharge as needed   Refer to discharge planning if patient needs post-hospital services based on physician order or complex needs related to functional status, cognitive ability or social support system     Problem: Pain  Goal: Verbalizes/displays adequate comfort level or baseline comfort level  3/29/2025 2218 by Mica Knapp RN  Outcome: Progressing  3/29/2025 1236 by Corrie Ren RN  Outcome: Progressing  Flowsheets (Taken 3/25/2025 0720 by Payal Daniel RN)  Verbalizes/displays adequate comfort level or baseline comfort level:   
  Problem: Chronic Conditions and Co-morbidities  Goal: Patient's chronic conditions and co-morbidity symptoms are monitored and maintained or improved  3/31/2025 1609 by Emma Cox RN  Outcome: Progressing  3/31/2025 0526 by Christelle Alberts RN  Outcome: Progressing     Problem: Discharge Planning  Goal: Discharge to home or other facility with appropriate resources  3/31/2025 1609 by Emma Cox RN  Outcome: Progressing  3/31/2025 0526 by Christelle Alberts RN  Outcome: Progressing     Problem: Pain  Goal: Verbalizes/displays adequate comfort level or baseline comfort level  3/31/2025 1609 by Emma Cox RN  Outcome: Progressing  3/31/2025 0526 by Christelle Alberts RN  Outcome: Progressing     Problem: Safety - Adult  Goal: Free from fall injury  3/31/2025 1609 by Emma Cox RN  Outcome: Progressing  3/31/2025 0526 by Christelle Alberts RN  Outcome: Progressing     Problem: ABCDS Injury Assessment  Goal: Absence of physical injury  3/31/2025 1609 by Emma Cox RN  Outcome: Progressing  3/31/2025 0526 by Christelle Alberts RN  Outcome: Progressing     Problem: Skin/Tissue Integrity  Goal: Skin integrity remains intact  Description: 1.  Monitor for areas of redness and/or skin breakdown  2.  Assess vascular access sites hourly  3.  Every 4-6 hours minimum:  Change oxygen saturation probe site  4.  Every 4-6 hours:  If on nasal continuous positive airway pressure, respiratory therapy assess nares and determine need for appliance change or resting period  3/31/2025 1609 by Emma Cox RN  Outcome: Progressing  3/31/2025 0526 by Christelle Alberts RN  Outcome: Progressing     Problem: Cardiovascular - Adult  Goal: Absence of cardiac dysrhythmias or at baseline  3/31/2025 1609 by Emma Cox RN  Outcome: Progressing  3/31/2025 0526 by Christelle Alberts RN  Outcome: Progressing     Problem: Skin/Tissue Integrity - Adult  Goal: Incisions, wounds, 
  Problem: Chronic Conditions and Co-morbidities  Goal: Patient's chronic conditions and co-morbidity symptoms are monitored and maintained or improved  4/1/2025 0142 by Luis Eason RN  Outcome: Progressing  3/31/2025 1609 by Emma Cox RN  Outcome: Progressing     Problem: Discharge Planning  Goal: Discharge to home or other facility with appropriate resources  4/1/2025 0142 by Luis Eason RN  Outcome: Progressing  3/31/2025 1609 by Emma Cox RN  Outcome: Progressing     Problem: Pain  Goal: Verbalizes/displays adequate comfort level or baseline comfort level  4/1/2025 0142 by Luis Eason RN  Outcome: Progressing  3/31/2025 1609 by Emma Cox RN  Outcome: Progressing     Problem: Safety - Adult  Goal: Free from fall injury  4/1/2025 0142 by Luis Eason RN  Outcome: Progressing  3/31/2025 1609 by Emma Cox RN  Outcome: Progressing     Problem: ABCDS Injury Assessment  Goal: Absence of physical injury  4/1/2025 0142 by Luis Eason RN  Outcome: Progressing  3/31/2025 1609 by Emma Cox RN  Outcome: Progressing     Problem: Skin/Tissue Integrity  Goal: Skin integrity remains intact  Description: 1.  Monitor for areas of redness and/or skin breakdown  2.  Assess vascular access sites hourly  3.  Every 4-6 hours minimum:  Change oxygen saturation probe site  4.  Every 4-6 hours:  If on nasal continuous positive airway pressure, respiratory therapy assess nares and determine need for appliance change or resting period  4/1/2025 0142 by Luis Eason RN  Outcome: Progressing  3/31/2025 1609 by Emma Cox RN  Outcome: Progressing     Problem: Cardiovascular - Adult  Goal: Absence of cardiac dysrhythmias or at baseline  4/1/2025 0142 by Luis Eason RN  Outcome: Progressing  3/31/2025 1609 by Emma Cox RN  Outcome: Progressing     Problem: Skin/Tissue Integrity - Adult  Goal: Incisions, wounds, or drain sites 
  Problem: Chronic Conditions and Co-morbidities  Goal: Patient's chronic conditions and co-morbidity symptoms are monitored and maintained or improved  4/1/2025 1104 by Emma Cox RN  Outcome: Progressing  4/1/2025 0142 by Luis Eason RN  Outcome: Progressing     Problem: Discharge Planning  Goal: Discharge to home or other facility with appropriate resources  4/1/2025 1104 by Emma Cox RN  Outcome: Progressing  4/1/2025 0142 by Luis Eason RN  Outcome: Progressing     Problem: Pain  Goal: Verbalizes/displays adequate comfort level or baseline comfort level  4/1/2025 1104 by Emma Cox RN  Outcome: Progressing  4/1/2025 0142 by Luis Eason RN  Outcome: Progressing     Problem: Safety - Adult  Goal: Free from fall injury  4/1/2025 1104 by Emma Cox RN  Outcome: Progressing  4/1/2025 0142 by Luis Eason RN  Outcome: Progressing     Problem: ABCDS Injury Assessment  Goal: Absence of physical injury  4/1/2025 1104 by Emma Cox RN  Outcome: Progressing  4/1/2025 0142 by Luis Eason RN  Outcome: Progressing     Problem: Skin/Tissue Integrity  Goal: Skin integrity remains intact  Description: 1.  Monitor for areas of redness and/or skin breakdown  2.  Assess vascular access sites hourly  3.  Every 4-6 hours minimum:  Change oxygen saturation probe site  4.  Every 4-6 hours:  If on nasal continuous positive airway pressure, respiratory therapy assess nares and determine need for appliance change or resting period  4/1/2025 1104 by Emma Cox RN  Outcome: Progressing  4/1/2025 0142 by Luis Eason RN  Outcome: Progressing     Problem: Cardiovascular - Adult  Goal: Absence of cardiac dysrhythmias or at baseline  4/1/2025 1104 by Emma Cox RN  Outcome: Progressing  4/1/2025 0142 by Luis Eason RN  Outcome: Progressing     Problem: Skin/Tissue Integrity - Adult  Goal: Incisions, wounds, or drain sites healing 
  Problem: Chronic Conditions and Co-morbidities  Goal: Patient's chronic conditions and co-morbidity symptoms are monitored and maintained or improved  Outcome: Progressing     Problem: Discharge Planning  Goal: Discharge to home or other facility with appropriate resources  3/26/2025 2350 by Rob Swartz RN  Outcome: Progressing  3/26/2025 1513 by Praveena Doll RN  Outcome: Progressing     Problem: Pain  Goal: Verbalizes/displays adequate comfort level or baseline comfort level  Outcome: Progressing     Problem: Safety - Adult  Goal: Free from fall injury  3/26/2025 2350 by Rob Swartz RN  Outcome: Progressing  3/26/2025 1513 by Praveena Doll RN  Outcome: Progressing     Problem: ABCDS Injury Assessment  Goal: Absence of physical injury  Outcome: Progressing     Problem: Skin/Tissue Integrity  Goal: Skin integrity remains intact  Description: 1.  Monitor for areas of redness and/or skin breakdown  2.  Assess vascular access sites hourly  3.  Every 4-6 hours minimum:  Change oxygen saturation probe site  4.  Every 4-6 hours:  If on nasal continuous positive airway pressure, respiratory therapy assess nares and determine need for appliance change or resting period  3/26/2025 2350 by Rob Swartz RN  Outcome: Progressing  3/26/2025 1513 by Praveena Doll RN  Outcome: Progressing     
  Problem: Chronic Conditions and Co-morbidities  Goal: Patient's chronic conditions and co-morbidity symptoms are monitored and maintained or improved  Outcome: Progressing     Problem: Discharge Planning  Goal: Discharge to home or other facility with appropriate resources  Outcome: Progressing     Problem: Pain  Goal: Verbalizes/displays adequate comfort level or baseline comfort level  Outcome: Progressing     Problem: Safety - Adult  Goal: Free from fall injury  Outcome: Progressing     Problem: ABCDS Injury Assessment  Goal: Absence of physical injury  Outcome: Progressing     Problem: Skin/Tissue Integrity  Goal: Skin integrity remains intact  Description: 1.  Monitor for areas of redness and/or skin breakdown  2.  Assess vascular access sites hourly  3.  Every 4-6 hours minimum:  Change oxygen saturation probe site  4.  Every 4-6 hours:  If on nasal continuous positive airway pressure, respiratory therapy assess nares and determine need for appliance change or resting period  Outcome: Progressing     Problem: Cardiovascular - Adult  Goal: Absence of cardiac dysrhythmias or at baseline  Outcome: Progressing     Problem: Skin/Tissue Integrity - Adult  Goal: Incisions, wounds, or drain sites healing without S/S of infection  Outcome: Progressing     Problem: Musculoskeletal - Adult  Goal: Return mobility to safest level of function  Outcome: Progressing  Goal: Return ADL status to a safe level of function  Outcome: Progressing     Problem: Genitourinary - Adult  Goal: Absence of urinary retention  Outcome: Progressing     Problem: Metabolic/Fluid and Electrolytes - Adult  Goal: Electrolytes maintained within normal limits  Outcome: Progressing  Goal: Hemodynamic stability and optimal renal function maintained  Outcome: Progressing     Problem: Hematologic - Adult  Goal: Maintains hematologic stability  Outcome: Progressing     
  Problem: Chronic Conditions and Co-morbidities  Goal: Patient's chronic conditions and co-morbidity symptoms are monitored and maintained or improved  Outcome: Progressing     Problem: Discharge Planning  Goal: Discharge to home or other facility with appropriate resources  Outcome: Progressing     Problem: Pain  Goal: Verbalizes/displays adequate comfort level or baseline comfort level  Outcome: Progressing     Problem: Safety - Adult  Goal: Free from fall injury  Outcome: Progressing     Problem: ABCDS Injury Assessment  Goal: Absence of physical injury  Outcome: Progressing     Problem: Skin/Tissue Integrity  Goal: Skin integrity remains intact  Description: 1.  Monitor for areas of redness and/or skin breakdown  2.  Assess vascular access sites hourly  3.  Every 4-6 hours minimum:  Change oxygen saturation probe site  4.  Every 4-6 hours:  If on nasal continuous positive airway pressure, respiratory therapy assess nares and determine need for appliance change or resting period  Outcome: Progressing     Problem: Cardiovascular - Adult  Goal: Absence of cardiac dysrhythmias or at baseline  Outcome: Progressing     Problem: Skin/Tissue Integrity - Adult  Goal: Incisions, wounds, or drain sites healing without S/S of infection  Outcome: Progressing     Problem: Musculoskeletal - Adult  Goal: Return mobility to safest level of function  Outcome: Progressing  Goal: Return ADL status to a safe level of function  Outcome: Progressing     Problem: Genitourinary - Adult  Goal: Absence of urinary retention  Outcome: Progressing     Problem: Metabolic/Fluid and Electrolytes - Adult  Goal: Electrolytes maintained within normal limits  Outcome: Progressing  Goal: Hemodynamic stability and optimal renal function maintained  Outcome: Progressing     Problem: Hematologic - Adult  Goal: Maintains hematologic stability  Outcome: Progressing     
  Problem: Chronic Conditions and Co-morbidities  Goal: Patient's chronic conditions and co-morbidity symptoms are monitored and maintained or improved  Outcome: Progressing  Flowsheets (Taken 3/24/2025 1621 by Payal Daniel RN)  Care Plan - Patient's Chronic Conditions and Co-Morbidity Symptoms are Monitored and Maintained or Improved:   Monitor and assess patient's chronic conditions and comorbid symptoms for stability, deterioration, or improvement   Update acute care plan with appropriate goals if chronic or comorbid symptoms are exacerbated and prevent overall improvement and discharge   Collaborate with multidisciplinary team to address chronic and comorbid conditions and prevent exacerbation or deterioration     Problem: Discharge Planning  Goal: Discharge to home or other facility with appropriate resources  Outcome: Progressing  Flowsheets (Taken 3/24/2025 1621 by Payal Daniel RN)  Discharge to home or other facility with appropriate resources:   Arrange for needed discharge resources and transportation as appropriate   Identify barriers to discharge with patient and caregiver   Identify discharge learning needs (meds, wound care, etc)   Arrange for interpreters to assist at discharge as needed   Refer to discharge planning if patient needs post-hospital services based on physician order or complex needs related to functional status, cognitive ability or social support system     Problem: Pain  Goal: Verbalizes/displays adequate comfort level or baseline comfort level  Outcome: Progressing  Flowsheets (Taken 3/24/2025 1621 by Payal Daniel RN)  Verbalizes/displays adequate comfort level or baseline comfort level:   Assess pain using appropriate pain scale   Consider cultural and social influences on pain and pain management   Implement non-pharmacological measures as appropriate and evaluate response   Administer analgesics based on type and severity of pain and evaluate response   Notify Licensed Independent 
  Problem: Discharge Planning  Goal: Discharge to home or other facility with appropriate resources  Outcome: Progressing     Problem: Safety - Adult  Goal: Free from fall injury  Outcome: Progressing; no fall/no injury - safety/fall precautions in place     Problem: Skin/Tissue Integrity  Goal: Skin integrity remains intact  Description: 1.  Monitor for areas of redness and/or skin breakdown  2.  Assess vascular access sites hourly    Outcome: Progressing; no new skin breakdown noted     
See OT evaluation for all goals and OT POC. Electronically signed by Evelin Hess OTR/L on 3/25/2025 at 3:33 PM    
Therapy evaluation completed.  Please see daily notes and/or progress notes for details related to planned treatment interventions, goals and functional performance.   
Corrie Ren, RN  Outcome: Progressing  Flowsheets (Taken 3/29/2025 1236)  Hemodynamic stability and optimal renal function maintained:   Monitor labs and assess for signs and symptoms of volume excess or deficit   Monitor response to interventions for patient's volume status, including labs, urine output, blood pressure (other measures as available)   Encourage oral intake as appropriate    Problem: Hematologic - Adult  Goal: Maintains hematologic stability  3/29/2025 1236 by Corrie Ren RN  Outcome: Progressing  Flowsheets (Taken 3/29/2025 1236)  Maintains hematologic stability:   Assess for signs and symptoms of bleeding or hemorrhage   Monitor labs for bleeding or clotting disorders   Administer blood products/factors as ordered    Electronically signed by Corrie Ren RN on 3/29/2025 at 12:40 PM

## 2025-04-01 NOTE — CARE COORDINATION
Per physician P2P has been denied. Call to Emily with [facility] with update of pt discharging home today, she will resume care.

## 2025-04-01 NOTE — CARE COORDINATION
AWAITING P2P TO BE COMPLETED TODAY TO SEE IF THE PT IS APPROVED TO GO TO [facility].  PT'S DAUGHTER-IN-LAW CALLED AND WAS UPDATED.  FAMILY IS CONCERNED ABOUT THE PT GOING HOME AS SHE HAS NO HELP AT HOME AND WOULD HAVE TO BE ABLE TO MANAGE ON HER OWN.

## 2025-04-02 NOTE — PROGRESS NOTES
[facility] Hospitalist Progress Note    Admitting Date and Time: 3/24/2025 11:44 AM  Admit Dx: Anemia due to acute blood loss [D62]  GI bleed [K92.2]  Gastrointestinal hemorrhage with melena [K92.1]  Chronic kidney disease, unspecified CKD stage [N18.9]    Subjective:  Patient is being followed for Anemia due to acute blood loss [D62]  GI bleed [K92.2]  Gastrointestinal hemorrhage with melena [K92.1]  Chronic kidney disease, unspecified CKD stage [N18.9]   Pt was seen and examined today.  Patient had no complaint on examination.  Per RN: No acute events overnight    ROS: denies fever, chills, cp, sob, n/v, HA unless stated above.      pantoprazole  40 mg Oral BID    rOPINIRole  0.5 mg Oral TID    ferrous sulfate  325 mg Oral Dinner    metoprolol tartrate  25 mg Oral BID    lidocaine  1 patch TransDERmal Daily    diclofenac sodium  2 g Topical BID    sodium chloride flush  5-40 mL IntraVENous 2 times per day     labetalol, 5 mg, Q6H PRN  acetaminophen, 650 mg, Q4H PRN   Or  acetaminophen, 650 mg, Q6H PRN  ipratropium, 0.5 mg, Q4H PRN  sodium chloride, , PRN  sodium chloride flush, 5-40 mL, PRN  sodium chloride, , PRN  ondansetron, 4 mg, Q8H PRN   Or  ondansetron, 4 mg, Q6H PRN  polyethylene glycol, 17 g, Daily PRN         Objective:    BP (!) 141/47   Pulse 64   Temp 97.6 °F (36.4 °C) (Oral)   Resp 20   Ht 1.575 m (5' 2\")   Wt 77.1 kg (170 lb)   SpO2 100%   BMI 31.09 kg/m²     General Appearance: alert and oriented to person, place and time and in no acute distress  Skin: warm and dry  Head: normocephalic and atraumatic  Eyes: pupils equal, round, and reactive to light, extraocular eye movements intact, conjunctivae normal  Neck: neck supple and non tender without mass   Pulmonary/Chest: clear to auscultation bilaterally- no wheezes, rales or rhonchi, normal air movement, no respiratory distress  Cardiovascular: normal rate, normal S1 and S2 and no carotid bruits  Abdomen: soft, non-tender, 
       [facility] Hospitalist Progress Note    Admitting Date and Time: 3/24/2025 11:44 AM  Admit Dx: Anemia due to acute blood loss [D62]  GI bleed [K92.2]  Gastrointestinal hemorrhage with melena [K92.1]  Chronic kidney disease, unspecified CKD stage [N18.9]    Subjective:    Pt was seen and examined.  Patient was sleeping on examination.    Per RN: Acute events overnight    ROS: denies fever, chills, cp, sob, n/v, HA unless stated above.      pantoprazole  40 mg Oral BID    rOPINIRole  0.5 mg Oral TID    ferrous sulfate  325 mg Oral Dinner    metoprolol tartrate  25 mg Oral BID    lidocaine  1 patch TransDERmal Daily    diclofenac sodium  2 g Topical BID    sodium chloride flush  5-40 mL IntraVENous 2 times per day     labetalol, 5 mg, Q6H PRN  acetaminophen, 650 mg, Q4H PRN   Or  acetaminophen, 650 mg, Q6H PRN  ipratropium, 0.5 mg, Q4H PRN  sodium chloride, , PRN  sodium chloride flush, 5-40 mL, PRN  sodium chloride, , PRN  ondansetron, 4 mg, Q8H PRN   Or  ondansetron, 4 mg, Q6H PRN  polyethylene glycol, 17 g, Daily PRN         Objective:    BP (!) 155/78   Pulse 65   Temp 97.7 °F (36.5 °C) (Axillary)   Resp 18   Ht 1.575 m (5' 2\")   Wt 77.1 kg (170 lb)   SpO2 96%   BMI 31.09 kg/m²     General Appearance: alert and oriented to person, place and time and in no acute distress  Skin: warm and dry  Head: normocephalic and atraumatic  Eyes: pupils equal, round, and reactive to light, extraocular eye movements intact, conjunctivae normal  Neck: neck supple and non tender without mass   Pulmonary/Chest: clear to auscultation bilaterally- no wheezes, rales or rhonchi, normal air movement, no respiratory distress  Cardiovascular: normal rate, normal S1 and S2 and no carotid bruits  Abdomen: soft, non-tender, non-distended, normal bowel sounds, no masses or organomegaly  Extremities: no cyanosis, no clubbing and no edema  Neurologic: no cranial nerve deficit and speech normal        Recent Labs     03/28/25  0515 
   03/25/25 1145   RT Protocol   History Pulmonary Disease 1   Respiratory pattern 0   Breath sounds 2   Cough 0   Indications for Bronchodilator Therapy Decreased or absent breath sounds   Bronchodilator Assessment Score 3       
[facility] continues to follow. Precert was initiated yesterday, 3/28/25. Still in \"initial clinical review\" for authorization as of this time. Will notify [facility] when precert has been approved for InPatient Skilled Nursing.  
CLINICAL PHARMACY NOTE: MEDS TO BEDS    Total # of Prescriptions Filled: 1   The following medications were delivered to the patient:  Pantoprazole 40 mg Tab  Ropinirole not in stock... Transferring out to [facility] in [place] per patient    Additional Documentation:    
Call received from Sheri at [facility].  Pt was denied for skilled stay at [facility].  Peer to Peer is optional.  Phone number and instructions for P2P given to Allison at [facility].      
Called peer to peer went to voicemail, left a message to call me back.  
Did peer to peer, they said that she can walk 30 feet stand by assit and denies her snif, spoke with amanda   
Gastroenterology Progress Note    Loren Noyola is a 89 y.o. female patient.  Hospitalization Day:2    Chief C/O: GI bleed, anemia, melena    SUBJECTIVE: Patient is tolerating clear liquid diet without difficulty.  Patient denies any nausea, vomiting, nor abdominal pain.  Patient had bowel movement this a.m. which was black and formed in consistency.     Previous endoscopic evaluation:  EGD 3/25/2025, [facility]  Bleeding gastric ulcer with visible vessel, hemostasis achieved with clip placement.  A few other nonbleeding ulcers in the gastric antrum.  Normal esophagus. Large amount of altered blood/coffee-ground material in the gastric body and fundus, large volume lavage and aspiration to achieve adequate views. Deformed pylorus suggestive of previous peptic ulcer disease, J-shaped stomach with looping.  Normal examined duodenum.  No specimens collected.    EGD 11/16/2024, [facility]  Normal esophagus.  Z-line regular, 35 cm from the incisors.  Gastroesophageal flap valve classified as Hill Grade II (fold present, opens with respiration).  Atrophic mucosa in the gastric fundus and gastric body. Previously documented ulcer seen in the prepyloric area and the ulcer was measuring about 8 mm in size and this was a superficial ulcer and no stigmata of recent bleed.  Normal examined duodenum.  No specimens collected.     EGD 11/11/2024, [facility]  Normal esophagus.  Non-bleeding gastric ulcers with a clean ulcer base (Flavio Class III).  Gastric mucosal atrophy.  Biopsied.  Normal examined duodenum. Lax lower esophageal sphincter on retroflexed views  No old or fresh blood seen  Histology: Chronic inactive gastritis with focal interstitial metaplasia, negative for Helicobacter pylori  ROS:  Gastrointestinal ROS: no abdominal pain, change in bowel habits, or black or bloody stools    Physical    VITALS:  BP (!) 147/61   Pulse 67   Temp 98.2 °F (36.8 °C) (Axillary)   Resp 18   Ht 1.575 m (5' 2\")   
Gastroenterology Progress Note    Loren Noyola is a 89 y.o. female patient.  Hospitalization Day:3    Chief C/O: GI bleed, anemia, melena    SUBJECTIVE: Patient tolerating soft diet without difficulty.  She denies any nausea vomiting nor abdominal pain.  Patient did not have bowel movement overnight.    Previous endoscopic evaluation:  EGD 3/25/2025, [facility]  Bleeding gastric ulcer with visible vessel, hemostasis achieved with clip placement.  A few other nonbleeding ulcers in the gastric antrum.  Normal esophagus. Large amount of altered blood/coffee-ground material in the gastric body and fundus, large volume lavage and aspiration to achieve adequate views. Deformed pylorus suggestive of previous peptic ulcer disease, J-shaped stomach with looping.  Normal examined duodenum.  No specimens collected.    EGD 11/16/2024, [facility]  Normal esophagus.  Z-line regular, 35 cm from the incisors.  Gastroesophageal flap valve classified as Hill Grade II (fold present, opens with respiration).  Atrophic mucosa in the gastric fundus and gastric body. Previously documented ulcer seen in the prepyloric area and the ulcer was measuring about 8 mm in size and this was a superficial ulcer and no stigmata of recent bleed.  Normal examined duodenum.  No specimens collected.     EGD 11/11/2024, [facility]  Normal esophagus.  Non-bleeding gastric ulcers with a clean ulcer base (Flavio Class III).  Gastric mucosal atrophy.  Biopsied.  Normal examined duodenum. Lax lower esophageal sphincter on retroflexed views  No old or fresh blood seen  Histology: Chronic inactive gastritis with focal interstitial metaplasia, negative for Helicobacter pylori  ROS:  Gastrointestinal ROS: no abdominal pain, change in bowel habits, or black or bloody stools    Physical    VITALS:  BP (!) 165/59 Comment: Rn Notified  Pulse 69   Temp 97.7 °F (36.5 °C) (Oral)   Resp 16   Ht 1.575 m (5' 2\")   Wt 77.1 kg (170 lb)   SpO2 94%   
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, with stage 1 through stage 4 chronic kidney disease, or unspecified chronic kidney disease 2024    Melena 2024    GI bleeding 2024    Chest pain 2024    Gastric ulcer 2024    Shortness of breath 2024    GI bleed 11/10/2024    History of GI bleed 11/10/2024    Pain of upper abdomen 11/10/2024    Personal history of other diseases of the digestive system 11/10/2024    GIB (gastrointestinal bleeding) 2024    Gastro-esophageal reflux disease without esophagitis 2024    Hyperlipidemia, unspecified 2024    Spinal stenosis of lumbar region with neurogenic claudication 2024    Mixed incontinence 10/25/2024    Urinary incontinence 10/24/2024    CAD (coronary artery disease)     Impaired mobility ADLs dt  thoracic and lumbar spinal stenosis with T12 compression fracture. 10/23/2024    Compression fracture of T12 vertebra with routine healing 10/23/2024    Left kidney mass 10/23/2024    Wedge compression fracture of t11-T12 vertebra, subsequent encounter for fracture with routine healing 10/23/2024    Arthralgia of hip 10/22/2024    Greater trochanteric bursitis 10/22/2024    Gait instability 10/21/2024    Pacemaker 2024    Peripheral vascular disease 2023    
[facility] OCCUPATIONAL THERAPY MED SURG TREATMENT NOTE     Date: 3/31/2025  Patient Name: Loren Noyola        MRN: [id number]  Account: [id number]   : 1935  (89 y.o.)  Room: [place] [address]    Chart Review:    Restrictions  Restrictions/Precautions  Restrictions/Precautions: Fall Risk  Activity Level: Up as Tolerated  Implants Present? : Pacemaker  Position Activity Restriction  Other Position/Activity Restrictions: THR     Safety:  Safety Devices  Type of Devices: All fall risk precautions in place;Call light within reach;Left in chair;Chair alarm in place    Patient's birthday verified: Yes    Subjective:    Subjective: \"Actually yes, my hips are still hurting. I'd say a six\"       Pain at start of treatment: Yes: 6/10    Pain at end of treatment: Yes: 4/10    Location: Hips  Description Aching  Nursing notified: Declined  RN:   Intervention: Repositioned Other: Reports improvement following ambulation    Objective:    ADL Status:  ADL  Feeding: Setup  Grooming: Stand by assistance  UE Bathing: None  UE Bathing Skilled Clinical Factors: States she completed with nursing, anticipate setup  LE Bathing: None  LE Bathing Skilled Clinical Factors: States she completed with nursing, anticipate min A for balance  UE Dressing: Setup  UE Dressing Skilled Clinical Factors: Dons her dressing gown from home  LE Dressing: None  LE Dressing Skilled Clinical Factors: Anticipate min A for balance  Putting On/Taking Off Footwear: Setup  Toileting: None  Toileting Skilled Clinical Factors: States she has been completing, anticipate min A for balance with pants management  Additional Comments: Pt completed partial ADL donning dressing gown and socks, standing at sink to wash hands and face. Mild balance deficits, G- safety awareness during mobility  Toilet Transfers  Toilet Transfer: Unable to assess  Toilet Transfers Comments: Anticipate SBA based on other mobility    EMILY Hose donned: No  If no - why  Not 
Palliative Care Progress Note    Patient: Loren Noyola  Gender: female  YOB: 1935  Unit/Bed: W273/W273-01  CodeStatus: Full Code  Inpatient Treatment Team: Treatment Team:   Janelle Henning MD Jain, Kirk LORENZO, MD Henning, Samantha ANNA, DAVID Davis, Aziza Sanchez, Kirk LORENZO, MD Louis, ANDREW Colon Jacqueline, RN Hancock, Tara L, RCP Downs, Angela R Scali, Gayle, RN  Admit Date:  3/24/2025    Chief Complaint: GI Bleed    History of Presenting Illness:      Loren Noyola is a 89 y.o. female on hospital day 2 with a history of bleeding ulcer, CAD, colitis, hx of hip fx, hld, htn, osteoarthritis, osteoporosis, spinal stenosis.  Patient was brought to the emergency room with fatigue . Patient was admitted in the setting of GI bleed. .    Palliative care was consulted by Dr. Henning for goals of care.     Upon entering the room I find the patient alert and oriented x3, awaiting scope, currently NPO for procedure. Denies fever/chills. Denies functional decline. Patient son at bedside. . Reports significant chronic pain at home in bilateral hips and lower back. Take Tylenol at home.  Discussed goals of care.     3/26/25:  A/o x 3, reports improvement of pain with lidocaine patches and low dose voltaren gel. Has blood running. No family at bedside.     Review of Systems:       Review of Systems   Constitutional:  Positive for activity change, appetite change and fatigue.   HENT:  Negative for trouble swallowing and voice change.    Respiratory:  Negative for cough and shortness of breath.    Gastrointestinal:  Negative for nausea and vomiting.   Genitourinary:  Negative for difficulty urinating.   Neurological:  Positive for weakness.       Physical Examination:       BP (!) 169/51   Pulse 59   Temp 97.3 °F (36.3 °C) (Oral)   Resp 18   Ht 1.575 m (5' 2\")   Wt 77.1 kg (170 lb)   SpO2 100%   BMI 31.09 kg/m²      Physical Exam  Constitutional:       General: She is not in acute 
Physical Therapy  Facility/Department: [facility] MED SURG W273/W273-01  Physical Therapy Discharge      NAME: Loren Noyola    : 1935 (89 y.o.)  MRN: [id number]    Account: [id number]  Gender: female      Patient has been discharged from acute care hospital. DC patient from current PT program.      Electronically signed by Benita Whitley PT on 25 at 1:18 PM EDT    
Physical Therapy Med Surg Daily Treatment Note  Facility/Department: [address] ORTHO TELE  Room: 73/73[facility]       NAME: Loren Noyola  : 1935 (89 y.o.)  MRN: [id number]  CODE STATUS: Full Code    Date of Service: 3/26/2025    Patient Diagnosis(es): Anemia due to acute blood loss [D62]  GI bleed [K92.2]  Gastrointestinal hemorrhage with melena [K92.1]  Chronic kidney disease, unspecified CKD stage [N18.9]   No chief complaint on file.    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, with stage 1 through stage 4 chronic kidney disease, or unspecified chronic kidney disease 2024    Melena 2024    GI bleeding 2024    Chest pain 2024    Gastric ulcer 2024    Shortness of breath 2024    GI bleed 11/10/2024    History of GI bleed 11/10/2024    Pain of upper abdomen 11/10/2024    Personal history of other diseases of the digestive system 11/10/2024    GIB (gastrointestinal bleeding) 2024    Gastro-esophageal reflux disease without esophagitis 2024    Hyperlipidemia, unspecified 2024    Spinal stenosis of lumbar region with neurogenic claudication 2024    Mixed incontinence 10/25/2024    Urinary incontinence 10/24/2024    CAD (coronary artery disease)     Impaired mobility ADLs dt  thoracic and lumbar spinal stenosis with T12 compression fracture. 10/23/2024    Compression fracture of T12 vertebra with routine healing 10/23/2024    Left kidney mass 10/23/2024    Wedge compression fracture of t11-T12 vertebra, subsequent encounter for 
Physical Therapy Med Surg Daily Treatment Note  Facility/Department: [address] ORTHO TELE  Room: [facility]       NAME: Loren Noyola  : 1935 (89 y.o.)  MRN: [id number]  CODE STATUS: Full Code    Date of Service: 3/27/2025    Patient Diagnosis(es): Anemia due to acute blood loss [D62]  GI bleed [K92.2]  Gastrointestinal hemorrhage with melena [K92.1]  Chronic kidney disease, unspecified CKD stage [N18.9]   No chief complaint on file.    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, with stage 1 through stage 4 chronic kidney disease, or unspecified chronic kidney disease 2024    Melena 2024    GI bleeding 2024    Chest pain 2024    Gastric ulcer 2024    Shortness of breath 2024    GI bleed 11/10/2024    History of GI bleed 11/10/2024    Pain of upper abdomen 11/10/2024    Personal history of other diseases of the digestive system 11/10/2024    GIB (gastrointestinal bleeding) 2024    Gastro-esophageal reflux disease without esophagitis 2024    Hyperlipidemia, unspecified 2024    Spinal stenosis of lumbar region with neurogenic claudication 2024    Mixed incontinence 10/25/2024    Urinary incontinence 10/24/2024    CAD (coronary artery disease)     Impaired mobility ADLs dt  thoracic and lumbar spinal stenosis with T12 compression fracture. 10/23/2024    Compression fracture of T12 vertebra with routine healing 10/23/2024    Left kidney mass 10/23/2024    Wedge compression fracture of t11-T12 
Physical Therapy Med Surg Daily Treatment Note  Facility/Department: [address] ORTHO TELE  Room: 73/[facility]       NAME: Loren Noyola  : 1935 (89 y.o.)  MRN: [id number]  CODE STATUS: Full Code    Date of Service: 2025    Patient Diagnosis(es): Anemia due to acute blood loss [D62]  GI bleed [K92.2]  Gastrointestinal hemorrhage with melena [K92.1]  Chronic kidney disease, unspecified CKD stage [N18.9]   No chief complaint on file.    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, with stage 1 through stage 4 chronic kidney disease, or unspecified chronic kidney disease 2024    Melena 2024    GI bleeding 2024    Chest pain 2024    Gastric ulcer 2024    Shortness of breath 2024    GI bleed 11/10/2024    History of GI bleed 11/10/2024    Pain of upper abdomen 11/10/2024    Personal history of other diseases of the digestive system 11/10/2024    GIB (gastrointestinal bleeding) 2024    Gastro-esophageal reflux disease without esophagitis 2024    Hyperlipidemia, unspecified 2024    Spinal stenosis of lumbar region with neurogenic claudication 2024    Mixed incontinence 10/25/2024    Urinary incontinence 10/24/2024    CAD (coronary artery disease)     Impaired mobility ADLs dt  thoracic and lumbar spinal stenosis with T12 compression fracture. 10/23/2024    Compression fracture of T12 vertebra with routine healing 10/23/2024    Left kidney mass 10/23/2024    Wedge compression fracture of t11-T12 
Progress Note  Date:3/25/2025       [facility]  Patient Name:Loren Noyola     YOB: 1935     Age:89 y.o.        Subjective    Subjective:  Symptoms:  She reports malaise and weakness.  No shortness of breath, cough, chest pain, headache, chest pressure, anorexia, diarrhea or anxiety.    Diet:  No nausea or vomiting.       Review of Systems   Respiratory:  Negative for cough and shortness of breath.    Cardiovascular:  Negative for chest pain.   Gastrointestinal:  Negative for anorexia, diarrhea, nausea and vomiting.   Neurological:  Positive for weakness.     Objective         Vitals Last 24 Hours:  TEMPERATURE:  Temp  Av.7 °F (36.5 °C)  Min: 97.2 °F (36.2 °C)  Max: 98 °F (36.7 °C)  RESPIRATIONS RANGE: Resp  Av.2  Min: 14  Max: 22  PULSE OXIMETRY RANGE: SpO2  Av.8 %  Min: 95 %  Max: 100 %  PULSE RANGE: Pulse  Av.4  Min: 61  Max: 79  BLOOD PRESSURE RANGE: Systolic (24hrs), Av , Min:106 , Max:169   ; Diastolic (24hrs), Av, Min:52, Max:72    I/O (24Hr):    Intake/Output Summary (Last 24 hours) at 3/25/2025 1136  Last data filed at 3/25/2025 0704  Gross per 24 hour   Intake 303.75 ml   Output 850 ml   Net -546.25 ml     Objective:  General Appearance:  Comfortable, well-appearing and in no acute distress.    Vital signs: (most recent): Blood pressure (!) 169/63, pulse 79, temperature 97.7 °F (36.5 °C), temperature source Axillary, resp. rate 17, height 1.575 m (5' 2\"), weight 77.1 kg (170 lb), SpO2 98%.    HEENT: Normal HEENT exam.    Lungs:  There are decreased breath sounds.    Heart: S1 normal and S2 normal.    Abdomen: Abdomen is soft.  Bowel sounds are normal.     Pulses: Distal pulses are intact.    Neurological: Patient is alert.    Pupils:  Pupils are equal, round, and reactive to light.    Skin:  Warm.      Labs/Imaging/Diagnostics    Labs:  CBC:  Recent Labs     25  1231 25  1831 25  2341 25  0711   WBC 11.6*  --   --  13.1* 
Progress Note  Date:3/28/2025       Room:73/W273-01  Patient Name:Loren Noyola     YOB: 1935     Age:89 y.o.    Subjective    Pt seen and examined at bedside.  No events overnight.  No hematochezia or melena. States that follows with Dr. Vasquez in regards to cardiology.    Objective         Vitals Last 24 Hours:  TEMPERATURE:  Temp  Av.9 °F (36.6 °C)  Min: 97.7 °F (36.5 °C)  Max: 98.2 °F (36.8 °C)  RESPIRATIONS RANGE: Resp  Av  Min: 16  Max: 23  PULSE OXIMETRY RANGE: SpO2  Av.8 %  Min: 96 %  Max: 99 %  PULSE RANGE: Pulse  Av.6  Min: 61  Max: 64  BLOOD PRESSURE RANGE: Systolic (24hrs), Av , Min:134 , Max:200   ; Diastolic (24hrs), Av, Min:53, Max:65    I/O (24Hr):    Intake/Output Summary (Last 24 hours) at 3/28/2025 1300  Last data filed at 3/28/2025 0637  Gross per 24 hour   Intake 641.18 ml   Output 800 ml   Net -158.82 ml     Objective:  General Appearance:  Comfortable, in no acute distress and ill-appearing (Pleasant and cooperative, conversant, lying comfortably in bed.).    Vital signs: (most recent): Blood pressure (!) 160/55, pulse 61, temperature 98.2 °F (36.8 °C), temperature source Axillary, resp. rate 23, height 1.575 m (5' 2\"), weight 77.1 kg (170 lb), SpO2 98%.  No fever.    Output: Producing urine.    HEENT: Normal HEENT exam.    Lungs:  Normal effort and normal respiratory rate.  There are decreased breath sounds.  No wheezes or rhonchi.    Heart: Normal rate.  S1 normal and S2 normal.  No murmur.   Abdomen: Abdomen is soft.  Bowel sounds are normal.   There is no abdominal tenderness.     Pulses: Distal pulses are intact.    Neurological: Patient is alert.    Pupils:  Pupils are equal, round, and reactive to light.    Skin:  Warm.      Labs/Imaging/Diagnostics    Labs:  CBC:  Recent Labs     25  0456 25  1402 25  0458 25  1415 25  0515   WBC 11.0*  --  9.6  --  9.9   RBC 2.20*  --  2.63*  --  2.65*   HGB 6.6*   
Progress Note  Date:3/28/2025       Room:W273/W273-01  Patient Name:Loren Noyola     YOB: 1935     Age:89 y.o.    Subjective    Pt seen and examined at bedside. I took over her care today. No events overnight. S/p EGD yesterday, Hgb is relatively stable she is tolerating diet. No melena or hematochezia today. Vitals stable, labs reviewed. Discussed plan with the pt at bedside.      Objective         Vitals Last 24 Hours:  TEMPERATURE:  Temp  Av.8 °F (36.6 °C)  Min: 97.7 °F (36.5 °C)  Max: 98.1 °F (36.7 °C)  RESPIRATIONS RANGE: Resp  Av  Min: 16  Max: 18  PULSE OXIMETRY RANGE: SpO2  Av %  Min: 93 %  Max: 99 %  PULSE RANGE: Pulse  Av  Min: 61  Max: 69  BLOOD PRESSURE RANGE: Systolic (24hrs), Av , Min:119 , Max:165   ; Diastolic (24hrs), Av, Min:49, Max:64    I/O (24Hr):    Intake/Output Summary (Last 24 hours) at 3/28/2025 0008  Last data filed at 3/27/2025 2029  Gross per 24 hour   Intake 841.18 ml   Output 350 ml   Net 491.18 ml     Objective:  General Appearance:  Comfortable, in no acute distress and ill-appearing (Pleasant and cooperative, conversant, lying comfortably in bed.).    Vital signs: (most recent): Blood pressure (!) 163/59, pulse 64, temperature 97.7 °F (36.5 °C), temperature source Oral, resp. rate 18, height 1.575 m (5' 2\"), weight 77.1 kg (170 lb), SpO2 99%.  No fever.    Output: Producing urine.    HEENT: Normal HEENT exam.    Lungs:  Normal effort and normal respiratory rate.  There are decreased breath sounds.  No wheezes or rhonchi.    Heart: Normal rate.  S1 normal and S2 normal.  No murmur.   Abdomen: Abdomen is soft.  Bowel sounds are normal.   There is no abdominal tenderness.     Pulses: Distal pulses are intact.    Neurological: Patient is alert.    Pupils:  Pupils are equal, round, and reactive to light.    Skin:  Warm.      Labs/Imaging/Diagnostics    Labs:  CBC:  Recent Labs     25  0711 25  1401 25  0456 
Progress Note  Date:3/31/2025       [facility]  Patient Name:Loren Noyola     YOB: 1935     Age:89 y.o.        Subjective    Subjective:  Symptoms:  She reports malaise and weakness.  No shortness of breath, cough, chest pain, headache, chest pressure, anorexia, diarrhea or anxiety.    Diet:  No nausea or vomiting.       Review of Systems   Respiratory:  Negative for cough and shortness of breath.    Cardiovascular:  Negative for chest pain.   Gastrointestinal:  Negative for anorexia, diarrhea, nausea and vomiting.   Neurological:  Positive for weakness.     Objective         Vitals Last 24 Hours:  TEMPERATURE:  Temp  Av.5 °F (36.4 °C)  Min: 97.3 °F (36.3 °C)  Max: 97.7 °F (36.5 °C)  RESPIRATIONS RANGE: Resp  Av.7  Min: 18  Max: 20  PULSE OXIMETRY RANGE: SpO2  Av.5 %  Min: 99 %  Max: 100 %  PULSE RANGE: Pulse  Av.8  Min: 60  Max: 65  BLOOD PRESSURE RANGE: Systolic (24hrs), Av , Min:118 , Max:161   ; Diastolic (24hrs), Av, Min:51, Max:69    I/O (24Hr):  No intake or output data in the 24 hours ending 25 1345    Objective:  General Appearance:  Comfortable, well-appearing and in no acute distress.    Vital signs: (most recent): Blood pressure (!) 118/51, pulse 60, temperature 97.5 °F (36.4 °C), temperature source Oral, resp. rate 18, height 1.575 m (5' 2\"), weight 77.1 kg (170 lb), SpO2 99%.    HEENT: Normal HEENT exam.    Lungs:  There are decreased breath sounds.    Heart: S1 normal and S2 normal.    Abdomen: Abdomen is soft.  Bowel sounds are normal.     Pulses: Distal pulses are intact.    Neurological: Patient is alert.    Pupils:  Pupils are equal, round, and reactive to light.    Skin:  Warm.      Labs/Imaging/Diagnostics    Labs:  CBC:  Recent Labs     25  0515 25  0516 25  0504   WBC 9.5 13.4* 9.6   RBC 2.66* 2.71* 2.60*   HGB 7.9* 7.8* 7.6*   HCT 24.5* 24.7* 23.9*   MCV 92.1 91.1 91.9   RDW 18.4* 18.4* 17.8*    190 184 
Pt ambulated to bathroom with walker tolerated well, ambulated from bathroom very unsteady stated she felt dizzy, assisted into bed.   
[facility]   MUSIC THERAPY  Declined Visit       Date:  4/1/2025        Patient Name: Loren Noyola       MRN: [id number]        YOB: 1935 (89 y.o.)       Gender: female          RESTRICTIONS/PRECAUTIONS:  Restrictions/Precautions: Fall Risk     Hearing: Within functional limits ([facility])    Subjective/Observation:   Patient declined participating in individual music therapy session at 1:20pm stating she was very tired and wanted to rest instead.      Assessment/Plan:      [] Patient would like to have music therapy, just not today. [facility].      [x] Patient would like to have music therapy another time, however their D/C is before [facility] will be back on unit.        *If patient is still on unit, or readmitted at another time, [facility]-BC will attempt to see patient.      [] Patient does not want music therapy. [facility] will not attempt to see pt again unless pt specifically requests.       JESUS Katz, MT-BC    4/1/2025  Electronically signed by Marietta Faust on 4/1/2025 at 3:11 PM   
Wound Ostomy Continence Nurse  Follow-up Progress Note       NAME:  Loren Noyola  MEDICAL RECORD NUMBER:  [id number]  AGE:  89 y.o.   GENDER:  female  :  1935  TODAY'S DATE:  2025    Subjective:   Wound Identification:  Wound Type: traumatic  Contributing Factors:  hand strike to kitchen sink faucet        Patient Goal of Care:  [x] Wound Healing  [] Odor Control  [] Palliative Care  [] Pain Control   [] Other:     Objective:    BP (!) 192/69   Pulse 65   Temp 97.9 °F (36.6 °C) (Oral)   Resp 20   Ht 1.575 m (5' 2\")   Wt 77.1 kg (170 lb)   SpO2 100%   BMI 31.09 kg/m²   Saúl Risk Score: Saúl Scale Score: 18  Assessment:   Measurements:  Wound 25 Hand Left;Posterior (Active)   Wound Image   25   Wound Etiology Traumatic 25   Dressing Status New dressing applied 25   Wound Cleansed Cleansed with saline 25   Dressing/Treatment Hydrofera blue;Foam 25   Dressing Change Due 25   Wound Length (cm) 0.4 cm 25   Wound Width (cm) 0.2 cm 25   Wound Depth (cm) 0.2 cm 25   Wound Surface Area (cm^2) 0.08 cm^2 25   Change in Wound Size % (l*w) 80 25   Wound Volume (cm^3) 0.016 cm^3 25   Wound Healing % 90 25   Wound Assessment Pink/red 25   Drainage Amount Scant (moist but unmeasurable) 25   Drainage Description Serous 25   Odor None 25   Nel-wound Assessment Intact;Fragile 25   Margins Defined edges 25   Wound Thickness Description not for Pressure Injury Full thickness 25   Number of days: 7     Assessment: Follow up rounding complete at this time for left hand wound. Dressing removed. Patient educated on dressing selection as she had questions about covering wound. Verbalized understanding. Wound cleansed with saline - new photo and measurements taken and placed in 
Pacemaker 06/17/2024    Peripheral vascular disease 11/30/2023    Right bundle branch block (RBBB) 11/30/2023    S/P CABG (coronary artery bypass graft) 11/30/2023    Trochanteric bursitis of left hip 11/30/2023    Vertigo 11/30/2023    Right wrist tendonitis 01/18/2023    Left knee pain 03/16/2018    Symptomatic anemia 02/16/2018     Gait and ADL abnormality dt left subtrochanteric femoral neck fracture and left hemiarthroplasty-[facility] rehab admission February 10, 2018 02/11/2018    Acute cystitis without hematuria 02/11/2018    Closed left hip fracture, initial encounter ([facility]) 02/08/2018    DDD (degenerative disc disease), lumbar 11/20/2017    Essential hypertension 11/20/2017    Lumbar facet arthropathy 11/20/2017    Lumbosacral spondylosis without myelopathy 11/20/2017    Primary osteoarthritis of both knees 11/20/2017    Right lumbosacral radiculopathy 11/20/2017    Constipation 01/08/2016    Foot pain, left 01/29/2015    Left foot pain 01/29/2015    Low back pain radiating to left leg 12/06/2011    Spondylolisthesis of lumbar region 12/06/2011    Cervical spondylosis without myelopathy 05/19/2005    Headache 05/19/2005        Past Medical History:   Diagnosis Date    Bleeding ulcer     CAD (coronary artery disease)     Colitis     Hip fx, left, closed, initial encounter ([facility]) 02/10/2018    Hyperlipidemia     Hypertension     Osteoarthritis     Osteoporosis     Spinal stenosis      Past Surgical History:   Procedure Laterality Date    APPENDECTOMY      COLECTOMY  1/3/2011    sigmoid colectomy with colostomy    COLECTOMY  7/26/2011    partial colectomy with colostomy closure    CORONARY ARTERY BYPASS GRAFT      HYSTERECTOMY (CERVIX STATUS UNKNOWN)      HYSTERECTOMY, TOTAL ABDOMINAL (CERVIX REMOVED)      IR MIDLINE CATH  11/14/2024    IR MIDLINE CATH 11/14/2024 [facility] SPECIAL PROCEDURE    NE HEMIARTHROPLASTY HIP PARTIAL Left 2/8/2018    HIP HEMIARTHROPLASTY- performed by Bryan Malcolm MD at [facility] OR    
vertebra, subsequent encounter for fracture with routine healing 10/23/2024    Arthralgia of hip 10/22/2024    Greater trochanteric bursitis 10/22/2024    Gait instability 10/21/2024    Pacemaker 06/17/2024    Peripheral vascular disease 11/30/2023    Right bundle branch block (RBBB) 11/30/2023    S/P CABG (coronary artery bypass graft) 11/30/2023    Trochanteric bursitis of left hip 11/30/2023    Vertigo 11/30/2023    Right wrist tendonitis 01/18/2023    Left knee pain 03/16/2018    Symptomatic anemia 02/16/2018     Gait and ADL abnormality dt left subtrochanteric femoral neck fracture and left hemiarthroplasty-[facility] rehab admission February 10, 2018 02/11/2018    Acute cystitis without hematuria 02/11/2018    Closed left hip fracture, initial encounter ([facility]) 02/08/2018    DDD (degenerative disc disease), lumbar 11/20/2017    Essential hypertension 11/20/2017    Lumbar facet arthropathy 11/20/2017    Lumbosacral spondylosis without myelopathy 11/20/2017    Primary osteoarthritis of both knees 11/20/2017    Right lumbosacral radiculopathy 11/20/2017    Constipation 01/08/2016    Foot pain, left 01/29/2015    Left foot pain 01/29/2015    Low back pain radiating to left leg 12/06/2011    Spondylolisthesis of lumbar region 12/06/2011    Cervical spondylosis without myelopathy 05/19/2005    Headache 05/19/2005        Past Medical History:   Diagnosis Date    Bleeding ulcer     CAD (coronary artery disease)     Colitis     Hip fx, left, closed, initial encounter ([facility]) 02/10/2018    Hyperlipidemia     Hypertension     Osteoarthritis     Osteoporosis     Spinal stenosis      Past Surgical History:   Procedure Laterality Date    APPENDECTOMY      COLECTOMY  1/3/2011    sigmoid colectomy with colostomy    COLECTOMY  7/26/2011    partial colectomy with colostomy closure    CORONARY ARTERY BYPASS GRAFT      HYSTERECTOMY (CERVIX STATUS UNKNOWN)      HYSTERECTOMY, TOTAL ABDOMINAL (CERVIX REMOVED)      IR MIDLINE CATH  11/14/2024 
telemetry, change fluids to hypotonic solution TCT 55 min  3/26: Transfuse for another PRBC for low hemoglobin.  Patient not bleeding.  Continue current care.  EGD was reviewed with the patient.  Bleeding due to gastric ulcer that has been clipped, add requip for restless leg syndrome TCT 55 min  Electronically signed by Janelle Henning MD on 3/25/25 at 11:36 AM EDT    
03/25/25 0911   Dressing/Treatment Hydrofera blue;Foam 03/25/25 0911   Dressing Change Due 03/28/25 03/25/25 0911   Wound Length (cm) 0.8 cm 03/25/25 0911   Wound Width (cm) 0.5 cm 03/25/25 0911   Wound Depth (cm) 0.4 cm 03/25/25 0911   Wound Surface Area (cm^2) 0.4 cm^2 03/25/25 0911   Wound Volume (cm^3) 0.16 cm^3 03/25/25 0911   Wound Assessment Pink/red;Granulation tissue 03/25/25 0911   Drainage Amount Scant (moist but unmeasurable) 03/25/25 0911   Drainage Description Serous 03/25/25 0911   Odor None 03/25/25 0911   Nel-wound Assessment Intact 03/25/25 0911   Margins Defined edges 03/25/25 0911   Wound Thickness Description not for Pressure Injury Full thickness 03/25/25 0911   Number of days: 0     Assessment:    Removed NPWT. Wound to the left hand is clean, red/glanular with shallow depth and scant serous drainage. Nel wound skin is intact. Photo documentation and measurements obtained. Wound cleansed with saline, dried with 4x4s. Hydrofera blue moistened with saline applied to the wound bed and covered with 3x3 foam dressing. Patient provided instruction on the wound care regimen and requested that wound care team call and let her son know - did this at the time of this encounter. Will update discharge instruction and patient can continue to follow-up in the [place] wound center after discharge.    Plan   Plan of Care: Wound 03/25/25 Hand Left;Posterior-Dressing/Treatment: Hydrofera blue, Foam    Specialty Bed Required : N/A   [] Low Air Loss   [] Pressure Redistribution  [] Fluid Immersion  [] Bariatric  [] Other:     Current Diet: Diet NPO Exceptions are: Sips of Water with Meds  Dietician consult:  N/A    Discharge Plan:  Placement for patient upon discharge: TBD    Patient appropriate for Outpatient Wound Care Center: Yes - Established patient of Dr. Riley in the [place] [facility]    Referrals:  []   [] Home Health Care  [] Supplies  [] Other    Patient/Caregiver Teaching:  Level of

## 2025-04-08 ENCOUNTER — APPOINTMENT (OUTPATIENT)
Dept: WOUND CARE | Facility: CLINIC | Age: OVER 89
End: 2025-04-08
Payer: MEDICARE

## 2025-04-21 ENCOUNTER — OFFICE VISIT (OUTPATIENT)
Dept: GASTROENTEROLOGY | Age: 89
End: 2025-04-21
Payer: MEDICARE

## 2025-04-21 VITALS — HEART RATE: 63 BPM | HEIGHT: 62 IN | WEIGHT: 170 LBS | BODY MASS INDEX: 31.28 KG/M2 | OXYGEN SATURATION: 96 %

## 2025-04-21 DIAGNOSIS — Z87.11 HISTORY OF GASTRIC ULCER: ICD-10-CM

## 2025-04-21 DIAGNOSIS — K21.9 GASTROESOPHAGEAL REFLUX DISEASE, UNSPECIFIED WHETHER ESOPHAGITIS PRESENT: Primary | ICD-10-CM

## 2025-04-21 PROCEDURE — 99203 OFFICE O/P NEW LOW 30 MIN: CPT | Performed by: NURSE PRACTITIONER

## 2025-04-21 PROCEDURE — 1123F ACP DISCUSS/DSCN MKR DOCD: CPT | Performed by: NURSE PRACTITIONER

## 2025-04-21 PROCEDURE — 1159F MED LIST DOCD IN RCRD: CPT | Performed by: NURSE PRACTITIONER

## 2025-04-21 RX ORDER — PANTOPRAZOLE SODIUM 40 MG/1
40 TABLET, DELAYED RELEASE ORAL
Qty: 90 TABLET | Refills: 1 | Status: SHIPPED | OUTPATIENT
Start: 2025-04-21

## 2025-04-21 ASSESSMENT — ENCOUNTER SYMPTOMS
ABDOMINAL DISTENTION: 0
NAUSEA: 0
TROUBLE SWALLOWING: 0
DIARRHEA: 0
GASTROINTESTINAL NEGATIVE: 1
RECTAL PAIN: 0
VOMITING: 0
CONSTIPATION: 0
ANAL BLEEDING: 0
ABDOMINAL PAIN: 0
BLOOD IN STOOL: 0

## 2025-04-21 NOTE — PROGRESS NOTES
Gastroenterology Clinic Follow up Visit    Loren Noyola  [id number]  Chief Complaint   Patient presents with    Follow-up     HPI: 89 y.o. female following up after last GI clinic on Visit date not found     Interval change: Patient has follow-up to GI clinic with history of gastric ulcer and acute blood loss anemia.  Patient reports stools have been daily and brown in color.  Patient denies any abdominal pain, nausea nor vomiting.  Patient has decreased Protonix to once daily, and denies any acid reflux with breakthrough symptoms.  Patient was restarted on Eliquis, once daily, and will continue until Watchman procedure May 8, 2025 at [facility].  Patient is followed by PCP, Dr. Guallpa with repeat CBC, noting hemoglobin to be 9.2.  Denies any unintentional weight loss, dysphagia, hematemesis, hematochezia, nor melena.    HPI from last Hospitalization 3/24/2025 to 4/1/2025 summarized below:  Patient admitted for GI bleed on Eliquis.  Patient received 2 units PRCB. EGD found to have bleeding gastric ulcer with visible vessel, clipped or hemostasis.    Restart Eliquis.  Iron supplementation daily  Vitamin B 12 and folate normal.     Previous GI work up/Endoscopic investigations:  EGD 3/25/2025, [facility]  Bleeding gastric ulcer with visible vessel, hemostasis achieved with clip placement.  A few other nonbleeding ulcers in the gastric antrum.  Normal esophagus. Large amount of altered blood/coffee-ground material in the gastric body and fundus, large volume lavage and aspiration to achieve adequate views. Deformed pylorus suggestive of previous peptic ulcer disease, J-shaped stomach with looping.  Normal examined duodenum.  No specimens collected.     EGD 11/16/2024, [facility]  Normal esophagus.  Z-line regular, 35 cm from the incisors.  Gastroesophageal flap valve classified as Hill Grade II (fold present, opens with respiration).  Atrophic mucosa in the gastric fundus and gastric body. Previously documented  Name band;

## 2025-04-22 ENCOUNTER — HOSPITAL ENCOUNTER (OUTPATIENT)
Dept: CARDIOLOGY | Age: 89
Discharge: HOME | End: 2025-04-22
Payer: MEDICARE

## 2025-04-22 ENCOUNTER — TELEPHONE (OUTPATIENT)
Dept: CARDIOLOGY | Facility: CLINIC | Age: OVER 89
End: 2025-04-22
Payer: MEDICARE

## 2025-04-22 DIAGNOSIS — Z95.0 PACEMAKER: ICD-10-CM

## 2025-04-22 PROCEDURE — 93294 REM INTERROG EVL PM/LDLS PM: CPT | Performed by: INTERNAL MEDICINE

## 2025-04-22 PROCEDURE — 93296 REM INTERROG EVL PM/IDS: CPT

## 2025-04-22 NOTE — TELEPHONE ENCOUNTER
Rec'd fax from [facility] device clinic requesting  order for services rendered.  Order entered for date of service rendered and x next year for routine device checks in-clinic and remotely or as directed by physician.

## 2025-04-23 ENCOUNTER — APPOINTMENT (OUTPATIENT)
Dept: CARDIOLOGY | Facility: CLINIC | Age: OVER 89
End: 2025-04-23
Payer: MEDICARE

## 2025-04-30 ENCOUNTER — HOSPITAL ENCOUNTER (OUTPATIENT)
Dept: CARDIOLOGY | Facility: HOSPITAL | Age: OVER 89
Discharge: HOME | End: 2025-04-30
Payer: MEDICARE

## 2025-04-30 VITALS
OXYGEN SATURATION: 97 % | TEMPERATURE: 97.9 F | HEART RATE: 66 BPM | DIASTOLIC BLOOD PRESSURE: 63 MMHG | BODY MASS INDEX: 31.85 KG/M2 | HEIGHT: 62 IN | WEIGHT: 173.06 LBS | RESPIRATION RATE: 18 BRPM | SYSTOLIC BLOOD PRESSURE: 134 MMHG

## 2025-04-30 DIAGNOSIS — I48.91 ATRIAL FIBRILLATION, UNSPECIFIED TYPE (MULTI): ICD-10-CM

## 2025-04-30 DIAGNOSIS — I48.20 CHRONIC A-FIB (MULTI): ICD-10-CM

## 2025-04-30 LAB
ANION GAP SERPL CALC-SCNC: 10 MMOL/L (ref 10–20)
APTT PPP: 33 SECONDS (ref 26–36)
BUN SERPL-MCNC: 30 MG/DL (ref 6–23)
CALCIUM SERPL-MCNC: 9 MG/DL (ref 8.6–10.3)
CHLORIDE SERPL-SCNC: 107 MMOL/L (ref 98–107)
CO2 SERPL-SCNC: 30 MMOL/L (ref 21–32)
CREAT SERPL-MCNC: 1.92 MG/DL (ref 0.5–1.05)
EGFRCR SERPLBLD CKD-EPI 2021: 25 ML/MIN/1.73M*2
EJECTION FRACTION: 65 %
ERYTHROCYTE [DISTWIDTH] IN BLOOD BY AUTOMATED COUNT: 15.9 % (ref 11.5–14.5)
GLUCOSE SERPL-MCNC: 87 MG/DL (ref 74–99)
HCT VFR BLD AUTO: 31.6 % (ref 36–46)
HGB BLD-MCNC: 9.9 G/DL (ref 12–16)
INR PPP: 1 (ref 0.9–1.1)
MCH RBC QN AUTO: 28.8 PG (ref 26–34)
MCHC RBC AUTO-ENTMCNC: 31.3 G/DL (ref 32–36)
MCV RBC AUTO: 92 FL (ref 80–100)
NRBC BLD-RTO: 0 /100 WBCS (ref 0–0)
PLATELET # BLD AUTO: 211 X10*3/UL (ref 150–450)
POTASSIUM SERPL-SCNC: 3.9 MMOL/L (ref 3.5–5.3)
PROTHROMBIN TIME: 11.2 SECONDS (ref 9.8–12.4)
RBC # BLD AUTO: 3.44 X10*6/UL (ref 4–5.2)
RIGHT VENTRICLE PEAK SYSTOLIC PRESSURE: 32.8 MMHG
SODIUM SERPL-SCNC: 143 MMOL/L (ref 136–145)
WBC # BLD AUTO: 8 X10*3/UL (ref 4.4–11.3)

## 2025-04-30 PROCEDURE — 93325 DOPPLER ECHO COLOR FLOW MAPG: CPT | Performed by: INTERNAL MEDICINE

## 2025-04-30 PROCEDURE — 99152 MOD SED SAME PHYS/QHP 5/>YRS: CPT | Performed by: INTERNAL MEDICINE

## 2025-04-30 PROCEDURE — 85610 PROTHROMBIN TIME: CPT | Performed by: NURSE PRACTITIONER

## 2025-04-30 PROCEDURE — 99152 MOD SED SAME PHYS/QHP 5/>YRS: CPT

## 2025-04-30 PROCEDURE — 7100000002 HC RECOVERY ROOM TIME - EACH INCREMENTAL 1 MINUTE

## 2025-04-30 PROCEDURE — 93005 ELECTROCARDIOGRAM TRACING: CPT

## 2025-04-30 PROCEDURE — 7100000001 HC RECOVERY ROOM TIME - INITIAL BASE CHARGE

## 2025-04-30 PROCEDURE — 85027 COMPLETE CBC AUTOMATED: CPT | Performed by: NURSE PRACTITIONER

## 2025-04-30 PROCEDURE — 80048 BASIC METABOLIC PNL TOTAL CA: CPT | Performed by: NURSE PRACTITIONER

## 2025-04-30 PROCEDURE — 2500000004 HC RX 250 GENERAL PHARMACY W/ HCPCS (ALT 636 FOR OP/ED): Mod: JZ | Performed by: INTERNAL MEDICINE

## 2025-04-30 PROCEDURE — 93325 DOPPLER ECHO COLOR FLOW MAPG: CPT

## 2025-04-30 PROCEDURE — 36415 COLL VENOUS BLD VENIPUNCTURE: CPT | Performed by: NURSE PRACTITIONER

## 2025-04-30 PROCEDURE — 7100000010 HC PHASE TWO TIME - EACH INCREMENTAL 1 MINUTE

## 2025-04-30 PROCEDURE — 2500000005 HC RX 250 GENERAL PHARMACY W/O HCPCS: Performed by: INTERNAL MEDICINE

## 2025-04-30 PROCEDURE — 99223 1ST HOSP IP/OBS HIGH 75: CPT | Performed by: NURSE PRACTITIONER

## 2025-04-30 PROCEDURE — 2500000004 HC RX 250 GENERAL PHARMACY W/ HCPCS (ALT 636 FOR OP/ED): Mod: JZ | Performed by: NURSE PRACTITIONER

## 2025-04-30 PROCEDURE — 93312 ECHO TRANSESOPHAGEAL: CPT | Performed by: INTERNAL MEDICINE

## 2025-04-30 PROCEDURE — 7100000009 HC PHASE TWO TIME - INITIAL BASE CHARGE

## 2025-04-30 RX ORDER — ROPINIROLE 0.5 MG/1
0.5 TABLET, FILM COATED ORAL 3 TIMES DAILY
COMMUNITY

## 2025-04-30 RX ORDER — FENTANYL CITRATE 50 UG/ML
INJECTION, SOLUTION INTRAMUSCULAR; INTRAVENOUS AS NEEDED
Status: DISCONTINUED | OUTPATIENT
Start: 2025-04-30 | End: 2025-04-30 | Stop reason: HOSPADM

## 2025-04-30 RX ORDER — TORSEMIDE 20 MG/1
20 TABLET ORAL DAILY
COMMUNITY

## 2025-04-30 RX ORDER — HYDRALAZINE HYDROCHLORIDE 20 MG/ML
INJECTION INTRAMUSCULAR; INTRAVENOUS AS NEEDED
Status: DISCONTINUED | OUTPATIENT
Start: 2025-04-30 | End: 2025-04-30 | Stop reason: HOSPADM

## 2025-04-30 RX ORDER — LIDOCAINE HYDROCHLORIDE 20 MG/ML
JELLY TOPICAL AS NEEDED
Status: DISCONTINUED | OUTPATIENT
Start: 2025-04-30 | End: 2025-04-30 | Stop reason: HOSPADM

## 2025-04-30 RX ORDER — PANTOPRAZOLE SODIUM 40 MG/1
40 TABLET, DELAYED RELEASE ORAL 2 TIMES DAILY
COMMUNITY

## 2025-04-30 RX ORDER — MIDAZOLAM HYDROCHLORIDE 1 MG/ML
INJECTION, SOLUTION INTRAMUSCULAR; INTRAVENOUS AS NEEDED
Status: DISCONTINUED | OUTPATIENT
Start: 2025-04-30 | End: 2025-04-30 | Stop reason: HOSPADM

## 2025-04-30 RX ORDER — CALCIUM CARBONATE 200(500)MG
1 TABLET,CHEWABLE ORAL DAILY
COMMUNITY

## 2025-04-30 RX ORDER — HYDRALAZINE HYDROCHLORIDE 25 MG/1
10 TABLET, FILM COATED ORAL 3 TIMES DAILY
COMMUNITY

## 2025-04-30 RX ORDER — HYDRALAZINE HYDROCHLORIDE 20 MG/ML
10 INJECTION INTRAMUSCULAR; INTRAVENOUS ONCE
Status: COMPLETED | OUTPATIENT
Start: 2025-04-30 | End: 2025-04-30

## 2025-04-30 RX ADMIN — LIDOCAINE HYDROCHLORIDE 1 APPLICATION: 20 JELLY TOPICAL at 09:11

## 2025-04-30 RX ADMIN — HYDRALAZINE HYDROCHLORIDE 10 MG: 20 INJECTION INTRAMUSCULAR; INTRAVENOUS at 09:14

## 2025-04-30 RX ADMIN — BENZOCAINE, BUTAMBEN, AND TETRACAINE HYDROCHLORIDE 2 SPRAY: .028; .004; .004 AEROSOL, SPRAY TOPICAL at 09:11

## 2025-04-30 RX ADMIN — FENTANYL CITRATE 25 MCG: 50 INJECTION, SOLUTION INTRAMUSCULAR; INTRAVENOUS at 09:17

## 2025-04-30 RX ADMIN — HYDRALAZINE HYDROCHLORIDE 10 MG: 20 INJECTION INTRAMUSCULAR; INTRAVENOUS at 08:43

## 2025-04-30 RX ADMIN — Medication 3 L/MIN: at 09:06

## 2025-04-30 RX ADMIN — MIDAZOLAM 1 MG: 1 INJECTION INTRAMUSCULAR; INTRAVENOUS at 09:17

## 2025-04-30 RX ADMIN — MIDAZOLAM 1 MG: 1 INJECTION INTRAMUSCULAR; INTRAVENOUS at 09:20

## 2025-04-30 ASSESSMENT — PAIN DESCRIPTION - DESCRIPTORS: DESCRIPTORS: ACHING

## 2025-04-30 ASSESSMENT — ENCOUNTER SYMPTOMS
WEAKNESS: 1
SHORTNESS OF BREATH: 0
CONSTITUTIONAL NEGATIVE: 1
PALPITATIONS: 0
HEMATOLOGIC/LYMPHATIC NEGATIVE: 1
LIGHT-HEADEDNESS: 0
PSYCHIATRIC NEGATIVE: 1
ALLERGIC/IMMUNOLOGIC NEGATIVE: 1
CHEST TIGHTNESS: 0
CARDIOVASCULAR NEGATIVE: 1
ENDOCRINE NEGATIVE: 1
RESPIRATORY NEGATIVE: 1
MUSCULOSKELETAL NEGATIVE: 1
GASTROINTESTINAL NEGATIVE: 1
DIZZINESS: 0
EYES NEGATIVE: 1

## 2025-04-30 ASSESSMENT — PAIN - FUNCTIONAL ASSESSMENT
PAIN_FUNCTIONAL_ASSESSMENT: 0-10

## 2025-04-30 ASSESSMENT — PAIN SCALES - GENERAL
PAINLEVEL_OUTOF10: 0 - NO PAIN

## 2025-04-30 NOTE — NURSING NOTE
Discharge instructions discussed with patient and son.  No driving for 24 hours and taking it easy.  Follow-up with Dr. Avila 5/5/25.  Patient discharged to son in car via wheelchair and volunteer.

## 2025-04-30 NOTE — Clinical Note
Left the unit for Peg placement, accompanied by family member.   Patient ID band present and verified. Patient contact is in the lobby.

## 2025-04-30 NOTE — DISCHARGE INSTRUCTIONS
TRANSESOPHAGEAL ECHOCARDIOGRAM DISCHARGE INSTRUCTIONS    FOR SUDDEN AND SEVERE CHEST PAIN, SHORTNESS OF BREATH, SIGNS OF STROKE OR CHANGES IN MENTAL STATUS YOU SHOULD CALL [phone] IMMEDIATELY.    FOR NEXT 24 HOURS  - Upon discharge, you should return home and rest for the remainder of the day and evening. You do not have to stay on bed rest but should not be very active.  It is recommended a responsible adult be with you for the first 24 hours after the procedure.    - No driving for 24 hours after procedure.  Please arrange for someone to drive you home from the hospital today.  No driving until your follow-up appointment with your provider if you have had a passing out spell in the recent past or previously restricted from driving.     - Do not drive, operate machinery, or use power tools for 24 hours after your procedure.     - Do not make any legal decisions for 24 hours after your procedure.     - Do not drink alcoholic beverages for 24 hours after your procedure.

## 2025-04-30 NOTE — H&P
History Of Present Illness  Lou Oliva is a 89 y.o. female past medical history significant for paroxysmal atrial fibrillation, maintained on metoprolol tartrate 25 mg twice daily and anticoagulated with Eliquis 2.5 mg twice daily, previously on amiodarone which was discontinued due to thyroid issues, history of CAD, s/p remote CABG (2007), SND, s/p remote PPM, chronic diastolic heart failure, PAD, AAA, HTN, HLD, hyperthyroidism, anemia, recent GI bleed requiring blood transfusions, falls, CKD and was recently referred by Dr. Avila to Dr. Chamorro for evaluation for left atrial appendage closure.  Patient's CHADS-VASC score is 5, HAS-BLED score is 5.  Given patient's significant GI bleeding and falls risk, she is at increased risk for both bleeding and stroke and therefore a reasonable candidate for left atrial appendage occluder placement.  Patient is at [facility] today for CELINA under the care of Dr. Avila to exclude left atrial appendage thrombus and evaluate left atrial appendage prior to scheduled Watchman procedure next week with Dr. Chamorro.     Past Medical History  Medical History[1]    Surgical History  Surgical History[2]     Social History    Remote tobacco use  Denies alcohol use  Denies drug use    Family History  Family History[3]     Allergies  Lisinopril, Nsaids (non-steroidal anti-inflammatory drug), Statins-hmg-coa reductase inhibitors, and Sulfa (sulfonamide antibiotics)    Review of Systems   Constitutional: Negative.    HENT: Negative.     Eyes: Negative.    Respiratory: Negative.  Negative for chest tightness and shortness of breath.    Cardiovascular: Negative.  Negative for chest pain and palpitations.   Gastrointestinal: Negative.    Endocrine: Negative.    Genitourinary: Negative.    Musculoskeletal: Negative.    Skin: Negative.    Allergic/Immunologic: Negative.    Neurological:  Positive for weakness. Negative for dizziness and light-headedness.  "  Hematological: Negative.    Psychiatric/Behavioral: Negative.       Physical Exam  Vitals reviewed.   Constitutional:       Appearance: Normal appearance. She is obese.   HENT:      Head: Normocephalic and atraumatic.      Nose: Nose normal.      Mouth/Throat:      Mouth: Mucous membranes are moist.      Pharynx: Oropharynx is clear.   Eyes:      Pupils: Pupils are equal, round, and reactive to light.   Cardiovascular:      Rate and Rhythm: Normal rate and regular rhythm.      Pulses: Normal pulses.      Heart sounds: Normal heart sounds.   Pulmonary:      Effort: Pulmonary effort is normal.      Breath sounds: Normal breath sounds.   Abdominal:      General: Bowel sounds are normal.      Palpations: Abdomen is soft.   Musculoskeletal:         General: Normal range of motion.      Cervical back: Normal range of motion and neck supple.   Skin:     General: Skin is warm and dry.   Neurological:      General: No focal deficit present.      Mental Status: She is alert and oriented to person, place, and time.   Psychiatric:         Mood and Affect: Mood normal.         Behavior: Behavior normal.       Last Recorded Vitals  Blood pressure (!) 207/89, pulse 61, temperature 36.6 °C (97.9 °F), temperature source Temporal, resp. rate 16, height 1.575 m (5' 2\"), weight 78.5 kg (173 lb 1 oz), SpO2 96%.    Relevant Results  Labs pending at this time  Results for orders placed or performed during the hospital encounter of 04/30/25 (from the past 24 hours)   CBC   Result Value Ref Range    WBC 8.0 4.4 - 11.3 x10*3/uL    nRBC 0.0 0.0 - 0.0 /100 WBCs    RBC 3.44 (L) 4.00 - 5.20 x10*6/uL    Hemoglobin 9.9 (L) 12.0 - 16.0 g/dL    Hematocrit 31.6 (L) 36.0 - 46.0 %    MCV 92 80 - 100 fL    MCH 28.8 26.0 - 34.0 pg    MCHC 31.3 (L) 32.0 - 36.0 g/dL    RDW 15.9 (H) 11.5 - 14.5 %    Platelets 211 150 - 450 x10*3/uL   Coagulation Screen   Result Value Ref Range    Protime 11.2 9.8 - 12.4 seconds    INR 1.0 0.9 - 1.1    aPTT 33 26 - 36 " seconds   ECG 12 lead   Result Value Ref Range    Ventricular Rate 61 BPM    Atrial Rate 56 BPM    GA Interval 254 ms    QRS Duration 140 ms    QT Interval 476 ms    QTC Calculation(Bazett) 479 ms    R Axis -62 degrees    T Axis 19 degrees    QRS Count 10 beats    Q Onset 209 ms    T Offset 447 ms    QTC Fredericia 478 ms      Cardiology, Vascular, and Other Imaging  ECG 12 lead  Result Date: 4/30/2025  Atrial-paced rhythm with prolonged AV conduction Right bundle branch block Left anterior fascicular block ** Bifascicular block ** Possible Lateral infarct , age undetermined Abnormal ECG When compared with ECG of 07-DEC-2024 12:15, No significant change was found    Assessment/Plan   Paroxysmal atrial fibrillation   - Currently maintained on metoprolol tartrate 25 mg twice daily and anticoagulated with Eliquis 2.5 mg twice daily.   `-Patient is at increased risk for bleeding and stroke due to frequent falls, GI bleed and maintained on anticoagulation for stroke risk reduction d/t A-fib.  It is recommended she undergo placement of a Watchman LAAO device.   - Patient is scheduled for placement of a Watchman LAAO device on 5/8/2025 under the care of Dr. Chamorro.   - CELINA today under the care of Dr. Avila to rule out thrombus and evaluate left atrial appendage prior to left atrial appendage closure.  2.   Recent history of GI bleeds requiring blood transfusions/anemia  3.   Frequent falls  4.   Coronary artery disease/s/p remote CABG (2007)  5.   Sinus node dysfunction/s/p remote PPM  6.   Benign essential hypertension -elevated this a.m.  Will give hydralazine 10 mg IV prior to CELINA.  7.   Chronic diastolic heart failure  8.   Chronic kidney disease 3  9.   Obesity -BMI 32.04    Further recommendations pending procedure results  I spent 75 minutes in the professional and overall care of this patient.      Noemi Machado, APRN-CNP        [1] History reviewed. No pertinent past medical history.  [2]   Past Surgical  History:  Procedure Laterality Date    OTHER SURGICAL HISTORY  01/05/2022    Arterial ligation    OTHER SURGICAL HISTORY  01/05/2022    Hip replacement    OTHER SURGICAL HISTORY  01/25/2022    Complete colonoscopy   [3] No family history on file.

## 2025-05-01 ENCOUNTER — APPOINTMENT (OUTPATIENT)
Dept: CARDIOLOGY | Facility: HOSPITAL | Age: OVER 89
End: 2025-05-01
Payer: MEDICARE

## 2025-05-01 DIAGNOSIS — I48.91 ATRIAL FIBRILLATION, UNSPECIFIED TYPE (MULTI): ICD-10-CM

## 2025-05-03 DIAGNOSIS — I45.10 RIGHT BUNDLE BRANCH BLOCK (RBBB): ICD-10-CM

## 2025-05-03 DIAGNOSIS — K21.9 GASTROESOPHAGEAL REFLUX DISEASE WITHOUT ESOPHAGITIS: ICD-10-CM

## 2025-05-03 DIAGNOSIS — I49.5 SINUS NODE DYSFUNCTION (MULTI): ICD-10-CM

## 2025-05-03 DIAGNOSIS — E78.2 MIXED HYPERLIPIDEMIA: ICD-10-CM

## 2025-05-03 DIAGNOSIS — E05.90 HYPERTHYROIDISM: ICD-10-CM

## 2025-05-03 DIAGNOSIS — Z95.1 S/P CABG (CORONARY ARTERY BYPASS GRAFT): ICD-10-CM

## 2025-05-03 DIAGNOSIS — K27.9 PUD (PEPTIC ULCER DISEASE): ICD-10-CM

## 2025-05-03 DIAGNOSIS — K92.2 GASTROINTESTINAL HEMORRHAGE, UNSPECIFIED GASTROINTESTINAL HEMORRHAGE TYPE: ICD-10-CM

## 2025-05-03 DIAGNOSIS — N28.9 RENAL INSUFFICIENCY: ICD-10-CM
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
## 2025-05-03 DIAGNOSIS — R94.31 ABNORMAL ELECTROCARDIOGRAM: ICD-10-CM

## 2025-05-03 DIAGNOSIS — I48.0 PAROXYSMAL ATRIAL FIBRILLATION (MULTI): ICD-10-CM

## 2025-05-03 DIAGNOSIS — I50.32 CHRONIC DIASTOLIC HEART FAILURE: ICD-10-CM

## 2025-05-03 DIAGNOSIS — D64.9 ANEMIA, UNSPECIFIED TYPE: ICD-10-CM

## 2025-05-03 DIAGNOSIS — I73.9 PERIPHERAL VASCULAR DISEASE: ICD-10-CM

## 2025-05-03 DIAGNOSIS — I10 PRIMARY HYPERTENSION: ICD-10-CM

## 2025-05-03 DIAGNOSIS — I44.4 LAFB (LEFT ANTERIOR FASCICULAR BLOCK): ICD-10-CM

## 2025-05-03 DIAGNOSIS — Z95.0 PACEMAKER: ICD-10-CM

## 2025-05-03 DIAGNOSIS — Z87.891 FORMER SMOKER: ICD-10-CM

## 2025-05-03 DIAGNOSIS — Z79.899 HIGH RISK MEDICATION USE: ICD-10-CM

## 2025-05-03 LAB
ATRIAL RATE: 56 BPM
PR INTERVAL: 254 MS
Q ONSET: 209 MS
QRS COUNT: 10 BEATS
QRS DURATION: 140 MS
QT INTERVAL: 476 MS
QTC CALCULATION(BAZETT): 479 MS
QTC FREDERICIA: 478 MS
R AXIS: -62 DEGREES
T AXIS: 19 DEGREES
T OFFSET: 447 MS
VENTRICULAR RATE: 61 BPM

## 2025-05-05 ENCOUNTER — APPOINTMENT (OUTPATIENT)
Dept: CARDIOLOGY | Facility: CLINIC | Age: OVER 89
End: 2025-05-05
Payer: MEDICARE

## 2025-05-05 VITALS
HEART RATE: 62 BPM | DIASTOLIC BLOOD PRESSURE: 44 MMHG | HEIGHT: 62 IN | BODY MASS INDEX: 31.98 KG/M2 | WEIGHT: 173.8 LBS | SYSTOLIC BLOOD PRESSURE: 98 MMHG

## 2025-05-05 DIAGNOSIS — I71.40 ABDOMINAL AORTIC ANEURYSM (AAA) WITHOUT RUPTURE, UNSPECIFIED PART: ICD-10-CM

## 2025-05-05 DIAGNOSIS — I10 PRIMARY HYPERTENSION: ICD-10-CM

## 2025-05-05 DIAGNOSIS — I10 HYPERTENSION, UNSPECIFIED TYPE: ICD-10-CM

## 2025-05-05 DIAGNOSIS — Z79.899 HIGH RISK MEDICATION USE: ICD-10-CM

## 2025-05-05 DIAGNOSIS — E05.90 HYPERTHYROIDISM: ICD-10-CM

## 2025-05-05 DIAGNOSIS — K92.2 GASTROINTESTINAL HEMORRHAGE, UNSPECIFIED GASTROINTESTINAL HEMORRHAGE TYPE: ICD-10-CM

## 2025-05-05 DIAGNOSIS — Z86.16 HISTORY OF COVID-19: ICD-10-CM

## 2025-05-05 DIAGNOSIS — Z87.891 FORMER SMOKER: Primary | ICD-10-CM

## 2025-05-05 DIAGNOSIS — I25.10 ASHD (ARTERIOSCLEROTIC HEART DISEASE): ICD-10-CM

## 2025-05-05 DIAGNOSIS — R60.9 EDEMA, UNSPECIFIED TYPE: ICD-10-CM

## 2025-05-05 DIAGNOSIS — R42 VERTIGO: ICD-10-CM

## 2025-05-05 DIAGNOSIS — I50.32 CHRONIC DIASTOLIC HEART FAILURE: ICD-10-CM

## 2025-05-05 DIAGNOSIS — N28.9 RENAL INSUFFICIENCY: ICD-10-CM

## 2025-05-05 DIAGNOSIS — I48.0 PAROXYSMAL ATRIAL FIBRILLATION (MULTI): ICD-10-CM

## 2025-05-05 DIAGNOSIS — K21.9 GASTROESOPHAGEAL REFLUX DISEASE WITHOUT ESOPHAGITIS: ICD-10-CM

## 2025-05-05 DIAGNOSIS — Z95.1 S/P CABG (CORONARY ARTERY BYPASS GRAFT): ICD-10-CM

## 2025-05-05 DIAGNOSIS — D64.9 ANEMIA, UNSPECIFIED TYPE: ICD-10-CM

## 2025-05-05 DIAGNOSIS — I44.4 LAFB (LEFT ANTERIOR FASCICULAR BLOCK): ICD-10-CM

## 2025-05-05 DIAGNOSIS — K27.9 PUD (PEPTIC ULCER DISEASE): ICD-10-CM

## 2025-05-05 DIAGNOSIS — E78.01 FAMILIAL HYPERCHOLESTEROLEMIA: ICD-10-CM
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
## 2025-05-05 DIAGNOSIS — E78.2 MIXED HYPERLIPIDEMIA: ICD-10-CM

## 2025-05-05 DIAGNOSIS — R94.31 ABNORMAL ELECTROCARDIOGRAM: ICD-10-CM

## 2025-05-05 DIAGNOSIS — Z95.0 PACEMAKER: ICD-10-CM

## 2025-05-05 DIAGNOSIS — I73.9 PERIPHERAL VASCULAR DISEASE (CMS-HCC): ICD-10-CM

## 2025-05-05 DIAGNOSIS — I48.91 ATRIAL FIBRILLATION (MULTI): ICD-10-CM

## 2025-05-05 DIAGNOSIS — I45.10 RIGHT BUNDLE BRANCH BLOCK (RBBB): ICD-10-CM

## 2025-05-05 PROCEDURE — 3074F SYST BP LT 130 MM HG: CPT | Performed by: INTERNAL MEDICINE

## 2025-05-05 PROCEDURE — 3078F DIAST BP <80 MM HG: CPT | Performed by: INTERNAL MEDICINE

## 2025-05-05 PROCEDURE — 93000 ELECTROCARDIOGRAM COMPLETE: CPT | Performed by: INTERNAL MEDICINE

## 2025-05-05 PROCEDURE — 99214 OFFICE O/P EST MOD 30 MIN: CPT | Performed by: INTERNAL MEDICINE

## 2025-05-05 PROCEDURE — 1159F MED LIST DOCD IN RCRD: CPT | Performed by: INTERNAL MEDICINE

## 2025-05-05 RX ORDER — HYDRALAZINE HYDROCHLORIDE 10 MG/1
10 TABLET, FILM COATED ORAL 2 TIMES DAILY
Start: 2025-05-05

## 2025-05-05 NOTE — PROGRESS NOTES
CARDIOLOGY OFFICE NOTE     Date:   5/5/2025    Patient:    Lou Oliva    YOB: 1935    Primary Physician: Alexa Chatman MD         REASON FOR VISIT / CHIEF COMPLAINT:     Cardiology follow-up post CELINA.    HPI:     Lou Oliva was seen in cardiac evaluation at the [facility] Cardiology office May 5, 2025.      The patients problems are listed as in the impression below.    Electronic medical records reviewed.    Patient returns with her son.  She had a CELINA last week for pre-Watchman device left atrial appendage evaluation.    See results as noted below for details.  Basically noted normal left ventricular function with moderate concentric left hypertrophy and small left atrial appendage with partial follow-up surgical ligation in the past.  It measured 1.5 cm x 1.7 cm (depth).    She is in atrial paced rhythm.    Patient is anticipating left atrial occlusion Watchman device implant with Dr. Chamorro 5/8/2025.    She has no new complaints otherwise.    Patient denies Chest Pain, SOB, Lightheadedness, Dizziness, TIA or CVA symptoms.  No CHF or Edema.  No Palpitations.  No GI,  or Bleeding Issues. No Recent Fever or Chills.     Cardiovascular and general review of systems is otherwise negative.    A 14-system review is otherwise negative, other than noted.     PHYSICAL EXAMINATION:      Vitals:    05/05/25 1420   BP: (!) 98/44   Pulse: 62     No acute distress.  Alert and oriented.   Head and neck examination: No jugular venous distention, no carotid bruits, no mass. Carotid upstrokes preserved. Oral mucosa moist. No xanthelasma. Head and neck examination otherwise unremarkable.   Lungs: Clear to auscultation and percussion. No wheezes, no rales, and no rhonchi. Chest excursion appeared to be normal. No chest wall tenderness on palpation. Well-healed mid incisional scar.   Heart: Normal S1 and S2. No S3. No S4. No rub. No murmur. Point of maximal impulse was within normal  limits. Pacemaker left chest. Abdomen was soft. Nontender. No organomegaly. No bruits. No masses.   Extremities: Mild bipedal edema. No clubbing. No cyanosis. Pulses are strong throughout. No bruits.   Musculoskeletal exam: No ulcers, otherwise unremarkable.   Neurologically appeared grossly intact.   .  IMPRESSION:       Cardiovascular status stable  Shortness of breath, stable  Fatigue, chronic  Edema, improved  Fall, nonrecurrent  GI bleed due to peptic ulcer disease, 9 recurrent  Hypothyroidism, possibly secondary to amiodarone use.  Congestive heart failure, diastolic, compensated  Symptomatic bradycardia, post permanent pacemaker currently 2023, Azuki (Vozero/Gengibre) sensor XT DR LOVELL.  Coronary artery disease, status post coronary artery bypass graft surgery, September 2007; left internal mammary artery to the left anterior descending coronary artery, saphenous vein graft to the first obtuse marginal and second obtuse marginal, diagonal and right coronary artery, left atrial appendage ligation  Transesophageal echocardiogram, 4/2025, small left atrial appendage appearing to be only partially ligated if at all.  Negative Lexiscan myocardial perfusion stress test, ejection fraction 65%, 2012 , 2020 and June 2024.  Normal left ventricular systolic function. LVEF 60%.,  Echocardiogram 12/2024.  Hypertension.  Familial hyperlipidemia (intolerant to statins and Juxtapid).  Peripheral vascular disease with stable 2.6 cm abdominal aortic ectasia; status post left iliac balloon angioplasty.   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   History of appendectomy.  History of total abdominal hysterectomy.  Obesity.  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   Possible obstructive sleep apnea.  Statin intolerance due to myalgias  GERD / PUD  Anemia  COVID infection 1/2024.      Otherwise as per assessment below.    RECOMMENDATIONS:      The patient appears to be doing well overall.  She  tolerated her CELINA without incident.  She has no new complaints.  She is anticipating watchman left atrial appendage closure 5/8/2025 with Dr. Chamorro.    Of note, her left atrial appendage is measured as above I did discuss with Dr. Chamorro as well as Dr. Art.  At this time they both are anticipating proceeding with left atrial appendage closure as previously scheduled planned.    Patient will continue current medications.  Refills were provided.    [employer] portal use was encouraged.    We will plan to see back in 2 months with Laboratory Studies and ECG as ordered.     Patient will follow up with their primary physician for general care.    The patient knows to contact medical care earlier if need be.      ALLERGIES:     Lisinopril, Nsaids (non-steroidal anti-inflammatory drug), Statins-hmg-coa reductase inhibitors, and Sulfa (sulfonamide antibiotics)     MEDICATIONS:     Current Outpatient Medications   Medication Instructions    acetaminophen (TYLENOL) 1,000 mg, Every 4 hours PRN    amLODIPine (NORVASC) 2.5 mg, oral, Daily    apixaban (Eliquis) 2.5 mg tablet TAKE 1 TABLET(2.5 MG) BY MOUTH TWICE DAILY    betamethasone dipropionate (Diprosone) 0.05 % lotion 2 times daily    calcium carbonate (Tums) 500 mg (200 mg elemental) chewable tablet 1 tablet, Daily    cholecalciferol (VITAMIN D3) 50 mcg, Daily    diclofenac sodium (VOLTAREN ARTHRITIS PAIN) 4 g, 4 times daily PRN    FeroSuL tablet 1 tablet, Daily with breakfast    ferrous gluconate 324 (38 Fe) MG tablet 1 tablet, Daily    hydrALAZINE (APRESOLINE) 10 mg, 3 times daily    lidocaine (Salonpas, lidocaine,) 4 % patch 1 patch, Daily    losartan (COZAAR) 25 mg, oral, 2 times daily    metoprolol tartrate (LOPRESSOR) 25 mg, oral, 2 times daily    nitroglycerin (NitrolinguaL) 400 mcg/spray spray Place 1 spray under the tongue every 5 minutes if needed for chest pain.    pantoprazole (PROTONIX) 40 mg, 2 times daily    polyethylene glycol (GLYCOLAX, MIRALAX) 17 g, Daily  RT    ranolazine (RANEXA) 500 mg, oral, 2 times daily    rOPINIRole (REQUIP) 0.5 mg, 3 times daily    torsemide (DEMADEX) 20 mg, Daily       ELECTROCARDIOGRAM:      Atrial paced rhythm.  Right bundle branch block.  Left anterior fascicular block.  Rate 62.    CARDIAC TESTING:      Transesophageal echocardiogram, 4/30/2025:   1. Normal LV Function, LVEF 65%.   2. Moderate Concentric LVH with Diastolic dysfunction.   3. The left atrial size is moderately dilated.   4. ROSAMARIA small with possible partial prior reported surgical ligation. No thrombus. Measures 1.52cm oriface, 1.7cm depth.   5. No left atrial mass or thrombus.   6. No evidence of a patent foramen ovale or ASD.   7. Moderate AV Sclerosis without stenosis.   8. Normal right ventricular global systolic function.   9. Right sided pacer leads noted.  10. Slightly elevated right ventricular systolic pressure.  11. No comparison study available.    LABORATORY DATA:      CBC:   Lab Results   Component Value Date    WBC 8.0 04/30/2025    RBC 3.44 (L) 04/30/2025    HGB 9.9 (L) 04/30/2025    HCT 31.6 (L) 04/30/2025     04/30/2025        CMP:    Lab Results   Component Value Date     04/30/2025    K 3.9 04/30/2025     04/30/2025    CO2 30 04/30/2025    BUN 30 (H) 04/30/2025    CREATININE 1.92 (H) 04/30/2025    GLUCOSE 87 04/30/2025    CALCIUM 9.0 04/30/2025       Magnesium:    Lab Results   Component Value Date    MG 2.03 12/05/2023       Lipid Profile:    Lab Results   Component Value Date    CHOL 216 (H) 12/05/2023    TRIG 92 12/05/2023    HDL 42.6 12/05/2023    LDLCALC 155 (H) 12/05/2023       Hepatic Function Panel:    Lab Results   Component Value Date    ALKPHOS 49 12/07/2024    ALT 10 12/07/2024    AST 20 12/07/2024    PROT 6.0 (L) 12/07/2024    BILITOT 0.4 12/07/2024    BILIDIR 0.1 12/07/2024       TSH:    Lab Results   Component Value Date    TSH 0.51 05/14/2024       PT/INR:    Lab Results   Component Value Date    PROTIME 11.2 04/30/2025     INR 1.0 04/30/2025                     PROBLEM LIST:     Problem List[1]          Delroy Avila MD, [facility] /  Cardiology      Of Note:  [employer] voice recognition dictation software was utilized partially in the preparation of this note, therefore, inaccuracies in spelling, word choice and punctuation may have occurred which were not recognized at the time of signing.    Patient was seen and examined with total time of visit including chart preparation, rooming, and chart completion exceeding 40 minutes.                  [1]   Patient Active Problem List  Diagnosis    Abnormal electrocardiogram    Aneurysm of abdominal aorta    ASHD (arteriosclerotic heart disease)    Atrial fibrillation (Multi)    GERD (gastroesophageal reflux disease)    GI bleed    Edema    Hyperlipidemia    Pain of left hip joint    Hypertension    Peripheral vascular disease (CMS-HCC)    Right bundle branch block (RBBB)    S/P CABG (coronary artery bypass graft)    Trochanteric bursitis of left hip    Vertigo    Pacemaker    Sinus node dysfunction (Multi)    High risk medication use    BMI 32.0-32.9,adult    Former smoker    Anemia    PUD (peptic ulcer disease)    LAFB (left anterior fascicular block)    Hyperthyroidism    Renal insufficiency    Fall    History of COVID-19    Chronic diastolic heart failure

## 2025-05-07 PROBLEM — I48.91 ATRIAL FIBRILLATION, UNSPECIFIED TYPE (MULTI): Status: ACTIVE | Noted: 2025-05-07

## 2025-05-08 ENCOUNTER — TELEPHONE (OUTPATIENT)
Dept: CARDIOLOGY | Facility: HOSPITAL | Age: OVER 89
End: 2025-05-08

## 2025-05-08 ENCOUNTER — HOSPITAL ENCOUNTER (OUTPATIENT)
Facility: HOSPITAL | Age: OVER 89
Discharge: HOME | End: 2025-05-08
Attending: INTERNAL MEDICINE | Admitting: INTERNAL MEDICINE
Payer: MEDICARE

## 2025-05-08 ENCOUNTER — APPOINTMENT (OUTPATIENT)
Dept: CARDIOLOGY | Facility: HOSPITAL | Age: OVER 89
End: 2025-05-08
Payer: MEDICARE

## 2025-05-08 VITALS
OXYGEN SATURATION: 100 % | SYSTOLIC BLOOD PRESSURE: 207 MMHG | DIASTOLIC BLOOD PRESSURE: 97 MMHG | TEMPERATURE: 98.6 F | HEART RATE: 66 BPM | RESPIRATION RATE: 18 BRPM | BODY MASS INDEX: 31.79 KG/M2 | HEIGHT: 62 IN

## 2025-05-08 DIAGNOSIS — I73.9 PERIPHERAL VASCULAR DISEASE: ICD-10-CM

## 2025-05-08 DIAGNOSIS — Z79.899 HIGH RISK MEDICATION USE: ICD-10-CM

## 2025-05-08 DIAGNOSIS — R42 VERTIGO: ICD-10-CM

## 2025-05-08 DIAGNOSIS — N28.9 RENAL INSUFFICIENCY: ICD-10-CM

## 2025-05-08 DIAGNOSIS — Z86.16 HISTORY OF COVID-19: ICD-10-CM

## 2025-05-08 DIAGNOSIS — I10 HYPERTENSION, UNSPECIFIED TYPE: ICD-10-CM
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
## 2025-05-08 DIAGNOSIS — Z01.818 PRE-OP EVALUATION: ICD-10-CM

## 2025-05-08 DIAGNOSIS — K92.2 GASTROINTESTINAL HEMORRHAGE, UNSPECIFIED GASTROINTESTINAL HEMORRHAGE TYPE: ICD-10-CM

## 2025-05-08 DIAGNOSIS — I25.10 ASHD (ARTERIOSCLEROTIC HEART DISEASE): ICD-10-CM

## 2025-05-08 DIAGNOSIS — R94.31 ABNORMAL ELECTROCARDIOGRAM: ICD-10-CM

## 2025-05-08 DIAGNOSIS — E05.90 HYPERTHYROIDISM: ICD-10-CM

## 2025-05-08 DIAGNOSIS — E78.01 FAMILIAL HYPERCHOLESTEROLEMIA: ICD-10-CM

## 2025-05-08 DIAGNOSIS — I50.32 CHRONIC DIASTOLIC HEART FAILURE: ICD-10-CM

## 2025-05-08 DIAGNOSIS — I48.91 ATRIAL FIBRILLATION (MULTI): ICD-10-CM

## 2025-05-08 DIAGNOSIS — K21.9 GASTROESOPHAGEAL REFLUX DISEASE WITHOUT ESOPHAGITIS: ICD-10-CM

## 2025-05-08 DIAGNOSIS — R60.9 EDEMA, UNSPECIFIED TYPE: ICD-10-CM

## 2025-05-08 DIAGNOSIS — K27.9 PUD (PEPTIC ULCER DISEASE): ICD-10-CM

## 2025-05-08 DIAGNOSIS — I10 PRIMARY HYPERTENSION: ICD-10-CM

## 2025-05-08 DIAGNOSIS — E78.2 MIXED HYPERLIPIDEMIA: ICD-10-CM

## 2025-05-08 DIAGNOSIS — I44.4 LAFB (LEFT ANTERIOR FASCICULAR BLOCK): ICD-10-CM

## 2025-05-08 DIAGNOSIS — Z95.0 PACEMAKER: ICD-10-CM

## 2025-05-08 DIAGNOSIS — I48.0 PAROXYSMAL ATRIAL FIBRILLATION (MULTI): ICD-10-CM

## 2025-05-08 DIAGNOSIS — Z95.1 S/P CABG (CORONARY ARTERY BYPASS GRAFT): ICD-10-CM

## 2025-05-08 DIAGNOSIS — D64.9 ANEMIA, UNSPECIFIED TYPE: ICD-10-CM

## 2025-05-08 DIAGNOSIS — I48.91 ATRIAL FIBRILLATION, UNSPECIFIED TYPE (MULTI): Primary | ICD-10-CM

## 2025-05-08 DIAGNOSIS — I45.10 RIGHT BUNDLE BRANCH BLOCK (RBBB): ICD-10-CM

## 2025-05-08 DIAGNOSIS — I71.40 ABDOMINAL AORTIC ANEURYSM (AAA) WITHOUT RUPTURE, UNSPECIFIED PART: ICD-10-CM

## 2025-05-08 LAB
ABO GROUP (TYPE) IN BLOOD: NORMAL
ACT BLD: 271 SEC (ref 83–199)
ACT BLD: 327 SEC (ref 83–199)
ALBUMIN SERPL BCP-MCNC: 3.8 G/DL (ref 3.4–5)
ALP SERPL-CCNC: 41 U/L (ref 33–136)
ALT SERPL W P-5'-P-CCNC: 5 U/L (ref 7–45)
ANION GAP SERPL CALC-SCNC: 13 MMOL/L (ref 10–20)
ANTIBODY SCREEN: NORMAL
APTT PPP: 31 SECONDS (ref 26–36)
AST SERPL W P-5'-P-CCNC: 12 U/L (ref 9–39)
ATRIAL RATE: 60 BPM
BASOPHILS # BLD AUTO: 0.04 X10*3/UL (ref 0–0.1)
BASOPHILS NFR BLD AUTO: 0.5 %
BILIRUB SERPL-MCNC: 0.7 MG/DL (ref 0–1.2)
BUN SERPL-MCNC: 31 MG/DL (ref 6–23)
CALCIUM SERPL-MCNC: 9.1 MG/DL (ref 8.6–10.3)
CHLORIDE SERPL-SCNC: 105 MMOL/L (ref 98–107)
CO2 SERPL-SCNC: 28 MMOL/L (ref 21–32)
CREAT SERPL-MCNC: 1.99 MG/DL (ref 0.5–1.05)
EGFRCR SERPLBLD CKD-EPI 2021: 23 ML/MIN/1.73M*2
EOSINOPHIL # BLD AUTO: 0.07 X10*3/UL (ref 0–0.4)
EOSINOPHIL NFR BLD AUTO: 0.9 %
ERYTHROCYTE [DISTWIDTH] IN BLOOD BY AUTOMATED COUNT: 15.1 % (ref 11.5–14.5)
GLUCOSE SERPL-MCNC: 86 MG/DL (ref 74–99)
HCT VFR BLD AUTO: 33.6 % (ref 36–46)
HGB BLD-MCNC: 10.6 G/DL (ref 12–16)
IMM GRANULOCYTES # BLD AUTO: 0.03 X10*3/UL (ref 0–0.5)
IMM GRANULOCYTES NFR BLD AUTO: 0.4 % (ref 0–0.9)
INR PPP: 1 (ref 0.9–1.1)
LYMPHOCYTES # BLD AUTO: 0.7 X10*3/UL (ref 0.8–3)
LYMPHOCYTES NFR BLD AUTO: 8.6 %
MCH RBC QN AUTO: 28.6 PG (ref 26–34)
MCHC RBC AUTO-ENTMCNC: 31.5 G/DL (ref 32–36)
MCV RBC AUTO: 91 FL (ref 80–100)
MONOCYTES # BLD AUTO: 0.7 X10*3/UL (ref 0.05–0.8)
MONOCYTES NFR BLD AUTO: 8.6 %
NEUTROPHILS # BLD AUTO: 6.57 X10*3/UL (ref 1.6–5.5)
NEUTROPHILS NFR BLD AUTO: 81 %
NRBC BLD-RTO: 0 /100 WBCS (ref 0–0)
PLATELET # BLD AUTO: 199 X10*3/UL (ref 150–450)
POTASSIUM SERPL-SCNC: 4 MMOL/L (ref 3.5–5.3)
PR INTERVAL: 256 MS
PROT SERPL-MCNC: 6.3 G/DL (ref 6.4–8.2)
PROTHROMBIN TIME: 11.2 SECONDS (ref 9.8–12.4)
Q ONSET: 208 MS
QRS COUNT: 10 BEATS
QRS DURATION: 140 MS
QT INTERVAL: 496 MS
QTC CALCULATION(BAZETT): 496 MS
QTC FREDERICIA: 496 MS
R AXIS: -69 DEGREES
RBC # BLD AUTO: 3.71 X10*6/UL (ref 4–5.2)
RH FACTOR (ANTIGEN D): NORMAL
SODIUM SERPL-SCNC: 142 MMOL/L (ref 136–145)
T AXIS: 8 DEGREES
T OFFSET: 456 MS
VENTRICULAR RATE: 60 BPM
WBC # BLD AUTO: 8.1 X10*3/UL (ref 4.4–11.3)

## 2025-05-08 PROCEDURE — 99153 MOD SED SAME PHYS/QHP EA: CPT | Performed by: INTERNAL MEDICINE

## 2025-05-08 PROCEDURE — 85025 COMPLETE CBC W/AUTO DIFF WBC: CPT | Performed by: NURSE PRACTITIONER

## 2025-05-08 PROCEDURE — 2780000003 HC OR 278 NO HCPCS: Performed by: INTERNAL MEDICINE

## 2025-05-08 PROCEDURE — 7100000002 HC RECOVERY ROOM TIME - EACH INCREMENTAL 1 MINUTE: Performed by: INTERNAL MEDICINE

## 2025-05-08 PROCEDURE — 93005 ELECTROCARDIOGRAM TRACING: CPT | Mod: 59

## 2025-05-08 PROCEDURE — 80053 COMPREHEN METABOLIC PANEL: CPT | Performed by: NURSE PRACTITIONER

## 2025-05-08 PROCEDURE — C1760 CLOSURE DEV, VASC: HCPCS | Performed by: INTERNAL MEDICINE

## 2025-05-08 PROCEDURE — 85347 COAGULATION TIME ACTIVATED: CPT

## 2025-05-08 PROCEDURE — 99222 1ST HOSP IP/OBS MODERATE 55: CPT | Performed by: NURSE PRACTITIONER

## 2025-05-08 PROCEDURE — 2060000001 HC INTERMEDIATE ICU ROOM DAILY

## 2025-05-08 PROCEDURE — 7100000001 HC RECOVERY ROOM TIME - INITIAL BASE CHARGE: Performed by: INTERNAL MEDICINE

## 2025-05-08 PROCEDURE — 33340 PERQ CLSR TCAT L ATR APNDGE: CPT | Mod: 74 | Performed by: INTERNAL MEDICINE

## 2025-05-08 PROCEDURE — 93010 ELECTROCARDIOGRAM REPORT: CPT | Performed by: INTERNAL MEDICINE

## 2025-05-08 PROCEDURE — 85347 COAGULATION TIME ACTIVATED: CPT | Performed by: INTERNAL MEDICINE

## 2025-05-08 PROCEDURE — 7100000009 HC PHASE TWO TIME - INITIAL BASE CHARGE: Performed by: INTERNAL MEDICINE

## 2025-05-08 PROCEDURE — C1759 CATH, INTRA ECHOCARDIOGRAPHY: HCPCS | Performed by: INTERNAL MEDICINE

## 2025-05-08 PROCEDURE — 2720000007 HC OR 272 NO HCPCS: Performed by: INTERNAL MEDICINE

## 2025-05-08 PROCEDURE — 99152 MOD SED SAME PHYS/QHP 5/>YRS: CPT | Performed by: INTERNAL MEDICINE

## 2025-05-08 PROCEDURE — 7100000010 HC PHASE TWO TIME - EACH INCREMENTAL 1 MINUTE: Performed by: INTERNAL MEDICINE

## 2025-05-08 PROCEDURE — 36415 COLL VENOUS BLD VENIPUNCTURE: CPT | Performed by: NURSE PRACTITIONER

## 2025-05-08 PROCEDURE — C1894 INTRO/SHEATH, NON-LASER: HCPCS | Performed by: INTERNAL MEDICINE

## 2025-05-08 PROCEDURE — 85610 PROTHROMBIN TIME: CPT | Performed by: NURSE PRACTITIONER

## 2025-05-08 PROCEDURE — 2500000001 HC RX 250 WO HCPCS SELF ADMINISTERED DRUGS (ALT 637 FOR MEDICARE OP): Performed by: NURSE PRACTITIONER

## 2025-05-08 PROCEDURE — 2500000004 HC RX 250 GENERAL PHARMACY W/ HCPCS (ALT 636 FOR OP/ED): Mod: JZ | Performed by: INTERNAL MEDICINE

## 2025-05-08 PROCEDURE — 76937 US GUIDE VASCULAR ACCESS: CPT | Performed by: INTERNAL MEDICINE

## 2025-05-08 PROCEDURE — 86901 BLOOD TYPING SEROLOGIC RH(D): CPT | Performed by: NURSE PRACTITIONER

## 2025-05-08 PROCEDURE — 2500000004 HC RX 250 GENERAL PHARMACY W/ HCPCS (ALT 636 FOR OP/ED): Mod: JZ | Performed by: NURSE PRACTITIONER

## 2025-05-08 RX ORDER — LIDOCAINE HYDROCHLORIDE 20 MG/ML
INJECTION, SOLUTION INFILTRATION; PERINEURAL AS NEEDED
Status: DISCONTINUED | OUTPATIENT
Start: 2025-05-08 | End: 2025-05-08 | Stop reason: HOSPADM

## 2025-05-08 RX ORDER — FENTANYL CITRATE 50 UG/ML
INJECTION, SOLUTION INTRAMUSCULAR; INTRAVENOUS AS NEEDED
Status: DISCONTINUED | OUTPATIENT
Start: 2025-05-08 | End: 2025-05-08 | Stop reason: HOSPADM

## 2025-05-08 RX ORDER — ONDANSETRON HYDROCHLORIDE 2 MG/ML
4 INJECTION, SOLUTION INTRAVENOUS EVERY 8 HOURS PRN
Status: DISCONTINUED | OUTPATIENT
Start: 2025-05-08 | End: 2025-05-08 | Stop reason: HOSPADM

## 2025-05-08 RX ORDER — MIDAZOLAM HYDROCHLORIDE 1 MG/ML
INJECTION INTRAMUSCULAR; INTRAVENOUS AS NEEDED
Status: DISCONTINUED | OUTPATIENT
Start: 2025-05-08 | End: 2025-05-08 | Stop reason: HOSPADM

## 2025-05-08 RX ORDER — ONDANSETRON 4 MG/1
4 TABLET, FILM COATED ORAL EVERY 8 HOURS PRN
Status: DISCONTINUED | OUTPATIENT
Start: 2025-05-08 | End: 2025-05-08 | Stop reason: HOSPADM

## 2025-05-08 RX ORDER — LOSARTAN POTASSIUM 25 MG/1
25 TABLET ORAL ONCE
Status: COMPLETED | OUTPATIENT
Start: 2025-05-08 | End: 2025-05-08

## 2025-05-08 RX ORDER — PROTAMINE SULFATE 10 MG/ML
INJECTION, SOLUTION INTRAVENOUS CONTINUOUS PRN
Status: COMPLETED | OUTPATIENT
Start: 2025-05-08 | End: 2025-05-08

## 2025-05-08 RX ORDER — NAPROXEN SODIUM 220 MG/1
324 TABLET, FILM COATED ORAL ONCE
Status: DISCONTINUED | OUTPATIENT
Start: 2025-05-08 | End: 2025-05-08

## 2025-05-08 RX ORDER — HYDRALAZINE HYDROCHLORIDE 20 MG/ML
INJECTION INTRAMUSCULAR; INTRAVENOUS AS NEEDED
Status: DISCONTINUED | OUTPATIENT
Start: 2025-05-08 | End: 2025-05-08 | Stop reason: HOSPADM

## 2025-05-08 RX ORDER — HEPARIN SODIUM 1000 [USP'U]/ML
INJECTION, SOLUTION INTRAVENOUS; SUBCUTANEOUS AS NEEDED
Status: DISCONTINUED | OUTPATIENT
Start: 2025-05-08 | End: 2025-05-08 | Stop reason: HOSPADM

## 2025-05-08 RX ORDER — HYDRALAZINE HYDROCHLORIDE 20 MG/ML
10 INJECTION INTRAMUSCULAR; INTRAVENOUS ONCE
Status: COMPLETED | OUTPATIENT
Start: 2025-05-08 | End: 2025-05-08

## 2025-05-08 RX ORDER — CEFAZOLIN SODIUM 1 G/50ML
1 SOLUTION INTRAVENOUS ONCE
Status: DISCONTINUED | OUTPATIENT
Start: 2025-05-08 | End: 2025-05-08

## 2025-05-08 RX ORDER — ACETAMINOPHEN 160 MG/5ML
650 SOLUTION ORAL EVERY 6 HOURS PRN
Status: DISCONTINUED | OUTPATIENT
Start: 2025-05-08 | End: 2025-05-08 | Stop reason: HOSPADM

## 2025-05-08 RX ORDER — ROPINIROLE 0.25 MG/1
0.5 TABLET, FILM COATED ORAL 3 TIMES DAILY
Status: DISCONTINUED | OUTPATIENT
Start: 2025-05-08 | End: 2025-05-08 | Stop reason: HOSPADM

## 2025-05-08 RX ORDER — ACETAMINOPHEN 325 MG/1
650 TABLET ORAL EVERY 6 HOURS PRN
Status: DISCONTINUED | OUTPATIENT
Start: 2025-05-08 | End: 2025-05-08 | Stop reason: HOSPADM

## 2025-05-08 RX ORDER — LIDOCAINE HYDROCHLORIDE AND EPINEPHRINE 10; 20 UG/ML; MG/ML
INJECTION, SOLUTION INFILTRATION; PERINEURAL AS NEEDED
Status: DISCONTINUED | OUTPATIENT
Start: 2025-05-08 | End: 2025-05-08 | Stop reason: HOSPADM

## 2025-05-08 RX ORDER — ACETAMINOPHEN 650 MG/1
650 SUPPOSITORY RECTAL EVERY 6 HOURS PRN
Status: DISCONTINUED | OUTPATIENT
Start: 2025-05-08 | End: 2025-05-08 | Stop reason: HOSPADM

## 2025-05-08 RX ADMIN — HYDRALAZINE HYDROCHLORIDE 10 MG: 20 INJECTION INTRAMUSCULAR; INTRAVENOUS at 13:15

## 2025-05-08 RX ADMIN — LOSARTAN POTASSIUM 25 MG: 25 TABLET, FILM COATED ORAL at 13:35

## 2025-05-08 ASSESSMENT — ENCOUNTER SYMPTOMS
WEAKNESS: 1
RESPIRATORY NEGATIVE: 1
GASTROINTESTINAL NEGATIVE: 1
HEMATOLOGIC/LYMPHATIC NEGATIVE: 1
ENDOCRINE NEGATIVE: 1
MUSCULOSKELETAL NEGATIVE: 1
PALPITATIONS: 0
CONSTITUTIONAL NEGATIVE: 1
CHEST TIGHTNESS: 0
CARDIOVASCULAR NEGATIVE: 1
LIGHT-HEADEDNESS: 0
PSYCHIATRIC NEGATIVE: 1
DIZZINESS: 0
ALLERGIC/IMMUNOLOGIC NEGATIVE: 1
EYES NEGATIVE: 1
SHORTNESS OF BREATH: 0

## 2025-05-08 ASSESSMENT — COLUMBIA-SUICIDE SEVERITY RATING SCALE - C-SSRS
6. HAVE YOU EVER DONE ANYTHING, STARTED TO DO ANYTHING, OR PREPARED TO DO ANYTHING TO END YOUR LIFE?: NO
1. IN THE PAST MONTH, HAVE YOU WISHED YOU WERE DEAD OR WISHED YOU COULD GO TO SLEEP AND NOT WAKE UP?: NO
2. HAVE YOU ACTUALLY HAD ANY THOUGHTS OF KILLING YOURSELF?: NO

## 2025-05-08 ASSESSMENT — PAIN SCALES - GENERAL
PAINLEVEL_OUTOF10: 0 - NO PAIN

## 2025-05-08 ASSESSMENT — PAIN - FUNCTIONAL ASSESSMENT: PAIN_FUNCTIONAL_ASSESSMENT: 0-10

## 2025-05-08 NOTE — PRE-SEDATION DOCUMENTATION
Patient: Lou Oliva  MRN: [id number]  NPO guidelines met: Yes    Physical Exam    Airway  Mallampati: III     Cardiovascular    Dental    Pulmonary        Plan    ASA 3     Mild          NASAL CONGESTION

## 2025-05-08 NOTE — SIGNIFICANT EVENT
Pt ambulated in room with assistance, complained of some dizziness, gait steady with use of cane, assisted back to bed, /52, right groin site stable, no complaints of pain

## 2025-05-08 NOTE — TELEPHONE ENCOUNTER
Left voicemail for patient regarding rescheduling the Watchman procedure, the patient is encouraged to call back.

## 2025-05-08 NOTE — DISCHARGE INSTRUCTIONS
**We apologize for the technical difficulties that required us to cancel your procedure today.  The  Structural Heart Nurse Coordinator will be in contact with you soon to reschedule your procedure.            DISCHARGE INSTRUCTIONS     FOR SUDDEN AND SEVERE CHEST PAIN, SHORTNESS OF BREATH, EXCESSIVE BLEEDING, SIGNS OF STROKE, OR CHANGES IN MENTAL STATUS YOU SHOULD CALL 911 IMMEDIATELY.       FOR NEXT 24 HOURS  - Upon discharge, you should return home and rest for the remainder of the day and evening. You do not have to stay on bed rest but should not be very active.  It is recommended a responsible adult be with you for the first 24 hours after the procedure.    - No driving for 24 hours after procedure. Please arrange for someone to drive you home from the hospital today.     - Do not drive, operate machinery, or use power tools for 24 hours after your procedure.     - Do not make any legal decisions for 24 hours after your procedure.     - Do not drink alcoholic beverages for 24 hours after your procedure.    WOUND CARE   *FOR FEMORAL (LEG) ACCESS*  ·      Avoid heavy lifting (over 10 pounds) for 7 days, squatting or excessive bending for 2 days, and strenuous exercise for 7 days.  ·      No submerged bathing, swimming, or hot tubs for the next 7 days, or until fully healed.  ·      Avoid sexual activity for 3-4 days until any groin discomfort has ceased.    - The transparent dressing should be removed from the site 24 hours after the procedure.  Wash the site gently with soap and water. Rinse well and pat dry. Keep the area clean and dry. You may apply a Band-Aid to the site. Avoid lotions, ointments, or powders until fully healed.     - You may shower the day after your procedure.      - It is normal to notice a small bruise around the puncture site and/or a small grape sized or smaller lump. Any large bruising or large lump warrants a call to the office.     - If bleeding should occur, lay down and apply  pressure to the affected area for 10 minutes.  If the bleeding stops notify your physician.  If there is a large amount of bleeding or spurting of blood CALL 911 immediately.  DO NOT drive yourself to the hospital.    - You may experience some tenderness, bruising or minimal inflammation.  If you have any concerns, you may contact the Cath Lab or if any of these symptoms become excessive, contact your cardiologist or go to the emergency room.     OTHER INSTRUCTIONS  - You may take acetaminophen (Tylenol) as directed for discomfort.  If pain is not relieved with acetaminophen (Tylenol), contact your doctor.    - If you notice or experience any of the following, you should notify your doctor or seek medical attention  Chest pain or discomfort  Change in mental status or weakness in extremities.  Dizziness, light headedness, or feeling faint.  Change in the site where the procedure was performed, such as bleeding or an increased area of bruising or swelling.  Tingling, numbness, pain, or coolness in the leg/arm beyond the site where the procedure was performed.  Signs of infection (i.e. shaking chills, temperature > 100 degrees Fahrenheit, warmth, redness) in the leg/arm area where the procedure was performed.  Changes in urination   Bloody or black stools  Vomiting blood  Severe nose bleeds  Any excessive bleeding

## 2025-05-08 NOTE — H&P
History Of Present Illness  Lou Oliva is a 89 y.o. female with past medical history significant for paroxysmal atrial fibrillation, maintained on metoprolol tartrate 25 mg twice daily and anticoagulated with Eliquis 2.5 mg twice daily, previously on amiodarone which was discontinued due to thyroid issues, history of CAD, s/p remote CABG (2007), SND, s/p remote PPM, chronic diastolic heart failure, PAD, AAA, HTN, HLD, hyperthyroidism, anemia, recent GI bleed requiring blood transfusions, falls, CKD and was recently referred by Dr. Avila to Dr. Chamorro for evaluation for left atrial appendage closure.   Given patient's significant GI bleeding and falls risk, she is at increased risk for both bleeding and stroke and therefore a reasonable candidate for left atrial appendage occluder placement.  Patient is at [facility] for placement of a Watchman LAAO device under the care of Dr. Chamorro.     Patient's CHADS-VASC score is 5, HAS-BLED score is 5.   Past Medical History  Medical History[1]    Surgical History  Surgical History[2]     Social History    Remote tobacco use  Denies alcohol use  Denies drug use     Family History  Family History[3]     Allergies  Lisinopril, Nsaids (non-steroidal anti-inflammatory drug), Statins-hmg-coa reductase inhibitors, and Sulfa (sulfonamide antibiotics)    Review of Systems   Constitutional: Negative.    HENT: Negative.     Eyes: Negative.    Respiratory: Negative.  Negative for chest tightness and shortness of breath.    Cardiovascular: Negative.  Negative for chest pain and palpitations.   Gastrointestinal: Negative.    Endocrine: Negative.    Genitourinary: Negative.    Musculoskeletal: Negative.    Skin: Negative.    Allergic/Immunologic: Negative.    Neurological:  Positive for weakness. Negative for dizziness and light-headedness.   Hematological: Negative.    Psychiatric/Behavioral: Negative.       Physical Exam  Vitals reviewed.  "  Constitutional:       Appearance: Normal appearance. She is obese.   HENT:      Head: Normocephalic and atraumatic.      Nose: Nose normal.      Mouth/Throat:      Mouth: Mucous membranes are moist.      Pharynx: Oropharynx is clear.   Eyes:      Pupils: Pupils are equal, round, and reactive to light.   Cardiovascular:      Rate and Rhythm: Normal rate and regular rhythm.      Pulses: Normal pulses.      Heart sounds: Normal heart sounds.   Pulmonary:      Effort: Pulmonary effort is normal.      Breath sounds: Normal breath sounds.   Abdominal:      General: Bowel sounds are normal.      Palpations: Abdomen is soft.   Musculoskeletal:         General: Normal range of motion.      Cervical back: Normal range of motion and neck supple.   Skin:     General: Skin is warm and dry.   Neurological:      General: No focal deficit present.      Mental Status: She is alert and oriented to person, place, and time.   Psychiatric:         Mood and Affect: Mood normal.         Behavior: Behavior normal.       Last Recorded Vitals  Blood pressure (!) 188/94, pulse 79, temperature 37 °C (98.6 °F), temperature source Temporal, resp. rate 18, height 1.575 m (5' 2\"), SpO2 97%.    Relevant Results  Labs pending at this time  Results for orders placed or performed during the hospital encounter of 05/08/25 (from the past 24 hours)   Type And Screen   Result Value Ref Range    ABO TYPE A     Rh TYPE POS    Comprehensive Metabolic Panel   Result Value Ref Range    Glucose 86 74 - 99 mg/dL    Sodium 142 136 - 145 mmol/L    Potassium 4.0 3.5 - 5.3 mmol/L    Chloride 105 98 - 107 mmol/L    Bicarbonate 28 21 - 32 mmol/L    Anion Gap 13 10 - 20 mmol/L    Urea Nitrogen 31 (H) 6 - 23 mg/dL    Creatinine 1.99 (H) 0.50 - 1.05 mg/dL    eGFR 23 (L) >60 mL/min/1.73m*2    Calcium 9.1 8.6 - 10.3 mg/dL    Albumin 3.8 3.4 - 5.0 g/dL    Alkaline Phosphatase 41 33 - 136 U/L    Total Protein 6.3 (L) 6.4 - 8.2 g/dL    AST 12 9 - 39 U/L    Bilirubin, Total " 0.7 0.0 - 1.2 mg/dL    ALT 5 (L) 7 - 45 U/L   CBC and Auto Differential   Result Value Ref Range    WBC 8.1 4.4 - 11.3 x10*3/uL    nRBC 0.0 0.0 - 0.0 /100 WBCs    RBC 3.71 (L) 4.00 - 5.20 x10*6/uL    Hemoglobin 10.6 (L) 12.0 - 16.0 g/dL    Hematocrit 33.6 (L) 36.0 - 46.0 %    MCV 91 80 - 100 fL    MCH 28.6 26.0 - 34.0 pg    MCHC 31.5 (L) 32.0 - 36.0 g/dL    RDW 15.1 (H) 11.5 - 14.5 %    Platelets 199 150 - 450 x10*3/uL    Neutrophils % 81.0 40.0 - 80.0 %    Immature Granulocytes %, Automated 0.4 0.0 - 0.9 %    Lymphocytes % 8.6 13.0 - 44.0 %    Monocytes % 8.6 2.0 - 10.0 %    Eosinophils % 0.9 0.0 - 6.0 %    Basophils % 0.5 0.0 - 2.0 %    Neutrophils Absolute 6.57 (H) 1.60 - 5.50 x10*3/uL    Immature Granulocytes Absolute, Automated 0.03 0.00 - 0.50 x10*3/uL    Lymphocytes Absolute 0.70 (L) 0.80 - 3.00 x10*3/uL    Monocytes Absolute 0.70 0.05 - 0.80 x10*3/uL    Eosinophils Absolute 0.07 0.00 - 0.40 x10*3/uL    Basophils Absolute 0.04 0.00 - 0.10 x10*3/uL   Coagulation Screen   Result Value Ref Range    Protime 11.2 9.8 - 12.4 seconds    INR 1.0 0.9 - 1.1    aPTT 31 26 - 36 seconds   ECG 12 lead   Result Value Ref Range    Ventricular Rate 60 BPM    Atrial Rate 60 BPM    NJ Interval 256 ms    QRS Duration 140 ms    QT Interval 496 ms    QTC Calculation(Bazett) 496 ms    R Axis -69 degrees    T Axis 8 degrees    QRS Count 10 beats    Q Onset 208 ms    T Offset 456 ms    QTC Fredericia 496 ms        Cardiology, Vascular, and Other Imaging  ECG 12 lead  Result Date: 5/8/2025  Atrial-paced rhythm with prolonged AV conduction Right bundle branch block Left anterior fascicular block ** Bifascicular block ** Possible Lateral infarct (cited on or before 30-APR-2025) Abnormal ECG When compared with ECG of 30-APR-2025 07:58, No significant change was found    ECG 12 lead (Clinic Performed)  Result Date: 5/6/2025  Atrial paced rhythm.  Right bundle branch block.  Left anterior fascicular block.  Rate 62.    Assessment/Plan    Paroxysmal atrial fibrillation              - Currently maintained on metoprolol tartrate 25 mg twice daily and anticoagulated with Eliquis 2.5 mg twice daily.      - Patient is at increased risk for bleeding and stroke due to frequent falls, GI bleed and maintained on anticoagulation for stroke risk reduction d/t A-fib.  It is recommended she undergo placement of a Watchman LAAO device.              - Plan for placement of a Watchman LAAO device today under the care of Dr. Chamorro.  2.   Recent history of GI bleeds requiring blood transfusions/anemia  3.   Frequent falls  4.   Coronary artery disease/s/p remote CABG (2007)  5.   Sinus node dysfunction/s/p remote PPM  6.   Benign essential hypertension -elevated this AM.  Will give hydralazine 10 mg IV prior to procedure.  7.   Chronic diastolic heart failure  8.   Chronic kidney disease 3  9.   Obesity -BMI 32.04     Further recommendations pending procedure results  I spent 55 minutes in the professional and overall care of this patient.      Noemi Machado, APRN-CNP          [1]   Past Medical History:  Diagnosis Date    Anemia     Arrhythmia     Chronic kidney disease     Coronary artery disease     Disease of thyroid gland     Hyperlipidemia     Hypertension     PAD (peripheral artery disease) (CMS-HCC)    [2]   Past Surgical History:  Procedure Laterality Date    CORONARY ARTERY BYPASS GRAFT      INSERT / REPLACE / REMOVE PACEMAKER      OTHER SURGICAL HISTORY  01/05/2022    Arterial ligation    OTHER SURGICAL HISTORY  01/05/2022    Hip replacement    OTHER SURGICAL HISTORY  01/25/2022    Complete colonoscopy   [3] No family history on file.

## 2025-05-08 NOTE — Clinical Note
Sheath was exchanged in the right femoral vein with SHEATH, JULIETTE, W/O GUIDEWIRE, 8FR INTRODUCER,  8FR ASHU, 2.5 CM DILATOR.

## 2025-05-08 NOTE — SIGNIFICANT EVENT
Discharge instructions reviewed with pt including wound care, medications and follow up, verbalized understanding

## 2025-05-08 NOTE — Clinical Note
Sheath was removed in the right femoral vein. Site closed by Perclose. Short 8 Fr sheath advanced after first perclose

## 2025-05-08 NOTE — POST-PROCEDURE NOTE
Physician Transition of Care Summary  Invasive Cardiovascular Lab    Procedure Date: 5/8/2025  Attending:    * Omar Chamorro - Primary  Resident/Fellow/Other Assistant: Surgeons and Role:  * No surgeons found with a matching role *    Indications:   Pre-op Diagnosis      * Atrial fibrillation, unspecified type (Multi) [I48.91]    Post-procedure diagnosis:   Post-op Diagnosis     * Atrial fibrillation, unspecified type (Multi) [I48.91]    Procedure(s):     * Procedure aborted - Cath  WA PERQ CLSR TCAT L ATR APNDGE W/ENDOCARDIAL IMPLNT [[id number]]    Description of the Procedure:   Aborted procedure because of technical issues after access. (ICE machine wasn't working properly and besides all attempts, we decided to abort and postpone the procedure).    Stents/Implants:   Implants       No implant documentation for this case.            Anticoagulation/Antiplatelet Plan:   eliquis    Estimated Blood Loss:   5 mL    Anesthesia: * No anesthesia type entered * Anesthesia Staff: No anesthesia staff entered.    Any Specimen(s) Removed:   Order Name Source Comment Collection Info Order Time   TYPE AND SCREEN Blood, Venous  Collected By: Kimberly Casaletta, RN 5/8/2025  9:42 AM     Release result to MyChart   Immediate        COMPREHENSIVE METABOLIC PANEL Blood, Venous  Collected By: Kimberly Casaletta, RN 5/8/2025  9:42 AM     Release result to MyChart   Immediate        CBC WITH AUTO DIFFERENTIAL Blood, Venous  Collected By: Kimberly Casaletta, RN 5/8/2025  9:42 AM     Release result to MyChart   Immediate        COAGULATION SCREEN Blood, Venous  Collected By: Kimberly Casaletta, RN 5/8/2025  9:42 AM     Release result to MyChart   Immediate            Disposition:   Aborted procedure.      Electronically signed by: Placido Gonsales MD, 5/8/2025 12:34 PM

## 2025-05-09 DIAGNOSIS — Z00.6 ENCOUNTER FOR EXAMINATION FOR NORMAL COMPARISON AND CONTROL IN CLINICAL RESEARCH PROGRAM: ICD-10-CM

## 2025-05-09 DIAGNOSIS — I48.91 ATRIAL FIBRILLATION, UNSPECIFIED TYPE (MULTI): ICD-10-CM

## 2025-05-10 DIAGNOSIS — I10 PRIMARY HYPERTENSION: ICD-10-CM

## 2025-05-10 DIAGNOSIS — E78.2 MIXED HYPERLIPIDEMIA: ICD-10-CM

## 2025-05-11 ENCOUNTER — PATIENT OUTREACH (OUTPATIENT)
Dept: CARE COORDINATION | Age: OVER 89
End: 2025-05-11
Payer: MEDICARE

## 2025-05-13 ENCOUNTER — PATIENT OUTREACH (OUTPATIENT)
Dept: CARE COORDINATION | Age: OVER 89
End: 2025-05-13
Payer: MEDICARE

## 2025-05-13 VITALS — SYSTOLIC BLOOD PRESSURE: 153 MMHG | DIASTOLIC BLOOD PRESSURE: 77 MMHG

## 2025-05-13 DIAGNOSIS — I10 PRIMARY HYPERTENSION: ICD-10-CM

## 2025-05-13 DIAGNOSIS — I50.32 CHRONIC DIASTOLIC HEART FAILURE: ICD-10-CM

## 2025-05-13 DIAGNOSIS — I48.0 PAROXYSMAL ATRIAL FIBRILLATION (MULTI): Primary | ICD-10-CM

## 2025-05-13 SDOH — SOCIAL STABILITY: SOCIAL NETWORK: HOW OFTEN DO YOU ATTEND MEETINGS OF THE CLUBS OR ORGANIZATIONS YOU BELONG TO?: PATIENT DECLINED

## 2025-05-13 SDOH — SOCIAL STABILITY: SOCIAL INSECURITY
WITHIN THE LAST YEAR, HAVE YOU BEEN RAPED OR FORCED TO HAVE ANY KIND OF SEXUAL ACTIVITY BY YOUR PARTNER OR EX-PARTNER?: NO

## 2025-05-13 SDOH — SOCIAL STABILITY: SOCIAL NETWORK: HOW OFTEN DO YOU GET TOGETHER WITH FRIENDS OR RELATIVES?: TWICE A WEEK

## 2025-05-13 SDOH — SOCIAL STABILITY: SOCIAL NETWORK: HOW OFTEN DO YOU ATTEND CHURCH OR RELIGIOUS SERVICES?: PATIENT DECLINED

## 2025-05-13 SDOH — ECONOMIC STABILITY: HOUSING INSECURITY: IN THE LAST 12 MONTHS, WAS THERE A TIME WHEN YOU WERE NOT ABLE TO PAY THE MORTGAGE OR RENT ON TIME?: NO

## 2025-05-13 SDOH — HEALTH STABILITY: PHYSICAL HEALTH
HOW OFTEN DO YOU NEED TO HAVE SOMEONE HELP YOU WHEN YOU READ INSTRUCTIONS, PAMPHLETS, OR OTHER WRITTEN MATERIAL FROM YOUR DOCTOR OR PHARMACY?: NEVER

## 2025-05-13 SDOH — ECONOMIC STABILITY: FOOD INSECURITY: HOW HARD IS IT FOR YOU TO PAY FOR THE VERY BASICS LIKE FOOD, HOUSING, MEDICAL CARE, AND HEATING?: NOT VERY HARD

## 2025-05-13 SDOH — HEALTH STABILITY: MENTAL HEALTH
DO YOU FEEL STRESS - TENSE, RESTLESS, NERVOUS, OR ANXIOUS, OR UNABLE TO SLEEP AT NIGHT BECAUSE YOUR MIND IS TROUBLED ALL THE TIME - THESE DAYS?: ONLY A LITTLE

## 2025-05-13 SDOH — SOCIAL STABILITY: SOCIAL INSECURITY
WITHIN THE LAST YEAR, HAVE YOU BEEN KICKED, HIT, SLAPPED, OR OTHERWISE PHYSICALLY HURT BY YOUR PARTNER OR EX-PARTNER?: NO

## 2025-05-13 SDOH — ECONOMIC STABILITY: INCOME INSECURITY: IN THE PAST 12 MONTHS HAS THE ELECTRIC, GAS, OIL, OR WATER COMPANY THREATENED TO SHUT OFF SERVICES IN YOUR HOME?: NO

## 2025-05-13 SDOH — HEALTH STABILITY: MENTAL HEALTH: HOW MANY DRINKS CONTAINING ALCOHOL DO YOU HAVE ON A TYPICAL DAY WHEN YOU ARE DRINKING?: PATIENT DECLINED

## 2025-05-13 SDOH — HEALTH STABILITY: MENTAL HEALTH: HOW OFTEN DO YOU HAVE SIX OR MORE DRINKS ON ONE OCCASION?: PATIENT DECLINED

## 2025-05-13 SDOH — SOCIAL STABILITY: SOCIAL INSECURITY: ARE YOU MARRIED, WIDOWED, DIVORCED, SEPARATED, NEVER MARRIED, OR LIVING WITH A PARTNER?: MARRIED

## 2025-05-13 SDOH — ECONOMIC STABILITY: HOUSING INSECURITY: AT ANY TIME IN THE PAST 12 MONTHS, WERE YOU HOMELESS OR LIVING IN A SHELTER (INCLUDING NOW)?: NO

## 2025-05-13 SDOH — SOCIAL STABILITY: SOCIAL INSECURITY: WITHIN THE LAST YEAR, HAVE YOU BEEN AFRAID OF YOUR PARTNER OR EX-PARTNER?: NO

## 2025-05-13 SDOH — HEALTH STABILITY: MENTAL HEALTH: HOW OFTEN DO YOU HAVE A DRINK CONTAINING ALCOHOL?: PATIENT DECLINED

## 2025-05-13 SDOH — ECONOMIC STABILITY: FOOD INSECURITY: WITHIN THE PAST 12 MONTHS, YOU WORRIED THAT YOUR FOOD WOULD RUN OUT BEFORE YOU GOT THE MONEY TO BUY MORE.: NEVER TRUE

## 2025-05-13 SDOH — SOCIAL STABILITY: SOCIAL INSECURITY: WITHIN THE LAST YEAR, HAVE YOU BEEN HUMILIATED OR EMOTIONALLY ABUSED IN OTHER WAYS BY YOUR PARTNER OR EX-PARTNER?: NO

## 2025-05-13 SDOH — SOCIAL STABILITY: SOCIAL NETWORK
DO YOU BELONG TO ANY CLUBS OR ORGANIZATIONS SUCH AS CHURCH GROUPS, UNIONS, FRATERNAL OR ATHLETIC GROUPS, OR SCHOOL GROUPS?: PATIENT DECLINED

## 2025-05-13 SDOH — ECONOMIC STABILITY: FOOD INSECURITY: WITHIN THE PAST 12 MONTHS, THE FOOD YOU BOUGHT JUST DIDN'T LAST AND YOU DIDN'T HAVE MONEY TO GET MORE.: NEVER TRUE

## 2025-05-13 SDOH — HEALTH STABILITY: PHYSICAL HEALTH: ON AVERAGE, HOW MANY MINUTES DO YOU ENGAGE IN EXERCISE AT THIS LEVEL?: 0 MIN

## 2025-05-13 SDOH — SOCIAL STABILITY: SOCIAL NETWORK: IN A TYPICAL WEEK, HOW MANY TIMES DO YOU TALK ON THE PHONE WITH FAMILY, FRIENDS, OR NEIGHBORS?: THREE TIMES A WEEK

## 2025-05-13 SDOH — HEALTH STABILITY: PHYSICAL HEALTH: ON AVERAGE, HOW MANY DAYS PER WEEK DO YOU ENGAGE IN MODERATE TO STRENUOUS EXERCISE (LIKE A BRISK WALK)?: 0 DAYS

## 2025-05-13 SDOH — ECONOMIC STABILITY: TRANSPORTATION INSECURITY: IN THE PAST 12 MONTHS, HAS LACK OF TRANSPORTATION KEPT YOU FROM MEDICAL APPOINTMENTS OR FROM GETTING MEDICATIONS?: NO

## 2025-05-13 ASSESSMENT — LIFESTYLE VARIABLES
AUDIT-C TOTAL SCORE: -1
SKIP TO QUESTIONS 9-10: 0

## 2025-05-13 ASSESSMENT — ACTIVITIES OF DAILY LIVING (ADL): LACK_OF_TRANSPORTATION: NO

## 2025-05-13 NOTE — PROGRESS NOTES
Spoke to patient for emrollment call.   PCP rescheduled for 5/27  Daughter in law/nurse and kids check in on her.   Watchman procedure rescheduled for 5/22 (she was sent home to return, machine arm broke).  She has spouse, files joint SS, but won't really need PAP because following Watchman she won't need Eliquis. Now she pays $169/month and has about half month left.   Resting at home until the procedure.

## 2025-05-22 ENCOUNTER — APPOINTMENT (OUTPATIENT)
Dept: CARDIOLOGY | Facility: HOSPITAL | Age: OVER 89
DRG: 274 | End: 2025-05-22
Payer: MEDICARE

## 2025-05-22 ENCOUNTER — HOSPITAL ENCOUNTER (INPATIENT)
Facility: HOSPITAL | Age: OVER 89
Discharge: HOME | DRG: 274 | End: 2025-05-22
Attending: INTERNAL MEDICINE | Admitting: INTERNAL MEDICINE
Payer: MEDICARE

## 2025-05-22 VITALS
TEMPERATURE: 98.6 F | DIASTOLIC BLOOD PRESSURE: 80 MMHG | HEART RATE: 65 BPM | RESPIRATION RATE: 16 BRPM | BODY MASS INDEX: 26.54 KG/M2 | OXYGEN SATURATION: 95 % | HEIGHT: 67 IN | WEIGHT: 169.09 LBS | SYSTOLIC BLOOD PRESSURE: 176 MMHG

## 2025-05-22 DIAGNOSIS — Z86.16 HISTORY OF COVID-19: ICD-10-CM

## 2025-05-22 DIAGNOSIS — I25.10 ASHD (ARTERIOSCLEROTIC HEART DISEASE): ICD-10-CM

## 2025-05-22 DIAGNOSIS — I50.32 CHRONIC DIASTOLIC HEART FAILURE: ICD-10-CM

## 2025-05-22 DIAGNOSIS — I48.91 ATRIAL FIBRILLATION, UNSPECIFIED TYPE (MULTI): Primary | ICD-10-CM

## 2025-05-22 DIAGNOSIS — Z95.1 S/P CABG (CORONARY ARTERY BYPASS GRAFT): ICD-10-CM

## 2025-05-22 DIAGNOSIS — R42 VERTIGO: ICD-10-CM

## 2025-05-22 DIAGNOSIS — K21.9 GASTROESOPHAGEAL REFLUX DISEASE WITHOUT ESOPHAGITIS: ICD-10-CM

## 2025-05-22 DIAGNOSIS — Z95.818 PRESENCE OF WATCHMAN LEFT ATRIAL APPENDAGE CLOSURE DEVICE: ICD-10-CM

## 2025-05-22 DIAGNOSIS — I48.0 PAROXYSMAL ATRIAL FIBRILLATION (MULTI): ICD-10-CM

## 2025-05-22 DIAGNOSIS — Z79.899 HIGH RISK MEDICATION USE: ICD-10-CM

## 2025-05-22 DIAGNOSIS — R94.31 ABNORMAL ELECTROCARDIOGRAM: ICD-10-CM

## 2025-05-22 DIAGNOSIS — E78.2 MIXED HYPERLIPIDEMIA: ICD-10-CM

## 2025-05-22 DIAGNOSIS — D64.9 ANEMIA, UNSPECIFIED TYPE: ICD-10-CM
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
## 2025-05-22 DIAGNOSIS — I48.91 ATRIAL FIBRILLATION (MULTI): ICD-10-CM

## 2025-05-22 DIAGNOSIS — I45.10 RIGHT BUNDLE BRANCH BLOCK (RBBB): ICD-10-CM

## 2025-05-22 DIAGNOSIS — N28.9 RENAL INSUFFICIENCY: ICD-10-CM

## 2025-05-22 DIAGNOSIS — K92.2 GASTROINTESTINAL HEMORRHAGE, UNSPECIFIED GASTROINTESTINAL HEMORRHAGE TYPE: ICD-10-CM

## 2025-05-22 DIAGNOSIS — K27.9 PUD (PEPTIC ULCER DISEASE): ICD-10-CM

## 2025-05-22 DIAGNOSIS — E05.90 HYPERTHYROIDISM: ICD-10-CM

## 2025-05-22 DIAGNOSIS — Z01.818 PRE-OP EXAM: ICD-10-CM

## 2025-05-22 DIAGNOSIS — E78.01 FAMILIAL HYPERCHOLESTEROLEMIA: ICD-10-CM

## 2025-05-22 DIAGNOSIS — Z95.0 PACEMAKER: ICD-10-CM

## 2025-05-22 DIAGNOSIS — Z98.890 POST-OPERATIVE STATE: ICD-10-CM

## 2025-05-22 DIAGNOSIS — I10 PRIMARY HYPERTENSION: ICD-10-CM

## 2025-05-22 DIAGNOSIS — Z00.6 ENCOUNTER FOR EXAMINATION FOR NORMAL COMPARISON AND CONTROL IN CLINICAL RESEARCH PROGRAM: ICD-10-CM

## 2025-05-22 DIAGNOSIS — I44.4 LAFB (LEFT ANTERIOR FASCICULAR BLOCK): ICD-10-CM

## 2025-05-22 DIAGNOSIS — I73.9 PERIPHERAL VASCULAR DISEASE: ICD-10-CM

## 2025-05-22 DIAGNOSIS — R60.9 EDEMA, UNSPECIFIED TYPE: ICD-10-CM

## 2025-05-22 DIAGNOSIS — I10 HYPERTENSION, UNSPECIFIED TYPE: ICD-10-CM

## 2025-05-22 DIAGNOSIS — I71.40 ABDOMINAL AORTIC ANEURYSM (AAA) WITHOUT RUPTURE, UNSPECIFIED PART: ICD-10-CM

## 2025-05-22 LAB
ABO GROUP (TYPE) IN BLOOD: NORMAL
ACT BLD: >397 SEC (ref 83–199)
ALBUMIN SERPL BCP-MCNC: 3.8 G/DL (ref 3.4–5)
ALP SERPL-CCNC: 41 U/L (ref 33–136)
ALT SERPL W P-5'-P-CCNC: 5 U/L (ref 7–45)
ANION GAP SERPL CALC-SCNC: 12 MMOL/L (ref 10–20)
ANTIBODY SCREEN: NORMAL
APTT PPP: 32 SECONDS (ref 26–36)
AST SERPL W P-5'-P-CCNC: 13 U/L (ref 9–39)
ATRIAL RATE: 50 BPM
ATRIAL RATE: 62 BPM
BILIRUB SERPL-MCNC: 0.6 MG/DL (ref 0–1.2)
BUN SERPL-MCNC: 34 MG/DL (ref 6–23)
CALCIUM SERPL-MCNC: 8.9 MG/DL (ref 8.6–10.3)
CHLORIDE SERPL-SCNC: 104 MMOL/L (ref 98–107)
CO2 SERPL-SCNC: 28 MMOL/L (ref 21–32)
CREAT SERPL-MCNC: 2.22 MG/DL (ref 0.5–1.05)
EGFRCR SERPLBLD CKD-EPI 2021: 21 ML/MIN/1.73M*2
EJECTION FRACTION: 60 %
EJECTION FRACTION: 60 %
ERYTHROCYTE [DISTWIDTH] IN BLOOD BY AUTOMATED COUNT: 14.4 % (ref 11.5–14.5)
GLUCOSE SERPL-MCNC: 95 MG/DL (ref 74–99)
HCT VFR BLD AUTO: 32.9 % (ref 36–46)
HGB BLD-MCNC: 10.3 G/DL (ref 12–16)
INR PPP: 1 (ref 0.9–1.1)
MCH RBC QN AUTO: 28.2 PG (ref 26–34)
MCHC RBC AUTO-ENTMCNC: 31.3 G/DL (ref 32–36)
MCV RBC AUTO: 90 FL (ref 80–100)
NRBC BLD-RTO: 0 /100 WBCS (ref 0–0)
P AXIS: 67 DEGREES
P OFFSET: 148 MS
P ONSET: 117 MS
PLATELET # BLD AUTO: 221 X10*3/UL (ref 150–450)
POTASSIUM SERPL-SCNC: 4.1 MMOL/L (ref 3.5–5.3)
PR INTERVAL: 212 MS
PR INTERVAL: 262 MS
PROT SERPL-MCNC: 6.2 G/DL (ref 6.4–8.2)
PROTHROMBIN TIME: 11.2 SECONDS (ref 9.8–12.4)
Q ONSET: 208 MS
Q ONSET: 223 MS
QRS COUNT: 10 BEATS
QRS COUNT: 10 BEATS
QRS DURATION: 142 MS
QRS DURATION: 142 MS
QT INTERVAL: 462 MS
QT INTERVAL: 496 MS
QTC CALCULATION(BAZETT): 462 MS
QTC CALCULATION(BAZETT): 503 MS
QTC FREDERICIA: 462 MS
QTC FREDERICIA: 501 MS
R AXIS: -56 DEGREES
R AXIS: -66 DEGREES
RBC # BLD AUTO: 3.65 X10*6/UL (ref 4–5.2)
RH FACTOR (ANTIGEN D): NORMAL
SODIUM SERPL-SCNC: 140 MMOL/L (ref 136–145)
T AXIS: -1 DEGREES
T AXIS: -23 DEGREES
T OFFSET: 439 MS
T OFFSET: 471 MS
VENTRICULAR RATE: 60 BPM
VENTRICULAR RATE: 62 BPM
WBC # BLD AUTO: 7.7 X10*3/UL (ref 4.4–11.3)

## 2025-05-22 PROCEDURE — C1760 CLOSURE DEV, VASC: HCPCS | Performed by: INTERNAL MEDICINE

## 2025-05-22 PROCEDURE — C1894 INTRO/SHEATH, NON-LASER: HCPCS | Performed by: INTERNAL MEDICINE

## 2025-05-22 PROCEDURE — 85730 THROMBOPLASTIN TIME PARTIAL: CPT | Performed by: NURSE PRACTITIONER

## 2025-05-22 PROCEDURE — 2720000007 HC OR 272 NO HCPCS: Performed by: INTERNAL MEDICINE

## 2025-05-22 PROCEDURE — 93308 TTE F-UP OR LMTD: CPT

## 2025-05-22 PROCEDURE — 80053 COMPREHEN METABOLIC PANEL: CPT | Performed by: NURSE PRACTITIONER

## 2025-05-22 PROCEDURE — 93005 ELECTROCARDIOGRAM TRACING: CPT

## 2025-05-22 PROCEDURE — C1889 IMPLANT/INSERT DEVICE, NOC: HCPCS | Performed by: INTERNAL MEDICINE

## 2025-05-22 PROCEDURE — 33340 PERQ CLSR TCAT L ATR APNDGE: CPT | Performed by: INTERNAL MEDICINE

## 2025-05-22 PROCEDURE — 2500000001 HC RX 250 WO HCPCS SELF ADMINISTERED DRUGS (ALT 637 FOR MEDICARE OP): Performed by: NURSE PRACTITIONER

## 2025-05-22 PROCEDURE — 86901 BLOOD TYPING SEROLOGIC RH(D): CPT | Performed by: NURSE PRACTITIONER

## 2025-05-22 PROCEDURE — 85347 COAGULATION TIME ACTIVATED: CPT | Performed by: INTERNAL MEDICINE

## 2025-05-22 PROCEDURE — C1893 INTRO/SHEATH, FIXED,NON-PEEL: HCPCS | Performed by: INTERNAL MEDICINE

## 2025-05-22 PROCEDURE — 7100000009 HC PHASE TWO TIME - INITIAL BASE CHARGE: Performed by: INTERNAL MEDICINE

## 2025-05-22 PROCEDURE — 2500000004 HC RX 250 GENERAL PHARMACY W/ HCPCS (ALT 636 FOR OP/ED): Mod: JZ | Performed by: INTERNAL MEDICINE

## 2025-05-22 PROCEDURE — 02L73DK OCCLUSION OF LEFT ATRIAL APPENDAGE WITH INTRALUMINAL DEVICE, PERCUTANEOUS APPROACH: ICD-10-PCS | Performed by: INTERNAL MEDICINE

## 2025-05-22 PROCEDURE — 1210000001 HC SEMI-PRIVATE ROOM DAILY

## 2025-05-22 PROCEDURE — 93308 TTE F-UP OR LMTD: CPT | Performed by: INTERNAL MEDICINE

## 2025-05-22 PROCEDURE — 2550000001 HC RX 255 CONTRASTS: Performed by: INTERNAL MEDICINE

## 2025-05-22 PROCEDURE — 2500000004 HC RX 250 GENERAL PHARMACY W/ HCPCS (ALT 636 FOR OP/ED): Mod: JZ | Performed by: NURSE PRACTITIONER

## 2025-05-22 PROCEDURE — 7100000001 HC RECOVERY ROOM TIME - INITIAL BASE CHARGE: Performed by: INTERNAL MEDICINE

## 2025-05-22 PROCEDURE — 85347 COAGULATION TIME ACTIVATED: CPT

## 2025-05-22 PROCEDURE — 85027 COMPLETE CBC AUTOMATED: CPT | Performed by: NURSE PRACTITIONER

## 2025-05-22 PROCEDURE — 1210000006 HC SEMI PRIVATE ROOM - IP ONLY PROCEDURE WITH INTENT

## 2025-05-22 PROCEDURE — G0269 OCCLUSIVE DEVICE IN VEIN ART: HCPCS | Performed by: INTERNAL MEDICINE

## 2025-05-22 PROCEDURE — 7100000002 HC RECOVERY ROOM TIME - EACH INCREMENTAL 1 MINUTE: Performed by: INTERNAL MEDICINE

## 2025-05-22 PROCEDURE — 93662 INTRACARDIAC ECG (ICE): CPT | Performed by: INTERNAL MEDICINE

## 2025-05-22 PROCEDURE — 99153 MOD SED SAME PHYS/QHP EA: CPT | Performed by: INTERNAL MEDICINE

## 2025-05-22 PROCEDURE — 36415 COLL VENOUS BLD VENIPUNCTURE: CPT | Performed by: NURSE PRACTITIONER

## 2025-05-22 PROCEDURE — 99152 MOD SED SAME PHYS/QHP 5/>YRS: CPT | Performed by: INTERNAL MEDICINE

## 2025-05-22 PROCEDURE — 2780000003 HC OR 278 NO HCPCS: Performed by: INTERNAL MEDICINE

## 2025-05-22 PROCEDURE — 7100000010 HC PHASE TWO TIME - EACH INCREMENTAL 1 MINUTE: Performed by: INTERNAL MEDICINE

## 2025-05-22 PROCEDURE — 99222 1ST HOSP IP/OBS MODERATE 55: CPT | Performed by: NURSE PRACTITIONER

## 2025-05-22 PROCEDURE — 85610 PROTHROMBIN TIME: CPT | Performed by: NURSE PRACTITIONER

## 2025-05-22 PROCEDURE — C1759 CATH, INTRA ECHOCARDIOGRAPHY: HCPCS | Performed by: INTERNAL MEDICINE

## 2025-05-22 DEVICE — LEFT ATRIAL APPENDAGE CLOSURE DEVICE WITH DELIVERY SYSTEM
Type: IMPLANTABLE DEVICE | Site: ABDOMEN | Status: FUNCTIONAL
Brand: WATCHMAN FLX™ PRO

## 2025-05-22 RX ORDER — CEFAZOLIN SODIUM 2 G/50ML
2 SOLUTION INTRAVENOUS ONCE
Status: DISCONTINUED | OUTPATIENT
Start: 2025-05-22 | End: 2025-05-27 | Stop reason: HOSPADM

## 2025-05-22 RX ORDER — MIDAZOLAM HYDROCHLORIDE 1 MG/ML
INJECTION, SOLUTION INTRAMUSCULAR; INTRAVENOUS AS NEEDED
Status: DISCONTINUED | OUTPATIENT
Start: 2025-05-22 | End: 2025-05-22 | Stop reason: HOSPADM

## 2025-05-22 RX ORDER — CEFAZOLIN SODIUM 2 G/50ML
SOLUTION INTRAVENOUS CONTINUOUS PRN
Status: COMPLETED | OUTPATIENT
Start: 2025-05-22 | End: 2025-05-22

## 2025-05-22 RX ORDER — HYDRALAZINE HYDROCHLORIDE 20 MG/ML
10 INJECTION INTRAMUSCULAR; INTRAVENOUS EVERY 4 HOURS PRN
Status: DISCONTINUED | OUTPATIENT
Start: 2025-05-22 | End: 2025-05-27 | Stop reason: HOSPADM

## 2025-05-22 RX ORDER — ONDANSETRON HYDROCHLORIDE 2 MG/ML
4 INJECTION, SOLUTION INTRAVENOUS EVERY 8 HOURS PRN
Status: DISCONTINUED | OUTPATIENT
Start: 2025-05-22 | End: 2025-05-27 | Stop reason: HOSPADM

## 2025-05-22 RX ORDER — FENTANYL CITRATE 50 UG/ML
INJECTION, SOLUTION INTRAMUSCULAR; INTRAVENOUS AS NEEDED
Status: DISCONTINUED | OUTPATIENT
Start: 2025-05-22 | End: 2025-05-22 | Stop reason: HOSPADM

## 2025-05-22 RX ORDER — NAPROXEN SODIUM 220 MG/1
324 TABLET, FILM COATED ORAL ONCE
Status: COMPLETED | OUTPATIENT
Start: 2025-05-22 | End: 2025-05-22

## 2025-05-22 RX ORDER — ACETAMINOPHEN 160 MG/5ML
650 SOLUTION ORAL EVERY 6 HOURS PRN
Status: DISCONTINUED | OUTPATIENT
Start: 2025-05-22 | End: 2025-05-27 | Stop reason: HOSPADM

## 2025-05-22 RX ORDER — PROTAMINE SULFATE 10 MG/ML
INJECTION, SOLUTION INTRAVENOUS CONTINUOUS PRN
Status: COMPLETED | OUTPATIENT
Start: 2025-05-22 | End: 2025-05-22

## 2025-05-22 RX ORDER — TRAMADOL HYDROCHLORIDE 50 MG/1
50 TABLET, FILM COATED ORAL EVERY 6 HOURS PRN
Status: DISCONTINUED | OUTPATIENT
Start: 2025-05-22 | End: 2025-05-27 | Stop reason: HOSPADM

## 2025-05-22 RX ORDER — ASPIRIN 81 MG/1
81 TABLET ORAL DAILY
Start: 2025-05-22

## 2025-05-22 RX ORDER — ONDANSETRON 4 MG/1
4 TABLET, FILM COATED ORAL EVERY 8 HOURS PRN
Status: DISCONTINUED | OUTPATIENT
Start: 2025-05-22 | End: 2025-05-27 | Stop reason: HOSPADM

## 2025-05-22 RX ORDER — ACETAMINOPHEN 325 MG/1
650 TABLET ORAL EVERY 6 HOURS PRN
Status: DISCONTINUED | OUTPATIENT
Start: 2025-05-22 | End: 2025-05-27 | Stop reason: HOSPADM

## 2025-05-22 RX ORDER — HYDRALAZINE HYDROCHLORIDE 10 MG/1
10 TABLET, FILM COATED ORAL ONCE
Status: DISCONTINUED | OUTPATIENT
Start: 2025-05-22 | End: 2025-05-22

## 2025-05-22 RX ORDER — LIDOCAINE HYDROCHLORIDE 20 MG/ML
INJECTION, SOLUTION INFILTRATION; PERINEURAL AS NEEDED
Status: DISCONTINUED | OUTPATIENT
Start: 2025-05-22 | End: 2025-05-22 | Stop reason: HOSPADM

## 2025-05-22 RX ORDER — HEPARIN SODIUM 1000 [USP'U]/ML
INJECTION, SOLUTION INTRAVENOUS; SUBCUTANEOUS AS NEEDED
Status: DISCONTINUED | OUTPATIENT
Start: 2025-05-22 | End: 2025-05-22 | Stop reason: HOSPADM

## 2025-05-22 RX ORDER — HYDRALAZINE HYDROCHLORIDE 20 MG/ML
10 INJECTION INTRAMUSCULAR; INTRAVENOUS ONCE
Status: COMPLETED | OUTPATIENT
Start: 2025-05-22 | End: 2025-05-22

## 2025-05-22 RX ORDER — ACETAMINOPHEN 650 MG/1
650 SUPPOSITORY RECTAL EVERY 6 HOURS PRN
Status: DISCONTINUED | OUTPATIENT
Start: 2025-05-22 | End: 2025-05-27 | Stop reason: HOSPADM

## 2025-05-22 RX ADMIN — HYDRALAZINE HYDROCHLORIDE 10 MG: 20 INJECTION INTRAMUSCULAR; INTRAVENOUS at 07:23

## 2025-05-22 RX ADMIN — HYDRALAZINE HYDROCHLORIDE 10 MG: 20 INJECTION INTRAMUSCULAR; INTRAVENOUS at 06:44

## 2025-05-22 RX ADMIN — ASPIRIN 324 MG: 81 TABLET, CHEWABLE ORAL at 06:28

## 2025-05-22 ASSESSMENT — ENCOUNTER SYMPTOMS
GASTROINTESTINAL NEGATIVE: 1
RESPIRATORY NEGATIVE: 1
MUSCULOSKELETAL NEGATIVE: 1
LIGHT-HEADEDNESS: 0
CARDIOVASCULAR NEGATIVE: 1
ENDOCRINE NEGATIVE: 1
CONSTITUTIONAL NEGATIVE: 1
PALPITATIONS: 0
ALLERGIC/IMMUNOLOGIC NEGATIVE: 1
DIZZINESS: 0
EYES NEGATIVE: 1
PSYCHIATRIC NEGATIVE: 1
SHORTNESS OF BREATH: 0
CHEST TIGHTNESS: 0
HEMATOLOGIC/LYMPHATIC NEGATIVE: 1
WEAKNESS: 1

## 2025-05-22 ASSESSMENT — PAIN SCALES - GENERAL

## 2025-05-22 NOTE — H&P
History Of Present Illness  Lou Oliva is a 89 y.o. female with past medical history significant for paroxysmal atrial fibrillation, maintained on metoprolol tartrate 25 mg twice daily and anticoagulated with Eliquis 2.5 mg twice daily, previously on amiodarone which was discontinued due to thyroid issues, history of CAD, s/p remote CABG (2007), SND, s/p remote PPM, chronic diastolic heart failure, PAD, AAA, HTN, HLD, hyperthyroidism, anemia, recent GI bleed requiring blood transfusions, falls, CKD and was recently referred by Dr. Avila to Dr. Chamorro for evaluation for left atrial appendage closure.   Patient's CHADS-VASC score is 5, HAS-BLED score is 5.  Given patient's significant GI bleeding and falls risk, she is at increased risk for both bleeding and stroke and therefore a reasonable candidate for left atrial appendage occluder placement.  Patient is at [facility] today for placement of a Watchman LAAO device under the care of Dr. Chamorro.      Past Medical History  Medical History[1]    Surgical History  Surgical History[2]     Social History    Remote tobacco use  Denies alcohol use  Denies drug use     Family History  Family History[3]     Allergies  Lisinopril, Nsaids (non-steroidal anti-inflammatory drug), Statins-hmg-coa reductase inhibitors, and Sulfa (sulfonamide antibiotics)    Review of Systems   Constitutional: Negative.    HENT: Negative.     Eyes: Negative.    Respiratory: Negative.  Negative for chest tightness and shortness of breath.    Cardiovascular: Negative.  Negative for chest pain and palpitations.   Gastrointestinal: Negative.    Endocrine: Negative.    Genitourinary: Negative.    Musculoskeletal: Negative.    Skin: Negative.    Allergic/Immunologic: Negative.    Neurological:  Positive for weakness. Negative for dizziness and light-headedness.   Hematological: Negative.    Psychiatric/Behavioral: Negative.       Physical Exam  Vitals reviewed.  "  Constitutional:       Appearance: Normal appearance.   HENT:      Head: Normocephalic and atraumatic.      Nose: Nose normal.      Mouth/Throat:      Mouth: Mucous membranes are moist.      Pharynx: Oropharynx is clear.   Eyes:      Pupils: Pupils are equal, round, and reactive to light.   Cardiovascular:      Rate and Rhythm: Normal rate and regular rhythm.      Pulses: Normal pulses.      Heart sounds: Normal heart sounds.   Pulmonary:      Effort: Pulmonary effort is normal.      Breath sounds: Normal breath sounds.   Abdominal:      General: Bowel sounds are normal.      Palpations: Abdomen is soft.   Musculoskeletal:         General: Normal range of motion.      Cervical back: Normal range of motion and neck supple.   Skin:     General: Skin is warm and dry.   Neurological:      General: No focal deficit present.      Mental Status: She is alert and oriented to person, place, and time.   Psychiatric:         Mood and Affect: Mood normal.         Behavior: Behavior normal.       Last Recorded Vitals  Blood pressure (!) 185/73, pulse 63, temperature 37 °C (98.6 °F), temperature source Temporal, resp. rate 18, height 1.702 m (5' 7\"), weight 76.7 kg (169 lb 1.5 oz), SpO2 99%.    Relevant Results  Results for orders placed or performed during the hospital encounter of 05/22/25 (from the past 24 hours)   Type And Screen   Result Value Ref Range    ABO TYPE A     Rh TYPE POS     ANTIBODY SCREEN NEG    Comprehensive Metabolic Panel   Result Value Ref Range    Glucose 95 74 - 99 mg/dL    Sodium 140 136 - 145 mmol/L    Potassium 4.1 3.5 - 5.3 mmol/L    Chloride 104 98 - 107 mmol/L    Bicarbonate 28 21 - 32 mmol/L    Anion Gap 12 10 - 20 mmol/L    Urea Nitrogen 34 (H) 6 - 23 mg/dL    Creatinine 2.22 (H) 0.50 - 1.05 mg/dL    eGFR 21 (L) >60 mL/min/1.73m*2    Calcium 8.9 8.6 - 10.3 mg/dL    Albumin 3.8 3.4 - 5.0 g/dL    Alkaline Phosphatase 41 33 - 136 U/L    Total Protein 6.2 (L) 6.4 - 8.2 g/dL    AST 13 9 - 39 U/L    " Bilirubin, Total 0.6 0.0 - 1.2 mg/dL    ALT 5 (L) 7 - 45 U/L   CBC   Result Value Ref Range    WBC 7.7 4.4 - 11.3 x10*3/uL    nRBC 0.0 0.0 - 0.0 /100 WBCs    RBC 3.65 (L) 4.00 - 5.20 x10*6/uL    Hemoglobin 10.3 (L) 12.0 - 16.0 g/dL    Hematocrit 32.9 (L) 36.0 - 46.0 %    MCV 90 80 - 100 fL    MCH 28.2 26.0 - 34.0 pg    MCHC 31.3 (L) 32.0 - 36.0 g/dL    RDW 14.4 11.5 - 14.5 %    Platelets 221 150 - 450 x10*3/uL   Coagulation Screen   Result Value Ref Range    Protime 11.2 9.8 - 12.4 seconds    INR 1.0 0.9 - 1.1    aPTT 32 26 - 36 seconds   ECG 12 lead   Result Value Ref Range    Ventricular Rate 60 BPM    Atrial Rate 50 BPM    ND Interval 262 ms    QRS Duration 142 ms    QT Interval 462 ms    QTC Calculation(Bazett) 462 ms    R Axis -56 degrees    T Axis -1 degrees    QRS Count 10 beats    Q Onset 208 ms    T Offset 439 ms    QTC Fredericia 462 ms   Transthoracic Echo (TTE) Limited   Result Value Ref Range    BSA 1.9 m2      Cardiology, Vascular, and Other Imaging  ECG 12 lead  Result Date: 5/22/2025  Atrial-paced rhythm with prolonged AV conduction Right bundle branch block Left anterior fascicular block ** Bifascicular block ** Abnormal ECG When compared with ECG of 08-MAY-2025 09:49, No significant change was found    Assessment/Plan   Paroxysmal atrial fibrillation              - Currently maintained on metoprolol tartrate 25 mg twice daily and anticoagulated with Eliquis 2.5 mg twice daily.              - Patient is at increased risk for bleeding and stroke due to frequent falls, GI bleed and maintained on anticoagulation for stroke risk reduction d/t A-fib.  It is recommended she undergo placement of a Watchman LAAO device.              - Plan for placement of a Watchman LAAO device today under the care of Dr. Chamorro.  2.   Recent history of GI bleeds requiring blood transfusions/anemia  3.   Frequent falls  4.   Coronary artery disease/s/p remote CABG (2007)  5.   Sinus node dysfunction/s/p remote PPM  6.    Benign essential hypertension -elevated this AM.  Will give hydralazine 10 mg IV prior to procedure.  7.   Chronic diastolic heart failure  8.   Chronic kidney disease 3  9.   Obesity -BMI 32.04     Further recommendations pending procedure results  I spent 55 minutes in the professional and overall care of this patient.      Noemi Machado, APRN-CNP        [1]   Past Medical History:  Diagnosis Date    Anemia     Arrhythmia     Chronic kidney disease     Coronary artery disease     Disease of thyroid gland     Hyperlipidemia     Hypertension     PAD (peripheral artery disease)    [2]   Past Surgical History:  Procedure Laterality Date    CORONARY ARTERY BYPASS GRAFT      INSERT / REPLACE / REMOVE PACEMAKER      OTHER SURGICAL HISTORY  01/05/2022    Arterial ligation    OTHER SURGICAL HISTORY  01/05/2022    Hip replacement    OTHER SURGICAL HISTORY  01/25/2022    Complete colonoscopy   [3] No family history on file.

## 2025-05-22 NOTE — POST-PROCEDURE NOTE
S/p ROSAMARIA closure    Access: Dual right femoral vein access s/p proglide closure devices.     Device: After confirmation of the device position on fluoroscopy and ICE, anchor with a tug test, compression and seal a 20 mm Watchman was successfully deployed without any immediate complications.     No effusion on ICE was present pre and post watchman deployment.     Recommendations:   low dose Eliquis for 3 months followed by ASA for life   CELINA in 3 months  Access site monitoring.   TTE today  Likely discharge home today once recovery and monitoring period is complete.

## 2025-05-22 NOTE — DISCHARGE SUMMARY
[facility] INPATIENT DISCHARGE SUMMARY    BRIEF OVERVIEW  Admitting Provider: Omar Chamorro MD  Discharge Provider: Omar Chamorro MD  Primary Care Physician at Discharge: Alexa Chatman [phone]     Admission Date: 5/22/2025     Discharge Date: 5/22/2025    Primary Discharge Diagnosis  Paroxysmal atrial fibrillation  S/p placement of a Watchman LAAO device    History of Present Illness  Lou Oliva is a 89 y.o. female with past medical history significant for paroxysmal atrial fibrillation, maintained on metoprolol tartrate 25 mg twice daily and anticoagulated with Eliquis 2.5 mg twice daily, previously on amiodarone which was discontinued due to thyroid issues, history of CAD, s/p remote CABG (2007), SND, s/p remote PPM, chronic diastolic heart failure, PAD, AAA, HTN, HLD, hyperthyroidism, anemia, recent GI bleed requiring blood transfusions, falls, CKD and was recently referred by Dr. Avila to Dr. Chamorro for evaluation for left atrial appendage closure for stroke risk reduction.  Patient presented to [facility] on 5/22/2025 for elective placement of a Watchman LAAO device.    Hospital Course  Lou Oliva is stable status post placement of a 20 mm Watchman LAAO device via RFV x 2 devices on 5/22/2025 under the care of Dr. Chamorro.  The patient tolerated the procedure well and there were no postprocedural complications.  Please see procedural report for complete details.  Right groin procedural access sites remained stable, soft without signs of active bleeding or hematoma.  Patient denies complaints of chest pain, shortness of breath, abdominal pain, groin pain, numbness or tingling in the extremities.  She has been up ambulating without difficulty after her bedrest/recovery.  Postop ECHO reveals a stable EF and no significant change of the pericardial space.    Plan:  - Patient to be discharged home today, 5/22/2025 per Dr. Chamorro.  - Will resume Eliquis  2.5 mg twice daily x 3 months followed by aspirin 81 mg daily for life.  - Continue home medications.  - Outpatient follow-up with Dr. Avila in 2 weeks has been arranged.  - Repeat Watchman CELINA in 3 months to reassess the device.  - Watchman RN coordinator to follow-up per protocol    Physical Exam at Discharge  Discharge Condition: good  Heart Rate: 65  Resp: 16  BP: 176/80  Temp: 37 °C (98.6 °F)  Weight: 76.7 kg (169 lb 1.5 oz)    Physical Exam  Vitals reviewed.   Constitutional:       Appearance: Normal appearance.   HENT:      Head: Normocephalic and atraumatic.      Nose: Nose normal.      Mouth/Throat:      Mouth: Mucous membranes are moist.      Pharynx: Oropharynx is clear.   Eyes:      Pupils: Pupils are equal, round, and reactive to light.   Cardiovascular:      Rate and Rhythm: Normal rate and regular rhythm.      Pulses: Normal pulses.      Heart sounds: Normal heart sounds.   Pulmonary:      Effort: Pulmonary effort is normal.      Breath sounds: Normal breath sounds.   Abdominal:      General: Bowel sounds are normal.      Palpations: Abdomen is soft.   Musculoskeletal:         General: Normal range of motion.      Cervical back: Normal range of motion and neck supple.   Skin:     General: Skin is warm and dry.      Comments: Right groin procedural access site is stable, soft with no bleeding, hematoma or bruit.   Neurological:      General: No focal deficit present.      Mental Status: She is alert and oriented to person, place, and time.   Psychiatric:         Mood and Affect: Mood normal.         Behavior: Behavior normal.       Home Medications     Medication List      CHANGE how you take these medications     metoprolol tartrate 25 mg tablet; Commonly known as: Lopressor; Take 1   tablet (25 mg) by mouth 2 times a day.; What changed: how much to take     CONTINUE taking these medications     acetaminophen 500 mg capsule; Commonly known as: Tylenol   apixaban 2.5 mg tablet; Commonly known as:  Eliquis; TAKE 1 TABLET(2.5   MG) BY MOUTH TWICE DAILY.  Please resume Eliquis on 5/23/2025.   betamethasone dipropionate 0.05 % lotion; Commonly known as: Diprosone   calcium carbonate 500 mg (200 mg elemental) chewable tablet; Commonly   known as: Tums   FeroSuL 325 mg (65 mg iron) tablet; Generic drug: ferrous sulfate   ferrous gluconate 324 mg (38 mg elemental) tablet; Commonly known as:   Fergon   hydrALAZINE 10 mg tablet; Commonly known as: Apresoline; Take 1 tablet   (10 mg) by mouth 2 times a day.   losartan 25 mg tablet; Commonly known as: Cozaar; Take 1 tablet (25 mg)   by mouth 2 times a day.   nitroglycerin 400 mcg/spray spray; Commonly known as: NitrolinguaL   pantoprazole 40 mg EC tablet; Commonly known as: ProtoNix   polyethylene glycol 17 gram/dose powder; Commonly known as: Glycolax,   Miralax   ranolazine 500 mg 12 hr tablet; Commonly known as: Ranexa; Take 1 tablet   (500 mg) by mouth 2 times a day.   rOPINIRole 0.5 mg tablet; Commonly known as: Requip   Salonpas (lidocaine) 4 % patch; Generic drug: lidocaine   torsemide 20 mg tablet; Commonly known as: Demadex   Vitamin D3 50 mcg (2,000 units) tablet; Generic drug: cholecalciferol   Voltaren Arthritis Pain 1 % gel; Generic drug: diclofenac sodium       Outpatient Follow-Up  Future Appointments   Date Time Provider Department Center   6/10/2025  3:00 PM Delroy Avila MD [id number]   7/21/2025  2:45 PM MD Sar[id number]       Noemi Machado, APRN-CNP

## 2025-05-22 NOTE — DISCHARGE INSTRUCTIONS
Watchman Discharge Instructions    Anticoagulation Plan:    You are to resume your Eliquis 2.5 mg twice daily for 3 months post Watchman, then stop.  Then start Aspirin 81 mg daily the day after your last dose of blood thinner.    You will have a 3 month CELINA to assess the effectiveness of the Watchman Device.     General Instructions:  DO NOT drink any alcoholic drinks or take any non-prescriptive medications that contain alcohol for the first 24 hours.   DO NOT make any important decisions for the first 24 hours.  Activity:  You are advised to go directly home from the hospital.   DO NOT lift anything heavier than 10 pounds for one week, this allows for proper healing of the groin.   No excessive exercise or treadmill use for one week. You may walk and do stairs, slowly.   No sexual activities for 24 hours after you arrive home.    Wound Care:  If slight bleeding should occur at site, lie down and have someone apply firm pressure just above the puncture site for 5 minutes.  If it continues or is profuse, call 911. Always notify your doctor if bleeding occurs.   Keep site clean and dry. Let air dry or you may use a simple bandaid.   Gently cleanse the puncture site in your groin with soap and water only.   You may experience some tenderness, bruising or minimal inflammation.  If you have any concerns, you may contact the Cath Lab or if any of these symptoms become excessive, contact your cardiologist or go to the emergency room.   No tub baths, soaking, or swimming for one week.   May shower the next day after your procedure.    Diet:  You may resume your normal diet. However it is better to start with liquids such as juices then soup and crackers, and gradually work up to solid foods.    Other Instructions:  If you develop difficulty breathing, rash, hives, severe nausea, vomiting, light-headedness or any signs of infection, immediately contact your doctor and go to the nearest emergency room.   You must take your  aspirin, clopidogrel (Plavix), prasugrel (Effient), or ticagrelor (Brilinta) every day without missing a single dose.  If you are getting low on these medications, contact your physician immediately for a refill.  - CALL 911 IF YOU HAVE ANY OF THE SIGNS AND SYMPTOMS OF HEART FAILURE: 1. Chest pain 2. Significant Shortness of breath 3. Fainting.     Call Provider If (Home-going Patients):  Breathing faster than normal.   Breathing harder than normal or having retractions.   Fever of 100.4 F (38 C) or higher.   Chills.   Drinking less than normal.   Urinating less than normal, over 1 day.   Acting very sleepy and difficult to awaken.   Vomiting (throwing up) and not able to eat or drink for 12 hours.   3 or more loose, watery bowel movements in 24 hours (diarrhea).   Any new concerning symptoms.    Please call the [facility] TEAM LINE if you have any questions or concerns - [phone]   [facility] nurse coordinator Autumn Pineda's phone is [phone].

## 2025-05-22 NOTE — NURSING NOTE
Blood pressure rechecked 98/52.  Patient able to repeat medication and activity restrictions.  Reinforced with son to have her put her feet up and relax for the rest of the evening.  Right groin remains normal and patient discharged via wheelchair and transport to son in car.

## 2025-05-22 NOTE — Clinical Note
Closure device placed in the right femoral vein. Site closed by Perclose. Deployed By: Omar Chamorro, MDDeployed by fellow

## 2025-05-22 NOTE — Clinical Note
Accessed site: right femoral artery.   Ultrasound guidance was used. Access to RFV with 2 micropuncture wires

## 2025-05-22 NOTE — NURSING NOTE
Blood pressure of 202 / 94  call to [facility] to get order for additional hydralazine 10mg IVP.  Patient informed of importance of regulating her blood pressure.  Patient took all morning medications.

## 2025-05-22 NOTE — NURSING NOTE
Patient sitting in chair with son in room.  Helped patient get dressed.  VSS but blood pressure 92/51.  Discharge instructions discussed including activity restrictions including dangling feet on the side of the bed before getting up to ambulate.  Patient instructed on right groin bleeding interventions and when to call 911.  Medications explaiuned including resuming eliquis 2.5mg twice a day tomorrow but not starting aspirin 81mg until after eliquis is discontinued in 3 months.  Spoke to son with patient present to make sure instructions where clear.  Metoprolol will be full 25mg twice a day change noted and discussed.  Patient chair walking in room but reinforced that she is to dangle first before standing up and to go home and relax for the rest of the day.

## 2025-05-23 ENCOUNTER — PATIENT OUTREACH (OUTPATIENT)
Dept: CARE COORDINATION | Age: OVER 89
End: 2025-05-23
Payer: MEDICARE

## 2025-05-23 NOTE — PROGRESS NOTES
Attempted daily call, going straight to voicemail. Unable to leave message as VM has not been set up.

## 2025-05-25 ENCOUNTER — PATIENT OUTREACH (OUTPATIENT)
Dept: CARE COORDINATION | Age: OVER 89
End: 2025-05-25
Payer: MEDICARE

## 2025-05-28 ENCOUNTER — PATIENT OUTREACH (OUTPATIENT)
Dept: CARE COORDINATION | Age: OVER 89
End: 2025-05-28

## 2025-05-28 ENCOUNTER — APPOINTMENT (OUTPATIENT)
Dept: CARE COORDINATION | Age: OVER 89
End: 2025-05-28
Payer: MEDICARE

## 2025-05-28 NOTE — PROGRESS NOTES
Daily Call Note: Initial [facility] completed with Alphonse NP    Patient had a PCP yesterday.   Patient has an appointment with Cardiology 06/10/2025  Denuies chest pain, sob, or dizziness.     0800  179/71   HR 67  1000 167/64   HR 55  1500   172/70  HR 60    Weight 170 lbs     Patient requested Assistance with Eliquis.  Alphonse will notify Jony.    Patient stopped ASA    Alphonse reviewed medications.     Patient does not have transportation or food security issues     Patient had a PCP appointment yesterday.    Patient states she does not need a refill on medications.     [facility] 6/4/25 @ 13:30              Pt Education:   Barriers:   Topics for Daily Review:   Pt demonstrates clear understanding:     Daily Weight:  There were no vitals filed for this visit.   Last 3 Weights:  Wt Readings from Last 7 Encounters:   05/22/25 76.7 kg (169 lb 1.5 oz)   05/05/25 78.8 kg (173 lb 12.8 oz)   04/30/25 78.5 kg (173 lb 1 oz)   02/03/25 79.5 kg (175 lb 3.2 oz)   01/21/25 80.6 kg (177 lb 9.6 oz)   12/07/24 83 kg (183 lb)   08/13/24 86.4 kg (190 lb 6.4 oz)       Masimo Device:    Masimo Clinical Impression:     Virtual Visits--Scheduled (Most Recent Date at Top)  Follow up Appointments  Recent Visits  No visits were found meeting these conditions.  Showing recent visits within past 30 days and meeting all other requirements  Future Appointments  No visits were found meeting these conditions.  Showing future appointments within next 90 days and meeting all other requirements       Frequency of RN Calls & Virtual Visits per Team Agreement:     Medication issues Addressed (what was done):     Follow up appointments scheduled by [facility] Staff:   Referrals made by [facility] staff:

## 2025-05-30 ENCOUNTER — PATIENT OUTREACH (OUTPATIENT)
Dept: CARE COORDINATION | Age: OVER 89
End: 2025-05-30
Payer: MEDICARE

## 2025-05-30 VITALS — DIASTOLIC BLOOD PRESSURE: 52 MMHG | SYSTOLIC BLOOD PRESSURE: 125 MMHG

## 2025-05-30 DIAGNOSIS — I50.32 CHRONIC DIASTOLIC HEART FAILURE: ICD-10-CM

## 2025-05-30 DIAGNOSIS — I48.91 ATRIAL FIBRILLATION, UNSPECIFIED TYPE (MULTI): Primary | ICD-10-CM

## 2025-05-30 ASSESSMENT — ENCOUNTER SYMPTOMS
LOSS OF SENSATION IN FEET: 1
OCCASIONAL FEELINGS OF UNSTEADINESS: 0
DEPRESSION: 0

## 2025-05-30 NOTE — PROGRESS NOTES
Daily call, patient reports feeling alright.   Reports some pain and lightheadedness yesterday. Reports took bp and noted elevation. Says happens when she gets up too fast, like from the toilet. ,States she is eating and walking good.   QIANA gamboa, she did have a fall last year where she fractureed there pelvis.  Uses ambulatory aids to get around.   Screened for PAP, she will have her son help her text SS forms for Eliquis.   States pays 169/mo for eliquis.   Also pays a lot for menstral pads and lidocaine patches.   Discussed continuing  to take bp readings and monitor symptoms.   Discussed follow up appointments.   PCP 5/27, med refills at that time.   Next call reviewed.

## 2025-06-03 RX ORDER — HYDRALAZINE HYDROCHLORIDE 25 MG/1
1 TABLET, FILM COATED ORAL 3 TIMES DAILY
COMMUNITY
Start: 2025-05-27 | End: 2025-06-10 | Stop reason: SDUPTHER

## 2025-06-04 ENCOUNTER — APPOINTMENT (OUTPATIENT)
Dept: CARE COORDINATION | Age: OVER 89
End: 2025-06-04
Payer: MEDICARE

## 2025-06-04 ENCOUNTER — APPOINTMENT (OUTPATIENT)
Dept: PHARMACY | Facility: HOSPITAL | Age: OVER 89
End: 2025-06-04
Payer: MEDICARE

## 2025-06-04 ENCOUNTER — PATIENT OUTREACH (OUTPATIENT)
Dept: CARE COORDINATION | Age: OVER 89
End: 2025-06-04

## 2025-06-04 VITALS — HEART RATE: 61 BPM | DIASTOLIC BLOOD PRESSURE: 53 MMHG | SYSTOLIC BLOOD PRESSURE: 114 MMHG

## 2025-06-04 VITALS — SYSTOLIC BLOOD PRESSURE: 114 MMHG | HEART RATE: 61 BPM | DIASTOLIC BLOOD PRESSURE: 53 MMHG

## 2025-06-04 DIAGNOSIS — I50.32 CHRONIC DIASTOLIC HEART FAILURE: ICD-10-CM

## 2025-06-04 DIAGNOSIS — I48.91 ATRIAL FIBRILLATION, UNSPECIFIED TYPE (MULTI): ICD-10-CM

## 2025-06-04 RX ORDER — PANTOPRAZOLE SODIUM 40 MG/1
40 TABLET, DELAYED RELEASE ORAL DAILY
Qty: 90 TABLET | Refills: 3 | Status: SHIPPED | OUTPATIENT
Start: 2025-06-04

## 2025-06-04 NOTE — PROGRESS NOTES
"[facility]    Lou Oliva is a 89 y.o. female was referred to Clinical Pharmacy Team to complete a post-discharge medication optimization and monitoring visit.  The patient was referred for multiple concerns while in [facility]. Today was our second visit.       Referring Provider: Eva Simms AP*  PCP: Alexa Chatman MD - not with [facility]       Subjective   Allergies[1]    [id number] - [place], [place] - [address] [facility] [address] [facility] [place] [address]  Phone: [phone] Fax: [phone]    [id number] - [place], [place] - [address]  [address]  [place] [address]  Phone: [phone] Fax: [phone]      Medication System Management:  Affordability/Accessibility: try to enroll into PAP  Adherence/Organization: no issues       Social History     Social History Narrative    Not on file          HPI      Review of Systems        Objective     There were no vitals taken for this visit.   BP Readings from Last 4 Encounters:   05/30/25 125/52   05/22/25 176/80   05/13/25 153/77   05/08/25 (!) 207/97      There were no vitals filed for this visit.     LAB  Lab Results   Component Value Date    BILITOT 0.6 05/22/2025    CALCIUM 8.9 05/22/2025    CO2 28 05/22/2025     05/22/2025    CREATININE 2.22 (H) 05/22/2025    GLUCOSE 95 05/22/2025    ALKPHOS 41 05/22/2025    K 4.1 05/22/2025    PROT 6.2 (L) 05/22/2025     05/22/2025    AST 13 05/22/2025    ALT 5 (L) 05/22/2025    BUN 34 (H) 05/22/2025    ANIONGAP 12 05/22/2025    MG 2.03 12/05/2023    ALBUMIN 3.8 05/22/2025    GFRF 32 (A) 03/27/2023     Lab Results   Component Value Date    TRIG 92 12/05/2023    CHOL 216 (H) 12/05/2023    LDLCALC 155 (H) 12/05/2023    HDL 42.6 12/05/2023     No results found for: \"HGBA1C\"      Current Outpatient Medications   Medication Instructions    acetaminophen (TYLENOL) 1,000 mg, Every 4 hours PRN    apixaban (Eliquis) 2.5 mg tablet " TAKE 1 TABLET(2.5 MG) BY MOUTH TWICE DAILY.  Please resume Eliquis on 5/23/2025.    aspirin 81 mg, oral, Daily, Please start taking the day after you complete 3 months of Eliquis post Watchman.  You will then continue aspirin 81 mg daily for life.    betamethasone dipropionate (Diprosone) 0.05 % lotion 2 times daily    calcium carbonate (Tums) 500 mg (200 mg elemental) chewable tablet 1 tablet, Daily    cholecalciferol (VITAMIN D3) 50 mcg, Daily    diclofenac sodium (VOLTAREN ARTHRITIS PAIN) 4 g, 4 times daily PRN    FeroSuL tablet 1 tablet, Daily with breakfast    ferrous gluconate 324 (38 Fe) MG tablet 1 tablet, Daily    hydrALAZINE (Apresoline) 25 mg tablet 1 tablet, Every 12 hours scheduled (0630,1830)    hydrALAZINE (APRESOLINE) 10 mg, oral, 2 times daily    lidocaine (Salonpas, lidocaine,) 4 % patch 1 patch, Daily    losartan (COZAAR) 25 mg, oral, 2 times daily    metoprolol tartrate (LOPRESSOR) 25 mg, oral, 2 times daily    nitroglycerin (NitrolinguaL) 400 mcg/spray spray Place 1 spray under the tongue every 5 minutes if needed for chest pain.    pantoprazole (PROTONIX) 40 mg, 2 times daily    polyethylene glycol (GLYCOLAX, MIRALAX) 17 g, Daily RT    ranolazine (RANEXA) 500 mg, oral, 2 times daily    rOPINIRole (REQUIP) 0.5 mg, 3 times daily    tiZANidine (ZANAFLEX) 4 mg, oral, 3 times daily PRN    torsemide (DEMADEX) 20 mg, Daily          Assessment/Plan   Problem List Items Addressed This Visit       Chronic diastolic heart failure    Atrial fibrillation, unspecified type (Multi)       -she was recently re-admitted for her Watchman placement and since being back home she has been doing okay   -she did have some chest pain the other day but feels like it has improved today   -she has been monitoring her BP's a few times per day and keeping log   -BP's have been higher in the morning before her medication   -most recent EF was 65%  -also monitoring her weights and pulse ox   -does have some dizziness if she  tries to get up too quickly, otherwise does get around okay with her rolator     /76   HR 63   Weight 170 lbs   98% on RA     Medications Changes/Concerns:  CHF  GDMT --> losartan 25mg twice daily and metoprolol tartrate 25mg twice daily  Torsemide daily   Also continue with Hydralazine and Ranolazine twice daily  Afib --> eliquis, aspirin  Eliquis dosed appropriately based on age and kidney function   Eliquis + aspirin for 3 months then will only continue with Aspirin lifelong       Refills Needed:  -none needed today       Plan/To Do:  -continue to log vitals daily   -seeing cardio next week   -nursing will continue with MWF calls       [facility] PAP Status:  -not going to pursue since she is only going to be on Eliquis for 3 months   -she already paid for the first month so discussed to see if her cardio office can give her samples for the remaining 2 months and if not then I can do a free trial for 30 days of the 5mg tabs and she will just cut them in half       Time Spent with Patient and [facility] Team - Alpohnse Simms NP and GUERO Guerra via phone call: 30 minutes      Follow Up: 1 week     Continue all meds under the continuation of care with the referring provider and clinical pharmacy team.    Jony Lemus, Marie     Verbal consent to manage patient's drug therapy was obtained from the patient. They were informed they may decline to participate or withdraw from participation in pharmacy services at any time.          [1]   Allergies  Allergen Reactions    Lisinopril Unknown    Nsaids (Non-Steroidal Anti-Inflammatory Drug) GI bleeding    Statins-Hmg-Coa Reductase Inhibitors Unknown    Sulfa (Sulfonamide Antibiotics) Unknown

## 2025-06-04 NOTE — PROGRESS NOTES
"Daily Call Note: [facility]  Pt feeling pretty good. Pt states that her chest \"tightens\" when she lays on  her back. \"like a tight bra\", no sob. Pt states she hasn't had that pain for a few days.  Pt states that she does get a little dizzy when she gets up to fast. Pt encouraged to stand slowly and cautious.  RX eloquis  discussion for PAP.  Pt to take eloquis for 3 months. Pt encourage to question cardiologist about eloquis samples due to the cost of eloquis.   Medication list and doses discussed with RX. Pt denies need for PAP at this time.    Pt request hand rails / bars around toilet.   178/76  morning  BP   114/53 hr 61 Current BP  No wt today pt states - approx 170  Pox 98%   Hr 63  Pt Education: medication doses  Barriers: no  Topics for Daily Review: pain and dizziness  Pt demonstrates clear understanding: Yes    Daily Weight:  There were no vitals filed for this visit.   Last 3 Weights:  Wt Readings from Last 7 Encounters:   05/22/25 76.7 kg (169 lb 1.5 oz)   05/05/25 78.8 kg (173 lb 12.8 oz)   04/30/25 78.5 kg (173 lb 1 oz)   02/03/25 79.5 kg (175 lb 3.2 oz)   01/21/25 80.6 kg (177 lb 9.6 oz)   12/07/24 83 kg (183 lb)   08/13/24 86.4 kg (190 lb 6.4 oz)       Masimo Device:    Masimo Clinical Impression:     Virtual Visits--Scheduled (Most Recent Date at Top)  Follow up Appointments  Recent Visits  No visits were found meeting these conditions.  Showing recent visits within past 30 days and meeting all other requirements  Future Appointments  No visits were found meeting these conditions.  Showing future appointments within next 90 days and meeting all other requirements       Frequency of RN Calls & Virtual Visits per Team Agreement: [facility] Frequency: Bi-Weekly    Medication issues Addressed (what was done): per RX    Follow up appointments scheduled by [facility] Staff: 6/12  Referrals made by [facility] staff: na        "

## 2025-06-06 ENCOUNTER — PATIENT OUTREACH (OUTPATIENT)
Dept: CARE COORDINATION | Age: OVER 89
End: 2025-06-06
Payer: MEDICARE

## 2025-06-06 VITALS — SYSTOLIC BLOOD PRESSURE: 163 MMHG | DIASTOLIC BLOOD PRESSURE: 75 MMHG | WEIGHT: 165 LBS | BODY MASS INDEX: 25.84 KG/M2

## 2025-06-06 NOTE — PROGRESS NOTES
Daily call, patient reports feeling alright. Things are going OK.  Reports bp, it was prior to meds.   Reports weight.   Some pain when she coughs or lies down.   Taking tylenol and patch to help with pain. Ambulates with roller walker. She is doing her own strengthening exercises at home.   Denies swelling, taking water pills.   Upcoming appointments reviewed.

## 2025-06-10 ENCOUNTER — APPOINTMENT (OUTPATIENT)
Dept: CARDIOLOGY | Facility: CLINIC | Age: OVER 89
End: 2025-06-10
Payer: MEDICARE

## 2025-06-10 VITALS
WEIGHT: 172.4 LBS | DIASTOLIC BLOOD PRESSURE: 78 MMHG | SYSTOLIC BLOOD PRESSURE: 150 MMHG | HEART RATE: 62 BPM | BODY MASS INDEX: 30.55 KG/M2 | HEIGHT: 63 IN

## 2025-06-10 DIAGNOSIS — I73.9 PERIPHERAL VASCULAR DISEASE: ICD-10-CM

## 2025-06-10 DIAGNOSIS — I48.0 PAROXYSMAL ATRIAL FIBRILLATION (MULTI): ICD-10-CM

## 2025-06-10 DIAGNOSIS — Z95.818 PRESENCE OF WATCHMAN LEFT ATRIAL APPENDAGE CLOSURE DEVICE: ICD-10-CM

## 2025-06-10 DIAGNOSIS — Z95.1 S/P CABG (CORONARY ARTERY BYPASS GRAFT): ICD-10-CM

## 2025-06-10 DIAGNOSIS — Z87.891 FORMER SMOKER: ICD-10-CM

## 2025-06-10 DIAGNOSIS — Z79.899 HIGH RISK MEDICATION USE: ICD-10-CM

## 2025-06-10 DIAGNOSIS — K92.2 GASTROINTESTINAL HEMORRHAGE, UNSPECIFIED GASTROINTESTINAL HEMORRHAGE TYPE: ICD-10-CM

## 2025-06-10 DIAGNOSIS — R94.31 ABNORMAL ELECTROCARDIOGRAM: ICD-10-CM

## 2025-06-10 DIAGNOSIS — Z95.0 PACEMAKER: ICD-10-CM

## 2025-06-10 DIAGNOSIS — I45.10 RIGHT BUNDLE BRANCH BLOCK (RBBB): ICD-10-CM

## 2025-06-10 DIAGNOSIS — I10 PRIMARY HYPERTENSION: ICD-10-CM

## 2025-06-10 DIAGNOSIS — I44.4 LAFB (LEFT ANTERIOR FASCICULAR BLOCK): ICD-10-CM

## 2025-06-10 DIAGNOSIS — N28.9 RENAL INSUFFICIENCY: Primary | ICD-10-CM

## 2025-06-10 DIAGNOSIS — E78.2 MIXED HYPERLIPIDEMIA: ICD-10-CM

## 2025-06-10 DIAGNOSIS — E05.90 HYPERTHYROIDISM: ICD-10-CM

## 2025-06-10 PROCEDURE — 99214 OFFICE O/P EST MOD 30 MIN: CPT | Performed by: INTERNAL MEDICINE

## 2025-06-10 PROCEDURE — 3077F SYST BP >= 140 MM HG: CPT | Performed by: INTERNAL MEDICINE

## 2025-06-10 PROCEDURE — 1111F DSCHRG MED/CURRENT MED MERGE: CPT | Performed by: INTERNAL MEDICINE

## 2025-06-10 PROCEDURE — 3078F DIAST BP <80 MM HG: CPT | Performed by: INTERNAL MEDICINE

## 2025-06-10 PROCEDURE — 93000 ELECTROCARDIOGRAM COMPLETE: CPT | Performed by: INTERNAL MEDICINE

## 2025-06-10 RX ORDER — HYDRALAZINE HYDROCHLORIDE 25 MG/1
25 TABLET, FILM COATED ORAL 3 TIMES DAILY
Qty: 280 TABLET | Refills: 1 | Status: SHIPPED | OUTPATIENT
Start: 2025-06-10 | End: 2025-12-27

## 2025-06-10 RX ORDER — DICLOFENAC SODIUM 10 MG/G
4 GEL TOPICAL 4 TIMES DAILY PRN
COMMUNITY

## 2025-06-10 NOTE — PROGRESS NOTES
CARDIOLOGY OFFICE NOTE     Date:   6/10/2025    Patient:    Lou Oliva    YOB: 1935    Primary Physician: Alexa Chatman MD         REASON FOR VISIT / CHIEF COMPLAINT:     Cardiology follow-up post watchman implant.    HPI:     Lou Oliva was seen in cardiac evaluation at the [facility] Cardiology office Ree 10, 2025.      The patients problems are listed as in the impression below.    Electronic medical records reviewed.    Patient underwent successful watchman implant 5/2025.  No complications.    She returns now.  She feels well.  Her blood pressure has been a bit high.  She has less edema.    Patient denies Chest Pain, Lightheadedness, Dizziness, TIA or CVA symptoms.  No CHF.  No Palpitations.  No GI,  or Bleeding Issues. No Recent Fever or Chills.     Cardiovascular and general review of systems is otherwise negative.    A 14-system review is otherwise negative, other than noted.     PHYSICAL EXAMINATION:      Vitals:    06/10/25 1517   BP: 162/64   Pulse: 62     No acute distress.  Alert and oriented.   Head and neck examination: No jugular venous distention, no carotid bruits, no mass. Carotid upstrokes preserved. Oral mucosa moist. No xanthelasma. Head and neck examination otherwise unremarkable.   Lungs: Clear to auscultation and percussion. No wheezes, no rales, and no rhonchi. Chest excursion appeared to be normal. No chest wall tenderness on palpation. Well-healed mid incisional scar.   Heart: Normal S1 and S2. No S3. No S4. No rub. No murmur. Point of maximal impulse was within normal limits. Pacemaker left chest. Abdomen was soft. Nontender. No organomegaly. No bruits. No masses.   Extremities: Mild bipedal edema. No clubbing. No cyanosis. Pulses are strong throughout. No bruits.   Musculoskeletal exam: No ulcers, otherwise unremarkable.   Neurologically appeared grossly intact.   .  IMPRESSION:       Cardiovascular status stable  Shortness of breath,  stable  Fatigue, chronic  Edema, improved  Fall, nonrecurrent  GI bleed due to peptic ulcer disease, 9 recurrent  Hypothyroidism, possibly secondary to amiodarone use.  Congestive heart failure, diastolic, compensated  Symptomatic bradycardia, post permanent pacemaker currently 2023, Medtronic sensor XT DR LOVELL.  Coronary artery disease, status post coronary artery bypass graft surgery, September 2007; left internal mammary artery to the left anterior descending coronary artery, saphenous vein graft to the first obtuse marginal and second obtuse marginal, diagonal and right coronary artery, left atrial appendage ligation  Transesophageal echocardiogram, 4/2025, small left atrial appendage appearing to be only partially ligated if at all.  Negative Lexiscan myocardial perfusion stress test, ejection fraction 65%, 2012 , 2020 and June 2024.  Normal left ventricular systolic function. LVEF 60%.,  Echocardiogram 12/2024.  Hypertension.  Familial hyperlipidemia (intolerant to statins and Juxtapid).  Peripheral vascular disease with stable 2.6 cm abdominal aortic ectasia; status post left iliac balloon angioplasty.   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   History of appendectomy.  History of total abdominal hysterectomy.  Obesity.  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   Possible obstructive sleep apnea.  Statin intolerance due to myalgias  GERD / PUD  Anemia  COVID infection 1/2024.      Otherwise as per assessment below.  Check her blood pressure again      RECOMMENDATIONS:      The patient is doing well overall post Watchman device implant.  Her blood pressure is elevated would suggest increasing hydralazine to 25 mg 3 times daily.  She is to stay on her current medications otherwise.  Refills were provided.    Exercise dietary program.    Hydration.    [employer] portal use was encouraged.    We will plan to see back in 2 months with Laboratory Studies and ECG as  ordered.     Patient will follow up with their primary physician for general care.    The patient knows to contact medical care earlier if need be.      ALLERGIES:     Lisinopril, Nsaids (non-steroidal anti-inflammatory drug), Statins-hmg-coa reductase inhibitors, and Sulfa (sulfonamide antibiotics)     MEDICATIONS:     Current Outpatient Medications   Medication Instructions    apixaban (Eliquis) 2.5 mg tablet TAKE 1 TABLET(2.5 MG) BY MOUTH TWICE DAILY.  Please resume Eliquis on 5/23/2025.    aspirin 81 mg, oral, Daily, Please start taking the day after you complete 3 months of Eliquis post Watchman.  You will then continue aspirin 81 mg daily for life.    betamethasone dipropionate (Diprosone) 0.05 % lotion 2 times daily PRN    calcium carbonate (Tums) 500 mg (200 mg elemental) chewable tablet 1 tablet, Daily    cholecalciferol (VITAMIN D3) 50 mcg, Daily    diclofenac sodium (VOLTAREN ARTHRITIS PAIN) 4 g, 4 times daily PRN    ferrous gluconate 324 (38 Fe) MG tablet 1 tablet, Daily    hydrALAZINE (Apresoline) 25 mg tablet 1 tablet, Every 12 hours scheduled (0630,1830)    lidocaine (Salonpas, lidocaine,) 4 % patch 1 patch, Daily    losartan (COZAAR) 25 mg, oral, 2 times daily    metoprolol tartrate (LOPRESSOR) 25 mg, oral, 2 times daily    nitroglycerin (NitrolinguaL) 400 mcg/spray spray Place 1 spray under the tongue every 5 minutes if needed for chest pain.    pantoprazole (PROTONIX) 40 mg, oral, Daily, Do not crush, chew, or split. 2 times a day for 4 weeks then 1 daily    polyethylene glycol (GLYCOLAX, MIRALAX) 17 g, Daily RT    ranolazine (RANEXA) 500 mg, oral, 2 times daily    rOPINIRole (REQUIP) 0.5 mg, 3 times daily    tiZANidine (ZANAFLEX) 4 mg, oral, 3 times daily PRN    torsemide (DEMADEX) 20 mg, Daily       ELECTROCARDIOGRAM:      Atrial paced rhythm.  Right bundle branch block, left anterior fascicular block.  Rate 62.    CARDIAC TESTING:      Echocardiogram, 5/2025:  Normal left trickle function.  Left  ventricular ejection fraction 60%.  No significant valvular heart disease.    Watchman left atrial occlusion device implant, 5/2025    LABORATORY DATA:      CBC:   Lab Results   Component Value Date    WBC 7.7 05/22/2025    RBC 3.65 (L) 05/22/2025    HGB 10.3 (L) 05/22/2025    HCT 32.9 (L) 05/22/2025     05/22/2025        CMP:    Lab Results   Component Value Date     05/22/2025    K 4.1 05/22/2025     05/22/2025    CO2 28 05/22/2025    BUN 34 (H) 05/22/2025    CREATININE 2.22 (H) 05/22/2025    GLUCOSE 95 05/22/2025    CALCIUM 8.9 05/22/2025       Magnesium:    Lab Results   Component Value Date    MG 2.03 12/05/2023       Lipid Profile:    Lab Results   Component Value Date    CHOL 216 (H) 12/05/2023    TRIG 92 12/05/2023    HDL 42.6 12/05/2023    LDLCALC 155 (H) 12/05/2023       Hepatic Function Panel:    Lab Results   Component Value Date    ALKPHOS 41 05/22/2025    ALT 5 (L) 05/22/2025    AST 13 05/22/2025    PROT 6.2 (L) 05/22/2025    BILITOT 0.6 05/22/2025    BILIDIR 0.1 12/07/2024                     PROBLEM LIST:     Problem List[1]          Delroy Avila MD, [facility] /  Cardiology      Of Note:  [employer] voice recognition dictation software was utilized partially in the preparation of this note, therefore, inaccuracies in spelling, word choice and punctuation may have occurred which were not recognized at the time of signing.    Patient was seen and examined with total time of visit including chart preparation, rooming, and chart completion exceeding 40 minutes.             [1]   Patient Active Problem List  Diagnosis    Abnormal electrocardiogram    Aneurysm of abdominal aorta    ASHD (arteriosclerotic heart disease)    Atrial fibrillation (Multi)    GERD (gastroesophageal reflux disease)    GI bleed    Edema    Hyperlipidemia    Pain of left hip joint    Hypertension    Peripheral vascular disease    Right bundle branch block (RBBB)    S/P CABG (coronary artery bypass graft)     Trochanteric bursitis of left hip    Vertigo    Pacemaker    Sinus node dysfunction (Multi)    High risk medication use    BMI 32.0-32.9,adult    Former smoker    Anemia    PUD (peptic ulcer disease)    LAFB (left anterior fascicular block)    Hyperthyroidism    Renal insufficiency    Fall    History of COVID-19    Chronic diastolic heart failure    Atrial fibrillation, unspecified type (Multi)    Encounter for examination for normal comparison and control in clinical research program

## 2025-06-10 NOTE — PATIENT INSTRUCTIONS
FOLLOW UP WITH Dr. Delroy Avila MD, [facility] IN 2 MONTHS    START TAKING YOUR HYDRALAZINE 25MG TAKE ONE TABLET THREE TIMES DAILY     FASTING BLOODWORK TO BE DONE 1 WEEK PRIOR TO NEXT APPT     DID YOU KNOW  We have a pharmacy here in the [facility].  They can fill all prescriptions, not just cardiac medications.  Prescriptions from other pharmacies can easily be transferred to the  pharmacy by the  pharmacist on site.   pharmacies offer FREE HOME DELIVERY on medications to anywhere in [place]. They can sync your medications. Typically prescriptions can be ready in 10 - 15 minutes. If pharmacy is unable to fill your  prescription or if cost is more than your paying now the Pharmacist can easily transfer back to your Pharmacy of choice. Pharmacy phone # [phone].     Please bring all medicines, vitamins, and herbal supplements with you in original bottles to every appointment! This is the best way to ensure your medication list in your chart is accurate.    Prescriptions will not be filled unless you are compliant with your follow up appointments or have a follow up appointment scheduled as per instruction of your physician. Refills should be requested at the time of your visit.

## 2025-06-11 ENCOUNTER — PATIENT OUTREACH (OUTPATIENT)
Dept: CARE COORDINATION | Age: OVER 89
End: 2025-06-11
Payer: MEDICARE

## 2025-06-11 NOTE — PROGRESS NOTES
Daily call, patient reports feeling fine. She saw cardio yesterday, states they did EKG. She said Dr said it was fine, but she is inquiring what it said.   Still pending read, advised to check again on tomorrows [facility].   She will have labwork done again prior to next cardio follow up.   Hydralazine increased at cardio apt, to 25mg TID.   Advised to monitor bp log.   Next call reviewed.

## 2025-06-12 ENCOUNTER — APPOINTMENT (OUTPATIENT)
Dept: CARE COORDINATION | Age: OVER 89
End: 2025-06-12
Payer: MEDICARE

## 2025-06-12 ENCOUNTER — APPOINTMENT (OUTPATIENT)
Dept: PHARMACY | Facility: HOSPITAL | Age: OVER 89
End: 2025-06-12
Payer: MEDICARE

## 2025-06-12 ENCOUNTER — PATIENT OUTREACH (OUTPATIENT)
Dept: CARE COORDINATION | Age: OVER 89
End: 2025-06-12

## 2025-06-12 VITALS
OXYGEN SATURATION: 98 % | DIASTOLIC BLOOD PRESSURE: 65 MMHG | WEIGHT: 172 LBS | HEART RATE: 61 BPM | BODY MASS INDEX: 30.47 KG/M2 | SYSTOLIC BLOOD PRESSURE: 148 MMHG

## 2025-06-12 DIAGNOSIS — I48.0 PAROXYSMAL ATRIAL FIBRILLATION (MULTI): ICD-10-CM

## 2025-06-12 DIAGNOSIS — M62.838 MUSCLE SPASM: ICD-10-CM

## 2025-06-12 DIAGNOSIS — N18.4 CKD (CHRONIC KIDNEY DISEASE) STAGE 4, GFR 15-29 ML/MIN (MULTI): ICD-10-CM

## 2025-06-12 DIAGNOSIS — I50.32 CHRONIC DIASTOLIC HEART FAILURE: Primary | ICD-10-CM

## 2025-06-12 DIAGNOSIS — I50.32 CHRONIC DIASTOLIC HEART FAILURE: ICD-10-CM

## 2025-06-12 DIAGNOSIS — I10 PRIMARY HYPERTENSION: ICD-10-CM

## 2025-06-12 DIAGNOSIS — I48.91 ATRIAL FIBRILLATION, UNSPECIFIED TYPE (MULTI): ICD-10-CM

## 2025-06-12 NOTE — PROGRESS NOTES
[facility]: patient reports feeling alright. States still has knee ans hip causing her aches.   Reviewed EKG results from 6/10 per request, afib RBBB discussed.   Reports bp, pulse ox and weight.   Hydralazine new dose discussed.   Chest tightness when she lays back, improved as she sleeps on her side now and wears a bra to help support.   She does get dizzy if she moves too fast.   Uses rollaider walker.   Discussed slowing movements to minimize symptoms.   She is doing therapy exercises at home.   She states she does have swelling and states cardio said increase diuretic and decrease PRN per swelling.   Discussed hydration, intake.   Lab work a gain 2 weeks prior to cardio 7/21, so around 7/7.  Next call scheduled.

## 2025-06-12 NOTE — PROGRESS NOTES
"[facility]    Lou Oliva is a 89 y.o. female was referred to Clinical Pharmacy Team to complete a post-discharge medication optimization and monitoring visit.  The patient was referred for multiple concerns while in [facility]. Today was our third visit.       Referring Provider: Eva Simms AP*  PCP: Alexa Chatman MD - not with [facility]       Subjective   Allergies[1]    [facility][id number] - [facility] [place] - [address] [facility] [address] [facility] [place] [address]  Phone: [phone] Fax: [phone]    [facility] - [place], [place] - [address]  [address]  [place] [address]  Phone: [phone] Fax: [phone]      Medication System Management:  Affordability/Accessibility: no concerns currently   Adherence/Organization: no issues       Social History     Social History Narrative    Not on file          HPI      Review of Systems        Objective     There were no vitals taken for this visit.   BP Readings from Last 4 Encounters:   06/10/25 150/78   06/06/25 163/75   06/04/25 114/53   06/04/25 114/53      There were no vitals filed for this visit.     LAB  Lab Results   Component Value Date    BILITOT 0.6 05/22/2025    CALCIUM 8.9 05/22/2025    CO2 28 05/22/2025     05/22/2025    CREATININE 2.22 (H) 05/22/2025    GLUCOSE 95 05/22/2025    ALKPHOS 41 05/22/2025    K 4.1 05/22/2025    PROT 6.2 (L) 05/22/2025     05/22/2025    AST 13 05/22/2025    ALT 5 (L) 05/22/2025    BUN 34 (H) 05/22/2025    ANIONGAP 12 05/22/2025    MG 2.03 12/05/2023    ALBUMIN 3.8 05/22/2025    GFRF 32 (A) 03/27/2023     Lab Results   Component Value Date    TRIG 92 12/05/2023    CHOL 216 (H) 12/05/2023    LDLCALC 155 (H) 12/05/2023    HDL 42.6 12/05/2023     No results found for: \"HGBA1C\"      Current Outpatient Medications   Medication Instructions    apixaban (Eliquis) 2.5 mg tablet TAKE 1 TABLET(2.5 MG) BY MOUTH TWICE DAILY.  Please resume " Eliquis on 5/23/2025.    aspirin 81 mg, oral, Daily, Please start taking the day after you complete 3 months of Eliquis post Watchman.  You will then continue aspirin 81 mg daily for life.    betamethasone dipropionate (Diprosone) 0.05 % lotion 2 times daily PRN    calcium carbonate (Tums) 500 mg (200 mg elemental) chewable tablet 1 tablet, Daily    cholecalciferol (VITAMIN D3) 50 mcg, Daily    diclofenac sodium (VOLTAREN ARTHRITIS PAIN) 4 g, 4 times daily PRN    ferrous gluconate 324 (38 Fe) MG tablet 1 tablet, Daily    hydrALAZINE (APRESOLINE) 25 mg, oral, 3 times daily    lidocaine (Salonpas, lidocaine,) 4 % patch 1 patch, Daily    losartan (COZAAR) 25 mg, oral, 2 times daily    metoprolol tartrate (LOPRESSOR) 25 mg, oral, 2 times daily    nitroglycerin (NitrolinguaL) 400 mcg/spray spray Place 1 spray under the tongue every 5 minutes if needed for chest pain.    pantoprazole (PROTONIX) 40 mg, oral, Daily, Do not crush, chew, or split. 2 times a day for 4 weeks then 1 daily    polyethylene glycol (GLYCOLAX, MIRALAX) 17 g, Daily RT    ranolazine (RANEXA) 500 mg, oral, 2 times daily    rOPINIRole (REQUIP) 0.5 mg, 3 times daily    tiZANidine (ZANAFLEX) 4 mg, oral, 3 times daily PRN    torsemide (DEMADEX) 20 mg, Daily          Assessment/Plan   Problem List Items Addressed This Visit       Chronic diastolic heart failure    Atrial fibrillation, unspecified type (Multi)       -she was recently re-admitted for her Watchman placement and since being back home she has been doing okay   -she did have some chest pain the other day but feels like it has improved today   -she has been monitoring her BP's a few times per day and keeping log   -BP's have been higher in the morning before her medication but then comes down later in the day  -most recent EF was 65%  -also monitoring her weights and pulse ox   -does have some dizziness if she tries to get up too quickly, otherwise does get around okay with her rolator     BP  148/65   HR 51   98% on RA     Medications Changes/Concerns:  CHF  GDMT --> losartan 25mg twice daily and metoprolol tartrate 25mg twice daily  Torsemide daily   Also continue with Hydralazine and Ranolazine   Hydralazine increased to 25mg TID   Afib --> eliquis, aspirin  Eliquis dosed appropriately based on age and kidney function   Eliquis + aspirin for 3 months then will only continue with Aspirin lifelong       Refills Needed:  -none needed today       Plan/To Do:  -continue to log vitals daily   -will need to get repeat blood work done in early July   -nursing will continue with calls throughout the week       [facility] PAP Status:  -not going to pursue since she is only going to be on Eliquis for 3 months   -she already paid for the first month so discussed to see if her cardio office can give her samples for the remaining 2 months and if not then I can do a free trial for 30 days of the 5mg tabs and she will just cut them in half       Time Spent with Patient and [facility] Team - Alphonse Simms NP and Christy RN via phone call: 20 minutes      Follow Up: 1 week     Continue all meds under the continuation of care with the referring provider and clinical pharmacy team.    Jony Lemus, Marie     Verbal consent to manage patient's drug therapy was obtained from the patient. They were informed they may decline to participate or withdraw from participation in pharmacy services at any time.          [1]   Allergies  Allergen Reactions    Lisinopril Unknown    Nsaids (Non-Steroidal Anti-Inflammatory Drug) GI bleeding    Statins-Hmg-Coa Reductase Inhibitors Unknown    Sulfa (Sulfonamide Antibiotics) Unknown

## 2025-06-12 NOTE — PROGRESS NOTES
" Healthy at Home Virtual Visit      Admission Date: 5/22  Discharge Date: 5/22  Discharging Facility: [facility]   PCP: Alexa Chatman MD   [facility] Visit: 3     Admission Dx and Associated Referral Dx:   Paroxysmal atrial fibrillation  S/p placement of a Watchman LAAO device     Brief summary of hospitalization or reason for referral:   89 y.o. female with past medical history significant for paroxysmal atrial fibrillation, maintained on metoprolol tartrate 25 mg twice daily and anticoagulated with Eliquis 2.5 mg twice daily, previously on amiodarone which was discontinued due to thyroid issues, history of CAD, s/p remote CABG (2007), SND, s/p remote PPM, chronic diastolic heart failure, PAD, AAA, HTN, HLD, hyperthyroidism, anemia, recent GI bleed requiring blood transfusions, falls, CKD and was recently referred by Dr. Avila to Dr. Chamorro for evaluation for left atrial appendage closure for stroke risk reduction.  Patient presented to [facility] on 5/22/2025 for elective placement of a Watchman LAAO device.      Interval Subjective:   Pain on left side above the breast in the bone area   Remains with some dizziness with moving from a sitting to standing position  Swelling to BLE  Denies CP, lightheadedness, SOB      BP: 178/65 before meds, 148/65 after meds  HR: 51 and 65  SPO2: 98%  Weight: 172     Masimo: No  Oxygen: No              Medications Issues/Refill need  Medication Follow up action needed:  PAP on hold, patient only taking eliquis for 3 months        Interval or Pertinent Labs/Testing:  Lab Review:   No results found for: \"HGBA1C\"               Lab Results   Component Value Date      05/22/2025     K 4.1 05/22/2025      05/22/2025     CO2 28 05/22/2025     BUN 34 (H) 05/22/2025     CREATININE 2.22 (H) 05/22/2025     GLUCOSE 95 05/22/2025     CALCIUM 8.9 05/22/2025                Lab Results   Component Value Date     WBC 7.7 05/22/2025     HGB 10.3 (L) 05/22/2025     " HCT 32.9 (L) 05/22/2025     MCV 90 05/22/2025      05/22/2025                Lab Results   Component Value Date     CHOL 216 (H) 12/05/2023     TRIG 92 12/05/2023     HDL 42.6 12/05/2023         Social Determinants of Health:  Medication Finances:  PAP  Transportation:  No Issues  Access to Food:  No issues           Assessment/Plan  #Paroxysmal atrial fibrillation  #S/p placement of a Watchman LAAO device  #Diastolic Heart Failure  #HTN  #HLD  -c/w eliquis 2.5 mg twice daily x 3 months follow by aspirin 81 mg daily   -Repeat Watchman CELINA in 3 months to reassess the device   -change metoprolol tartrate 25 mg twice daily  -c/w ranolazine 500 mg twice daily   -EF 60% on 5/22  -c/w torsemide 20 mg, losartan 25 mg (advised by cardiologist to increase Torsedmide up to 60 mg according to weight gain/fluid retention parameters)  -cardiologist increased hydralazine 25 mg twice daily  -monitor and log Bps daily     #CKD  -GFR 21 on 5/22  -Followed by PCP     #Muscle Spasm  -chronic  -Taking tizanidine 4 mg tid prn        Upcoming Appointments:   6/10 Cardiology  7/21 Cardiology     Telemedicine Visit:  Patient Location during Visit:  [place]  Visit Modality:  Telephone  Additional Visit Participants:  None  Patient/Proxy verbally consents to Evaluation and Treatment      Participants include Christy (RN), Jony (PharmD)     JASMIN Harrell, CNP  General Internal Medicine  [facility]

## 2025-06-13 ENCOUNTER — PATIENT OUTREACH (OUTPATIENT)
Dept: CARE COORDINATION | Age: OVER 89
End: 2025-06-13
Payer: MEDICARE

## 2025-06-13 VITALS — SYSTOLIC BLOOD PRESSURE: 137 MMHG | DIASTOLIC BLOOD PRESSURE: 65 MMHG | OXYGEN SATURATION: 96 % | HEART RATE: 61 BPM

## 2025-06-13 NOTE — PROGRESS NOTES
Daily call completed patient states she is doing ok today Her BP this am 164/59 HR 53 before her medications after medications 137/65 HR 61 PX 96-98% she did have a little dizziness this am but she thinks she may have gotten up to fast. No medication needs questions and concerns addressed Next call is Wednesday

## 2025-06-16 ENCOUNTER — PATIENT OUTREACH (OUTPATIENT)
Dept: CARE COORDINATION | Age: OVER 89
End: 2025-06-16
Payer: MEDICARE

## 2025-06-18 ENCOUNTER — PATIENT OUTREACH (OUTPATIENT)
Dept: CARE COORDINATION | Age: OVER 89
End: 2025-06-18
Payer: MEDICARE

## 2025-06-18 VITALS — DIASTOLIC BLOOD PRESSURE: 69 MMHG | SYSTOLIC BLOOD PRESSURE: 129 MMHG

## 2025-06-18 NOTE — PROGRESS NOTES
Daily Call Note: Daily call completed - the patient stated that she is still experiencing some light-headedness when walking. She reported that otherwise she is feeling well. She stated she does have chronic lower extremity edema for which she takes torsemide. She was asked to report her blood pressure, which was 166/79 at 0700, prior to taking her medication. She stated that at 0900 it ws 113/44. She was questioned about her measurement technique and indicated that it was correct. She was instructed on how antihypertensives work well to optimize the BP, but they can also cause hypotension, and especially when combined with inadequate hydration. The patient confirmed she does not drink enough water and was asked to add a glass of water to consume over the next hour, as well as repeating the BP. She will hold off taking the hydralazine (which she plans to take at 1400 along with the Eliquis and Torsemide) until she has reported the repeat BP so that information can be given to the provider, and a secure chat was sent to the provider alerting her to the situation. She was also educated on Eliquis as anticoagulant - she thought that it was for antihypertension.    6/18/25 1341 - the patient was called, as she had not returned the call with a recent BP reading. She stated she had not checked it, that she was taking a nap and would call back later. She confirmed she has not taken any medication yet.     6/18 1615 - the patient called back to report that her BP was 129/69 at 1512 - the Nurse Practitioner, Alphonse Simms was notified of the reading. She advised the patient hold her BP meds for the day - that she should to drink water for hydration, and recheck BP tonight and in the morning. If her BP is on the lower end and she feels lightheaded or dizziness, she is to hold her meds and call [facility]. The patient was instructed on this and verbalized understanding by repeating the instructions. She has taken the hydralazine out of  her pill pack for tonight.  Pt demonstrates clear understanding: Yes    Daily Weight:  There were no vitals filed for this visit.   Last 3 Weights:  Wt Readings from Last 7 Encounters:   06/12/25 78 kg (172 lb)   06/10/25 78.2 kg (172 lb 6.4 oz)   06/06/25 74.8 kg (165 lb)   05/22/25 76.7 kg (169 lb 1.5 oz)   05/05/25 78.8 kg (173 lb 12.8 oz)   04/30/25 78.5 kg (173 lb 1 oz)   02/03/25 79.5 kg (175 lb 3.2 oz)     Virtual Visits--Scheduled (Most Recent Date at Top)  Follow up Appointments  Recent Visits  No visits were found meeting these conditions.  Showing recent visits within past 30 days and meeting all other requirements  Future Appointments  No visits were found meeting these conditions.  Showing future appointments within next 90 days and meeting all other requirements       Frequency of RN Calls & Virtual Visits per Team Agreement: Healthy at Home Frequency: W/F    Medication issues Addressed (what was done):     Follow up appointments scheduled by [facility] Staff:   Referrals made by [facility] staff:

## 2025-06-19 ENCOUNTER — PATIENT OUTREACH (OUTPATIENT)
Dept: CARE COORDINATION | Age: OVER 89
End: 2025-06-19

## 2025-06-19 ENCOUNTER — DOCUMENTATION (OUTPATIENT)
Dept: CARE COORDINATION | Age: OVER 89
End: 2025-06-19

## 2025-06-19 ENCOUNTER — APPOINTMENT (OUTPATIENT)
Dept: PHARMACY | Facility: HOSPITAL | Age: OVER 89
End: 2025-06-19
Payer: MEDICARE

## 2025-06-19 ENCOUNTER — APPOINTMENT (OUTPATIENT)
Dept: CARE COORDINATION | Age: OVER 89
End: 2025-06-19
Payer: MEDICARE

## 2025-06-19 VITALS
HEART RATE: 63 BPM | SYSTOLIC BLOOD PRESSURE: 121 MMHG | WEIGHT: 172 LBS | DIASTOLIC BLOOD PRESSURE: 59 MMHG | OXYGEN SATURATION: 96 % | BODY MASS INDEX: 30.47 KG/M2

## 2025-06-19 DIAGNOSIS — I48.91 ATRIAL FIBRILLATION, UNSPECIFIED TYPE (MULTI): ICD-10-CM

## 2025-06-19 DIAGNOSIS — I50.32 CHRONIC DIASTOLIC HEART FAILURE: ICD-10-CM

## 2025-06-19 DIAGNOSIS — I10 PRIMARY HYPERTENSION: ICD-10-CM

## 2025-06-19 DIAGNOSIS — I50.32 CHRONIC DIASTOLIC HEART FAILURE: Primary | ICD-10-CM

## 2025-06-19 DIAGNOSIS — I48.0 PAROXYSMAL ATRIAL FIBRILLATION (MULTI): ICD-10-CM

## 2025-06-19 DIAGNOSIS — N18.4 CKD (CHRONIC KIDNEY DISEASE) STAGE 4, GFR 15-29 ML/MIN (MULTI): ICD-10-CM

## 2025-06-19 NOTE — PROGRESS NOTES
Patient called in to report her am /69 she had not taken any medications yet  reached out to the provider patient asked if she should taker her hydralazine this am ( it was held last evening) advised she is good to take her medications she has no symptoms. She is going to re check around noon and she has an HHVC today @ 1300

## 2025-06-19 NOTE — PROGRESS NOTES
"[facility]    Lou Oliva is a 89 y.o. female was referred to Clinical Pharmacy Team to complete a post-discharge medication optimization and monitoring visit.  The patient was referred for multiple concerns while in [facility]. Today was our fourth visit.       Referring Provider: Eva Simms AP*  PCP: Alexa Chatman MD - not with [facility]       Subjective   Allergies[1]    [facility][id number] - [facility] [place] - [address] AT [facility] [address] [facility] [place] [address]  Phone: [phone] Fax: [phone]    [facility] - [place], [place] - [address]  [address]  [place] [address]  Phone: [phone] Fax: [phone]      Medication System Management:  Affordability/Accessibility: no concerns currently   Adherence/Organization: no issues       Social History     Social History Narrative    Not on file          HPI      Review of Systems        Objective     There were no vitals taken for this visit.   BP Readings from Last 4 Encounters:   06/18/25 129/69   06/13/25 137/65   06/12/25 148/65   06/10/25 150/78      There were no vitals filed for this visit.     LAB  Lab Results   Component Value Date    BILITOT 0.6 05/22/2025    CALCIUM 8.9 05/22/2025    CO2 28 05/22/2025     05/22/2025    CREATININE 2.22 (H) 05/22/2025    GLUCOSE 95 05/22/2025    ALKPHOS 41 05/22/2025    K 4.1 05/22/2025    PROT 6.2 (L) 05/22/2025     05/22/2025    AST 13 05/22/2025    ALT 5 (L) 05/22/2025    BUN 34 (H) 05/22/2025    ANIONGAP 12 05/22/2025    MG 2.03 12/05/2023    ALBUMIN 3.8 05/22/2025    GFRF 32 (A) 03/27/2023     Lab Results   Component Value Date    TRIG 92 12/05/2023    CHOL 216 (H) 12/05/2023    LDLCALC 155 (H) 12/05/2023    HDL 42.6 12/05/2023     No results found for: \"HGBA1C\"      Current Outpatient Medications   Medication Instructions    apixaban (Eliquis) 2.5 mg tablet TAKE 1 TABLET(2.5 MG) BY MOUTH TWICE DAILY.  Please resume " Eliquis on 5/23/2025.    aspirin 81 mg, oral, Daily, Please start taking the day after you complete 3 months of Eliquis post Watchman.  You will then continue aspirin 81 mg daily for life.    betamethasone dipropionate (Diprosone) 0.05 % lotion 2 times daily PRN    calcium carbonate (Tums) 500 mg (200 mg elemental) chewable tablet 1 tablet, Daily    cholecalciferol (VITAMIN D3) 50 mcg, Daily    diclofenac sodium (VOLTAREN ARTHRITIS PAIN) 4 g, 4 times daily PRN    ferrous gluconate 324 (38 Fe) MG tablet 1 tablet, Daily    hydrALAZINE (APRESOLINE) 25 mg, oral, 3 times daily    lidocaine (Salonpas, lidocaine,) 4 % patch 1 patch, Daily    losartan (COZAAR) 25 mg, oral, 2 times daily    metoprolol tartrate (LOPRESSOR) 25 mg, oral, 2 times daily    nitroglycerin (NitrolinguaL) 400 mcg/spray spray Place 1 spray under the tongue every 5 minutes if needed for chest pain.    pantoprazole (PROTONIX) 40 mg, oral, Daily, Do not crush, chew, or split. 2 times a day for 4 weeks then 1 daily    polyethylene glycol (GLYCOLAX, MIRALAX) 17 g, Daily RT    ranolazine (RANEXA) 500 mg, oral, 2 times daily    rOPINIRole (REQUIP) 0.5 mg, 3 times daily    torsemide (DEMADEX) 20 mg, Daily          Assessment/Plan   Problem List Items Addressed This Visit       Chronic diastolic heart failure    Atrial fibrillation, unspecified type (Multi)       -she was recently re-admitted for her Watchman placement and since being back home she has been doing okay   -no chest pain or swelling; feeling good overall today   -she has been monitoring her BP's a few times per day and keeping log   -BP's have been higher in the morning before her medication but then comes down later in the day  -BP this morning before meds was 165/69 --> went down to 137/55 at noon --> now 121/59 while on visit with her   -HR's have all been running in the 60's  -most recent EF was 65%  -also monitoring her weights and pulse ox   -does have some dizziness if she tries to get up  too quickly, otherwise does get around okay with her rolator       Medications Changes/Concerns:  CHF  GDMT --> losartan 25mg twice daily and metoprolol tartrate 25mg twice daily  Torsemide daily   Also continue with Hydralazine and Ranolazine   Afib --> eliquis, aspirin  Eliquis dosed appropriately based on age and kidney function   Eliquis + aspirin for 3 months then will only continue with Aspirin lifelong       Refills Needed:  -none needed today       Plan/To Do:  -continue to log vitals daily   -nursing will continue with calls throughout the week       [facility] PAP Status:  -not going to pursue since she is only going to be on Eliquis for 3 months   -she already paid for the first month so discussed to see if her cardio office can give her samples for the remaining 2 months and if not then I can do a free trial for 30 days of the 5mg tabs and she will just cut them in half       Time Spent with Patient and [facility] Team - Alphonse Simms NP and Christy RN via phone call: 15 minutes      Follow Up: 1 week     Continue all meds under the continuation of care with the referring provider and clinical pharmacy team.    Jony Lemus, Marie     Verbal consent to manage patient's drug therapy was obtained from the patient. They were informed they may decline to participate or withdraw from participation in pharmacy services at any time.          [1]   Allergies  Allergen Reactions    Lisinopril Unknown    Nsaids (Non-Steroidal Anti-Inflammatory Drug) GI bleeding    Statins-Hmg-Coa Reductase Inhibitors Unknown    Sulfa (Sulfonamide Antibiotics) Unknown

## 2025-06-19 NOTE — PROGRESS NOTES
HHVC: spoke to patient, she reports feeling good.   Reports noon bp without noon meds, and denies any dizziness.   Bp on call at 1300, had noon meds. Provider advised rechecking throughout day and continue to log.   She will take afternoon dose of hydralazine and call us back around 1700 today with recheck.   No SOB, Reports a little ankle swelling.   She is continuing leg exercises.   [facility]

## 2025-06-19 NOTE — PROGRESS NOTES
" Healthy at Home Virtual Visit      Admission Date: 5/22  Discharge Date: 5/22  Discharging Facility: [facility]   PCP: Alexa Chatman MD   [facility] Visit: 3     Admission Dx and Associated Referral Dx:   Paroxysmal atrial fibrillation  S/p placement of a Watchman LAAO device     Brief summary of hospitalization or reason for referral:   89 y.o. female with past medical history significant for paroxysmal atrial fibrillation, maintained on metoprolol tartrate 25 mg twice daily and anticoagulated with Eliquis 2.5 mg twice daily, previously on amiodarone which was discontinued due to thyroid issues, history of CAD, s/p remote CABG (2007), SND, s/p remote PPM, chronic diastolic heart failure, PAD, AAA, HTN, HLD, hyperthyroidism, anemia, recent GI bleed requiring blood transfusions, falls, CKD and was recently referred by Dr. Avila to Dr. Chamorro for evaluation for left atrial appendage closure for stroke risk reduction.  Patient presented to [facility] on 5/22/2025 for elective placement of a Watchman LAAO device.      Interval Subjective:   Pain on left side above the breast in the bone area   Remains with some dizziness with moving from a sitting to standing position  Endorses swelling to bilat ankles  Denies CP, lightheadedness, dizziness, SOB,     BP: 166/79 before meds 137/55 at 12 noon after meds, 121/59 (63) during visit   HR: 65  SPO2: 96%  Weight: 172 lbs     Masimo: No  Oxygen: No              Medications Issues/Refill need  Medication Follow up action needed:  PAP on hold, patient only taking eliquis for 3 months        Interval or Pertinent Labs/Testing:  Lab Review:   No results found for: \"HGBA1C\"               Lab Results   Component Value Date      05/22/2025     K 4.1 05/22/2025      05/22/2025     CO2 28 05/22/2025     BUN 34 (H) 05/22/2025     CREATININE 2.22 (H) 05/22/2025     GLUCOSE 95 05/22/2025     CALCIUM 8.9 05/22/2025                Lab Results   Component Value " Date     WBC 7.7 05/22/2025     HGB 10.3 (L) 05/22/2025     HCT 32.9 (L) 05/22/2025     MCV 90 05/22/2025      05/22/2025                Lab Results   Component Value Date     CHOL 216 (H) 12/05/2023     TRIG 92 12/05/2023     HDL 42.6 12/05/2023         Social Determinants of Health:  Medication Finances:  PAP  Transportation:  No Issues  Access to Food:  No issues           Assessment/Plan  #Paroxysmal atrial fibrillation  #S/p placement of a Watchman LAAO device  #Diastolic Heart Failure  #HTN  #HLD  -c/w eliquis 2.5 mg twice daily x 3 months follow by aspirin 81 mg daily   -Repeat Watchman CELINA in 3 months to reassess the device   -change metoprolol tartrate 25 mg twice daily  -c/w ranolazine 500 mg twice daily   -EF 60% on 5/22  -c/w torsemide 20 mg, losartan 25 mg (advised by cardiologist to increase Torsedmide up to 60 mg according to weight gain/fluid retention parameters)  -cardiologist increased hydralazine 25 mg tid daily  -monitor and log Bps daily  -experienced dizziness on 6/18 after dose of hydralazine, continue to monitor symptoms      #CKD  -GFR 21 on 5/22  -Followed by PCP     #Muscle Spasm  -chronic  -Taking tizanidine 4 mg tid prn        Upcoming Appointments:   6/10 Cardiology  7/21 Cardiology     Telemedicine Visit:  Patient Location during Visit:  [place]  Visit Modality:  Telephone  Additional Visit Participants:  None  Patient/Proxy verbally consents to Evaluation and Treatment      Participants include Christy (RN)Jony (PharmD)     JASMIN Harrell, CNP  General Internal Medicine  [facility]

## 2025-06-20 ENCOUNTER — PATIENT OUTREACH (OUTPATIENT)
Dept: CARE COORDINATION | Age: OVER 89
End: 2025-06-20
Payer: MEDICARE

## 2025-06-20 VITALS
OXYGEN SATURATION: 99 % | SYSTOLIC BLOOD PRESSURE: 136 MMHG | DIASTOLIC BLOOD PRESSURE: 56 MMHG | BODY MASS INDEX: 29.23 KG/M2 | HEART RATE: 53 BPM | WEIGHT: 165 LBS

## 2025-06-20 NOTE — PROGRESS NOTES
Daily call, patient reports being alright.   Reports vitals, weight.   Denies any symptoms when bp elevated.   Endorses a little swelling in her ankles. Improves when sits and rests.  Meds reviewed.    Call schedule reviewed and adjusted.   She will check bp throughout day and call in if elevated.

## 2025-06-23 ENCOUNTER — PATIENT OUTREACH (OUTPATIENT)
Dept: CARE COORDINATION | Age: OVER 89
End: 2025-06-23
Payer: MEDICARE

## 2025-06-23 VITALS
DIASTOLIC BLOOD PRESSURE: 60 MMHG | SYSTOLIC BLOOD PRESSURE: 133 MMHG | WEIGHT: 164 LBS | BODY MASS INDEX: 29.05 KG/M2 | OXYGEN SATURATION: 99 % | HEART RATE: 60 BPM

## 2025-06-23 NOTE — PROGRESS NOTES
Daily call. Reports vitals past few days. Reports weight.   States a little dizzy this morning, ensured hydrated in heat weather. She is inside with AC drinking and urinating fine.   No other concerns, next call reviewed.

## 2025-06-26 ENCOUNTER — APPOINTMENT (OUTPATIENT)
Dept: PHARMACY | Facility: HOSPITAL | Age: OVER 89
End: 2025-06-26
Payer: MEDICARE

## 2025-06-26 ENCOUNTER — APPOINTMENT (OUTPATIENT)
Dept: CARE COORDINATION | Age: OVER 89
End: 2025-06-26
Payer: MEDICARE

## 2025-06-26 ENCOUNTER — PATIENT OUTREACH (OUTPATIENT)
Dept: CARE COORDINATION | Age: OVER 89
End: 2025-06-26

## 2025-06-26 DIAGNOSIS — I50.32 CHRONIC DIASTOLIC HEART FAILURE: ICD-10-CM

## 2025-06-26 DIAGNOSIS — I48.91 ATRIAL FIBRILLATION, UNSPECIFIED TYPE (MULTI): ICD-10-CM

## 2025-06-26 DIAGNOSIS — I50.32 CHRONIC DIASTOLIC HEART FAILURE: Primary | ICD-10-CM

## 2025-06-26 RX ORDER — TORSEMIDE 20 MG/1
20 TABLET ORAL DAILY
Qty: 90 TABLET | Refills: 3 | Status: SHIPPED | OUTPATIENT
Start: 2025-06-26

## 2025-06-26 NOTE — PROGRESS NOTES
"[facility]    Lou Oliva is a 89 y.o. female was referred to Clinical Pharmacy Team to complete a post-discharge medication optimization and monitoring visit.  The patient was referred for multiple concerns while in [facility]. Today was our fifth visit.       Referring Provider: Eva Simms AP*  PCP: Alexa Chatman MD - not with [facility]       Subjective   Allergies[1]    [id number] - [facility] [place] - [address] [facility] [address] [facility] [place] [address]  Phone: [phone] Fax: [phone]    [facility] - [place], [place] - [address]  [address]  [place] [address]  Phone: [phone] Fax: [phone]      Medication System Management:  Affordability/Accessibility: no concerns currently   Adherence/Organization: no issues       Social History     Social History Narrative    Not on file          HPI      Review of Systems        Objective     There were no vitals taken for this visit.   BP Readings from Last 4 Encounters:   06/23/25 133/60   06/20/25 136/56   06/19/25 121/59   06/18/25 129/69      There were no vitals filed for this visit.     LAB  Lab Results   Component Value Date    BILITOT 0.6 05/22/2025    CALCIUM 8.9 05/22/2025    CO2 28 05/22/2025     05/22/2025    CREATININE 2.22 (H) 05/22/2025    GLUCOSE 95 05/22/2025    ALKPHOS 41 05/22/2025    K 4.1 05/22/2025    PROT 6.2 (L) 05/22/2025     05/22/2025    AST 13 05/22/2025    ALT 5 (L) 05/22/2025    BUN 34 (H) 05/22/2025    ANIONGAP 12 05/22/2025    MG 2.03 12/05/2023    ALBUMIN 3.8 05/22/2025    GFRF 32 (A) 03/27/2023     Lab Results   Component Value Date    TRIG 92 12/05/2023    CHOL 216 (H) 12/05/2023    LDLCALC 155 (H) 12/05/2023    HDL 42.6 12/05/2023     No results found for: \"HGBA1C\"      Current Outpatient Medications   Medication Instructions    apixaban (Eliquis) 2.5 mg tablet TAKE 1 TABLET(2.5 MG) BY MOUTH TWICE DAILY.  Please resume " "Eliquis on 5/23/2025.    aspirin 81 mg, oral, Daily, Please start taking the day after you complete 3 months of Eliquis post Watchman.  You will then continue aspirin 81 mg daily for life.    betamethasone dipropionate (Diprosone) 0.05 % lotion 2 times daily PRN    calcium carbonate (Tums) 500 mg (200 mg elemental) chewable tablet 1 tablet, Daily    cholecalciferol (VITAMIN D3) 50 mcg, Daily    diclofenac sodium (VOLTAREN ARTHRITIS PAIN) 4 g, 4 times daily PRN    ferrous gluconate 324 (38 Fe) MG tablet 1 tablet, Daily    hydrALAZINE (APRESOLINE) 25 mg, oral, 3 times daily    lidocaine (Salonpas, lidocaine,) 4 % patch 1 patch, Daily    losartan (COZAAR) 25 mg, oral, 2 times daily    metoprolol tartrate (LOPRESSOR) 25 mg, oral, 2 times daily    nitroglycerin (NitrolinguaL) 400 mcg/spray spray Place 1 spray under the tongue every 5 minutes if needed for chest pain.    pantoprazole (PROTONIX) 40 mg, oral, Daily, Do not crush, chew, or split. 2 times a day for 4 weeks then 1 daily    polyethylene glycol (GLYCOLAX, MIRALAX) 17 g, Daily RT    ranolazine (RANEXA) 500 mg, oral, 2 times daily    rOPINIRole (REQUIP) 0.5 mg, 3 times daily    torsemide (DEMADEX) 20 mg, Daily          Assessment/Plan   Problem List Items Addressed This Visit       Chronic diastolic heart failure    Atrial fibrillation, unspecified type (Multi)       -she has been monitoring her BP's a few times per day and keeping log   -BP's have been higher in the morning before her medication but then comes down later in the day  -BP this morning before meds was 141/59 --> went down to 137/62  -HR's have all been running in the 60's  -most recent EF was 65%  -also monitoring her weights and pulse ox   -weight today was 164 lbs (down 6 lbs in about 3 weeks)  -does have some dizziness if she tries to get up too quickly, otherwise does get around okay with her rolator   -had some chest \"tightness\" earlier in the week also but nothing severe and it was only the " one time   -no S/S of bleeding      Medications Changes/Concerns:  CHF  GDMT --> losartan 25mg twice daily and metoprolol tartrate 25mg twice daily  Torsemide daily   Also continue with Hydralazine and Ranolazine   Afib --> eliquis, aspirin  Eliquis dosed appropriately based on age and kidney function   Eliquis + aspirin for 3 months then will only continue with Aspirin lifelong       Refills Needed:  -none needed today       Plan/To Do:  -continue to log vitals for all future visits   -will be following up with cardio again in July   -will graduate from [facility] today!       PAP Status:  -not going to pursue since she is only going to be on Eliquis for 3 months   -she already paid for the first month so discussed to see if her cardio office can give her samples for the remaining 2 months and if not then I can do a free trial for 30 days of the 5mg tabs and she will just cut them in half       Time Spent with Patient and [facility] Team - Dr. Coleman and Massiel ASL RN via phone call: 10 minutes      Follow Up: as needed    Continue all meds under the continuation of care with the referring provider and clinical pharmacy team.    Jony Lemus, PharmD     Verbal consent to manage patient's drug therapy was obtained from the patient. They were informed they may decline to participate or withdraw from participation in pharmacy services at any time.          [1]   Allergies  Allergen Reactions    Lisinopril Unknown    Nsaids (Non-Steroidal Anti-Inflammatory Drug) GI bleeding    Statins-Hmg-Coa Reductase Inhibitors Unknown    Sulfa (Sulfonamide Antibiotics) Unknown

## 2025-06-26 NOTE — PROGRESS NOTES
" [facility] Virtual Visit     Admission Date: 5/22  Discharge Date: 5/22  Discharging Facility: [facility]   PCP: Alexa Chatman MD   [facility] Visit: 5    Admission Dx and Associated Referral Dx:   Paroxysmal atrial fibrillation  S/p placement of a Watchman LAAO device     Brief summary of hospitalization or reason for referral:   89 y.o. female with past medical history significant for paroxysmal atrial fibrillation, maintained on metoprolol tartrate 25 mg twice daily and anticoagulated with Eliquis 2.5 mg twice daily, previously on amiodarone which was discontinued due to thyroid issues, history of CAD, s/p remote CABG (2007), SND, s/p remote PPM, chronic diastolic heart failure, PAD, AAA, HTN, HLD, hyperthyroidism, anemia, recent GI bleed requiring blood transfusions, falls, CKD and was recently referred by Dr. Avila to Dr. Chamorro for evaluation for left atrial appendage closure for stroke risk reduction.  Patient presented to [facility] on 5/22/2025 for elective placement of a Watchman LAAO device.     Interval Subjective:     Had some chest tightness on Tuesday, under left breast. Also has R arm tingling.   Feet sometimes numb, but swelling is improved.     BP: 141/59 before meds, 137/62 after  HR: 67  SPO2:   Glucose:   Weight: 164  Wt Readings from Last 3 Encounters:   06/23/25 74.4 kg (164 lb)   06/20/25 74.8 kg (165 lb)   06/19/25 78 kg (172 lb)       Masimo: No  Oxygen: No    Interval or Pertinent Labs/Testing:  Lab Review:   No results found for: \"HGBA1C\"    No results found for this or any previous visit (from the past 96 hours).      [facility] Concerns & Interventions:    Medications Issues/Refill:      Assessment/Plan    #Paroxysmal atrial fibrillation  #S/p placement of a Watchman LAAO device  #Diastolic Heart Failure  #HTN  #HLD  -c/w eliquis 2.5 mg twice daily x 3 months follow by aspirin 81 mg daily   -Repeat Watchman CELINA in 3 months to reassess the device   -change metoprolol " tartrate 25 mg twice daily  -c/w ranolazine 500 mg twice daily   -EF 60% on 5/22  -c/w torsemide 20 mg, losartan 25 mg (advised by cardiologist to increase Torsedmide up to 60 mg according to weight gain/fluid retention parameters)  -cardiologist increased hydralazine 25 mg tid daily  -monitor and log Bps daily  -dizziness improving     #CKD  -GFR 21 on 5/22  -Followed by PCP     #Muscle Spasm  -chronic  -Taking tizanidine 4 mg tid prn      Upcoming Appointments:       Telemedicine Visit:  Patient Location during Visit:  [place]  Visit Modality:  Telephone  Additional Visit Participants:  None  Patient/Proxy verbally consents to Evaluation and Treatment

## 2025-06-26 NOTE — PROGRESS NOTES
Daily Call Note:     Patient Graduated from [facility].     [facility] Program Interventions:   Financial Resources- PAP, med rec, medication changes, scheduled appts     Medication Reconciliation: complete    Continued Needs:  No    Incontinence Assessment:  Continent    Incontinence Interventions:  No    Falls Risk Score:    Falls Risk Interventions:  Yes    MMO Resources Provided         [facility] call with the patient, Jony Goldstein, and the Nurse  Pt states she is doing okay  She mentions that she had some chest tightness a couple days ago  She was feeling some numbness in her feet, and arm tingling but feels better now  Swelling in her LE has gone down  Her most recent BP   137/62  HR 61  Pt does have some hip pain in which she uses Tylenol, pain patch, and muscle relaxant  Pt had no add'l questions or concerns  Felt okay to graduate the program         Pt Education:  Barriers:   Topics for Daily Review:  Pt demonstrates clear understanding:     Daily Weight:  There were no vitals filed for this visit.   Last 3 Weights:  Wt Readings from Last 7 Encounters:   06/23/25 74.4 kg (164 lb)   06/20/25 74.8 kg (165 lb)   06/19/25 78 kg (172 lb)   06/12/25 78 kg (172 lb)   06/10/25 78.2 kg (172 lb 6.4 oz)   06/06/25 74.8 kg (165 lb)   05/22/25 76.7 kg (169 lb 1.5 oz)       Masimo Device:    Masimo Clinical Impression:     Virtual Visits--Scheduled (Most Recent Date at Top)  Follow up Appointments  Recent Visits  No visits were found meeting these conditions.  Showing recent visits within past 30 days and meeting all other requirements  Future Appointments  No visits were found meeting these conditions.  Showing future appointments within next 90 days and meeting all other requirements       Frequency of RN Calls & Virtual Visits per Team Agreement:     Medication issues Addressed (what was done):     Follow up appointments scheduled by [facility] Staff:  Referrals made by [facility] staff:

## 2025-07-14 ENCOUNTER — APPOINTMENT (OUTPATIENT)
Dept: CARDIOLOGY | Facility: CLINIC | Age: OVER 89
End: 2025-07-14
Payer: MEDICARE

## 2025-07-17 LAB
ALBUMIN SERPL-MCNC: 3.8 G/DL (ref 3.6–5.1)
ALBUMIN/GLOB SERPL: 1.7 (CALC) (ref 1–2.5)
ALP SERPL-CCNC: 48 U/L (ref 37–153)
ALT SERPL-CCNC: 6 U/L (ref 6–29)
ANION GAP SERPL CALCULATED.4IONS-SCNC: 11 MMOL/L (CALC) (ref 7–17)
AST SERPL-CCNC: 13 U/L (ref 10–35)
BILIRUB DIRECT SERPL-MCNC: 0.1 MG/DL
BILIRUB INDIRECT SERPL-MCNC: 0.6 MG/DL (CALC) (ref 0.2–1.2)
BILIRUB SERPL-MCNC: 0.7 MG/DL (ref 0.2–1.2)
BUN SERPL-MCNC: 37 MG/DL (ref 7–25)
BUN/CREAT SERPL: 15 (CALC) (ref 6–22)
CALCIUM SERPL-MCNC: 9 MG/DL (ref 8.6–10.4)
CHLORIDE SERPL-SCNC: 105 MMOL/L (ref 98–110)
CHOLEST SERPL-MCNC: 188 MG/DL
CHOLEST/HDLC SERPL: 4.7 (CALC)
CO2 SERPL-SCNC: 26 MMOL/L (ref 20–32)
CREAT SERPL-MCNC: 2.54 MG/DL (ref 0.6–0.95)
EGFRCR SERPLBLD CKD-EPI 2021: 17 ML/MIN/1.73M2
ERYTHROCYTE [DISTWIDTH] IN BLOOD BY AUTOMATED COUNT: 13.4 % (ref 11–15)
GLOBULIN SER CALC-MCNC: 2.3 G/DL (CALC) (ref 1.9–3.7)
GLUCOSE SERPL-MCNC: 85 MG/DL (ref 65–99)
HCT VFR BLD AUTO: 34.2 % (ref 35–45)
HDLC SERPL-MCNC: 40 MG/DL
HGB BLD-MCNC: 10.6 G/DL (ref 11.7–15.5)
LDLC SERPL CALC-MCNC: 126 MG/DL (CALC)
MAGNESIUM SERPL-MCNC: 2.4 MG/DL (ref 1.5–2.5)
MCH RBC QN AUTO: 27.7 PG (ref 27–33)
MCHC RBC AUTO-ENTMCNC: 31 G/DL (ref 32–36)
MCV RBC AUTO: 89.5 FL (ref 80–100)
NONHDLC SERPL-MCNC: 148 MG/DL (CALC)
PLATELET # BLD AUTO: 198 THOUSAND/UL (ref 140–400)
PMV BLD REES-ECKER: 10.8 FL (ref 7.5–12.5)
POTASSIUM SERPL-SCNC: 4.4 MMOL/L (ref 3.5–5.3)
PROT SERPL-MCNC: 6.1 G/DL (ref 6.1–8.1)
RBC # BLD AUTO: 3.82 MILLION/UL (ref 3.8–5.1)
SODIUM SERPL-SCNC: 142 MMOL/L (ref 135–146)
TRIGL SERPL-MCNC: 117 MG/DL
WBC # BLD AUTO: 7 THOUSAND/UL (ref 3.8–10.8)

## 2025-07-21 ENCOUNTER — APPOINTMENT (OUTPATIENT)
Dept: CARDIOLOGY | Facility: CLINIC | Age: OVER 89
End: 2025-07-21
Payer: MEDICARE

## 2025-07-21 VITALS
SYSTOLIC BLOOD PRESSURE: 120 MMHG | HEIGHT: 63 IN | WEIGHT: 171 LBS | DIASTOLIC BLOOD PRESSURE: 54 MMHG | BODY MASS INDEX: 30.3 KG/M2 | HEART RATE: 62 BPM

## 2025-07-21 DIAGNOSIS — I48.0 PAROXYSMAL ATRIAL FIBRILLATION (MULTI): ICD-10-CM

## 2025-07-21 DIAGNOSIS — I10 PRIMARY HYPERTENSION: ICD-10-CM

## 2025-07-21 DIAGNOSIS — Z95.0 PACEMAKER: ICD-10-CM

## 2025-07-21 DIAGNOSIS — R60.9 EDEMA, UNSPECIFIED TYPE: ICD-10-CM

## 2025-07-21 DIAGNOSIS — Z98.890 HISTORY OF LEFT ATRIAL APPENDAGE CLOSURE: Primary | ICD-10-CM

## 2025-07-21 DIAGNOSIS — Z87.891 FORMER SMOKER: ICD-10-CM

## 2025-07-21 DIAGNOSIS — E05.90 HYPERTHYROIDISM: ICD-10-CM

## 2025-07-21 DIAGNOSIS — Z79.899 HIGH RISK MEDICATION USE: ICD-10-CM

## 2025-07-21 DIAGNOSIS — N28.9 RENAL INSUFFICIENCY: ICD-10-CM

## 2025-07-21 PROCEDURE — 99214 OFFICE O/P EST MOD 30 MIN: CPT | Performed by: INTERNAL MEDICINE

## 2025-07-21 PROCEDURE — 3078F DIAST BP <80 MM HG: CPT | Performed by: INTERNAL MEDICINE

## 2025-07-21 PROCEDURE — 3074F SYST BP LT 130 MM HG: CPT | Performed by: INTERNAL MEDICINE

## 2025-07-21 PROCEDURE — 93000 ELECTROCARDIOGRAM COMPLETE: CPT | Performed by: INTERNAL MEDICINE

## 2025-07-21 PROCEDURE — 1159F MED LIST DOCD IN RCRD: CPT | Performed by: INTERNAL MEDICINE

## 2025-07-21 NOTE — PATIENT INSTRUCTIONS
DISCONTINUE ELIQUIS   CONTINUE ASPIRIN 81 MG DAILY     Dr. Avila would like you to watch your diet, exercise and hydrate   FASTING LABS 1 WEEK PRIOR TO NEXT OFFICE VISIT      DID YOU KNOW  We have a pharmacy here in the [facility].  They can fill all prescriptions, not just cardiac medications.  Prescriptions from other pharmacies can easily be transferred to the  pharmacy by the  pharmacist on site.   pharmacies offer FREE HOME DELIVERY on medications to anywhere in [place]. They can sync your medications. Typically prescriptions can be ready in 10 - 15 minutes. If pharmacy is unable to fill your  prescription or if cost is more than your paying now the Pharmacist can easily transfer back to your Pharmacy of choice. Pharmacy phone # [phone].      Please bring all medicines, vitamins, and herbal supplements with you in original bottles to every appointment  Prescriptions will not be filled unless you are compliant with your follow up appointments or have a follow up appointment scheduled as per instruction of your physician.   Refills should be requested at the time of your visit.

## 2025-07-21 NOTE — PROGRESS NOTES
CARDIOLOGY OFFICE NOTE     Date:   7/21/2025    Patient:    Lou Oliva    YOB: 1935    Primary Physician: Alexa Chatman MD         REASON FOR VISIT / CHIEF COMPLAINT:     2-month cardiology follow-up.    HPI:     Lou Oliva was seen in cardiac evaluation at the [facility] office July 21, 2025.      The patients problems are listed as in the impression below.    Electronic medical records reviewed.    Patient returns.  She is with her son.  She is doing well.  She had her Watchman device placed May 22, 2025.  She has had no complaints since.  She feels well.  Her pacemaker is working well.    She is mostly limited due to her degenerative joint disease with left hip and knee discomfort.    She does have some chronic edema which she takes Demadex for    Patient denies Chest Pain, SOB, Lightheadedness, Dizziness, TIA or CVA symptoms.  No CHF.  No Palpitations.  No GI,  or Bleeding Issues. No Recent Fever or Chills.     Cardiovascular and general review of systems is otherwise negative.    A 14-system review is otherwise negative, other than noted.     PHYSICAL EXAMINATION:      Vitals:    07/21/25 1503   BP: 120/54   Pulse: 62     No acute distress.  Alert and oriented.   Head and neck examination: No jugular venous distention, no carotid bruits, no mass. Carotid upstrokes preserved. Oral mucosa moist. No xanthelasma. Head and neck examination otherwise unremarkable.   Lungs: Clear to auscultation and percussion. No wheezes, no rales, and no rhonchi. Chest excursion appeared to be normal. No chest wall tenderness on palpation. Well-healed mid incisional scar.   Heart: Normal S1 and S2. No S3. No S4. No rub. No murmur. Point of maximal impulse was within normal limits. Pacemaker left chest. Abdomen was soft. Nontender. No organomegaly. No bruits. No masses.   Extremities: Mild bipedal edema. No clubbing. No cyanosis. Pulses are strong throughout. No bruits.    Musculoskeletal exam: No ulcers, otherwise unremarkable.   Neurologically appeared grossly intact.   .  IMPRESSION:       Cardiovascular status stable  Asymptomatic, CV wise  Fatigue, chronic  Edema, improved  Fall, nonrecurrent  Atrial fibrillation, maintains atrial paced rhythm, post pacemaker Medtronic, 2023  Status post Watchman device, #20, 5/20/2025.  GI bleed due to peptic ulcer disease, not recurrent  Congestive heart failure, diastolic, compensated  Symptomatic bradycardia, post permanent pacemaker currently 2023, Medtronic sensor XT DR LOVELL.  Coronary artery disease, status post coronary artery bypass graft surgery, September 2007; left internal mammary artery to the left anterior descending coronary artery, saphenous vein graft to the first obtuse marginal and second obtuse marginal, diagonal and right coronary artery, left atrial appendage ligation  Transesophageal echocardiogram, 4/2025, small left atrial appendage appearing to be only partially ligated if at all.  Negative Lexiscan myocardial perfusion stress test, ejection fraction 65%, 2012 , 2020 and June 2024.  Normal left ventricular systolic function. LVEF 60%.,  Echocardiogram 12/2024.  Hypertension.  Hypothyroidism, possibly secondary to prior amiodarone use.  Familial hyperlipidemia (intolerant to statins and Juxtapid).  Peripheral vascular disease with stable 2.6 cm abdominal aortic ectasia; status post left iliac balloon angioplasty.   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   History of appendectomy.  History of total abdominal hysterectomy.  Obesity.  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   Possible obstructive sleep apnea.  Statin intolerance due to myalgias  GERD / PUD  Anemia  COVID infection 1/2024.      Otherwise as per assessment below.    RECOMMENDATIONS:      The patient overall is doing well.  Would suggest continuing current medications except for that she can discontinue her  Eliquis at this point post Watchman device.  Refills were provided.    Exercise dietary program.  Hydration.    Pacemaker interrogations and follow-up.    [employer] portal use was encouraged.    We will plan to see back in 3 months with Laboratory Studies and ECG as ordered.     Patient will follow up with their primary physician for general care.    The patient knows to contact medical care earlier if need be.      ALLERGIES:     Lisinopril, Nsaids (non-steroidal anti-inflammatory drug), Statins-hmg-coa reductase inhibitors, and Sulfa (sulfonamide antibiotics)     MEDICATIONS:     Current Outpatient Medications   Medication Instructions    apixaban (Eliquis) 2.5 mg tablet TAKE 1 TABLET(2.5 MG) BY MOUTH TWICE DAILY.  Please resume Eliquis on 5/23/2025.    aspirin 81 mg, oral, Daily, Please start taking the day after you complete 3 months of Eliquis post Watchman.  You will then continue aspirin 81 mg daily for life.    betamethasone dipropionate (Diprosone) 0.05 % lotion 2 times daily PRN    calcium carbonate (Tums) 500 mg (200 mg elemental) chewable tablet 1 tablet, Daily    cholecalciferol (VITAMIN D3) 50 mcg, Daily    diclofenac sodium (VOLTAREN ARTHRITIS PAIN) 4 g, 4 times daily PRN    ferrous gluconate 324 (38 Fe) MG tablet 1 tablet, Daily    hydrALAZINE (APRESOLINE) 25 mg, oral, 3 times daily    lidocaine (Salonpas, lidocaine,) 4 % patch 1 patch, Daily    losartan (COZAAR) 25 mg, oral, 2 times daily    metoprolol tartrate (LOPRESSOR) 25 mg, oral, 2 times daily    nitroglycerin (NitrolinguaL) 400 mcg/spray spray Place 1 spray under the tongue every 5 minutes if needed for chest pain.    pantoprazole (PROTONIX) 40 mg, oral, Daily, Do not crush, chew, or split. 2 times a day for 4 weeks then 1 daily    polyethylene glycol (GLYCOLAX, MIRALAX) 17 g, Daily RT    ranolazine (RANEXA) 500 mg, oral, 2 times daily    rOPINIRole (REQUIP) 0.5 mg, 3 times daily    torsemide (DEMADEX) 20 mg, oral, Daily        ELECTROCARDIOGRAM:      Atrial paced rhythm, right bundle branch block, left anterior fascicular block, rate 62.  She cannot    CARDIAC:      None this visit    LABORATORY DATA:      CBC:   Lab Results   Component Value Date    WBC 7.0 07/16/2025    RBC 3.82 07/16/2025    HGB 10.6 (L) 07/16/2025    HCT 34.2 (L) 07/16/2025     07/16/2025        CMP:    Lab Results   Component Value Date     07/16/2025    K 4.4 07/16/2025     07/16/2025    CO2 26 07/16/2025    BUN 37 (H) 07/16/2025    CREATININE 2.54 (H) 07/16/2025    GLUCOSE 85 07/16/2025    CALCIUM 9.0 07/16/2025       Magnesium:    Lab Results   Component Value Date    MG 2.4 07/16/2025       Lipid Profile:    Lab Results   Component Value Date    CHOL 188 07/16/2025    TRIG 117 07/16/2025    HDL 40 (L) 07/16/2025    LDLCALC 126 (H) 07/16/2025       Hepatic Function Panel:    Lab Results   Component Value Date    ALKPHOS 48 07/16/2025    ALT 6 07/16/2025    AST 13 07/16/2025    PROT 6.1 07/16/2025    BILITOT 0.7 07/16/2025    BILIDIR 0.1 07/16/2025                   PROBLEM LIST:     Problem List[1]          Delroy Avila MD, [facility]   [facility] /  Cardiology      Of Note:  ActX voice recognition dictation software was utilized partially in the preparation of this note, therefore, inaccuracies in spelling, word choice and punctuation may have occurred which were not recognized at the time of signing.    Patient was seen and examined with total time of visit including chart preparation, rooming, and chart completion exceeding 40 minutes.      ISmiley am scribing for, and in the presence of Dr. Delroy Avila MD, FACC.    I, Dr. Delroy Avila MD, [facility], personally performed the services described in the documentation as scribed by Smiley HEMPHILL in my presence, and confirm it is both accurate and complete.            [1]   Patient Active Problem List  Diagnosis    Abnormal electrocardiogram    Aneurysm of abdominal aorta     ASHD (arteriosclerotic heart disease)    Atrial fibrillation (Multi)    GERD (gastroesophageal reflux disease)    GI bleed    Edema    Hyperlipidemia    Pain of left hip joint    Hypertension    Peripheral vascular disease    Right bundle branch block (RBBB)    S/P CABG (coronary artery bypass graft)    Trochanteric bursitis of left hip    Vertigo    Pacemaker    Sinus node dysfunction (Multi)    High risk medication use    BMI 32.0-32.9,adult    Former smoker    Anemia    PUD (peptic ulcer disease)    LAFB (left anterior fascicular block)    Hyperthyroidism    Renal insufficiency    Fall    History of COVID-19    Chronic diastolic heart failure    Atrial fibrillation, unspecified type (Multi)    Encounter for examination for normal comparison and control in clinical research program    Presence of Watchman left atrial appendage closure device

## 2025-07-28 ENCOUNTER — HOSPITAL ENCOUNTER (OUTPATIENT)
Dept: CARDIOLOGY | Age: 89
Discharge: HOME | End: 2025-07-28
Payer: MEDICARE

## 2025-07-28 PROCEDURE — 93296 REM INTERROG EVL PM/IDS: CPT

## 2025-07-28 PROCEDURE — 93294 REM INTERROG EVL PM/LDLS PM: CPT | Performed by: INTERNAL MEDICINE

## 2025-08-19 ENCOUNTER — APPOINTMENT (OUTPATIENT)
Dept: CARDIOLOGY | Facility: CLINIC | Age: OVER 89
End: 2025-08-19
Payer: MEDICARE

## 2025-08-19 VITALS
SYSTOLIC BLOOD PRESSURE: 146 MMHG | BODY MASS INDEX: 29.5 KG/M2 | WEIGHT: 172.8 LBS | HEIGHT: 64 IN | HEART RATE: 62 BPM | DIASTOLIC BLOOD PRESSURE: 70 MMHG

## 2025-08-19 DIAGNOSIS — D64.9 ANEMIA, UNSPECIFIED TYPE: ICD-10-CM

## 2025-08-19 DIAGNOSIS — I73.9 PERIPHERAL VASCULAR DISEASE: ICD-10-CM

## 2025-08-19 DIAGNOSIS — N28.9 RENAL INSUFFICIENCY: ICD-10-CM

## 2025-08-19 DIAGNOSIS — R60.9 EDEMA, UNSPECIFIED TYPE: ICD-10-CM

## 2025-08-19 DIAGNOSIS — Z95.0 PACEMAKER: ICD-10-CM

## 2025-08-19 DIAGNOSIS — I10 HYPERTENSION, UNSPECIFIED TYPE: ICD-10-CM

## 2025-08-19 DIAGNOSIS — I10 PRIMARY HYPERTENSION: ICD-10-CM

## 2025-08-19 DIAGNOSIS — I48.0 PAROXYSMAL ATRIAL FIBRILLATION (MULTI): ICD-10-CM

## 2025-08-19 DIAGNOSIS — I44.4 LAFB (LEFT ANTERIOR FASCICULAR BLOCK): ICD-10-CM

## 2025-08-19 DIAGNOSIS — I71.40 ABDOMINAL AORTIC ANEURYSM (AAA) WITHOUT RUPTURE, UNSPECIFIED PART: ICD-10-CM

## 2025-08-19 DIAGNOSIS — Z95.1 S/P CABG (CORONARY ARTERY BYPASS GRAFT): ICD-10-CM

## 2025-08-19 DIAGNOSIS — I45.10 RIGHT BUNDLE BRANCH BLOCK (RBBB): ICD-10-CM

## 2025-08-19 DIAGNOSIS — Z79.899 HIGH RISK MEDICATION USE: ICD-10-CM

## 2025-08-19 PROCEDURE — 3078F DIAST BP <80 MM HG: CPT | Performed by: INTERNAL MEDICINE

## 2025-08-19 PROCEDURE — 99214 OFFICE O/P EST MOD 30 MIN: CPT | Performed by: INTERNAL MEDICINE

## 2025-08-19 PROCEDURE — 3077F SYST BP >= 140 MM HG: CPT | Performed by: INTERNAL MEDICINE

## 2025-08-19 PROCEDURE — 1159F MED LIST DOCD IN RCRD: CPT | Performed by: INTERNAL MEDICINE

## 2025-08-19 RX ORDER — METOPROLOL TARTRATE 50 MG/1
50 TABLET ORAL 2 TIMES DAILY
Qty: 180 TABLET | Refills: 1 | Status: SHIPPED | OUTPATIENT
Start: 2025-08-19 | End: 2026-02-15

## 2025-09-16 ENCOUNTER — APPOINTMENT (OUTPATIENT)
Dept: CARDIOLOGY | Facility: CLINIC | Age: OVER 89
End: 2025-09-16
Payer: MEDICARE

## 2025-11-04 ENCOUNTER — APPOINTMENT (OUTPATIENT)
Dept: CARDIOLOGY | Facility: CLINIC | Age: OVER 89
End: 2025-11-04
Payer: MEDICARE

## 2025-11-19 ENCOUNTER — APPOINTMENT (OUTPATIENT)
Dept: CARDIOLOGY | Facility: CLINIC | Age: OVER 89
End: 2025-11-19
Payer: MEDICARE

## (undated) DEVICE — FEMORAL CANAL BRUSH, IRRIGATION/SUCTION

## (undated) DEVICE — SUTURE MCRYL SZ 4-0 L27IN ABSRB UD L19MM PS-2 1/2 CIR PRIM Y426H

## (undated) DEVICE — SHEATH, PINNACLE, 10 CM,  7FR INTRODUCER, 7FR DIA, 2.5 CM DIALATOR

## (undated) DEVICE — HIGH FLOW TIP

## (undated) DEVICE — GLOVE ORANGE PI 8 1/2   MSG9085

## (undated) DEVICE — KIT BNE CEM PREP FEM W O RESTRIC BRSH CURET NOZ AND SPNG

## (undated) DEVICE — DEVICE, CLOSURE, PERCLOSE, PROSTYLE

## (undated) DEVICE — PACK PROCEDURE SURG TOT HIP DRP

## (undated) DEVICE — ACCESS KIT, S-MAK MINI, 4FR 10CM 0.018IN 40CM, NT/PT, ECHO ENHANCE NEEDLE

## (undated) DEVICE — TUBING IRRIGATION 140/160/180/190 SER GI ENDOSCP SMARTCAP

## (undated) DEVICE — BRUSH ENDO CLN L90.5IN SHTH DIA1.7MM BRIST DIA5-7MM 2-6MM

## (undated) DEVICE — ADVANCED FEMORAL PRESSURIZER, MEDIUM

## (undated) DEVICE — CATHETER, ANGIO, IMPULSE, PIG 155, 6 FR X 110 CM

## (undated) DEVICE — SUPPLEMENT DIGESTIVE H2O SOL GI-EASE

## (undated) DEVICE — SHEATH, PINNACLE, W/.038 GUIDEWIRE, 10 CM,  8FR INTRODUCER, 8FR DIA, 2.5 CM DIALATOR

## (undated) DEVICE — 3M™ STERI-DRAPE™ U-DRAPE 1015: Brand: STERI-DRAPE™

## (undated) DEVICE — BLADE SAW W6.3XL60MM THK0.64MM CUT THK1.04MM REPL SHT RECIP

## (undated) DEVICE — TUBE SET 96 MM 64 MM H2O PERISTALTIC STD AUX CHANNEL

## (undated) DEVICE — SINGLE PORT MANIFOLD: Brand: NEPTUNE 2

## (undated) DEVICE — 2000CC GUARDIAN II: Brand: GUARDIAN

## (undated) DEVICE — SUTURE RETRIEVER

## (undated) DEVICE — SIPS DUAL 2 MINUTE TIP

## (undated) DEVICE — CHLORAPREP 26ML ORANGE

## (undated) DEVICE — CONMED SCOPE SAVER BITE BLOCK, 20X27 MM: Brand: SCOPE SAVER

## (undated) DEVICE — Z DISCONTINUED PER MEDLINE USE 2741942 DRESSING AQUACEL 6 IN ALG W9XL15CM SIL CVR WTRPRF VIR BACT BARR ANTIMIC

## (undated) DEVICE — VERSACROSS KIT, ACCESS SOLUTION, RF WIRE 180CM

## (undated) DEVICE — Device

## (undated) DEVICE — ENDO CARRY-ON PROCEDURE KIT: Brand: ENDO CARRY-ON PROCEDURE KIT

## (undated) DEVICE — SHEATH, PINNACLE, W/O GUIDEWIRE, 8FR INTRODUCER,  8FR DIA, 2.5 CM DILATOR

## (undated) DEVICE — FORCEPS ENDOSCP BX OVL CUP SERR W/NEEDLE 2.3MM DIA 160CML

## (undated) DEVICE — TUBING, SUCTION, 1/4" X 10', STRAIGHT: Brand: MEDLINE

## (undated) DEVICE — ELECTRODE PT RET AD L9FT HI MOIST COND ADH HYDRGEL CORDED

## (undated) DEVICE — CARDIAC CATH KIT, MANIFOLD, CUSTOM

## (undated) DEVICE — CEMENT MIXING SYSTEM WITH FEMORAL BREAKWAY NOZZLE: Brand: REVOLUTION

## (undated) DEVICE — SYSTEM, ACCESS, FXD DBL CURVE, WATCHMAN

## (undated) DEVICE — GLOVE ORANGE PI 8   MSG9080

## (undated) DEVICE — GLOVE ORANGE PI 7 1/2   MSG9075

## (undated) DEVICE — KIT, INTRODUCER MICROPUNCTURE, 4FR STIFF, NITINOL WIRE, TUNGSTEN TIP

## (undated) DEVICE — TIBURON TOP SHEET: Brand: CONVERTORS

## (undated) DEVICE — CATHETER, VIEW FLEX XTRA ICE, 9FR

## (undated) DEVICE — TRAY CATH CATH OD16FR BG F HYDROCOATED

## (undated) DEVICE — CATHETER, ACUNAV ULTRASOUND, 8FR 90CM, GE

## (undated) DEVICE — INTRODUCER, CATHETER, HEMOSTASIS, FAST-CATH, 10 FR X 23 CM

## (undated) DEVICE — HIP PILLOW, ABDUCTION: Brand: DEROYAL

## (undated) DEVICE — T4 HOOD

## (undated) DEVICE — COVER POLY, ACUSON SWIFT LINK, 15.2 > 7.6 X 183CM

## (undated) DEVICE — SUTURE ETHBND EXCEL SZ 2 L30IN NONABSORBABLE GRN L40MM V-37 MX69G

## (undated) DEVICE — SUTURE VCRL SZ 1 L36IN ABSRB UD L36MM CT-1 1/2 CIR J947H

## (undated) DEVICE — SUTURE MCRYL + SZ 3-0 L36IN ABSRB UD CT-1 L36MM 1/2 CIR MCP944H

## (undated) DEVICE — TIBURON SPLIT SHEET: Brand: CONVERTORS